# Patient Record
Sex: FEMALE | Race: WHITE | Employment: OTHER
[De-identification: names, ages, dates, MRNs, and addresses within clinical notes are randomized per-mention and may not be internally consistent; named-entity substitution may affect disease eponyms.]

---

## 2017-01-12 ENCOUNTER — TELEPHONE (OUTPATIENT)
Dept: ONCOLOGY | Facility: CLINIC | Age: 62
End: 2017-01-12

## 2017-01-18 ENCOUNTER — TELEPHONE (OUTPATIENT)
Dept: PRIMARY CARE CLINIC | Facility: CLINIC | Age: 62
End: 2017-01-18

## 2017-01-20 ENCOUNTER — TELEPHONE (OUTPATIENT)
Dept: PRIMARY CARE CLINIC | Facility: CLINIC | Age: 62
End: 2017-01-20

## 2017-01-20 DIAGNOSIS — Z79.01 LONG TERM CURRENT USE OF ANTICOAGULANT THERAPY: ICD-10-CM

## 2017-01-20 DIAGNOSIS — I82.409 ACUTE THROMBOEMBOLISM OF DEEP VEINS OF LOWER EXTREMITY, UNSPECIFIED LATERALITY (HCC): Primary | ICD-10-CM

## 2017-01-20 RX ORDER — WARFARIN SODIUM 5 MG/1
TABLET ORAL
Qty: 30 TABLET | Refills: 0 | Status: SHIPPED | OUTPATIENT
Start: 2017-01-20 | End: 2017-03-20 | Stop reason: SDUPTHER

## 2017-03-02 ENCOUNTER — OFFICE VISIT (OUTPATIENT)
Dept: ONCOLOGY | Facility: CLINIC | Age: 62
End: 2017-03-02

## 2017-03-02 VITALS
WEIGHT: 267.8 LBS | TEMPERATURE: 99 F | SYSTOLIC BLOOD PRESSURE: 133 MMHG | HEART RATE: 98 BPM | RESPIRATION RATE: 20 BRPM | HEIGHT: 60 IN | BODY MASS INDEX: 52.58 KG/M2 | DIASTOLIC BLOOD PRESSURE: 67 MMHG

## 2017-03-02 DIAGNOSIS — I82.4Y3 DEEP VEIN THROMBOSIS (DVT) OF PROXIMAL VEIN OF BOTH LOWER EXTREMITIES, UNSPECIFIED CHRONICITY (HCC): ICD-10-CM

## 2017-03-02 DIAGNOSIS — I82.409 ACUTE THROMBOEMBOLISM OF DEEP VEINS OF LOWER EXTREMITY, UNSPECIFIED LATERALITY (HCC): ICD-10-CM

## 2017-03-02 DIAGNOSIS — I89.0 LYMPHEDEMA OF BOTH LOWER EXTREMITIES: ICD-10-CM

## 2017-03-02 DIAGNOSIS — Z79.01 LONG TERM CURRENT USE OF ANTICOAGULANT THERAPY: Primary | ICD-10-CM

## 2017-03-02 PROCEDURE — 99203 OFFICE O/P NEW LOW 30 MIN: CPT | Performed by: INTERNAL MEDICINE

## 2017-03-02 RX ORDER — M-VIT,TX,IRON,MINS/CALC/FOLIC 27MG-0.4MG
1 TABLET ORAL DAILY
COMMUNITY

## 2017-03-02 ASSESSMENT — ENCOUNTER SYMPTOMS
COLOR CHANGE: 0
COUGH: 0
BACK PAIN: 0
CONSTIPATION: 0
EYE ITCHING: 0
EYE REDNESS: 0
BLOOD IN STOOL: 0
WHEEZING: 0
NAUSEA: 0
VOMITING: 0
DIARRHEA: 0
SHORTNESS OF BREATH: 0
ABDOMINAL PAIN: 0
CHEST TIGHTNESS: 0

## 2017-03-15 ENCOUNTER — TELEPHONE (OUTPATIENT)
Dept: WOUND CARE | Age: 62
End: 2017-03-15

## 2017-03-15 RX ORDER — TRAMADOL HYDROCHLORIDE 50 MG/1
TABLET ORAL
Qty: 30 TABLET | Refills: 1 | Status: SHIPPED | OUTPATIENT
Start: 2017-03-15 | End: 2018-01-11 | Stop reason: ALTCHOICE

## 2017-03-20 DIAGNOSIS — Z79.01 LONG TERM CURRENT USE OF ANTICOAGULANT THERAPY: ICD-10-CM

## 2017-03-20 DIAGNOSIS — I82.409 ACUTE THROMBOEMBOLISM OF DEEP VEINS OF LOWER EXTREMITY, UNSPECIFIED LATERALITY (HCC): ICD-10-CM

## 2017-03-20 RX ORDER — WARFARIN SODIUM 5 MG/1
TABLET ORAL
Qty: 30 TABLET | Refills: 0 | Status: SHIPPED | OUTPATIENT
Start: 2017-03-20 | End: 2017-06-22 | Stop reason: ALTCHOICE

## 2017-03-23 ENCOUNTER — OFFICE VISIT (OUTPATIENT)
Dept: ONCOLOGY | Age: 62
End: 2017-03-23
Payer: COMMERCIAL

## 2017-03-23 VITALS
SYSTOLIC BLOOD PRESSURE: 130 MMHG | TEMPERATURE: 97.8 F | HEIGHT: 60 IN | DIASTOLIC BLOOD PRESSURE: 62 MMHG | HEART RATE: 111 BPM | WEIGHT: 264.8 LBS | RESPIRATION RATE: 20 BRPM | BODY MASS INDEX: 51.99 KG/M2

## 2017-03-23 DIAGNOSIS — I82.409 ACUTE THROMBOEMBOLISM OF DEEP VEINS OF LOWER EXTREMITY, UNSPECIFIED LATERALITY (HCC): Primary | ICD-10-CM

## 2017-03-23 DIAGNOSIS — E66.9 OBESITY, UNSPECIFIED OBESITY SEVERITY, UNSPECIFIED OBESITY TYPE: ICD-10-CM

## 2017-03-23 DIAGNOSIS — Z79.01 LONG TERM CURRENT USE OF ANTICOAGULANT THERAPY: ICD-10-CM

## 2017-03-23 PROCEDURE — 99214 OFFICE O/P EST MOD 30 MIN: CPT | Performed by: INTERNAL MEDICINE

## 2017-03-23 ASSESSMENT — ENCOUNTER SYMPTOMS
VOMITING: 0
EYE REDNESS: 0
DIARRHEA: 0
BACK PAIN: 0
COLOR CHANGE: 0
ABDOMINAL PAIN: 0
SINUS PRESSURE: 1
NAUSEA: 0
CONSTIPATION: 0
EYE ITCHING: 0
WHEEZING: 0
CHEST TIGHTNESS: 0
COUGH: 0
SHORTNESS OF BREATH: 0
BLOOD IN STOOL: 0

## 2017-06-20 ENCOUNTER — HOSPITAL ENCOUNTER (OUTPATIENT)
Age: 62
Discharge: HOME | End: 2017-06-20
Payer: COMMERCIAL

## 2017-06-20 DIAGNOSIS — I82.409 ACUTE THROMBOEMBOLISM OF DEEP VEINS OF LOWER EXTREMITY, UNSPECIFIED LATERALITY (HCC): ICD-10-CM

## 2017-06-20 DIAGNOSIS — Z79.01 LONG TERM CURRENT USE OF ANTICOAGULANT THERAPY: ICD-10-CM

## 2017-06-20 LAB
ABSOLUTE EOS #: 0.2 K/UL (ref 0–0.4)
ABSOLUTE LYMPH #: 2.2 K/UL (ref 1–4.8)
ABSOLUTE MONO #: 0.6 K/UL (ref 0–1)
ALBUMIN SERPL-MCNC: 3.8 G/DL (ref 3.5–5.2)
ALBUMIN/GLOBULIN RATIO: 1.2 (ref 1–2.5)
ALP BLD-CCNC: 84 U/L (ref 35–104)
ALT SERPL-CCNC: 15 U/L (ref 5–33)
ANION GAP SERPL CALCULATED.3IONS-SCNC: 15 MMOL/L (ref 9–17)
AST SERPL-CCNC: 15 U/L
BASOPHILS # BLD: 0 %
BASOPHILS ABSOLUTE: 0 K/UL (ref 0–0.2)
BILIRUB SERPL-MCNC: 0.26 MG/DL (ref 0.3–1.2)
BUN BLDV-MCNC: 25 MG/DL (ref 8–23)
BUN/CREAT BLD: 36 (ref 9–20)
CALCIUM SERPL-MCNC: 8.8 MG/DL (ref 8.6–10.4)
CHLORIDE BLD-SCNC: 104 MMOL/L (ref 98–107)
CO2: 25 MMOL/L (ref 20–31)
CREAT SERPL-MCNC: 0.7 MG/DL (ref 0.5–0.9)
DIFFERENTIAL TYPE: NORMAL
EOSINOPHILS RELATIVE PERCENT: 2 %
GFR AFRICAN AMERICAN: >60 ML/MIN
GFR NON-AFRICAN AMERICAN: >60 ML/MIN
GFR SERPL CREATININE-BSD FRML MDRD: ABNORMAL ML/MIN/{1.73_M2}
GFR SERPL CREATININE-BSD FRML MDRD: ABNORMAL ML/MIN/{1.73_M2}
GLUCOSE BLD-MCNC: 105 MG/DL (ref 70–99)
HCT VFR BLD CALC: 44.8 % (ref 36–46)
HEMOGLOBIN: 15.3 G/DL (ref 12–16)
LYMPHOCYTES # BLD: 29 %
MCH RBC QN AUTO: 32.9 PG (ref 26–34)
MCHC RBC AUTO-ENTMCNC: 34.2 G/DL (ref 31–37)
MCV RBC AUTO: 96.1 FL (ref 80–100)
MONOCYTES # BLD: 9 %
PDW BLD-RTO: 12.9 % (ref 12.1–15.2)
PLATELET # BLD: 286 K/UL (ref 140–450)
PLATELET ESTIMATE: NORMAL
PMV BLD AUTO: NORMAL FL (ref 6–12)
POTASSIUM SERPL-SCNC: 3.9 MMOL/L (ref 3.7–5.3)
RBC # BLD: 4.66 M/UL (ref 4–5.2)
RBC # BLD: NORMAL 10*6/UL
SEG NEUTROPHILS: 60 %
SEGMENTED NEUTROPHILS ABSOLUTE COUNT: 4.4 K/UL (ref 1.8–7.7)
SODIUM BLD-SCNC: 144 MMOL/L (ref 135–144)
TOTAL PROTEIN: 7.1 G/DL (ref 6.4–8.3)
WBC # BLD: 7.4 K/UL (ref 3.5–11)
WBC # BLD: NORMAL 10*3/UL

## 2017-06-20 PROCEDURE — 80053 COMPREHEN METABOLIC PANEL: CPT

## 2017-06-20 PROCEDURE — 85025 COMPLETE CBC W/AUTO DIFF WBC: CPT

## 2017-06-20 PROCEDURE — 36415 COLL VENOUS BLD VENIPUNCTURE: CPT

## 2017-06-22 ENCOUNTER — OFFICE VISIT (OUTPATIENT)
Dept: ONCOLOGY | Age: 62
End: 2017-06-22
Payer: COMMERCIAL

## 2017-06-22 VITALS
HEIGHT: 60 IN | RESPIRATION RATE: 20 BRPM | WEIGHT: 256 LBS | SYSTOLIC BLOOD PRESSURE: 148 MMHG | DIASTOLIC BLOOD PRESSURE: 81 MMHG | HEART RATE: 81 BPM | TEMPERATURE: 97.2 F | BODY MASS INDEX: 50.26 KG/M2

## 2017-06-22 DIAGNOSIS — I82.409 ACUTE THROMBOEMBOLISM OF DEEP VEINS OF LOWER EXTREMITY, UNSPECIFIED LATERALITY (HCC): Primary | ICD-10-CM

## 2017-06-22 DIAGNOSIS — Z79.01 LONG TERM CURRENT USE OF ANTICOAGULANT THERAPY: ICD-10-CM

## 2017-06-22 PROCEDURE — 99213 OFFICE O/P EST LOW 20 MIN: CPT | Performed by: INTERNAL MEDICINE

## 2017-06-22 ASSESSMENT — ENCOUNTER SYMPTOMS
WHEEZING: 0
CONSTIPATION: 0
COLOR CHANGE: 0
BLOOD IN STOOL: 0
CHEST TIGHTNESS: 0
COUGH: 0
DIARRHEA: 0
ABDOMINAL PAIN: 0
EYE ITCHING: 0
NAUSEA: 0
SHORTNESS OF BREATH: 0
VOMITING: 0
EYE REDNESS: 0
BACK PAIN: 0

## 2017-06-30 ENCOUNTER — TELEPHONE (OUTPATIENT)
Dept: PODIATRY | Age: 62
End: 2017-06-30

## 2017-07-06 ENCOUNTER — TELEPHONE (OUTPATIENT)
Dept: PODIATRY | Age: 62
End: 2017-07-06

## 2018-01-03 ENCOUNTER — HOSPITAL ENCOUNTER (OUTPATIENT)
Age: 63
Discharge: HOME | End: 2018-01-03
Payer: COMMERCIAL

## 2018-01-03 DIAGNOSIS — I82.409 ACUTE THROMBOEMBOLISM OF DEEP VEINS OF LOWER EXTREMITY, UNSPECIFIED LATERALITY (HCC): ICD-10-CM

## 2018-01-03 LAB
ABSOLUTE EOS #: 0.2 K/UL (ref 0–0.4)
ABSOLUTE IMMATURE GRANULOCYTE: ABNORMAL K/UL (ref 0–0.3)
ABSOLUTE LYMPH #: 2 K/UL (ref 1–4.8)
ABSOLUTE MONO #: 0.7 K/UL (ref 0–1)
ALBUMIN SERPL-MCNC: 4 G/DL (ref 3.5–5.2)
ALBUMIN/GLOBULIN RATIO: 1.2 (ref 1–2.5)
ALP BLD-CCNC: 95 U/L (ref 35–104)
ALT SERPL-CCNC: 19 U/L (ref 5–33)
ANION GAP SERPL CALCULATED.3IONS-SCNC: 12 MMOL/L (ref 9–17)
AST SERPL-CCNC: 16 U/L
BASOPHILS # BLD: 0 % (ref 0–2)
BASOPHILS ABSOLUTE: 0 K/UL (ref 0–0.2)
BILIRUB SERPL-MCNC: 0.3 MG/DL (ref 0.3–1.2)
BUN BLDV-MCNC: 25 MG/DL (ref 8–23)
BUN/CREAT BLD: 40 (ref 9–20)
CALCIUM SERPL-MCNC: 9.3 MG/DL (ref 8.6–10.4)
CHLORIDE BLD-SCNC: 103 MMOL/L (ref 98–107)
CO2: 29 MMOL/L (ref 20–31)
CREAT SERPL-MCNC: 0.62 MG/DL (ref 0.5–0.9)
DIFFERENTIAL TYPE: ABNORMAL
EOSINOPHILS RELATIVE PERCENT: 3 % (ref 0–8)
GFR AFRICAN AMERICAN: >60 ML/MIN
GFR NON-AFRICAN AMERICAN: >60 ML/MIN
GFR SERPL CREATININE-BSD FRML MDRD: ABNORMAL ML/MIN/{1.73_M2}
GFR SERPL CREATININE-BSD FRML MDRD: ABNORMAL ML/MIN/{1.73_M2}
GLUCOSE BLD-MCNC: 108 MG/DL (ref 70–99)
HCT VFR BLD CALC: 46.8 % (ref 36–46)
HEMOGLOBIN: 15.5 G/DL (ref 12–16)
IMMATURE GRANULOCYTES: ABNORMAL %
LYMPHOCYTES # BLD: 26 % (ref 24–44)
MCH RBC QN AUTO: 32.3 PG (ref 26–34)
MCHC RBC AUTO-ENTMCNC: 33.2 G/DL (ref 31–37)
MCV RBC AUTO: 97.5 FL (ref 80–100)
MONOCYTES # BLD: 9 % (ref 0–12)
PDW BLD-RTO: 13.1 % (ref 12.1–15.2)
PLATELET # BLD: 303 K/UL (ref 140–450)
PLATELET ESTIMATE: ABNORMAL
PMV BLD AUTO: 7.3 FL (ref 6–12)
POTASSIUM SERPL-SCNC: 4 MMOL/L (ref 3.7–5.3)
RBC # BLD: 4.8 M/UL (ref 4–5.2)
RBC # BLD: ABNORMAL 10*6/UL
SEG NEUTROPHILS: 62 % (ref 36–66)
SEGMENTED NEUTROPHILS ABSOLUTE COUNT: 4.8 K/UL (ref 1.8–7.7)
SODIUM BLD-SCNC: 144 MMOL/L (ref 135–144)
TOTAL PROTEIN: 7.4 G/DL (ref 6.4–8.3)
WBC # BLD: 7.7 K/UL (ref 3.5–11)
WBC # BLD: ABNORMAL 10*3/UL

## 2018-01-03 PROCEDURE — 85025 COMPLETE CBC W/AUTO DIFF WBC: CPT

## 2018-01-03 PROCEDURE — 36415 COLL VENOUS BLD VENIPUNCTURE: CPT

## 2018-01-03 PROCEDURE — 80053 COMPREHEN METABOLIC PANEL: CPT

## 2018-01-11 ENCOUNTER — OFFICE VISIT (OUTPATIENT)
Dept: ONCOLOGY | Age: 63
End: 2018-01-11
Payer: COMMERCIAL

## 2018-01-11 VITALS
RESPIRATION RATE: 18 BRPM | HEART RATE: 108 BPM | DIASTOLIC BLOOD PRESSURE: 73 MMHG | WEIGHT: 269 LBS | BODY MASS INDEX: 52.81 KG/M2 | SYSTOLIC BLOOD PRESSURE: 141 MMHG | TEMPERATURE: 96.6 F | HEIGHT: 60 IN

## 2018-01-11 DIAGNOSIS — I82.409 ACUTE THROMBOEMBOLISM OF DEEP VEINS OF LOWER EXTREMITY, UNSPECIFIED LATERALITY (HCC): Primary | ICD-10-CM

## 2018-01-11 DIAGNOSIS — Z79.01 LONG TERM CURRENT USE OF ANTICOAGULANT THERAPY: ICD-10-CM

## 2018-01-11 PROCEDURE — 3017F COLORECTAL CA SCREEN DOC REV: CPT | Performed by: INTERNAL MEDICINE

## 2018-01-11 PROCEDURE — G8427 DOCREV CUR MEDS BY ELIG CLIN: HCPCS | Performed by: INTERNAL MEDICINE

## 2018-01-11 PROCEDURE — 3014F SCREEN MAMMO DOC REV: CPT | Performed by: INTERNAL MEDICINE

## 2018-01-11 PROCEDURE — G8417 CALC BMI ABV UP PARAM F/U: HCPCS | Performed by: INTERNAL MEDICINE

## 2018-01-11 PROCEDURE — G8484 FLU IMMUNIZE NO ADMIN: HCPCS | Performed by: INTERNAL MEDICINE

## 2018-01-11 PROCEDURE — 99213 OFFICE O/P EST LOW 20 MIN: CPT | Performed by: INTERNAL MEDICINE

## 2018-01-11 PROCEDURE — 4004F PT TOBACCO SCREEN RCVD TLK: CPT | Performed by: INTERNAL MEDICINE

## 2018-01-11 ASSESSMENT — ENCOUNTER SYMPTOMS
NAUSEA: 0
SHORTNESS OF BREATH: 0
COLOR CHANGE: 0
EYE REDNESS: 0
DIARRHEA: 0
CHEST TIGHTNESS: 0
CONSTIPATION: 0
COUGH: 0
EYE ITCHING: 0
BACK PAIN: 0
WHEEZING: 0
VOMITING: 0
BLOOD IN STOOL: 0
ABDOMINAL PAIN: 0

## 2018-01-11 NOTE — PROGRESS NOTES
respiratory distress. She has no wheezes. Right breast exhibits no mass. Left breast exhibits no mass. Abdominal: Soft. She exhibits no mass. There is no hepatosplenomegaly. There is no tenderness. Musculoskeletal: Normal range of motion. She exhibits no edema or tenderness. Lymphadenopathy:     She has no cervical adenopathy. She has no axillary adenopathy. Neurological: She is alert and oriented to person, place, and time. No cranial nerve deficit. Skin: Skin is warm and dry. No cyanosis. Nails show no clubbing. Psychiatric: She has a normal mood and affect. Her behavior is normal. Thought content normal.   Nursing note and vitals reviewed.     Results for orders placed or performed during the hospital encounter of 01/03/18   CBC Auto Differential   Result Value Ref Range    WBC 7.7 3.5 - 11.0 k/uL    RBC 4.80 4.0 - 5.2 m/uL    Hemoglobin 15.5 12.0 - 16.0 g/dL    Hematocrit 46.8 (H) 36 - 46 %    MCV 97.5 80 - 100 fL    MCH 32.3 26 - 34 pg    MCHC 33.2 31 - 37 g/dL    RDW 13.1 12.1 - 15.2 %    Platelets 166 571 - 009 k/uL    MPV 7.3 6.0 - 12.0 fL    Differential Type NOT REPORTED     Seg Neutrophils 62 36 - 66 %    Lymphocytes 26 24 - 44 %    Monocytes 9 0 - 12 %    Eosinophils % 3 0 - 8 %    Basophils 0 0 - 2 %    Immature Granulocytes NOT REPORTED 0 %    Segs Absolute 4.80 1.8 - 7.7 k/uL    Absolute Lymph # 2.00 1.0 - 4.8 k/uL    Absolute Mono # 0.70 0.0 - 1.0 k/uL    Absolute Eos # 0.20 0.0 - 0.4 k/uL    Basophils # 0.00 0.0 - 0.2 k/uL    Absolute Immature Granulocyte NOT REPORTED 0.00 - 0.30 k/uL    WBC Morphology NOT REPORTED     RBC Morphology NOT REPORTED     Platelet Estimate NOT REPORTED    Comprehensive Metabolic Panel   Result Value Ref Range    Glucose 108 (H) 70 - 99 mg/dL    BUN 25 (H) 8 - 23 mg/dL    CREATININE 0.62 0.50 - 0.90 mg/dL    Bun/Cre Ratio 40 (H) 9 - 20    Calcium 9.3 8.6 - 10.4 mg/dL    Sodium 144 135 - 144 mmol/L    Potassium 4.0 3.7 - 5.3 mmol/L    Chloride 103 98 - 107

## 2018-01-16 DIAGNOSIS — Z79.01 LONG TERM CURRENT USE OF ANTICOAGULANT THERAPY: ICD-10-CM

## 2018-01-19 ENCOUNTER — TELEPHONE (OUTPATIENT)
Dept: ONCOLOGY | Age: 63
End: 2018-01-19

## 2018-01-19 DIAGNOSIS — Z79.01 LONG TERM CURRENT USE OF ANTICOAGULANT THERAPY: ICD-10-CM

## 2018-03-23 ENCOUNTER — HOSPITAL ENCOUNTER (OUTPATIENT)
Dept: PREADMISSION TESTING | Age: 63
Discharge: HOME | End: 2018-03-27
Payer: COMMERCIAL

## 2018-03-23 VITALS
DIASTOLIC BLOOD PRESSURE: 81 MMHG | WEIGHT: 265.4 LBS | HEART RATE: 85 BPM | RESPIRATION RATE: 18 BRPM | TEMPERATURE: 97.6 F | SYSTOLIC BLOOD PRESSURE: 155 MMHG | BODY MASS INDEX: 52.1 KG/M2 | HEIGHT: 60 IN | OXYGEN SATURATION: 95 %

## 2018-03-23 LAB
ABSOLUTE EOS #: 0.12 K/UL (ref 0–0.44)
ABSOLUTE IMMATURE GRANULOCYTE: <0.03 K/UL (ref 0–0.3)
ABSOLUTE LYMPH #: 1.8 K/UL (ref 1.1–3.7)
ABSOLUTE MONO #: 0.66 K/UL (ref 0.1–1.2)
ANION GAP SERPL CALCULATED.3IONS-SCNC: 11 MMOL/L (ref 9–17)
BASOPHILS # BLD: 1 % (ref 0–2)
BASOPHILS ABSOLUTE: 0.04 K/UL (ref 0–0.2)
BUN BLDV-MCNC: 26 MG/DL (ref 8–23)
BUN/CREAT BLD: 44 (ref 9–20)
CALCIUM SERPL-MCNC: 8.9 MG/DL (ref 8.6–10.4)
CHLORIDE BLD-SCNC: 103 MMOL/L (ref 98–107)
CO2: 30 MMOL/L (ref 20–31)
CREAT SERPL-MCNC: 0.59 MG/DL (ref 0.5–0.9)
DIFFERENTIAL TYPE: ABNORMAL
EKG ATRIAL RATE: 75 BPM
EKG P AXIS: 51 DEGREES
EKG P-R INTERVAL: 196 MS
EKG Q-T INTERVAL: 452 MS
EKG QRS DURATION: 136 MS
EKG QTC CALCULATION (BAZETT): 504 MS
EKG R AXIS: -17 DEGREES
EKG T AXIS: 10 DEGREES
EKG VENTRICULAR RATE: 75 BPM
EOSINOPHILS RELATIVE PERCENT: 2 % (ref 1–4)
GFR AFRICAN AMERICAN: >60 ML/MIN
GFR NON-AFRICAN AMERICAN: >60 ML/MIN
GFR SERPL CREATININE-BSD FRML MDRD: ABNORMAL ML/MIN/{1.73_M2}
GFR SERPL CREATININE-BSD FRML MDRD: ABNORMAL ML/MIN/{1.73_M2}
GLUCOSE BLD-MCNC: 98 MG/DL (ref 70–99)
HCT VFR BLD CALC: 45.3 % (ref 36.3–47.1)
HEMOGLOBIN: 15.3 G/DL (ref 11.9–15.1)
IMMATURE GRANULOCYTES: 0 %
LYMPHOCYTES # BLD: 25 % (ref 24–43)
MCH RBC QN AUTO: 32.9 PG (ref 25.2–33.5)
MCHC RBC AUTO-ENTMCNC: 33.8 G/DL (ref 28.4–34.8)
MCV RBC AUTO: 97.4 FL (ref 82.6–102.9)
MONOCYTES # BLD: 9 % (ref 3–12)
NRBC AUTOMATED: 0 PER 100 WBC
PDW BLD-RTO: 12.5 % (ref 11.8–14.4)
PLATELET # BLD: 298 K/UL (ref 138–453)
PLATELET ESTIMATE: ABNORMAL
PMV BLD AUTO: 9.1 FL (ref 8.1–13.5)
POTASSIUM SERPL-SCNC: 3.5 MMOL/L (ref 3.7–5.3)
RBC # BLD: 4.65 M/UL (ref 3.95–5.11)
RBC # BLD: ABNORMAL 10*6/UL
SEG NEUTROPHILS: 64 % (ref 36–65)
SEGMENTED NEUTROPHILS ABSOLUTE COUNT: 4.65 K/UL (ref 1.5–8.1)
SODIUM BLD-SCNC: 144 MMOL/L (ref 135–144)
WBC # BLD: 7.3 K/UL (ref 3.5–11.3)
WBC # BLD: ABNORMAL 10*3/UL

## 2018-03-23 PROCEDURE — 93005 ELECTROCARDIOGRAM TRACING: CPT

## 2018-03-23 PROCEDURE — 87641 MR-STAPH DNA AMP PROBE: CPT

## 2018-03-23 PROCEDURE — 36415 COLL VENOUS BLD VENIPUNCTURE: CPT

## 2018-03-23 PROCEDURE — 85025 COMPLETE CBC W/AUTO DIFF WBC: CPT

## 2018-03-23 PROCEDURE — 80048 BASIC METABOLIC PNL TOTAL CA: CPT

## 2018-03-23 RX ORDER — ACETAMINOPHEN 325 MG/1
650 TABLET ORAL ONCE
Status: CANCELLED | OUTPATIENT
Start: 2018-03-23 | End: 2018-03-23

## 2018-03-23 RX ORDER — TRANEXAMIC ACID 650 1/1
1300 TABLET ORAL ONCE
Status: CANCELLED | OUTPATIENT
Start: 2018-03-23

## 2018-03-23 RX ORDER — HYDROCORTISONE ACETATE 0.5 %
CREAM (GRAM) TOPICAL DAILY
COMMUNITY
End: 2018-10-30 | Stop reason: ALTCHOICE

## 2018-03-23 ASSESSMENT — PAIN DESCRIPTION - FREQUENCY: FREQUENCY: CONTINUOUS

## 2018-03-23 ASSESSMENT — PAIN SCALES - GENERAL: PAINLEVEL_OUTOF10: 9

## 2018-03-23 ASSESSMENT — PAIN DESCRIPTION - ORIENTATION: ORIENTATION: LEFT

## 2018-03-23 ASSESSMENT — PAIN DESCRIPTION - PAIN TYPE: TYPE: ACUTE PAIN

## 2018-03-23 ASSESSMENT — PAIN DESCRIPTION - LOCATION: LOCATION: KNEE

## 2018-03-23 NOTE — PROGRESS NOTES
Patient instructed on the pre-operative, intra-operative, and post-operative process. Patient's surgery arrival time to the hospital and surgery start time confirmed for the day of surgery. Patient instructed on NPO status. Medication instructions reviewed with patient. Pre operative instruction sheet reviewed and given to patient in PAT.
Patient instructed to stop Xarelto per PCP instructions.
Yes  Patient Language: English  Medical History Reviewed: Yes  NPO Status Reinforced: Yes  Ride and Caregiver Arranged: Yes  Ride Caregiver Provider:  & daughter  Patient Knows to Bring Current Medications: Yes    Pre-AdmissionTesting Checklist  Patient has been to this health system before?: Yes, W/in last 6 months  Does patient refuse blood?: No  Pre-existing DNR Comfort Care/DNR Arrest/DNI Order: No  Healthcare Directive: No, patient does not have an advance directive for healthcare treatment   needed: No  Patient can read and write?: Yes  History given by: Patient  Providing self care at home?: Yes  Assistive Devices with patient: Jennifer Paz, Wheelchair  Discharge transport (for same day patients): Family    Patient instructed on the pre-operative, intra-operative, and post-operative process? Yes  Medication instructions reviewed with patient? Yes  Pre operative instruction sheet reviewed and given to patient in PAT?   Yes

## 2018-03-24 LAB
MRSA, DNA, NASAL: NORMAL
SPECIMEN DESCRIPTION: NORMAL

## 2018-04-04 ENCOUNTER — HOSPITAL ENCOUNTER (OUTPATIENT)
Dept: PHYSICAL THERAPY | Age: 63
Setting detail: THERAPIES SERIES
Discharge: HOME | End: 2018-04-04
Payer: COMMERCIAL

## 2018-04-04 PROCEDURE — G8979 MOBILITY GOAL STATUS: HCPCS

## 2018-04-04 PROCEDURE — 97162 PT EVAL MOD COMPLEX 30 MIN: CPT

## 2018-04-04 PROCEDURE — G8978 MOBILITY CURRENT STATUS: HCPCS

## 2018-04-04 PROCEDURE — 97530 THERAPEUTIC ACTIVITIES: CPT

## 2018-04-06 ENCOUNTER — HOSPITAL ENCOUNTER (OUTPATIENT)
Age: 63
Discharge: HOME | End: 2018-04-06
Payer: COMMERCIAL

## 2018-04-06 ENCOUNTER — HOSPITAL ENCOUNTER (OUTPATIENT)
Dept: PHYSICAL THERAPY | Age: 63
Setting detail: THERAPIES SERIES
Discharge: HOME | End: 2018-04-06
Payer: COMMERCIAL

## 2018-04-06 DIAGNOSIS — Z79.01 LONG TERM CURRENT USE OF ANTICOAGULANT THERAPY: ICD-10-CM

## 2018-04-06 DIAGNOSIS — I82.409 ACUTE THROMBOEMBOLISM OF DEEP VEINS OF LOWER EXTREMITY, UNSPECIFIED LATERALITY (HCC): ICD-10-CM

## 2018-04-06 LAB
ABSOLUTE EOS #: 0.09 K/UL (ref 0–0.44)
ABSOLUTE IMMATURE GRANULOCYTE: <0.03 K/UL (ref 0–0.3)
ABSOLUTE LYMPH #: 1.66 K/UL (ref 1.1–3.7)
ABSOLUTE MONO #: 0.56 K/UL (ref 0.1–1.2)
ALBUMIN SERPL-MCNC: 3.9 G/DL (ref 3.5–5.2)
ALBUMIN/GLOBULIN RATIO: 1.2 (ref 1–2.5)
ALP BLD-CCNC: 104 U/L (ref 35–104)
ALT SERPL-CCNC: 15 U/L (ref 5–33)
ANION GAP SERPL CALCULATED.3IONS-SCNC: 12 MMOL/L (ref 9–17)
AST SERPL-CCNC: 15 U/L
BASOPHILS # BLD: 1 % (ref 0–2)
BASOPHILS ABSOLUTE: 0.05 K/UL (ref 0–0.2)
BILIRUB SERPL-MCNC: 0.28 MG/DL (ref 0.3–1.2)
BUN BLDV-MCNC: 27 MG/DL (ref 8–23)
BUN/CREAT BLD: 49 (ref 9–20)
CALCIUM SERPL-MCNC: 9.3 MG/DL (ref 8.6–10.4)
CHLORIDE BLD-SCNC: 105 MMOL/L (ref 98–107)
CO2: 27 MMOL/L (ref 20–31)
CREAT SERPL-MCNC: 0.55 MG/DL (ref 0.5–0.9)
DIFFERENTIAL TYPE: ABNORMAL
EOSINOPHILS RELATIVE PERCENT: 1 % (ref 1–4)
GFR AFRICAN AMERICAN: >60 ML/MIN
GFR NON-AFRICAN AMERICAN: >60 ML/MIN
GFR SERPL CREATININE-BSD FRML MDRD: ABNORMAL ML/MIN/{1.73_M2}
GFR SERPL CREATININE-BSD FRML MDRD: ABNORMAL ML/MIN/{1.73_M2}
GLUCOSE BLD-MCNC: 93 MG/DL (ref 70–99)
HCT VFR BLD CALC: 48.1 % (ref 36.3–47.1)
HEMOGLOBIN: 16.1 G/DL (ref 11.9–15.1)
IMMATURE GRANULOCYTES: 0 %
LYMPHOCYTES # BLD: 24 % (ref 24–43)
MCH RBC QN AUTO: 32.7 PG (ref 25.2–33.5)
MCHC RBC AUTO-ENTMCNC: 33.5 G/DL (ref 28.4–34.8)
MCV RBC AUTO: 97.6 FL (ref 82.6–102.9)
MONOCYTES # BLD: 8 % (ref 3–12)
NRBC AUTOMATED: 0 PER 100 WBC
PDW BLD-RTO: 12.1 % (ref 11.8–14.4)
PLATELET # BLD: 287 K/UL (ref 138–453)
PLATELET ESTIMATE: ABNORMAL
PMV BLD AUTO: 9 FL (ref 8.1–13.5)
POTASSIUM SERPL-SCNC: 3.7 MMOL/L (ref 3.7–5.3)
RBC # BLD: 4.93 M/UL (ref 3.95–5.11)
RBC # BLD: ABNORMAL 10*6/UL
SEG NEUTROPHILS: 66 % (ref 36–65)
SEGMENTED NEUTROPHILS ABSOLUTE COUNT: 4.66 K/UL (ref 1.5–8.1)
SODIUM BLD-SCNC: 144 MMOL/L (ref 135–144)
TOTAL PROTEIN: 7.2 G/DL (ref 6.4–8.3)
WBC # BLD: 7 K/UL (ref 3.5–11.3)
WBC # BLD: ABNORMAL 10*3/UL

## 2018-04-06 PROCEDURE — 97110 THERAPEUTIC EXERCISES: CPT

## 2018-04-06 PROCEDURE — 36415 COLL VENOUS BLD VENIPUNCTURE: CPT

## 2018-04-06 PROCEDURE — 80053 COMPREHEN METABOLIC PANEL: CPT

## 2018-04-06 PROCEDURE — 85025 COMPLETE CBC W/AUTO DIFF WBC: CPT

## 2018-04-09 ENCOUNTER — HOSPITAL ENCOUNTER (OUTPATIENT)
Dept: PHYSICAL THERAPY | Age: 63
Setting detail: THERAPIES SERIES
Discharge: HOME | End: 2018-04-09
Payer: COMMERCIAL

## 2018-04-09 NOTE — PROGRESS NOTES
[facility]  Inpatient/Observation/Outpatient Rehabilitation    Date: 2018  Patient Name: Diamond Woods       [] Inpatient Acute/Observation       [x]  Outpatient  : 1955   [x] Pt cancelled due to:    [x] Other: Patient called to cancel due to a death in family over the weekend Anders Kehr lost her mother)  Maria Fernanda Garrett Date: 2018

## 2018-04-12 ENCOUNTER — HOSPITAL ENCOUNTER (OUTPATIENT)
Dept: PHYSICAL THERAPY | Age: 63
Setting detail: THERAPIES SERIES
Discharge: HOME | End: 2018-04-12
Payer: COMMERCIAL

## 2018-04-25 NOTE — PROGRESS NOTES
[facility]  Inpatient/Observation/Outpatient Rehabilitation    Date: 2018  Patient Name: Stephen Noonan       [x] Inpatient Acute/Observation       []  Outpatient  : 1955     Phoned pt regarding PT and whether pt would like to resume at this time; left message for pt to phone facility.        Ambar Riojas , PT, DPT, CMPT Date: 2018

## 2018-04-26 ENCOUNTER — OFFICE VISIT (OUTPATIENT)
Dept: ONCOLOGY | Age: 63
End: 2018-04-26
Payer: COMMERCIAL

## 2018-04-26 VITALS
DIASTOLIC BLOOD PRESSURE: 68 MMHG | BODY MASS INDEX: 51.13 KG/M2 | TEMPERATURE: 96.7 F | HEIGHT: 60 IN | RESPIRATION RATE: 17 BRPM | SYSTOLIC BLOOD PRESSURE: 127 MMHG | WEIGHT: 260.4 LBS | HEART RATE: 86 BPM

## 2018-04-26 DIAGNOSIS — D75.1 ERYTHROCYTOSIS: ICD-10-CM

## 2018-04-26 DIAGNOSIS — Z79.01 LONG TERM CURRENT USE OF ANTICOAGULANT THERAPY: Primary | ICD-10-CM

## 2018-04-26 DIAGNOSIS — Z86.718 HISTORY OF RECURRENT DEEP VEIN THROMBOSIS (DVT): ICD-10-CM

## 2018-04-26 PROCEDURE — 4004F PT TOBACCO SCREEN RCVD TLK: CPT | Performed by: INTERNAL MEDICINE

## 2018-04-26 PROCEDURE — G8427 DOCREV CUR MEDS BY ELIG CLIN: HCPCS | Performed by: INTERNAL MEDICINE

## 2018-04-26 PROCEDURE — 99214 OFFICE O/P EST MOD 30 MIN: CPT | Performed by: INTERNAL MEDICINE

## 2018-04-26 PROCEDURE — 3017F COLORECTAL CA SCREEN DOC REV: CPT | Performed by: INTERNAL MEDICINE

## 2018-04-26 PROCEDURE — G8417 CALC BMI ABV UP PARAM F/U: HCPCS | Performed by: INTERNAL MEDICINE

## 2018-04-26 RX ORDER — POTASSIUM CHLORIDE 1500 MG/1
20 TABLET, EXTENDED RELEASE ORAL
Refills: 0 | COMMUNITY
Start: 2018-04-13 | End: 2018-10-30 | Stop reason: ALTCHOICE

## 2018-04-26 ASSESSMENT — ENCOUNTER SYMPTOMS
WHEEZING: 0
DIARRHEA: 0
ABDOMINAL PAIN: 0
COLOR CHANGE: 0
CHEST TIGHTNESS: 0
NAUSEA: 0
COUGH: 0
CONSTIPATION: 0
EYE ITCHING: 0
VOMITING: 0
EYE REDNESS: 0
BACK PAIN: 0
SHORTNESS OF BREATH: 0
BLOOD IN STOOL: 0

## 2018-05-01 DIAGNOSIS — D75.1 ERYTHROCYTOSIS: ICD-10-CM

## 2018-05-02 ENCOUNTER — HOSPITAL ENCOUNTER (OUTPATIENT)
Dept: PULMONOLOGY | Age: 63
Discharge: HOME | End: 2018-05-02
Payer: COMMERCIAL

## 2018-05-02 ENCOUNTER — HOSPITAL ENCOUNTER (OUTPATIENT)
Age: 63
Discharge: HOME | End: 2018-05-02
Payer: COMMERCIAL

## 2018-05-02 LAB
ALLEN TEST: ABNORMAL
CARBOXYHEMOGLOBIN: ABNORMAL % (ref 0–5)
FIO2: 21
HCO3 ARTERIAL: 22.1 MMOL/L (ref 22–26)
METHEMOGLOBIN: ABNORMAL % (ref 0–1.9)
MODE: ABNORMAL
NEGATIVE BASE EXCESS, ART: 1.2 MMOL/L (ref 0–2)
NOTIFICATION TIME: ABNORMAL
NOTIFICATION: ABNORMAL
O2 DEVICE/FLOW/%: ABNORMAL
O2 SAT, ARTERIAL: 95.2 % (ref 95–98)
OXYHEMOGLOBIN: ABNORMAL % (ref 95–98)
PATIENT TEMP: 37
PCO2 ARTERIAL: 33.4 MMHG (ref 35–45)
PCO2, ART, TEMP ADJ: ABNORMAL (ref 35–45)
PEEP/CPAP: ABNORMAL
PH ARTERIAL: 7.44 (ref 7.35–7.45)
PH, ART, TEMP ADJ: ABNORMAL (ref 7.35–7.45)
PO2 ARTERIAL: 72 MMHG (ref 80–100)
PO2, ART, TEMP ADJ: ABNORMAL MMHG (ref 80–100)
POSITIVE BASE EXCESS, ART: ABNORMAL MMOL/L (ref 0–2)
PSV: ABNORMAL
PT. POSITION: ABNORMAL
RESPIRATORY RATE: 16
SAMPLE SITE: ABNORMAL
SET RATE: ABNORMAL
TEXT FOR RESPIRATORY: ABNORMAL
TOTAL HB: ABNORMAL G/DL (ref 12–16)
TOTAL RATE: ABNORMAL
VT: ABNORMAL

## 2018-05-02 PROCEDURE — 36415 COLL VENOUS BLD VENIPUNCTURE: CPT

## 2018-05-02 PROCEDURE — 82805 BLOOD GASES W/O2 SATURATION: CPT

## 2018-05-02 PROCEDURE — 36600 WITHDRAWAL OF ARTERIAL BLOOD: CPT

## 2018-05-02 PROCEDURE — 81270 JAK2 GENE: CPT

## 2018-05-02 PROCEDURE — 82668 ASSAY OF ERYTHROPOIETIN: CPT

## 2018-05-04 LAB — ERYTHROPOIETIN: 10 MU/ML (ref 4–27)

## 2018-05-06 LAB — V617F MUTATION, QNT: 0 %

## 2018-06-18 NOTE — DISCHARGE SUMMARY
Phone: Lorena          Fax: [phone]                            Outpatient Physical Therapy                                                                    Discharge Summary    Patient: Sintia Mojica  : 1955  [facility] #: [id number]   Referring physician: No admitting provider for patient encounter. Referring Practitioner: Florinda Deras MD      Diagnosis: Primary OA of left knee, M17.12      Date Treatment Initiated: 18  Date of Last Treatment: 18      PT Visit Information  Onset Date: 18  PT Insurance Information: Pramod  Total # of Visits Approved: 3  Total # of Visits to Date: 2  Plan of Care/Certification Expiration Date: 18  No Show: 0  Canceled Appointment: 0      Frequency/Duration   2 times per week   4 weeks      Treatment Received  []HP/CP      []Electrical Stim   [x]Therapeutic Exercise      []Gait Training  []Aquatics   []Ultrasound         [x]Patient Education/HEP   []Manual Therapy  []Traction    []Neuro-kyle        []Soft Tissue Mobs            []Home TENS  []Iontophoresis    []Orthotic casting/fitting      []Dry Needling    Assessment  Assessment: Pt completed 2 PT visits for left knee pain. No other visits were scheduled and we will now discharge PT. Goals  Short term goals  Time Frame for Short term goals: 2 weeks  Short term goal 1: Pt to be instructed in home program. -MET  Short term goal 2: Pt to tolerate 20-30 mins ther ex for left LE strengthening. Short term goal 3: Pt to achieve 92 degrees of left knee flexion to assist with ADL's. Long term goals  Time Frame for Long term goals : 4 weeks  Long term goal 1: Pt to report independence and compliance with home program.   Long term goal 2: Pt to perform sit to stand transfers with only one UE on arm rest indicating improved functional strength of LE's. Long term goal 3: Pt to achieve 100 degrees of left knee flexion to assist with functional tasks. Long term goal 4: Pt to ascend/descend one flight of stairs in order to assist with access to bathroom at home.        Reason for Discharge  [] Goals Achieved                       [x] Poor Follow Through/Attendance                  [] Optimal Function Achieved     [x] Patient Discharged Self    [] Hospitalization                         [] Physician discharge      Thank you for this referral      Diann Cordova, PT, DPT                    Date: 5/18/2018

## 2018-07-17 DIAGNOSIS — Z79.01 LONG TERM CURRENT USE OF ANTICOAGULANT THERAPY: ICD-10-CM

## 2018-07-17 NOTE — TELEPHONE ENCOUNTER
Patient calls to request Xarelto refill. She asks that it be sent to [facility].  She has 3-4 tablets remaining.

## 2018-07-19 ENCOUNTER — HOSPITAL ENCOUNTER (OUTPATIENT)
Dept: PULMONOLOGY | Age: 63
Discharge: HOME | End: 2018-07-19
Payer: COMMERCIAL

## 2018-07-19 ENCOUNTER — HOSPITAL ENCOUNTER (OUTPATIENT)
Age: 63
Discharge: HOME | End: 2018-07-19
Payer: COMMERCIAL

## 2018-07-19 DIAGNOSIS — Z86.718 HISTORY OF RECURRENT DEEP VEIN THROMBOSIS (DVT): ICD-10-CM

## 2018-07-19 DIAGNOSIS — D75.1 ERYTHROCYTOSIS: ICD-10-CM

## 2018-07-19 LAB
ABSOLUTE EOS #: 0.12 K/UL (ref 0–0.44)
ABSOLUTE IMMATURE GRANULOCYTE: <0.03 K/UL (ref 0–0.3)
ABSOLUTE LYMPH #: 1.94 K/UL (ref 1.1–3.7)
ABSOLUTE MONO #: 0.55 K/UL (ref 0.1–1.2)
ALBUMIN SERPL-MCNC: 3.6 G/DL (ref 3.5–5.2)
ALBUMIN/GLOBULIN RATIO: 1.2 (ref 1–2.5)
ALLEN TEST: ABNORMAL
ALP BLD-CCNC: 84 U/L (ref 35–104)
ALT SERPL-CCNC: 13 U/L (ref 5–33)
ANION GAP SERPL CALCULATED.3IONS-SCNC: 10 MMOL/L (ref 9–17)
AST SERPL-CCNC: 14 U/L
BASOPHILS # BLD: 1 % (ref 0–2)
BASOPHILS ABSOLUTE: 0.03 K/UL (ref 0–0.2)
BILIRUB SERPL-MCNC: 0.32 MG/DL (ref 0.3–1.2)
BUN BLDV-MCNC: 28 MG/DL (ref 8–23)
BUN/CREAT BLD: 53 (ref 9–20)
CALCIUM SERPL-MCNC: 8.7 MG/DL (ref 8.6–10.4)
CARBOXYHEMOGLOBIN: ABNORMAL % (ref 0–5)
CHLORIDE BLD-SCNC: 105 MMOL/L (ref 98–107)
CO2: 27 MMOL/L (ref 20–31)
CREAT SERPL-MCNC: 0.53 MG/DL (ref 0.5–0.9)
DIFFERENTIAL TYPE: NORMAL
EOSINOPHILS RELATIVE PERCENT: 2 % (ref 1–4)
FIO2: 21
GFR AFRICAN AMERICAN: >60 ML/MIN
GFR NON-AFRICAN AMERICAN: >60 ML/MIN
GFR SERPL CREATININE-BSD FRML MDRD: ABNORMAL ML/MIN/{1.73_M2}
GFR SERPL CREATININE-BSD FRML MDRD: ABNORMAL ML/MIN/{1.73_M2}
GLUCOSE BLD-MCNC: 85 MG/DL (ref 70–99)
HCO3 ARTERIAL: 25 MMOL/L (ref 22–26)
HCT VFR BLD CALC: 43.2 % (ref 36.3–47.1)
HEMOGLOBIN: 14.7 G/DL (ref 11.9–15.1)
IMMATURE GRANULOCYTES: 0 %
LYMPHOCYTES # BLD: 30 % (ref 24–43)
MCH RBC QN AUTO: 32.6 PG (ref 25.2–33.5)
MCHC RBC AUTO-ENTMCNC: 34 G/DL (ref 28.4–34.8)
MCV RBC AUTO: 95.8 FL (ref 82.6–102.9)
METHEMOGLOBIN: ABNORMAL % (ref 0–1.9)
MODE: ABNORMAL
MONOCYTES # BLD: 9 % (ref 3–12)
NEGATIVE BASE EXCESS, ART: ABNORMAL MMOL/L (ref 0–2)
NOTIFICATION TIME: ABNORMAL
NOTIFICATION: ABNORMAL
NRBC AUTOMATED: 0 PER 100 WBC
O2 DEVICE/FLOW/%: ABNORMAL
O2 SAT, ARTERIAL: 95.5 % (ref 95–98)
OXYHEMOGLOBIN: ABNORMAL % (ref 95–98)
PATIENT TEMP: 37
PCO2 ARTERIAL: 35.5 MMHG (ref 35–45)
PCO2, ART, TEMP ADJ: ABNORMAL (ref 35–45)
PDW BLD-RTO: 12.4 % (ref 11.8–14.4)
PEEP/CPAP: ABNORMAL
PH ARTERIAL: 7.47 (ref 7.35–7.45)
PH, ART, TEMP ADJ: ABNORMAL (ref 7.35–7.45)
PLATELET # BLD: 295 K/UL (ref 138–453)
PLATELET ESTIMATE: NORMAL
PMV BLD AUTO: 9.5 FL (ref 8.1–13.5)
PO2 ARTERIAL: 72.5 MMHG (ref 80–100)
PO2, ART, TEMP ADJ: ABNORMAL MMHG (ref 80–100)
POSITIVE BASE EXCESS, ART: 1.7 MMOL/L (ref 0–2)
POTASSIUM SERPL-SCNC: 3.1 MMOL/L (ref 3.7–5.3)
PSV: ABNORMAL
PT. POSITION: ABNORMAL
RBC # BLD: 4.51 M/UL (ref 3.95–5.11)
RBC # BLD: NORMAL 10*6/UL
RESPIRATORY RATE: 16
SAMPLE SITE: ABNORMAL
SEG NEUTROPHILS: 58 % (ref 36–65)
SEGMENTED NEUTROPHILS ABSOLUTE COUNT: 3.76 K/UL (ref 1.5–8.1)
SET RATE: ABNORMAL
SODIUM BLD-SCNC: 142 MMOL/L (ref 135–144)
TEXT FOR RESPIRATORY: ABNORMAL
TOTAL HB: ABNORMAL G/DL (ref 12–16)
TOTAL PROTEIN: 6.5 G/DL (ref 6.4–8.3)
TOTAL RATE: ABNORMAL
VT: ABNORMAL
WBC # BLD: 6.4 K/UL (ref 3.5–11.3)
WBC # BLD: NORMAL 10*3/UL

## 2018-07-19 PROCEDURE — 82805 BLOOD GASES W/O2 SATURATION: CPT

## 2018-07-19 PROCEDURE — 36415 COLL VENOUS BLD VENIPUNCTURE: CPT

## 2018-07-19 PROCEDURE — 85025 COMPLETE CBC W/AUTO DIFF WBC: CPT

## 2018-07-19 PROCEDURE — 36600 WITHDRAWAL OF ARTERIAL BLOOD: CPT

## 2018-07-19 PROCEDURE — 80053 COMPREHEN METABOLIC PANEL: CPT

## 2018-08-17 ENCOUNTER — HOSPITAL ENCOUNTER (OUTPATIENT)
Dept: NON INVASIVE DIAGNOSTICS | Age: 63
Discharge: HOME | End: 2018-08-17
Payer: COMMERCIAL

## 2018-08-17 LAB
LV EF: 55 %
LVEF MODALITY: NORMAL

## 2018-08-17 PROCEDURE — 93306 TTE W/DOPPLER COMPLETE: CPT

## 2018-08-17 PROCEDURE — 3430000000 HC RX DIAGNOSTIC RADIOPHARMACEUTICAL: Performed by: INTERNAL MEDICINE

## 2018-08-17 PROCEDURE — A9500 TC99M SESTAMIBI: HCPCS | Performed by: INTERNAL MEDICINE

## 2018-08-17 PROCEDURE — 93017 CV STRESS TEST TRACING ONLY: CPT

## 2018-08-17 PROCEDURE — 6360000002 HC RX W HCPCS: Performed by: FAMILY MEDICINE

## 2018-08-17 PROCEDURE — 78452 HT MUSCLE IMAGE SPECT MULT: CPT

## 2018-08-17 RX ADMIN — REGADENOSON 0.4 MG: 0.08 INJECTION, SOLUTION INTRAVENOUS at 10:45

## 2018-08-17 RX ADMIN — Medication 30 MILLICURIE: at 09:46

## 2018-08-20 ENCOUNTER — HOSPITAL ENCOUNTER (OUTPATIENT)
Dept: NON INVASIVE DIAGNOSTICS | Age: 63
Discharge: HOME | End: 2018-08-20
Payer: COMMERCIAL

## 2018-08-20 PROCEDURE — 3430000000 HC RX DIAGNOSTIC RADIOPHARMACEUTICAL: Performed by: INTERNAL MEDICINE

## 2018-08-20 PROCEDURE — A9500 TC99M SESTAMIBI: HCPCS | Performed by: INTERNAL MEDICINE

## 2018-08-20 RX ADMIN — Medication 30 MILLICURIE: at 12:32

## 2018-10-16 RX ORDER — OXYBUTYNIN CHLORIDE 5 MG/1
5 TABLET ORAL 4 TIMES DAILY
COMMUNITY
End: 2018-10-30 | Stop reason: ALTCHOICE

## 2018-10-16 RX ORDER — GABAPENTIN 300 MG/1
300 CAPSULE ORAL 3 TIMES DAILY
COMMUNITY
End: 2018-10-30 | Stop reason: ALTCHOICE

## 2018-10-30 ENCOUNTER — OFFICE VISIT (OUTPATIENT)
Dept: UROLOGY | Age: 63
End: 2018-10-30
Payer: COMMERCIAL

## 2018-10-30 VITALS
DIASTOLIC BLOOD PRESSURE: 78 MMHG | TEMPERATURE: 98.2 F | BODY MASS INDEX: 48.21 KG/M2 | WEIGHT: 262 LBS | SYSTOLIC BLOOD PRESSURE: 120 MMHG | HEIGHT: 62 IN

## 2018-10-30 DIAGNOSIS — R35.1 NOCTURIA: ICD-10-CM

## 2018-10-30 DIAGNOSIS — K59.00 CONSTIPATION, UNSPECIFIED CONSTIPATION TYPE: ICD-10-CM

## 2018-10-30 DIAGNOSIS — R31.0 GROSS HEMATURIA: Primary | ICD-10-CM

## 2018-10-30 DIAGNOSIS — N39.46 MIXED INCONTINENCE: ICD-10-CM

## 2018-10-30 DIAGNOSIS — R35.0 FREQUENCY OF URINATION: ICD-10-CM

## 2018-10-30 PROCEDURE — 3017F COLORECTAL CA SCREEN DOC REV: CPT | Performed by: NURSE PRACTITIONER

## 2018-10-30 PROCEDURE — G8417 CALC BMI ABV UP PARAM F/U: HCPCS | Performed by: NURSE PRACTITIONER

## 2018-10-30 PROCEDURE — 99244 OFF/OP CNSLTJ NEW/EST MOD 40: CPT | Performed by: NURSE PRACTITIONER

## 2018-10-30 PROCEDURE — G8484 FLU IMMUNIZE NO ADMIN: HCPCS | Performed by: NURSE PRACTITIONER

## 2018-10-30 PROCEDURE — 51798 US URINE CAPACITY MEASURE: CPT | Performed by: NURSE PRACTITIONER

## 2018-10-30 PROCEDURE — G8427 DOCREV CUR MEDS BY ELIG CLIN: HCPCS | Performed by: NURSE PRACTITIONER

## 2018-10-30 PROCEDURE — 4004F PT TOBACCO SCREEN RCVD TLK: CPT | Performed by: NURSE PRACTITIONER

## 2018-10-30 RX ORDER — POLYETHYLENE GLYCOL 3350 17 G/17G
17 POWDER, FOR SOLUTION ORAL DAILY
Qty: 510 G | Refills: 11 | Status: SHIPPED | OUTPATIENT
Start: 2018-10-30 | End: 2018-11-29

## 2018-10-30 RX ORDER — OXYBUTYNIN CHLORIDE 10 MG/1
10 TABLET, EXTENDED RELEASE ORAL DAILY
Qty: 30 TABLET | Refills: 3 | Status: SHIPPED | OUTPATIENT
Start: 2018-10-30 | End: 2019-03-13 | Stop reason: SDUPTHER

## 2018-10-30 ASSESSMENT — ENCOUNTER SYMPTOMS
ABDOMINAL PAIN: 0
COLOR CHANGE: 0
SHORTNESS OF BREATH: 0
WHEEZING: 0
VOMITING: 0
BACK PAIN: 0
NAUSEA: 0
EYE PAIN: 0
COUGH: 0
CONSTIPATION: 0
EYE REDNESS: 0

## 2019-01-08 ENCOUNTER — TELEPHONE (OUTPATIENT)
Dept: UROLOGY | Age: 64
End: 2019-01-08

## 2019-02-21 ENCOUNTER — OFFICE VISIT (OUTPATIENT)
Dept: OBGYN | Age: 64
End: 2019-02-21
Payer: MEDICARE

## 2019-02-21 ENCOUNTER — HOSPITAL ENCOUNTER (OUTPATIENT)
Age: 64
Setting detail: SPECIMEN
Discharge: HOME | End: 2019-02-21
Payer: MEDICARE

## 2019-02-21 VITALS
DIASTOLIC BLOOD PRESSURE: 82 MMHG | HEIGHT: 62 IN | WEIGHT: 273 LBS | BODY MASS INDEX: 50.24 KG/M2 | SYSTOLIC BLOOD PRESSURE: 130 MMHG

## 2019-02-21 DIAGNOSIS — N81.4 CYSTOCELE WITH PROLAPSE: ICD-10-CM

## 2019-02-21 DIAGNOSIS — Z12.4 SCREENING FOR MALIGNANT NEOPLASM OF CERVIX: ICD-10-CM

## 2019-02-21 DIAGNOSIS — Z12.4 SCREENING FOR MALIGNANT NEOPLASM OF CERVIX: Primary | ICD-10-CM

## 2019-02-21 PROCEDURE — 99203 OFFICE O/P NEW LOW 30 MIN: CPT | Performed by: ADVANCED PRACTICE MIDWIFE

## 2019-02-21 PROCEDURE — 4004F PT TOBACCO SCREEN RCVD TLK: CPT | Performed by: ADVANCED PRACTICE MIDWIFE

## 2019-02-21 PROCEDURE — 87624 HPV HI-RISK TYP POOLED RSLT: CPT

## 2019-02-21 PROCEDURE — 3017F COLORECTAL CA SCREEN DOC REV: CPT | Performed by: ADVANCED PRACTICE MIDWIFE

## 2019-02-21 PROCEDURE — 1090F PRES/ABSN URINE INCON ASSESS: CPT | Performed by: ADVANCED PRACTICE MIDWIFE

## 2019-02-21 PROCEDURE — G0145 SCR C/V CYTO,THINLAYER,RESCR: HCPCS

## 2019-02-21 PROCEDURE — 1101F PT FALLS ASSESS-DOCD LE1/YR: CPT | Performed by: ADVANCED PRACTICE MIDWIFE

## 2019-02-21 PROCEDURE — G8417 CALC BMI ABV UP PARAM F/U: HCPCS | Performed by: ADVANCED PRACTICE MIDWIFE

## 2019-02-21 PROCEDURE — G8427 DOCREV CUR MEDS BY ELIG CLIN: HCPCS | Performed by: ADVANCED PRACTICE MIDWIFE

## 2019-02-21 PROCEDURE — 4040F PNEUMOC VAC/ADMIN/RCVD: CPT | Performed by: ADVANCED PRACTICE MIDWIFE

## 2019-02-21 PROCEDURE — 57160 INSERT PESSARY/OTHER DEVICE: CPT | Performed by: ADVANCED PRACTICE MIDWIFE

## 2019-02-21 PROCEDURE — G8484 FLU IMMUNIZE NO ADMIN: HCPCS | Performed by: ADVANCED PRACTICE MIDWIFE

## 2019-02-21 PROCEDURE — G8400 PT W/DXA NO RESULTS DOC: HCPCS | Performed by: ADVANCED PRACTICE MIDWIFE

## 2019-02-21 PROCEDURE — A4561 PESSARY RUBBER, ANY TYPE: HCPCS | Performed by: ADVANCED PRACTICE MIDWIFE

## 2019-02-21 PROCEDURE — 1123F ACP DISCUSS/DSCN MKR DOCD: CPT | Performed by: ADVANCED PRACTICE MIDWIFE

## 2019-02-21 ASSESSMENT — PATIENT HEALTH QUESTIONNAIRE - PHQ9
SUM OF ALL RESPONSES TO PHQ QUESTIONS 1-9: 2
SUM OF ALL RESPONSES TO PHQ QUESTIONS 1-9: 2
2. FEELING DOWN, DEPRESSED OR HOPELESS: 1
SUM OF ALL RESPONSES TO PHQ9 QUESTIONS 1 & 2: 2
1. LITTLE INTEREST OR PLEASURE IN DOING THINGS: 1

## 2019-02-22 LAB — COMMENT: NORMAL

## 2019-02-23 LAB
HPV SAMPLE: ABNORMAL
HPV, GENOTYPE 16: DETECTED
HPV, GENOTYPE 18: NOT DETECTED
HPV, HIGH RISK OTHER: NOT DETECTED
HPV, INTERPRETATION: ABNORMAL
SPECIMEN DESCRIPTION: ABNORMAL

## 2019-02-25 ENCOUNTER — TELEPHONE (OUTPATIENT)
Dept: OBGYN | Age: 64
End: 2019-02-25

## 2019-02-25 DIAGNOSIS — R32 URINARY INCONTINENCE, UNSPECIFIED TYPE: Primary | ICD-10-CM

## 2019-02-25 RX ORDER — OXYBUTYNIN CHLORIDE 10 MG/1
10 TABLET, EXTENDED RELEASE ORAL DAILY
Qty: 30 TABLET | Refills: 0 | Status: SHIPPED | OUTPATIENT
Start: 2019-02-25 | End: 2019-03-25 | Stop reason: SDUPTHER

## 2019-03-07 LAB — CYTOLOGY REPORT: NORMAL

## 2019-03-13 ENCOUNTER — OFFICE VISIT (OUTPATIENT)
Dept: OBGYN | Age: 64
End: 2019-03-13
Payer: MEDICARE

## 2019-03-13 ENCOUNTER — TELEPHONE (OUTPATIENT)
Dept: OBGYN | Age: 64
End: 2019-03-13

## 2019-03-13 VITALS
WEIGHT: 275 LBS | HEIGHT: 61 IN | SYSTOLIC BLOOD PRESSURE: 138 MMHG | DIASTOLIC BLOOD PRESSURE: 64 MMHG | BODY MASS INDEX: 51.92 KG/M2

## 2019-03-13 DIAGNOSIS — N81.4 CYSTOCELE WITH UTERINE PROLAPSE: Primary | ICD-10-CM

## 2019-03-13 DIAGNOSIS — R87.613 HGSIL (HIGH GRADE SQUAMOUS INTRAEPITHELIAL LESION) ON PAP SMEAR OF CERVIX: ICD-10-CM

## 2019-03-13 PROCEDURE — G8427 DOCREV CUR MEDS BY ELIG CLIN: HCPCS | Performed by: ADVANCED PRACTICE MIDWIFE

## 2019-03-13 PROCEDURE — 1090F PRES/ABSN URINE INCON ASSESS: CPT | Performed by: ADVANCED PRACTICE MIDWIFE

## 2019-03-13 PROCEDURE — 4004F PT TOBACCO SCREEN RCVD TLK: CPT | Performed by: ADVANCED PRACTICE MIDWIFE

## 2019-03-13 PROCEDURE — 1123F ACP DISCUSS/DSCN MKR DOCD: CPT | Performed by: ADVANCED PRACTICE MIDWIFE

## 2019-03-13 PROCEDURE — 99212 OFFICE O/P EST SF 10 MIN: CPT | Performed by: ADVANCED PRACTICE MIDWIFE

## 2019-03-13 PROCEDURE — G8484 FLU IMMUNIZE NO ADMIN: HCPCS | Performed by: ADVANCED PRACTICE MIDWIFE

## 2019-03-13 PROCEDURE — 1101F PT FALLS ASSESS-DOCD LE1/YR: CPT | Performed by: ADVANCED PRACTICE MIDWIFE

## 2019-03-13 PROCEDURE — 76856 US EXAM PELVIC COMPLETE: CPT | Performed by: OBSTETRICS & GYNECOLOGY

## 2019-03-13 PROCEDURE — G8417 CALC BMI ABV UP PARAM F/U: HCPCS | Performed by: ADVANCED PRACTICE MIDWIFE

## 2019-03-13 PROCEDURE — 3017F COLORECTAL CA SCREEN DOC REV: CPT | Performed by: ADVANCED PRACTICE MIDWIFE

## 2019-03-13 PROCEDURE — 4040F PNEUMOC VAC/ADMIN/RCVD: CPT | Performed by: ADVANCED PRACTICE MIDWIFE

## 2019-03-13 PROCEDURE — G8400 PT W/DXA NO RESULTS DOC: HCPCS | Performed by: ADVANCED PRACTICE MIDWIFE

## 2019-03-19 ENCOUNTER — HOSPITAL ENCOUNTER (OUTPATIENT)
Age: 64
Setting detail: SPECIMEN
Discharge: HOME | End: 2019-03-19
Payer: MEDICARE

## 2019-03-19 ENCOUNTER — OFFICE VISIT (OUTPATIENT)
Dept: OBGYN | Age: 64
End: 2019-03-19
Payer: MEDICARE

## 2019-03-19 VITALS
WEIGHT: 264 LBS | DIASTOLIC BLOOD PRESSURE: 74 MMHG | HEIGHT: 61 IN | BODY MASS INDEX: 49.84 KG/M2 | SYSTOLIC BLOOD PRESSURE: 128 MMHG

## 2019-03-19 DIAGNOSIS — R87.613 HGSIL ON CYTOLOGIC SMEAR OF CERVIX: Primary | ICD-10-CM

## 2019-03-19 PROCEDURE — 88305 TISSUE EXAM BY PATHOLOGIST: CPT

## 2019-03-19 PROCEDURE — 57454 BX/CURETT OF CERVIX W/SCOPE: CPT | Performed by: OBSTETRICS & GYNECOLOGY

## 2019-03-21 LAB — SURGICAL PATHOLOGY REPORT: NORMAL

## 2019-03-25 DIAGNOSIS — R32 URINARY INCONTINENCE, UNSPECIFIED TYPE: ICD-10-CM

## 2019-03-26 DIAGNOSIS — R87.613 HGSIL (HIGH GRADE SQUAMOUS INTRAEPITHELIAL LESION) ON PAP SMEAR OF CERVIX: ICD-10-CM

## 2019-03-26 DIAGNOSIS — Z98.890 HISTORY OF COLPOSCOPY WITH CERVICAL BIOPSY: Primary | ICD-10-CM

## 2019-03-26 RX ORDER — OXYBUTYNIN CHLORIDE 10 MG/1
TABLET, EXTENDED RELEASE ORAL
Qty: 30 TABLET | Refills: 0 | Status: ON HOLD | OUTPATIENT
Start: 2019-03-26 | End: 2020-02-14 | Stop reason: HOSPADM

## 2019-04-24 DIAGNOSIS — R32 URINARY INCONTINENCE, UNSPECIFIED TYPE: ICD-10-CM

## 2019-04-24 RX ORDER — OXYBUTYNIN CHLORIDE 10 MG/1
TABLET, EXTENDED RELEASE ORAL
Qty: 30 TABLET | Refills: 0 | OUTPATIENT
Start: 2019-04-24

## 2019-04-24 NOTE — TELEPHONE ENCOUNTER
This patient is no longer coming to our office she is being seen at [facility] and needs to have rx refilled there

## 2019-04-25 DIAGNOSIS — R32 URINARY INCONTINENCE, UNSPECIFIED TYPE: Primary | ICD-10-CM

## 2019-04-25 DIAGNOSIS — N39.498 OTHER URINARY INCONTINENCE: Primary | ICD-10-CM

## 2019-04-25 RX ORDER — OXYBUTYNIN CHLORIDE 10 MG/1
10 TABLET, EXTENDED RELEASE ORAL DAILY
Qty: 30 TABLET | Refills: 0 | Status: SHIPPED | OUTPATIENT
Start: 2019-04-25 | End: 2022-03-29 | Stop reason: DRUGHIGH

## 2019-04-25 RX ORDER — OXYBUTYNIN CHLORIDE 10 MG/1
10 TABLET, EXTENDED RELEASE ORAL DAILY
Qty: 7 TABLET | Refills: 0 | OUTPATIENT
Start: 2019-04-25

## 2019-08-06 ENCOUNTER — TELEPHONE (OUTPATIENT)
Dept: OBGYN | Age: 64
End: 2019-08-06

## 2019-10-09 ENCOUNTER — TELEPHONE (OUTPATIENT)
Dept: OBGYN | Age: 64
End: 2019-10-09

## 2019-10-29 ENCOUNTER — OFFICE VISIT (OUTPATIENT)
Dept: OBGYN | Age: 64
End: 2019-10-29
Payer: MEDICARE

## 2019-10-29 VITALS
SYSTOLIC BLOOD PRESSURE: 132 MMHG | DIASTOLIC BLOOD PRESSURE: 86 MMHG | WEIGHT: 281 LBS | HEIGHT: 61 IN | BODY MASS INDEX: 53.05 KG/M2

## 2019-10-29 DIAGNOSIS — N81.9 VAGINAL VAULT PROLAPSE: ICD-10-CM

## 2019-10-29 DIAGNOSIS — N81.11 CYSTOCELE, MIDLINE: Primary | ICD-10-CM

## 2019-10-29 PROCEDURE — 99213 OFFICE O/P EST LOW 20 MIN: CPT | Performed by: OBSTETRICS & GYNECOLOGY

## 2019-10-29 RX ORDER — NYSTATIN 100000 [USP'U]/G
POWDER TOPICAL
Refills: 11 | Status: ON HOLD | COMMUNITY
Start: 2019-08-22 | End: 2020-02-14 | Stop reason: HOSPADM

## 2019-10-30 ENCOUNTER — TELEPHONE (OUTPATIENT)
Dept: OBGYN | Age: 64
End: 2019-10-30

## 2019-10-30 DIAGNOSIS — N81.4 CYSTOCELE WITH PROLAPSE: Primary | ICD-10-CM

## 2020-02-10 ENCOUNTER — APPOINTMENT (OUTPATIENT)
Dept: GENERAL RADIOLOGY | Age: 65
DRG: 603 | End: 2020-02-10
Payer: MEDICARE

## 2020-02-10 ENCOUNTER — HOSPITAL ENCOUNTER (INPATIENT)
Age: 65
LOS: 4 days | Discharge: HOME HEALTH | DRG: 603 | End: 2020-02-14
Attending: EMERGENCY MEDICINE | Admitting: INTERNAL MEDICINE
Payer: MEDICARE

## 2020-02-10 PROBLEM — L03.119 CELLULITIS OF LOWER LEG: Status: ACTIVE | Noted: 2020-02-10

## 2020-02-10 LAB
ABSOLUTE EOS #: 0.06 K/UL (ref 0–0.44)
ABSOLUTE IMMATURE GRANULOCYTE: 0.03 K/UL (ref 0–0.3)
ABSOLUTE LYMPH #: 1.17 K/UL (ref 1.1–3.7)
ABSOLUTE MONO #: 0.69 K/UL (ref 0.1–1.2)
ALBUMIN SERPL-MCNC: 3.1 G/DL (ref 3.5–5.2)
ALBUMIN/GLOBULIN RATIO: 0.7 (ref 1–2.5)
ALP BLD-CCNC: 90 U/L (ref 35–104)
ALT SERPL-CCNC: 14 U/L (ref 5–33)
ANION GAP SERPL CALCULATED.3IONS-SCNC: 12 MMOL/L (ref 9–17)
AST SERPL-CCNC: 12 U/L
BASOPHILS # BLD: 0 % (ref 0–2)
BASOPHILS ABSOLUTE: 0.04 K/UL (ref 0–0.2)
BILIRUB SERPL-MCNC: 0.18 MG/DL (ref 0.3–1.2)
BUN BLDV-MCNC: 18 MG/DL (ref 8–23)
BUN/CREAT BLD: 15 (ref 9–20)
CALCIUM SERPL-MCNC: 8.5 MG/DL (ref 8.6–10.4)
CHLORIDE BLD-SCNC: 103 MMOL/L (ref 98–107)
CO2: 21 MMOL/L (ref 20–31)
CREAT SERPL-MCNC: 1.19 MG/DL (ref 0.5–0.9)
DIFFERENTIAL TYPE: ABNORMAL
EOSINOPHILS RELATIVE PERCENT: 1 % (ref 1–4)
GFR AFRICAN AMERICAN: 55 ML/MIN
GFR NON-AFRICAN AMERICAN: 45 ML/MIN
GFR SERPL CREATININE-BSD FRML MDRD: ABNORMAL ML/MIN/{1.73_M2}
GFR SERPL CREATININE-BSD FRML MDRD: ABNORMAL ML/MIN/{1.73_M2}
GLUCOSE BLD-MCNC: 128 MG/DL (ref 70–99)
HCT VFR BLD CALC: 35.4 % (ref 36.3–47.1)
HEMOGLOBIN: 11.4 G/DL (ref 11.9–15.1)
IMMATURE GRANULOCYTES: 0 %
INR BLD: 1 (ref 0.9–1.2)
LACTIC ACID, SEPSIS WHOLE BLOOD: NORMAL MMOL/L (ref 0.5–1.9)
LACTIC ACID, SEPSIS WHOLE BLOOD: NORMAL MMOL/L (ref 0.5–1.9)
LACTIC ACID, SEPSIS: 1.1 MMOL/L (ref 0.5–1.9)
LACTIC ACID, SEPSIS: 1.1 MMOL/L (ref 0.5–1.9)
LIPASE: 30 U/L (ref 13–60)
LYMPHOCYTES # BLD: 12 % (ref 24–43)
MCH RBC QN AUTO: 31.3 PG (ref 25.2–33.5)
MCHC RBC AUTO-ENTMCNC: 32.2 G/DL (ref 28.4–34.8)
MCV RBC AUTO: 97.3 FL (ref 82.6–102.9)
MONOCYTES # BLD: 7 % (ref 3–12)
NRBC AUTOMATED: 0 PER 100 WBC
PDW BLD-RTO: 13.8 % (ref 11.8–14.4)
PLATELET # BLD: 424 K/UL (ref 138–453)
PLATELET ESTIMATE: ABNORMAL
PMV BLD AUTO: 8.2 FL (ref 8.1–13.5)
POTASSIUM SERPL-SCNC: 4.6 MMOL/L (ref 3.7–5.3)
PROTHROMBIN TIME: 10.5 SEC (ref 9.7–12.2)
RBC # BLD: 3.64 M/UL (ref 3.95–5.11)
RBC # BLD: ABNORMAL 10*6/UL
SEG NEUTROPHILS: 80 % (ref 36–65)
SEGMENTED NEUTROPHILS ABSOLUTE COUNT: 7.41 K/UL (ref 1.5–8.1)
SODIUM BLD-SCNC: 136 MMOL/L (ref 135–144)
TOTAL PROTEIN: 7.7 G/DL (ref 6.4–8.3)
WBC # BLD: 9.4 K/UL (ref 3.5–11.3)
WBC # BLD: ABNORMAL 10*3/UL

## 2020-02-10 PROCEDURE — 6360000002 HC RX W HCPCS: Performed by: EMERGENCY MEDICINE

## 2020-02-10 PROCEDURE — 87186 SC STD MICRODIL/AGAR DIL: CPT

## 2020-02-10 PROCEDURE — 2580000003 HC RX 258: Performed by: INTERNAL MEDICINE

## 2020-02-10 PROCEDURE — 85610 PROTHROMBIN TIME: CPT

## 2020-02-10 PROCEDURE — 36415 COLL VENOUS BLD VENIPUNCTURE: CPT

## 2020-02-10 PROCEDURE — 2580000003 HC RX 258: Performed by: EMERGENCY MEDICINE

## 2020-02-10 PROCEDURE — 96365 THER/PROPH/DIAG IV INF INIT: CPT

## 2020-02-10 PROCEDURE — 87070 CULTURE OTHR SPECIMN AEROBIC: CPT

## 2020-02-10 PROCEDURE — 87077 CULTURE AEROBIC IDENTIFY: CPT

## 2020-02-10 PROCEDURE — 87040 BLOOD CULTURE FOR BACTERIA: CPT

## 2020-02-10 PROCEDURE — 83690 ASSAY OF LIPASE: CPT

## 2020-02-10 PROCEDURE — 87075 CULTR BACTERIA EXCEPT BLOOD: CPT

## 2020-02-10 PROCEDURE — 80053 COMPREHEN METABOLIC PANEL: CPT

## 2020-02-10 PROCEDURE — 87086 URINE CULTURE/COLONY COUNT: CPT

## 2020-02-10 PROCEDURE — 71045 X-RAY EXAM CHEST 1 VIEW: CPT

## 2020-02-10 PROCEDURE — 83605 ASSAY OF LACTIC ACID: CPT

## 2020-02-10 PROCEDURE — 87205 SMEAR GRAM STAIN: CPT

## 2020-02-10 PROCEDURE — 1200000000 HC SEMI PRIVATE

## 2020-02-10 PROCEDURE — 99284 EMERGENCY DEPT VISIT MOD MDM: CPT

## 2020-02-10 PROCEDURE — 85025 COMPLETE CBC W/AUTO DIFF WBC: CPT

## 2020-02-10 RX ORDER — SODIUM CHLORIDE 0.9 % (FLUSH) 0.9 %
10 SYRINGE (ML) INJECTION EVERY 12 HOURS SCHEDULED
Status: DISCONTINUED | OUTPATIENT
Start: 2020-02-10 | End: 2020-02-14 | Stop reason: HOSPADM

## 2020-02-10 RX ORDER — ONDANSETRON 2 MG/ML
4 INJECTION INTRAMUSCULAR; INTRAVENOUS EVERY 6 HOURS PRN
Status: DISCONTINUED | OUTPATIENT
Start: 2020-02-10 | End: 2020-02-14 | Stop reason: HOSPADM

## 2020-02-10 RX ORDER — SODIUM CHLORIDE 9 MG/ML
INJECTION, SOLUTION INTRAVENOUS CONTINUOUS
Status: DISCONTINUED | OUTPATIENT
Start: 2020-02-10 | End: 2020-02-11

## 2020-02-10 RX ORDER — M-VIT,TX,IRON,MINS/CALC/FOLIC 27MG-0.4MG
1 TABLET ORAL DAILY
Status: DISCONTINUED | OUTPATIENT
Start: 2020-02-11 | End: 2020-02-14 | Stop reason: HOSPADM

## 2020-02-10 RX ORDER — SODIUM CHLORIDE 0.9 % (FLUSH) 0.9 %
10 SYRINGE (ML) INJECTION EVERY 12 HOURS SCHEDULED
Status: DISCONTINUED | OUTPATIENT
Start: 2020-02-10 | End: 2020-02-11 | Stop reason: SDUPTHER

## 2020-02-10 RX ORDER — ACETAMINOPHEN 325 MG/1
650 TABLET ORAL EVERY 4 HOURS PRN
Status: DISCONTINUED | OUTPATIENT
Start: 2020-02-10 | End: 2020-02-11

## 2020-02-10 RX ORDER — SODIUM CHLORIDE 0.9 % (FLUSH) 0.9 %
10 SYRINGE (ML) INJECTION PRN
Status: DISCONTINUED | OUTPATIENT
Start: 2020-02-10 | End: 2020-02-14 | Stop reason: HOSPADM

## 2020-02-10 RX ORDER — OXYBUTYNIN CHLORIDE 10 MG/1
1 TABLET, EXTENDED RELEASE ORAL DAILY
Status: DISCONTINUED | OUTPATIENT
Start: 2020-02-11 | End: 2020-02-10 | Stop reason: SDUPTHER

## 2020-02-10 RX ORDER — NYSTATIN 100000 U/G
CREAM TOPICAL 2 TIMES DAILY
Status: DISCONTINUED | OUTPATIENT
Start: 2020-02-10 | End: 2020-02-11 | Stop reason: ALTCHOICE

## 2020-02-10 RX ORDER — OXYBUTYNIN CHLORIDE 5 MG/1
10 TABLET, EXTENDED RELEASE ORAL DAILY
Status: DISCONTINUED | OUTPATIENT
Start: 2020-02-11 | End: 2020-02-14 | Stop reason: HOSPADM

## 2020-02-10 RX ORDER — SODIUM CHLORIDE 0.9 % (FLUSH) 0.9 %
10 SYRINGE (ML) INJECTION PRN
Status: DISCONTINUED | OUTPATIENT
Start: 2020-02-10 | End: 2020-02-11 | Stop reason: SDUPTHER

## 2020-02-10 RX ADMIN — VANCOMYCIN HYDROCHLORIDE 1000 MG: 1 INJECTION, POWDER, LYOPHILIZED, FOR SOLUTION INTRAVENOUS at 23:03

## 2020-02-10 RX ADMIN — SODIUM CHLORIDE: 9 INJECTION, SOLUTION INTRAVENOUS at 23:16

## 2020-02-10 ASSESSMENT — PAIN SCALES - GENERAL
PAINLEVEL_OUTOF10: 6
PAINLEVEL_OUTOF10: 8

## 2020-02-10 ASSESSMENT — PAIN DESCRIPTION - DESCRIPTORS: DESCRIPTORS: SHARP

## 2020-02-10 ASSESSMENT — PAIN DESCRIPTION - PAIN TYPE: TYPE: ACUTE PAIN

## 2020-02-10 ASSESSMENT — PAIN DESCRIPTION - LOCATION
LOCATION: LEG
LOCATION: LEG

## 2020-02-10 ASSESSMENT — PAIN DESCRIPTION - FREQUENCY: FREQUENCY: INTERMITTENT

## 2020-02-10 ASSESSMENT — PAIN DESCRIPTION - ORIENTATION
ORIENTATION: LEFT
ORIENTATION: LEFT

## 2020-02-10 ASSESSMENT — PAIN - FUNCTIONAL ASSESSMENT: PAIN_FUNCTIONAL_ASSESSMENT: PREVENTS OR INTERFERES SOME ACTIVE ACTIVITIES AND ADLS

## 2020-02-11 PROBLEM — I87.009 POST-PHLEBITIC SYNDROME: Status: ACTIVE | Noted: 2020-02-11

## 2020-02-11 PROBLEM — I89.0 ELEPHANTIASIS NOSTRA VERRUCOSA: Status: ACTIVE | Noted: 2020-02-11

## 2020-02-11 LAB
-: NORMAL
ABSOLUTE EOS #: 0.08 K/UL (ref 0–0.44)
ABSOLUTE IMMATURE GRANULOCYTE: 0.03 K/UL (ref 0–0.3)
ABSOLUTE LYMPH #: 1.03 K/UL (ref 1.1–3.7)
ABSOLUTE MONO #: 0.67 K/UL (ref 0.1–1.2)
AMORPHOUS: NORMAL
ANION GAP SERPL CALCULATED.3IONS-SCNC: 11 MMOL/L (ref 9–17)
BACTERIA: NORMAL
BASOPHILS # BLD: 0 % (ref 0–2)
BASOPHILS ABSOLUTE: 0.03 K/UL (ref 0–0.2)
BUN BLDV-MCNC: 14 MG/DL (ref 8–23)
BUN/CREAT BLD: 14 (ref 9–20)
C-REACTIVE PROTEIN: 52 MG/L (ref 0–5)
CALCIUM SERPL-MCNC: 7.9 MG/DL (ref 8.6–10.4)
CASTS UA: NORMAL /LPF
CHLORIDE BLD-SCNC: 106 MMOL/L (ref 98–107)
CO2: 21 MMOL/L (ref 20–31)
CREAT SERPL-MCNC: 1.03 MG/DL (ref 0.5–0.9)
CRYSTALS, UA: NORMAL /HPF
DIFFERENTIAL TYPE: ABNORMAL
EOSINOPHILS RELATIVE PERCENT: 1 % (ref 1–4)
EPITHELIAL CELLS UA: NORMAL /HPF (ref 0–25)
GFR AFRICAN AMERICAN: >60 ML/MIN
GFR NON-AFRICAN AMERICAN: 54 ML/MIN
GFR SERPL CREATININE-BSD FRML MDRD: ABNORMAL ML/MIN/{1.73_M2}
GFR SERPL CREATININE-BSD FRML MDRD: ABNORMAL ML/MIN/{1.73_M2}
GLUCOSE BLD-MCNC: 108 MG/DL (ref 70–99)
HCT VFR BLD CALC: 31.5 % (ref 36.3–47.1)
HEMOGLOBIN: 10.1 G/DL (ref 11.9–15.1)
IMMATURE GRANULOCYTES: 0 %
LYMPHOCYTES # BLD: 13 % (ref 24–43)
MCH RBC QN AUTO: 31 PG (ref 25.2–33.5)
MCHC RBC AUTO-ENTMCNC: 32.1 G/DL (ref 28.4–34.8)
MCV RBC AUTO: 96.6 FL (ref 82.6–102.9)
MONOCYTES # BLD: 9 % (ref 3–12)
MUCUS: NORMAL
NRBC AUTOMATED: 0 PER 100 WBC
OTHER OBSERVATIONS UA: NORMAL
PDW BLD-RTO: 13.8 % (ref 11.8–14.4)
PLATELET # BLD: 366 K/UL (ref 138–453)
PLATELET ESTIMATE: ABNORMAL
PMV BLD AUTO: 8.2 FL (ref 8.1–13.5)
POTASSIUM SERPL-SCNC: 4.5 MMOL/L (ref 3.7–5.3)
PROCALCITONIN: 0.07 NG/ML
RBC # BLD: 3.26 M/UL (ref 3.95–5.11)
RBC # BLD: ABNORMAL 10*6/UL
RBC UA: NORMAL /HPF (ref 0–2)
RENAL EPITHELIAL, UA: NORMAL /HPF
SEDIMENTATION RATE, ERYTHROCYTE: 91 MM (ref 0–20)
SEG NEUTROPHILS: 77 % (ref 36–65)
SEGMENTED NEUTROPHILS ABSOLUTE COUNT: 5.82 K/UL (ref 1.5–8.1)
SODIUM BLD-SCNC: 138 MMOL/L (ref 135–144)
TRICHOMONAS: NORMAL
WBC # BLD: 7.7 K/UL (ref 3.5–11.3)
WBC # BLD: ABNORMAL 10*3/UL
WBC UA: NORMAL /HPF (ref 0–5)
YEAST: NORMAL

## 2020-02-11 PROCEDURE — 6360000002 HC RX W HCPCS: Performed by: NURSE PRACTITIONER

## 2020-02-11 PROCEDURE — 85651 RBC SED RATE NONAUTOMATED: CPT

## 2020-02-11 PROCEDURE — 2580000003 HC RX 258: Performed by: INTERNAL MEDICINE

## 2020-02-11 PROCEDURE — 51702 INSERT TEMP BLADDER CATH: CPT

## 2020-02-11 PROCEDURE — 80048 BASIC METABOLIC PNL TOTAL CA: CPT

## 2020-02-11 PROCEDURE — 84145 PROCALCITONIN (PCT): CPT

## 2020-02-11 PROCEDURE — 86140 C-REACTIVE PROTEIN: CPT

## 2020-02-11 PROCEDURE — 96366 THER/PROPH/DIAG IV INF ADDON: CPT

## 2020-02-11 PROCEDURE — 81001 URINALYSIS AUTO W/SCOPE: CPT

## 2020-02-11 PROCEDURE — 96367 TX/PROPH/DG ADDL SEQ IV INF: CPT

## 2020-02-11 PROCEDURE — 96376 TX/PRO/DX INJ SAME DRUG ADON: CPT

## 2020-02-11 PROCEDURE — 36415 COLL VENOUS BLD VENIPUNCTURE: CPT

## 2020-02-11 PROCEDURE — 97166 OT EVAL MOD COMPLEX 45 MIN: CPT

## 2020-02-11 PROCEDURE — 97162 PT EVAL MOD COMPLEX 30 MIN: CPT

## 2020-02-11 PROCEDURE — 6370000000 HC RX 637 (ALT 250 FOR IP): Performed by: NURSE PRACTITIONER

## 2020-02-11 PROCEDURE — 1200000000 HC SEMI PRIVATE

## 2020-02-11 PROCEDURE — 6370000000 HC RX 637 (ALT 250 FOR IP): Performed by: INTERNAL MEDICINE

## 2020-02-11 PROCEDURE — 85025 COMPLETE CBC W/AUTO DIFF WBC: CPT

## 2020-02-11 PROCEDURE — 99221 1ST HOSP IP/OBS SF/LOW 40: CPT | Performed by: PODIATRIST

## 2020-02-11 PROCEDURE — 6360000002 HC RX W HCPCS: Performed by: INTERNAL MEDICINE

## 2020-02-11 PROCEDURE — 96375 TX/PRO/DX INJ NEW DRUG ADDON: CPT

## 2020-02-11 RX ORDER — ACETAMINOPHEN 325 MG/1
650 TABLET ORAL EVERY 4 HOURS PRN
Status: DISCONTINUED | OUTPATIENT
Start: 2020-02-11 | End: 2020-02-14 | Stop reason: HOSPADM

## 2020-02-11 RX ORDER — KETOROLAC TROMETHAMINE 15 MG/ML
15 INJECTION, SOLUTION INTRAMUSCULAR; INTRAVENOUS EVERY 6 HOURS PRN
Status: DISCONTINUED | OUTPATIENT
Start: 2020-02-11 | End: 2020-02-14 | Stop reason: HOSPADM

## 2020-02-11 RX ORDER — CALCIUM CARBONATE 200(500)MG
500 TABLET,CHEWABLE ORAL 3 TIMES DAILY PRN
Status: DISCONTINUED | OUTPATIENT
Start: 2020-02-11 | End: 2020-02-14 | Stop reason: HOSPADM

## 2020-02-11 RX ORDER — FUROSEMIDE 10 MG/ML
40 INJECTION INTRAMUSCULAR; INTRAVENOUS 2 TIMES DAILY
Status: DISCONTINUED | OUTPATIENT
Start: 2020-02-11 | End: 2020-02-14 | Stop reason: HOSPADM

## 2020-02-11 RX ADMIN — KETOROLAC TROMETHAMINE 15 MG: 15 INJECTION, SOLUTION INTRAMUSCULAR; INTRAVENOUS at 14:43

## 2020-02-11 RX ADMIN — MULTIPLE VITAMINS W/ MINERALS TAB 1 TABLET: TAB at 08:41

## 2020-02-11 RX ADMIN — FUROSEMIDE 40 MG: 10 INJECTION, SOLUTION INTRAMUSCULAR; INTRAVENOUS at 17:20

## 2020-02-11 RX ADMIN — OXYBUTYNIN CHLORIDE 10 MG: 5 TABLET, EXTENDED RELEASE ORAL at 08:41

## 2020-02-11 RX ADMIN — NYSTATIN: 100000 CREAM TOPICAL at 00:52

## 2020-02-11 RX ADMIN — ACETAMINOPHEN 650 MG: 325 TABLET, FILM COATED ORAL at 08:41

## 2020-02-11 RX ADMIN — PIPERACILLIN AND TAZOBACTAM 3.38 G: 3; .375 INJECTION, POWDER, FOR SOLUTION INTRAVENOUS at 16:16

## 2020-02-11 RX ADMIN — PIPERACILLIN AND TAZOBACTAM 3.38 G: 3; .375 INJECTION, POWDER, FOR SOLUTION INTRAVENOUS at 23:20

## 2020-02-11 RX ADMIN — SODIUM CHLORIDE, PRESERVATIVE FREE 10 ML: 5 INJECTION INTRAVENOUS at 21:10

## 2020-02-11 RX ADMIN — ACETAMINOPHEN 650 MG: 325 TABLET, FILM COATED ORAL at 02:03

## 2020-02-11 RX ADMIN — VANCOMYCIN HYDROCHLORIDE 1750 MG: 750 INJECTION, POWDER, LYOPHILIZED, FOR SOLUTION INTRAVENOUS at 11:48

## 2020-02-11 RX ADMIN — RIVAROXABAN 20 MG: 20 TABLET, FILM COATED ORAL at 08:41

## 2020-02-11 RX ADMIN — FUROSEMIDE 40 MG: 10 INJECTION, SOLUTION INTRAMUSCULAR; INTRAVENOUS at 08:42

## 2020-02-11 RX ADMIN — ANTACID TABLETS 500 MG: 500 TABLET, CHEWABLE ORAL at 14:43

## 2020-02-11 RX ADMIN — PIPERACILLIN AND TAZOBACTAM 3.38 G: 3; .375 INJECTION, POWDER, FOR SOLUTION INTRAVENOUS at 07:26

## 2020-02-11 RX ADMIN — PIPERACILLIN AND TAZOBACTAM 3.38 G: 3; .375 INJECTION, POWDER, FOR SOLUTION INTRAVENOUS at 01:12

## 2020-02-11 RX ADMIN — KETOROLAC TROMETHAMINE 15 MG: 15 INJECTION, SOLUTION INTRAMUSCULAR; INTRAVENOUS at 21:09

## 2020-02-11 ASSESSMENT — PAIN DESCRIPTION - DESCRIPTORS
DESCRIPTORS: THROBBING
DESCRIPTORS: THROBBING
DESCRIPTORS: ACHING
DESCRIPTORS: SHARP

## 2020-02-11 ASSESSMENT — PAIN DESCRIPTION - FREQUENCY
FREQUENCY: INTERMITTENT

## 2020-02-11 ASSESSMENT — PAIN SCALES - GENERAL
PAINLEVEL_OUTOF10: 10
PAINLEVEL_OUTOF10: 5
PAINLEVEL_OUTOF10: 10
PAINLEVEL_OUTOF10: 10
PAINLEVEL_OUTOF10: 0
PAINLEVEL_OUTOF10: 5
PAINLEVEL_OUTOF10: 6
PAINLEVEL_OUTOF10: 0
PAINLEVEL_OUTOF10: 10

## 2020-02-11 ASSESSMENT — ENCOUNTER SYMPTOMS
EYES NEGATIVE: 1
GASTROINTESTINAL NEGATIVE: 1
DIARRHEA: 0
VOMITING: 0
SHORTNESS OF BREATH: 0
CONSTIPATION: 0
CHOKING: 0
WHEEZING: 0
COLOR CHANGE: 1
COUGH: 0
NAUSEA: 0
EYE PAIN: 0
APNEA: 0
ABDOMINAL PAIN: 0

## 2020-02-11 ASSESSMENT — PAIN DESCRIPTION - ORIENTATION
ORIENTATION: LEFT
ORIENTATION: RIGHT;LEFT;LOWER

## 2020-02-11 ASSESSMENT — PAIN DESCRIPTION - LOCATION
LOCATION: LEG

## 2020-02-11 ASSESSMENT — PAIN DESCRIPTION - PAIN TYPE: TYPE: CHRONIC PAIN

## 2020-02-11 NOTE — PROGRESS NOTES
Restrictions  Restrictions/Precautions  Restrictions/Precautions: General Precautions, Fall Risk    Subjective   General  Chart Reviewed: Yes  Patient assessed for rehabilitation services?: Yes  Family / Caregiver Present: No  Referring Practitioner: Dr Julien Em  Diagnosis: Cellulitis of lower leg  Subjective  Subjective: Patient reports 10/10 pain in LLE  General Comment  Comments: Patient laying in bed upon OT arrival, declines OOB activities due to LLE pain       Social/Functional History  Social/Functional History  Lives With: Spouse, Daughter  Type of Home: House  Home Layout: Two level, 1/2 bath on main level, Performs ADL's on one level  Home Access: Stairs to enter with rails  Entrance Stairs - Number of Steps: 5  Entrance Stairs - Rails: Left  Bathroom Shower/Tub: Walk-in shower, Shower chair with back  Bathroom Toilet: Standard  Bathroom Equipment: Shower chair  Bathroom Accessibility: Accessible  Home Equipment: Standard walker, Rolling walker  Receives Help From: Family  ADL Assistance: Independent  Homemaking Responsibilities: No  Ambulation Assistance: Independent  Transfer Assistance: Independent  Active : Yes  Mode of Transportation: Car  Additional Comments: Patient reports sponge bathing in 1/2 bath downstairs due to being unable to go up steps to 2nd floor with full bathroom and walk-in shower       Objective   Vision: Impaired  Vision Exceptions: Wears glasses at all times  Hearing: Within functional limits    Orientation  Overall Orientation Status: Within Functional Limits        ADL  Feeding: Independent  Additional Comments: Patient declines ADLs this date  Tone RUE  RUE Tone: Normotonic  Tone LUE  LUE Tone: Normotonic  Coordination  Movements Are Fluid And Coordinated: Yes        Transfers  Transfer Comments: Patient declines transfers this date     Cognition  Overall Cognitive Status: WFL  Perception  Overall Perceptual Status: WFL     Sensation  Overall Sensation Status: Impaired(Patient reports carpal tunnel in LUE)  LUE AROM (degrees)  LUE AROM : WFL  RUE AROM (degrees)  RUE AROM : WFL  LUE Strength  Gross LUE Strength: WFL  RUE Strength  Gross RUE Strength: WFL                   Plan   Plan  Times per week: 7  Times per day: Daily  Current Treatment Recommendations: Strengthening, Endurance Training, Self-Care / ADL, Balance Training, Functional Mobility Training, Safety Education & Training  Plan Comment: ther ex, ther act, self care                                                    AM-PAC Score        AM-[facility] Inpatient Daily Activity Raw Score: 17 (02/11/20 1220)  AM-PAC Inpatient ADL T-Scale Score : 37.26 (02/11/20 1220)  ADL Inpatient CMS 0-100% Score: 50.11 (02/11/20 1220)  ADL Inpatient CMS G-Code Modifier : CK (02/11/20 1220)      Goals  Short term goals  Time Frame for Short term goals: 10 days  Short term goal 1: Patient will perform 15 min of ther ex/ther act w/o SOB or fatigue in order to increase strength and endurance required for ADLs  Short term goal 2: Patient will perform 5 min of standing sinkside ADLs w/o LOB or intolerable pain in order to increase participation in ADLs  Short term goal 3: Patient will perform ADLs with SUP using AE PRN in order to increase safety and independence with ADLs  Short term goal 4: Patient will perform functional mobility during ADLs with SUP using LRAD in order to increase safety and independence with ADLs  Patient Goals   Patient goals : to decrease pain in LLE       Therapy Time   Individual Concurrent Group Co-treatment   Time In 1146         Time Out 1204         Minutes 18         Timed Code Treatment Minutes: Urbano Bergman 14 , Mohan Mcghee

## 2020-02-11 NOTE — PROGRESS NOTES
Physical Therapy    Facility/Department: [facility] MED SURG  Initial Assessment    NAME: Rogerio Coats  : 1955  MRN: [id number]    Date of Service: 2020    Discharge Recommendations:  Continue to assess pending progress, Home with Home health PT, IP Rehab        Assessment   Assessment: Patient is 75year old female with dx of lower leg cellulitis who presents with decreased B LE strength, decreased functional mobility and endurance, decreased functional activity tolerance, and decreased safety and balance during transfers and ambulation requiring increased level of assistance to prevent falls. Patient to  benefit from physical therapy to address all concerns and safely return to [facility]. Treatment Diagnosis: Difficulty walking  Prognosis: Good  Decision Making: Medium Complexity  REQUIRES PT FOLLOW UP: Yes  Activity Tolerance  Activity Tolerance: Patient Tolerated treatment well;Patient limited by pain       Patient Diagnosis(es): The primary encounter diagnosis was Cellulitis of left lower extremity. A diagnosis of Failure of outpatient treatment was also pertinent to this visit. has a past medical history of Carcinoma ([facility] [address] Cellulitis, DVT (deep venous thrombosis) ([facility] [address] and Wears glasses. has a past surgical history that includes Tubal ligation (); Carpal tunnel release (); [place] tooth extraction; Tubal ligation; and candy and bso (cervix removed).     Restrictions  Restrictions/Precautions  Restrictions/Precautions: General Precautions, Fall Risk  Vision/Hearing  Vision: Impaired  Vision Exceptions: Wears glasses at all times  Hearing: Within functional limits     Subjective  General  Chart Reviewed: Yes  Patient assessed for rehabilitation services?: Yes  Response To Previous Treatment: Not applicable  Family / Caregiver Present: No  Referring Practitioner: Dr. Susan Smiley MD  Referral Date : 20  Diagnosis: Cellulitis of lower leg, L03.119  Follows Commands: Within Functional

## 2020-02-11 NOTE — PROGRESS NOTES
Betadine painted to BLE creases with  [facility] at this time. He is ok with using in house powder afterwards, since we do not carry nystatin powder.

## 2020-02-11 NOTE — ED NOTES
Report called to 107 Saroj Norwood RN, patient transported by [facility].         Maricarmen English RN  02/10/20 4851

## 2020-02-11 NOTE — PROGRESS NOTES
Pharmacy Note  Vancomycin Consult    Sue Alvarado is a 75 y.o. female started on Vancomycin for cellulitis; consult received from Dr. Maggie Dinero  to manage therapy. Also receiving the following antibiotics: zosyn. Patient Active Problem List   Diagnosis    Acute thromboembolism of deep veins of lower extremity (HCC)    Long term current use of anticoagulant therapy    Bilateral cellulitis of lower leg    Acute shoulder pain due to trauma    Lymphedema of both lower extremities    Obesity    Tobacco abuse    Infection with methicillin-resistant Staphylococcus aureus (MRSA)    Bacterial infection due to E. coli    History of recurrent deep vein thrombosis (DVT)    Erythrocytosis    Gross hematuria    Frequency of urination    Nocturia    Mixed incontinence    Constipation    Cellulitis of lower leg       Allergies:  Estrogens     Temp max: 98.9 F  (37.2 C)    Recent Labs     02/10/20  2111   BUN 18       Recent Labs     02/10/20  2111   CREATININE 1.19*       Recent Labs     02/10/20  2111   WBC 9.4       No intake or output data in the 24 hours ending 02/10/20 2353    Culture Date      Source                       Results       Ht Readings from Last 1 Encounters:   02/10/20 5' 1\" (1.549 m)        Wt Readings from Last 1 Encounters:   02/10/20 282 lb 8 oz (128.1 kg)         Body mass index is 53.38 kg/m². Estimated Creatinine Clearance: 59 mL/min (A) (based on SCr of 1.19 mg/dL (H)). Goal Trough Level: 10-20 mcg/mL    Assessment/Plan:  Will initiate vancomycin 1750 mg IV every 24 hours. Timing of trough level will be determined based on culture results, renal function, and clinical response. Thank you for the consult. Will continue to follow.

## 2020-02-11 NOTE — PROGRESS NOTES
Dr. Ordonez Se in department at this time. Writer made him aware of consult. He will come back later to round on patient.

## 2020-02-11 NOTE — PROGRESS NOTES
Patient is resting in her bed quietly. Vitals are completed at this moment. Needs assessed.  Orders continued to be followed,

## 2020-02-11 NOTE — CONSULTS
Podiatry Consult H&P  2/11/2020   Junious Go       SUBJECTIVE: This is a 75 y.o. female seen bedside for B/L Leg cellulitis -- L>R ; requested by Dr. Jean Madrid. Cc: pain / redness , progressive and unresponsive to outpatient p.o. ATB  HPI: p.o; ATB         Leg elevation , without compression stockings ; can't wear          Leg swelling since 2006 DVT   MRR: IV Vancomycin  / IV Zosyn             SubQ Lovenox             OOB BRPs             Nystatin powder , automatically substituted with nystatin cream , via [facility] algorithm     Review of Systems   Constitutional: Positive for activity change, chills and fever. Negative for appetite change, diaphoresis, fatigue and unexpected weight change. HENT: Negative. Negative for nosebleeds. Eyes: Negative. Respiratory: Negative for apnea, cough, choking, shortness of breath and wheezing. Cardiovascular: Positive for leg swelling. +DVT, -claudication    Gastrointestinal: Negative. Negative for constipation, diarrhea, nausea and vomiting. Endocrine: Negative. -DM    Genitourinary:        +tubal Lig. Musculoskeletal: Positive for gait problem. Skin: Positive for color change, rash and wound. Negative for pallor. +lymphedema    Allergic/Immunologic: Negative for environmental allergies, food allergies and immunocompromised state. +meds , -metals , -tape, -latex,    Neurological: Negative for weakness and numbness. Hematological: Negative for adenopathy. Does not bruise/bleed easily. -hyper coagulopathy    Psychiatric/Behavioral: Negative.             Past Medical History:   Diagnosis Date    Carcinoma ([facility] [address]     Squamous Cell    Cellulitis     DVT (deep venous thrombosis) ([facility] [address] 2006    LLE    Wears glasses         Past Surgical History:   Procedure Laterality Date    CARPAL TUNNEL RELEASE  1990s    ANNABELLA AND BSO      05/2019    TUBAL LIGATION  1993    TUBAL LIGATION      WISDOM TOOTH EXTRACTION Family History   Adopted: Yes        Social History     Tobacco Use    Smoking status: Current Every Day Smoker     Packs/day: 1.00     Years: 42.00     Pack years: 42.00     Types: Cigarettes    Smokeless tobacco: Never Used   Substance Use Topics    Alcohol use: No     Alcohol/week: 0.0 standard drinks        Prior to Admission medications    Medication Sig Start Date End Date Taking? Authorizing Provider   nystatin (MYCOSTATIN) [id number] UNIT/GM powder APPLY TOPICALLY TO AFFECTED AREA(S) TWICE DAILY UNTIL HEALED *REPEAT AS NEEDED IF SYMPTOMS RETURN* 8/22/19  Yes Historical Provider, MD   oxybutynin (DITROPAN XL) 10 MG extended release tablet Take 1 tablet by mouth daily 4/25/19  Yes Ng Pipe, APRN - CNM   rivaroxaban (XARELTO) 20 MG TABS tablet Take 1 tablet by mouth daily (with breakfast) 7/17/18  Yes Margarita Moura MD   oxybutynin (DITROPAN-XL) 10 MG extended release tablet TAKE 1 TABLET BY MOUTH EVERY DAY 3/26/19   Ng Pipe, APRN - CNM   Multiple Vitamins-Minerals (THERAPEUTIC MULTIVITAMIN-MINERALS) tablet Take 1 tablet by mouth daily    Historical Provider, MD        Estrogens         OBJECTIVE:        Vitals:    02/11/20 1443   BP: 139/66   Pulse: 77   Resp: 16   Temp: 97.7 °F (36.5 °C)   SpO2: 95%              EXAM:        Pt is AAOx3    Vascular Exam:  DP and PT pulses are +2 on the right . DP and PT pulses are +2 on the left. CFT is <3 seconds bilateral lower extremity. Edema is Present, significant +2/7 pitting superimposed over indurated lymphedema -- significant anatomic cavity horizontal separation with associated skin \"clefts\"    Neuro Exam:  Light touch sensation is Present bilaterally Protective sensation is present using Arthur-Tim monofilament bilaterally    Dermatologic Exam:    Webspaces are dry, clean bilaterally. Open lesion are present, in the form of erosive skin clefts.   Hyperkeratotic lesion are present, actually lymphedema islands of \"nostra\"   Erythema Lab Results   Component Value Date    WBC 7.7 02/11/2020    HCT 31.5 (L) 02/11/2020    HGB 10.1 (L) 02/11/2020     02/11/2020     02/11/2020    K 4.5 02/11/2020     02/11/2020    CO2 21 02/11/2020    BUN 14 02/11/2020    CREATININE 1.03 (H) 02/11/2020    GLUCOSE 108 (H) 02/11/2020         Radiographs:    ASSESEMENT:  [unfilled]   Principal Problem:    Cellulitis of lower leg : IDSA moderate classification , L>R                  Plan: IV ATB x 2                           Hydration                            Leg elevation   Active Problems:    Lymphedema of both lower extremities ; secondary stage 2                  Plan: leg elevation    Post-phlebitic syndrome    Elephantiasis nostra verrucosa ; secondary to chronic lymphedema -- macerated skin friction areas                  Plan: D/C Nystatin cream                          Betadine solution astringent paint , F/U Cornstarch powder        PLAN:  Evaluation and Management  See orders           Thank you   Fatou Hayes [facility]   2/11/2020   3:36 PM

## 2020-02-11 NOTE — H&P
Hospital Medicine  History and Physical    Patient:  Cha Peterson  MRN: [id number]    Chief Complaint:  Leg pain, fever    History Obtained From:  patient  PCP: DONG Hodges CNP    History of Present Illness: The patient is a 75 y.o. female who has been on PO bactrim as an outpatient for several days for cellulitis of LLE. On 2/10 developed fever and chills and presented to the ER. Admitted for severe cellulitis, failure to respond to outpatient therapy, for IV antibiotics. Past Medical History:        Diagnosis Date    Carcinoma ([facility] [address]     Squamous Cell    Cellulitis     DVT (deep venous thrombosis) ([facility] [address] 2006    LLE    Wears glasses        Past Surgical History:        Procedure Laterality Date    CARPAL TUNNEL RELEASE  1990s    ANNABELLA AND BSO      05/2019    TUBAL LIGATION  1993    TUBAL LIGATION      WISDOM TOOTH EXTRACTION         Medications Prior to Admission:    Prior to Admission medications    Medication Sig Start Date End Date Taking? Authorizing Provider   nystatin (MYCOSTATIN) 741773 UNIT/GM powder APPLY TOPICALLY TO AFFECTED AREA(S) TWICE DAILY UNTIL HEALED *REPEAT AS NEEDED IF SYMPTOMS RETURN* 8/22/19  Yes Historical Provider, MD   oxybutynin (DITROPAN XL) 10 MG extended release tablet Take 1 tablet by mouth daily 4/25/19  Yes DONG Griffith CNM   rivaroxaban (XARELTO) 20 MG TABS tablet Take 1 tablet by mouth daily (with breakfast) 7/17/18  Yes Fidelina Arenas MD   oxybutynin (DITROPAN-XL) 10 MG extended release tablet TAKE 1 TABLET BY MOUTH EVERY DAY 3/26/19   DONG Griffith - CNLINUS   Multiple Vitamins-Minerals (THERAPEUTIC MULTIVITAMIN-MINERALS) tablet Take 1 tablet by mouth daily    Historical Provider, MD       Allergies:  Estrogens    Social History:   TOBACCO:   reports that she has been smoking cigarettes. She has a 42.00 pack-year smoking history. She has never used smokeless tobacco.  ETOH:   reports no history of alcohol use.   OCCUPATION: none    Family History:       Adopted: Yes       Review of Systems:  Constitutional: negative  for fevers, and positive for chills. Respiratory: positive for shortness of breath, negative for cough, and negative for wheezing  Cardiovascular: negative for chest pain, and negative for palpitations  Gastrointestinal: negative for abdominal pain, negative for nausea,negative for vomiting, negative for diarrhea, and negative for constipation  Genitourinary:positive for urinary incontinence, negative for decreased stream, dysuria, frequency, hematuria, hesitancy and nocturia  Integument/breast: positive for dryness, pruritus, rash, skin color change and skin lesion(s), negative for changed mole  Musculoskeletal:negative  Neurological: negative  All other systems were reviewed with the patient and are negative except as stated  Physical Exam:    Vitals:   Vitals:    02/11/20 0150   BP:    Pulse: 93   Resp:    Temp:    SpO2:      Weight: 282 lb 12.8 oz (128.3 kg)   Height: 5' 1\" (154.9 cm)   GEN:   A & O x3, no apparent distress. Is morbidly obese  EYES: No gross abnormalities. NECK: normal, supple, no lymphadenopathy,  no carotid bruits  PULM: clear to auscultation bilaterally- no wheezes, rales or rhonchi, normal air movement, no respiratory distress  COR: regular rate & rhythm, no murmurs and no gallops  ABD:  soft, non-tender, non-distended, normal bowel sounds, no masses or organomegaly  EXT:   Severe stasis changes with excoriation bilaterally. Left LE is red, hot to touch. 3+ edema bilaterally  NEURO: negative  SKIN:  See above      -----------------------------------------------------------------  Diagnostic Data: Reviewed    Assessment:      Hospital Problems:  Active Problems:    Cellulitis of lower leg  Resolved Problems:    * No resolved hospital problems.  *      All Problems:  Patient Active Problem List   Diagnosis    Acute thromboembolism of deep veins of lower extremity ([address]    Long term current use of anticoagulant therapy    Bilateral cellulitis of lower leg    Acute shoulder pain due to trauma    Lymphedema of both lower extremities    Obesity    Tobacco abuse    Infection with methicillin-resistant Staphylococcus aureus (MRSA)    Bacterial infection due to E. coli    History of recurrent deep vein thrombosis (DVT)    Erythrocytosis    Gross hematuria    Frequency of urination    Nocturia    Mixed incontinence    Constipation    Cellulitis of lower leg           Plan:       · This patient requires inpatient admission because of severe cellulitis, failure as out patient   · Factors affecting the medical complexity of this patient include morbid obesity, lymphedema, history of DVT, MAYNOR  · Estimated length of stay is 3 days  · Cultures, IV antibiotics, IV lasix, wound care  ·   High risk medications: Adarsh Joya MD  CORE MEASURES  · DVT prophylaxis: already on chronic anticoagulation  · Decubitus ulcer present on admission: No  · CODE STATUS: FULL CODE  · Nutrition Status: morbid obesity  · Physical therapy: Yes   · Old Charts reviewed: Yes  · EKG Reviewed:  Yes  · Advance Directive Addressed: Yes    Moraima García M.D.

## 2020-02-11 NOTE — ED PROVIDER NOTES
[facility] AT THE [facility] MED SURG  eMERGENCY dEPARTMENT eNCOUnter      Pt Name: Felice Sy  MRN: [id number]  [place] 1955  Date of evaluation: 2/10/2020  Provider: Ghassan Ortiz MD    CHIEF COMPLAINT     Chief Complaint   Patient presents with    Wound Check     left lower leg cellulitis. patient was seen by pcp last week. she was started on atb.  had follow up appt today. her pcp advised her to go to er to be admitted to hospital.        [address] [place] [address]    Felice Sy is a 75 y.o. female who presents to the emergency department for evaluation of left lower extremity redness. Patient was diagnosed with cellulitis and started on Bactrim last week. Symptoms are not improved so was sent by PCP to be admitted today. She states she has had discharge from the wound. She states she has had this problem in the past.  She denies any associated fevers. She describes the pain as burning. She rates the pain as mild. Patient is on Xarelto due to previous DVT.         PAST MEDICAL HISTORY     Past Medical History:   Diagnosis Date    Carcinoma ([facility] [address]     Squamous Cell    Cellulitis     DVT (deep venous thrombosis) ([facility] [address] 2006    LLE    Wears glasses        SURGICAL HISTORY       Past Surgical History:   Procedure Laterality Date    CARPAL TUNNEL RELEASE  1990s    ANNABELLA AND BSO      05/2019    TUBAL LIGATION  1993    TUBAL LIGATION      WISDOM TOOTH EXTRACTION         CURRENT MEDICATIONS       Current Discharge Medication List      CONTINUE these medications which have NOT CHANGED    Details   nystatin (MYCOSTATIN) [id number] UNIT/GM powder APPLY TOPICALLY TO AFFECTED AREA(S) TWICE DAILY UNTIL HEALED *REPEAT AS NEEDED IF SYMPTOMS RETURN*  Refills: 11      !! oxybutynin (DITROPAN XL) 10 MG extended release tablet Take 1 tablet by mouth daily  Qty: 30 tablet, Refills: 0    Associated Diagnoses: Other urinary incontinence      rivaroxaban (XARELTO) 20 MG TABS tablet Take 1 tablet by mouth daily (with breakfast)  Qty: 30 tablet, Refills: 5    Associated Diagnoses: Long term current use of anticoagulant therapy      !! oxybutynin (DITROPAN-XL) 10 MG extended release tablet TAKE 1 TABLET BY MOUTH EVERY DAY  Qty: 30 tablet, Refills: 0    Associated Diagnoses: Urinary incontinence, unspecified type      Multiple Vitamins-Minerals (THERAPEUTIC MULTIVITAMIN-MINERALS) tablet Take 1 tablet by mouth daily       ! ! - Potential duplicate medications found. Please discuss with provider.           ALLERGIES     Estrogens    FAMILY HISTORY       Family History   Adopted: Yes        SOCIAL HISTORY       Social History     Socioeconomic History    Marital status:      Spouse name: None    Number of children: None    Years of education: None    Highest education level: None   Occupational History    None   Social Needs    Financial resource strain: None    Food insecurity:     Worry: None     Inability: None    Transportation needs:     Medical: None     Non-medical: None   Tobacco Use    Smoking status: Current Every Day Smoker     Packs/day: 1.00     Years: 42.00     Pack years: 42.00     Types: Cigarettes    Smokeless tobacco: Never Used   Substance and Sexual Activity    Alcohol use: No     Alcohol/week: 0.0 standard drinks    Drug use: No    Sexual activity: Not Currently   Lifestyle    Physical activity:     Days per week: None     Minutes per session: None    Stress: None   Relationships    Social connections:     Talks on phone: None     Gets together: None     Attends [employer]ism service: None     Active member of club or organization: None     Attends meetings of clubs or organizations: None     Relationship status: None    Intimate partner violence:     Fear of current or ex partner: None     Emotionally abused: None     Physically abused: None     Forced sexual activity: None   Other Topics Concern    None   Social History Narrative    None       REVIEW OF SYSTEMS       Review of Systems Mental Status: She is alert and oriented to person, place, and time. DIAGNOSTIC RESULTS     RADIOLOGY: (none if blank)   Interpretation per theRadiologist below, if available at the time of this note:    XR CHEST PORTABLE   Final Result   Cardiomegaly with increased perihilar vasculature and interstitial markings   worrisome for interstitial pulmonary edema/congestion. LABS:  Labs Reviewed   CBC WITH AUTO DIFFERENTIAL - Abnormal; Notable for the following components:       Result Value    RBC 3.64 (*)     Hemoglobin 11.4 (*)     Hematocrit 35.4 (*)     Seg Neutrophils 80 (*)     Lymphocytes 12 (*)     All other components within normal limits   COMPREHENSIVE METABOLIC PANEL W/ REFLEX TO MG FOR LOW K - Abnormal; Notable for the following components:    Glucose 128 (*)     CREATININE 1.19 (*)     Calcium 8.5 (*)     Total Bilirubin 0.18 (*)     Alb 3.1 (*)     Albumin/Globulin Ratio 0.7 (*)     GFR Non- 45 (*)     GFR  55 (*)     All other components within normal limits   URINALYSIS - Abnormal; Notable for the following components:    Urine Hgb 1+ (*)     Protein, UA 1+ (*)     All other components within normal limits   URINE CULTURE   CULTURE BLOOD #1   CULTURE BLOOD #2   CULTURE, ANAEROBIC AND AEROBIC   WOUND CULTURE   LACTATE, SEPSIS   LACTATE, SEPSIS   PROTIME-INR   LIPASE   MICROSCOPIC URINALYSIS   CBC WITH AUTO DIFFERENTIAL       All other labs were within normal range or not returned as of this dictation. EMERGENCY DEPARTMENT COURSE and Medical Decision Making:     Laboratory studies are unremarkable. No evidence of sepsis. Patient does have cellulitis likely from fungus and back to real infection. Patient was started on nystatin cream.  As patient has failed outpatient treatment, patient was started on Zosyn and vancomycin. Patient would benefit from admission. Patient was discussed with Dr. Jose Franklin who agreed with this plan.     ED Medications administered this visit:    Medications   sodium chloride flush 0.9 % injection 10 mL (10 mLs Intravenous Not Given 2/10/20 2358)   sodium chloride flush 0.9 % injection 10 mL (has no administration in time range)   nystatin (MYCOSTATIN) cream ( Topical Given 2/11/20 0052)   therapeutic multivitamin-minerals 1 tablet (has no administration in time range)   oxybutynin (DITROPAN-XL) extended release tablet 10 mg (has no administration in time range)   rivaroxaban (XARELTO) tablet 20 mg (has no administration in time range)   0.9 % sodium chloride infusion ( Intravenous New Bag 2/10/20 2316)   sodium chloride flush 0.9 % injection 10 mL (10 mLs Intravenous Not Given 2/10/20 2358)   sodium chloride flush 0.9 % injection 10 mL (has no administration in time range)   magnesium hydroxide (MILK OF MAGNESIA) 400 MG/5ML suspension 30 mL (has no administration in time range)   ondansetron (ZOFRAN) injection 4 mg (has no administration in time range)   piperacillin-tazobactam (ZOSYN) 3.375 g in dextrose 5 % 50 mL IVPB extended infusion (mini-bag) (0 g Intravenous Stopped 2/11/20 0529)   acetaminophen (TYLENOL) tablet 650 mg (650 mg Oral Given 2/11/20 0203)   vancomycin (VANCOCIN) intermittent dosing (placeholder) (has no administration in time range)   vancomycin (VANCOCIN) 1,750 mg in dextrose 5 % 500 mL IVPB (has no administration in time range)   vancomycin 1000 mg IVPB in 250 mL D5W addavial (0 mg Intravenous Stopped 2/11/20 0050)       CLINICAL IMPRESSION      1. Cellulitis of left lower extremity    2.  Failure of outpatient treatment          DISPOSITION/PLAN   DISPOSITION Admitted 02/10/2020 10:10:05 PM      PATIENT REFERRED TO:  Judd Hill, APRN - CNP  [address]   [phone]            DISCHARGE MEDICATIONS:  Current Discharge Medication List               Solo Mosher MD (electronically signed)  AttendingEmergency Department Provider            Solo Mosher MD  02/11/20 3296

## 2020-02-11 NOTE — PLAN OF CARE
Problem: Pain:  Goal: Pain level will decrease  Description  Pain level will decrease  Outcome: Ongoing  Note:   Patient is able to communicate when pain intervention is required. Patient is also able to rate the pain on a scale of 0-10. Pain is assessed with hourly rounding while patient is awake and pain medication administered as needed and reassessed. Goal: Control of acute pain  Description  Control of acute pain  Outcome: Ongoing  Goal: Control of chronic pain  Description  Control of chronic pain  Outcome: Ongoing     Problem: Falls - Risk of:  Goal: Will remain free from falls  Description  Will remain free from falls  Outcome: Ongoing  Note:   Patient is a high fall risk. Patient is alert and oriented and calls out appropriately. Bed alarm on, bed wheels locked and kept in lowest position. Nonskid wear on, fall sign posted and fall sticker on. Bedside table and call light within reach. Needs assessed with hourly rounding and environment scanned for any safety issue. Goal: Absence of physical injury  Description  Absence of physical injury  Outcome: Ongoing     Problem: Risk for Impaired Skin Integrity  Goal: Tissue integrity - skin and mucous membranes  Description  Structural intactness and normal physiological function of skin and  mucous membranes. Outcome: Ongoing  Note:   Rome score completed for the admission. Skin assessed through head to toe. Patient do have multiple sites of skin issues with redness and excoriation that is seeping through. Medication applied as ordered to the creases of legs. Abdominal fold and bilateral groin cleaned and powder applied. Patient is able to turn self.  Will continue to assess

## 2020-02-12 LAB
ABSOLUTE EOS #: 0.11 K/UL (ref 0–0.44)
ABSOLUTE IMMATURE GRANULOCYTE: <0.03 K/UL (ref 0–0.3)
ABSOLUTE LYMPH #: 1.27 K/UL (ref 1.1–3.7)
ABSOLUTE MONO #: 0.67 K/UL (ref 0.1–1.2)
ANION GAP SERPL CALCULATED.3IONS-SCNC: 13 MMOL/L (ref 9–17)
BASOPHILS # BLD: 0 % (ref 0–2)
BASOPHILS ABSOLUTE: 0.03 K/UL (ref 0–0.2)
BILIRUBIN URINE: NEGATIVE
BUN BLDV-MCNC: 17 MG/DL (ref 8–23)
BUN/CREAT BLD: 14 (ref 9–20)
CALCIUM SERPL-MCNC: 7.6 MG/DL (ref 8.6–10.4)
CHLORIDE BLD-SCNC: 99 MMOL/L (ref 98–107)
CO2: 22 MMOL/L (ref 20–31)
COLOR: YELLOW
COMMENT UA: ABNORMAL
CREAT SERPL-MCNC: 1.22 MG/DL (ref 0.5–0.9)
CULTURE: ABNORMAL
CULTURE: NORMAL
DIFFERENTIAL TYPE: ABNORMAL
DIRECT EXAM: ABNORMAL
DIRECT EXAM: ABNORMAL
EOSINOPHILS RELATIVE PERCENT: 2 % (ref 1–4)
GFR AFRICAN AMERICAN: 53 ML/MIN
GFR NON-AFRICAN AMERICAN: 44 ML/MIN
GFR SERPL CREATININE-BSD FRML MDRD: ABNORMAL ML/MIN/{1.73_M2}
GFR SERPL CREATININE-BSD FRML MDRD: ABNORMAL ML/MIN/{1.73_M2}
GLUCOSE BLD-MCNC: 97 MG/DL (ref 70–99)
GLUCOSE URINE: NEGATIVE
HCT VFR BLD CALC: 31.7 % (ref 36.3–47.1)
HEMOGLOBIN: 10 G/DL (ref 11.9–15.1)
IMMATURE GRANULOCYTES: 0 %
KETONES, URINE: NEGATIVE
LEUKOCYTE ESTERASE, URINE: ABNORMAL
LYMPHOCYTES # BLD: 19 % (ref 24–43)
Lab: ABNORMAL
Lab: NORMAL
MCH RBC QN AUTO: 31 PG (ref 25.2–33.5)
MCHC RBC AUTO-ENTMCNC: 31.5 G/DL (ref 28.4–34.8)
MCV RBC AUTO: 98.1 FL (ref 82.6–102.9)
MONOCYTES # BLD: 10 % (ref 3–12)
NITRITE, URINE: NEGATIVE
NRBC AUTOMATED: 0 PER 100 WBC
PDW BLD-RTO: 13.5 % (ref 11.8–14.4)
PH UA: 7.5 (ref 5–9)
PLATELET # BLD: 354 K/UL (ref 138–453)
PLATELET ESTIMATE: ABNORMAL
PMV BLD AUTO: 8.4 FL (ref 8.1–13.5)
POTASSIUM SERPL-SCNC: 4.3 MMOL/L (ref 3.7–5.3)
PROTEIN UA: ABNORMAL
RBC # BLD: 3.23 M/UL (ref 3.95–5.11)
RBC # BLD: ABNORMAL 10*6/UL
SEG NEUTROPHILS: 69 % (ref 36–65)
SEGMENTED NEUTROPHILS ABSOLUTE COUNT: 4.59 K/UL (ref 1.5–8.1)
SODIUM BLD-SCNC: 134 MMOL/L (ref 135–144)
SPECIFIC GRAVITY UA: 1.01 (ref 1.01–1.02)
SPECIMEN DESCRIPTION: ABNORMAL
SPECIMEN DESCRIPTION: NORMAL
TURBIDITY: CLEAR
URINE HGB: ABNORMAL
UROBILINOGEN, URINE: NORMAL
WBC # BLD: 6.7 K/UL (ref 3.5–11.3)
WBC # BLD: ABNORMAL 10*3/UL

## 2020-02-12 PROCEDURE — 2580000003 HC RX 258: Performed by: INTERNAL MEDICINE

## 2020-02-12 PROCEDURE — 85025 COMPLETE CBC W/AUTO DIFF WBC: CPT

## 2020-02-12 PROCEDURE — 96376 TX/PRO/DX INJ SAME DRUG ADON: CPT

## 2020-02-12 PROCEDURE — 97110 THERAPEUTIC EXERCISES: CPT

## 2020-02-12 PROCEDURE — 97116 GAIT TRAINING THERAPY: CPT

## 2020-02-12 PROCEDURE — 80048 BASIC METABOLIC PNL TOTAL CA: CPT

## 2020-02-12 PROCEDURE — 6360000002 HC RX W HCPCS: Performed by: INTERNAL MEDICINE

## 2020-02-12 PROCEDURE — 6370000000 HC RX 637 (ALT 250 FOR IP): Performed by: INTERNAL MEDICINE

## 2020-02-12 PROCEDURE — 1200000000 HC SEMI PRIVATE

## 2020-02-12 PROCEDURE — 36415 COLL VENOUS BLD VENIPUNCTURE: CPT

## 2020-02-12 PROCEDURE — 6360000002 HC RX W HCPCS: Performed by: NURSE PRACTITIONER

## 2020-02-12 RX ADMIN — PIPERACILLIN AND TAZOBACTAM 3.38 G: 3; .375 INJECTION, POWDER, FOR SOLUTION INTRAVENOUS at 15:54

## 2020-02-12 RX ADMIN — SODIUM CHLORIDE, PRESERVATIVE FREE 10 ML: 5 INJECTION INTRAVENOUS at 07:57

## 2020-02-12 RX ADMIN — KETOROLAC TROMETHAMINE 15 MG: 15 INJECTION, SOLUTION INTRAMUSCULAR; INTRAVENOUS at 20:02

## 2020-02-12 RX ADMIN — MULTIPLE VITAMINS W/ MINERALS TAB 1 TABLET: TAB at 09:20

## 2020-02-12 RX ADMIN — KETOROLAC TROMETHAMINE 15 MG: 15 INJECTION, SOLUTION INTRAMUSCULAR; INTRAVENOUS at 10:07

## 2020-02-12 RX ADMIN — FUROSEMIDE 40 MG: 10 INJECTION, SOLUTION INTRAMUSCULAR; INTRAVENOUS at 17:05

## 2020-02-12 RX ADMIN — KETOROLAC TROMETHAMINE 15 MG: 15 INJECTION, SOLUTION INTRAMUSCULAR; INTRAVENOUS at 03:10

## 2020-02-12 RX ADMIN — PIPERACILLIN AND TAZOBACTAM 3.38 G: 3; .375 INJECTION, POWDER, FOR SOLUTION INTRAVENOUS at 23:15

## 2020-02-12 RX ADMIN — SODIUM CHLORIDE, PRESERVATIVE FREE 10 ML: 5 INJECTION INTRAVENOUS at 20:02

## 2020-02-12 RX ADMIN — OXYBUTYNIN CHLORIDE 10 MG: 5 TABLET, EXTENDED RELEASE ORAL at 09:20

## 2020-02-12 RX ADMIN — RIVAROXABAN 20 MG: 20 TABLET, FILM COATED ORAL at 07:56

## 2020-02-12 RX ADMIN — VANCOMYCIN HYDROCHLORIDE 1750 MG: 750 INJECTION, POWDER, LYOPHILIZED, FOR SOLUTION INTRAVENOUS at 11:53

## 2020-02-12 RX ADMIN — FUROSEMIDE 40 MG: 10 INJECTION, SOLUTION INTRAMUSCULAR; INTRAVENOUS at 09:20

## 2020-02-12 RX ADMIN — PIPERACILLIN AND TAZOBACTAM 3.38 G: 3; .375 INJECTION, POWDER, FOR SOLUTION INTRAVENOUS at 08:02

## 2020-02-12 ASSESSMENT — PAIN DESCRIPTION - LOCATION
LOCATION: LEG

## 2020-02-12 ASSESSMENT — PAIN SCALES - GENERAL
PAINLEVEL_OUTOF10: 5
PAINLEVEL_OUTOF10: 10
PAINLEVEL_OUTOF10: 2
PAINLEVEL_OUTOF10: 3
PAINLEVEL_OUTOF10: 6
PAINLEVEL_OUTOF10: 10
PAINLEVEL_OUTOF10: 2
PAINLEVEL_OUTOF10: 2
PAINLEVEL_OUTOF10: 6

## 2020-02-12 ASSESSMENT — PAIN DESCRIPTION - FREQUENCY
FREQUENCY: CONTINUOUS
FREQUENCY: INTERMITTENT

## 2020-02-12 ASSESSMENT — PAIN DESCRIPTION - ORIENTATION
ORIENTATION: LEFT

## 2020-02-12 ASSESSMENT — PAIN DESCRIPTION - PAIN TYPE
TYPE: ACUTE PAIN

## 2020-02-12 ASSESSMENT — PAIN DESCRIPTION - DESCRIPTORS
DESCRIPTORS: THROBBING
DESCRIPTORS: THROBBING

## 2020-02-12 NOTE — PROGRESS NOTES
After 3 unsuccessful attempts by other RNs, Anna Campos was also unsuccessful x2 attempts to place new IV. Supervisor called at this time to attempt. Vanco on hold until new IV in place.

## 2020-02-12 NOTE — PROGRESS NOTES
Progress Note    SUBJECTIVE:  Patient seen for cellulitis LLE. No new C/O . Feels better. ROS:   Constitutional: negative  for fevers, and negative for chills. Respiratory: negative for shortness of breath, negative for cough, and negative for wheezing  Cardiovascular: negative for chest pain, and negative for palpitations  Gastrointestinal: negative for abdominal pain, negative for nausea,negative for vomiting, negative for diarrhea, and negative for constipation     All other systems were reviewed with the patient and are negative unless otherwise stated in HPI    OBJECTIVE:    EXAM:  Vitals:    02/12/20 0001   BP:    Pulse: 83   Resp:    Temp:    SpO2:       Weight: 278 lb 12.8 oz (126.5 kg)    Height: 5' 1\" (154.9 cm)     GEN:   A & O x3, no apparent distress  EYES: No gross abnormalities. , PERRL and EOMI  NECK: normal, supple, no lymphadenopathy,   PULM: clear to auscultation bilaterally- no wheezes, rales or rhonchi, normal air movement, no respiratory distress  COR: regular rate & rhythm, no murmurs and no gallops  ABD:  soft, non-tender, non-distended, normal bowel sounds, no masses or organomegaly  EXT:   Bilateral severe stasis changes. Left still red , warm, but improving.  Edema improving  NEURO: negative  SKIN:  See above        CBC with Differential:    Lab Results   Component Value Date    WBC 6.7 02/12/2020    RBC 3.23 02/12/2020    HGB 10.0 02/12/2020    HCT 31.7 02/12/2020     02/12/2020    MCV 98.1 02/12/2020    MCH 31.0 02/12/2020    MCHC 31.5 02/12/2020    RDW 13.5 02/12/2020    LYMPHOPCT 19 02/12/2020    MONOPCT 10 02/12/2020    BASOPCT 0 02/12/2020    MONOSABS 0.67 02/12/2020    LYMPHSABS 1.27 02/12/2020    EOSABS 0.11 02/12/2020    BASOSABS 0.03 02/12/2020    DIFFTYPE NOT REPORTED 02/12/2020     CMP:    Lab Results   Component Value Date     02/11/2020    K 4.5 02/11/2020     02/11/2020    CO2 21 02/11/2020    BUN 14 02/11/2020    CREATININE 1.03 02/11/2020    GFRAA >60

## 2020-02-12 NOTE — PROGRESS NOTES
shoulder ROM noted.                      Plan   Plan  Times per week: 7  Times per day: Daily  Current Treatment Recommendations: Strengthening, Endurance Training, Self-Care / ADL, Balance Training, Functional Mobility Training, Safety Education & Training  Plan Comment: ther ex, ther act, self care  G-Code     OutComes Score                                                  AM-PAC Score             Goals  Short term goals  Time Frame for Short term goals: 10 days  Short term goal 1: Patient will perform 15 min of ther ex/ther act w/o SOB or fatigue in order to increase strength and endurance required for ADLs  Short term goal 2: Patient will perform 5 min of standing sinkside ADLs w/o LOB or intolerable pain in order to increase participation in ADLs  Short term goal 3: Patient will perform ADLs with SUP using AE PRN in order to increase safety and independence with ADLs  Short term goal 4: Patient will perform functional mobility during ADLs with SUP using LRAD in order to increase safety and independence with ADLs  Patient Goals   Patient goals : to decrease pain in LLE       Therapy Time   Individual Concurrent Group Co-treatment   Time In 1535         Time Out 1546         Minutes 11                 MICHAEL Lawler

## 2020-02-12 NOTE — PROGRESS NOTES
Vital signs and initial assessment done at this time. Pt is A&O x4. Lung sounds clear/diminished and bowel sounds active in all four quadrants. Pt reports that last BM was yesterday AM, 2/10/2020. BLE are swollen, red, and weeping with multiple red creases. Pedal pulses +2 bilaterally. Pt reports intermittent numbness to left leg. Heart sounds regular. Brooks catheter in place and draining yellow urine with sediment. Telemetry on and monitoring patient. IV Zosyn running. Pt asked writer to move her legs. Writer asked pt if she could move them without assistance and pt was able. Pt repositioned at this time and is now resting comfortably in bed with call light and belongings. Will continue to monitor.

## 2020-02-12 NOTE — PROGRESS NOTES
guard assistance  Quality of Gait: Slow shanna with FF posture  Distance: 20 fct x1 in room     Balance  Standing - Static: Fair  Standing - Dynamic: Fair  Exercises  Straight Leg Raise: x15  Quad Sets: x15  Heelslides: x15  Hip Abduction: x15  Knee Long Arc Quad: x15  Knee Short Arc Quad: x15  Ankle Pumps: 15x2  Comments: Seated marching x15. LE ther ex completed reclined and seated. G-Code     OutComes Score                                                     AM-PAC Score             Goals  Short term goals  Time Frame for Short term goals: 10 days  Short term goal 1: Patient to complete sit<->supine transfers with CGA and no LOB to improve mobility. Short term goal 2: Patient to complete sit/stand and stand pivot trasfers with SUP and no LOB with std. walker to improve mobility. Short term goal 3: Patient to ambulate 50ft with LRAD and no LOB or fatigue to improve functional endurance. Short term goal 4: Patient to ascend/descend 5 steps with HR and SBA with no LOB to safely enter her home. Plan    Plan  Times per week: 7  Times per day: Twice a day  Current Treatment Recommendations: Strengthening, Gait Training, Patient/Caregiver Education & Training, ROM, Stair training, Balance Training, Neuromuscular Re-education, Functional Mobility Training, Endurance Training, Home Exercise Program, Transfer Training, Safety Education & Training  Safety Devices  Type of devices:  All fall risk precautions in place, Call light within reach, Left in bed, Nurse notified, Patient at risk for falls(nurse in room)     Therapy Time   Individual Concurrent Group Co-treatment   Time In 1242         Time Out 1316         Minutes 34         Timed Code Treatment Minutes: [address] [facility], [id number]

## 2020-02-12 NOTE — PROGRESS NOTES
Physical Therapy  Facility/Department: [facility] AT THE [facility] MED SURG  Daily Treatment Note  NAME: Ramon Gordon  : 1955  MRN: [id number]    Date of Service: 2020    Discharge Recommendations:  Continue to assess pending progress, Home with Home health PT, IP Rehab        Assessment   Assessment: Pt was abmulate 4-5 feet with walker +1 CGA. Pt limited by knee pain. Bed exercise performed with reps of 10. Min/CGA assisit needed with transfers. Treatment Diagnosis: Difficulty walking  Prognosis: Good  Decision Making: Medium Complexity  PT Education: General Safety;Gait Training;Transfer Training  REQUIRES PT FOLLOW UP: Yes  Activity Tolerance  Activity Tolerance: Patient Tolerated treatment well;Patient limited by pain     Patient Diagnosis(es): The primary encounter diagnosis was Cellulitis of left lower extremity. A diagnosis of Failure of outpatient treatment was also pertinent to this visit. has a past medical history of Carcinoma ([facility] [address] Cellulitis, DVT (deep venous thrombosis) ([facility] [address] and Wears glasses. has a past surgical history that includes Tubal ligation (); Carpal tunnel release (); [place] tooth extraction; Tubal ligation; and candy and bso (cervix removed).     Restrictions  Restrictions/Precautions  Restrictions/Precautions: General Precautions, Fall Risk  Subjective   General  Chart Reviewed: Yes  Response To Previous Treatment: Not applicable  Family / Caregiver Present: No  Referring Practitioner: Dr. Taylor Ruelas MD  Subjective  Subjective: States pain in left knee got a little worse following gait--/10  Pain Screening  Patient Currently in Pain: Yes  Vital Signs  Patient Currently in Pain: Yes       Orientation  Orientation  Overall Orientation Status: Within Functional Limits  Cognition      Objective   Bed mobility  Supine to Sit: Minimal assistance  Transfers  Sit to Stand: Contact guard assistance  Stand to sit: Contact guard assistance  Ambulation  Ambulation?: Yes  Ambulation 1  Surface: level tile  Device: Standard Walker  Assistance: Contact guard assistance  Distance: 4-5 feet with walker         Exercises  Quad Sets: 10x  Heelslides: 10x  Gluteal Sets: 10x  Hip Extension/Leg Presses: 10x  Knee Short Arc Quad: 10x  Ankle Pumps: 10x  Upper Extremity: flexion and bench 10x ea                        G-Code     OutComes Score                                                     AM-PAC Score             Goals  Short term goals  Time Frame for Short term goals: 10 days  Short term goal 1: Patient to complete sit<->supine transfers with CGA and no LOB to improve mobility. Short term goal 2: Patient to complete sit/stand and stand pivot trasfers with SUP and no LOB with std. walker to improve mobility. Short term goal 3: Patient to ambulate 50ft with LRAD and no LOB or fatigue to improve functional endurance. Short term goal 4: Patient to ascend/descend 5 steps with HR and SBA with no LOB to safely enter her home. Plan    Plan  Times per week: 7  Times per day: Twice a day  Current Treatment Recommendations: Strengthening, Gait Training, Patient/Caregiver Education & Training, ROM, Stair training, Balance Training, Neuromuscular Re-education, Functional Mobility Training, Endurance Training, Home Exercise Program, Transfer Training, Safety Education & Training  Safety Devices  Type of devices:  All fall risk precautions in place, Call light within reach, Gait belt, Patient at risk for falls, Left in chair, Nurse notified     Therapy Time   Individual Concurrent Group Co-treatment   Time In 0955         Time Out 1019         Minutes 24         Timed Code Treatment Minutes: 9[address] [facility]

## 2020-02-13 LAB
ABSOLUTE EOS #: 0.18 K/UL (ref 0–0.44)
ABSOLUTE IMMATURE GRANULOCYTE: <0.03 K/UL (ref 0–0.3)
ABSOLUTE LYMPH #: 1.37 K/UL (ref 1.1–3.7)
ABSOLUTE MONO #: 0.65 K/UL (ref 0.1–1.2)
ANION GAP SERPL CALCULATED.3IONS-SCNC: 11 MMOL/L (ref 9–17)
BASOPHILS # BLD: 1 % (ref 0–2)
BASOPHILS ABSOLUTE: 0.04 K/UL (ref 0–0.2)
BUN BLDV-MCNC: 22 MG/DL (ref 8–23)
BUN/CREAT BLD: 18 (ref 9–20)
CALCIUM SERPL-MCNC: 7.9 MG/DL (ref 8.6–10.4)
CHLORIDE BLD-SCNC: 101 MMOL/L (ref 98–107)
CO2: 25 MMOL/L (ref 20–31)
CREAT SERPL-MCNC: 1.21 MG/DL (ref 0.5–0.9)
DIFFERENTIAL TYPE: ABNORMAL
EOSINOPHILS RELATIVE PERCENT: 3 % (ref 1–4)
GFR AFRICAN AMERICAN: 54 ML/MIN
GFR NON-AFRICAN AMERICAN: 45 ML/MIN
GFR SERPL CREATININE-BSD FRML MDRD: ABNORMAL ML/MIN/{1.73_M2}
GFR SERPL CREATININE-BSD FRML MDRD: ABNORMAL ML/MIN/{1.73_M2}
GLUCOSE BLD-MCNC: 110 MG/DL (ref 70–99)
HCT VFR BLD CALC: 30.3 % (ref 36.3–47.1)
HEMOGLOBIN: 9.8 G/DL (ref 11.9–15.1)
IMMATURE GRANULOCYTES: 0 %
LYMPHOCYTES # BLD: 22 % (ref 24–43)
MCH RBC QN AUTO: 30.7 PG (ref 25.2–33.5)
MCHC RBC AUTO-ENTMCNC: 32.3 G/DL (ref 28.4–34.8)
MCV RBC AUTO: 95 FL (ref 82.6–102.9)
MONOCYTES # BLD: 11 % (ref 3–12)
NRBC AUTOMATED: 0 PER 100 WBC
PDW BLD-RTO: 13.2 % (ref 11.8–14.4)
PLATELET # BLD: 349 K/UL (ref 138–453)
PLATELET ESTIMATE: ABNORMAL
PMV BLD AUTO: 8.1 FL (ref 8.1–13.5)
POTASSIUM SERPL-SCNC: 4.2 MMOL/L (ref 3.7–5.3)
RBC # BLD: 3.19 M/UL (ref 3.95–5.11)
RBC # BLD: ABNORMAL 10*6/UL
SEG NEUTROPHILS: 63 % (ref 36–65)
SEGMENTED NEUTROPHILS ABSOLUTE COUNT: 3.88 K/UL (ref 1.5–8.1)
SODIUM BLD-SCNC: 137 MMOL/L (ref 135–144)
VANCOMYCIN TROUGH DATE LAST DOSE: NORMAL
VANCOMYCIN TROUGH DOSE AMOUNT: NORMAL
VANCOMYCIN TROUGH TIME LAST DOSE: NORMAL
VANCOMYCIN TROUGH: 17 UG/ML (ref 10–20)
WBC # BLD: 6.1 K/UL (ref 3.5–11.3)
WBC # BLD: ABNORMAL 10*3/UL

## 2020-02-13 PROCEDURE — 97530 THERAPEUTIC ACTIVITIES: CPT

## 2020-02-13 PROCEDURE — 80202 ASSAY OF VANCOMYCIN: CPT

## 2020-02-13 PROCEDURE — 97110 THERAPEUTIC EXERCISES: CPT

## 2020-02-13 PROCEDURE — 36415 COLL VENOUS BLD VENIPUNCTURE: CPT

## 2020-02-13 PROCEDURE — 6370000000 HC RX 637 (ALT 250 FOR IP): Performed by: INTERNAL MEDICINE

## 2020-02-13 PROCEDURE — 6360000002 HC RX W HCPCS: Performed by: INTERNAL MEDICINE

## 2020-02-13 PROCEDURE — 2580000003 HC RX 258: Performed by: INTERNAL MEDICINE

## 2020-02-13 PROCEDURE — 99231 SBSQ HOSP IP/OBS SF/LOW 25: CPT | Performed by: PODIATRIST

## 2020-02-13 PROCEDURE — 96376 TX/PRO/DX INJ SAME DRUG ADON: CPT

## 2020-02-13 PROCEDURE — 85025 COMPLETE CBC W/AUTO DIFF WBC: CPT

## 2020-02-13 PROCEDURE — 6360000002 HC RX W HCPCS: Performed by: NURSE PRACTITIONER

## 2020-02-13 PROCEDURE — 1200000000 HC SEMI PRIVATE

## 2020-02-13 PROCEDURE — 97535 SELF CARE MNGMENT TRAINING: CPT

## 2020-02-13 PROCEDURE — 80048 BASIC METABOLIC PNL TOTAL CA: CPT

## 2020-02-13 PROCEDURE — 6370000000 HC RX 637 (ALT 250 FOR IP): Performed by: NURSE PRACTITIONER

## 2020-02-13 PROCEDURE — 97116 GAIT TRAINING THERAPY: CPT

## 2020-02-13 PROCEDURE — 96366 THER/PROPH/DIAG IV INF ADDON: CPT

## 2020-02-13 RX ORDER — POLYETHYLENE GLYCOL 3350 17 G/17G
17 POWDER, FOR SOLUTION ORAL DAILY
Status: DISCONTINUED | OUTPATIENT
Start: 2020-02-13 | End: 2020-02-14 | Stop reason: HOSPADM

## 2020-02-13 RX ORDER — TRAMADOL HYDROCHLORIDE 50 MG/1
50 TABLET ORAL EVERY 6 HOURS PRN
Status: DISCONTINUED | OUTPATIENT
Start: 2020-02-13 | End: 2020-02-14 | Stop reason: HOSPADM

## 2020-02-13 RX ADMIN — POLYETHYLENE GLYCOL (3350) 17 G: 17 POWDER, FOR SOLUTION ORAL at 09:54

## 2020-02-13 RX ADMIN — OXYBUTYNIN CHLORIDE 10 MG: 5 TABLET, EXTENDED RELEASE ORAL at 09:53

## 2020-02-13 RX ADMIN — Medication 10 ML: at 10:01

## 2020-02-13 RX ADMIN — PIPERACILLIN AND TAZOBACTAM 3.38 G: 3; .375 INJECTION, POWDER, FOR SOLUTION INTRAVENOUS at 07:00

## 2020-02-13 RX ADMIN — MULTIPLE VITAMINS W/ MINERALS TAB 1 TABLET: TAB at 09:53

## 2020-02-13 RX ADMIN — KETOROLAC TROMETHAMINE 15 MG: 15 INJECTION, SOLUTION INTRAMUSCULAR; INTRAVENOUS at 08:30

## 2020-02-13 RX ADMIN — RIVAROXABAN 20 MG: 20 TABLET, FILM COATED ORAL at 08:30

## 2020-02-13 RX ADMIN — TRAMADOL HYDROCHLORIDE 50 MG: 50 TABLET, FILM COATED ORAL at 12:53

## 2020-02-13 RX ADMIN — PIPERACILLIN AND TAZOBACTAM 3.38 G: 3; .375 INJECTION, POWDER, FOR SOLUTION INTRAVENOUS at 16:03

## 2020-02-13 RX ADMIN — TRAMADOL HYDROCHLORIDE 50 MG: 50 TABLET, FILM COATED ORAL at 18:56

## 2020-02-13 RX ADMIN — FUROSEMIDE 40 MG: 10 INJECTION, SOLUTION INTRAMUSCULAR; INTRAVENOUS at 09:54

## 2020-02-13 RX ADMIN — FUROSEMIDE 40 MG: 10 INJECTION, SOLUTION INTRAMUSCULAR; INTRAVENOUS at 17:32

## 2020-02-13 RX ADMIN — KETOROLAC TROMETHAMINE 15 MG: 15 INJECTION, SOLUTION INTRAMUSCULAR; INTRAVENOUS at 02:02

## 2020-02-13 RX ADMIN — SODIUM CHLORIDE, PRESERVATIVE FREE 10 ML: 5 INJECTION INTRAVENOUS at 08:31

## 2020-02-13 RX ADMIN — VANCOMYCIN HYDROCHLORIDE 1750 MG: 750 INJECTION, POWDER, LYOPHILIZED, FOR SOLUTION INTRAVENOUS at 12:06

## 2020-02-13 RX ADMIN — ACETAMINOPHEN 650 MG: 325 TABLET, FILM COATED ORAL at 02:57

## 2020-02-13 ASSESSMENT — PAIN DESCRIPTION - FREQUENCY
FREQUENCY: CONTINUOUS

## 2020-02-13 ASSESSMENT — PAIN SCALES - GENERAL
PAINLEVEL_OUTOF10: 2
PAINLEVEL_OUTOF10: 10
PAINLEVEL_OUTOF10: 0
PAINLEVEL_OUTOF10: 8
PAINLEVEL_OUTOF10: 8
PAINLEVEL_OUTOF10: 0
PAINLEVEL_OUTOF10: 8
PAINLEVEL_OUTOF10: 10
PAINLEVEL_OUTOF10: 9
PAINLEVEL_OUTOF10: 10
PAINLEVEL_OUTOF10: 2

## 2020-02-13 ASSESSMENT — PAIN DESCRIPTION - DESCRIPTORS
DESCRIPTORS: SHARP;THROBBING
DESCRIPTORS: SHARP;THROBBING
DESCRIPTORS: SHARP

## 2020-02-13 ASSESSMENT — PAIN DESCRIPTION - ORIENTATION
ORIENTATION: LEFT

## 2020-02-13 ASSESSMENT — PAIN DESCRIPTION - PAIN TYPE
TYPE: ACUTE PAIN

## 2020-02-13 ASSESSMENT — PAIN DESCRIPTION - LOCATION
LOCATION: LEG

## 2020-02-13 ASSESSMENT — PAIN - FUNCTIONAL ASSESSMENT
PAIN_FUNCTIONAL_ASSESSMENT: PREVENTS OR INTERFERES SOME ACTIVE ACTIVITIES AND ADLS

## 2020-02-13 NOTE — PROGRESS NOTES
Viola Aparicio NP at bedside. Patient complaining of pain still in LLE. Requesting something stronger for pain.

## 2020-02-13 NOTE — PLAN OF CARE
Problem: Pain:  Goal: Pain level will decrease  Description  Pain level will decrease  Outcome: Ongoing  Note:   Pt is able to verbalize when in pain. Pt given Toradol as needed. Will continue to monitor. Problem: Falls - Risk of:  Goal: Will remain free from falls  Description  Will remain free from falls  Outcome: Ongoing  Note:   Bed in low position. Wheels locked. Bed alarm on. 2/4 side rails are up. Fall band on. Call light within reach. Problem: Body Temperature - Imbalanced:  Goal: Ability to maintain a body temperature in the normal range will improve  Description  Ability to maintain a body temperature in the normal range will improve  Outcome: Ongoing  Note:   Temperature WNL, pt is getting IV Zosyn and Vanco, will continue to monitor. Problem: Skin Integrity - Impaired:  Goal: Will show no infection signs and symptoms  Description  Will show no infection signs and symptoms  Outcome: Ongoing  Note:   BLE are red, swollen, and dry. Pt is getting Betadine painted on BLE with powder applied twice a day, will continue to monitor.

## 2020-02-13 NOTE — PLAN OF CARE
Problem: Pain:  Goal: Pain level will decrease  Description  Pain level will decrease  2/13/2020 1044 by Sadiq Perdomo RN  Outcome: Ongoing  Note:   Patient with c/o pain to LLE, patient pain is being managed with reposition, elevation of lower extremities and prn pain medication. Patient is having periods of rest and sleep after taking pain meds. Problem: Falls - Risk of:  Goal: Will remain free from falls  Description  Will remain free from falls  2/13/2020 1044 by Sadiq Perdomo RN  Outcome: Ongoing  Note:   Patient has remained fall free, using fall prevention techniques, walker and assist up, slip socks, bed alarm. Problem: Risk for Impaired Skin Integrity  Goal: Tissue integrity - skin and mucous membranes  Description  Structural intactness and normal physiological function of skin and  mucous membranes. Outcome: Ongoing  Note:   Patient has compromise skin, continuing to assess and treat areas of breakdown as ordered.       Problem: Skin Integrity - Impaired:  Goal: Will show no infection signs and symptoms  Description  Will show no infection signs and symptoms  2/13/2020 1044 by Sadiq Perdomo RN  Outcome: Ongoing  Note:   Continuing to monitor skin and perform wound care as ordered

## 2020-02-13 NOTE — PROGRESS NOTES
Physical Therapy  Facility/Department: [facility] AT THE [facility] MED SURG  Daily Treatment Note  NAME: Chasity Hodges  : 1955  MRN: [id number]    Date of Service: 2020    Discharge Recommendations:  Continue to assess pending progress, Home with Home health PT, IP Rehab        Assessment   Treatment Diagnosis: Difficulty walking  Prognosis: Good  Patient Education: Patient educated on importance of daily ambulation and exercises. Pt with good understanding. REQUIRES PT FOLLOW UP: Yes  Activity Tolerance  Activity Tolerance: Patient limited by fatigue;Patient Tolerated treatment well     Patient Diagnosis(es): The primary encounter diagnosis was Cellulitis of lower leg. Diagnoses of Cellulitis of left lower extremity, Failure of outpatient treatment, Lymphedema of both lower extremities, and Post-phlebitic syndrome were also pertinent to this visit. has a past medical history of Carcinoma ([facility] [address] Cellulitis, DVT (deep venous thrombosis) ([facility] [address] and Wears glasses. has a past surgical history that includes Tubal ligation (); Carpal tunnel release (); [place] tooth extraction; Tubal ligation; and candy and bso (cervix removed). Restrictions  Restrictions/Precautions  Restrictions/Precautions: General Precautions, Fall Risk  Required Braces or Orthoses?: No  Subjective   General  Chart Reviewed: Yes  Response To Previous Treatment: Patient with no complaints from previous session. Family / Caregiver Present: No  Referring Practitioner: Dr. Lyndsay Vera MD  Subjective  Subjective: Patient reports that LLE is sore.            Orientation  Orientation  Overall Orientation Status: Within Functional Limits  Cognition      Objective      Transfers  Sit to Stand: Contact guard assistance;Stand by assistance  Stand to sit: Contact guard assistance;Stand by assistance  Ambulation  Ambulation?: Yes  Ambulation 1  Surface: level tile  Device: Rolling Walker  Assistance: Contact guard assistance;Stand by assistance  Quality of

## 2020-02-13 NOTE — PROGRESS NOTES
Patient visited @ bedside   Cc: B/L , L>>R          Sore          Is the redness returning ? HPI: elevation           Xarelto p.o.            Local specialized skin and ulcer care           IV ATB Vancomycin / Zosyn  ROS: neg constitutional             Tender  Past Medical History:   Diagnosis Date    Carcinoma ([facility] [address]     Squamous Cell    Cellulitis     [facility]    LLE    Wears glasses      Past Surgical History:   Procedure Laterality Date    CARPAL TUNNEL RELEASE  1990s    ANNABELLA AND BSO      05/2019    TUBAL LIGATION  1993    TUBAL LIGATION      WISDOM TOOTH EXTRACTION       Allergies   Allergen Reactions    Estrogens Other (See Comments)     Hx of DVT       Current Facility-Administered Medications:     polyethylene glycol (GLYCOLAX) packet 17 g, 17 g, Oral, Daily, DONG Zelaya - CNP    traMADol (ULTRAM) tablet 50 mg, 50 mg, Oral, Q6H PRN, Amanda Garcia APRN - CNP    furosemide (LASIX) injection 40 mg, 40 mg, Intravenous, BID, Samantha Mancera MD, 40 mg at 02/12/20 1705    calcium carbonate (TUMS) chewable tablet 500 mg, 500 mg, Oral, TID PRN, Amanda Garcia APRN - CNP, 500 mg at 02/11/20 1443    ketorolac (TORADOL) injection 15 mg, 15 mg, Intravenous, Q6H PRN, Amanda Garcia APRN - CNP, 15 mg at 02/13/20 0830    acetaminophen (TYLENOL) tablet 650 mg, 650 mg, Oral, Q4H PRN, Amanda Garcia APRN - CNP, 650 mg at 02/13/20 0257    therapeutic multivitamin-minerals 1 tablet, 1 tablet, Oral, Daily, Samantha Mancera MD, 1 tablet at 02/12/20 0920    oxybutynin (DITROPAN-XL) extended release tablet 10 mg, 10 mg, Oral, Daily, Samantha Mancera MD, 10 mg at 02/12/20 0920    rivaroxaban (XARELTO) tablet 20 mg, 20 mg, Oral, Daily with breakfast, Samantha Mancera MD, 20 mg at 02/13/20 0830    sodium chloride flush 0.9 % injection 10 mL, 10 mL, Intravenous, 2 times per day, Samantha Mancera MD, 10 mL at 02/13/20 0831    sodium chloride flush 0.9 % injection 10

## 2020-02-13 NOTE — PROGRESS NOTES
Vitals and assessment as charted. Brooks catheter empted at this time. Betadine painted to BLE, allowed to dry for 5 minutes, then powder applied. Legs are dry, red, and swollen. Will continue to monitor.

## 2020-02-13 NOTE — PROGRESS NOTES
standing sinkside grooming for 8 minutes c SBA. Pt. noted to be leaning slighlt onto sink while completing grooming tasks. Pt. had no LOB or reports of pain. Functional Mobility  Functional - Mobility Device: Rolling Walker  Activity: To/from bathroom  Functional Mobility Comments: Pt. bri'alexandra WILSON- safety to/from bathroom c walker but required 1 vc to back up completly before sitting in recliner. Pt. educated to back up completely, feel the back of her leg touch the chair before sitting down. Bed mobility  Supine to Sit: Minimal assistance(Pt. required one hand to pull sulf up from supine position. )  Scooting: Modified independent  Comment: Pt. educated to push through hands before standing c G understanding and bri. (Simultaneous filing. User may not have seen previous data.)  Transfers  Sit to stand: Stand by assistance         Type of ROM/Therapeutic Exercise  Comment: Theraband exercises for BUE in horizontal and vertical planes c light green theraband. Pt. was only able complete 10 reps with L hand on top d/t limited R shoulder flexion. Concerns c R shoulder c pt. presenting c limited PROM/AROM in frontal plane and crepitus in R shoulder.  Pt. reported PMH of dislocation and educated to contact Dr. Sarita Landaverde AROM (degrees)  RUE General AROM: limited          Plan   Plan  Times per week: 7  Times per day: Daily  Current Treatment Recommendations: Strengthening, Endurance Training, Self-Care / ADL, Balance Training, Functional Mobility Training, Safety Education & Training  Plan Comment: ther ex, ther act, self care    Goals  Short term goals  Time Frame for Short term goals: 10 days  Short term goal 1: Patient will perform 15 min of ther ex/ther act w/o SOB or fatigue in order to increase strength and endurance required for ADLs  Short term goal 2: Patient will perform 5 min of standing sinkside ADLs w/o LOB or intolerable pain in order to increase participation in ADLs  Short term goal 3: Patient will perform

## 2020-02-13 NOTE — PROGRESS NOTES
Sets: x15  Hip Abduction: x15  Knee Short Arc Quad: x15  Ankle Pumps: x15  Comments: Above listed exercises completed in supine position. Frequent rest periods d/t fatigue. Goals  Short term goals  Time Frame for Short term goals: 10 days  Short term goal 1: Patient to complete sit<->supine transfers with CGA and no LOB to improve mobility. Short term goal 2: Patient to complete sit/stand and stand pivot trasfers with SUP and no LOB with std. walker to improve mobility. Short term goal 3: Patient to ambulate 50ft with LRAD and no LOB or fatigue to improve functional endurance. Short term goal 4: Patient to ascend/descend 5 steps with HR and SBA with no LOB to safely enter her home. Plan    Plan  Times per week: 7  Times per day: Twice a day  Current Treatment Recommendations: Strengthening, Gait Training, Patient/Caregiver Education & Training, ROM, Stair training, Balance Training, Neuromuscular Re-education, Functional Mobility Training, Endurance Training, Home Exercise Program, Transfer Training, Safety Education & Training  Safety Devices  Type of devices:  All fall risk precautions in place, Call light within reach, Left in bed, Nurse notified, Patient at risk for falls     Therapy Time   Individual Concurrent Group Co-treatment   Time In 0941         Time Out 1004         Minutes [address] [place], [place]

## 2020-02-14 VITALS
OXYGEN SATURATION: 94 % | WEIGHT: 266.5 LBS | HEIGHT: 61 IN | SYSTOLIC BLOOD PRESSURE: 120 MMHG | DIASTOLIC BLOOD PRESSURE: 70 MMHG | BODY MASS INDEX: 50.31 KG/M2 | HEART RATE: 71 BPM | RESPIRATION RATE: 16 BRPM | TEMPERATURE: 98.4 F

## 2020-02-14 LAB
ABSOLUTE EOS #: 0.18 K/UL (ref 0–0.44)
ABSOLUTE IMMATURE GRANULOCYTE: 0.03 K/UL (ref 0–0.3)
ABSOLUTE LYMPH #: 1.41 K/UL (ref 1.1–3.7)
ABSOLUTE MONO #: 0.61 K/UL (ref 0.1–1.2)
ANION GAP SERPL CALCULATED.3IONS-SCNC: 10 MMOL/L (ref 9–17)
BASOPHILS # BLD: 1 % (ref 0–2)
BASOPHILS ABSOLUTE: 0.04 K/UL (ref 0–0.2)
BUN BLDV-MCNC: 22 MG/DL (ref 8–23)
BUN/CREAT BLD: 18 (ref 9–20)
CALCIUM SERPL-MCNC: 8 MG/DL (ref 8.6–10.4)
CHLORIDE BLD-SCNC: 97 MMOL/L (ref 98–107)
CO2: 27 MMOL/L (ref 20–31)
CREAT SERPL-MCNC: 1.24 MG/DL (ref 0.5–0.9)
DIFFERENTIAL TYPE: ABNORMAL
EOSINOPHILS RELATIVE PERCENT: 3 % (ref 1–4)
GFR AFRICAN AMERICAN: 52 ML/MIN
GFR NON-AFRICAN AMERICAN: 43 ML/MIN
GFR SERPL CREATININE-BSD FRML MDRD: ABNORMAL ML/MIN/{1.73_M2}
GFR SERPL CREATININE-BSD FRML MDRD: ABNORMAL ML/MIN/{1.73_M2}
GLUCOSE BLD-MCNC: 102 MG/DL (ref 70–99)
HCT VFR BLD CALC: 31.9 % (ref 36.3–47.1)
HEMOGLOBIN: 10.3 G/DL (ref 11.9–15.1)
IMMATURE GRANULOCYTES: 1 %
LYMPHOCYTES # BLD: 22 % (ref 24–43)
MCH RBC QN AUTO: 30.7 PG (ref 25.2–33.5)
MCHC RBC AUTO-ENTMCNC: 32.3 G/DL (ref 28.4–34.8)
MCV RBC AUTO: 95.2 FL (ref 82.6–102.9)
MONOCYTES # BLD: 9 % (ref 3–12)
NRBC AUTOMATED: 0 PER 100 WBC
PDW BLD-RTO: 13.1 % (ref 11.8–14.4)
PLATELET # BLD: 365 K/UL (ref 138–453)
PLATELET ESTIMATE: ABNORMAL
PMV BLD AUTO: 8.3 FL (ref 8.1–13.5)
POTASSIUM SERPL-SCNC: 4.2 MMOL/L (ref 3.7–5.3)
RBC # BLD: 3.35 M/UL (ref 3.95–5.11)
RBC # BLD: ABNORMAL 10*6/UL
SEG NEUTROPHILS: 64 % (ref 36–65)
SEGMENTED NEUTROPHILS ABSOLUTE COUNT: 4.19 K/UL (ref 1.5–8.1)
SODIUM BLD-SCNC: 134 MMOL/L (ref 135–144)
WBC # BLD: 6.5 K/UL (ref 3.5–11.3)
WBC # BLD: ABNORMAL 10*3/UL

## 2020-02-14 PROCEDURE — 6370000000 HC RX 637 (ALT 250 FOR IP): Performed by: INTERNAL MEDICINE

## 2020-02-14 PROCEDURE — 96376 TX/PRO/DX INJ SAME DRUG ADON: CPT

## 2020-02-14 PROCEDURE — 96375 TX/PRO/DX INJ NEW DRUG ADDON: CPT

## 2020-02-14 PROCEDURE — 36415 COLL VENOUS BLD VENIPUNCTURE: CPT

## 2020-02-14 PROCEDURE — 6370000000 HC RX 637 (ALT 250 FOR IP): Performed by: NURSE PRACTITIONER

## 2020-02-14 PROCEDURE — 2580000003 HC RX 258: Performed by: INTERNAL MEDICINE

## 2020-02-14 PROCEDURE — 97535 SELF CARE MNGMENT TRAINING: CPT

## 2020-02-14 PROCEDURE — 97530 THERAPEUTIC ACTIVITIES: CPT

## 2020-02-14 PROCEDURE — 97110 THERAPEUTIC EXERCISES: CPT

## 2020-02-14 PROCEDURE — 6360000002 HC RX W HCPCS: Performed by: INTERNAL MEDICINE

## 2020-02-14 PROCEDURE — 80048 BASIC METABOLIC PNL TOTAL CA: CPT

## 2020-02-14 PROCEDURE — 85025 COMPLETE CBC W/AUTO DIFF WBC: CPT

## 2020-02-14 PROCEDURE — 97116 GAIT TRAINING THERAPY: CPT

## 2020-02-14 PROCEDURE — 96366 THER/PROPH/DIAG IV INF ADDON: CPT

## 2020-02-14 RX ORDER — CLINDAMYCIN HYDROCHLORIDE 300 MG/1
300 CAPSULE ORAL 4 TIMES DAILY
Qty: 40 CAPSULE | Refills: 0 | Status: SHIPPED | OUTPATIENT
Start: 2020-02-14 | End: 2020-02-24

## 2020-02-14 RX ORDER — TRAMADOL HYDROCHLORIDE 50 MG/1
50 TABLET ORAL EVERY 6 HOURS PRN
Qty: 20 TABLET | Refills: 0 | Status: SHIPPED | OUTPATIENT
Start: 2020-02-14 | End: 2020-02-19

## 2020-02-14 RX ADMIN — TRAMADOL HYDROCHLORIDE 50 MG: 50 TABLET, FILM COATED ORAL at 07:38

## 2020-02-14 RX ADMIN — MULTIPLE VITAMINS W/ MINERALS TAB 1 TABLET: TAB at 09:28

## 2020-02-14 RX ADMIN — ACETAMINOPHEN 650 MG: 325 TABLET, FILM COATED ORAL at 05:46

## 2020-02-14 RX ADMIN — FUROSEMIDE 40 MG: 10 INJECTION, SOLUTION INTRAMUSCULAR; INTRAVENOUS at 09:27

## 2020-02-14 RX ADMIN — RIVAROXABAN 20 MG: 20 TABLET, FILM COATED ORAL at 07:38

## 2020-02-14 RX ADMIN — SODIUM CHLORIDE, PRESERVATIVE FREE 10 ML: 5 INJECTION INTRAVENOUS at 00:04

## 2020-02-14 RX ADMIN — PIPERACILLIN AND TAZOBACTAM 3.38 G: 3; .375 INJECTION, POWDER, FOR SOLUTION INTRAVENOUS at 07:28

## 2020-02-14 RX ADMIN — PIPERACILLIN AND TAZOBACTAM 3.38 G: 3; .375 INJECTION, POWDER, FOR SOLUTION INTRAVENOUS at 00:18

## 2020-02-14 RX ADMIN — OXYBUTYNIN CHLORIDE 10 MG: 5 TABLET, EXTENDED RELEASE ORAL at 09:28

## 2020-02-14 RX ADMIN — VANCOMYCIN HYDROCHLORIDE 1750 MG: 750 INJECTION, POWDER, LYOPHILIZED, FOR SOLUTION INTRAVENOUS at 10:24

## 2020-02-14 RX ADMIN — ONDANSETRON 4 MG: 2 INJECTION INTRAMUSCULAR; INTRAVENOUS at 09:40

## 2020-02-14 RX ADMIN — TRAMADOL HYDROCHLORIDE 50 MG: 50 TABLET, FILM COATED ORAL at 01:06

## 2020-02-14 ASSESSMENT — PAIN DESCRIPTION - FREQUENCY
FREQUENCY: CONTINUOUS
FREQUENCY: CONTINUOUS

## 2020-02-14 ASSESSMENT — PAIN DESCRIPTION - DESCRIPTORS: DESCRIPTORS: ACHING

## 2020-02-14 ASSESSMENT — PAIN DESCRIPTION - ORIENTATION
ORIENTATION: LEFT
ORIENTATION: LEFT

## 2020-02-14 ASSESSMENT — PAIN SCALES - GENERAL
PAINLEVEL_OUTOF10: 4
PAINLEVEL_OUTOF10: 10
PAINLEVEL_OUTOF10: 4
PAINLEVEL_OUTOF10: 8
PAINLEVEL_OUTOF10: 10
PAINLEVEL_OUTOF10: 6

## 2020-02-14 ASSESSMENT — PAIN DESCRIPTION - PAIN TYPE
TYPE: ACUTE PAIN
TYPE: ACUTE PAIN

## 2020-02-14 ASSESSMENT — PAIN DESCRIPTION - LOCATION
LOCATION: LEG
LOCATION: LEG

## 2020-02-14 NOTE — DISCHARGE SUMMARY
Summary  Preparation of Medication Reconciliation  Preparation of Discharge Prescriptions    Signed:  DONG Ponce, NP-C  2/14/2020, 10:00 AM

## 2020-02-14 NOTE — PROGRESS NOTES
Occupational Therapy  Facility/Department: [facility] AT THE [facility] MED SURG  Daily Treatment Note  NAME: Ana Laura Hayes  : 1955  MRN: [id number]    Date of Service: 2020    Discharge Recommendations:  Continue to assess pending progress, Patient would benefit from continued therapy after discharge, Home with Home health OT, Home with assist PRN       Assessment      Patient Education: Pt educated on d/c folder. Discussed DME and AE options, as well as fall prevention and safety within the home. Pt verbalized understanding. Activity Tolerance  Activity Tolerance: Patient Tolerated treatment well  Activity Tolerance: increased encouragement needed for participation. Safety Devices  Type of devices: Left in chair;Call light within reach         Patient Diagnosis(es): The primary encounter diagnosis was Cellulitis of lower leg. Diagnoses of Cellulitis of left lower extremity, Failure of outpatient treatment, Lymphedema of both lower extremities, and Post-phlebitic syndrome were also pertinent to this visit. has a past medical history of Carcinoma ([facility] [address] Cellulitis, DVT (deep venous thrombosis) ([facility] [address] and Wears glasses. has a past surgical history that includes Tubal ligation (); Carpal tunnel release (); [place] tooth extraction; Tubal ligation; and candy and bso (cervix removed). Restrictions  Restrictions/Precautions  Restrictions/Precautions: General Precautions, Fall Risk  Required Braces or Orthoses?: No  Subjective   General  Chart Reviewed: Yes  Patient assessed for rehabilitation services?: Yes  Family / Caregiver Present: No  Referring Practitioner: Dr Jose Franklin  Diagnosis: Cellulitis of lower leg  Subjective  Subjective: Pt reports 0/10 pain on therapist arrival as she reports \"I got my leg situated. \" When transitioning and sitting at EOB, pt reports 8/10 pain in L lower leg. General Comment  Comments: Pt in bed on therapist arrival, initially declined OT tx.  Nursing then present to remove cateheter and pt agreeable to getting up and out of bed. Orientation     Objective    ADL  LE Dressing: Contact guard assistance(Pt donned underwear while seated on toilet)  Toileting: Contact guard assistance  Additional Comments: Pt completed toileting routine and donning underwear with CGA this date. Functional Mobility  Functional - Mobility Device: Rolling Walker  Activity: To/from bathroom  Assist Level: Stand by assistance  Functional Mobility Comments: Pt demo'd good safety overall with functional mobility. Pt required Vc to back up to toilet to notice position of body to toilet before sitting. Toilet Transfers  Toilet - Technique: Ambulating  Equipment Used: Standard toilet  Toilet Transfer: Stand by assistance  Toilet Transfers Comments: Some VC required for pt to check position of body to toilet.    Bed mobility  Supine to Sit: Supervision  Transfers  Slide Board: Stand by assistance  Sit to stand: Stand by assistance                 Plan   Plan  Times per week: 7  Times per day: Daily  Current Treatment Recommendations: Strengthening, Endurance Training, Self-Care / ADL, Balance Training, Functional Mobility Training, Safety Education & Training  Plan Comment: ther ex, ther act, self care    Goals  Short term goals  Time Frame for Short term goals: 10 days  Short term goal 1: Patient will perform 15 min of ther ex/ther act w/o SOB or fatigue in order to increase strength and endurance required for ADLs  Short term goal 2: Patient will perform 5 min of standing sinkside ADLs w/o LOB or intolerable pain in order to increase participation in ADLs  Short term goal 3: Patient will perform ADLs with SUP using AE PRN in order to increase safety and independence with ADLs  Short term goal 4: Patient will perform functional mobility during ADLs with SUP using LRAD in order to increase safety and independence with ADLs  Patient Goals   Patient goals : to decrease pain in LLE       Therapy Time

## 2020-02-14 NOTE — PROGRESS NOTES
Physical Therapy  Facility/Department: [facility] AT THE [facility] MED SURG  Daily Treatment Note  NAME: Nghia Donnelly  : 1955  MRN: [id number]    Date of Service: 2020    Discharge Recommendations:  Continue to assess pending progress, Home with Home health PT, IP Rehab        Assessment   Assessment: Increased gait distance to 43 feet. Patient Education: Reviewed hand placement for proper sit to stand transfers. Patient Diagnosis(es): The primary encounter diagnosis was Cellulitis of lower leg. Diagnoses of Cellulitis of left lower extremity, Failure of outpatient treatment, Lymphedema of both lower extremities, and Post-phlebitic syndrome were also pertinent to this visit. has a past medical history of Carcinoma ([facility] [address] Cellulitis, DVT (deep venous thrombosis) ([facility] [address] and Wears glasses. has a past surgical history that includes Tubal ligation (); Carpal tunnel release (); [place] tooth extraction; Tubal ligation; and candy and bso (cervix removed). Restrictions  Restrictions/Precautions  Restrictions/Precautions: General Precautions, Fall Risk  Required Braces or Orthoses?: No  Subjective   General  Chart Reviewed: Yes  Response To Previous Treatment: Patient with no complaints from previous session. Referring Practitioner: Dr. Delmar Silva MD  Subjective  Subjective: Patient reports that LLE is sore. Pain Screening  Patient Currently in Pain: Denies  Vital Signs  Patient Currently in Pain: Denies       Orientation     Cognition      Objective   Bed mobility  Supine to Sit: Contact guard assistance  Scooting: Contact guard assistance  Transfers  Sit to Stand: Contact guard assistance;Stand by assistance  Stand to sit: Contact guard assistance  Comment: Reminded of proper hand placements. Ambulation  Ambulation?: Yes  Ambulation 1  Surface: level tile  Device: Rolling Walker  Assistance: Contact guard assistance;Stand by assistance  Quality of Gait: Good pattern --fair shanna.   Distance: 43 feet Exercises  Hip Flexion: 15x2  Knee Long Arc Quad: 15x2  Ankle Pumps: 15x2  Comments: Ther ex in sitting to BLE. Trunk FF                        G-Code     OutComes Score                                                     AM-PAC Score             Goals  Short term goals  Time Frame for Short term goals: 10 days  Short term goal 1: Patient to complete sit<->supine transfers with CGA and no LOB to improve mobility. Short term goal 2: Patient to complete sit/stand and stand pivot trasfers with SUP and no LOB with std. walker to improve mobility. Short term goal 3: Patient to ambulate 50ft with LRAD and no LOB or fatigue to improve functional endurance. Short term goal 4: Patient to ascend/descend 5 steps with HR and SBA with no LOB to safely enter her home. Plan    Plan  Times per week: 7  Times per day: Twice a day  Current Treatment Recommendations: Strengthening, Gait Training, Patient/Caregiver Education & Training, ROM, Stair training, Balance Training, Neuromuscular Re-education, Functional Mobility Training, Endurance Training, Home Exercise Program, Transfer Training, Safety Education & Training  Safety Devices  Type of devices:  All fall risk precautions in place, Call light within reach, Nurse notified, Patient at risk for falls, Left in chair, Gait belt     Therapy Time   Individual Concurrent Group Co-treatment   Time In 0700         Time Out 0723         Minutes [address] PTA 2893

## 2020-02-14 NOTE — PROGRESS NOTES
[facility]  Inpatient/Observation/Outpatient Rehabilitation    Date: 2020  Patient Name: Brandy Peña       [x] Inpatient Acute/Observation       []  Outpatient  : 1955       [] Pt no showed for scheduled appointment    [] Pt refused/declined therapy at this time due to:           [x] Pt cancelled due to:  [] No Reason Given   [] Sick/ill   [x] Other:    Patient not seen for physical therapy at 2:58Pm d/t just completing OT and needing to contact family preparing for discharge home.       Chiara Cobb, PTA Date: 2020

## 2020-02-14 NOTE — FLOWSHEET NOTE
Reviewed discharge instructions with patient. Voices understanding.  Waiting for her ride to come and bring her clothes

## 2020-02-14 NOTE — PLAN OF CARE
Problem: Pain:  Goal: Pain level will decrease  Description  Pain level will decrease  Outcome: Ongoing  Note:   Patient reporting tramadol is effectively controlling her pain. Will continue to assess     Problem: Falls - Risk of:  Goal: Will remain free from falls  Description  Will remain free from falls  Outcome: Ongoing  Note:   Patient is alert and oriented and has demonstrated the use of using the call light for assistance before getting up. Education has been provided to defer the use of the bed alarm and/or restraint free alarm as the patient has shown competency in calling for assistance and verbalizing the risk. Problem: Musculor/Skeletal Functional Status  Goal: Highest potential functional level  Outcome: Ongoing  Note:   Patient is a one assist with a walker. Will continue to monitor. Problem: Body Temperature - Imbalanced:  Goal: Ability to maintain a body temperature in the normal range will improve  Description  Ability to maintain a body temperature in the normal range will improve  Outcome: Ongoing  Note:   Patient afebrile at time of assessment. Problem: Skin Integrity - Impaired:  Goal: Will show no infection signs and symptoms  Description  Will show no infection signs and symptoms  Outcome: Ongoing  Note:   Patients vitals are WNL, she is afebrile and the redness and swelling on her BLE's is decreasing. She is also getting zosyn and vanco IV. Will continue to monitor.

## 2020-02-14 NOTE — PROGRESS NOTES
Progress Note    SUBJECTIVE:  Patient seen for cellulitis, no complaints. ROS:   Constitutional: negative  for fevers, and negative for chills. Respiratory: negative for shortness of breath, negative for cough, and negative for wheezing  Cardiovascular: negative for chest pain, and negative for palpitations  Gastrointestinal: negative for abdominal pain, negative for nausea,negative for vomiting, negative for diarrhea, and negative for constipation     All other systems were reviewed with the patient and are negative unless otherwise stated in HPI    OBJECTIVE:    EXAM:  Vitals:    02/14/20 0204   BP: 132/60   Pulse: 77   Resp: 18   Temp: 98.4 °F (36.9 °C)   SpO2: 92%      Weight: 266 lb 8 oz (120.9 kg)    Height: 5' 1\" (154.9 cm)     GEN:   A & O x3, no apparent distress  EYES: No gross abnormalities. , PERRL and EOMI  NECK: normal, supple, no lymphadenopathy,   PULM: clear to auscultation bilaterally- no wheezes, rales or rhonchi, normal air movement, no respiratory distress  COR: regular rate & rhythm and no murmurs  ABD:  soft, non-tender, non-distended, normal bowel sounds, no masses or organomegaly  EXT:   Chronic lymphedema, looks better  NEURO: negative  SKIN:  Left shin still red                Xr Chest Portable    Result Date: 2/10/2020  EXAMINATION: ONE XRAY VIEW OF THE CHEST 2/10/2020 9:06 pm COMPARISON: None. HISTORY: ORDERING SYSTEM PROVIDED HISTORY: fever TECHNOLOGIST PROVIDED HISTORY: fever FINDINGS: The cardiomediastinal silhouette is mildly enlarged in size. Increased perihilar pulmonary vascular congestion. Mildly increased perihilar interstitial markings. .  The lungs are otherwise clear. No pleural effusion or pneumothorax is present. Cardiomegaly with increased perihilar vasculature and interstitial markings worrisome for interstitial pulmonary edema/congestion.                    Hospital Problems:  Principal Problem:    Cellulitis of lower leg  Active Problems:    Post-phlebitic syndrome    Elephantiasis nostra verrucosa    Lymphedema of both lower extremities    Cellulitis of left lower extremity  Resolved Problems:    * No resolved hospital problems. *      All Problems:  Patient Active Problem List   Diagnosis    Acute thromboembolism of deep veins of lower extremity ([address]    Long term current use of anticoagulant therapy    Bilateral cellulitis of lower leg    Acute shoulder pain due to trauma    Lymphedema of both lower extremities    Obesity    Tobacco abuse    History of recurrent deep vein thrombosis (DVT)    Erythrocytosis    Gross hematuria    Frequency of urination    Nocturia    Mixed incontinence    Constipation    Cellulitis of lower leg    Post-phlebitic syndrome    Elephantiasis nostra verrucosa    Cellulitis of left lower extremity       ASSESSMENT/PLAN:  ·  cellulitis---resolving.  Home on PO antibiotics and home health     HIGH RISK MEDS: none    Valentín Betancourt M.D.

## 2020-02-15 LAB
CULTURE: ABNORMAL
CULTURE: NORMAL
CULTURE: NORMAL
DIRECT EXAM: ABNORMAL
DIRECT EXAM: ABNORMAL
Lab: ABNORMAL
Lab: NORMAL
Lab: NORMAL
SPECIMEN DESCRIPTION: ABNORMAL
SPECIMEN DESCRIPTION: NORMAL
SPECIMEN DESCRIPTION: NORMAL

## 2020-12-28 ENCOUNTER — HOSPITAL ENCOUNTER (INPATIENT)
Age: 65
LOS: 2 days | Discharge: HOME | DRG: 690 | End: 2020-12-30
Attending: EMERGENCY MEDICINE | Admitting: INTERNAL MEDICINE
Payer: MEDICARE

## 2020-12-28 ENCOUNTER — APPOINTMENT (OUTPATIENT)
Dept: CT IMAGING | Age: 65
DRG: 690 | End: 2020-12-28
Payer: MEDICARE

## 2020-12-28 ENCOUNTER — APPOINTMENT (OUTPATIENT)
Dept: GENERAL RADIOLOGY | Age: 65
DRG: 690 | End: 2020-12-28
Payer: MEDICARE

## 2020-12-28 DIAGNOSIS — N39.0 URINARY TRACT INFECTION WITH HEMATURIA, SITE UNSPECIFIED: Primary | ICD-10-CM

## 2020-12-28 DIAGNOSIS — N20.0 KIDNEY STONE: ICD-10-CM

## 2020-12-28 DIAGNOSIS — R31.9 URINARY TRACT INFECTION WITH HEMATURIA, SITE UNSPECIFIED: Primary | ICD-10-CM

## 2020-12-28 LAB
-: ABNORMAL
ABSOLUTE EOS #: <0.03 K/UL (ref 0–0.44)
ABSOLUTE IMMATURE GRANULOCYTE: 0.07 K/UL (ref 0–0.3)
ABSOLUTE LYMPH #: 0.9 K/UL (ref 1.1–3.7)
ABSOLUTE MONO #: 0.72 K/UL (ref 0.1–1.2)
ALBUMIN SERPL-MCNC: 3.2 G/DL (ref 3.5–5.2)
ALBUMIN/GLOBULIN RATIO: 0.8 (ref 1–2.5)
ALP BLD-CCNC: 74 U/L (ref 35–104)
ALT SERPL-CCNC: 8 U/L (ref 5–33)
AMORPHOUS: ABNORMAL
ANION GAP SERPL CALCULATED.3IONS-SCNC: 12 MMOL/L (ref 9–17)
AST SERPL-CCNC: 11 U/L
BACTERIA: ABNORMAL
BASOPHILS # BLD: 0 % (ref 0–2)
BASOPHILS ABSOLUTE: 0.03 K/UL (ref 0–0.2)
BILIRUB SERPL-MCNC: 0.19 MG/DL (ref 0.3–1.2)
BILIRUBIN URINE: ABNORMAL
BUN BLDV-MCNC: 28 MG/DL (ref 8–23)
BUN/CREAT BLD: 25 (ref 9–20)
CALCIUM SERPL-MCNC: 8.6 MG/DL (ref 8.6–10.4)
CASTS UA: ABNORMAL /LPF
CHLORIDE BLD-SCNC: 110 MMOL/L (ref 98–107)
CO2: 22 MMOL/L (ref 20–31)
COLOR: ABNORMAL
COMMENT UA: ABNORMAL
CREAT SERPL-MCNC: 1.1 MG/DL (ref 0.5–0.9)
CRYSTALS, UA: ABNORMAL /HPF
DIFFERENTIAL TYPE: ABNORMAL
EOSINOPHILS RELATIVE PERCENT: 0 % (ref 1–4)
EPITHELIAL CELLS UA: ABNORMAL /HPF (ref 0–25)
GFR AFRICAN AMERICAN: >60 ML/MIN
GFR NON-AFRICAN AMERICAN: 50 ML/MIN
GFR SERPL CREATININE-BSD FRML MDRD: ABNORMAL ML/MIN/{1.73_M2}
GFR SERPL CREATININE-BSD FRML MDRD: ABNORMAL ML/MIN/{1.73_M2}
GLUCOSE BLD-MCNC: 149 MG/DL (ref 70–99)
GLUCOSE URINE: ABNORMAL
HCT VFR BLD CALC: 33.2 % (ref 36.3–47.1)
HEMOGLOBIN: 10.6 G/DL (ref 11.9–15.1)
IMMATURE GRANULOCYTES: 1 %
INR BLD: 1.4
KETONES, URINE: ABNORMAL
LACTIC ACID, WHOLE BLOOD: NORMAL MMOL/L (ref 0.7–2.1)
LACTIC ACID: 0.8 MMOL/L (ref 0.5–2.2)
LEUKOCYTE ESTERASE, URINE: ABNORMAL
LYMPHOCYTES # BLD: 7 % (ref 24–43)
MCH RBC QN AUTO: 31.5 PG (ref 25.2–33.5)
MCHC RBC AUTO-ENTMCNC: 31.9 G/DL (ref 28.4–34.8)
MCV RBC AUTO: 98.5 FL (ref 82.6–102.9)
MONOCYTES # BLD: 6 % (ref 3–12)
MUCUS: ABNORMAL
NITRITE, URINE: POSITIVE
NRBC AUTOMATED: 0 PER 100 WBC
OTHER OBSERVATIONS UA: ABNORMAL
PARTIAL THROMBOPLASTIN TIME: 37.8 SEC (ref 23.9–33.8)
PDW BLD-RTO: 13.2 % (ref 11.8–14.4)
PH UA: 8.5 (ref 5–9)
PLATELET # BLD: 234 K/UL (ref 138–453)
PLATELET ESTIMATE: ABNORMAL
PMV BLD AUTO: 8.9 FL (ref 8.1–13.5)
POTASSIUM SERPL-SCNC: 3.7 MMOL/L (ref 3.7–5.3)
PROTEIN UA: ABNORMAL
PROTHROMBIN TIME: 17.1 SEC (ref 11.5–14.2)
RBC # BLD: 3.37 M/UL (ref 3.95–5.11)
RBC # BLD: ABNORMAL 10*6/UL
RBC UA: ABNORMAL /HPF (ref 0–2)
RENAL EPITHELIAL, UA: ABNORMAL /HPF
SEG NEUTROPHILS: 86 % (ref 36–65)
SEGMENTED NEUTROPHILS ABSOLUTE COUNT: 11.26 K/UL (ref 1.5–8.1)
SODIUM BLD-SCNC: 144 MMOL/L (ref 135–144)
SPECIFIC GRAVITY UA: <1.005 (ref 1.01–1.02)
TOTAL PROTEIN: 7.1 G/DL (ref 6.4–8.3)
TRICHOMONAS: ABNORMAL
TURBIDITY: ABNORMAL
URINE HGB: ABNORMAL
UROBILINOGEN, URINE: NORMAL
WBC # BLD: 13 K/UL (ref 3.5–11.3)
WBC # BLD: ABNORMAL 10*3/UL
WBC UA: ABNORMAL /HPF (ref 0–5)
YEAST: ABNORMAL

## 2020-12-28 PROCEDURE — 2580000003 HC RX 258: Performed by: INTERNAL MEDICINE

## 2020-12-28 PROCEDURE — 96375 TX/PRO/DX INJ NEW DRUG ADDON: CPT

## 2020-12-28 PROCEDURE — 87077 CULTURE AEROBIC IDENTIFY: CPT

## 2020-12-28 PROCEDURE — 85730 THROMBOPLASTIN TIME PARTIAL: CPT

## 2020-12-28 PROCEDURE — 74176 CT ABD & PELVIS W/O CONTRAST: CPT

## 2020-12-28 PROCEDURE — 81001 URINALYSIS AUTO W/SCOPE: CPT

## 2020-12-28 PROCEDURE — 99285 EMERGENCY DEPT VISIT HI MDM: CPT

## 2020-12-28 PROCEDURE — 80053 COMPREHEN METABOLIC PANEL: CPT

## 2020-12-28 PROCEDURE — 1200000000 HC SEMI PRIVATE

## 2020-12-28 PROCEDURE — 87040 BLOOD CULTURE FOR BACTERIA: CPT

## 2020-12-28 PROCEDURE — 6360000002 HC RX W HCPCS: Performed by: INTERNAL MEDICINE

## 2020-12-28 PROCEDURE — 85025 COMPLETE CBC W/AUTO DIFF WBC: CPT

## 2020-12-28 PROCEDURE — 85610 PROTHROMBIN TIME: CPT

## 2020-12-28 PROCEDURE — 83605 ASSAY OF LACTIC ACID: CPT

## 2020-12-28 PROCEDURE — 96365 THER/PROPH/DIAG IV INF INIT: CPT

## 2020-12-28 PROCEDURE — 6360000002 HC RX W HCPCS: Performed by: EMERGENCY MEDICINE

## 2020-12-28 PROCEDURE — 2580000003 HC RX 258: Performed by: EMERGENCY MEDICINE

## 2020-12-28 PROCEDURE — 74018 RADEX ABDOMEN 1 VIEW: CPT

## 2020-12-28 PROCEDURE — 6360000002 HC RX W HCPCS: Performed by: NURSE PRACTITIONER

## 2020-12-28 PROCEDURE — 87086 URINE CULTURE/COLONY COUNT: CPT

## 2020-12-28 PROCEDURE — 36415 COLL VENOUS BLD VENIPUNCTURE: CPT

## 2020-12-28 PROCEDURE — 87186 SC STD MICRODIL/AGAR DIL: CPT

## 2020-12-28 RX ORDER — SODIUM CHLORIDE 9 MG/ML
INJECTION, SOLUTION INTRAVENOUS CONTINUOUS
Status: DISCONTINUED | OUTPATIENT
Start: 2020-12-28 | End: 2020-12-30

## 2020-12-28 RX ORDER — MORPHINE SULFATE 2 MG/ML
2 INJECTION, SOLUTION INTRAMUSCULAR; INTRAVENOUS ONCE
Status: DISCONTINUED | OUTPATIENT
Start: 2020-12-28 | End: 2020-12-28

## 2020-12-28 RX ORDER — MORPHINE SULFATE 2 MG/ML
2 INJECTION, SOLUTION INTRAMUSCULAR; INTRAVENOUS ONCE
Status: COMPLETED | OUTPATIENT
Start: 2020-12-28 | End: 2020-12-28

## 2020-12-28 RX ORDER — PROMETHAZINE HYDROCHLORIDE 25 MG/1
12.5 TABLET ORAL EVERY 6 HOURS PRN
Status: DISCONTINUED | OUTPATIENT
Start: 2020-12-28 | End: 2020-12-30 | Stop reason: HOSPADM

## 2020-12-28 RX ORDER — SODIUM CHLORIDE 0.9 % (FLUSH) 0.9 %
10 SYRINGE (ML) INJECTION EVERY 12 HOURS SCHEDULED
Status: DISCONTINUED | OUTPATIENT
Start: 2020-12-28 | End: 2020-12-30 | Stop reason: HOSPADM

## 2020-12-28 RX ORDER — SODIUM CHLORIDE 0.9 % (FLUSH) 0.9 %
10 SYRINGE (ML) INJECTION PRN
Status: DISCONTINUED | OUTPATIENT
Start: 2020-12-28 | End: 2020-12-30 | Stop reason: HOSPADM

## 2020-12-28 RX ORDER — ACETAMINOPHEN 325 MG/1
650 TABLET ORAL EVERY 6 HOURS PRN
Status: DISCONTINUED | OUTPATIENT
Start: 2020-12-28 | End: 2020-12-30 | Stop reason: HOSPADM

## 2020-12-28 RX ORDER — ONDANSETRON 2 MG/ML
4 INJECTION INTRAMUSCULAR; INTRAVENOUS EVERY 6 HOURS PRN
Status: DISCONTINUED | OUTPATIENT
Start: 2020-12-28 | End: 2020-12-30 | Stop reason: HOSPADM

## 2020-12-28 RX ORDER — MORPHINE SULFATE 2 MG/ML
2 INJECTION, SOLUTION INTRAMUSCULAR; INTRAVENOUS EVERY 4 HOURS PRN
Status: DISCONTINUED | OUTPATIENT
Start: 2020-12-28 | End: 2020-12-29

## 2020-12-28 RX ORDER — SODIUM CHLORIDE 9 MG/ML
INJECTION, SOLUTION INTRAVENOUS CONTINUOUS
Status: DISCONTINUED | OUTPATIENT
Start: 2020-12-28 | End: 2020-12-28

## 2020-12-28 RX ORDER — ACETAMINOPHEN 650 MG/1
650 SUPPOSITORY RECTAL EVERY 6 HOURS PRN
Status: DISCONTINUED | OUTPATIENT
Start: 2020-12-28 | End: 2020-12-30 | Stop reason: HOSPADM

## 2020-12-28 RX ORDER — POLYETHYLENE GLYCOL 3350 17 G/17G
17 POWDER, FOR SOLUTION ORAL DAILY PRN
Status: DISCONTINUED | OUTPATIENT
Start: 2020-12-28 | End: 2020-12-30 | Stop reason: HOSPADM

## 2020-12-28 RX ADMIN — SODIUM CHLORIDE: 9 INJECTION, SOLUTION INTRAVENOUS at 19:06

## 2020-12-28 RX ADMIN — SODIUM CHLORIDE: 9 INJECTION, SOLUTION INTRAVENOUS at 10:21

## 2020-12-28 RX ADMIN — MORPHINE SULFATE 2 MG: 2 INJECTION, SOLUTION INTRAMUSCULAR; INTRAVENOUS at 11:10

## 2020-12-28 RX ADMIN — MORPHINE SULFATE 2 MG: 2 INJECTION, SOLUTION INTRAMUSCULAR; INTRAVENOUS at 20:06

## 2020-12-28 RX ADMIN — MORPHINE SULFATE 2 MG: 2 INJECTION, SOLUTION INTRAMUSCULAR; INTRAVENOUS at 08:44

## 2020-12-28 RX ADMIN — ENOXAPARIN SODIUM 40 MG: 40 INJECTION SUBCUTANEOUS at 13:02

## 2020-12-28 RX ADMIN — WATER 1 G: 1 INJECTION INTRAMUSCULAR; INTRAVENOUS; SUBCUTANEOUS at 10:21

## 2020-12-28 RX ADMIN — MORPHINE SULFATE 2 MG: 2 INJECTION, SOLUTION INTRAMUSCULAR; INTRAVENOUS at 14:22

## 2020-12-28 ASSESSMENT — PAIN DESCRIPTION - PROGRESSION: CLINICAL_PROGRESSION: GRADUALLY WORSENING

## 2020-12-28 ASSESSMENT — PAIN - FUNCTIONAL ASSESSMENT: PAIN_FUNCTIONAL_ASSESSMENT: ACTIVITIES ARE NOT PREVENTED

## 2020-12-28 ASSESSMENT — PAIN DESCRIPTION - PAIN TYPE
TYPE: ACUTE PAIN

## 2020-12-28 ASSESSMENT — PAIN SCALES - GENERAL
PAINLEVEL_OUTOF10: 5
PAINLEVEL_OUTOF10: 6
PAINLEVEL_OUTOF10: 8
PAINLEVEL_OUTOF10: 10
PAINLEVEL_OUTOF10: 10
PAINLEVEL_OUTOF10: 5
PAINLEVEL_OUTOF10: 8

## 2020-12-28 ASSESSMENT — PAIN DESCRIPTION - ORIENTATION
ORIENTATION: MID
ORIENTATION: MID
ORIENTATION: RIGHT
ORIENTATION: RIGHT

## 2020-12-28 ASSESSMENT — PAIN DESCRIPTION - FREQUENCY
FREQUENCY: INTERMITTENT
FREQUENCY: INTERMITTENT
FREQUENCY: CONTINUOUS
FREQUENCY: INTERMITTENT
FREQUENCY: CONTINUOUS

## 2020-12-28 ASSESSMENT — PAIN DESCRIPTION - ONSET
ONSET: GRADUAL
ONSET: GRADUAL

## 2020-12-28 ASSESSMENT — PAIN DESCRIPTION - LOCATION
LOCATION: HEAD
LOCATION: FLANK
LOCATION: FLANK

## 2020-12-28 ASSESSMENT — PAIN DESCRIPTION - DESCRIPTORS: DESCRIPTORS: HEADACHE

## 2020-12-28 NOTE — PROGRESS NOTES
Patient brought up via cart for admission to [facility] room 334. Pt is alert and oriented x4. Pt states she peed in her brief on the way up from ER. Pt transferred from cart to the bed with assistance. Brief changed and patient cleaned up. Respirations are unlabored but POPE noted. Pt noted to have an audible wheeze and expiratory wheezes auscultated throughout all lobes. Wound noted to the back of patients LLE. +2 pitting edema also noted to BLE. Pt c/o generalizes body soreness as well.

## 2020-12-28 NOTE — PROGRESS NOTES
Patient c/o right flank pain increasing at this time. Eladio Sharma NP called and updated. See new orders.

## 2020-12-28 NOTE — H&P
History and Physical    Patient:  June Call  MRN: [id number]    Chief Complaint:  Right flank pain, hematuria    History Obtained From:  patient, electronic medical record    PCP: No primary care provider on file. History of Present Illness: The patient is a 77 y.o. female who presented to the ER for evaluation of right flank pain and hematuria that she states has been going on for \"several weeks\". She explained that her symptoms included right flank pain, dysuria, and hematuria including clots, however she stated that they improved but then worsened causing her to have fever/chills, n/v and increased pain and malaise on Mirela Solange. She was seen and evaluated in the ER and found to have a Complicated UTI with Right Kidney stones. She was admitted and Urology was consulted. She explained that she has a bladder prolapse as well, however an apescary did not work for her. Past Medical History:        Diagnosis Date    Carcinoma ([facility]     Squamous Cell    Cellulitis     DVT (deep venous thrombosis) ([facility] 2006    LLE    Wears glasses        Past Surgical History:        Procedure Laterality Date    CARPAL TUNNEL RELEASE  1990s    ANNABELLA AND BSO      05/2019    TUBAL LIGATION  1993    TUBAL LIGATION      WISDOM TOOTH EXTRACTION         Family History:       Adopted: Yes       Social History:   TOBACCO:   reports that she has been smoking cigarettes. She has a 21.00 pack-year smoking history. She has never used smokeless tobacco.  ETOH:   reports no history of alcohol use. ELICIT DRUG USE:    Social History     Substance and Sexual Activity   Drug Use No     OCCUPATION: Retired      Allergies:  Estrogens    Medications Prior to Admission:    Prior to Admission medications    Medication Sig Start Date End Date Taking?  Authorizing Provider   oxybutynin (DITROPAN XL) 10 MG extended release tablet Take 1 tablet by mouth daily 4/25/19  Yes DONG Prieto CNM   rivaroxaban (XARELTO) 20 MG TABS tablet Take 1 tablet by mouth daily (with breakfast) 7/17/18  Yes Saige Bhardwaj MD   Multiple Vitamins-Minerals (THERAPEUTIC MULTIVITAMIN-MINERALS) tablet Take 1 tablet by mouth daily   Yes Historical Provider, MD       Review of Systems:  Constitutional:positive  for fevers, and negative for chills. Eyes: negative for visual disturbance   ENT: negative for sore throat, negative nasal congestion, and negative for earache  Respiratory: negative for shortness of breath, negative for cough, and negative for wheezing  Cardiovascular: negative for chest pain, negative for palpitations, and negative for syncope  Gastrointestinal: positive for abdominal pain, positive for nausea,positive for vomiting, negative for diarrhea, negative for constipation, and negative for hematochezia or melena  Genitourinary: positive for dysuria, positive for urinary urgency, positive for urinary frequency, and positive for hematuria  Skin: negative for skin rash, and negative for skin lesions  Neurological: negative for unilateral weakness, numbness or tingling. Physical Exam:    Vitals:   Temp: 98.6 °F (37 °C)  BP: (!) 162/82  Resp: 16  Pulse: 89  SpO2: 96 %  24HR INTAKE/OUTPUT:      Intake/Output Summary (Last 24 hours) at 12/28/2020 1452  Last data filed at 12/28/2020 1422  Gross per 24 hour   Intake 475 ml   Output --   Net 475 ml       Exam:  GEN:  alert and oriented to person, place and time, well-developed and well-nourished, in no acute distress  EYES: No gross abnormalities. , PERRL and EOMI  NECK: normal, supple, no lymphadenopathy,  no carotid bruits  PULM: clear to auscultation bilaterally- no wheezes, rales or rhonchi, normal air movement, no respiratory distress  COR: regular rate & rhythm, no murmurs, no gallops, S1 normal and S2 normal  ABD:  soft, tender RLQ, non-distended, normal bowel sounds, no masses or organomegaly and CV tenderness on the right  EXT:   no cyanosis, clubbing or edema present    NEURO: follows commands, [place], no deficits  SKIN:  no rashes or significant lesions  -----------------------------------------------------------------  Diagnostic Data:   Lab Results   Component Value Date    WBC 13.0 (H) 12/28/2020    HGB 10.6 (L) 12/28/2020     12/28/2020       Lab Results   Component Value Date    BUN 28 (H) 12/28/2020    CREATININE 1.10 (H) 12/28/2020     12/28/2020    K 3.7 12/28/2020    CALCIUM 8.6 12/28/2020     (H) 12/28/2020    CO2 22 12/28/2020    LABGLOM 50 (L) 12/28/2020       Lab Results   Component Value Date    WBCUA 10 TO 20 12/28/2020    RBCUA GREATER THAN 100 12/28/2020    EPITHUA 0 TO 2 12/28/2020    LEUKOCYTESUR LARGE (A) 12/28/2020    SPECGRAV <1.005 (L) 12/28/2020    GLUCOSEU TRACE (A) 12/28/2020    KETUA TRACE (A) 12/28/2020    PROTEINU 4+ (A) 12/28/2020    HGBUR 3+ (A) 12/28/2020    CASTUA NOT REPORTED 12/28/2020    CRYSTUA NOT REPORTED 12/28/2020    BACTERIA 4+ (A) 12/28/2020    YEAST NOT REPORTED 12/28/2020       No results found for: MYOGLOBIN, TROPONINT, CKTOTAL, CKMB, PROBNP    Ct Abdomen Pelvis Wo Contrast Additional Contrast? None    Result Date: 12/28/2020  EXAMINATION: CT OF THE ABDOMEN AND PELVIS WITHOUT CONTRAST 12/28/2020 9:00 am TECHNIQUE: CT of the abdomen and pelvis was performed without the administration of intravenous contrast. Multiplanar reformatted images are provided for review. Dose modulation, iterative reconstruction, and/or weight based adjustment of the mA/kV was utilized to reduce the radiation dose to as low as reasonably achievable. COMPARISON: None. HISTORY: ORDERING SYSTEM PROVIDED HISTORY: right flank pain, hematuria TECHNOLOGIST PROVIDED HISTORY: right flank pain, hematuria FINDINGS: Lower Chest: The visualized heart and lungs show no acute abnormalities. Organs: The liver, spleen, pancreas, adrenal glands and gallbladder show no significant abnormalities. Few tiny hyperdensities in the gallbladder may reflect layering gallstones. GI/Bowel:  There resolved hospital problems.  *      Patient Active Problem List    Diagnosis Date Noted    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     Priority: Medium     Class: Chronic    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     Priority: Medium     Class: Chronic    Complicated UTI (urinary tract infection) 12/28/2020    Calculus of right kidney 12/28/2020    Cellulitis of left lower extremity     Cellulitis of lower leg 02/10/2020    Gross hematuria 10/30/2018    Frequency of urination 10/30/2018    Nocturia 10/30/2018    Mixed incontinence 10/30/2018    Constipation 10/30/2018    History of recurrent deep vein thrombosis (DVT) 04/26/2018    Erythrocytosis 04/26/2018    Obesity 11/15/2016    Tobacco abuse 11/15/2016    Bilateral cellulitis of lower leg 11/08/2016    Acute shoulder pain due to trauma 11/08/2016    Lymphedema of both lower extremities 11/08/2016    Acute thromboembolism of deep veins of lower extremity ([facility] 09/05/2015    Long term current use of anticoagulant therapy 09/05/2015       Plan:     · This patient requires inpatient admission because of Complicated UTI  · Factors affecting the medical complexity of this patient include Right Renal Calculus  · Estimated length of stay is 3 days  · Complicated UTI with Right Renal Calculi  · Appreciate Urology  · IV NS at 125 ml/hr  · Rocephin IV  · BC x 2  · Lactic Acid -- normal  · Pain Control  · Daily BMP and CBC  · High risk medication monitoring: MS    CORE MEASURES  DVT prophylaxis: Lovenox  Decubitus ulcer present on admission: No  CODE STATUS: FULL CODE  Nutrition Status: good   Physical therapy: NA   Old Charts reviewed: Yes  EKG Reviewed: No  Advance Directive Addressed: Yes    DONG Ramirez, NP-C  12/28/2020, 2:52 PM

## 2020-12-28 NOTE — ED PROVIDER NOTES
[facility]      Pt Name: Malina Gotti  MRN: [id number]  [place] 1955  Date of evaluation: 12/28/2020  Provider: Ravi Pham MD    [address] [place] [address]       Chief Complaint   Patient presents with    Flank Pain     right side, ongoing for about 2 weeks,    Hematuria     ongoing for 2 weeks, passing large blood clots         HISTORY OF PRESENT ILLNESS   (Location/Symptom, Timing/Onset, Context/Setting, Quality, Duration, Modifying Factors, Severity)  Note limiting factors. Malina Gotti is a 77 y.o. female who presents to the emergency department      64-year-old female presented to emergency department for evaluation of right-sided flank and groin pain and intermittent hematuria. Patient states she does have a history of chronic back pain which has seemed to worsen over the past several days. Over the night last night the patient had intense right-sided flank pain that would intermittently radiate towards her right groin. She does have a history of intermittent hematuria over the past several weeks. Today she did note dark red urine. She did have chills and sweats on December 24 in the morning of December 25 but these have resolved. She not documented any fevers. She denies any fevers chills or sweats at this time. He has not had any nausea. No vomiting. Denies any abdominal pain. She does have a known history of a bladder prolapse. She tried a pessary in the past without success. She has not been able to schedule an appointment yet with urology. Patient does state she has been taking quite a bit of ibuprofen. States she takes up to 4 over-the-counter tablets every 6-8 hours for the past few days due to her back pain. He does currently take Xarelto for history of DVT in 2006. Currently denying any chest pain shortness of breath or cough. No nausea vomiting or diarrhea. No dysuria or urinary frequency. Patient does smoke cigarettes.   She does have gross hematuria. No known sick contacts. Nursing Notes were reviewed. REVIEW OF SYSTEMS    (2-9 systems for level 4, 10 or more for level 5)     Review of Systems   All other systems reviewed and are negative. Except as noted above the remainder of the review of systems was reviewed and negative.        PAST MEDICAL HISTORY     Past Medical History:   Diagnosis Date    [facility]     Squamous Cell    Cellulitis     DVT (deep venous thrombosis) ([facility] 2006    LLE    Wears glasses          SURGICAL HISTORY       Past Surgical History:   Procedure Laterality Date    CARPAL TUNNEL RELEASE  1990s    ANNABELLA AND BSO      05/2019    TUBAL LIGATION  1993    TUBAL LIGATION      WISDOM TOOTH EXTRACTION           CURRENT MEDICATIONS       Current Discharge Medication List      CONTINUE these medications which have NOT CHANGED    Details   oxybutynin (DITROPAN XL) 10 MG extended release tablet Take 1 tablet by mouth daily  Qty: 30 tablet, Refills: 0    Associated Diagnoses: Other urinary incontinence      rivaroxaban (XARELTO) 20 MG TABS tablet Take 1 tablet by mouth daily (with breakfast)  Qty: 30 tablet, Refills: 5    Associated Diagnoses: Long term current use of anticoagulant therapy      Multiple Vitamins-Minerals (THERAPEUTIC MULTIVITAMIN-MINERALS) tablet Take 1 tablet by mouth daily             ALLERGIES     Estrogens    FAMILY HISTORY       Family History   Adopted: Yes          SOCIAL HISTORY       Social History     Socioeconomic History    Marital status:      Spouse name: None    Number of children: None    Years of education: None    Highest education level: None   Occupational History    None   Social Needs    Financial resource strain: None    Food insecurity     Worry: None     Inability: None    Transportation needs     Medical: None     Non-medical: None   Tobacco Use    Smoking status: Current Every Day Smoker     Packs/day: 0.50     Years: 42.00     Pack years: tenderness. No guarding. Musculoskeletal:      Comments: Bilateral lower extremity lymphedema which is chronic. Skin:     General: Skin is warm and dry. Comments: Lateral groin intertriginous rash. Chronic lower extremity skin changes secondary to lymphedema. Neurological:      General: No focal deficit present. Mental Status: She is alert and oriented to person, place, and time. Comments: No gross sensorimotor deficits         DIAGNOSTIC RESULTS     EKG: All EKG's are interpreted by the Emergency Department Physician who either signs or Co-signs this chart in the absence of a cardiologist.        RADIOLOGY:   Non-plain film images such as CT, Ultrasound and MRI are read by the radiologist. Plain radiographic images are visualized and preliminarily interpreted by the emergency physician with the below findings:        Interpretation per the Radiologist below, if available at the time of this note:    XR ABDOMEN (KUB) (SINGLE AP VIEW)   Final Result   Known right renal pelvic stone. No additional stone definitely seen   radiographically. CT ABDOMEN PELVIS WO CONTRAST Additional Contrast? None   Final Result   1. A 13 mm calculus in the right renal pelvis and a 6 mm inferior pole right   renal calculus. No hydronephrosis. 2. Sigmoid diverticulosis. 3. A few tiny hyperdensities in the gallbladder suggesting cholelithiasis.                ED BEDSIDE ULTRASOUND:   Performed by ED Physician - none    LABS:  Labs Reviewed   CBC WITH AUTO DIFFERENTIAL - Abnormal; Notable for the following components:       Result Value    WBC 13.0 (*)     RBC 3.37 (*)     Hemoglobin 10.6 (*)     Hematocrit 33.2 (*)     Seg Neutrophils 86 (*)     Lymphocytes 7 (*)     Eosinophils % 0 (*)     Immature Granulocytes 1 (*)     Segs Absolute 11.26 (*)     Absolute Lymph # 0.90 (*)     All other components within normal limits   COMPREHENSIVE METABOLIC PANEL W/ REFLEX TO MG FOR LOW K - Abnormal; Notable for the following components:    Glucose 149 (*)     BUN 28 (*)     CREATININE 1.10 (*)     Bun/Cre Ratio 25 (*)     Chloride 110 (*)     Total Bilirubin 0.19 (*)     Alb 3.2 (*)     Albumin/Globulin Ratio 0.8 (*)     GFR Non- 50 (*)     All other components within normal limits   PROTIME-INR - Abnormal; Notable for the following components:    Protime 17.1 (*)     All other components within normal limits   APTT - Abnormal; Notable for the following components:    PTT 37.8 (*)     All other components within normal limits   URINE RT REFLEX TO CULTURE - Abnormal; Notable for the following components:    Color, UA RED (*)     Turbidity UA TURBID (*)     Glucose, Ur TRACE (*)     Bilirubin Urine MODERATE (*)     Ketones, Urine TRACE (*)     Specific Gravity, UA <1.005 (*)     Urine Hgb 3+ (*)     Protein, UA 4+ (*)     Nitrite, Urine POSITIVE (*)     Leukocyte Esterase, Urine LARGE (*)     All other components within normal limits   MICROSCOPIC URINALYSIS - Abnormal; Notable for the following components:    Bacteria, UA 4+ (*)     All other components within normal limits   CBC WITH AUTO DIFFERENTIAL - Abnormal; Notable for the following components:    RBC 2.79 (*)     Hemoglobin 8.8 (*)     Hematocrit 27.7 (*)     Seg Neutrophils 73 (*)     Lymphocytes 17 (*)     Eosinophils % 0 (*)     Immature Granulocytes 1 (*)     All other components within normal limits   BASIC METABOLIC PANEL W/ REFLEX TO MG FOR LOW K - Abnormal; Notable for the following components:    Bun/Cre Ratio 27 (*)     Calcium 7.8 (*)     Potassium 3.5 (*)     Chloride 111 (*)     All other components within normal limits   CULTURE, URINE   CULTURE, BLOOD 1   CULTURE, BLOOD 2   LACTIC ACID, PLASMA   MAGNESIUM       All other labs were within normal range or not returned as of this dictation.     EMERGENCY DEPARTMENT COURSE and DIFFERENTIAL DIAGNOSIS/MDM:   Vitals:    Vitals:    12/28/20 1954 12/29/20 0047 12/29/20 0517 12/29/20 0725   BP: (!) 147/63 138/67  121/79   Pulse: 88 86  88   Resp: 16 16  20   Temp: 98.7 °F (37.1 °C) 98.4 °F (36.9 °C)  98.7 °F (37.1 °C)   TempSrc: Temporal Temporal  Temporal   SpO2: 96% 93%  94%   Weight:   282 lb 4.8 oz (128.1 kg)    Height:               MDM  Number of Diagnoses or Management Options  Diagnosis management comments: Results reviewed and discussed with patient. Gross hematuria. WBC's 13. Hgb 10.6. Afebrile. Urine significant for 10-20 WBC, large amount RBC, Large LE and nitrite positive. Renal stones 13 mm and 6 mm noted. No ureteral stones. Rocephin ordered. Discussed with Dr Ailin Noriega and urology. Patient admitted for continued management. REASSESSMENT          CRITICAL CARE TIME   Total Critical Care time was  minutes, excluding separately reportable procedures. There was a high probability of clinically significant/life threatening deterioration in the patient's condition which required my urgent intervention. CONSULTS:  IP CONSULT TO UROLOGY    PROCEDURES:  Unless otherwise noted below, none     Procedures        FINAL IMPRESSION      1. Urinary tract infection with hematuria, site unspecified    2. Kidney stone          DISPOSITION/PLAN   DISPOSITION Admitted 12/28/2020 10:51:02 AM      PATIENT REFERRED TO:  No follow-up provider specified. DISCHARGE MEDICATIONS:  Current Discharge Medication List        Controlled Substances Monitoring:     No flowsheet data found.     (Please note that portions of this note were completed with a voice recognition program.  Efforts were made to edit the dictations but occasionally words are mis-transcribed.)    Gillian Werner MD (electronically signed)  Attending Emergency Physician            Gillian Werner MD  12/29/20 [id number]

## 2020-12-28 NOTE — CONSULTS
UROLOGY CONSULT    Patient:  Elmira Anna  MRN: [id number]    Chief Complaint:   The patient is a 77 y.o. female who presents with right flank pain    HISTORY OF PRESENT ILLNESS:   Patient presented to the emergency room with 2 weeks of right-sided flank pain and 2 weeks to 1 month of gross hematuria. Patient has been seen by our office in the past for urinary urgency and frequency. Patient also had hematuria at that time. Patient was offered CT urogram and cystoscopy over 2 years ago and she never followed up with our office. She states that since then she has intermittently passed clots in her urine. Within the past month she has been doing this more frequently. Patient does take Ditropan XL from her primary care. Patient is a current smoker. She states that her mother had bladder cancer. She is obese. She denies any dysuria nausea vomiting fever or chills. She states that she has been very fatigued recently. She was found to have a UTI on urinalysis and her white blood cell count was 13. Her creatinine was 1.10. She was admitted. She did have a CT stone protocol in the emergency room which was independently reviewed showing a 1.6 cm renal calculi. She has a noted right atrophic kidney. She does have bladder wall thickening as well.     Past Medical History:    Past Medical History:   Diagnosis Date    Carcinoma ([facility] [address]     Squamous Cell    Cellulitis     DVT (deep venous thrombosis) ([facility] [address] 2006    LLE    Wears glasses        Past Surgical History:    Past Surgical History:   Procedure Laterality Date    CARPAL TUNNEL RELEASE  1990s    ANNABELLA AND BSO      05/2019    TUBAL LIGATION  1993    TUBAL LIGATION      WISDOM TOOTH EXTRACTION          Medications:    Scheduled Meds:   sodium chloride flush  10 mL Intravenous 2 times per day    enoxaparin  40 mg Subcutaneous Daily    [START ON 12/29/2020] cefTRIAXone (ROCEPHIN) IV  1 g Intravenous Q24H     Continuous Infusions:   sodium chloride 125 mL/hr at 12/28/20 1223     PRN Meds:.sodium chloride flush, promethazine **OR** ondansetron, acetaminophen **OR** acetaminophen, polyethylene glycol, morphine    Allergies:    Estrogens    Social History:    Social History     Socioeconomic History    Marital status:      Spouse name: Not on file    Number of children: Not on file    Years of education: Not on file    Highest education level: Not on file   Occupational History    Not on file   Social Needs    Financial resource strain: Not on file    Food insecurity     Worry: Not on file     Inability: Not on file    Transportation needs     Medical: Not on file     Non-medical: Not on file   Tobacco Use    Smoking status: Current Every Day Smoker     Packs/day: 0.50     Years: 42.00     Pack years: 21.00     Types: Cigarettes    Smokeless tobacco: Never Used   Substance and Sexual Activity    Alcohol use: No     Alcohol/week: 0.0 standard drinks    Drug use: No    Sexual activity: Not Currently   Lifestyle    Physical activity     Days per week: Not on file     Minutes per session: Not on file    Stress: Not on file   Relationships    Social connections     Talks on phone: Not on file     Gets together: Not on file     Attends Evangelical service: Not on file     Active member of club or organization: Not on file     Attends meetings of clubs or organizations: Not on file     Relationship status: Not on file    Intimate partner violence     Fear of current or ex partner: Not on file     Emotionally abused: Not on file     Physically abused: Not on file     Forced sexual activity: Not on file   Other Topics Concern    Not on file   Social History Narrative    Not on file       Family History:    Family History   Adopted: Yes       REVIEW OF SYSTEMS:  Constitutional: Negative for fever, chills and unexpected weight change. Respiratory: Negative for shortness of breath and wheezing. Cardiovascular: Negative for chest pain and palpitations. 13.0*   HGB 10.6*   HCT 33.2*   MCV 98.5        Recent Labs     12/28/20  0830      K 3.7   *   CO2 22   BUN 28*   CREATININE 1.10*     No results found for: PSA    Urinalysis:   Recent Labs     12/28/20  0820   COLORU RED*   PHUR 8.5   WBCUA 10 TO 20   RBCUA GREATER THAN 100   MUCUS NOT REPORTED   TRICHOMONAS NOT REPORTED   YEAST NOT REPORTED   BACTERIA 4+*   SPECGRAV <1.005*   LEUKOCYTESUR LARGE*   UROBILINOGEN Normal   BILIRUBINUR MODERATE*        Additional Lab/culture results:  none    Imaging Results:  none    ASSESSMENT AND PLAN:  Impression:    Patient Active Problem List   Diagnosis    Acute thromboembolism of deep veins of lower extremity (HCC)    Long term current use of anticoagulant therapy    Bilateral cellulitis of lower leg    Acute shoulder pain due to trauma    Lymphedema of both lower extremities    Obesity    Tobacco abuse    History of recurrent deep vein thrombosis (DVT)    Erythrocytosis    Gross hematuria    Frequency of urination    Nocturia    Mixed incontinence    Constipation    Cellulitis of lower leg    Post-phlebitic syndrome    Elephantiasis nostra verrucosa    Cellulitis of left lower extremity    Complicated UTI (urinary tract infection)    Calculus of right kidney       Plan:   Follow-up urine culture    Continue Rocephin    Continue IV fluids    We will obtain a KUB to see if her large right-sided renal calculi is visualized on plain film. If stone is seen on plain film patient may be a candidate for right ESWL, if it is not seen on plain film we would have to proceed with a right HLL. She will need to clear her infection prior to proceeding with any procedure. Stone was visible on KUB.  We discussed risks and benefits of ESWL procedure and stent placement (including bleeding, infection, injury/perforation of  tract, renal hematoma, and possibility of the need for multiple procedures such as stent placement, subsequent HLL or repeat ESWL), details of procedure, and what to expect post-operatively. Patient does understand that this procedure will fragment the stone and these fragments will need to pass. Fragment passage will be associated with gross hematuria and flank pain. Patient voiced understanding and is amenable to schedule on right ESWL. We also discussed with her the need for a gross hematuria work-up. Again we had offered a work-up for this in the past but patient never followed up. We will be able to do cystoscopy at time of ESWL. Discussed with her the risk and benefits of a cystoscopy including bleeding and infection. Prior to scheduling any of these procedures she will need to get clearance as well as find out when she can hold her Xarelto as she does have a history of blood clots. We will more than likely need to schedule any elective procedures at [facility] as her insurance is not taken by [facility]. ------------------------------------------------  Thank you for the consultation.   I have discussed the care of this patient including pertinent history and exam findings, lab and imaging results, assessment, orders and plan as documented above with Holden Aguilar MD.    Electronically signed by Ezio Liao PA-C on 12/28/2020 at 2:14 PM

## 2020-12-28 NOTE — ED NOTES
Dr Camille Hazel called at office.  He is with a patient and will  Call us back     Rosa Elena Wayne  12/28/20 1000

## 2020-12-29 LAB
ABSOLUTE EOS #: 0.03 K/UL (ref 0–0.44)
ABSOLUTE IMMATURE GRANULOCYTE: 0.04 K/UL (ref 0–0.3)
ABSOLUTE LYMPH #: 1.39 K/UL (ref 1.1–3.7)
ABSOLUTE MONO #: 0.76 K/UL (ref 0.1–1.2)
ANION GAP SERPL CALCULATED.3IONS-SCNC: 11 MMOL/L (ref 9–17)
BASOPHILS # BLD: 0 % (ref 0–2)
BASOPHILS ABSOLUTE: 0.03 K/UL (ref 0–0.2)
BUN BLDV-MCNC: 23 MG/DL (ref 8–23)
BUN/CREAT BLD: 27 (ref 9–20)
CALCIUM SERPL-MCNC: 7.8 MG/DL (ref 8.6–10.4)
CHLORIDE BLD-SCNC: 111 MMOL/L (ref 98–107)
CO2: 20 MMOL/L (ref 20–31)
CREAT SERPL-MCNC: 0.85 MG/DL (ref 0.5–0.9)
DIFFERENTIAL TYPE: ABNORMAL
EOSINOPHILS RELATIVE PERCENT: 0 % (ref 1–4)
GFR AFRICAN AMERICAN: >60 ML/MIN
GFR NON-AFRICAN AMERICAN: >60 ML/MIN
GFR SERPL CREATININE-BSD FRML MDRD: ABNORMAL ML/MIN/{1.73_M2}
GFR SERPL CREATININE-BSD FRML MDRD: ABNORMAL ML/MIN/{1.73_M2}
GLUCOSE BLD-MCNC: 99 MG/DL (ref 70–99)
HCT VFR BLD CALC: 27.7 % (ref 36.3–47.1)
HEMOGLOBIN: 8.8 G/DL (ref 11.9–15.1)
IMMATURE GRANULOCYTES: 1 %
LYMPHOCYTES # BLD: 17 % (ref 24–43)
MAGNESIUM: 2 MG/DL (ref 1.6–2.6)
MCH RBC QN AUTO: 31.5 PG (ref 25.2–33.5)
MCHC RBC AUTO-ENTMCNC: 31.8 G/DL (ref 28.4–34.8)
MCV RBC AUTO: 99.3 FL (ref 82.6–102.9)
MONOCYTES # BLD: 9 % (ref 3–12)
NRBC AUTOMATED: 0 PER 100 WBC
PDW BLD-RTO: 13.1 % (ref 11.8–14.4)
PLATELET # BLD: 207 K/UL (ref 138–453)
PLATELET ESTIMATE: ABNORMAL
PMV BLD AUTO: 9.2 FL (ref 8.1–13.5)
POTASSIUM SERPL-SCNC: 3.5 MMOL/L (ref 3.7–5.3)
RBC # BLD: 2.79 M/UL (ref 3.95–5.11)
RBC # BLD: ABNORMAL 10*6/UL
SEG NEUTROPHILS: 73 % (ref 36–65)
SEGMENTED NEUTROPHILS ABSOLUTE COUNT: 6.07 K/UL (ref 1.5–8.1)
SODIUM BLD-SCNC: 142 MMOL/L (ref 135–144)
WBC # BLD: 8.3 K/UL (ref 3.5–11.3)
WBC # BLD: ABNORMAL 10*3/UL

## 2020-12-29 PROCEDURE — 2500000003 HC RX 250 WO HCPCS: Performed by: NURSE PRACTITIONER

## 2020-12-29 PROCEDURE — 1200000000 HC SEMI PRIVATE

## 2020-12-29 PROCEDURE — 6360000002 HC RX W HCPCS: Performed by: INTERNAL MEDICINE

## 2020-12-29 PROCEDURE — 83735 ASSAY OF MAGNESIUM: CPT

## 2020-12-29 PROCEDURE — 97162 PT EVAL MOD COMPLEX 30 MIN: CPT

## 2020-12-29 PROCEDURE — 6370000000 HC RX 637 (ALT 250 FOR IP): Performed by: NURSE PRACTITIONER

## 2020-12-29 PROCEDURE — 36415 COLL VENOUS BLD VENIPUNCTURE: CPT

## 2020-12-29 PROCEDURE — 97166 OT EVAL MOD COMPLEX 45 MIN: CPT

## 2020-12-29 PROCEDURE — 51798 US URINE CAPACITY MEASURE: CPT

## 2020-12-29 PROCEDURE — 6360000002 HC RX W HCPCS: Performed by: NURSE PRACTITIONER

## 2020-12-29 PROCEDURE — 6370000000 HC RX 637 (ALT 250 FOR IP): Performed by: INTERNAL MEDICINE

## 2020-12-29 PROCEDURE — 85025 COMPLETE CBC W/AUTO DIFF WBC: CPT

## 2020-12-29 PROCEDURE — 2580000003 HC RX 258: Performed by: INTERNAL MEDICINE

## 2020-12-29 PROCEDURE — 80048 BASIC METABOLIC PNL TOTAL CA: CPT

## 2020-12-29 RX ORDER — FUROSEMIDE 10 MG/ML
40 INJECTION INTRAMUSCULAR; INTRAVENOUS DAILY
Status: DISCONTINUED | OUTPATIENT
Start: 2020-12-29 | End: 2020-12-30 | Stop reason: HOSPADM

## 2020-12-29 RX ORDER — PHENAZOPYRIDINE HYDROCHLORIDE 100 MG/1
200 TABLET, FILM COATED ORAL 3 TIMES DAILY PRN
Status: DISCONTINUED | OUTPATIENT
Start: 2020-12-29 | End: 2020-12-30 | Stop reason: HOSPADM

## 2020-12-29 RX ADMIN — MICONAZOLE NITRATE: 20 POWDER TOPICAL at 20:22

## 2020-12-29 RX ADMIN — ACETAMINOPHEN 650 MG: 325 TABLET, FILM COATED ORAL at 23:39

## 2020-12-29 RX ADMIN — ACETAMINOPHEN 650 MG: 325 TABLET, FILM COATED ORAL at 07:32

## 2020-12-29 RX ADMIN — ACETAMINOPHEN 650 MG: 325 TABLET, FILM COATED ORAL at 17:41

## 2020-12-29 RX ADMIN — SODIUM CHLORIDE: 9 INJECTION, SOLUTION INTRAVENOUS at 12:30

## 2020-12-29 RX ADMIN — MORPHINE SULFATE 2 MG: 2 INJECTION, SOLUTION INTRAMUSCULAR; INTRAVENOUS at 03:39

## 2020-12-29 RX ADMIN — ENOXAPARIN SODIUM 40 MG: 40 INJECTION SUBCUTANEOUS at 08:27

## 2020-12-29 RX ADMIN — MORPHINE SULFATE 2 MG: 2 INJECTION, SOLUTION INTRAMUSCULAR; INTRAVENOUS at 08:28

## 2020-12-29 RX ADMIN — WATER 1 G: 1 INJECTION INTRAMUSCULAR; INTRAVENOUS; SUBCUTANEOUS at 08:27

## 2020-12-29 RX ADMIN — PHENAZOPYRIDINE HYDROCHLORIDE 200 MG: 100 TABLET ORAL at 17:41

## 2020-12-29 RX ADMIN — PHENAZOPYRIDINE HYDROCHLORIDE 200 MG: 100 TABLET ORAL at 23:39

## 2020-12-29 RX ADMIN — SODIUM CHLORIDE: 9 INJECTION, SOLUTION INTRAVENOUS at 21:39

## 2020-12-29 RX ADMIN — FUROSEMIDE 40 MG: 10 INJECTION, SOLUTION INTRAMUSCULAR; INTRAVENOUS at 08:27

## 2020-12-29 RX ADMIN — SODIUM CHLORIDE: 9 INJECTION, SOLUTION INTRAVENOUS at 03:36

## 2020-12-29 ASSESSMENT — PAIN SCALES - GENERAL
PAINLEVEL_OUTOF10: 10
PAINLEVEL_OUTOF10: 6
PAINLEVEL_OUTOF10: 8
PAINLEVEL_OUTOF10: 8
PAINLEVEL_OUTOF10: 10
PAINLEVEL_OUTOF10: 8

## 2020-12-29 ASSESSMENT — PAIN DESCRIPTION - DESCRIPTORS: DESCRIPTORS: SHARP

## 2020-12-29 ASSESSMENT — PAIN DESCRIPTION - FREQUENCY: FREQUENCY: INTERMITTENT

## 2020-12-29 NOTE — PROGRESS NOTES
[facility]  OCCUPATIONAL THERAPY  No Visit Note    [] ICU    [x] Acute   Patient: Ingrid Espinosa  Room: 63/6140-05      Ingrid Espinosa not seen on 12/29/2020 at 11:39 AM due to pt refusal/decline for OT evaluation at this time. Pt requesting this writer return at later time. Will re-attempt at later time as able.           Signature: FRANCINE Solis, OTR/L

## 2020-12-29 NOTE — PROGRESS NOTES
Occupational Therapy   Occupational Therapy Initial Assessment  Date: 2020   Patient Name: Amber Paredes  MRN: [id number]     : 1955    Date of Service: 2020    Discharge Recommendations:  Continue to assess pending progress       Assessment   Performance deficits / Impairments: Decreased functional mobility ; Decreased endurance;Decreased ADL status; Decreased coordination;Decreased strength;Decreased safe awareness  Assessment: Pt is a 77year old female admitted for a complicated UTI/kidney stones. Pt reports pain in lower back during evaluation, and reports relief with standing. min/modAx2 for sit to stand transfer out of bedside chair. Pt presents with decreased ADL independence and overall strength and endurance. Pt would benefit from skilled occupational therapy services to address deficits to improve independence and safety wtih ADL tasks. Treatment Diagnosis: generalized weakness  Prognosis: Good  Decision Making: Medium Complexity  OT Education: OT Role;Plan of Care;Transfer Training  REQUIRES OT FOLLOW UP: Yes  Safety Devices  Safety Devices in place: Yes  Type of devices: Left in chair;Call light within reach           Patient Diagnosis(es): The primary encounter diagnosis was Urinary tract infection with hematuria, site unspecified. A diagnosis of Kidney stone was also pertinent to this visit. has a past medical history of Carcinoma ([facility] [address] Cellulitis, DVT (deep venous thrombosis) ([facility] [address] and Wears glasses. has a past surgical history that includes Tubal ligation (); Carpal tunnel release (); Sarver tooth extraction; Tubal ligation; and candy and bso (cervix removed).     Treatment Diagnosis: generalized weakness      Restrictions  Restrictions/Precautions  Restrictions/Precautions: General Precautions, Fall Risk    Subjective   General  Chart Reviewed: Yes  Patient assessed for rehabilitation services?: Yes  Family / Caregiver Present: No  Referring Practitioner: Robyn Strength Comment: Grossly 4-/5  RUE Strength  Gross RUE Strength: WFL  RUE Strength Comment: Grossly 3+/5        Plan   Plan  Times per week: 7x/week  Times per day: Daily  Current Treatment Recommendations: Strengthening, Patient/Caregiver Education & Training, Balance Training, Functional Mobility Training, Endurance Training, Safety Education & Training, Self-Care / ADL      AM-PAC Score  AM-PAC Inpatient Daily Activity Raw Score: 17 (12/29/20 1404)  AM-PAC Inpatient ADL T-Scale Score : 37.26 (12/29/20 1404)  ADL Inpatient CMS 0-100% Score: 50.11 (12/29/20 1404)  ADL Inpatient CMS G-Code Modifier : CK (12/29/20 1404)    Goals  Short term goals  Time Frame for Short term goals: 10 visits  Short term goal 1: Pt will engage in greater than 15 minutes BUE therex/theract to improve UB strength for improved independence with ADL and functional transfers. Short term goal 2: Pt will complete TB ADL Yuridia with use of AE PRN to improve safety and independence with ADL tasks. Short term goal 3: Pt will complete toileting routine Yuridia to ensure safe return home. Short term goal 4: Pt will tolerate standing for greater than 5 mintues without rest break or LOB to improve safety and endurance for ADL and functional mobility.        Therapy Time   Individual Concurrent Group Co-treatment   Time In 1308         Time Out 1320         Minutes [address] [place], [place]

## 2020-12-29 NOTE — PROGRESS NOTES
Meghan Davis M.D. Internal Medicine Progress Note 12/29/20    SUBJECTIVE:    Patient seen for f/u of Complicated UTI (urinary tract infection). She denies fever or chills. Flank pain improved slightly. Denies n/v/d. She c/o swelling in hands and feet. States she had to have lasix along with IVF's during a previous admission. ROS:   Constitutional: negative  for fevers, and negative for chills. Respiratory: negative for shortness of breath, negative for cough, and negative for wheezing  Cardiovascular: negative for chest pain, and negative for palpitations  Gastrointestinal: negative for abdominal pain, negative for nausea,negative for vomiting, negative for diarrhea, and negative for constipation     All other systems were reviewed with the patient and are negative unless otherwise stated in HPI    OBJECTIVE:  Vitals:   Temp: 98.7 °F (37.1 °C)  BP: 121/79  Resp: 20  Pulse: 88  SpO2: 94 %    24HR INTAKE/OUTPUT:      Intake/Output Summary (Last 24 hours) at 12/29/2020 0750  Last data filed at 12/28/2020 2002  Gross per 24 hour   Intake 1358 ml   Output --   Net 1358 ml     -----------------------------------------------------------------  Exam:  GEN:    Awake, alert and oriented x3. EYES:  EOMI, pupils equal   NECK: Supple. No lymphadenopathy. No carotid bruit  CVS:    regular rate and rhythm, no audible murmur  PULM:  CTA, no wheezes, rales or rhonchi, no acute respiratory distress  ABD:    Bowels sounds normal.  Abdomen is soft. No distention. no tenderness to palpation. EXT:  1+ edema bilaterally  . No calf tenderness. NEURO: Moves all extremities. Motor and sensory are grossly intact  SKIN:  No rashes.   No skin lesions.    -----------------------------------------------------------------  Diagnostic Data:    · All available data reviewed  Lab Results   Component Value Date    WBC 8.3 12/29/2020    HGB 8.8 (L) 12/29/2020    MCV 99.3 12/29/2020     12/29/2020      Lab Results Component Value Date    GLUCOSE 99 12/29/2020    BUN 23 12/29/2020    CREATININE 0.85 12/29/2020     12/29/2020    K 3.5 (L) 12/29/2020    CALCIUM 7.8 (L) 12/29/2020     (H) 12/29/2020    CO2 20 12/29/2020         ASSESSMENT / PLAN:  · Complicated UTI with right renal calculi  ? IV rocephin  ? IVF's   ? Monitor urine and blood cultures  ? Urology consult  ? Monitor CBC with Diff and BMP daily  · History of DVT   ?  On chronic Xarelto  · DVT prophylaxis: SCD's   · High risk medications: none   · Disposition:  Discharge plan is home    Stephy Wilkinson M.D.  12/29/2020  7:50 AM

## 2020-12-29 NOTE — PROGRESS NOTES
Nutrition Assessment     Type and Reason for Visit: Initial    Nutrition Recommendations/Plan: continue current diet, f/u diet education needs. Nutrition Assessment:  Overweight/obesity r/t excess energy intakes aeb BMI 53.34. Pt would benefit from weight loss regimen, f/u for diet education needs. Pt admitted with UTI with R renal calculi. Pt has been asleep at multiple attempts to visit with her. Corrected calcium 8.44. Had received low sodium diet education at 2/20 hospitalization. Malnutrition Assessment:  Malnutrition Status: Insufficient data      Nutrition Related Findings: appears well nourished      Current Nutrition Therapies:    DIET GENERAL;     Anthropometric Measures:  · Height: 5' 1\" (154.9 cm)  · Current Body Wt: 282 lb 4.8 oz (128.1 kg)   · BMI: 53.4    Nutrition Diagnosis:   · Overweight/Obese related to excessive energy intake as evidenced by BMI      Nutrition Interventions:   Food and/or Nutrient Delivery:  Continue Current Diet  Nutrition Education/Counseling:  Education needed   Coordination of Nutrition Care:  Continue to monitor while inpatient    Goals:  PO > 75% of meals      Recent Labs     12/28/20  0830 12/29/20  0620    142   K 3.7 3.5*   * 111*   CO2 22 20   BUN 28* 23   CREATININE 1.10* 0.85   GLUCOSE 149* 99   ALT 8  --    ALKPHOS 74  --    GFR                            Lab Results   Component Value Date    LABALBU 3.2 12/28/2020      Nutrition Monitoring and Evaluation:   Behavioral-Environmental Outcomes:  None Identified   Food/Nutrient Intake Outcomes:  Food and Nutrient Intake  Physical Signs/Symptoms Outcomes:  Biochemical Data, Weight     Discharge Planning:    Continue current diet     Electronically signed by Valeria Ortiz RD, VIDAL on 12/29/20 at 9:43 AM EST    Contact: [phone]

## 2020-12-29 NOTE — FLOWSHEET NOTE
Vitals checked and assessment completed. C/o a headache this morning. Tylenol po given.  Continue to monitor

## 2020-12-29 NOTE — PROGRESS NOTES
Discussed discharge plans with the patient. Patient is a 77year old female here with Complicated UTI. She is alert and oriented. Patient is  and lives at home with her  & daughter. She uses a walker for ambulation. The patient and the family all share in the cooking and the cleaning. She is independent with her ADL's. Patient manages her own medications and drives. She has no outside services at this time. Her PCP is DONG Childers CNP. She has medical insurance that helps with medication costs. The discharge plan is home with no services at this time. She does not have advance directives. LSW to monitor and assist with any needs or concerns as they arise.     KATHRYN Fermin

## 2020-12-29 NOTE — PROGRESS NOTES
UROLOGY PROGRESS NOTE    Patient:  Beverly Her  MRN: [id number]      Subjective:   Dysuria is improving. Denies gross hematuria. Denies flank or abdominal pain. Objective:  Afebrile. Renal labs stable: BUN 23, creatinine 0.85, GFR greater than 60. WBC 8.3. Blood cultures x2 show no growth at 18 hours. Urine culture does show greater than 100,000 gram-negative rods. KUB was independently reviewed and does show a right renal stone consistent with prior CT findings. Staff reports incontinence and patient does not even attempt to use the bedside commode. PVR 190ml    REVIEW OF SYSTEMS:  Constitutional: Negative for fever, chills and unexpected weight change. Respiratory: Negative for shortness of breath and wheezing. Cardiovascular: Negative for chest pain and palpitations. Gastrointestinal: Negative for nausea or vomiting. Endocrine: Negative for polydipsia and polyuria. Genitourinary: Positive for incontinence and dysuria. Musculoskeletal: Negative for myalgias and joint swelling. Skin: Negative for rash and wound. Neurological: Negative for dizziness and headaches. Hematological: Negative for adenopathy. Does not bruise/bleed easily. PHYSICAL EXAM:  Constitutional: Patient resting comfortably, in no acute distress. Neuro: Alert and oriented to person place and time. Cranial nerves grossly intact. Psych: Mood and affect normal.  Skin: Warm, dry, non-diaphoretic  HEENT: normocephalic, atraumatic  Lymphatics: No palpable lymphadenopathy  Lungs: Respiratory effort normal, unlabored  Cardiovascular:  Normal peripheral pulses  Abdomen: Soft, non-tender, non-distended with no organomegaly or palpable masses. Erythematous rash consistent with yeast  : No CVA tenderness bilat. Bladder non-tender and not distended. Extremities: Calves are non-tender, equal in circumference bilat without swelling, warmth, or erythema.      Labs:  Recent Labs     12/28/20  0830 12/29/20  0620   WBC 13.0*

## 2020-12-29 NOTE — FLOWSHEET NOTE
Sleeping. Woke up to change brief since Lasix given earlier. States she bas urinated several times. Does not call out to be changed. Encouraged to try to use a BSC instead of lying in soaked briefs. Voices understanding.

## 2020-12-30 ENCOUNTER — TELEPHONE (OUTPATIENT)
Dept: UROLOGY | Age: 65
End: 2020-12-30

## 2020-12-30 VITALS
TEMPERATURE: 98.4 F | WEIGHT: 283.5 LBS | RESPIRATION RATE: 18 BRPM | HEIGHT: 61 IN | HEART RATE: 76 BPM | OXYGEN SATURATION: 96 % | BODY MASS INDEX: 53.52 KG/M2 | DIASTOLIC BLOOD PRESSURE: 83 MMHG | SYSTOLIC BLOOD PRESSURE: 161 MMHG

## 2020-12-30 LAB
ABSOLUTE EOS #: 0.08 K/UL (ref 0–0.44)
ABSOLUTE IMMATURE GRANULOCYTE: 0.04 K/UL (ref 0–0.3)
ABSOLUTE LYMPH #: 1.48 K/UL (ref 1.1–3.7)
ABSOLUTE MONO #: 0.79 K/UL (ref 0.1–1.2)
ANION GAP SERPL CALCULATED.3IONS-SCNC: 9 MMOL/L (ref 9–17)
BASOPHILS # BLD: 0 % (ref 0–2)
BASOPHILS ABSOLUTE: 0.03 K/UL (ref 0–0.2)
BUN BLDV-MCNC: 22 MG/DL (ref 8–23)
BUN/CREAT BLD: 27 (ref 9–20)
CALCIUM SERPL-MCNC: 7.8 MG/DL (ref 8.6–10.4)
CHLORIDE BLD-SCNC: 107 MMOL/L (ref 98–107)
CO2: 22 MMOL/L (ref 20–31)
CREAT SERPL-MCNC: 0.81 MG/DL (ref 0.5–0.9)
CULTURE: ABNORMAL
CULTURE: ABNORMAL
DIFFERENTIAL TYPE: ABNORMAL
EOSINOPHILS RELATIVE PERCENT: 1 % (ref 1–4)
GFR AFRICAN AMERICAN: >60 ML/MIN
GFR NON-AFRICAN AMERICAN: >60 ML/MIN
GFR SERPL CREATININE-BSD FRML MDRD: ABNORMAL ML/MIN/{1.73_M2}
GFR SERPL CREATININE-BSD FRML MDRD: ABNORMAL ML/MIN/{1.73_M2}
GLUCOSE BLD-MCNC: 107 MG/DL (ref 70–99)
HCT VFR BLD CALC: 27.9 % (ref 36.3–47.1)
HEMOGLOBIN: 9.2 G/DL (ref 11.9–15.1)
IMMATURE GRANULOCYTES: 1 %
LYMPHOCYTES # BLD: 19 % (ref 24–43)
Lab: ABNORMAL
MAGNESIUM: 2 MG/DL (ref 1.6–2.6)
MCH RBC QN AUTO: 32.1 PG (ref 25.2–33.5)
MCHC RBC AUTO-ENTMCNC: 33 G/DL (ref 28.4–34.8)
MCV RBC AUTO: 97.2 FL (ref 82.6–102.9)
MONOCYTES # BLD: 10 % (ref 3–12)
NRBC AUTOMATED: 0 PER 100 WBC
PDW BLD-RTO: 12.7 % (ref 11.8–14.4)
PLATELET # BLD: 249 K/UL (ref 138–453)
PLATELET ESTIMATE: ABNORMAL
PMV BLD AUTO: 9.6 FL (ref 8.1–13.5)
POTASSIUM SERPL-SCNC: 3 MMOL/L (ref 3.7–5.3)
RBC # BLD: 2.87 M/UL (ref 3.95–5.11)
RBC # BLD: ABNORMAL 10*6/UL
SEG NEUTROPHILS: 69 % (ref 36–65)
SEGMENTED NEUTROPHILS ABSOLUTE COUNT: 5.31 K/UL (ref 1.5–8.1)
SODIUM BLD-SCNC: 138 MMOL/L (ref 135–144)
SPECIMEN DESCRIPTION: ABNORMAL
WBC # BLD: 7.7 K/UL (ref 3.5–11.3)
WBC # BLD: ABNORMAL 10*3/UL

## 2020-12-30 PROCEDURE — 97116 GAIT TRAINING THERAPY: CPT

## 2020-12-30 PROCEDURE — 6370000000 HC RX 637 (ALT 250 FOR IP): Performed by: NURSE PRACTITIONER

## 2020-12-30 PROCEDURE — 94761 N-INVAS EAR/PLS OXIMETRY MLT: CPT

## 2020-12-30 PROCEDURE — 85025 COMPLETE CBC W/AUTO DIFF WBC: CPT

## 2020-12-30 PROCEDURE — 2500000003 HC RX 250 WO HCPCS: Performed by: NURSE PRACTITIONER

## 2020-12-30 PROCEDURE — 97110 THERAPEUTIC EXERCISES: CPT

## 2020-12-30 PROCEDURE — 36415 COLL VENOUS BLD VENIPUNCTURE: CPT

## 2020-12-30 PROCEDURE — 83735 ASSAY OF MAGNESIUM: CPT

## 2020-12-30 PROCEDURE — 80048 BASIC METABOLIC PNL TOTAL CA: CPT

## 2020-12-30 PROCEDURE — 6370000000 HC RX 637 (ALT 250 FOR IP): Performed by: INTERNAL MEDICINE

## 2020-12-30 PROCEDURE — 6360000002 HC RX W HCPCS: Performed by: INTERNAL MEDICINE

## 2020-12-30 PROCEDURE — 2580000003 HC RX 258: Performed by: INTERNAL MEDICINE

## 2020-12-30 RX ORDER — CIPROFLOXACIN 500 MG/1
500 TABLET, FILM COATED ORAL 2 TIMES DAILY
Qty: 20 TABLET | Refills: 0 | Status: SHIPPED | OUTPATIENT
Start: 2020-12-30 | End: 2021-01-09

## 2020-12-30 RX ORDER — PHENAZOPYRIDINE HYDROCHLORIDE 200 MG/1
200 TABLET, FILM COATED ORAL 3 TIMES DAILY PRN
Qty: 9 TABLET | Refills: 0 | Status: SHIPPED | OUTPATIENT
Start: 2020-12-30 | End: 2021-01-02

## 2020-12-30 RX ORDER — IBUPROFEN 400 MG/1
400 TABLET ORAL EVERY 6 HOURS PRN
Status: DISCONTINUED | OUTPATIENT
Start: 2020-12-30 | End: 2020-12-30 | Stop reason: HOSPADM

## 2020-12-30 RX ORDER — NYSTATIN 100000 U/G
CREAM TOPICAL
Qty: 30 G | Refills: 0 | Status: ON HOLD | OUTPATIENT
Start: 2020-12-30 | End: 2022-01-04

## 2020-12-30 RX ORDER — POTASSIUM CHLORIDE 20 MEQ/1
40 TABLET, EXTENDED RELEASE ORAL ONCE
Status: COMPLETED | OUTPATIENT
Start: 2020-12-30 | End: 2020-12-30

## 2020-12-30 RX ADMIN — SODIUM CHLORIDE: 9 INJECTION, SOLUTION INTRAVENOUS at 04:50

## 2020-12-30 RX ADMIN — FUROSEMIDE 40 MG: 10 INJECTION, SOLUTION INTRAMUSCULAR; INTRAVENOUS at 08:28

## 2020-12-30 RX ADMIN — PHENAZOPYRIDINE HYDROCHLORIDE 200 MG: 100 TABLET ORAL at 07:05

## 2020-12-30 RX ADMIN — POTASSIUM CHLORIDE 40 MEQ: 1500 TABLET, EXTENDED RELEASE ORAL at 08:27

## 2020-12-30 RX ADMIN — WATER 1 G: 1 INJECTION INTRAMUSCULAR; INTRAVENOUS; SUBCUTANEOUS at 08:29

## 2020-12-30 RX ADMIN — ENOXAPARIN SODIUM 40 MG: 40 INJECTION SUBCUTANEOUS at 08:28

## 2020-12-30 RX ADMIN — MICONAZOLE NITRATE: 20 POWDER TOPICAL at 08:33

## 2020-12-30 RX ADMIN — Medication 10 ML: at 08:28

## 2020-12-30 RX ADMIN — ACETAMINOPHEN 650 MG: 325 TABLET, FILM COATED ORAL at 07:05

## 2020-12-30 RX ADMIN — IBUPROFEN 400 MG: 400 TABLET, FILM COATED ORAL at 10:17

## 2020-12-30 NOTE — TELEPHONE ENCOUNTER
Ashely Martinez was recently d/c'd from the hospital.  She wanted to know if she could get a script for the amount of pain she is in. She states she cannot walk at times d/t the pain. She has been taking ibuprofen and is not helping her. Health Maintenance   Topic Date Due    Hepatitis C screen  1955    DTaP/Tdap/Td vaccine (1 - Tdap) 12/18/1972    Lipid screen  12/18/1993    Breast cancer screen  12/18/2003    Shingles Vaccine (1 of 2) 12/18/2003    Colon cancer screen colonoscopy  12/18/2003    DEXA (modify frequency per FRAX score)  12/18/2008    Pneumococcal 65+ years Vaccine (1 of 1 - PPSV23) 12/18/2018    Annual Wellness Visit (AWV)  09/22/2019    Flu vaccine (1) 09/01/2020    Potassium monitoring  12/30/2021    Creatinine monitoring  12/30/2021    Hepatitis A vaccine  Aged Out    Hepatitis B vaccine  Aged Out    Hib vaccine  Aged Out    Meningococcal (ACWY) vaccine  Aged Out             (applicable per patient's age: Cancer Screenings, Depression Screening, Fall Risk Screening, Immunizations)    Hemoglobin A1C (%)   Date Value   11/11/2016 5.5     AST (U/L)   Date Value   12/28/2020 11     ALT (U/L)   Date Value   12/28/2020 8     BUN (mg/dL)   Date Value   12/30/2020 22      (goal A1C is < 7)   (goal LDL is <100) need 30-50% reduction from baseline     BP Readings from Last 3 Encounters:   12/30/20 (!) 161/83   02/14/20 120/70   10/29/19 132/86    (goal /80)      All Future Testing planned in [facility]:  Lab Frequency Next Occurrence       Next Visit Date:  No future appointments.          Patient Active Problem List:     Acute thromboembolism of deep veins of lower extremity ([address]     Long term current use of anticoagulant therapy     Bilateral cellulitis of lower leg     Acute shoulder pain due to trauma     Lymphedema of both lower extremities     Obesity     Tobacco abuse     History of recurrent deep vein thrombosis (DVT)     Erythrocytosis     Gross hematuria     Frequency of

## 2020-12-30 NOTE — TELEPHONE ENCOUNTER
Jenna please call this patient and schedule her for a follow-up appointment in [place] as in our [place] office she may have to pay more out of pocket    (disregard the pain medication message, as our [place] office has called her

## 2020-12-30 NOTE — PROGRESS NOTES
Physical Therapy  Facility/Department: [facility] AT THE [facility] MED SURG  Daily Treatment Note  NAME: Osmar Canchola  : 1955  MRN: [id number]    Date of Service: 2020    Discharge Recommendations:  Continue to assess pending progress, Home with Home health PT, Home with assist PRN        Assessment   Treatment Diagnosis: Difficulty walking  Prognosis: Good  Decision Making: Low Complexity  PT Education: General Safety;PT Role;Gait Training;Transfer Training  Patient Education: Educated pt on above and importance of daily ex and mobility  REQUIRES PT FOLLOW UP: Yes  Activity Tolerance  Activity Tolerance: Patient Tolerated treatment well;Patient limited by pain     Patient Diagnosis(es): The primary encounter diagnosis was Urinary tract infection with hematuria, site unspecified. A diagnosis of Kidney stone was also pertinent to this visit. has a past medical history of Carcinoma ([address] Cellulitis, DVT (deep venous thrombosis) ([address] and Wears glasses. has a past surgical history that includes Tubal ligation (); Carpal tunnel release (); [place] tooth extraction; Tubal ligation; and candy and bso (cervix removed). Restrictions  Restrictions/Precautions  Restrictions/Precautions: General Precautions, Fall Risk  Subjective   General  Chart Reviewed: Yes  Response To Previous Treatment: Patient with no complaints from previous session. Family / Caregiver Present: No  Referring Practitioner: LAURA Bernard  Subjective  Subjective: C/o pain in LB          Orientation  Orientation  Overall Orientation Status: Within Functional Limits  Cognition      Objective   Bed mobility  Rolling to Left: Stand by assistance  Rolling to Right: Stand by assistance  Supine to Sit: Minimal assistance  Sit to Supine:  Moderate assistance;2 Person assistance  Scooting: Minimal assistance  Transfers  Sit to Stand: Minimal Assistance  Stand to sit: Minimal Assistance *Pt refused to sit up in chair after walking d/t the chair hurts her back. Ambulation  Ambulation?: Yes  Ambulation 1  Surface: level tile  Device: Rolling Walker  Assistance: Contact guard assistance;Minimal assistance  Quality of Gait: flexed posture  Gait Deviations: Slow Cheryl;Decreased step height;Decreased step length;Shuffles  Distance: 12 feet x 2     Balance  Posture: Poor  Sitting - Static: Good  Sitting - Dynamic: Fair  Standing - Static: Fair  Standing - Dynamic: Fair;-  Exercises  Quad Sets: x15  Heelslides: x10  Gluteal Sets: x15  Hip Abduction: x10  Ankle Pumps: x15  Comments: Above ex completed in supine       G-Code     OutComes Score   AM-PAC Score     Goals  Short term goals  Time Frame for Short term goals: 10 days  Short term goal 1: Patient to complete sit/stand and stand pivot transfers with CG/MinAx1 and no vc's for safe technique to improve mobility. Short term goal 2: Patient to tolerate 20-30 min of ther ex/act to improve functional strength and endurance. Short term goal 3: Patient to ascend/descend 4 steps with B HR and CGA with no LOB to safely enter her home. Short term goal 4: Patient to ambulate 50ft with FWW and CGA with no LOB or fatigue to improve mobility. Plan    Plan  Times per week: 7  Times per day: Twice a day(daily on weekends)  Current Treatment Recommendations: Strengthening, Neuromuscular Re-education, Home Exercise Program, ROM, Safety Education & Training, Balance Training, Endurance Training, Patient/Caregiver Education & Training, Functional Mobility Training, Transfer Training, Gait Training, Stair training  Safety Devices  Type of devices:  All fall risk precautions in place, Call light within reach, Patient at risk for falls, Nurse notified(no alarm upon entry)     Therapy Time   Individual Concurrent Group Co-treatment   Time In 0930         Time Out 0959         Minutes Ranjith So PTA

## 2020-12-30 NOTE — TELEPHONE ENCOUNTER
Please call her back and let her know that it does not appear as if she received any strong pain medication or narcotic pain medicines while she was admitted to the hospital floor other than what she received in the emergency room. If she is in significant intractable/uncontrolled pain we recommend that she goes to the emergency room.     Please also let her know that the [place] office will be reaching out to her to schedule her a follow-up appointment as she was correct when she stated that our [place] office does not take her insurance

## 2020-12-30 NOTE — PLAN OF CARE
Problem: Pain:  Goal: Pain level will decrease  Outcome: Completed  Goal: Control of acute pain  Outcome: Completed  Goal: Control of chronic pain  Outcome: Completed     Problem: Falls - Risk of:  Goal: Will remain free from falls  Outcome: Completed  Goal: Absence of physical injury  Outcome: Completed     Problem: Skin Integrity:  Goal: Will show no infection signs and symptoms  Outcome: Completed  Goal: Absence of new skin breakdown  Outcome: Completed

## 2020-12-30 NOTE — FLOWSHEET NOTE
Up to bathroom per PT after incontinent a large amount of urine in bed. Refused to sit up in chair at bedside. States her back hurts. Returned to bed.

## 2020-12-30 NOTE — DISCHARGE SUMMARY
Triny Cross M.D. 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     Priority: Medium     Class: Chronic    Complicated UTI (urinary tract infection) 12/28/2020    Calculus of right kidney 12/28/2020    Cellulitis of left lower extremity     Cellulitis of lower leg 02/10/2020    Gross hematuria 10/30/2018    Frequency of urination 10/30/2018    Nocturia 10/30/2018    Mixed incontinence 10/30/2018    Constipation 10/30/2018    History of recurrent deep vein thrombosis (DVT) 04/26/2018    Erythrocytosis 04/26/2018    Obesity 11/15/2016    Tobacco abuse 11/15/2016    Bilateral cellulitis of lower leg 11/08/2016    Acute shoulder pain due to trauma 11/08/2016    Lymphedema of both lower extremities 11/08/2016    Acute thromboembolism of deep veins of lower extremity ([address] 09/05/2015    Long term current use of anticoagulant therapy 09/05/2015       Additional Diagnoses:       Diagnosis Date    Carcinoma ([address]     Squamous Cell    Cellulitis     DVT (deep venous thrombosis) ([facility] 2006    LLE    Wears glasses         Hospital Course: The patient was admitted for complicated UTI with right renal stones. She was treated with iv rocephin and improved over the course of her hospitalization. Urine culture grew klebsiella which was sensitive to cipro. Blood cultures were negative. She was seen by urology who will f/u as outpatient to discuss definitive stone tx. Discharge Exam:  GEN:    Awake, alert and oriented x3. EYES:  EOMI, pupils equal   NECK: Supple. No lymphadenopathy. No carotid bruit  CVS:    regular rate and rhythm, no audible murmur  PULM:  CTA, no wheezes, rales or rhonchi, no acute respiratory distress  ABD:    Bowels sounds normal.  Abdomen is soft. No distention. no tenderness to palpation. EXT:   no edema bilaterally . No calf tenderness. NEURO: Moves all extremities. Motor and sensory are grossly intact  SKIN:  No rashes. No skin lesions. Consultants:    · Dr. Anuel Clinton, urology    Procedures:    · none    Complications:   · none    Significant Diagnostic Studies:   · Discharge Labs:  Lab Results   Component Value Date    WBC 7.7 12/30/2020    HGB 9.2 (L) 12/30/2020    MCV 97.2 12/30/2020     12/30/2020      Lab Results   Component Value Date    GLUCOSE 107 (H) 12/30/2020    BUN 22 12/30/2020    CREATININE 0.81 12/30/2020     12/30/2020    K 3.0 (L) 12/30/2020    CALCIUM 7.8 (L) 12/30/2020     12/30/2020    CO2 22 12/30/2020       Discharge Condition:   · stable    Disposition:   · Discharge to Home    Discharge Medications:   Sushil Jasso   Home Medication Instructions [id number]    Printed on:12/30/20 1017   Medication Information                      ciprofloxacin (CIPRO) 500 MG tablet  Take 1 tablet by mouth 2 times daily for 10 days             Multiple Vitamins-Minerals (THERAPEUTIC MULTIVITAMIN-MINERALS) tablet  Take 1 tablet by mouth daily             nystatin (MYCOSTATIN) 988459 UNIT/GM cream  Apply topically 2 times daily.              oxybutynin (DITROPAN XL) 10 MG extended release tablet  Take 1 tablet by mouth daily             phenazopyridine (PYRIDIUM) 200 MG tablet  Take 1 tablet by mouth 3 times daily as needed for Pain             rivaroxaban (XARELTO) 20 MG TABS tablet  Take 1 tablet by mouth daily (with breakfast)                 Patient Instructions:   · Activity: activity as tolerated  · Diet: encourage fluids  · Wound Care: none needed  · Follow up with DONG Bruce CNP in 1-2 weeks · Follow-up with Dr. Jeremias Morris, Urology in 1-2 weeks as directed    Ranjith Licona on Discharge (if applicable)  ACE/ARB in CHF: N/A  ASA in MI: N/A  Statin in MI: N/A  Statin in CVA: N/A  Antiplatelet in CVA: N/A    Total time spent on discharge services: 40 minutes  Including the following activities:  · Evaluation and Management of patient  · Discussion with patient and/or surrogate about current care plan  · Coordination with Case Management and/or   · Coordination of care with Consultants (if applicable)   · Coordination of care with Receiving Facility Physician (if applicable)  · Completion of DME forms (if applicable)  · Preparation of Discharge Summary  · Preparation of Medication Reconciliation  · Preparation of Discharge Prescriptions      Signed:  Collin Garcia M.D.  12/30/2020  10:17 AM

## 2020-12-30 NOTE — DISCHARGE INSTR - DIET

## 2020-12-30 NOTE — FLOWSHEET NOTE
Awake, resting in bed. Vitals checked and assessment completed. C/O chronic back pain and cramping in abdomen/ nathalia area. Tylenol po and Pyridium given. Encouraged out of bed today and to participate with PT/OT or she may possible have to go to rehab. Voices understanding. Call light within reach.  Continued to monitor

## 2020-12-30 NOTE — FLOWSHEET NOTE
Changed wet brief. Up to chair at bedside. Reviewed discharge instructions with patients. Voices understanding. IV removed. States her son cant come until this afternoon.

## 2020-12-30 NOTE — PROGRESS NOTES
Component Value Date    GLUCOSE 107 (H) 12/30/2020    BUN 22 12/30/2020    CREATININE 0.81 12/30/2020     12/30/2020    K 3.0 (L) 12/30/2020    CALCIUM 7.8 (L) 12/30/2020     12/30/2020    CO2 22 12/30/2020       PROBLEM LIST:  Principal Problem:    Complicated UTI (urinary tract infection)  Active Problems:    Calculus of right kidney  Resolved Problems:    * No resolved hospital problems. *      ASSESSMENT / PLAN:  · Complicated Klebsiella UTI with right renal calculi  ? Blood cultures negative  ? Urine culture = klebsiella susceptible to cipro  ? Urology consult appreciated - f/u as outpatient  · History of DVT   ?  On chronic Xarelto  · High risk medications: none   · Disposition: Tripp De La Torre is home      Annie Sun M.D.  12/30/2020  10:13 AM

## 2020-12-30 NOTE — PROGRESS NOTES
Occupational Therapy  Facility/Department: [facility] AT THE [facility] MED SURG  Daily Treatment Note  NAME: Baron Miller  : 1955  MRN: [id number]    Date of Service: 2020    Discharge Recommendations:  Continue to assess pending progress       Assessment      OT Education: OT Role  Patient Education: Pt educated on discharge folder with G understanding. Activity Tolerance  Activity Tolerance: Patient limited by pain  Safety Devices  Safety Devices in place: Yes  Type of devices: Call light within reach; Left in bed         Patient Diagnosis(es): The primary encounter diagnosis was Urinary tract infection with hematuria, site unspecified. A diagnosis of Kidney stone was also pertinent to this visit. has a past medical history of Carcinoma ([facility] Cellulitis, DVT (deep venous thrombosis) ([facility] [facility] and Wears glasses. has a past surgical history that includes Tubal ligation (); Carpal tunnel release (); [place] tooth extraction; Tubal ligation; and candy and bso (cervix removed). Restrictions  Restrictions/Precautions  Restrictions/Precautions: General Precautions, Fall Risk  Subjective   General  Chart Reviewed: Yes  Patient assessed for rehabilitation services?: Yes  Family / Caregiver Present: No  Referring Practitioner: LAURA Lopez  Diagnosis: complicated UTI  Subjective  Subjective: Pt reports 10/10 pain at time of arrival in lower back due to kidney stone. General Comment  Comments: Pt in bed with HOB raised upon therapist arrival finishing session with PT. Pt agreeable to ther ex tasks but declines to complete self cares stating, \"I'm suppose to go home this afternoon. \"      Orientation     Objective                                                                Type of ROM/Therapeutic Exercise  Type of ROM/Therapeutic Exercise: Free weights  Comment: (B)UE ther ex, 1# dumbells x15 reps and x5 variations for increased UE stg and endurance necessary in ADL tasks.  Pt requires frequent short RBs secondary to increased fatigue and reports of back pain. Pt declines to complete additional set of ther ex at this time dt to increased pain stating, \"It's just throbbing in there. \"  Exercises  Other: Pt declined to get OOB with ther ex this date due to just finishing session with PT. Plan   Plan  Times per week: 7x/week  Times per day: Daily  Current Treatment Recommendations: Strengthening, Patient/Caregiver Education & Training, Balance Training, Functional Mobility Training, Endurance Training, Safety Education & Training, Self-Care / ADL  G-Code     OutComes Score                                                  AM-PAC Score             Goals  Short term goals  Time Frame for Short term goals: 10 visits  Short term goal 1: Pt will engage in greater than 15 minutes BUE therex/theract to improve UB strength for improved independence with ADL and functional transfers. Short term goal 2: Pt will complete TB ADL Yuridia with use of AE PRN to improve safety and independence with ADL tasks. Short term goal 3: Pt will complete toileting routine Yuridia to ensure safe return home. Short term goal 4: Pt will tolerate standing for greater than 5 mintues without rest break or LOB to improve safety and endurance for ADL and functional mobility.        Therapy Time   Individual Concurrent Group Co-treatment   Time In 1000         Time Out 1017         Minutes [facility]

## 2021-01-02 LAB
CULTURE: NORMAL
CULTURE: NORMAL
Lab: NORMAL
Lab: NORMAL
SPECIMEN DESCRIPTION: NORMAL
SPECIMEN DESCRIPTION: NORMAL

## 2021-01-12 ENCOUNTER — HOSPITAL ENCOUNTER (OUTPATIENT)
Age: 66
Setting detail: SPECIMEN
Discharge: HOME | End: 2021-01-12
Payer: MEDICARE

## 2021-01-12 DIAGNOSIS — R30.0 DYSURIA: ICD-10-CM

## 2021-01-12 LAB
-: ABNORMAL
AMORPHOUS: ABNORMAL
BACTERIA: ABNORMAL
BILIRUBIN URINE: NEGATIVE
CASTS UA: ABNORMAL /LPF
COLOR: YELLOW
COMMENT UA: ABNORMAL
CRYSTALS, UA: ABNORMAL /HPF
CRYSTALS, UA: ABNORMAL /HPF
EPITHELIAL CELLS UA: ABNORMAL /HPF (ref 0–25)
GLUCOSE URINE: NEGATIVE
KETONES, URINE: NEGATIVE
LEUKOCYTE ESTERASE, URINE: ABNORMAL
MUCUS: ABNORMAL
NITRITE, URINE: NEGATIVE
OTHER OBSERVATIONS UA: ABNORMAL
PH UA: 6 (ref 5–9)
PROTEIN UA: ABNORMAL
RBC UA: ABNORMAL /HPF (ref 0–2)
RENAL EPITHELIAL, UA: ABNORMAL /HPF
SPECIFIC GRAVITY UA: >1.03 (ref 1.01–1.02)
TRICHOMONAS: ABNORMAL
TURBIDITY: ABNORMAL
URINE HGB: ABNORMAL
UROBILINOGEN, URINE: NORMAL
WBC UA: ABNORMAL /HPF (ref 0–5)
YEAST: ABNORMAL

## 2021-01-12 PROCEDURE — 87086 URINE CULTURE/COLONY COUNT: CPT

## 2021-01-12 PROCEDURE — 81001 URINALYSIS AUTO W/SCOPE: CPT

## 2021-01-13 LAB
CULTURE: NO GROWTH
Lab: NORMAL
SPECIMEN DESCRIPTION: NORMAL

## 2021-01-14 ENCOUNTER — TELEPHONE (OUTPATIENT)
Dept: UROLOGY | Age: 66
End: 2021-01-14

## 2021-01-14 NOTE — TELEPHONE ENCOUNTER
----- Message from DONG Wheat - CNP sent at 1/14/2021  8:59 AM EST -----  Call pt - urine cx reviewed and negative for UTI     [significant microhematuria - add to schedule to discuss blood in the urine also]

## 2021-01-14 NOTE — RESULT ENCOUNTER NOTE
Call pt - urine cx reviewed and negative for UTI     [significant microhematuria - add to schedule to discuss blood in the urine also]

## 2021-01-22 ENCOUNTER — HOSPITAL ENCOUNTER (INPATIENT)
Age: 66
LOS: 3 days | Discharge: HOME | DRG: 603 | End: 2021-01-25
Attending: EMERGENCY MEDICINE | Admitting: FAMILY MEDICINE
Payer: MEDICARE

## 2021-01-22 DIAGNOSIS — I89.0 LYMPHEDEMA: ICD-10-CM

## 2021-01-22 DIAGNOSIS — L03.90 CELLULITIS, UNSPECIFIED CELLULITIS SITE: Primary | ICD-10-CM

## 2021-01-22 LAB
ABSOLUTE EOS #: 0.04 K/UL (ref 0–0.44)
ABSOLUTE IMMATURE GRANULOCYTE: 0.03 K/UL (ref 0–0.3)
ABSOLUTE LYMPH #: 1.14 K/UL (ref 1.1–3.7)
ABSOLUTE MONO #: 0.6 K/UL (ref 0.1–1.2)
ANION GAP SERPL CALCULATED.3IONS-SCNC: 11 MMOL/L (ref 9–17)
BASOPHILS # BLD: 0 % (ref 0–2)
BASOPHILS ABSOLUTE: <0.03 K/UL (ref 0–0.2)
BNP INTERPRETATION: NORMAL
BUN BLDV-MCNC: 36 MG/DL (ref 8–23)
BUN/CREAT BLD: 31 (ref 9–20)
CALCIUM SERPL-MCNC: 9.1 MG/DL (ref 8.6–10.4)
CHLORIDE BLD-SCNC: 104 MMOL/L (ref 98–107)
CO2: 22 MMOL/L (ref 20–31)
CREAT SERPL-MCNC: 1.18 MG/DL (ref 0.5–0.9)
DIFFERENTIAL TYPE: ABNORMAL
EKG ATRIAL RATE: 92 BPM
EKG P AXIS: 76 DEGREES
EKG P-R INTERVAL: 166 MS
EKG Q-T INTERVAL: 384 MS
EKG QRS DURATION: 142 MS
EKG QTC CALCULATION (BAZETT): 474 MS
EKG R AXIS: -17 DEGREES
EKG T AXIS: 19 DEGREES
EKG VENTRICULAR RATE: 92 BPM
EOSINOPHILS RELATIVE PERCENT: 1 % (ref 1–4)
GFR AFRICAN AMERICAN: 55 ML/MIN
GFR NON-AFRICAN AMERICAN: 46 ML/MIN
GFR SERPL CREATININE-BSD FRML MDRD: ABNORMAL ML/MIN/{1.73_M2}
GFR SERPL CREATININE-BSD FRML MDRD: ABNORMAL ML/MIN/{1.73_M2}
GLUCOSE BLD-MCNC: 117 MG/DL (ref 70–99)
HCT VFR BLD CALC: 32.8 % (ref 36.3–47.1)
HEMOGLOBIN: 10.4 G/DL (ref 11.9–15.1)
IMMATURE GRANULOCYTES: 0 %
LYMPHOCYTES # BLD: 14 % (ref 24–43)
MCH RBC QN AUTO: 31 PG (ref 25.2–33.5)
MCHC RBC AUTO-ENTMCNC: 31.7 G/DL (ref 28.4–34.8)
MCV RBC AUTO: 97.6 FL (ref 82.6–102.9)
MONOCYTES # BLD: 8 % (ref 3–12)
NRBC AUTOMATED: 0 PER 100 WBC
PDW BLD-RTO: 14.1 % (ref 11.8–14.4)
PLATELET # BLD: 328 K/UL (ref 138–453)
PLATELET ESTIMATE: ABNORMAL
PMV BLD AUTO: 9.2 FL (ref 8.1–13.5)
POTASSIUM SERPL-SCNC: 4 MMOL/L (ref 3.7–5.3)
PRO-BNP: 130 PG/ML
RBC # BLD: 3.36 M/UL (ref 3.95–5.11)
RBC # BLD: ABNORMAL 10*6/UL
SEG NEUTROPHILS: 77 % (ref 36–65)
SEGMENTED NEUTROPHILS ABSOLUTE COUNT: 6.09 K/UL (ref 1.5–8.1)
SODIUM BLD-SCNC: 137 MMOL/L (ref 135–144)
TROPONIN INTERP: ABNORMAL
TROPONIN INTERP: ABNORMAL
TROPONIN T: ABNORMAL NG/ML
TROPONIN T: ABNORMAL NG/ML
TROPONIN, HIGH SENSITIVITY: 21 NG/L (ref 0–14)
TROPONIN, HIGH SENSITIVITY: 25 NG/L (ref 0–14)
WBC # BLD: 7.9 K/UL (ref 3.5–11.3)
WBC # BLD: ABNORMAL 10*3/UL

## 2021-01-22 PROCEDURE — 93005 ELECTROCARDIOGRAM TRACING: CPT | Performed by: EMERGENCY MEDICINE

## 2021-01-22 PROCEDURE — 93010 ELECTROCARDIOGRAM REPORT: CPT | Performed by: FAMILY MEDICINE

## 2021-01-22 PROCEDURE — 94761 N-INVAS EAR/PLS OXIMETRY MLT: CPT

## 2021-01-22 PROCEDURE — 99283 EMERGENCY DEPT VISIT LOW MDM: CPT

## 2021-01-22 PROCEDURE — 87070 CULTURE OTHR SPECIMN AEROBIC: CPT

## 2021-01-22 PROCEDURE — 96375 TX/PRO/DX INJ NEW DRUG ADDON: CPT

## 2021-01-22 PROCEDURE — 2500000003 HC RX 250 WO HCPCS: Performed by: FAMILY MEDICINE

## 2021-01-22 PROCEDURE — 87040 BLOOD CULTURE FOR BACTERIA: CPT

## 2021-01-22 PROCEDURE — 84484 ASSAY OF TROPONIN QUANT: CPT

## 2021-01-22 PROCEDURE — 85025 COMPLETE CBC W/AUTO DIFF WBC: CPT

## 2021-01-22 PROCEDURE — 36415 COLL VENOUS BLD VENIPUNCTURE: CPT

## 2021-01-22 PROCEDURE — 83880 ASSAY OF NATRIURETIC PEPTIDE: CPT

## 2021-01-22 PROCEDURE — 2580000003 HC RX 258: Performed by: NURSE PRACTITIONER

## 2021-01-22 PROCEDURE — 6370000000 HC RX 637 (ALT 250 FOR IP): Performed by: NURSE PRACTITIONER

## 2021-01-22 PROCEDURE — 2580000003 HC RX 258: Performed by: EMERGENCY MEDICINE

## 2021-01-22 PROCEDURE — 96374 THER/PROPH/DIAG INJ IV PUSH: CPT

## 2021-01-22 PROCEDURE — 1200000000 HC SEMI PRIVATE

## 2021-01-22 PROCEDURE — 87205 SMEAR GRAM STAIN: CPT

## 2021-01-22 PROCEDURE — 6360000002 HC RX W HCPCS: Performed by: EMERGENCY MEDICINE

## 2021-01-22 PROCEDURE — 80048 BASIC METABOLIC PNL TOTAL CA: CPT

## 2021-01-22 RX ORDER — SODIUM CHLORIDE 0.9 % (FLUSH) 0.9 %
10 SYRINGE (ML) INJECTION PRN
Status: DISCONTINUED | OUTPATIENT
Start: 2021-01-22 | End: 2021-01-25 | Stop reason: HOSPADM

## 2021-01-22 RX ORDER — SODIUM CHLORIDE 9 MG/ML
INJECTION, SOLUTION INTRAVENOUS CONTINUOUS
Status: DISCONTINUED | OUTPATIENT
Start: 2021-01-22 | End: 2021-01-23

## 2021-01-22 RX ORDER — ACETAMINOPHEN 325 MG/1
650 TABLET ORAL EVERY 6 HOURS PRN
Status: DISCONTINUED | OUTPATIENT
Start: 2021-01-22 | End: 2021-01-25 | Stop reason: HOSPADM

## 2021-01-22 RX ORDER — ZINC 25 MG
2 TABLET ORAL DAILY
COMMUNITY
End: 2022-02-25

## 2021-01-22 RX ORDER — ASCORBIC ACID 500 MG
250 TABLET ORAL DAILY
Status: DISCONTINUED | OUTPATIENT
Start: 2021-01-22 | End: 2021-01-25 | Stop reason: HOSPADM

## 2021-01-22 RX ORDER — VITAMIN B COMPLEX
1000 TABLET ORAL DAILY
Status: DISCONTINUED | OUTPATIENT
Start: 2021-01-22 | End: 2021-01-25 | Stop reason: HOSPADM

## 2021-01-22 RX ORDER — CLINDAMYCIN PHOSPHATE 600 MG/50ML
600 INJECTION INTRAVENOUS EVERY 8 HOURS
Status: DISCONTINUED | OUTPATIENT
Start: 2021-01-22 | End: 2021-01-25 | Stop reason: HOSPADM

## 2021-01-22 RX ORDER — KETOROLAC TROMETHAMINE 15 MG/ML
30 INJECTION, SOLUTION INTRAMUSCULAR; INTRAVENOUS ONCE
Status: COMPLETED | OUTPATIENT
Start: 2021-01-22 | End: 2021-01-22

## 2021-01-22 RX ORDER — PROMETHAZINE HYDROCHLORIDE 25 MG/1
12.5 TABLET ORAL EVERY 6 HOURS PRN
Status: DISCONTINUED | OUTPATIENT
Start: 2021-01-22 | End: 2021-01-25 | Stop reason: HOSPADM

## 2021-01-22 RX ORDER — POLYETHYLENE GLYCOL 3350 17 G/17G
17 POWDER, FOR SOLUTION ORAL DAILY PRN
Status: DISCONTINUED | OUTPATIENT
Start: 2021-01-22 | End: 2021-01-25 | Stop reason: HOSPADM

## 2021-01-22 RX ORDER — PHENAZOPYRIDINE HYDROCHLORIDE 95 MG/1
95 TABLET ORAL 3 TIMES DAILY PRN
Status: ON HOLD | COMMUNITY
End: 2022-01-04

## 2021-01-22 RX ORDER — VITAMIN E 268 MG
400 CAPSULE ORAL DAILY
Status: DISCONTINUED | OUTPATIENT
Start: 2021-01-22 | End: 2021-01-25 | Stop reason: HOSPADM

## 2021-01-22 RX ORDER — MULTIVIT WITH MINERALS/LUTEIN
250 TABLET ORAL DAILY
COMMUNITY

## 2021-01-22 RX ORDER — SODIUM CHLORIDE 0.9 % (FLUSH) 0.9 %
10 SYRINGE (ML) INJECTION EVERY 12 HOURS SCHEDULED
Status: DISCONTINUED | OUTPATIENT
Start: 2021-01-22 | End: 2021-01-25 | Stop reason: HOSPADM

## 2021-01-22 RX ORDER — VITAMIN E 268 MG
400 CAPSULE ORAL DAILY
COMMUNITY

## 2021-01-22 RX ORDER — ACETAMINOPHEN 650 MG/1
650 SUPPOSITORY RECTAL EVERY 6 HOURS PRN
Status: DISCONTINUED | OUTPATIENT
Start: 2021-01-22 | End: 2021-01-25 | Stop reason: HOSPADM

## 2021-01-22 RX ORDER — OXYBUTYNIN CHLORIDE 5 MG/1
10 TABLET, EXTENDED RELEASE ORAL DAILY
Status: DISCONTINUED | OUTPATIENT
Start: 2021-01-22 | End: 2021-01-25 | Stop reason: HOSPADM

## 2021-01-22 RX ORDER — ONDANSETRON 2 MG/ML
4 INJECTION INTRAMUSCULAR; INTRAVENOUS EVERY 6 HOURS PRN
Status: DISCONTINUED | OUTPATIENT
Start: 2021-01-22 | End: 2021-01-25 | Stop reason: HOSPADM

## 2021-01-22 RX ADMIN — RIVAROXABAN 20 MG: 20 TABLET, FILM COATED ORAL at 17:04

## 2021-01-22 RX ADMIN — SODIUM CHLORIDE: 9 INJECTION, SOLUTION INTRAVENOUS at 17:04

## 2021-01-22 RX ADMIN — WATER 1000 MG: 1 INJECTION INTRAMUSCULAR; INTRAVENOUS; SUBCUTANEOUS at 13:16

## 2021-01-22 RX ADMIN — CLINDAMYCIN PHOSPHATE 600 MG: 600 INJECTION, SOLUTION INTRAVENOUS at 17:44

## 2021-01-22 RX ADMIN — KETOROLAC TROMETHAMINE 30 MG: 15 INJECTION, SOLUTION INTRAMUSCULAR; INTRAVENOUS at 13:29

## 2021-01-22 ASSESSMENT — PAIN SCALES - GENERAL: PAINLEVEL_OUTOF10: 0

## 2021-01-22 NOTE — H&P
[address] [facility]  History and Physical        Patient:  Sandra Shaikh  MRN: [id number]    Chief Complaint:    Chief Complaint   Patient presents with    Leg Swelling    Leg Problem     leg cellulitis, discharge, foul odor and copious odor. History Obtained From:  patient, electronic medical record  PCP: DONG Potter CNP    History of Present Illness: The patient is a 77 y.o. female  With h/o lymphedema of legs,  who presents with INCREASING REDNESS,SWELLING OF BOTH LEGS AND DRAIANGE FROM BOTH LEGS OVER LAST 1 WK. SHE HAS H/O CELLULITIS AND DVT. Past Medical History:        Diagnosis Date    Carcinoma ([facility]     Squamous Cell    Cellulitis     DVT (deep venous thrombosis) ([facility] 2006    LLE    Kidney stone     Wears glasses        Past Surgical History:        Procedure Laterality Date    CARPAL TUNNEL RELEASE  1990s    ANNABELLA AND BSO      05/2019    TUBAL LIGATION  1993    TUBAL LIGATION      WISDOM TOOTH EXTRACTION         Medications Prior to Admission:    Prior to Admission medications    Medication Sig Start Date End Date Taking? Authorizing Provider   Misc Natural Products (JOINT SUPPORT COMPLEX) CAPS Take 2 capsules by mouth daily   Yes Historical Provider, MD   Ascorbic Acid (VITAMIN C) 250 MG tablet Take 250 mg by mouth daily   Yes Historical Provider, MD   vitamin E 400 UNIT capsule Take 400 Units by mouth daily   Yes Historical Provider, MD   vitamin D (CHOLECALCIFEROL) 25 MCG (1000 UT) TABS tablet Take 1,000 Units by mouth daily   Yes Historical Provider, MD   phenazopyridine (AZO-TABS) 95 MG tablet Take 95 mg by mouth 3 times daily as needed for Pain   Yes Historical Provider, MD   nystatin (MYCOSTATIN) 735199 UNIT/GM cream Apply topically 2 times daily.  12/30/20  Yes Devi Haskins MD   oxybutynin (DITROPAN XL) 10 MG extended release tablet Take 1 tablet by mouth daily 4/25/19  Yes Janeal Prader, APRN - MOISES   rivaroxaban (XARELTO) 20 MG TABS CELLULITIS    -----------------------------------------------------------------  Diagnostic Data:   · All diagnostic data was reviewed  Lab Results   Component Value Date    WBC 7.9 01/22/2021    HGB 10.4 (L) 01/22/2021    MCV 97.6 01/22/2021     01/22/2021      Lab Results   Component Value Date    GLUCOSE 117 (H) 01/22/2021    BUN 36 (H) 01/22/2021    CREATININE 1.18 (H) 01/22/2021     01/22/2021    K 4.0 01/22/2021    CALCIUM 9.1 01/22/2021     01/22/2021    CO2 22 01/22/2021     Lab Results   Component Value Date    WBCUA 5 TO 10 01/12/2021    RBCUA GREATER THAN 100 01/12/2021    EPITHUA 2 TO 5 01/12/2021    LEUKOCYTESUR TRACE (A) 01/12/2021    SPECGRAV >1.030 (H) 01/12/2021    GLUCOSEU NEGATIVE 01/12/2021    KETUA NEGATIVE 01/12/2021    PROTEINU 1+ (A) 01/12/2021    HGBUR 3+ (A) 01/12/2021    CASTUA NOT REPORTED 01/12/2021    CRYSTUA CALCIUM OXALATE (A) 01/12/2021    CRYSTUA 20 TO 50 (A) 01/12/2021    BACTERIA NOT REPORTED 01/12/2021    YEAST NOT REPORTED 01/12/2021       Assessment:     Active Problems:    Post-phlebitic syndrome    Bilateral cellulitis of lower leg    Lymphedema of both lower extremities    Obesity    Tobacco abuse  Resolved Problems:    * No resolved hospital problems. *      Plan:     · This patient requires inpatient admission because of [address] IV ANTIBIOTICS  · Factors affecting the medical complexity of this patient include LYMPHEDEMA,TOBACCOABUSE,H/O DVT  · Estimated length of stay is 2 days  · Discussed patient's symptoms and data results including labs and imaging studies with the ER MD at time of admission  · High risk drug monitoring: XERALTO  ·     CORE MEASURES  · DVT prophylaxis: already on chronic anticoagulation  · Decubitus ulcer present on admission: No  · CODE STATUS: FULL CODE  · Nutrition Status: good   · Physical therapy: Yes   · Old Charts reviewed: Yes  · EKG Reviewed:  Yes  · Advance Directive Addressed: Yes    Dhaval Bell Skye Carrera M.D.  1/22/2021  5:22 PM

## 2021-01-22 NOTE — PROGRESS NOTES
Patient arrived to floor via Er cart. Transferred to bed with slider. Multiple areas of impaired skin integrity to BLE. VS and assessment obtained.

## 2021-01-22 NOTE — ED PROVIDER NOTES
[facility] AT THE [facility] MED SURG  EMERGENCY DEPARTMENT ENCOUNTER      Pt Name: Ingrid Espinosa  MRN: [id number]  [place] 1955  Date of evaluation: 1/22/2021  Provider: Sugar Johnson MD    CHIEF COMPLAINT       Chief Complaint   Patient presents with    Leg Swelling    Leg Problem     leg cellulitis, discharge, foul odor and copious odor. HISTORY OF PRESENT ILLNESS   (Location/Symptom, Timing/Onset, Context/Setting, Quality, Duration, Modifying Factors, Severity)  Note limiting factors. Ingrid Espinosa is a 77 y.o. female who presents to the emergency department      57-year-old female history of bilateral lower extremity lymphedema and recurrent cellulitis presented emergency department for evaluation of worsening swelling pain and redness of her bilateral lower extremities. She states she was supposed to go to wound care today for evaluation but due to her worsening symptoms she presented to the emergency department instead. She does report she has a history of DVTs but she is currently on Xarelto. Also reports she has a known kidney stone and she is following with urology but she has not had any flank pain dysuria urinary frequency. Not any fevers chills or sweats. She is concerned only about the condition of the skin on her legs the worsening swelling redness and drainage. Nursing Notes were reviewed. REVIEW OF SYSTEMS    (2-9 systems for level 4, 10 or more for level 5)     Review of Systems   All other systems reviewed and are negative. Except as noted above the remainder of the review of systems was reviewed and negative.        PAST MEDICAL HISTORY     Past Medical History:   Diagnosis Date    Carcinoma ([facility] [address]     Squamous Cell    Cellulitis     DVT (deep venous thrombosis) ([facility] [address] 2006    LLE    Kidney stone     Wears glasses          SURGICAL HISTORY       Past Surgical History:   Procedure Laterality Date    CARPAL TUNNEL RELEASE  1990s    ANNABELLA AND BSO      05/2019    TUBAL Currently   Lifestyle    Physical activity     Days per week: None     Minutes per session: None    Stress: None   Relationships    Social connections     Talks on phone: None     Gets together: None     Attends Sabianism service: None     Active member of club or organization: None     Attends meetings of clubs or organizations: None     Relationship status: None    Intimate partner violence     Fear of current or ex partner: None     Emotionally abused: None     Physically abused: None     Forced sexual activity: None   Other Topics Concern    None   Social History Narrative    None       SCREENINGS        [place] Coma Scale  Eye Opening: Spontaneous  Best Verbal Response: Oriented  Best Motor Response: Obeys commands  [place] Coma Scale Score: 15               PHYSICAL EXAM    (up to 7 for level 4, 8 or more for level 5)     ED Triage Vitals [01/22/21 1056]   BP Temp Temp Source Pulse Resp SpO2 Height Weight   126/64 97.7 °F (36.5 °C) Oral 93 20 99 % 5' 1\" (1.549 m) 295 lb (133.8 kg)       Physical Exam  Vitals signs and nursing note reviewed. Constitutional:       Comments: Afebrile. Nontoxic-appearing. Patient appears in no acute distress   HENT:      Head: Normocephalic and atraumatic. Neck:      Musculoskeletal: Normal range of motion and neck supple. Cardiovascular:      Rate and Rhythm: Normal rate and regular rhythm. Pulmonary:      Effort: Pulmonary effort is normal. No respiratory distress. Breath sounds: Normal breath sounds. Abdominal:      General: There is no distension. Palpations: Abdomen is soft. Tenderness: There is no abdominal tenderness. Musculoskeletal:      Comments: Tensive bilateral lower extremity lymphedema with chronic skin changes. Patient does have extensive cellulitis of her bilateral feet and legs left does appear to be worse than the right. Multiple areas of skin breakdown secondary to edema and chronic pressure and irritation.   Multiple areas of what appears to be mostly serous drainage. Left leg wounds did have some what appear to be purulent drainage. Neurological:      General: No focal deficit present. Mental Status: She is oriented to person, place, and time.          DIAGNOSTIC RESULTS     EKG: All EKG's are interpreted by the Emergency Department Physician who either signs or Co-signs this chart in the absence of a cardiologist.        RADIOLOGY:   Non-plain film images such as CT, Ultrasound and MRI are read by the radiologist. Plain radiographic images are visualized and preliminarily interpreted by the emergency physician with the below findings:        Interpretation per the Radiologist below, if available at the time of this note:    No orders to display         ED BEDSIDE ULTRASOUND:   Performed by ED Physician - none    LABS:  Labs Reviewed   CULTURE, WOUND - Abnormal; Notable for the following components:       Result Value    Direct Exam RARE GRAM POSITIVE COCCI (*)     Culture NORMAL SKIN NAGI MODERATE GROWTH (*)     All other components within normal limits   CBC WITH AUTO DIFFERENTIAL - Abnormal; Notable for the following components:    RBC 3.36 (*)     Hemoglobin 10.4 (*)     Hematocrit 32.8 (*)     Seg Neutrophils 77 (*)     Lymphocytes 14 (*)     All other components within normal limits   BASIC METABOLIC PANEL W/ REFLEX TO MG FOR LOW K - Abnormal; Notable for the following components:    Glucose 117 (*)     BUN 36 (*)     CREATININE 1.18 (*)     Bun/Cre Ratio 31 (*)     GFR Non- 46 (*)     GFR  55 (*)     All other components within normal limits   TROPONIN - Abnormal; Notable for the following components:    Troponin, High Sensitivity 25 (*)     All other components within normal limits   TROPONIN - Abnormal; Notable for the following components:    Troponin, High Sensitivity 21 (*)     All other components within normal limits   CBC WITH AUTO DIFFERENTIAL - Abnormal; Notable for the following components:    RBC 2.80 (*)     Hemoglobin 8.7 (*)     Hematocrit 28.1 (*)     Lymphocytes 23 (*)     All other components within normal limits   BASIC METABOLIC PANEL W/ REFLEX TO MG FOR LOW K - Abnormal; Notable for the following components:    Glucose 100 (*)     BUN 32 (*)     CREATININE 0.93 (*)     Bun/Cre Ratio 34 (*)     Calcium 8.1 (*)     Chloride 110 (*)     Anion Gap 8 (*)     All other components within normal limits   IRON AND TIBC - Abnormal; Notable for the following components:    Iron 36 (*)     TIBC 213 (*)     Iron Saturation 17 (*)     All other components within normal limits   CBC WITH AUTO DIFFERENTIAL - Abnormal; Notable for the following components:    RBC 2.86 (*)     Hemoglobin 8.9 (*)     Hematocrit 28.4 (*)     Lymphocytes 22 (*)     All other components within normal limits   COMPREHENSIVE METABOLIC PANEL - Abnormal; Notable for the following components:    Glucose 100 (*)     BUN 34 (*)     CREATININE 1.00 (*)     Bun/Cre Ratio 34 (*)     Calcium 8.1 (*)     Sodium 134 (*)     Anion Gap 8 (*)     Total Bilirubin <0.10 (*)     Total Protein 6.2 (*)     Albumin 2.6 (*)     Albumin/Globulin Ratio 0.7 (*)     GFR Non- 55 (*)     All other components within normal limits   CBC WITH AUTO DIFFERENTIAL - Abnormal; Notable for the following components:    RBC 2.84 (*)     Hemoglobin 9.0 (*)     Hematocrit 28.6 (*)     All other components within normal limits   COMPREHENSIVE METABOLIC PANEL - Abnormal; Notable for the following components:    Glucose 102 (*)     BUN 31 (*)     CREATININE 1.05 (*)     Bun/Cre Ratio 30 (*)     Calcium 7.9 (*)     Anion Gap 5 (*)     Total Bilirubin <0.10 (*)     Total Protein 6.0 (*)     Albumin 2.6 (*)     Albumin/Globulin Ratio 0.8 (*)     GFR Non- 52 (*)     All other components within normal limits   CULTURE, BLOOD 1   CULTURE, BLOOD 1   BRAIN NATRIURETIC PEPTIDE   FERRITIN   BLOOD OCCULT STOOL DIAGNOSTIC       All other labs were within normal range or not returned as of this dictation. EMERGENCY DEPARTMENT COURSE and DIFFERENTIAL DIAGNOSIS/MDM:   Vitals:    Vitals:    01/25/21 0017 01/25/21 0615 01/25/21 0850 01/25/21 1045   BP: 123/69 (!) 134/97  133/60   Pulse: 76 73  75   Resp: 18 28 22   Temp: 98.1 °F (36.7 °C) 98.6 °F (37 °C)  98.6 °F (37 °C)   TempSrc: Temporal Oral  Temporal   SpO2: 96% 96%  99%   Weight:       Height:   5' 1\" (1.549 m)            MDM  Number of Diagnoses or Management Options  Cellulitis, unspecified cellulitis site  Lymphedema  Diagnosis management comments: Patient is requesting pain medication for pain in her lower extremities. Toradol was ordered. I did start her on Rocephin IV. Laboratory results were reviewed. She did have extensive wounds cellulitis and need for wound care due to lymphedema and chronic skin breakdown of her lower extremities. Patient was discussed via telephone with Dr. Rosalio Cartagena who accepted the patient for continued management. REASSESSMENT          CRITICAL CARE TIME   Total Critical Care time was  minutes, excluding separately reportable procedures. There was a high probability of clinically significant/life threatening deterioration in the patient's condition which required my urgent intervention. CONSULTS:  IP CONSULT TO SPIRITUAL SERVICES  IP CONSULT TO CASE MANAGEMENT  IP CONSULT TO PODIATRY  IP CONSULT TO PHYSICAL MEDICINE REHAB    PROCEDURES:  Unless otherwise noted below, none     Procedures        FINAL IMPRESSION      1. Cellulitis, unspecified cellulitis site    2.  Lymphedema          DISPOSITION/PLAN   DISPOSITION Admitted 01/22/2021 03:14:18 PM      PATIENT REFERRED TO:  DONG Delarosa CNP  [address]  [phone]    Go on 2/2/2021  at 2:30 PM for hospital follow up     [place] DONG Corrales CNP  [address]  [phone]    Go on 2/1/2021  at 1:45 PM for previous follow up- (rescheduled appointment from 1/25/21)    [address]  [address] [phone]          DISCHARGE MEDICATIONS:  Discharge Medication List as of 1/25/2021  5:23 PM      START taking these medications    Details   clindamycin (CLEOCIN) 300 MG capsule Take 1 capsule by mouth 3 times daily for 10 days, Disp-30 capsule, R-0Normal           Controlled Substances Monitoring:     No flowsheet data found.     (Please note that portions of this note were completed with a voice recognition program.  Efforts were made to edit the dictations but occasionally words are mis-transcribed.)    Susan Alva MD (electronically signed)  Attending Emergency Physician            Susan Alva MD  01/22/21 2010       Susan Alva MD  01/29/21 [id number]

## 2021-01-23 PROBLEM — D64.9 NORMOCYTIC ANEMIA: Status: ACTIVE | Noted: 2021-01-23

## 2021-01-23 LAB
ABSOLUTE EOS #: 0.26 K/UL (ref 0–0.44)
ABSOLUTE IMMATURE GRANULOCYTE: <0.03 K/UL (ref 0–0.3)
ABSOLUTE LYMPH #: 1.45 K/UL (ref 1.1–3.7)
ABSOLUTE MONO #: 0.61 K/UL (ref 0.1–1.2)
ANION GAP SERPL CALCULATED.3IONS-SCNC: 8 MMOL/L (ref 9–17)
BASOPHILS # BLD: 1 % (ref 0–2)
BASOPHILS ABSOLUTE: 0.03 K/UL (ref 0–0.2)
BUN BLDV-MCNC: 32 MG/DL (ref 8–23)
BUN/CREAT BLD: 34 (ref 9–20)
CALCIUM SERPL-MCNC: 8.1 MG/DL (ref 8.6–10.4)
CHLORIDE BLD-SCNC: 110 MMOL/L (ref 98–107)
CO2: 21 MMOL/L (ref 20–31)
CREAT SERPL-MCNC: 0.93 MG/DL (ref 0.5–0.9)
DIFFERENTIAL TYPE: ABNORMAL
EOSINOPHILS RELATIVE PERCENT: 4 % (ref 1–4)
FERRITIN: 42 UG/L (ref 13–150)
GFR AFRICAN AMERICAN: >60 ML/MIN
GFR NON-AFRICAN AMERICAN: >60 ML/MIN
GFR SERPL CREATININE-BSD FRML MDRD: ABNORMAL ML/MIN/{1.73_M2}
GFR SERPL CREATININE-BSD FRML MDRD: ABNORMAL ML/MIN/{1.73_M2}
GLUCOSE BLD-MCNC: 100 MG/DL (ref 70–99)
HCT VFR BLD CALC: 28.1 % (ref 36.3–47.1)
HEMOGLOBIN: 8.7 G/DL (ref 11.9–15.1)
IMMATURE GRANULOCYTES: 0 %
IRON SATURATION: 17 % (ref 20–55)
IRON: 36 UG/DL (ref 37–145)
LYMPHOCYTES # BLD: 23 % (ref 24–43)
MCH RBC QN AUTO: 31.1 PG (ref 25.2–33.5)
MCHC RBC AUTO-ENTMCNC: 31 G/DL (ref 28.4–34.8)
MCV RBC AUTO: 100.4 FL (ref 82.6–102.9)
MONOCYTES # BLD: 10 % (ref 3–12)
NRBC AUTOMATED: 0 PER 100 WBC
PDW BLD-RTO: 14.4 % (ref 11.8–14.4)
PLATELET # BLD: 261 K/UL (ref 138–453)
PLATELET ESTIMATE: ABNORMAL
PMV BLD AUTO: 8.9 FL (ref 8.1–13.5)
POTASSIUM SERPL-SCNC: 3.7 MMOL/L (ref 3.7–5.3)
RBC # BLD: 2.8 M/UL (ref 3.95–5.11)
RBC # BLD: ABNORMAL 10*6/UL
SEG NEUTROPHILS: 62 % (ref 36–65)
SEGMENTED NEUTROPHILS ABSOLUTE COUNT: 4.06 K/UL (ref 1.5–8.1)
SODIUM BLD-SCNC: 139 MMOL/L (ref 135–144)
TOTAL IRON BINDING CAPACITY: 213 UG/DL (ref 250–450)
UNSATURATED IRON BINDING CAPACITY: 177 UG/DL (ref 112–347)
WBC # BLD: 6.4 K/UL (ref 3.5–11.3)
WBC # BLD: ABNORMAL 10*3/UL

## 2021-01-23 PROCEDURE — 36415 COLL VENOUS BLD VENIPUNCTURE: CPT

## 2021-01-23 PROCEDURE — 1200000000 HC SEMI PRIVATE

## 2021-01-23 PROCEDURE — 6370000000 HC RX 637 (ALT 250 FOR IP): Performed by: NURSE PRACTITIONER

## 2021-01-23 PROCEDURE — 2580000003 HC RX 258: Performed by: NURSE PRACTITIONER

## 2021-01-23 PROCEDURE — 82728 ASSAY OF FERRITIN: CPT

## 2021-01-23 PROCEDURE — 83540 ASSAY OF IRON: CPT

## 2021-01-23 PROCEDURE — 94761 N-INVAS EAR/PLS OXIMETRY MLT: CPT

## 2021-01-23 PROCEDURE — 80048 BASIC METABOLIC PNL TOTAL CA: CPT

## 2021-01-23 PROCEDURE — 85025 COMPLETE CBC W/AUTO DIFF WBC: CPT

## 2021-01-23 PROCEDURE — 2500000003 HC RX 250 WO HCPCS: Performed by: FAMILY MEDICINE

## 2021-01-23 PROCEDURE — 83550 IRON BINDING TEST: CPT

## 2021-01-23 PROCEDURE — 6360000002 HC RX W HCPCS: Performed by: NURSE PRACTITIONER

## 2021-01-23 RX ADMIN — RIVAROXABAN 20 MG: 20 TABLET, FILM COATED ORAL at 17:06

## 2021-01-23 RX ADMIN — OXYBUTYNIN CHLORIDE 10 MG: 5 TABLET, EXTENDED RELEASE ORAL at 09:33

## 2021-01-23 RX ADMIN — WATER 1000 MG: 1 INJECTION INTRAMUSCULAR; INTRAVENOUS; SUBCUTANEOUS at 12:52

## 2021-01-23 RX ADMIN — OXYCODONE HYDROCHLORIDE AND ACETAMINOPHEN 250 MG: 500 TABLET ORAL at 09:34

## 2021-01-23 RX ADMIN — ACETAMINOPHEN 650 MG: 325 TABLET, FILM COATED ORAL at 02:50

## 2021-01-23 RX ADMIN — CLINDAMYCIN PHOSPHATE 600 MG: 600 INJECTION, SOLUTION INTRAVENOUS at 17:06

## 2021-01-23 RX ADMIN — CLINDAMYCIN PHOSPHATE 600 MG: 600 INJECTION, SOLUTION INTRAVENOUS at 02:37

## 2021-01-23 RX ADMIN — SODIUM CHLORIDE: 9 INJECTION, SOLUTION INTRAVENOUS at 07:34

## 2021-01-23 RX ADMIN — Medication 1000 UNITS: at 09:33

## 2021-01-23 RX ADMIN — SODIUM CHLORIDE, PRESERVATIVE FREE 10 ML: 5 INJECTION INTRAVENOUS at 12:52

## 2021-01-23 RX ADMIN — CLINDAMYCIN PHOSPHATE 600 MG: 600 INJECTION, SOLUTION INTRAVENOUS at 09:35

## 2021-01-23 RX ADMIN — SODIUM CHLORIDE, PRESERVATIVE FREE 10 ML: 5 INJECTION INTRAVENOUS at 09:36

## 2021-01-23 RX ADMIN — Medication 400 UNITS: at 09:33

## 2021-01-23 RX ADMIN — SODIUM CHLORIDE, PRESERVATIVE FREE 10 ML: 5 INJECTION INTRAVENOUS at 20:32

## 2021-01-23 ASSESSMENT — PAIN SCALES - GENERAL
PAINLEVEL_OUTOF10: 10
PAINLEVEL_OUTOF10: 0
PAINLEVEL_OUTOF10: 10
PAINLEVEL_OUTOF10: 0

## 2021-01-23 ASSESSMENT — PAIN DESCRIPTION - PAIN TYPE: TYPE: OTHER (COMMENT)

## 2021-01-23 NOTE — PROGRESS NOTES
Reapplied zinc paste to patients bilaterally to lower extremities and changed bed pad-Assessment and vital signs complete. Repositioned patient for comfort.

## 2021-01-23 NOTE — PROGRESS NOTES
Morning assessment completed. Pt denies needs at this time. Call light in reach. Will continue to monitor.

## 2021-01-23 NOTE — PROGRESS NOTES
Progress Note  Boaz Reagan MD    OBJECTIVE:    Patient seen for f/u of Bilateral cellulitis of lower leg. She has less popain  Hb 8.7     ROS:   Constitutional: negative  for fevers, and negative for chills. Respiratory: negative for shortness of breath, negative for cough, and negative for wheezing  Cardiovascular: negative for chest pain, and negative for palpitations  Gastrointestinal: negative for abdominal pain, negative for nausea,negative for vomiting, negative for diarrhea, and negative for constipation  Extremities- has less pain   All other systems were reviewed with the patient and are negative unless otherwise stated in HPI    OBJECTIVE:  Vitals:   Temp: 97.9 °F (36.6 °C)  BP: 120/65  Resp: 18  Pulse: 82  SpO2: 99 %    24HR INTAKE/OUTPUT:      Intake/Output Summary (Last 24 hours) at 1/23/2021 0853  Last data filed at 1/23/2021 0452  Gross per 24 hour   Intake 1156 ml   Output 200 ml   Net 956 ml     -----------------------------------------------------------------  Exam:  GEN:    Awake, alert and oriented x3. EYES:  EOMI, pupils equal   NECK: Supple. No lymphadenopathy. No carotid bruit  CVS:    regular rate and rhythm, no audible murmur  PULM:  CTA, no wheezes, rales or rhonchi, no acute respiratory distress  ABD:    Bowels sounds normal.  Abdomen is soft. No distention. no tenderness to palpation. EXT:   has non pitting edema, edema bilaterally . No calf tenderness. NEURO: Moves all extremities. Motor and sensory are grossly intact  SKIN:  No rashes.   Cellulitis improving    -----------------------------------------------------------------  Diagnostic Data:    · All available data reviewed  Lab Results   Component Value Date    WBC 6.4 01/23/2021    HGB 8.7 (L) 01/23/2021    .4 01/23/2021     01/23/2021      Lab Results   Component Value Date    GLUCOSE 100 (H) 01/23/2021    BUN 32 (H) 01/23/2021    CREATININE 0.93 (H) 01/23/2021     01/23/2021    K 3.7 01/23/2021    CALCIUM 8.1 (L) 01/23/2021     (H) 01/23/2021    CO2 21 01/23/2021       PROBLEM LIST:  Principal Problem:    Bilateral cellulitis of lower leg  Active Problems:    Post-phlebitic syndrome    Lymphedema of both lower extremities    Obesity    Tobacco abuse    Normocytic anemia  Resolved Problems:    * No resolved hospital problems. *      ASSESSMENT / PLAN:  Bilateral cellulitis of lower leg  · Continue current therapy  · Local skin care  · Normocytic anemia- check iron and tibc,stool for occult blood  · Nutrition status:  Well developed, well nourished with no malnutrition  · DVT prophylaxis: Xarelto   · High risk medications: xeralto   · Disposition:  Discharge plan is home    Jenniffer Nelson M.D.  1/23/2021  8:53 AM

## 2021-01-24 PROBLEM — D50.9 IRON DEFICIENCY ANEMIA: Status: ACTIVE | Noted: 2021-01-24

## 2021-01-24 LAB
ABSOLUTE EOS #: 0.17 K/UL (ref 0–0.44)
ABSOLUTE IMMATURE GRANULOCYTE: <0.03 K/UL (ref 0–0.3)
ABSOLUTE LYMPH #: 1.26 K/UL (ref 1.1–3.7)
ABSOLUTE MONO #: 0.61 K/UL (ref 0.1–1.2)
ALBUMIN SERPL-MCNC: 2.6 G/DL (ref 3.5–5.2)
ALBUMIN/GLOBULIN RATIO: 0.7 (ref 1–2.5)
ALP BLD-CCNC: 60 U/L (ref 35–104)
ALT SERPL-CCNC: 18 U/L (ref 5–33)
ANION GAP SERPL CALCULATED.3IONS-SCNC: 8 MMOL/L (ref 9–17)
AST SERPL-CCNC: 21 U/L
BASOPHILS # BLD: 0 % (ref 0–2)
BASOPHILS ABSOLUTE: <0.03 K/UL (ref 0–0.2)
BILIRUB SERPL-MCNC: <0.1 MG/DL (ref 0.3–1.2)
BUN BLDV-MCNC: 34 MG/DL (ref 8–23)
BUN/CREAT BLD: 34 (ref 9–20)
CALCIUM SERPL-MCNC: 8.1 MG/DL (ref 8.6–10.4)
CHLORIDE BLD-SCNC: 106 MMOL/L (ref 98–107)
CO2: 20 MMOL/L (ref 20–31)
CREAT SERPL-MCNC: 1 MG/DL (ref 0.5–0.9)
CULTURE: ABNORMAL
DIFFERENTIAL TYPE: ABNORMAL
DIRECT EXAM: ABNORMAL
DIRECT EXAM: ABNORMAL
EOSINOPHILS RELATIVE PERCENT: 3 % (ref 1–4)
GFR AFRICAN AMERICAN: >60 ML/MIN
GFR NON-AFRICAN AMERICAN: 55 ML/MIN
GFR SERPL CREATININE-BSD FRML MDRD: ABNORMAL ML/MIN/{1.73_M2}
GFR SERPL CREATININE-BSD FRML MDRD: ABNORMAL ML/MIN/{1.73_M2}
GLUCOSE BLD-MCNC: 100 MG/DL (ref 70–99)
HCT VFR BLD CALC: 28.4 % (ref 36.3–47.1)
HEMOGLOBIN: 8.9 G/DL (ref 11.9–15.1)
IMMATURE GRANULOCYTES: 0 %
LYMPHOCYTES # BLD: 22 % (ref 24–43)
Lab: ABNORMAL
MCH RBC QN AUTO: 31.1 PG (ref 25.2–33.5)
MCHC RBC AUTO-ENTMCNC: 31.3 G/DL (ref 28.4–34.8)
MCV RBC AUTO: 99.3 FL (ref 82.6–102.9)
MONOCYTES # BLD: 11 % (ref 3–12)
NRBC AUTOMATED: 0 PER 100 WBC
PDW BLD-RTO: 14.4 % (ref 11.8–14.4)
PLATELET # BLD: 248 K/UL (ref 138–453)
PLATELET ESTIMATE: ABNORMAL
PMV BLD AUTO: 8.9 FL (ref 8.1–13.5)
POTASSIUM SERPL-SCNC: 3.8 MMOL/L (ref 3.7–5.3)
RBC # BLD: 2.86 M/UL (ref 3.95–5.11)
RBC # BLD: ABNORMAL 10*6/UL
SEG NEUTROPHILS: 64 % (ref 36–65)
SEGMENTED NEUTROPHILS ABSOLUTE COUNT: 3.63 K/UL (ref 1.5–8.1)
SODIUM BLD-SCNC: 134 MMOL/L (ref 135–144)
SPECIMEN DESCRIPTION: ABNORMAL
TOTAL PROTEIN: 6.2 G/DL (ref 6.4–8.3)
WBC # BLD: 5.7 K/UL (ref 3.5–11.3)
WBC # BLD: ABNORMAL 10*3/UL

## 2021-01-24 PROCEDURE — 36415 COLL VENOUS BLD VENIPUNCTURE: CPT

## 2021-01-24 PROCEDURE — 6360000002 HC RX W HCPCS: Performed by: FAMILY MEDICINE

## 2021-01-24 PROCEDURE — 85025 COMPLETE CBC W/AUTO DIFF WBC: CPT

## 2021-01-24 PROCEDURE — 6360000002 HC RX W HCPCS: Performed by: NURSE PRACTITIONER

## 2021-01-24 PROCEDURE — 2580000003 HC RX 258: Performed by: NURSE PRACTITIONER

## 2021-01-24 PROCEDURE — 2500000003 HC RX 250 WO HCPCS: Performed by: FAMILY MEDICINE

## 2021-01-24 PROCEDURE — 6370000000 HC RX 637 (ALT 250 FOR IP): Performed by: NURSE PRACTITIONER

## 2021-01-24 PROCEDURE — 94761 N-INVAS EAR/PLS OXIMETRY MLT: CPT

## 2021-01-24 PROCEDURE — 1200000000 HC SEMI PRIVATE

## 2021-01-24 PROCEDURE — 80053 COMPREHEN METABOLIC PANEL: CPT

## 2021-01-24 RX ORDER — FUROSEMIDE 10 MG/ML
40 INJECTION INTRAMUSCULAR; INTRAVENOUS ONCE
Status: COMPLETED | OUTPATIENT
Start: 2021-01-24 | End: 2021-01-24

## 2021-01-24 RX ADMIN — SODIUM CHLORIDE, PRESERVATIVE FREE 10 ML: 5 INJECTION INTRAVENOUS at 10:03

## 2021-01-24 RX ADMIN — WATER 1000 MG: 1 INJECTION INTRAMUSCULAR; INTRAVENOUS; SUBCUTANEOUS at 13:40

## 2021-01-24 RX ADMIN — RIVAROXABAN 20 MG: 20 TABLET, FILM COATED ORAL at 17:43

## 2021-01-24 RX ADMIN — FUROSEMIDE 40 MG: 10 INJECTION, SOLUTION INTRAMUSCULAR; INTRAVENOUS at 09:58

## 2021-01-24 RX ADMIN — ACETAMINOPHEN 650 MG: 325 TABLET, FILM COATED ORAL at 06:59

## 2021-01-24 RX ADMIN — OXYCODONE HYDROCHLORIDE AND ACETAMINOPHEN 250 MG: 500 TABLET ORAL at 08:08

## 2021-01-24 RX ADMIN — CLINDAMYCIN PHOSPHATE 600 MG: 600 INJECTION, SOLUTION INTRAVENOUS at 02:00

## 2021-01-24 RX ADMIN — ACETAMINOPHEN 650 MG: 325 TABLET, FILM COATED ORAL at 19:40

## 2021-01-24 RX ADMIN — CLINDAMYCIN PHOSPHATE 600 MG: 600 INJECTION, SOLUTION INTRAVENOUS at 17:43

## 2021-01-24 RX ADMIN — Medication 400 UNITS: at 08:08

## 2021-01-24 RX ADMIN — Medication 1000 UNITS: at 08:08

## 2021-01-24 RX ADMIN — OXYBUTYNIN CHLORIDE 10 MG: 5 TABLET, EXTENDED RELEASE ORAL at 08:08

## 2021-01-24 RX ADMIN — SODIUM CHLORIDE, PRESERVATIVE FREE 10 ML: 5 INJECTION INTRAVENOUS at 20:50

## 2021-01-24 RX ADMIN — CLINDAMYCIN PHOSPHATE 600 MG: 600 INJECTION, SOLUTION INTRAVENOUS at 09:58

## 2021-01-24 ASSESSMENT — PAIN SCALES - GENERAL
PAINLEVEL_OUTOF10: 1
PAINLEVEL_OUTOF10: 3
PAINLEVEL_OUTOF10: 8

## 2021-01-24 NOTE — PROGRESS NOTES
Patient alert and oriented x 4. Vital signs and head to toe assessment performed. Zinc paste over dry skin and wound areas on bilateral lower extremities. Swelling and seeping on bilateral lower extremities. Patient comfortable at this time. Vital signs stable at this time. Bed in lowest position with bed alarm on and bed wheels locked. Call light and patient belongings in reach. Instructed to use call light for assistance.

## 2021-01-24 NOTE — PROGRESS NOTES
Dr Kelley Manzo called back today and stated that he will see patient tomorrow and to continue zinc on her legs and antibiotics. No new orders given at this time.

## 2021-01-24 NOTE — PROGRESS NOTES
Progress Note  Boaz Reagan MD    OBJECTIVE:    Patient seen for f/u of Bilateral cellulitis of lower leg. Has gained 2 lbs and has peripheral edema     ROS:   Constitutional: negative  for fevers, and negative for chills. Respiratory: negative for shortness of breath, negative for cough, and negative for wheezing  Cardiovascular: negative for chest pain, and negative for palpitations, has edema  Gastrointestinal: negative for abdominal pain, negative for nausea,negative for vomiting, negative for diarrhea, and negative for constipation  Extremities- has less pain   All other systems were reviewed with the patient and are negative unless otherwise stated in HPI    OBJECTIVE:  Vitals:   Temp: 98.6 °F (37 °C)  BP: (!) 142/63  Resp: 16  Pulse: 84  SpO2: 98 %    24HR INTAKE/OUTPUT:      Intake/Output Summary (Last 24 hours) at 1/24/2021 1008  Last data filed at 1/24/2021 1000  Gross per 24 hour   Intake 600 ml   Output 200 ml   Net 400 ml     -----------------------------------------------------------------  Exam:  GEN:    Awake, alert and oriented x3. EYES:  EOMI, pupils equal   NECK: Supple. No lymphadenopathy. No carotid bruit  CVS:    regular rate and rhythm, no audible murmur  PULM:  CTA, no wheezes, rales or rhonchi, no acute respiratory distress  ABD:    Bowels sounds normal.  Abdomen is soft. No distention. no tenderness to palpation. EXT:   has non pitting edema, edema bilaterally . No calf tenderness. NEURO: Moves all extremities. Motor and sensory are grossly intact  SKIN:  No rashes.   Cellulitis improving    -----------------------------------------------------------------  Diagnostic Data:    · All available data reviewed  Lab Results   Component Value Date    WBC 5.7 01/24/2021    HGB 8.9 (L) 01/24/2021    MCV 99.3 01/24/2021     01/24/2021      Lab Results   Component Value Date    GLUCOSE 100 (H) 01/24/2021    BUN 34 (H) 01/24/2021    CREATININE 1.00 (H) 01/24/2021     (L) 01/24/2021    K 3.8 01/24/2021    CALCIUM 8.1 (L) 01/24/2021     01/24/2021    CO2 20 01/24/2021       PROBLEM LIST:  Principal Problem:    Bilateral cellulitis of lower leg  Active Problems:    Post-phlebitic syndrome    Lymphedema of both lower extremities    Obesity    Tobacco abuse    Iron deficiency anemia  Resolved Problems:    * No resolved hospital problems. *      ASSESSMENT / PLAN:  Bilateral cellulitis of lower leg  · Continue iv antibiotics  · Wound care  · Local skin care  · Anemia- check stool for occult blood  · Edema- lasix 40 mg iv once,iv to hep lock  · Nutrition status:  Well developed, well nourished with no malnutrition  · DVT prophylaxis: Xarelto   · High risk medications: xeralto   · Disposition:  Discharge plan is home    Joni Mccoy M.D.  1/24/2021  10:08 AM

## 2021-01-24 NOTE — PROGRESS NOTES
Dr Lala Fernando spoke to writer this morning about pts bilateral lower leg wounds. Dr Grant Toney to see pt tomorrow. Writer to bedside to see wounds today. BLE skin very scaly, weeping, wounds noted to posterior calves bilaterally. Skin currently has zinc paste in place. Recommended wound care at this time, wash legs with mild soap and water daily. Apply zinc paste, cover open areas with silver alginate dressing, wrap in kerlix, cover with ace wrap. Change daily and PRN saturation. Posterior upper thigh wound, cover with padded tegaderm.    Liana Montelongo BSN, RN, [employer]

## 2021-01-25 VITALS
RESPIRATION RATE: 22 BRPM | OXYGEN SATURATION: 99 % | DIASTOLIC BLOOD PRESSURE: 60 MMHG | WEIGHT: 277.6 LBS | SYSTOLIC BLOOD PRESSURE: 133 MMHG | TEMPERATURE: 98.6 F | HEIGHT: 61 IN | BODY MASS INDEX: 52.41 KG/M2 | HEART RATE: 75 BPM

## 2021-01-25 LAB
ABSOLUTE EOS #: 0.18 K/UL (ref 0–0.44)
ABSOLUTE IMMATURE GRANULOCYTE: <0.03 K/UL (ref 0–0.3)
ABSOLUTE LYMPH #: 1.5 K/UL (ref 1.1–3.7)
ABSOLUTE MONO #: 0.67 K/UL (ref 0.1–1.2)
ALBUMIN SERPL-MCNC: 2.6 G/DL (ref 3.5–5.2)
ALBUMIN/GLOBULIN RATIO: 0.8 (ref 1–2.5)
ALP BLD-CCNC: 54 U/L (ref 35–104)
ALT SERPL-CCNC: 16 U/L (ref 5–33)
ANION GAP SERPL CALCULATED.3IONS-SCNC: 5 MMOL/L (ref 9–17)
AST SERPL-CCNC: 16 U/L
BASOPHILS # BLD: 1 % (ref 0–2)
BASOPHILS ABSOLUTE: 0.03 K/UL (ref 0–0.2)
BILIRUB SERPL-MCNC: <0.1 MG/DL (ref 0.3–1.2)
BUN BLDV-MCNC: 31 MG/DL (ref 8–23)
BUN/CREAT BLD: 30 (ref 9–20)
CALCIUM SERPL-MCNC: 7.9 MG/DL (ref 8.6–10.4)
CHLORIDE BLD-SCNC: 107 MMOL/L (ref 98–107)
CO2: 25 MMOL/L (ref 20–31)
CREAT SERPL-MCNC: 1.05 MG/DL (ref 0.5–0.9)
DATE, STOOL #1: NORMAL
DATE, STOOL #2: NORMAL
DATE, STOOL #3: NORMAL
DIFFERENTIAL TYPE: ABNORMAL
EOSINOPHILS RELATIVE PERCENT: 3 % (ref 1–4)
GFR AFRICAN AMERICAN: >60 ML/MIN
GFR NON-AFRICAN AMERICAN: 52 ML/MIN
GFR SERPL CREATININE-BSD FRML MDRD: ABNORMAL ML/MIN/{1.73_M2}
GFR SERPL CREATININE-BSD FRML MDRD: ABNORMAL ML/MIN/{1.73_M2}
GLUCOSE BLD-MCNC: 102 MG/DL (ref 70–99)
HCT VFR BLD CALC: 28.6 % (ref 36.3–47.1)
HEMOCCULT SP1 STL QL: NEGATIVE
HEMOCCULT SP2 STL QL: NORMAL
HEMOCCULT SP3 STL QL: NORMAL
HEMOGLOBIN: 9 G/DL (ref 11.9–15.1)
IMMATURE GRANULOCYTES: 0 %
LYMPHOCYTES # BLD: 26 % (ref 24–43)
MCH RBC QN AUTO: 31.7 PG (ref 25.2–33.5)
MCHC RBC AUTO-ENTMCNC: 31.5 G/DL (ref 28.4–34.8)
MCV RBC AUTO: 100.7 FL (ref 82.6–102.9)
MONOCYTES # BLD: 12 % (ref 3–12)
NRBC AUTOMATED: 0 PER 100 WBC
PDW BLD-RTO: 14.4 % (ref 11.8–14.4)
PLATELET # BLD: 240 K/UL (ref 138–453)
PLATELET ESTIMATE: ABNORMAL
PMV BLD AUTO: 8.9 FL (ref 8.1–13.5)
POTASSIUM SERPL-SCNC: 4.2 MMOL/L (ref 3.7–5.3)
RBC # BLD: 2.84 M/UL (ref 3.95–5.11)
RBC # BLD: ABNORMAL 10*6/UL
SEG NEUTROPHILS: 58 % (ref 36–65)
SEGMENTED NEUTROPHILS ABSOLUTE COUNT: 3.39 K/UL (ref 1.5–8.1)
SODIUM BLD-SCNC: 137 MMOL/L (ref 135–144)
TIME, STOOL #1: 1425
TIME, STOOL #2: NORMAL
TIME, STOOL #3: NORMAL
TOTAL PROTEIN: 6 G/DL (ref 6.4–8.3)
WBC # BLD: 5.8 K/UL (ref 3.5–11.3)
WBC # BLD: ABNORMAL 10*3/UL

## 2021-01-25 PROCEDURE — 82272 OCCULT BLD FECES 1-3 TESTS: CPT

## 2021-01-25 PROCEDURE — 94761 N-INVAS EAR/PLS OXIMETRY MLT: CPT

## 2021-01-25 PROCEDURE — 97162 PT EVAL MOD COMPLEX 30 MIN: CPT

## 2021-01-25 PROCEDURE — 80053 COMPREHEN METABOLIC PANEL: CPT

## 2021-01-25 PROCEDURE — 6360000002 HC RX W HCPCS: Performed by: NURSE PRACTITIONER

## 2021-01-25 PROCEDURE — 85025 COMPLETE CBC W/AUTO DIFF WBC: CPT

## 2021-01-25 PROCEDURE — 36415 COLL VENOUS BLD VENIPUNCTURE: CPT

## 2021-01-25 PROCEDURE — 6370000000 HC RX 637 (ALT 250 FOR IP): Performed by: NURSE PRACTITIONER

## 2021-01-25 PROCEDURE — 99221 1ST HOSP IP/OBS SF/LOW 40: CPT | Performed by: PODIATRIST

## 2021-01-25 PROCEDURE — 2580000003 HC RX 258: Performed by: NURSE PRACTITIONER

## 2021-01-25 PROCEDURE — 2500000003 HC RX 250 WO HCPCS: Performed by: FAMILY MEDICINE

## 2021-01-25 RX ORDER — CLINDAMYCIN HYDROCHLORIDE 300 MG/1
300 CAPSULE ORAL 3 TIMES DAILY
Qty: 30 CAPSULE | Refills: 0 | Status: SHIPPED | OUTPATIENT
Start: 2021-01-25 | End: 2021-02-04

## 2021-01-25 RX ADMIN — Medication 400 UNITS: at 07:40

## 2021-01-25 RX ADMIN — Medication 1000 UNITS: at 07:40

## 2021-01-25 RX ADMIN — RIVAROXABAN 20 MG: 20 TABLET, FILM COATED ORAL at 16:56

## 2021-01-25 RX ADMIN — OXYBUTYNIN CHLORIDE 10 MG: 5 TABLET, EXTENDED RELEASE ORAL at 07:40

## 2021-01-25 RX ADMIN — CLINDAMYCIN PHOSPHATE 600 MG: 600 INJECTION, SOLUTION INTRAVENOUS at 02:37

## 2021-01-25 RX ADMIN — OXYCODONE HYDROCHLORIDE AND ACETAMINOPHEN 250 MG: 500 TABLET ORAL at 07:41

## 2021-01-25 RX ADMIN — CLINDAMYCIN PHOSPHATE 600 MG: 600 INJECTION, SOLUTION INTRAVENOUS at 09:49

## 2021-01-25 RX ADMIN — SODIUM CHLORIDE, PRESERVATIVE FREE 10 ML: 5 INJECTION INTRAVENOUS at 10:00

## 2021-01-25 NOTE — PROGRESS NOTES
Outcomes:  Knowledge or Skill   Food/Nutrient Intake Outcomes:  Food and Nutrient Intake  Physical Signs/Symptoms Outcomes:  Biochemical Data, Weight, Fluid Status or Edema     Discharge Planning:    Continue current diet     Electronically signed by Valeria Ortiz RD, VIDAL on 1/25/21 at 8:57 AM EST    Contact: [phone]

## 2021-01-25 NOTE — VIRTUAL HEALTH
Podiatry Consult H&P  1/25/2021   Cincinnatilia Bundy       SUBJECTIVE: This is a 77 y.o. female seen bedside for B/L Legs ; DDx cellulitis   Cc: progressive and unresponsive red , eventual redness  / fever  HPI: [facility] wound clinic outpatient care     MRR: Lovenox ; current            IV ATB x 2 ; current             Current dressing Zinc paste -Kerlix- Ace             IV Lasix / bedrest             Hospitalization 12/28/2020 & 2/10/2020 @ [facility]             **Hx [facility] lymphedema clinic 2016 ; last visit 12/22/2016     Review of Systems +smoker / +CVH & I , with DVT Hx. ; -Mag filter , -PE                                    +obesity  / +Anemia                                    +admission malaise / +admission fever / chills                                    +long standing lymphedema                                    -claudication , -angina         Past Medical History:   Diagnosis Date    Carcinoma ([facility] [address]     Squamous Cell    Cellulitis     DVT (deep venous thrombosis) ([facility] [address] 2006    LLE    Kidney stone     Wears glasses         Past Surgical History:   Procedure Laterality Date    CARPAL TUNNEL RELEASE  1990s    ANNABELLA AND BSO      05/2019    TUBAL LIGATION  1993    TUBAL LIGATION      WISDOM TOOTH EXTRACTION           Family History   Adopted: Yes        Social History     Tobacco Use    Smoking status: Current Every Day Smoker     Packs/day: 0.50     Years: 42.00     Pack years: 21.00     Types: Cigarettes    Smokeless tobacco: Never Used   Substance Use Topics    Alcohol use: No     Alcohol/week: 0.0 standard drinks        Prior to Admission medications    Medication Sig Start Date End Date Taking?  Authorizing Provider   Misc Natural Products (JOINT SUPPORT COMPLEX) CAPS Take 2 capsules by mouth daily   Yes Historical Provider, MD   Ascorbic Acid (VITAMIN C) 250 MG tablet Take 250 mg by mouth daily   Yes Historical Provider, MD   vitamin E 400 UNIT capsule Take 400 Units by mouth daily   Yes Historical Provider, MD   vitamin D (CHOLECALCIFEROL) 25 MCG (1000 UT) TABS tablet Take 1,000 Units by mouth daily   Yes Historical Provider, MD   phenazopyridine (AZO-TABS) 95 MG tablet Take 95 mg by mouth 3 times daily as needed for Pain   Yes Historical Provider, MD   nystatin (MYCOSTATIN) 770331 UNIT/GM cream Apply topically 2 times daily. 12/30/20  Yes Devi Haskins MD   oxybutynin (DITROPAN XL) 10 MG extended release tablet Take 1 tablet by mouth daily 4/25/19  Yes Janeal Prader, APRN - CNM   rivaroxaban (XARELTO) 20 MG TABS tablet Take 1 tablet by mouth daily (with breakfast) 7/17/18  Yes Ever Wise MD   Multiple Vitamins-Minerals (THERAPEUTIC MULTIVITAMIN-MINERALS) tablet Take 1 tablet by mouth daily   Yes Historical Provider, MD        Estrogens         OBJECTIVE:        Vitals:    01/25/21 0615   BP: (!) 134/97   Pulse: 73   Resp: 28   Temp: 98.6 °F (37 °C)   SpO2: 96%              EXAM:        Pt is AAOx3    Vascular Exam:  DP and PT pulses are +1 on the right . DP and PT pulses are +1 on the left. CFT is <5 seconds bilateral lower extremity. Edema is Present, significant ; +2/7 , R=L ; WITH end stage induration     Neuro Exam:  Light touch sensation is Present bilaterally Protective sensation is present using [place] monofilament bilaterally    Dermatologic Exam:  Nails are thickened, elongated with subungual debris bilaterally. Webspaces are dry, clean bilaterally. Open lesion are present, as irregular macerated cleft(s) . Hyperkeratotic lesion are present, consistent with verrucous nostra. Erythema present, low grade this AM  ; apparently significant decrease from admission , > 24 hours ago     MSK: Muscle strength 5/5 Bilateral  ROM is full without pain or crepitation bilateral.  Pain on palpation Bilateral calves // shins , not  foot/feet.     Current Facility-Administered Medications   Medication Dose Route Frequency Provider Last Rate Last Admin    ascorbic acid (VITAMIN C) tablet 250 mg  250 mg Oral Daily Milon Peat, APRN - CNP   250 mg at 01/25/21 0741    oxybutynin (DITROPAN-XL) extended release tablet 10 mg  10 mg Oral Daily Milon Peat, APRN - CNP   10 mg at 01/25/21 0740    rivaroxaban (XARELTO) tablet 20 mg  20 mg Oral Dinner Milon Peat, APRN - CNP   20 mg at 01/24/21 1743    vitamin D (CHOLECALCIFEROL) tablet 1,000 Units  1,000 Units Oral Daily Milon Peat, APRN - CNP   1,000 Units at 01/25/21 0740    vitamin E capsule 400 Units  400 Units Oral Daily Milon Peat, APRN - CNP   400 Units at 01/25/21 0740    sodium chloride flush 0.9 % injection 10 mL  10 mL Intravenous 2 times per day Milon Peat, APRN - CNP   10 mL at 01/24/21 2050    sodium chloride flush 0.9 % injection 10 mL  10 mL Intravenous PRN Milon Peat, APRN - CNP   10 mL at 01/23/21 1252    promethazine (PHENERGAN) tablet 12.5 mg  12.5 mg Oral Q6H PRN Milon Peat, APRN - CNP        Or    ondansetron (ZOFRAN) injection 4 mg  4 mg Intravenous Q6H PRN Milon Peat, APRN - CNP        polyethylene glycol (GLYCOLAX) packet 17 g  17 g Oral Daily PRN Milon Peat, APRN - CNP        acetaminophen (TYLENOL) tablet 650 mg  650 mg Oral Q6H PRN Milon Peat, APRN - CNP   650 mg at 01/24/21 1940    Or    acetaminophen (TYLENOL) suppository 650 mg  650 mg Rectal Q6H PRN Milon Peat, APRN - CNP        cefTRIAXone (ROCEPHIN) 1,000 mg in sterile water 10 mL IV syringe  1,000 mg Intravenous Q24H Milon Peat, APRN - CNP   1,000 mg at 01/24/21 1340    clindamycin (CLEOCIN) 600 mg in dextrose 5 % 50 mL IVPB  600 mg Intravenous Shiva Baig MD   Stopped at 01/25/21 0323        Lab Results   Component Value Date    WBC 5.8 01/25/2021    HCT 28.6 (L) 01/25/2021    HGB 9.0 (L) 01/25/2021     01/25/2021     01/25/2021    K 4.2 01/25/2021     01/25/2021    CO2 25 01/25/2021    BUN 31 (H) 01/25/2021    CREATININE 1.05 (H) 01/25/2021

## 2021-01-25 NOTE — PROGRESS NOTES
Met with Patient this a.m. to discuss discharge plans. Patient is a 77year old , white female, admitted with a diagnosis of Bilateral Cellulitis of lower leg. Patient is alert and oriented, pleasant and cooperative with this assessment. States that she will go home upon discharge with her  and her daughter. Patient resides in [facility] with her  and daughter. This is a second marriage for her. States that daughter works but also assists in her care needs when able. Her family consists of 5 adult children and 3 grand children. Patient uses a walker and a shower chair at home for assistance. States that she lives in a two floor home and only has a half bath on the main floor. States that she washes up but it has been \"quite a while\" since she has been able to climb the steps and take a shower. Patient is a smoker and smokes \"about a half a pack\" daily. She drives herself and provides for her own transportation needs or relies on family. PCP is Jessy Kaufman CNP. Patient states that she has no difficulty with affording her medications at this time. Discharge plan is to return home with her family when she is deemed medically stable. She declines offer to arrange for home health care services. She is a 'Full Code' status and voices no interest in pursuing medical directives at this point. States that she has had this diagnosis a couple of times in the distant past.  LSW to monitor for further needs and assist as needs/concerns develop.     [address] Micheal [address]  1/25/2021

## 2021-01-25 NOTE — CARE COORDINATION
Readmission Assessment  Number of Days since last admission?: 8-30 days  Previous Disposition: Home with Family  Who is being Interviewed: Patient  What was the patient's/caregiver's perception as to why they think they needed to return back to the hospital?: (feels has a low immune system and go the leg wounds after the kidney stone last month)  Did you visit your Primary Care Physician after you left the hospital, before you returned this time?: No(Did not have a ride to the appointment that was made before discharge)  Why weren't you able to visit your PCP?: Had no transportation  Did you see a specialist, such as Cardiac, Pulmonary, Orthopedic Physician, etc. after you left the hospital?: No(had appt with Dr Keaton Sloan for today in the [address] [place] [address])  Who advised the patient to return to the hospital?: Other (Comment)(Came to ER per self because legs were sore and seeping.)  Does the patient report anything that got in the way of taking their medications?: No  In our efforts to provide the best possible care to you and others like you, can you think of anything that we could have done to help you after you left the hospital the first time, so that you might not have needed to return so soon?: Other (Comment)(No.  Feels this admission for cellulitis is unrelated to the kidney stones from last month)

## 2021-01-25 NOTE — PROGRESS NOTES
Patient was taken out with family by Patrizia Deutsch RN. Personal belongings were taken with patient and locked cupboard checked. Patient denied questions and all paperwork was sent with patient. #Chest pain  - Now resolved. Patient HDS. ddx: AF vs COPD, per cards, low risk of ACS  - Low c/f ACS given multiple previous ED hospitalization for similar pain and negative workup. Remains HDS  - CE (elevated troponin also in setting of ESRD): delta trop likely renal and volume shifts  c/w diuresis via dialysis  Per cardiology, No further testing at this time  - Recent nuclear stress 03/2020 w/o coronary insufficiency.  - Telemetry monitoring  - TTE 10/31 revealed pulmonary hypertension, elevated LV filling pressures

## 2021-01-25 NOTE — PROGRESS NOTES
Referral to [facility] made this p.m. as Patient now wants assistance with wound care/dressing changes.     [address]heal [address]  1/25/2021

## 2021-01-25 NOTE — PROGRESS NOTES
Physical Therapy    Facility/Department: [facility] MED SURG  Initial Assessment    NAME: Kinza Yoder  : 1955  MRN: [id number]    Date of Service: 2021    Discharge Recommendations:  Home with Home health PT        Assessment   Body structures, Functions, Activity limitations: (BLE lymphedema)  Assessment: Pt is a 77year old female that was referred to PT for lymphedema consult. PT spoke to patient about plan of care once D/C today. PT recommneded home care nursing and PT for lymphedema management  Treatment Diagnosis: BLE lymphedema  Prognosis: Fair  Decision Making: Medium Complexity  Patient Education: pt educated on home health lymphedema  REQUIRES PT FOLLOW UP: No(1 time consult for lymphedema consult)  Activity Tolerance  Activity Tolerance: Patient Tolerated treatment well       Patient Diagnosis(es): The primary encounter diagnosis was Cellulitis, unspecified cellulitis site. A diagnosis of Lymphedema was also pertinent to this visit. has a past medical history of Carcinoma ([address] Cellulitis, DVT (deep venous thrombosis) ([facility] [address] Kidney stone, and Wears glasses. has a past surgical history that includes Tubal ligation (); Carpal tunnel release (); [place] tooth extraction; Tubal ligation; and candy and bso (cervix removed).     Restrictions  Restrictions/Precautions  Restrictions/Precautions: General Precautions, Fall Risk  Vision/Hearing        Subjective  General  Chart Reviewed: Yes  Response To Previous Treatment: Not applicable  Family / Caregiver Present: No  Referring Practitioner: Dr. Trudy Flowers  Referral Date : 21  Diagnosis: BLE lymphedema/cellulitis  Subjective  Subjective: pt reports chronic leg swelling  Pain Screening  Patient Currently in Pain: Denies          Orientation  Orientation  Overall Orientation Status: Within Normal Limits  Social/Functional History  Social/Functional History  Lives With: Daughter  Type of Home: House  Home Equipment: Rolling walker  ADL Assistance: Independent  Homemaking Assistance: Independent  Ambulation Assistance: Independent  Transfer Assistance: Independent    Objective     Observation/Palpation  Observation: BLE lymphedema    AROM RLE (degrees)  RLE AROM: WFL  AROM LLE (degrees)  LLE AROM : WFL  Strength RLE  Strength RLE: WFL  Strength LLE  Strength LLE: WFL    Plan   Plan  Times per week: 1 visit for lymphedema consult  Safety Devices  Type of devices: Call light within reach, Left in bed    AM-PAC Score     AM-PAC Inpatient Mobility without Stair Climbing Raw Score : 15 (01/25/21 1650)  AM-PAC Inpatient without Stair Climbing T-Scale Score : 43.03 (01/25/21 1650)  Mobility Inpatient CMS 0-100% Score: 47.43 (01/25/21 1650)  Mobility Inpatient without Stair CMS G-Code Modifier : CK (01/25/21 1650)       Goals  Short term goals  Time Frame for Short term goals: 20 days  Short term goal 1: Pt educated on her POC and plan for lymphedema  Short term goal 2: PT will set up at home lymphedema care with   Patient Goals   Patient goals : \"to get treatment for BLE lymphedema\"       Therapy Time   Individual Concurrent Group Co-treatment   Time In 1555         Time Out 1610         Minutes 138 Antonina Ramey, PT

## 2021-01-25 NOTE — PROGRESS NOTES
Discharge instructions given to pt. No questions, pt verbalized understanding all instructions. Pt aware of follow up appointment as listed on AVS. Await family to bring clothing for pt to wear home and transport pt home.

## 2021-01-25 NOTE — PROGRESS NOTES
XL BM, formed, brown with small amt of red noted within. Occult stool sample sent to lab for testing, as ordered.  Await results O-T Advancement Flap Text: The defect edges were debeveled with a #15 scalpel blade.  Given the location of the defect, shape of the defect and the proximity to free margins an O-T advancement flap was deemed most appropriate.  Using a sterile surgical marker, an appropriate advancement flap was drawn incorporating the defect and placing the expected incisions within the relaxed skin tension lines where possible.    The area thus outlined was incised deep to adipose tissue with a #15 scalpel blade.  The skin margins were undermined to an appropriate distance in all directions utilizing iris scissors.

## 2021-01-25 NOTE — PROGRESS NOTES
Student at bedside. No complaints at this time. Assessment complete. Will continue to assess and monitor.

## 2021-01-25 NOTE — DISCHARGE SUMMARY
Discharge Summary    Fransisca Rogel  :  1955  MRN:  [id number]    Admit date:  2021      Discharge date: 2021     Admitting Physician:  Shannon Andino MD    Discharge Diagnoses:    Principal Problem:    Bilateral cellulitis of lower leg  Active Problems:    Post-phlebitic syndrome    Elephantiasis nostra verrucosa    Lymphedema of both lower extremities    Obesity    Tobacco abuse    Iron deficiency anemia  Resolved Problems:    * No resolved hospital problems. *      Hospital Course:   Fransisca Rogel is a 77 y.o. female admitted with bilateral cellulitis of lower legs. She presented to the ER for evaluation of edema redness and discomfort to both lower legs. Upon evaluation she was found to have cellulitis and drainage that approximately had been going on for over a week. She does have a history of lymphedema in both lower extremities. She was admitted and placed on IV Zosyn. Dr. Aranza Flores was also consulted for management of wounds. His recommendation is zinc applied to the skin wrapped with Kerlix and secured with Ace bandages. Her legs have been improving. She will be discharged home today and placed on clindamycin for 10 days of treatment. She will have [facility] home care come to the home to provide PT/OT and wound care. Caty Garrison is going to arrange home pumping through [facility] as well. Consultants:  Dr. Leroy Hawthorne, wound care/podiatry    Procedures: none    Complications: none    Discharge Condition: good    Exam:  GEN:  alert and oriented to person, place and time, well-developed and well-nourished, in no acute distress  EYES: No gross abnormalities. , PERRL and EOMI  NECK: normal, supple, no lymphadenopathy,  no carotid bruits  PULM: clear to auscultation bilaterally- no wheezes, rales or rhonchi, normal air movement, no respiratory distress  COR: regular rate & rhythm, no murmurs, no gallops, S1 normal and S2 normal  ABD:  soft, non-tender, non-distended, normal bowel sounds, no masses or organomegaly  EXT:   Nonpitting chronic edema  NEURO: follows commands, [place], no deficits  SKIN:  Erythema improved    Significant Diagnostic Studies:   Lab Results   Component Value Date    WBC 5.8 01/25/2021    HGB 9.0 (L) 01/25/2021     01/25/2021       Lab Results   Component Value Date    BUN 31 (H) 01/25/2021    CREATININE 1.05 (H) 01/25/2021     01/25/2021    K 4.2 01/25/2021    CALCIUM 7.9 (L) 01/25/2021     01/25/2021    CO2 25 01/25/2021    LABGLOM 52 (L) 01/25/2021       Lab Results   Component Value Date    WBCUA 5 TO 10 01/12/2021    RBCUA GREATER THAN 100 01/12/2021    EPITHUA 2 TO 5 01/12/2021    LEUKOCYTESUR TRACE (A) 01/12/2021    SPECGRAV >1.030 (H) 01/12/2021    GLUCOSEU NEGATIVE 01/12/2021    KETUA NEGATIVE 01/12/2021    PROTEINU 1+ (A) 01/12/2021    HGBUR 3+ (A) 01/12/2021    CASTUA NOT REPORTED 01/12/2021    CRYSTUA CALCIUM OXALATE (A) 01/12/2021    CRYSTUA 20 TO 50 (A) 01/12/2021    BACTERIA NOT REPORTED 01/12/2021    YEAST NOT REPORTED 01/12/2021       No results found.     Assessment and Plan:  Patient Active Problem List    Diagnosis Date Noted    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     Priority: Medium     Class: Chronic    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     Priority: Medium     Class: Chronic    Iron deficiency anemia 01/24/2021    Normocytic anemia 01/23/2021    Cellulitis 96/97/0385    Complicated UTI (urinary tract infection) 12/28/2020    Calculus of right kidney 12/28/2020    Cellulitis of left lower extremity     Cellulitis of lower leg 02/10/2020    Gross hematuria 10/30/2018    Frequency of urination 10/30/2018    Nocturia 10/30/2018    Mixed incontinence 10/30/2018    Constipation 10/30/2018    History of recurrent deep vein thrombosis (DVT) 04/26/2018    Erythrocytosis 04/26/2018    Obesity 11/15/2016    Tobacco abuse 11/15/2016    Bilateral cellulitis of lower leg 11/08/2016    Acute shoulder pain due to trauma 11/08/2016    Lymphedema of both lower extremities 11/08/2016    Acute thromboembolism of deep veins of lower extremity ([facility] [address] 09/05/2015    Long term current use of anticoagulant therapy 09/05/2015        Discharge Medications:       Polly Hartman   Home Medication Instructions [id number]    Printed on:01/25/21 1211   Medication Information                      Ascorbic Acid (VITAMIN C) 250 MG tablet  Take 250 mg by mouth daily             clindamycin (CLEOCIN) 300 MG capsule  Take 1 capsule by mouth 3 times daily for 10 days             Misc Natural Products (JOINT SUPPORT COMPLEX) CAPS  Take 2 capsules by mouth daily             Multiple Vitamins-Minerals (THERAPEUTIC MULTIVITAMIN-MINERALS) tablet  Take 1 tablet by mouth daily             nystatin (MYCOSTATIN) 146531 UNIT/GM cream  Apply topically 2 times daily. oxybutynin (DITROPAN XL) 10 MG extended release tablet  Take 1 tablet by mouth daily             phenazopyridine (AZO-TABS) 95 MG tablet  Take 95 mg by mouth 3 times daily as needed for Pain             rivaroxaban (XARELTO) 20 MG TABS tablet  Take 1 tablet by mouth daily (with breakfast)             vitamin D (CHOLECALCIFEROL) 25 MCG (1000 UT) TABS tablet  Take 1,000 Units by mouth daily             vitamin E 400 UNIT capsule  Take 400 Units by mouth daily                 Patient Instructions:    Activity: activity as tolerated  Diet: cardiac diet  Wound Care: Zinc, Kerlix, Ace  Other: Home pumping through [facility] rehab    Disposition:   Discharge to Home with home health    Follow up:  Patient will be followed by DONG Gutierres CNP in 1-2 weeks    CORE MEASURES on Discharge (if applicable)  ACE/ARB in CHF: NA  Statin in MI: NA  ASA in MI: NA  Statin in CVA: NA  Antiplatelet in CVA: NA    Total time spent on discharge services: 40 minutes    Including the following activities:  Evaluation and Management of patient  Discussion with patient and/or surrogate about current care

## 2021-01-25 NOTE — PROGRESS NOTES
Progress Note  Boaz Reagan MD    OBJECTIVE:    Patient seen for f/u of Bilateral cellulitis of lower leg.edema better  ROS:   Constitutional: negative  for fevers, and negative for chills. Respiratory: negative for shortness of breath, negative for cough, and negative for wheezing  Cardiovascular: negative for chest pain, and negative for palpitations, has edema  Gastrointestinal: negative for abdominal pain, negative for nausea,negative for vomiting, negative for diarrhea, and negative for constipation     All other systems were reviewed with the patient and are negative unless otherwise stated in HPI    OBJECTIVE:  Vitals:   Temp: 98.6 °F (37 °C)  BP: (!) 134/97  Resp: 28  Pulse: 73  SpO2: 96 %    24HR INTAKE/OUTPUT:      Intake/Output Summary (Last 24 hours) at 1/25/2021 0752  Last data filed at 1/24/2021 1932  Gross per 24 hour   Intake 700 ml   Output --   Net 700 ml     -----------------------------------------------------------------  Exam:  GEN:    Awake, alert and oriented x3. EYES:  EOMI, pupils equal   NECK: Supple. No lymphadenopathy. No carotid bruit  CVS:    regular rate and rhythm, no audible murmur  PULM:  CTA, no wheezes, rales or rhonchi, no acute respiratory distress  ABD:    Bowels sounds normal.  Abdomen is soft. No distention. no tenderness to palpation. EXT:   has non pitting edema, edema bilaterally . No calf tenderness. NEURO: Moves all extremities. Motor and sensory are grossly intact  SKIN:  No rashes.   Cellulitis improving    -----------------------------------------------------------------  Diagnostic Data:    · All available data reviewed  Lab Results   Component Value Date    WBC 5.8 01/25/2021    HGB 9.0 (L) 01/25/2021    .7 01/25/2021     01/25/2021      Lab Results   Component Value Date    GLUCOSE 102 (H) 01/25/2021    BUN 31 (H) 01/25/2021    CREATININE 1.05 (H) 01/25/2021     01/25/2021    K 4.2 01/25/2021    CALCIUM 7.9 (L) 01/25/2021    CL

## 2021-03-04 DIAGNOSIS — I89.0 LYMPHEDEMA OF BOTH LOWER EXTREMITIES: Primary | ICD-10-CM

## 2021-03-04 DIAGNOSIS — R60.0 EDEMA OF LOWER EXTREMITY: ICD-10-CM

## 2021-03-04 NOTE — PROGRESS NOTES
Hx IP consult & referral for outpatient services  Continuity of care   5-6 weeks outpatient pneumatic pumping + MLD  Physical therapy notes reviewed     Rx: home pneumatic compression pumps     [employer] [facility]  3/4/2021  10:19 AM

## 2022-01-02 ENCOUNTER — APPOINTMENT (OUTPATIENT)
Dept: GENERAL RADIOLOGY | Age: 67
End: 2022-01-02
Payer: MEDICARE

## 2022-01-02 ENCOUNTER — APPOINTMENT (OUTPATIENT)
Dept: CT IMAGING | Age: 67
End: 2022-01-02
Payer: MEDICARE

## 2022-01-02 ENCOUNTER — HOSPITAL ENCOUNTER (EMERGENCY)
Age: 67
Discharge: ANOTHER HOSPITAL | End: 2022-01-04
Attending: EMERGENCY MEDICINE
Payer: MEDICARE

## 2022-01-02 DIAGNOSIS — N39.0 URINARY TRACT INFECTION WITHOUT HEMATURIA, SITE UNSPECIFIED: Primary | ICD-10-CM

## 2022-01-02 DIAGNOSIS — K68.12 PSOAS ABSCESS (HCC): ICD-10-CM

## 2022-01-02 DIAGNOSIS — J81.0 ACUTE PULMONARY EDEMA (HCC): ICD-10-CM

## 2022-01-02 DIAGNOSIS — N20.0 KIDNEY STONE: ICD-10-CM

## 2022-01-02 DIAGNOSIS — N81.10 FEMALE BLADDER PROLAPSE: ICD-10-CM

## 2022-01-02 PROBLEM — N28.89 RENAL MASS: Status: ACTIVE | Noted: 2022-01-02

## 2022-01-02 LAB
-: ABNORMAL
ABSOLUTE EOS #: <0.03 K/UL (ref 0–0.44)
ABSOLUTE IMMATURE GRANULOCYTE: 0.06 K/UL (ref 0–0.3)
ABSOLUTE LYMPH #: 0.76 K/UL (ref 1.1–3.7)
ABSOLUTE MONO #: 0.84 K/UL (ref 0.1–1.2)
ALBUMIN SERPL-MCNC: 2.8 G/DL (ref 3.5–5.2)
ALBUMIN/GLOBULIN RATIO: 0.6 (ref 1–2.5)
ALP BLD-CCNC: 61 U/L (ref 35–104)
ALT SERPL-CCNC: 8 U/L (ref 5–33)
AMORPHOUS: ABNORMAL
ANION GAP SERPL CALCULATED.3IONS-SCNC: 12 MMOL/L (ref 9–17)
AST SERPL-CCNC: 7 U/L
BACTERIA: ABNORMAL
BASOPHILS # BLD: 0 % (ref 0–2)
BASOPHILS ABSOLUTE: <0.03 K/UL (ref 0–0.2)
BILIRUB SERPL-MCNC: 0.37 MG/DL (ref 0.3–1.2)
BILIRUBIN URINE: NEGATIVE
BNP INTERPRETATION: ABNORMAL
BUN BLDV-MCNC: 18 MG/DL (ref 8–23)
BUN/CREAT BLD: 19 (ref 9–20)
CALCIUM SERPL-MCNC: 8.4 MG/DL (ref 8.6–10.4)
CASTS UA: ABNORMAL /LPF
CHLORIDE BLD-SCNC: 105 MMOL/L (ref 98–107)
CO2: 24 MMOL/L (ref 20–31)
COLOR: YELLOW
COMMENT UA: ABNORMAL
CREAT SERPL-MCNC: 0.97 MG/DL (ref 0.5–0.9)
CRYSTALS, UA: ABNORMAL /HPF
DIFFERENTIAL TYPE: ABNORMAL
EOSINOPHILS RELATIVE PERCENT: 0 % (ref 1–4)
EPITHELIAL CELLS UA: ABNORMAL /HPF (ref 0–25)
GFR AFRICAN AMERICAN: >60 ML/MIN
GFR NON-AFRICAN AMERICAN: 57 ML/MIN
GFR SERPL CREATININE-BSD FRML MDRD: ABNORMAL ML/MIN/{1.73_M2}
GFR SERPL CREATININE-BSD FRML MDRD: ABNORMAL ML/MIN/{1.73_M2}
GLUCOSE BLD-MCNC: 134 MG/DL (ref 70–99)
GLUCOSE URINE: NEGATIVE
HCT VFR BLD CALC: 32.5 % (ref 36.3–47.1)
HEMOGLOBIN: 10.2 G/DL (ref 11.9–15.1)
IMMATURE GRANULOCYTES: 1 %
KETONES, URINE: NEGATIVE
LACTIC ACID: 0.9 MMOL/L (ref 0.5–2.2)
LEUKOCYTE ESTERASE, URINE: ABNORMAL
LIPASE: 18 U/L (ref 13–60)
LYMPHOCYTES # BLD: 7 % (ref 24–43)
MCH RBC QN AUTO: 30.9 PG (ref 25.2–33.5)
MCHC RBC AUTO-ENTMCNC: 31.4 G/DL (ref 28.4–34.8)
MCV RBC AUTO: 98.5 FL (ref 82.6–102.9)
MONOCYTES # BLD: 7 % (ref 3–12)
MUCUS: ABNORMAL
NITRITE, URINE: POSITIVE
NRBC AUTOMATED: 0 PER 100 WBC
OTHER OBSERVATIONS UA: ABNORMAL
PDW BLD-RTO: 13.7 % (ref 11.8–14.4)
PH UA: 6 (ref 5–9)
PLATELET # BLD: 367 K/UL (ref 138–453)
PLATELET ESTIMATE: ABNORMAL
PMV BLD AUTO: 8 FL (ref 8.1–13.5)
POTASSIUM SERPL-SCNC: 3.8 MMOL/L (ref 3.7–5.3)
PRO-BNP: 1070 PG/ML
PROTEIN UA: ABNORMAL
RBC # BLD: 3.3 M/UL (ref 3.95–5.11)
RBC # BLD: ABNORMAL 10*6/UL
RBC UA: ABNORMAL /HPF (ref 0–2)
RENAL EPITHELIAL, UA: ABNORMAL /HPF
SARS-COV-2, RAPID: NOT DETECTED
SEG NEUTROPHILS: 85 % (ref 36–65)
SEGMENTED NEUTROPHILS ABSOLUTE COUNT: 10.07 K/UL (ref 1.5–8.1)
SODIUM BLD-SCNC: 141 MMOL/L (ref 135–144)
SPECIFIC GRAVITY UA: 1.02 (ref 1.01–1.02)
SPECIMEN DESCRIPTION: NORMAL
TOTAL PROTEIN: 7.4 G/DL (ref 6.4–8.3)
TRICHOMONAS: ABNORMAL
TURBIDITY: ABNORMAL
URINE HGB: ABNORMAL
UROBILINOGEN, URINE: NORMAL
WBC # BLD: 11.8 K/UL (ref 3.5–11.3)
WBC # BLD: ABNORMAL 10*3/UL
WBC UA: ABNORMAL /HPF (ref 0–5)
YEAST: ABNORMAL

## 2022-01-02 PROCEDURE — 6370000000 HC RX 637 (ALT 250 FOR IP): Performed by: EMERGENCY MEDICINE

## 2022-01-02 PROCEDURE — 85025 COMPLETE CBC W/AUTO DIFF WBC: CPT

## 2022-01-02 PROCEDURE — 99285 EMERGENCY DEPT VISIT HI MDM: CPT

## 2022-01-02 PROCEDURE — 6360000002 HC RX W HCPCS: Performed by: EMERGENCY MEDICINE

## 2022-01-02 PROCEDURE — 81001 URINALYSIS AUTO W/SCOPE: CPT

## 2022-01-02 PROCEDURE — 87077 CULTURE AEROBIC IDENTIFY: CPT

## 2022-01-02 PROCEDURE — 2580000003 HC RX 258: Performed by: EMERGENCY MEDICINE

## 2022-01-02 PROCEDURE — 80053 COMPREHEN METABOLIC PANEL: CPT

## 2022-01-02 PROCEDURE — 96375 TX/PRO/DX INJ NEW DRUG ADDON: CPT

## 2022-01-02 PROCEDURE — 87186 SC STD MICRODIL/AGAR DIL: CPT

## 2022-01-02 PROCEDURE — 74176 CT ABD & PELVIS W/O CONTRAST: CPT

## 2022-01-02 PROCEDURE — 83605 ASSAY OF LACTIC ACID: CPT

## 2022-01-02 PROCEDURE — C9803 HOPD COVID-19 SPEC COLLECT: HCPCS

## 2022-01-02 PROCEDURE — 96365 THER/PROPH/DIAG IV INF INIT: CPT

## 2022-01-02 PROCEDURE — 87205 SMEAR GRAM STAIN: CPT

## 2022-01-02 PROCEDURE — 96367 TX/PROPH/DG ADDL SEQ IV INF: CPT

## 2022-01-02 PROCEDURE — 96376 TX/PRO/DX INJ SAME DRUG ADON: CPT

## 2022-01-02 PROCEDURE — 71045 X-RAY EXAM CHEST 1 VIEW: CPT

## 2022-01-02 PROCEDURE — 87635 SARS-COV-2 COVID-19 AMP PRB: CPT

## 2022-01-02 PROCEDURE — 96366 THER/PROPH/DIAG IV INF ADDON: CPT

## 2022-01-02 PROCEDURE — 83690 ASSAY OF LIPASE: CPT

## 2022-01-02 PROCEDURE — 87040 BLOOD CULTURE FOR BACTERIA: CPT

## 2022-01-02 PROCEDURE — 83880 ASSAY OF NATRIURETIC PEPTIDE: CPT

## 2022-01-02 PROCEDURE — 87150 DNA/RNA AMPLIFIED PROBE: CPT

## 2022-01-02 PROCEDURE — 96372 THER/PROPH/DIAG INJ SC/IM: CPT

## 2022-01-02 PROCEDURE — 36415 COLL VENOUS BLD VENIPUNCTURE: CPT

## 2022-01-02 RX ORDER — SODIUM CHLORIDE 9 MG/ML
25 INJECTION, SOLUTION INTRAVENOUS PRN
Status: CANCELLED | OUTPATIENT
Start: 2022-01-02

## 2022-01-02 RX ORDER — ONDANSETRON 2 MG/ML
4 INJECTION INTRAMUSCULAR; INTRAVENOUS ONCE
Status: COMPLETED | OUTPATIENT
Start: 2022-01-02 | End: 2022-01-02

## 2022-01-02 RX ORDER — POTASSIUM CHLORIDE 7.45 MG/ML
10 INJECTION INTRAVENOUS PRN
Status: CANCELLED | OUTPATIENT
Start: 2022-01-02

## 2022-01-02 RX ORDER — FENTANYL CITRATE 50 UG/ML
50 INJECTION, SOLUTION INTRAMUSCULAR; INTRAVENOUS
Status: DISCONTINUED | OUTPATIENT
Start: 2022-01-02 | End: 2022-01-04 | Stop reason: HOSPADM

## 2022-01-02 RX ORDER — FUROSEMIDE 10 MG/ML
20 INJECTION INTRAMUSCULAR; INTRAVENOUS ONCE
Status: DISCONTINUED | OUTPATIENT
Start: 2022-01-02 | End: 2022-01-02

## 2022-01-02 RX ORDER — POTASSIUM CHLORIDE 20 MEQ/1
40 TABLET, EXTENDED RELEASE ORAL PRN
Status: CANCELLED | OUTPATIENT
Start: 2022-01-02

## 2022-01-02 RX ORDER — FENTANYL CITRATE 50 UG/ML
25 INJECTION, SOLUTION INTRAMUSCULAR; INTRAVENOUS ONCE
Status: COMPLETED | OUTPATIENT
Start: 2022-01-02 | End: 2022-01-02

## 2022-01-02 RX ORDER — SODIUM CHLORIDE 0.9 % (FLUSH) 0.9 %
10 SYRINGE (ML) INJECTION PRN
Status: CANCELLED | OUTPATIENT
Start: 2022-01-02

## 2022-01-02 RX ORDER — SODIUM CHLORIDE 0.9 % (FLUSH) 0.9 %
5-40 SYRINGE (ML) INJECTION EVERY 12 HOURS SCHEDULED
Status: CANCELLED | OUTPATIENT
Start: 2022-01-02

## 2022-01-02 RX ORDER — 0.9 % SODIUM CHLORIDE 0.9 %
500 INTRAVENOUS SOLUTION INTRAVENOUS ONCE
Status: COMPLETED | OUTPATIENT
Start: 2022-01-02 | End: 2022-01-02

## 2022-01-02 RX ADMIN — SODIUM CHLORIDE 500 ML: 9 INJECTION, SOLUTION INTRAVENOUS at 13:13

## 2022-01-02 RX ADMIN — PIPERACILLIN SODIUM,TAZOBACTAM SODIUM 3375 MG: 3; .375 INJECTION, POWDER, FOR SOLUTION INTRAVENOUS at 17:17

## 2022-01-02 RX ADMIN — ONDANSETRON 4 MG: 2 INJECTION INTRAMUSCULAR; INTRAVENOUS at 13:14

## 2022-01-02 RX ADMIN — RIVAROXABAN 20 MG: 20 TABLET, FILM COATED ORAL at 21:31

## 2022-01-02 RX ADMIN — FENTANYL CITRATE 25 MCG: 50 INJECTION INTRAMUSCULAR; INTRAVENOUS at 13:15

## 2022-01-02 RX ADMIN — FENTANYL CITRATE 50 MCG: 50 INJECTION INTRAMUSCULAR; INTRAVENOUS at 21:30

## 2022-01-02 ASSESSMENT — PAIN DESCRIPTION - LOCATION: LOCATION: BACK

## 2022-01-02 ASSESSMENT — PAIN SCALES - GENERAL
PAINLEVEL_OUTOF10: 10
PAINLEVEL_OUTOF10: 10
PAINLEVEL_OUTOF10: 6

## 2022-01-02 ASSESSMENT — PAIN DESCRIPTION - ORIENTATION: ORIENTATION: LOWER

## 2022-01-02 NOTE — ED PROVIDER NOTES
[facility] ED  EMERGENCY DEPARTMENT ENCOUNTER      Pt Name: Juan Mercado  MRN: [id number]  [place] 1955  Date of evaluation: 1/2/2022  Provider: Caitlin Morrison MD    CHIEF COMPLAINT       Chief Complaint   Patient presents with    Back Pain     Pt C/O lower back pain that started yesterday. pt thinks she may have a kidney stone or an infection. HISTORY OF PRESENT ILLNESS   (Location/Symptom, Timing/Onset, Context/Setting, Quality, Duration, Modifying Factors, Severity)  Note limiting factors. Juan Mercado is a 74 y.o. female who presents to the emergency department     68-year-old patient presents emergency department with concerns of low back discomfort of gradual onset approximately 2 days in duration. Patient has had similar symptomatology approximately 1 year prior during which time she had kidney infection. Patient also is concerned that she may indeed have a kidney stone. She denies any history for trauma. She also relates that she does have a documented bladder prolapse which is of chronicity. There is been no history for vomiting no history for loose stools. No numbness or tingling in the perineal region no bowel or bladder incontinence. No radiation discomfort into her legs. Nursing Notes were reviewed. REVIEW OF SYSTEMS    (2-9 systems for level 4, 10 or more for level 5)     Review of Systems   All other systems reviewed and are negative. Except as noted above the remainder of the review of systems was reviewed and negative.        PAST MEDICAL HISTORY     Past Medical History:   Diagnosis Date    Carcinoma ([address]     Squamous Cell    Cellulitis     DVT (deep venous thrombosis) ([facility] [address] 2006    LLE    Kidney stone     Wears glasses          SURGICAL HISTORY       Past Surgical History:   Procedure Laterality Date    CARPAL TUNNEL RELEASE  1990s    ANNABELLA AND BSO      05/2019    TUBAL LIGATION  1993    TUBAL LIGATION      WISDOM TOOTH EXTRACTION           CURRENT MEDICATIONS       Previous Medications    ASCORBIC ACID (VITAMIN C) 250 MG TABLET    Take 250 mg by mouth daily    MISC NATURAL PRODUCTS (JOINT SUPPORT COMPLEX) CAPS    Take 2 capsules by mouth daily    MULTIPLE VITAMINS-MINERALS (THERAPEUTIC MULTIVITAMIN-MINERALS) TABLET    Take 1 tablet by mouth daily    NYSTATIN (MYCOSTATIN) 518075 UNIT/GM CREAM    Apply topically 2 times daily. OXYBUTYNIN (DITROPAN XL) 10 MG EXTENDED RELEASE TABLET    Take 1 tablet by mouth daily    PHENAZOPYRIDINE (AZO-TABS) 95 MG TABLET    Take 95 mg by mouth 3 times daily as needed for Pain    RIVAROXABAN (XARELTO) 20 MG TABS TABLET    Take 1 tablet by mouth daily (with breakfast)    VITAMIN D (CHOLECALCIFEROL) 25 MCG (1000 UT) TABS TABLET    Take 1,000 Units by mouth daily    VITAMIN E 400 UNIT CAPSULE    Take 400 Units by mouth daily       ALLERGIES     Estrogens    FAMILY HISTORY       Family History   Adopted: Yes          SOCIAL HISTORY       Social History     Socioeconomic History    Marital status:      Spouse name: None    Number of children: None    Years of education: None    Highest education level: None   Occupational History    None   Tobacco Use    Smoking status: Current Every Day Smoker     Packs/day: 0.50     Years: 42.00     Pack years: 21.00     Types: Cigarettes    Smokeless tobacco: Never Used   Vaping Use    Vaping Use: Never used   Substance and Sexual Activity    Alcohol use: No     Alcohol/week: 0.0 standard drinks    Drug use: No    Sexual activity: Not Currently   Other Topics Concern    None   Social History Narrative    None     Social Determinants of Health     Financial Resource Strain:     Difficulty of Paying Living Expenses: Not on file   Food Insecurity:     Worried About Running Out of Food in the Last Year: Not on file    Nabeel of Food in the Last Year: Not on file   Transportation Needs:     Lack of Transportation (Medical):  Not on file    Lack of Transportation (Non-Medical): Not on file   Physical Activity:     Days of Exercise per Week: Not on file    Minutes of Exercise per Session: Not on file   Stress:     Feeling of Stress : Not on file   Social Connections:     Frequency of Communication with Friends and Family: Not on file    Frequency of Social Gatherings with Friends and Family: Not on file    Attends Orthodoxy Services: Not on file    Active Member of [address] [place] [address] or Organizations: Not on file    Attends Club or Organization Meetings: Not on file    Marital Status: Not on file   Intimate Partner Violence:     Fear of Current or Ex-Partner: Not on file    Emotionally Abused: Not on file    Physically Abused: Not on file    Sexually Abused: Not on file   Housing Stability:     Unable to Pay for Housing in the Last Year: Not on file    Number of [place] in the Last Year: Not on file    Unstable Housing in the Last Year: Not on file       SCREENINGS        [place] Coma Scale  Eye Opening: Spontaneous  Best Verbal Response: Oriented  Best Motor Response: Obeys commands  [place] Coma Scale Score: 15               PHYSICAL EXAM    (up to 7 for level 4, 8 or more for level 5)     ED Triage Vitals [01/02/22 1203]   BP Temp Temp Source Pulse Resp SpO2 Height Weight   (!) 140/66 98.7 °F (37.1 °C) Oral 95 16 95 % 5' 1\" (1.549 m) 250 lb (113.4 kg)       Physical Exam  Vitals reviewed. HENT:      Head: Normocephalic. Nose: Nose normal.      Mouth/Throat:      Mouth: Mucous membranes are moist.   Eyes:      Extraocular Movements: Extraocular movements intact. Cardiovascular:      Rate and Rhythm: Normal rate and regular rhythm. Pulses: Normal pulses. Heart sounds: Normal heart sounds. Pulmonary:      Effort: Pulmonary effort is normal.      Breath sounds: Normal breath sounds. Abdominal:      General: Abdomen is flat. Palpations: Abdomen is soft. There is no mass. Tenderness: There is no abdominal tenderness. There is right CVA tenderness. There is no left CVA tenderness. Genitourinary:     Comments: Significant bladder prolapse of chronicity  Musculoskeletal:         General: Normal range of motion. Cervical back: Normal range of motion. No rigidity. Right lower leg: No edema. Left lower leg: No edema. Comments: Minimal tenderness paralumbar musculoskeletal region   Skin:     General: Skin is warm. Capillary Refill: Capillary refill takes less than 2 seconds. Findings: No lesion or rash. Neurological:      General: No focal deficit present. Mental Status: She is alert and oriented to person, place, and time. Mental status is at baseline. Motor: No weakness. DIAGNOSTIC RESULTS     EKG: All EKG's are interpreted by the Emergency Department Physician who either signs or Co-signs this chart in the absence of a cardiologist.      RADIOLOGY:   Non-plain film images such as CT, Ultrasound and MRI are read by the radiologist. Plain radiographic images are visualized and preliminarily interpreted by the emergency physician with the below findings:      Interpretation per the Radiologist below, if available at the time of this note:    XR CHEST PORTABLE   Final Result   Pulmonary vascular congestion         CT ABDOMEN PELVIS WO CONTRAST Additional Contrast? None   Preliminary Result   Limited noncontrast examination. New abnormal mass appears to be incontiguous with the inferomedial aspect of   the right kidney and contiguous with the right psoas muscle measuring   approximately 7.9 x 7.4 x 8.8 cm. The density of this lesion/mass measures   slightly higher than the simple fluid. Differential consideration include   perinephric/retroperitoneal abscess or complex perinephric urinoma in a   setting of large hyperdense stone seen about the right ureteropelvic   junction. Consider CT abdomen and pelvis with contrast for better   characterization.   Additional subcentimeter hypodense renal stones, which   appear to be nonobstructing. Layering subcentimeter hyperdense stones seen within the urinary bladder. Inferiorly prolapsed urinary bladder. Gallbladder sludge/layering gallstones without imaging features of acute   cholecystitis seen. Colonic diverticulosis without evidence of acute diverticulitis.                ED BEDSIDE ULTRASOUND:   Performed by ED Physician - none    LABS:  Labs Reviewed   CULTURE, BLOOD 1 - Abnormal; Notable for the following components:       Result Value    Culture POSITIVE BLOOD CULTURE, RN NOTIFIED: (*)     All other components within normal limits   CULTURE, BLOOD 1 - Abnormal; Notable for the following components:    Culture POSITIVE BLOOD CULTURE, RN NOTIFIED: (*)     All other components within normal limits   CBC WITH AUTO DIFFERENTIAL - Abnormal; Notable for the following components:    WBC 11.8 (*)     RBC 3.30 (*)     Hemoglobin 10.2 (*)     Hematocrit 32.5 (*)     MPV 8.0 (*)     Seg Neutrophils 85 (*)     Lymphocytes 7 (*)     Eosinophils % 0 (*)     Immature Granulocytes 1 (*)     Segs Absolute 10.07 (*)     Absolute Lymph # 0.76 (*)     All other components within normal limits   COMPREHENSIVE METABOLIC PANEL W/ REFLEX TO MG FOR LOW K - Abnormal; Notable for the following components:    Glucose 134 (*)     CREATININE 0.97 (*)     Calcium 8.4 (*)     Albumin 2.8 (*)     Albumin/Globulin Ratio 0.6 (*)     GFR Non- 57 (*)     All other components within normal limits   URINALYSIS - Abnormal; Notable for the following components:    Turbidity UA SLIGHTLY CLOUDY (*)     Urine Hgb 2+ (*)     Protein, UA TRACE (*)     Nitrite, Urine POSITIVE (*)     Leukocyte Esterase, Urine SMALL (*)     All other components within normal limits   MICROSCOPIC URINALYSIS - Abnormal; Notable for the following components:    Bacteria, UA 3+ (*)     All other components within normal limits   BRAIN NATRIURETIC PEPTIDE - Abnormal; Notable for the following components:    Pro-BNP 1,070 (*)     All other components within normal limits   CBC WITH AUTO DIFFERENTIAL - Abnormal; Notable for the following components:    WBC 11.9 (*)     RBC 3.11 (*)     Hemoglobin 9.6 (*)     Hematocrit 30.8 (*)     Seg Neutrophils 83 (*)     Lymphocytes 9 (*)     Eosinophils % 0 (*)     Segs Absolute 9.84 (*)     Absolute Lymph # 1.09 (*)     All other components within normal limits   COMPREHENSIVE METABOLIC PANEL W/ REFLEX TO MG FOR LOW K - Abnormal; Notable for the following components:    Glucose 128 (*)     Calcium 8.2 (*)     Albumin 2.5 (*)     Albumin/Globulin Ratio 0.6 (*)     All other components within normal limits   TROPONIN - Abnormal; Notable for the following components:    Troponin, High Sensitivity 28 (*)     All other components within normal limits   TROPONIN - Abnormal; Notable for the following components:    Troponin, High Sensitivity 29 (*)     All other components within normal limits   BRAIN NATRIURETIC PEPTIDE - Abnormal; Notable for the following components:    Pro-BNP 2,247 (*)     All other components within normal limits   COVID-19, RAPID   COVID-19, RAPID   LIPASE   LACTIC ACID       All other labs were within normal range or not returned as of this dictation. EMERGENCY DEPARTMENT COURSE and DIFFERENTIAL DIAGNOSIS/MDM:   Vitals:    Vitals:    01/03/22 1400 01/03/22 1430 01/03/22 1500 01/03/22 1530   BP: (!) 176/67 (!) 171/66 (!) 170/65 (!) 177/78   Pulse:       Resp:       Temp:       TempSrc:       SpO2: 98% 98% 99% 98%   Weight:       Height:             MDM  Number of Diagnoses or Management Options  Acute pulmonary edema [facility])  Female bladder prolapse  Renal mass  Urinary tract infection without hematuria, site unspecified  Diagnosis management comments: 58-year-old patient presents emergency department with severe lower back discomfort as documented in the HPI.     Diagnostic considerations kidney abnormality, nephrolithiasis, pyelonephritis,    Laboratory studies CT scan has been reviewed demonstrated renal mass with extension into the psoas muscle-abscess versus uroma    Consultation undertaken with Dr. Rogerio Akers  general surgeon on-call at . [facility] 142 transfer to facility which has IR and urology\nephrology services readily available    Consultation undertaken with practitioner Zoila Guidry) on-call for Dr. Mckinley and accepts the patient on behalf of Dr. Mary Cheatham also mentions that she will inform urology of the patient    Cultures have been drawn IV antibiotics initiated    Currently we are awaiting available beds at the [address] [place] [address]     Patient remains hemodynamically stable nontoxic-appearing during emergency department course oxygenating well with pulse oximeter of 95% on room air-during my attendance  ED Course as of 01/03/22 1613   Sun Jan 02, 2022   1526 CT ABDOMEN PELVIS WO CONTRAST Additional Contrast? None  CT abdomen pelvis right renal mass continuous with the right psoas muscle-suspect abscess versus urinoma [RS]   1614 CBC Auto Differential(!):    WBC 11.8(!)   RBC 3.30(!)   Hemoglobin Quant 10. 2(!)   Hematocrit 32.5(!)   MCV 98.5   MCH 30.9   MCHC 31.4   RDW 13.7   Platelet Count 120   MPV 8.0(!)   NRBC Automated 0.0   Differential Type NOT REPORTED   Seg Neutrophils 85(!)   Lymphocytes 7(!)   Monocytes 7   Eosinophils % 0(!)   Basophils 0   Immature Granulocytes 1(!)   Segs Absolute 10.07(!)   Absolute Lymph # 0.76(!)   Absolute Mono # 0.84   Absolute Eos # <0.03   Basophils Absolute <0.03   Absolute Immature Granulocyte 0.06   WBC Morphology NOT REPORTED   RBC Morphology NOT REPORTED   Platelet Estimate NOT REPORTED [RS]   1614 Comprehensive Metabolic Panel w/ Reflex to MG(!):    Glucose 134(!)   BUN 18   Creatinine 0.97(!)   Bun/Cre Ratio 19   CALCIUM, SERUM, 396053 8.4(!)   Sodium 141   Potassium 3.8   Chloride 105   CO2 24   Anion Gap 12   Alk Phos 61   ALT 8   AST 7 Bilirubin 0.37   Total Protein 7.4   Albumin 2.8(!)   Albumin/Globulin Ratio 0.6(!)   GFR Non- 57(!)   GFR  >60   GFR Comment        GFR Staging      [RS]   1614 Microscopic Urinalysis(!):    -        WBC, UA 20 TO 50   RBC, UA 5 TO 10   Casts UA NOT REPORTED   Crystals, UA NOT REPORTED   Epithelial Cells, UA 2 TO 5   Renal Epithelial, UA NOT REPORTED   Bacteria, UA 3+(!)   Mucus, UA NOT REPORTED   Trichomonas, UA NOT REPORTED   Amorphous, UA NOT REPORTED   Other Observations UA NOT REPORTED   Yeast, Urine NOT REPORTED [RS]   1614 Urinalysis, reflex to microscopic(!):    Color, UA Yellow   Turbidity UA SLIGHTLY CLOUDY(!)   Glucose, UA NEGATIVE   Bilirubin, Urine NEGATIVE   Ketones, Urine NEGATIVE   Specific Gravity, UA 1.020   Urine Hgb 2+(!)   pH, UA 6.0   Protein, UA TRACE(!)   Urobilinogen, Urine Normal   Nitrite, Urine POSITIVE(!)   Leukocyte Esterase, Urine SMALL(!)   Urinalysis Comments NOT REPORTED [RS]      ED Course User Index  [RS] Jeremy Ewing MD         CRITICAL CARE TIME   Total Critical Care time was minutes, excluding separately reportable procedures. There was a high probability of clinically significant/life threatening deterioration in the patient's condition which required my urgent intervention. CONSULTS:  None    PROCEDURES:  Unless otherwise noted below, none     Procedures    FINAL IMPRESSION      1. Urinary tract infection without hematuria, site unspecified    2. Renal mass    3. Female bladder prolapse    4. Acute pulmonary edema [facility])          DISPOSITION/PLAN   DISPOSITION Decision To Transfer 01/02/2022 04:17:21 PM      PATIENT REFERRED TO:  No follow-up provider specified. DISCHARGE MEDICATIONS:  New Prescriptions    No medications on file     Controlled Substances Monitoring:     No flowsheet data found.     (Please note that portions of this note were completed with a voice recognition program.  Efforts were made to edit the dictations but occasionally words are mis-transcribed.)    Judith Dumont MD (electronically signed)  Attending Emergency Physician            Judith Dumont MD  01/03/22 [facility]  01/03/22 9155

## 2022-01-02 NOTE — ED NOTES
93 Antonina Gill for hospitalist at [facility]. V's per Dr. Vu Meter request.     Tere [facility]  01/02/22 8067

## 2022-01-02 NOTE — ED NOTES
Bed: 01B  Expected date: 1/2/22  Expected time:   Means of arrival:   Comments:  Low back pain     Bear JONES Reser  01/02/22 1153

## 2022-01-02 NOTE — ED NOTES
Pt straight  cathed at this time. Lisbeth care complete. Pt has a large bladder prolapse. MD notified.       Yvonne Mendez RN  01/02/22 1718

## 2022-01-03 LAB
ABSOLUTE EOS #: <0.03 K/UL (ref 0–0.44)
ABSOLUTE EOS #: <0.03 K/UL (ref 0–0.44)
ABSOLUTE IMMATURE GRANULOCYTE: 0.04 K/UL (ref 0–0.3)
ABSOLUTE IMMATURE GRANULOCYTE: 0.05 K/UL (ref 0–0.3)
ABSOLUTE LYMPH #: 1.03 K/UL (ref 1.1–3.7)
ABSOLUTE LYMPH #: 1.09 K/UL (ref 1.1–3.7)
ABSOLUTE MONO #: 0.88 K/UL (ref 0.1–1.2)
ABSOLUTE MONO #: 0.91 K/UL (ref 0.1–1.2)
ALBUMIN SERPL-MCNC: 2.5 G/DL (ref 3.5–5.2)
ALBUMIN/GLOBULIN RATIO: 0.6 (ref 1–2.5)
ALP BLD-CCNC: 52 U/L (ref 35–104)
ALT SERPL-CCNC: 8 U/L (ref 5–33)
ANION GAP SERPL CALCULATED.3IONS-SCNC: 10 MMOL/L (ref 9–17)
AST SERPL-CCNC: 11 U/L
BASOPHILS # BLD: 0 % (ref 0–2)
BASOPHILS # BLD: 0 % (ref 0–2)
BASOPHILS ABSOLUTE: 0.03 K/UL (ref 0–0.2)
BASOPHILS ABSOLUTE: 0.03 K/UL (ref 0–0.2)
BILIRUB SERPL-MCNC: 0.38 MG/DL (ref 0.3–1.2)
BNP INTERPRETATION: ABNORMAL
BUN BLDV-MCNC: 15 MG/DL (ref 8–23)
BUN/CREAT BLD: 17 (ref 9–20)
CALCIUM SERPL-MCNC: 8.2 MG/DL (ref 8.6–10.4)
CHLORIDE BLD-SCNC: 102 MMOL/L (ref 98–107)
CO2: 24 MMOL/L (ref 20–31)
CREAT SERPL-MCNC: 0.87 MG/DL (ref 0.5–0.9)
DIFFERENTIAL TYPE: ABNORMAL
DIFFERENTIAL TYPE: ABNORMAL
EKG ATRIAL RATE: 83 BPM
EKG P AXIS: 43 DEGREES
EKG P-R INTERVAL: 158 MS
EKG Q-T INTERVAL: 420 MS
EKG QRS DURATION: 128 MS
EKG QTC CALCULATION (BAZETT): 493 MS
EKG R AXIS: -20 DEGREES
EKG T AXIS: 4 DEGREES
EKG VENTRICULAR RATE: 83 BPM
EOSINOPHILS RELATIVE PERCENT: 0 % (ref 1–4)
EOSINOPHILS RELATIVE PERCENT: 0 % (ref 1–4)
GFR AFRICAN AMERICAN: >60 ML/MIN
GFR NON-AFRICAN AMERICAN: >60 ML/MIN
GFR SERPL CREATININE-BSD FRML MDRD: ABNORMAL ML/MIN/{1.73_M2}
GFR SERPL CREATININE-BSD FRML MDRD: ABNORMAL ML/MIN/{1.73_M2}
GLUCOSE BLD-MCNC: 128 MG/DL (ref 70–99)
HCT VFR BLD CALC: 30.8 % (ref 36.3–47.1)
HCT VFR BLD CALC: 31.6 % (ref 36.3–47.1)
HEMOGLOBIN: 9.6 G/DL (ref 11.9–15.1)
HEMOGLOBIN: 9.9 G/DL (ref 11.9–15.1)
IMMATURE GRANULOCYTES: 0 %
IMMATURE GRANULOCYTES: 0 %
LACTIC ACID, WHOLE BLOOD: NORMAL MMOL/L (ref 0.7–2.1)
LACTIC ACID: 0.8 MMOL/L (ref 0.5–2.2)
LYMPHOCYTES # BLD: 9 % (ref 24–43)
LYMPHOCYTES # BLD: 9 % (ref 24–43)
MCH RBC QN AUTO: 30.7 PG (ref 25.2–33.5)
MCH RBC QN AUTO: 30.9 PG (ref 25.2–33.5)
MCHC RBC AUTO-ENTMCNC: 31.2 G/DL (ref 28.4–34.8)
MCHC RBC AUTO-ENTMCNC: 31.3 G/DL (ref 28.4–34.8)
MCV RBC AUTO: 98.1 FL (ref 82.6–102.9)
MCV RBC AUTO: 99 FL (ref 82.6–102.9)
MONOCYTES # BLD: 8 % (ref 3–12)
MONOCYTES # BLD: 8 % (ref 3–12)
NRBC AUTOMATED: 0 PER 100 WBC
NRBC AUTOMATED: 0 PER 100 WBC
PDW BLD-RTO: 13.4 % (ref 11.8–14.4)
PDW BLD-RTO: 13.7 % (ref 11.8–14.4)
PLATELET # BLD: 368 K/UL (ref 138–453)
PLATELET # BLD: 369 K/UL (ref 138–453)
PLATELET ESTIMATE: ABNORMAL
PLATELET ESTIMATE: ABNORMAL
PMV BLD AUTO: 8.4 FL (ref 8.1–13.5)
PMV BLD AUTO: 8.6 FL (ref 8.1–13.5)
POTASSIUM SERPL-SCNC: 3.9 MMOL/L (ref 3.7–5.3)
PRO-BNP: 2247 PG/ML
RBC # BLD: 3.11 M/UL (ref 3.95–5.11)
RBC # BLD: 3.22 M/UL (ref 3.95–5.11)
RBC # BLD: ABNORMAL 10*6/UL
RBC # BLD: ABNORMAL 10*6/UL
SARS-COV-2, RAPID: NOT DETECTED
SEG NEUTROPHILS: 83 % (ref 36–65)
SEG NEUTROPHILS: 83 % (ref 36–65)
SEGMENTED NEUTROPHILS ABSOLUTE COUNT: 9.83 K/UL (ref 1.5–8.1)
SEGMENTED NEUTROPHILS ABSOLUTE COUNT: 9.84 K/UL (ref 1.5–8.1)
SODIUM BLD-SCNC: 136 MMOL/L (ref 135–144)
SPECIMEN DESCRIPTION: NORMAL
TOTAL PROTEIN: 6.8 G/DL (ref 6.4–8.3)
TROPONIN INTERP: ABNORMAL
TROPONIN INTERP: ABNORMAL
TROPONIN T: ABNORMAL NG/ML
TROPONIN T: ABNORMAL NG/ML
TROPONIN, HIGH SENSITIVITY: 28 NG/L (ref 0–14)
TROPONIN, HIGH SENSITIVITY: 29 NG/L (ref 0–14)
WBC # BLD: 11.8 K/UL (ref 3.5–11.3)
WBC # BLD: 11.9 K/UL (ref 3.5–11.3)
WBC # BLD: ABNORMAL 10*3/UL
WBC # BLD: ABNORMAL 10*3/UL

## 2022-01-03 PROCEDURE — 6370000000 HC RX 637 (ALT 250 FOR IP): Performed by: EMERGENCY MEDICINE

## 2022-01-03 PROCEDURE — C9803 HOPD COVID-19 SPEC COLLECT: HCPCS

## 2022-01-03 PROCEDURE — 83605 ASSAY OF LACTIC ACID: CPT

## 2022-01-03 PROCEDURE — 87635 SARS-COV-2 COVID-19 AMP PRB: CPT

## 2022-01-03 PROCEDURE — 6360000002 HC RX W HCPCS: Performed by: EMERGENCY MEDICINE

## 2022-01-03 PROCEDURE — 93010 ELECTROCARDIOGRAM REPORT: CPT | Performed by: INTERNAL MEDICINE

## 2022-01-03 PROCEDURE — 36415 COLL VENOUS BLD VENIPUNCTURE: CPT

## 2022-01-03 PROCEDURE — 84484 ASSAY OF TROPONIN QUANT: CPT

## 2022-01-03 PROCEDURE — 83880 ASSAY OF NATRIURETIC PEPTIDE: CPT

## 2022-01-03 PROCEDURE — 85025 COMPLETE CBC W/AUTO DIFF WBC: CPT

## 2022-01-03 PROCEDURE — 2580000003 HC RX 258: Performed by: EMERGENCY MEDICINE

## 2022-01-03 PROCEDURE — 93005 ELECTROCARDIOGRAM TRACING: CPT | Performed by: EMERGENCY MEDICINE

## 2022-01-03 PROCEDURE — 80053 COMPREHEN METABOLIC PANEL: CPT

## 2022-01-03 PROCEDURE — 2500000003 HC RX 250 WO HCPCS: Performed by: EMERGENCY MEDICINE

## 2022-01-03 RX ORDER — FENTANYL CITRATE 50 UG/ML
25 INJECTION, SOLUTION INTRAMUSCULAR; INTRAVENOUS
Status: DISCONTINUED | OUTPATIENT
Start: 2022-01-03 | End: 2022-01-04 | Stop reason: HOSPADM

## 2022-01-03 RX ORDER — ACETAMINOPHEN 500 MG
1000 TABLET ORAL ONCE
Status: COMPLETED | OUTPATIENT
Start: 2022-01-03 | End: 2022-01-03

## 2022-01-03 RX ORDER — FUROSEMIDE 10 MG/ML
20 INJECTION INTRAMUSCULAR; INTRAVENOUS 2 TIMES DAILY
Status: COMPLETED | OUTPATIENT
Start: 2022-01-03 | End: 2022-01-03

## 2022-01-03 RX ORDER — METOPROLOL TARTRATE 5 MG/5ML
5 INJECTION INTRAVENOUS ONCE
Status: COMPLETED | OUTPATIENT
Start: 2022-01-03 | End: 2022-01-03

## 2022-01-03 RX ADMIN — FUROSEMIDE 20 MG: 10 INJECTION, SOLUTION INTRAVENOUS at 08:13

## 2022-01-03 RX ADMIN — ACETAMINOPHEN 1000 MG: 500 TABLET ORAL at 21:34

## 2022-01-03 RX ADMIN — PIPERACILLIN SODIUM,TAZOBACTAM SODIUM 3375 MG: 3; .375 INJECTION, POWDER, FOR SOLUTION INTRAVENOUS at 00:19

## 2022-01-03 RX ADMIN — PIPERACILLIN SODIUM,TAZOBACTAM SODIUM 3375 MG: 3; .375 INJECTION, POWDER, FOR SOLUTION INTRAVENOUS at 18:47

## 2022-01-03 RX ADMIN — FUROSEMIDE 20 MG: 10 INJECTION, SOLUTION INTRAVENOUS at 18:39

## 2022-01-03 RX ADMIN — RIVAROXABAN 20 MG: 20 TABLET, FILM COATED ORAL at 18:51

## 2022-01-03 RX ADMIN — FENTANYL CITRATE 50 MCG: 50 INJECTION INTRAMUSCULAR; INTRAVENOUS at 12:53

## 2022-01-03 RX ADMIN — METOPROLOL TARTRATE 5 MG: 5 INJECTION INTRAVENOUS at 11:30

## 2022-01-03 RX ADMIN — PIPERACILLIN SODIUM,TAZOBACTAM SODIUM 3375 MG: 3; .375 INJECTION, POWDER, FOR SOLUTION INTRAVENOUS at 07:17

## 2022-01-03 RX ADMIN — FENTANYL CITRATE 50 MCG: 50 INJECTION INTRAMUSCULAR; INTRAVENOUS at 07:11

## 2022-01-03 RX ADMIN — FENTANYL CITRATE 50 MCG: 50 INJECTION INTRAMUSCULAR; INTRAVENOUS at 18:41

## 2022-01-03 ASSESSMENT — PAIN SCALES - GENERAL
PAINLEVEL_OUTOF10: 6
PAINLEVEL_OUTOF10: 8
PAINLEVEL_OUTOF10: 10

## 2022-01-03 ASSESSMENT — PAIN DESCRIPTION - PROGRESSION: CLINICAL_PROGRESSION: RESOLVED

## 2022-01-03 NOTE — ED NOTES
Pt requesting pain medication, MD made aware no further orders.      Telma Willoughby RN  01/02/22 5351

## 2022-01-03 NOTE — ED PROVIDER NOTES
This is an admitted patient that is being boarded in the Emergency Department due to critical shortage of inpatient resources. It is unclear at this point when the patient would be getting a bed, and therefore Emergency Department rounds were performed on the patient. Pt looks nontoxic  However room airsat noted to be 90% and she is noted to be rhoncourous  Denies any hx of preexisting lung disease; is a smoker        Assessment/Plan:    1. L sided ?urinoma vs abscess--Abx started; will change Zosyn to q6hrs  Pain control, anticipate likely procedure however no bed tonight so will give her home dose of Xarelto tonight. 2. Cough/SOB--obtain covid swab, CXR        Edgardo Barba, DO  01/02/22 2111    A/P:  1. L renal mass: urinoma vs abscess  2. Pulmonary edema, ? CHF  3.  UTI on [address], DO  01/03/22 3777

## 2022-01-03 NOTE — ED NOTES
[facility] called to try [facility] (could be a week for a bed), [place], [facility], [facility], and [facility] IF they have interventional radiology.       Edgar Jackson  01/03/22 7825

## 2022-01-03 NOTE — ED NOTES
[facility] called to try either [facility] or [facility] OJ Nelson  01/03/22 1002

## 2022-01-04 ENCOUNTER — APPOINTMENT (OUTPATIENT)
Dept: GENERAL RADIOLOGY | Age: 67
End: 2022-01-04
Payer: MEDICARE

## 2022-01-04 ENCOUNTER — APPOINTMENT (OUTPATIENT)
Dept: CT IMAGING | Age: 67
End: 2022-01-04
Payer: MEDICARE

## 2022-01-04 ENCOUNTER — APPOINTMENT (OUTPATIENT)
Dept: GENERAL RADIOLOGY | Age: 67
DRG: 871 | End: 2022-01-04
Attending: STUDENT IN AN ORGANIZED HEALTH CARE EDUCATION/TRAINING PROGRAM
Payer: MEDICARE

## 2022-01-04 ENCOUNTER — ANESTHESIA (OUTPATIENT)
Dept: OPERATING ROOM | Age: 67
End: 2022-01-04
Payer: MEDICARE

## 2022-01-04 ENCOUNTER — ANESTHESIA EVENT (OUTPATIENT)
Dept: OPERATING ROOM | Age: 67
End: 2022-01-04
Payer: MEDICARE

## 2022-01-04 ENCOUNTER — HOSPITAL ENCOUNTER (INPATIENT)
Age: 67
LOS: 9 days | Discharge: NURSING, REHAB OR LONG-TERM CARE | DRG: 871 | End: 2022-01-13
Attending: STUDENT IN AN ORGANIZED HEALTH CARE EDUCATION/TRAINING PROGRAM | Admitting: INTERNAL MEDICINE
Payer: MEDICARE

## 2022-01-04 VITALS
WEIGHT: 250 LBS | BODY MASS INDEX: 47.2 KG/M2 | HEIGHT: 61 IN | OXYGEN SATURATION: 94 % | DIASTOLIC BLOOD PRESSURE: 69 MMHG | HEART RATE: 78 BPM | TEMPERATURE: 98.5 F | SYSTOLIC BLOOD PRESSURE: 166 MMHG | RESPIRATION RATE: 18 BRPM

## 2022-01-04 VITALS — DIASTOLIC BLOOD PRESSURE: 76 MMHG | OXYGEN SATURATION: 100 % | SYSTOLIC BLOOD PRESSURE: 149 MMHG

## 2022-01-04 DIAGNOSIS — K68.12 PSOAS MUSCLE ABSCESS (HCC): ICD-10-CM

## 2022-01-04 DIAGNOSIS — N15.1 PERINEPHRIC ABSCESS: Primary | ICD-10-CM

## 2022-01-04 PROBLEM — B96.1 BACTEREMIA DUE TO KLEBSIELLA PNEUMONIAE: Status: ACTIVE | Noted: 2022-01-04

## 2022-01-04 PROBLEM — R78.81 BACTEREMIA DUE TO KLEBSIELLA PNEUMONIAE: Status: ACTIVE | Noted: 2022-01-04

## 2022-01-04 LAB
ABSOLUTE EOS #: <0.03 K/UL (ref 0–0.44)
ABSOLUTE IMMATURE GRANULOCYTE: 0.05 K/UL (ref 0–0.3)
ABSOLUTE LYMPH #: 0.8 K/UL (ref 1.1–3.7)
ABSOLUTE MONO #: 0.76 K/UL (ref 0.1–1.2)
ANION GAP SERPL CALCULATED.3IONS-SCNC: 13 MMOL/L (ref 9–17)
BASOPHILS # BLD: 0 % (ref 0–2)
BASOPHILS ABSOLUTE: <0.03 K/UL (ref 0–0.2)
BUN BLDV-MCNC: 17 MG/DL (ref 8–23)
BUN/CREAT BLD: ABNORMAL (ref 9–20)
CALCIUM SERPL-MCNC: 8.4 MG/DL (ref 8.6–10.4)
CHLORIDE BLD-SCNC: 102 MMOL/L (ref 98–107)
CO2: 24 MMOL/L (ref 20–31)
CREAT SERPL-MCNC: 0.77 MG/DL (ref 0.5–0.9)
CULTURE: ABNORMAL
DIFFERENTIAL TYPE: ABNORMAL
EOSINOPHILS RELATIVE PERCENT: 0 % (ref 1–4)
GFR AFRICAN AMERICAN: >60 ML/MIN
GFR NON-AFRICAN AMERICAN: >60 ML/MIN
GFR SERPL CREATININE-BSD FRML MDRD: ABNORMAL ML/MIN/{1.73_M2}
GFR SERPL CREATININE-BSD FRML MDRD: ABNORMAL ML/MIN/{1.73_M2}
GLUCOSE BLD-MCNC: 106 MG/DL (ref 70–99)
HCT VFR BLD CALC: 33.6 % (ref 36.3–47.1)
HEMOGLOBIN: 10.8 G/DL (ref 11.9–15.1)
IMMATURE GRANULOCYTES: 0 %
INR BLD: 1.1
LYMPHOCYTES # BLD: 7 % (ref 24–43)
Lab: ABNORMAL
Lab: ABNORMAL
MCH RBC QN AUTO: 31.4 PG (ref 25.2–33.5)
MCHC RBC AUTO-ENTMCNC: 32.1 G/DL (ref 28.4–34.8)
MCV RBC AUTO: 97.7 FL (ref 82.6–102.9)
MONOCYTES # BLD: 6 % (ref 3–12)
NRBC AUTOMATED: 0 PER 100 WBC
PDW BLD-RTO: 13.3 % (ref 11.8–14.4)
PLATELET # BLD: 346 K/UL (ref 138–453)
PLATELET ESTIMATE: ABNORMAL
PMV BLD AUTO: 8.3 FL (ref 8.1–13.5)
POTASSIUM SERPL-SCNC: 3.9 MMOL/L (ref 3.7–5.3)
PROTHROMBIN TIME: 11.3 SEC (ref 9.1–12.3)
RBC # BLD: 3.44 M/UL (ref 3.95–5.11)
RBC # BLD: ABNORMAL 10*6/UL
SEG NEUTROPHILS: 87 % (ref 36–65)
SEGMENTED NEUTROPHILS ABSOLUTE COUNT: 10.49 K/UL (ref 1.5–8.1)
SODIUM BLD-SCNC: 139 MMOL/L (ref 135–144)
SPECIMEN DESCRIPTION: ABNORMAL
SPECIMEN DESCRIPTION: ABNORMAL
WBC # BLD: 12.1 K/UL (ref 3.5–11.3)
WBC # BLD: ABNORMAL 10*3/UL

## 2022-01-04 PROCEDURE — 2580000003 HC RX 258: Performed by: NURSE ANESTHETIST, CERTIFIED REGISTERED

## 2022-01-04 PROCEDURE — 6360000002 HC RX W HCPCS: Performed by: NURSE ANESTHETIST, CERTIFIED REGISTERED

## 2022-01-04 PROCEDURE — 7100000011 HC PHASE II RECOVERY - ADDTL 15 MIN: Performed by: UROLOGY

## 2022-01-04 PROCEDURE — 3209999900 FLUORO FOR SURGICAL PROCEDURES

## 2022-01-04 PROCEDURE — 36415 COLL VENOUS BLD VENIPUNCTURE: CPT

## 2022-01-04 PROCEDURE — 6360000002 HC RX W HCPCS: Performed by: EMERGENCY MEDICINE

## 2022-01-04 PROCEDURE — 1200000000 HC SEMI PRIVATE

## 2022-01-04 PROCEDURE — 6360000002 HC RX W HCPCS: Performed by: INTERNAL MEDICINE

## 2022-01-04 PROCEDURE — C1758 CATHETER, URETERAL: HCPCS | Performed by: UROLOGY

## 2022-01-04 PROCEDURE — 80048 BASIC METABOLIC PNL TOTAL CA: CPT

## 2022-01-04 PROCEDURE — 6370000000 HC RX 637 (ALT 250 FOR IP): Performed by: UROLOGY

## 2022-01-04 PROCEDURE — 2709999900 HC NON-CHARGEABLE SUPPLY: Performed by: UROLOGY

## 2022-01-04 PROCEDURE — 85610 PROTHROMBIN TIME: CPT

## 2022-01-04 PROCEDURE — 7100000010 HC PHASE II RECOVERY - FIRST 15 MIN: Performed by: UROLOGY

## 2022-01-04 PROCEDURE — 2500000003 HC RX 250 WO HCPCS: Performed by: NURSE ANESTHETIST, CERTIFIED REGISTERED

## 2022-01-04 PROCEDURE — 2580000003 HC RX 258: Performed by: EMERGENCY MEDICINE

## 2022-01-04 PROCEDURE — 2580000003 HC RX 258: Performed by: STUDENT IN AN ORGANIZED HEALTH CARE EDUCATION/TRAINING PROGRAM

## 2022-01-04 PROCEDURE — 99222 1ST HOSP IP/OBS MODERATE 55: CPT | Performed by: STUDENT IN AN ORGANIZED HEALTH CARE EDUCATION/TRAINING PROGRAM

## 2022-01-04 PROCEDURE — 3600000003 HC SURGERY LEVEL 3 BASE: Performed by: UROLOGY

## 2022-01-04 PROCEDURE — 85025 COMPLETE CBC W/AUTO DIFF WBC: CPT

## 2022-01-04 PROCEDURE — C1769 GUIDE WIRE: HCPCS | Performed by: UROLOGY

## 2022-01-04 PROCEDURE — 6360000002 HC RX W HCPCS: Performed by: STUDENT IN AN ORGANIZED HEALTH CARE EDUCATION/TRAINING PROGRAM

## 2022-01-04 PROCEDURE — 3600000013 HC SURGERY LEVEL 3 ADDTL 15MIN: Performed by: UROLOGY

## 2022-01-04 PROCEDURE — 74018 RADEX ABDOMEN 1 VIEW: CPT

## 2022-01-04 PROCEDURE — C2617 STENT, NON-COR, TEM W/O DEL: HCPCS | Performed by: UROLOGY

## 2022-01-04 PROCEDURE — 74177 CT ABD & PELVIS W/CONTRAST: CPT

## 2022-01-04 PROCEDURE — 3700000001 HC ADD 15 MINUTES (ANESTHESIA): Performed by: UROLOGY

## 2022-01-04 PROCEDURE — 6360000004 HC RX CONTRAST MEDICATION: Performed by: EMERGENCY MEDICINE

## 2022-01-04 PROCEDURE — 3700000000 HC ANESTHESIA ATTENDED CARE: Performed by: UROLOGY

## 2022-01-04 DEVICE — URETERAL STENT WITH SIDE HOLES 6FX24CM
Type: IMPLANTABLE DEVICE | Site: URETER | Status: FUNCTIONAL
Brand: TRIA™ FIRM

## 2022-01-04 RX ORDER — LIDOCAINE HYDROCHLORIDE 20 MG/ML
JELLY TOPICAL PRN
Status: DISCONTINUED | OUTPATIENT
Start: 2022-01-04 | End: 2022-01-04 | Stop reason: ALTCHOICE

## 2022-01-04 RX ORDER — MAGNESIUM SULFATE 1 G/100ML
1000 INJECTION INTRAVENOUS PRN
Status: DISCONTINUED | OUTPATIENT
Start: 2022-01-04 | End: 2022-01-13 | Stop reason: HOSPADM

## 2022-01-04 RX ORDER — LIDOCAINE HYDROCHLORIDE 20 MG/ML
INJECTION, SOLUTION EPIDURAL; INFILTRATION; INTRACAUDAL; PERINEURAL PRN
Status: DISCONTINUED | OUTPATIENT
Start: 2022-01-04 | End: 2022-01-04 | Stop reason: SDUPTHER

## 2022-01-04 RX ORDER — SODIUM CHLORIDE, SODIUM LACTATE, POTASSIUM CHLORIDE, CALCIUM CHLORIDE 600; 310; 30; 20 MG/100ML; MG/100ML; MG/100ML; MG/100ML
INJECTION, SOLUTION INTRAVENOUS CONTINUOUS PRN
Status: DISCONTINUED | OUTPATIENT
Start: 2022-01-04 | End: 2022-01-04 | Stop reason: SDUPTHER

## 2022-01-04 RX ORDER — MORPHINE SULFATE 2 MG/ML
1 INJECTION, SOLUTION INTRAMUSCULAR; INTRAVENOUS EVERY 4 HOURS PRN
Status: DISCONTINUED | OUTPATIENT
Start: 2022-01-04 | End: 2022-01-13 | Stop reason: HOSPADM

## 2022-01-04 RX ORDER — FENTANYL CITRATE 50 UG/ML
INJECTION, SOLUTION INTRAMUSCULAR; INTRAVENOUS PRN
Status: DISCONTINUED | OUTPATIENT
Start: 2022-01-04 | End: 2022-01-04 | Stop reason: SDUPTHER

## 2022-01-04 RX ORDER — ACETAMINOPHEN 325 MG/1
650 TABLET ORAL EVERY 6 HOURS PRN
Status: DISCONTINUED | OUTPATIENT
Start: 2022-01-04 | End: 2022-01-13 | Stop reason: HOSPADM

## 2022-01-04 RX ORDER — MORPHINE SULFATE 2 MG/ML
2 INJECTION, SOLUTION INTRAMUSCULAR; INTRAVENOUS EVERY 4 HOURS PRN
Status: DISCONTINUED | OUTPATIENT
Start: 2022-01-04 | End: 2022-01-13 | Stop reason: HOSPADM

## 2022-01-04 RX ORDER — SODIUM CHLORIDE 9 MG/ML
INJECTION, SOLUTION INTRAVENOUS CONTINUOUS
Status: DISCONTINUED | OUTPATIENT
Start: 2022-01-04 | End: 2022-01-06

## 2022-01-04 RX ORDER — ONDANSETRON 2 MG/ML
4 INJECTION INTRAMUSCULAR; INTRAVENOUS EVERY 6 HOURS PRN
Status: DISCONTINUED | OUTPATIENT
Start: 2022-01-04 | End: 2022-01-13 | Stop reason: HOSPADM

## 2022-01-04 RX ORDER — GENTAMICIN SULFATE 100 MG/100ML
100 INJECTION, SOLUTION INTRAVENOUS EVERY 12 HOURS
Status: COMPLETED | OUTPATIENT
Start: 2022-01-04 | End: 2022-01-05

## 2022-01-04 RX ORDER — ONDANSETRON 4 MG/1
4 TABLET, ORALLY DISINTEGRATING ORAL EVERY 8 HOURS PRN
Status: DISCONTINUED | OUTPATIENT
Start: 2022-01-04 | End: 2022-01-13 | Stop reason: HOSPADM

## 2022-01-04 RX ORDER — ONDANSETRON 2 MG/ML
INJECTION INTRAMUSCULAR; INTRAVENOUS PRN
Status: DISCONTINUED | OUTPATIENT
Start: 2022-01-04 | End: 2022-01-04 | Stop reason: SDUPTHER

## 2022-01-04 RX ORDER — PROPOFOL 10 MG/ML
INJECTION, EMULSION INTRAVENOUS PRN
Status: DISCONTINUED | OUTPATIENT
Start: 2022-01-04 | End: 2022-01-04 | Stop reason: SDUPTHER

## 2022-01-04 RX ORDER — ACETAMINOPHEN 650 MG/1
650 SUPPOSITORY RECTAL EVERY 6 HOURS PRN
Status: DISCONTINUED | OUTPATIENT
Start: 2022-01-04 | End: 2022-01-13 | Stop reason: HOSPADM

## 2022-01-04 RX ORDER — POLYETHYLENE GLYCOL 3350 17 G/17G
17 POWDER, FOR SOLUTION ORAL DAILY PRN
Status: DISCONTINUED | OUTPATIENT
Start: 2022-01-04 | End: 2022-01-13 | Stop reason: HOSPADM

## 2022-01-04 RX ORDER — PANTOPRAZOLE SODIUM 40 MG/1
40 TABLET, DELAYED RELEASE ORAL
Status: DISCONTINUED | OUTPATIENT
Start: 2022-01-05 | End: 2022-01-13 | Stop reason: HOSPADM

## 2022-01-04 RX ADMIN — SODIUM CHLORIDE: 9 INJECTION, SOLUTION INTRAVENOUS at 18:58

## 2022-01-04 RX ADMIN — MEROPENEM 1000 MG: 1 INJECTION INTRAVENOUS at 09:28

## 2022-01-04 RX ADMIN — IOPAMIDOL 75 ML: 755 INJECTION, SOLUTION INTRAVENOUS at 06:58

## 2022-01-04 RX ADMIN — FENTANYL CITRATE 25 MCG: 50 INJECTION INTRAMUSCULAR; INTRAVENOUS at 12:11

## 2022-01-04 RX ADMIN — PIPERACILLIN AND TAZOBACTAM 3375 MG: 3; .375 INJECTION, POWDER, FOR SOLUTION INTRAVENOUS at 19:01

## 2022-01-04 RX ADMIN — SODIUM CHLORIDE, POTASSIUM CHLORIDE, SODIUM LACTATE AND CALCIUM CHLORIDE: 600; 310; 30; 20 INJECTION, SOLUTION INTRAVENOUS at 11:43

## 2022-01-04 RX ADMIN — MORPHINE SULFATE 2 MG: 2 INJECTION, SOLUTION INTRAMUSCULAR; INTRAVENOUS at 18:53

## 2022-01-04 RX ADMIN — LIDOCAINE HYDROCHLORIDE 100 MG: 20 INJECTION, SOLUTION EPIDURAL; INFILTRATION; INTRACAUDAL; PERINEURAL at 11:58

## 2022-01-04 RX ADMIN — GENTAMICIN SULFATE 100 MG: 100 INJECTION, SOLUTION INTRAVENOUS at 23:20

## 2022-01-04 RX ADMIN — FENTANYL CITRATE 50 MCG: 50 INJECTION INTRAMUSCULAR; INTRAVENOUS at 07:24

## 2022-01-04 RX ADMIN — FENTANYL CITRATE 25 MCG: 50 INJECTION INTRAMUSCULAR; INTRAVENOUS at 10:40

## 2022-01-04 RX ADMIN — PIPERACILLIN SODIUM,TAZOBACTAM SODIUM 3375 MG: 3; .375 INJECTION, POWDER, FOR SOLUTION INTRAVENOUS at 04:50

## 2022-01-04 RX ADMIN — PROPOFOL 100 MG: 10 INJECTION, EMULSION INTRAVENOUS at 11:58

## 2022-01-04 RX ADMIN — FENTANYL CITRATE 25 MCG: 50 INJECTION INTRAMUSCULAR; INTRAVENOUS at 11:54

## 2022-01-04 RX ADMIN — ENOXAPARIN SODIUM 30 MG: 100 INJECTION SUBCUTANEOUS at 07:24

## 2022-01-04 RX ADMIN — ONDANSETRON 4 MG: 2 INJECTION INTRAMUSCULAR; INTRAVENOUS at 11:56

## 2022-01-04 RX ADMIN — MORPHINE SULFATE 2 MG: 2 INJECTION, SOLUTION INTRAMUSCULAR; INTRAVENOUS at 23:19

## 2022-01-04 RX ADMIN — FENTANYL CITRATE 25 MCG: 50 INJECTION INTRAMUSCULAR; INTRAVENOUS at 12:05

## 2022-01-04 ASSESSMENT — PAIN SCALES - GENERAL
PAINLEVEL_OUTOF10: 8
PAINLEVEL_OUTOF10: 8
PAINLEVEL_OUTOF10: 0
PAINLEVEL_OUTOF10: 3
PAINLEVEL_OUTOF10: 10
PAINLEVEL_OUTOF10: 10
PAINLEVEL_OUTOF10: 0
PAINLEVEL_OUTOF10: 10
PAINLEVEL_OUTOF10: 0
PAINLEVEL_OUTOF10: 10
PAINLEVEL_OUTOF10: 9

## 2022-01-04 ASSESSMENT — PAIN DESCRIPTION - LOCATION
LOCATION: FLANK
LOCATION: BACK
LOCATION: BACK

## 2022-01-04 ASSESSMENT — PAIN DESCRIPTION - PROGRESSION
CLINICAL_PROGRESSION: NOT CHANGED

## 2022-01-04 ASSESSMENT — PAIN - FUNCTIONAL ASSESSMENT: PAIN_FUNCTIONAL_ASSESSMENT: PREVENTS OR INTERFERES SOME ACTIVE ACTIVITIES AND ADLS

## 2022-01-04 ASSESSMENT — PAIN DESCRIPTION - ONSET: ONSET: ON-GOING

## 2022-01-04 ASSESSMENT — LIFESTYLE VARIABLES: SMOKING_STATUS: 1

## 2022-01-04 ASSESSMENT — PAIN DESCRIPTION - FREQUENCY
FREQUENCY: CONTINUOUS
FREQUENCY: CONTINUOUS

## 2022-01-04 ASSESSMENT — PAIN DESCRIPTION - DESCRIPTORS
DESCRIPTORS: DISCOMFORT;SHARP;SHOOTING
DESCRIPTORS: TIGHTNESS;SQUEEZING

## 2022-01-04 ASSESSMENT — PAIN DESCRIPTION - PAIN TYPE
TYPE: ACUTE PAIN
TYPE: ACUTE PAIN

## 2022-01-04 ASSESSMENT — PAIN DESCRIPTION - ORIENTATION
ORIENTATION: MID
ORIENTATION: RIGHT

## 2022-01-04 NOTE — ANESTHESIA PRE PROCEDURE
Department of Anesthesiology  Preprocedure Note       Name:  Richelle Mae   Age:  74 y.o.  :  1955                                          MRN:  [id number]         Date:  2022      Surgeon: Franco Ferrer):  Vashti Frances MD    Procedure: Procedure(s):  CYSTOSCOPY URETERAL STENT INSERTION    Medications prior to admission:   Prior to Admission medications    Medication Sig Start Date End Date Taking? Authorizing Provider   oxybutynin (DITROPAN XL) 10 MG extended release tablet Take 1 tablet by mouth daily 19  Yes DONG [facility] - CNLINUS   rivaroxaban (XARELTO) 20 MG TABS tablet Take 1 tablet by mouth daily (with breakfast) 18  Yes Lisbet Elam MD   Misc Natural Products (JOINT SUPPORT COMPLEX) CAPS Take 2 capsules by mouth daily    Historical Provider, MD   Ascorbic Acid (VITAMIN C) 250 MG tablet Take 250 mg by mouth daily    Historical Provider, MD   vitamin E 400 UNIT capsule Take 400 Units by mouth daily    Historical Provider, MD   vitamin D (CHOLECALCIFEROL) 25 MCG (1000 UT) TABS tablet Take 1,000 Units by mouth daily    Historical Provider, MD   phenazopyridine (AZO-TABS) 95 MG tablet Take 95 mg by mouth 3 times daily as needed for Pain    Historical Provider, MD   nystatin (MYCOSTATIN) 368349 UNIT/GM cream Apply topically 2 times daily.  20   Saniya Diego MD   Multiple Vitamins-Minerals (THERAPEUTIC MULTIVITAMIN-MINERALS) tablet Take 1 tablet by mouth daily    Historical Provider, MD       Current medications:    Current Facility-Administered Medications   Medication Dose Route Frequency Provider Last Rate Last Admin    [MAR Hold] meropenem (MERREM) 1,000 mg in sodium chloride 0.9 % 100 mL IVPB (mini-bag)  1,000 mg IntraVENous Q8H Sandor Herr DO   Stopped at 22 0955    [MAR Hold] enoxaparin (LOVENOX) injection 30 mg  30 mg SubCUTAneous BID Jake Castaneda DO   30 mg at 22 0724    [MAR Hold] fentaNYL (SUBLIMAZE) injection 25 mcg  25 mcg IntraVENous Q2H PRN Danielle Reyes MD        [facility] Hold] rivaroxaban (XARELTO) tablet 20 mg  20 mg Oral Daily Danielle Reyes MD   20 mg at 01/03/22 1851    [MAR Hold] fentaNYL (SUBLIMAZE) injection 50 mcg  50 mcg IntraVENous Q2H PRN Opal Folk, DO   50 mcg at 01/04/22 0724    [MAR Hold] piperacillin-tazobactam (ZOSYN) 3,375 mg in dextrose 5 % 50 mL IVPB (mini-bag)  3,375 mg IntraVENous Q8H Opal Folk, DO   Stopped at 01/04/22 8892       Allergies: Allergies   Allergen Reactions    Estrogens Other (See Comments)     Hx of DVT       Problem List:    Patient Active Problem List   Diagnosis Code    Acute thromboembolism of deep veins of lower extremity ([facility] [address] I82.409    Long term current use of anticoagulant therapy Z79.01    Bilateral cellulitis of lower leg L03.116, L03.115    Acute shoulder pain due to trauma M25.519, G89.11    Lymphedema of both lower extremities I89.0    Obesity E66.9    Tobacco abuse Z72.0    History of recurrent deep vein thrombosis (DVT) Z86.718    Erythrocytosis D75.1    Gross hematuria R31.0    Frequency of urination R35.0    Nocturia R35.1    Mixed incontinence N39.46    Constipation K59.00    Cellulitis of lower leg L03.119    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 995    Complicated UTI (urinary tract infection) N39.0    Calculus of right kidney N20.0    Cellulitis L03.90    Normocytic anemia D64.9    Iron deficiency anemia D50.9    Renal mass N28.89       Past Medical History:        Diagnosis Date    Carcinoma ([facility] [address]     Squamous Cell    Cellulitis     DVT (deep venous thrombosis) ([facility] [address] 2006    LLE    Kidney stone     Wears glasses        Past Surgical History:        Procedure Laterality Date    CARPAL TUNNEL RELEASE  1990s    ANNABELLA AND BSO      05/2019    TUBAL LIGATION  1993    TUBAL LIGATION      WISDOM TOOTH EXTRACTION         Social History:    Social History Tobacco Use    Smoking status: Current Every Day Smoker     Packs/day: 0.50     Years: 42.00     Pack years: 21.00     Types: Cigarettes    Smokeless tobacco: Never Used   Substance Use Topics    Alcohol use: No     Alcohol/week: 0.0 standard drinks                                Ready to quit: Not Answered  Counseling given: Not Answered      Vital Signs (Current):   Vitals:    01/04/22 0602 01/04/22 0829 01/04/22 0859 01/04/22 1014   BP: (!) 143/65 (!) 162/69 (!) 169/71 (!) 186/79   Pulse:    77   Resp:    24   Temp:    36.5 °C (97.7 °F)   TempSrc:    Temporal   SpO2: 98% 98% 98% 98%   Weight:       Height:                                                  BP Readings from Last 3 Encounters:   01/04/22 (!) 186/79   01/25/21 133/60   12/30/20 (!) 161/83       NPO Status: Time of last liquid consumption: 1700                        Time of last solid consumption: 1700                        Date of last liquid consumption: 01/01/22                        Date of last solid food consumption: 01/01/22    BMI:   Wt Readings from Last 3 Encounters:   01/02/22 250 lb (113.4 kg)   01/25/21 277 lb 9.6 oz (125.9 kg)   12/30/20 283 lb 8 oz (128.6 kg)     Body mass index is 47.24 kg/m². CBC:   Lab Results   Component Value Date    WBC 11.8 01/03/2022    RBC 3.22 01/03/2022    HGB 9.9 01/03/2022    HCT 31.6 01/03/2022    MCV 98.1 01/03/2022    RDW 13.4 01/03/2022     01/03/2022       CMP:   Lab Results   Component Value Date     01/03/2022    K 3.9 01/03/2022     01/03/2022    CO2 24 01/03/2022    BUN 15 01/03/2022    CREATININE 0.87 01/03/2022    GFRAA >60 01/03/2022    LABGLOM >60 01/03/2022    GLUCOSE 128 01/03/2022    PROT 6.8 01/03/2022    CALCIUM 8.2 01/03/2022    BILITOT 0.38 01/03/2022    ALKPHOS 52 01/03/2022    AST 11 01/03/2022    ALT 8 01/03/2022       POC Tests: No results for input(s): POCGLU, POCNA, POCK, POCCL, POCBUN, POCHEMO, POCHCT in the last 72 hours.     Coags:   Lab Results Component Value Date    PROTIME 17.1 12/28/2020    INR 1.4 12/28/2020    APTT 37.8 12/28/2020       HCG (If Applicable): No results found for: PREGTESTUR, PREGSERUM, HCG, HCGQUANT     ABGs:   Lab Results   Component Value Date    PHART 7.466 07/19/2018    PO2ART 72.5 07/19/2018    RVG9ALE 35.5 07/19/2018    GLC4WOC 25.0 07/19/2018    W3HAXQCA 95.5 07/19/2018        Type & Screen (If Applicable):  No results found for: LABABO, LABRH    Drug/Infectious Status (If Applicable):  No results found for: HIV, HEPCAB    COVID-19 Screening (If Applicable):   Lab Results   Component Value Date    COVID19 Not Detected 01/03/2022           Anesthesia Evaluation  Patient summary reviewed and Nursing notes reviewed no history of anesthetic complications:   Airway: Mallampati: III  TM distance: >3 FB   Neck ROM: full  Mouth opening: > = 3 FB Dental:          Pulmonary:normal exam  breath sounds clear to auscultation  (+) current smoker                           Cardiovascular:  Exercise tolerance: poor (<4 METS),   (+) hypertension:,       ECG reviewed  Rhythm: regular  Rate: normal  Echocardiogram reviewed         Beta Blocker:  Dose within 24 Hrs      ROS comment: EKG: Sinus rhythm with Premature atrial complexes  Right bundle branch block  Abnormal ECG  When compared with ECG of 22-JAN-2021 12:37,  Premature atrial complexes are now Present  Confirmed by Vikas Teague MD, Madison Johnson ([id number]) on 1/3/2022 10:48:18 AM     Neuro/Psych:   (+) headaches: migraine headaches,             GI/Hepatic/Renal:   (+) renal disease: kidney stones, morbid obesity          Endo/Other:    (+) blood dyscrasia (Last xaralto dose was yesterday.): anemia and anticoagulation therapy:., .                  ROS comment: Cellulitis BLE Abdominal:   (+) obese,           Vascular:   + DVT (LLE in 2006, currently resolved.), . Other Findings:             Anesthesia Plan      general     ASA 3       Induction: intravenous.     MIPS: Postoperative opioids intended and Prophylactic antiemetics administered. Anesthetic plan and risks discussed with patient. Plan discussed with CRNA.                   Carline Fabry, APRN - CRNA   1/4/2022

## 2022-01-04 NOTE — ED PROVIDER NOTES
Care of Ashely Paredes was assumed from previous attending and is being seen for Back Pain (Pt C/O lower back pain that started yesterday. pt thinks she may have a kidney stone or an infection. )  . The patient's initial evaluation and plan have been discussed with the prior provider who initially evaluated the patient. Handoff taken on the following patient from prior Attending Physician:    Brianda Mathew    I was available and discussed any additional care issues that arose and coordinated the management plans with the resident(s) caring for the patient during my duty period. Any areas of disagreement with residents documentation of care or procedures are noted on the chart. I was personally present for the key portions of any/all procedures during my duty period. I have documented in the chart those procedures where I was not present during the key portions.       EMERGENCY DEPARTMENT COURSE / MEDICAL DECISION MAKING:       MEDICATIONS GIVEN:  Orders Placed This Encounter   Medications    0.9 % sodium chloride bolus    fentaNYL (SUBLIMAZE) injection 25 mcg    ondansetron (ZOFRAN) injection 4 mg    piperacillin-tazobactam (ZOSYN) 3,375 mg in dextrose 5 % 50 mL IVPB (mini-bag)     Order Specific Question:   Antimicrobial Indications     Answer:   Urinary Tract Infection    rivaroxaban (XARELTO) tablet 20 mg    DISCONTD: fentaNYL (SUBLIMAZE) injection 50 mcg    DISCONTD: piperacillin-tazobactam (ZOSYN) 3,375 mg in dextrose 5 % 50 mL IVPB (mini-bag)     Order Specific Question:   Antimicrobial Indications     Answer:   Intra-Abdominal Infection    DISCONTD: piperacillin-tazobactam (ZOSYN) 3,375 mg in dextrose 5 % 50 mL IVPB (mini-bag)     Order Specific Question:   Antimicrobial Indications     Answer:   Intra-Abdominal Infection    DISCONTD: furosemide (LASIX) injection 20 mg    furosemide (LASIX) injection 20 mg    metoprolol (LOPRESSOR) injection 5 mg    DISCONTD: fentaNYL (SUBLIMAZE) injection 25 mcg    DISCONTD: rivaroxaban (XARELTO) tablet 20 mg    acetaminophen (TYLENOL) tablet 1,000 mg    iopamidol (ISOVUE-370) 76 % injection 75 mL    DISCONTD: meropenem (MERREM) 1,000 mg in sodium chloride 0.9 % 100 mL IVPB (mini-bag)     Order Specific Question:   Antimicrobial Indications     Answer:   Urinary Tract Infection    DISCONTD: enoxaparin (LOVENOX) injection 30 mg    DISCONTD: lidocaine (XYLOCAINE) 2 % uro-jet       LABS / RADIOLOGY:     Labs Reviewed   CULTURE, BLOOD 1 - Abnormal; Notable for the following components:       Result Value    Culture POSITIVE BLOOD CULTURE, RN NOTIFIED: (*)     Culture   (*)     Value: Detected: Klebsiella pneumoniae Detected: Methodology- Polymerase Chain Reaction (PCR)    All other components within normal limits   CULTURE, BLOOD 1 - Abnormal; Notable for the following components:    Culture POSITIVE BLOOD CULTURE, RN NOTIFIED: (*)     Culture LACTOSE FERMENTING GRAM NEGATIVE RODS (*)     All other components within normal limits   CBC WITH AUTO DIFFERENTIAL - Abnormal; Notable for the following components:    WBC 11.8 (*)     RBC 3.30 (*)     Hemoglobin 10.2 (*)     Hematocrit 32.5 (*)     MPV 8.0 (*)     Seg Neutrophils 85 (*)     Lymphocytes 7 (*)     Eosinophils % 0 (*)     Immature Granulocytes 1 (*)     Segs Absolute 10.07 (*)     Absolute Lymph # 0.76 (*)     All other components within normal limits   COMPREHENSIVE METABOLIC PANEL W/ REFLEX TO MG FOR LOW K - Abnormal; Notable for the following components:    Glucose 134 (*)     CREATININE 0.97 (*)     Calcium 8.4 (*)     Albumin 2.8 (*)     Albumin/Globulin Ratio 0.6 (*)     GFR Non- 57 (*)     All other components within normal limits   URINALYSIS - Abnormal; Notable for the following components:    Turbidity UA SLIGHTLY CLOUDY (*)     Urine Hgb 2+ (*)     Protein, UA TRACE (*)     Nitrite, Urine POSITIVE (*)     Leukocyte Esterase, Urine SMALL (*)     All other components within normal limits MICROSCOPIC URINALYSIS - Abnormal; Notable for the following components:    Bacteria, UA 3+ (*)     All other components within normal limits   BRAIN NATRIURETIC PEPTIDE - Abnormal; Notable for the following components:    Pro-BNP 1,070 (*)     All other components within normal limits   CBC WITH AUTO DIFFERENTIAL - Abnormal; Notable for the following components:    WBC 11.9 (*)     RBC 3.11 (*)     Hemoglobin 9.6 (*)     Hematocrit 30.8 (*)     Seg Neutrophils 83 (*)     Lymphocytes 9 (*)     Eosinophils % 0 (*)     Segs Absolute 9.84 (*)     Absolute Lymph # 1.09 (*)     All other components within normal limits   COMPREHENSIVE METABOLIC PANEL W/ REFLEX TO MG FOR LOW K - Abnormal; Notable for the following components:    Glucose 128 (*)     Calcium 8.2 (*)     Albumin 2.5 (*)     Albumin/Globulin Ratio 0.6 (*)     All other components within normal limits   TROPONIN - Abnormal; Notable for the following components:    Troponin, High Sensitivity 28 (*)     All other components within normal limits   TROPONIN - Abnormal; Notable for the following components:    Troponin, High Sensitivity 29 (*)     All other components within normal limits   BRAIN NATRIURETIC PEPTIDE - Abnormal; Notable for the following components:    Pro-BNP 2,247 (*)     All other components within normal limits   CBC WITH AUTO DIFFERENTIAL - Abnormal; Notable for the following components:    WBC 11.8 (*)     RBC 3.22 (*)     Hemoglobin 9.9 (*)     Hematocrit 31.6 (*)     Seg Neutrophils 83 (*)     Lymphocytes 9 (*)     Eosinophils % 0 (*)     Segs Absolute 9.83 (*)     Absolute Lymph # 1.03 (*)     All other components within normal limits   COVID-19, RAPID   COVID-19, RAPID   CULTURE, URINE   LIPASE   LACTIC ACID   LACTIC ACID, PLASMA       CT ABDOMEN PELVIS WO CONTRAST Additional Contrast? None    Result Date: 1/2/2022  EXAMINATION: CT OF THE ABDOMEN AND PELVIS WITHOUT CONTRAST 1/2/2022 1:34 pm TECHNIQUE: CT of the abdomen and pelvis was performed without the administration of intravenous contrast. Multiplanar reformatted images are provided for review. Dose modulation, iterative reconstruction, and/or weight based adjustment of the mA/kV was utilized to reduce the radiation dose to as low as reasonably achievable. COMPARISON: 12/28/2020 HISTORY: ORDERING SYSTEM PROVIDED HISTORY: Low back pain suspect nephrolithiasis TECHNOLOGIST PROVIDED HISTORY: Low back pain suspect nephrolithiasis Decision Support Exception - unselect if not a suspected or confirmed emergency medical condition->Emergency Medical Condition (MA) FINDINGS: Lower Chest: Scarring/atelectatic changes are seen at the lung bases. Organs: Evaluation of visceral organs is limited without the benefit of intravenous contrast. The liver appears homogeneous without contour deforming mass seen. Layering hyperdensity is seen within the lumen of the gallbladder, which may reflect gallbladder sludge versus tiny gravel of gallstones. No significant pericholecystic inflammatory changes seen. The pancreas appears atrophic without evidence of pancreatic ductal dilatation seen. The spleen is not enlarged. The adrenal glands appear within normal limits. Compared to the prior CT abdomen and pelvis dated 12/28/2020, there is a new abnormal density/mass of the inferomedial aspect of the right kidney. This area is poorly delineated and difficult to measure; however, it measures approximately 7.9 x 7.4 x 8.8 cm. The density of lesion/mass measures slightly higher than simple fluid and contiguous with the right psoas muscle. There is a 1.3 cm hyperdense stone seen about the expected location of the ureteropelvic junction. There are additional smaller subcentimeter stones of the bilateral kidneys. There is mild-to-moderate right and mild left perinephric stranding. GI/Bowel: Evaluation of the GI tract is limited without the benefit of enteric contrast.  Extensive colonic diverticulosis is seen. The appendix appears nondilated. There is a small hiatal hernia. No evidence of bowel obstruction is seen. Pelvis: The urinary bladder appears to be inferiorly prolapse. There are subcentimeter foci of increased density seen within the dependent portion of the urinary bladder, suspicious for tiny urinary bladder stones. The uterus appears absent. Peritoneum/Retroperitoneum: No evidence of intra-abdominal free air is seen. Moderate vascular calcification changes are seen. Bones/Soft Tissues: Multilevel degenerative disc disease affects the thoracolumbar spine. Limited noncontrast examination. New abnormal mass appears to be incontiguous with the inferomedial aspect of the right kidney and contiguous with the right psoas muscle measuring approximately 7.9 x 7.4 x 8.8 cm. The density of this lesion/mass measures slightly higher than the simple fluid. Differential consideration include perinephric/retroperitoneal abscess or complex perinephric urinoma in a setting of large hyperdense stone seen about the right ureteropelvic junction. Consider CT abdomen and pelvis with contrast for better characterization. Additional subcentimeter hypodense renal stones, which appear to be nonobstructing. Layering subcentimeter hyperdense stones seen within the urinary bladder. Inferiorly prolapsed urinary bladder. Gallbladder sludge/layering gallstones without imaging features of acute cholecystitis seen. Colonic diverticulosis without evidence of acute diverticulitis. CT ABDOMEN PELVIS W IV CONTRAST Additional Contrast? None    Result Date: 1/4/2022  EXAMINATION: CT OF THE ABDOMEN AND PELVIS WITH CONTRAST 1/4/2022 7:11 am TECHNIQUE: CT of the abdomen and pelvis was performed with the administration of intravenous contrast. Multiplanar reformatted images are provided for review.  Dose modulation, iterative reconstruction, and/or weight based adjustment of the mA/kV was utilized to reduce the radiation dose to as low as reasonably achievable. COMPARISON: 01/02/2022 HISTORY: ORDERING SYSTEM PROVIDED HISTORY: fever, flank pain; Abscess vs urinoma? TECHNOLOGIST PROVIDED HISTORY: IV contrast only fever, flank pain; Abscess vs urinoma? Decision Support Exception - unselect if not a suspected or confirmed emergency medical condition->Emergency Medical Condition (MA) FINDINGS: LOWER CHEST: Trace right pleural effusion. Dependent opacities in the lung bases are consistent with subsegmental atelectasis. KIDNEYS AND URINARY TRACT: Obstructing 1.6 cm stone in the right UPJ results in moderately severe hydronephrosis. The recently described abnormality about the inferior medial right kidney involving the adjacent psoas muscle has rim enhancement consistent with an abscess measuring 7.2 x 6.0 x 7.7 cm. Additional punctate stones are present in the lower pole of the right kidney. Punctate left renal stones are present as well. Appropriate contrast excretion on the left side is demonstrated. No contrast is present within the right renal collecting system. A Brooks catheter is present within the bladder. Findings of pelvic floor relaxation are demonstrated with cystocele. Previously noted small stones in the inferior bladder not well delineated on this exam.  Multiple calcifications seen posterior to the bladder are again noted, likely representing phleboliths. ORGANS: Lack of intravenous contrast limits evaluation of the solid organs and bowel. The liver, gallbladder, pancreas, spleen, and adrenals reveal no acute abnormality. Cholelithiasis. GI/BOWEL: No bowel dilatation or wall thickening. Diverticulosis. PELVIS: No acute findings. PERITONEUM/RETROPERITONEUM: No lymphadenopathy is noted. No free air or free fluid. The aorta is normal in caliber. BONES/SOFT TISSUES: No acute osseous abnormality is identified. 1.  Obstructing 1.6 cm stone in the right UPJ results in moderately severe hydronephrosis.   The recently described abnormality along the inferomedial right kidney involving the psoas muscle is consistent with an abscess measuring up to 7.2 x 6.0 x 7.7 cm. 2.  Bilateral nephrolithiasis. Previously noted small bladder stones are not delineated on this exam. 3.  Brooks catheter within the bladder. Pelvic floor relaxation. 4.  Diverticulosis and cholelithiasis. RECOMMENDATIONS: Unavailable     XR CHEST PORTABLE    Result Date: 1/2/2022  EXAMINATION: ONE XRAY VIEW OF THE CHEST 1/2/2022 6:41 pm COMPARISON: 02/10/2020 HISTORY: ORDERING SYSTEM PROVIDED HISTORY: cough, SOB TECHNOLOGIST PROVIDED HISTORY: cough, SOB FINDINGS: Cardiac size is enlarged. No acute infiltrates are seen . The pulmonary vascularity is hazy and indistinct. No pneumothorax. No pleural effusions identified . Gweneth Latch Pulmonary vascular congestion       RECENT VITALS:     Temp: 98.5 °F (36.9 °C),  Pulse: 78, Resp: 18, BP: (!) 166/69, SpO2: 94 %    This patient is a 74 y.o. Female with lower back pain, CT showing stone at UPJ on the R side, PAtient also with UTI and fever, and CT showing psoas abscess. Patient on zosyn,and meropenam ordered. Awaiting transfer. Blood cultures obtained initially, now showing +Klebseialla pneumonia. Patient still having pain, REviewed imaging, and due to septic stone, and abscess, decision to transfer to ER due to surgical need. Will need urology and gen surgery  Is on xarelto,  Also having CHF exacerabation, got lasix    8:32 AM EST  Notified that patient does have a bed available. Transport set up will speak with urology    9:38 AM EST  Spoke with Dr. Michaelle Jolley who requested stent be placed by Dr. Deborah Luke if possible prior to transport  Spoke with [facility] for Dr. Deborah Luke, who will see if patient can be add on for stent placement. 9:53 AM EST  Patient to go to OR for stent placement. Dr. Michaelle Jolley notified. Transport waiting patient to be done with OR and then will take up to V's    OUTSTANDING TASKS / RECOMMENDATIONS:    1. Transfer    1.  Urinary tract infection without hematuria, site unspecified    2. Female bladder prolapse    3. Acute pulmonary edema (HCC)    4. Kidney stone    5.  Psoas abscess ([facility]                Patricio Salcido DO, DO  Attending Emergency Physician  [address] [place] [address],   01/04/22 1800

## 2022-01-04 NOTE — H&P
[facility]  Office: [address], DO, Rene Victor, DO, Audra Boast, DO, Katie Carlsonjanneth Blood, DO, Juanita Chavez MD, Belen Cabrera MD, James Lassiter MD, Saji Reina MD, Farideh Ortega MD, Fatou Contreras MD, Lucy Ya MD, Chance Vázquez, DO, Esther Bustamante, DO, Kristal Ocampo MD,  Noam Rodriguez, DO, Philemon Simmonds, MD, Creed Ahumada, MD, Marlow Cowden, MD, Heydi Aguilera MD, Esha Hernandez MD, Vance Hunter MD, Geronimo Green MD, Beth Panda, [facility], [facility], [facility], Jolynn Patino, CNP, Jose Ramon Hill, CNS, Akila Nava, CNP, Billy Nelson, CNP, Angeles Bernabe, CNP, Maurizio Khan, CNP, Corrina Wood, CNP, Liliana Bailey PA-C, Linda Guzman, [facility], Henrry Chau, [facility], Guillermo Hagen, CNP, Baldwin Essex, CNP, Sj Sanabria, CNP, Aimee Ramirez, CNP, Lizbet Fink, CNP, [facility]    HISTORY AND PHYSICAL EXAMINATION            Date:   1/4/2022  Patient name:  Mane Chew  Date of admission:  1/4/2022  2:33 PM  MRN:   [id number]  Account:  [de-identified]  YOB: 1955  PCP:    DONG Lassiter CNP  Room:   392/4847-40  Code Status:    Prior    Chief Complaint:     Back pain right sided    History Obtained From:     patient, electronic medical record    History of Present Illness:     Mane Chew is a 74 y.o. Non- / non  female who presents with No chief complaint on file. and is admitted to the hospital for the management of Renal mass. 74year old female with past medical history of bilateral lympedema, morbid obesity, history of DVT and on chronic anticoagulation at home on xarelto presents with back pain to outlying facility. Patient underwent CT abdomen pelvis showing 1.6 cm stone with 7.2x 6.0 x7.0 cm psoas abscess. Tim also has 2 blood cultures postivie for bacteria. One showing klebsiella PNA.        Past Medical History:     Past Medical History: Diagnosis Date    [facility]     Squamous Cell    Cellulitis     DVT (deep venous thrombosis) ([facility] 2006    LLE    Kidney stone     Wears glasses         Past Surgical History:     Past Surgical History:   Procedure Laterality Date    CARPAL TUNNEL RELEASE  1990s    ANNABELLA AND BSO      05/2019    TUBAL LIGATION  1993    TUBAL LIGATION      WISDOM TOOTH EXTRACTION          Medications Prior to Admission:     Prior to Admission medications    Medication Sig Start Date End Date Taking? Authorizing Provider   Multiple Vitamins-Minerals (THERAPEUTIC MULTIVITAMIN-MINERALS) tablet Take 1 tablet by mouth daily   Yes Historical Provider, MD [employer] (JOINT SUPPORT COMPLEX) CAPS Take 2 capsules by mouth daily    Historical Provider, MD   Ascorbic Acid (VITAMIN C) 250 MG tablet Take 250 mg by mouth daily    Historical Provider, MD   vitamin E 400 UNIT capsule Take 400 Units by mouth daily    Historical Provider, MD   vitamin D (CHOLECALCIFEROL) 25 MCG (1000 UT) TABS tablet Take 1,000 Units by mouth daily    Historical Provider, MD   oxybutynin (DITROPAN XL) 10 MG extended release tablet Take 1 tablet by mouth daily  Patient taking differently: Take 15 mg by mouth daily  4/25/19   DONG Ramsey CNM   rivaroxaban (XARELTO) 20 MG TABS tablet Take 1 tablet by mouth daily (with breakfast) 7/17/18   Adryan Nath MD        Allergies:     Estrogens    Social History:     Tobacco:    reports that she has been smoking cigarettes. She has a 21.00 pack-year smoking history. She has never used smokeless tobacco.  Alcohol:      reports no history of alcohol use. Drug Use:  reports no history of drug use. Family History:     Family History   Adopted: Yes       Review of Systems:     Positive and Negative as described in HPI.     CONSTITUTIONAL:  negative for fevers, chills, sweats, fatigue, weight loss  HEENT:  negative for vision, hearing changes, runny nose, throat pain  RESPIRATORY:  negative for shortness of breath, cough, congestion, wheezing  CARDIOVASCULAR:  negative for chest pain, palpitations  GASTROINTESTINAL:  negative for nausea, vomiting, diarrhea, constipation, change in bowel habits, abdominal pain   GENITOURINARY:  negative for difficulty of urination, burning with urination, frequency   INTEGUMENT:  negative for rash, skin lesions, easy bruising   HEMATOLOGIC/LYMPHATIC:  negative for swelling/edema   ALLERGIC/IMMUNOLOGIC:  negative for urticaria , itching  ENDOCRINE:  negative increase in drinking, increase in urination, hot or cold intolerance  MUSCULOSKELETAL:  negative joint pains, muscle aches, swelling of joints  NEUROLOGICAL:  negative for headaches, dizziness, lightheadedness, numbness, pain, tingling extremities  BEHAVIOR/PSYCH:  negative for depression, anxiety    Physical Exam:   BP (!) 154/82   Pulse 79   Temp 99.8 °F (37.7 °C)   Resp 20   Ht 5' 1\" (1.549 m)   LMP  (LMP Unknown)   SpO2 98%   BMI 47.24 kg/m²   Temp (24hrs), Av.9 °F (37.2 °C), Min:97.7 °F (36.5 °C), Max:101.2 °F (38.4 °C)    No results for input(s): POCGLU in the last 72 hours.   No intake or output data in the 24 hours ending 22 1726    General Appearance: alert, chronically ill appearing,   Mental status: oriented to person, place, and time  Head: normocephalic, atraumatic  Eye: no icterus, redness, pupils equal and reactive  Ear: normal external ear, no discharge, hearing intact  Nose: no drainage noted  Mouth: mucous membranes dry  Neck: supple,  Lungs: decreased secodnary to body habitus  Cardiovascular: normal rate, regular rhythm, systolic murmur  Abdomen: Soft, nontender, nondistended, normal bowel sounds, slight right sided flank pain  Neurologic: There are no new focal motor or sensory deficits, normal muscle tone and bulk, no abnormal sensation, normal speech, cranial nerves II through XII grossly intact  Skin: bilateral lymphedema  Extremities: bilateral lymphedema  Psych: normal affect    Investigations:      Laboratory Testing:  Recent Results (from the past 24 hour(s))   CBC auto differential    Collection Time: 01/03/22  9:52 PM   Result Value Ref Range    WBC 11.8 (H) 3.5 - 11.3 k/uL    RBC 3.22 (L) 3.95 - 5.11 m/uL    Hemoglobin 9.9 (L) 11.9 - 15.1 g/dL    Hematocrit 31.6 (L) 36.3 - 47.1 %    MCV 98.1 82.6 - 102.9 fL    MCH 30.7 25.2 - 33.5 pg    MCHC 31.3 28.4 - 34.8 g/dL    RDW 13.4 11.8 - 14.4 %    Platelets 666 629 - 059 k/uL    MPV 8.6 8.1 - 13.5 fL    NRBC Automated 0.0 0.0 per 100 WBC    Differential Type NOT REPORTED     Seg Neutrophils 83 (H) 36 - 65 %    Lymphocytes 9 (L) 24 - 43 %    Monocytes 8 3 - 12 %    Eosinophils % 0 (L) 1 - 4 %    Basophils 0 0 - 2 %    Immature Granulocytes 0 0 %    Segs Absolute 9.83 (H) 1.50 - 8.10 k/uL    Absolute Lymph # 1.03 (L) 1.10 - 3.70 k/uL    Absolute Mono # 0.88 0.10 - 1.20 k/uL    Absolute Eos # <0.03 0.00 - 0.44 k/uL    Basophils Absolute 0.03 0.00 - 0.20 k/uL    Absolute Immature Granulocyte 0.04 0.00 - 0.30 k/uL    WBC Morphology NOT REPORTED     RBC Morphology NOT REPORTED     Platelet Estimate NOT REPORTED    Lactic acid, plasma    Collection Time: 01/03/22  9:52 PM   Result Value Ref Range    Lactic Acid 0.8 0.5 - 2.2 mmol/L    Lactic Acid, Whole Blood NOT REPORTED 0.7 - 2.1 mmol/L       Imaging/Diagnostics:  CT ABDOMEN PELVIS WO CONTRAST Additional Contrast? None    Result Date: 1/4/2022  Limited noncontrast examination. New abnormal mass appears to be contiguous with the inferomedial aspect of the right kidney and contiguous with the right psoas muscle measuring approximately 7.9 x 7.4 x 8.8 cm. The density of this lesion/mass measures slightly higher than the simple fluid. Differential consideration include perinephric/retroperitoneal abscess or complex perinephric urinoma in a setting of large hyperdense stone seen about the right ureteropelvic junction.   Consider CT abdomen and pelvis with contrast for better characterization. Additional subcentimeter hypodense renal stones, which appear to be nonobstructing. Layering subcentimeter hyperdense stones seen within the urinary bladder. Inferiorly prolapsed urinary bladder. Gallbladder sludge/layering gallstones without imaging features of acute cholecystitis seen. Colonic diverticulosis without evidence of acute diverticulitis. CT ABDOMEN PELVIS W IV CONTRAST Additional Contrast? None    Result Date: 1/4/2022  1. Obstructing 1.6 cm stone in the right UPJ results in moderately severe hydronephrosis. The recently described abnormality along the inferomedial right kidney involving the psoas muscle is consistent with an abscess measuring up to 7.2 x 6.0 x 7.7 cm. 2.  Bilateral nephrolithiasis. Previously noted small bladder stones are not delineated on this exam. 3.  Brooks catheter within the bladder. Pelvic floor relaxation. 4.  Diverticulosis and cholelithiasis. RECOMMENDATIONS: Unavailable     XR CHEST PORTABLE    Result Date: 1/2/2022  Pulmonary vascular congestion       Assessment :      Hospital Problems           Last Modified POA    * (Principal) Renal mass 1/4/2022 Yes    Long term current use of anticoagulant therapy 1/4/2022 Yes    Lymphedema of both lower extremities 1/4/2022 Yes    Obesity 1/4/2022 Yes    Tobacco abuse 1/4/2022 Yes    History of recurrent deep vein thrombosis (DVT) 1/4/2022 Yes    Frequency of urination 1/4/2022 Yes    Mixed incontinence 4/7/2690 Yes    Complicated UTI (urinary tract infection) 1/4/2022 Yes          Plan:     Patient status inpatient in the Med/Surge    Uretal stone with Klebsiella PNA bacteremia and concern for psoas abscess. APprciate Urology and ID recommdnations.  IV hydration, zosyn, follow up KUB, follow up CBC and PT INR  Chronically anticoagulated with xarelto, hold for now, if no intervention, plan to start loveox full dose in interim  Wound care for bilateral lymphedema  Brooks placed, continue to monitor output  PT/OT    Consultations:   IP CONSULT TO UROLOGY  IP CONSULT TO UROLOGY    Patient is admitted as inpatient status because of co-morbidities listed above, severity of signs and symptoms as outlined, requirement for current medical therapies and most importantly because of direct risk to patient if care not provided in a hospital setting. Expected length of stay > 48 hours.     Zora Felix MD  1/4/2022  5:26 PM    Copy sent to  DONG Curran CNP

## 2022-01-04 NOTE — ED NOTES
Patient was taken out of department to surgery via stretcher.  has been notified via phone on poc.      Nia Lorenzo RN  01/04/22 1 Michelle Aguero RN  01/04/22 1013

## 2022-01-04 NOTE — CONSULTS
Urology Consultation    Patient:  Juan Mercado  MRN: [id number]  YOB: 1955    CHIEF COMPLAINT:  Right flank pain    HISTORY OF PRESENT ILLNESS:   The patient is a 74 y.o. female who presents with right flank pain. Pt presented to the ED with right flank pain. Pt had Ct scan that showed a right sided renal abscess. This also showed right hydronephrosis and an obstructing 1.6 cm right ureteral calculus. Pt was subsequently scheduled for transfer but has had to remain in ED for bed/staff issues.  locally was consulted this AM. There are plans for right sided PCNT at tertiary center. Pt is on empiric zosyn and merepenum. PT has has fever and leukocytosis. Pt has never seen Urology but did know she has a large stone. She has tried to make  appointments but has had transportation issues. Pain is 10 of 10 in the right flank currently. Patient's old records, notes and chart reviewed and summarized above. Past Medical History:    Past Medical History:   Diagnosis Date    Car[facility] ([facility]     Squamous Cell    Cellulitis     DVT (deep venous thrombosis) ([facility] 2006    LLE    Kidney stone     Wears glasses        Past Surgical History:    Past Surgical History:   Procedure Laterality Date    CARPAL TUNNEL RELEASE  1990s    ANNABELLA AND BSO      05/2019    TUBAL LIGATION  1993    TUBAL LIGATION      WISDOM TOOTH EXTRACTION       Previous  surgery: none   Medications:    Scheduled Meds:   [MAR Hold] meropenem  1,000 mg IntraVENous Q8H    [MAR Hold] enoxaparin  30 mg SubCUTAneous BID    [MAR Hold] rivaroxaban  20 mg Oral Daily    [MAR Hold] piperacillin-tazobactam  3,375 mg IntraVENous Q8H     Continuous Infusions:  PRN Meds:. [MAR Hold] fentanNYL, [MAR Hold] fentanNYL    Allergies:  Estrogens    Social History:    Social History     Socioeconomic History    Marital status:      Spouse name: Not on file    Number of children: Not on file    Years of education: Not on file    Highest education level: Not on file   Occupational History    Not on file   Tobacco Use    Smoking status: Current Every Day Smoker     Packs/day: 0.50     Years: 42.00     Pack years: 21.00     Types: Cigarettes    Smokeless tobacco: Never Used   Vaping Use    Vaping Use: Never used   Substance and Sexual Activity    Alcohol use: No     Alcohol/week: 0.0 standard drinks    Drug use: No    Sexual activity: Not Currently   Other Topics Concern    Not on file   Social History Narrative    Not on file     Social Determinants of Health     Financial Resource Strain:     Difficulty of Paying Living Expenses: Not on file   Food Insecurity:     Worried About Running Out of Food in the Last Year: Not on file    Nabeel of Food in the Last Year: Not on file   Transportation Needs:     Lack of Transportation (Medical): Not on file    Lack of Transportation (Non-Medical):  Not on file   Physical Activity:     Days of Exercise per Week: Not on file    Minutes of Exercise per Session: Not on file   Stress:     Feeling of Stress : Not on file   Social Connections:     Frequency of Communication with Friends and Family: Not on file    Frequency of Social Gatherings with Friends and Family: Not on file    Attends [employer]ology Services: Not on file    Active Member of [address] [place] [address] [employer] or Organizations: Not on file    Attends Club or Organization Meetings: Not on file    Marital Status: Not on file   Intimate Partner Violence:     Fear of Current or Ex-Partner: Not on file    Emotionally Abused: Not on file    Physically Abused: Not on file    Sexually Abused: Not on file   Housing Stability:     Unable to Pay for Housing in the Last Year: Not on file    Number of [place] in the Last Year: Not on file    Unstable Housing in the Last Year: Not on file       Family History:    Family History   Adopted: Yes     Previous Urologic Family history: none  REVIEW OF SYSTEMS:  Constitutional: negative  Eyes: negative  Respiratory: negative  Cardiovascular: negative  Gastrointestinal: negative  Genitourinary: see HPI  Musculoskeletal: negative  Skin: negative   Neurological: negative  Hematological/Lymphatic: negative  Psychological: negative    Physical Exam:      This a 74 y.o. female   Patient Vitals for the past 24 hrs:   BP Temp Temp src Pulse Resp SpO2   01/04/22 1014 (!) 186/79 97.7 °F (36.5 °C) Temporal 77 24 98 %   01/04/22 0859 (!) 169/71 -- -- -- -- 98 %   01/04/22 0829 (!) 162/69 -- -- -- -- 98 %   01/04/22 0602 (!) 143/65 -- -- -- -- 98 %   01/04/22 0600 (!) 165/69 97.9 °F (36.6 °C) Oral 78 20 98 %   01/04/22 0532 (!) 141/67 -- -- -- -- 97 %   01/04/22 0502 (!) 162/57 -- -- -- -- 96 %   01/04/22 0451 -- 97.9 °F (36.6 °C) Tympanic -- -- --   01/04/22 0428 (!) 154/71 -- -- -- -- 98 %   01/04/22 0358 (!) 172/70 -- -- -- -- 96 %   01/04/22 0328 (!) 166/70 -- -- -- -- 96 %   01/04/22 0258 (!) 141/74 -- -- -- -- 96 %   01/04/22 0228 138/65 -- -- -- -- 98 %   01/04/22 0223 (!) 151/66 -- -- -- -- 98 %   01/04/22 0158 (!) 150/71 -- -- -- -- 96 %   01/04/22 0128 (!) 139/59 -- -- -- -- 98 %   01/04/22 0058 (!) 161/72 -- -- -- -- --   01/03/22 2243 -- 100 °F (37.8 °C) Tympanic -- -- --   01/03/22 2228 (!) 147/61 -- -- -- -- 94 %   01/03/22 2158 (!) 142/63 -- -- -- -- 95 %   01/03/22 2128 (!) 148/61 -- -- -- -- 94 %   01/03/22 2058 (!) 149/64 -- -- -- -- 93 %   01/03/22 2048 (!) 151/62 -- -- -- -- 94 %   01/03/22 2045 -- 101.2 °F (38.4 °C) Tympanic -- -- --   01/03/22 1901 (!) 150/56 -- -- -- -- 94 %   01/03/22 1831 (!) 164/80 -- -- -- -- 96 %   01/03/22 1801 (!) 161/75 -- -- -- -- 96 %   01/03/22 1731 (!) 152/76 -- -- -- -- 97 %   01/03/22 1701 (!) 153/73 -- -- -- -- 97 %   01/03/22 1530 (!) 177/78 -- -- -- -- 98 %   01/03/22 1500 (!) 170/65 -- -- -- -- 99 %   01/03/22 1430 (!) 171/66 -- -- -- -- 98 %   01/03/22 1400 (!) 176/67 -- -- -- -- 98 %   01/03/22 1330 (!) 164/58 -- -- -- -- 99 %   01/03/22 1300 (!) 173/69 -- -- -- -- 95 %   01/03/22 1252 (!) 175/62 -- -- -- -- 97 %   01/03/22 1140 (!) 149/51 -- -- -- -- 97 %   01/03/22 1130 (!) 217/93 -- -- -- -- 97 %   01/03/22 1100 (!) 217/100 -- -- -- -- 98 %     Constitutional: Patient in no acute distress. Neuro: Alert and oriented to person, place and time. Psych: mood and affect normal  HEENT negative  Lungs: Respiratory effort is normal  Cardiovascular: Normal peripheral pulses  Abdomen: Soft, non-tender, non-distended with no CVA, flank pain or hepatosplenomegaly. No hernias. Kidneys normal.  Lymphatics: No palpable lymphadenopathy. Bladder non-tender and not distended.   Pelvic exam: deferred  Rectal exam not indicated    LABS:   Recent Labs     01/02/22  1243 01/03/22  0534 01/03/22  2152   WBC 11.8* 11.9* 11.8*   HGB 10.2* 9.6* 9.9*   HCT 32.5* 30.8* 31.6*   MCV 98.5 99.0 98.1    368 369     Recent Labs     01/02/22  1243 01/03/22  0534    136   K 3.8 3.9    102   CO2 24 24   BUN 18 15   CREATININE 0.97* 0.87       Additional Lab/culture results:    Urinalysis:   Recent Labs     01/02/22  1530   COLORU Yellow   PHUR 6.0   WBCUA 20 TO 48   RBCUA 5 TO 10   MUCUS NOT REPORTED   TRICHOMONAS NOT REPORTED   YEAST NOT REPORTED   BACTERIA 3+*   SPECGRAV 1.020   LEUKOCYTESUR SMALL*   UROBILINOGEN Normal   BILIRUBINUR NEGATIVE        -----------------------------------------------------------------  Imaging Results:      Assessment and Plan   Impression:    Patient Active Problem List   Diagnosis    Acute thromboembolism of deep veins of lower extremity (HCC)    Long term current use of anticoagulant therapy    Bilateral cellulitis of lower leg    Acute shoulder pain due to trauma    Lymphedema of both lower extremities    Obesity    Tobacco abuse    History of recurrent deep vein thrombosis (DVT)    Erythrocytosis    Gross hematuria    Frequency of urination    Nocturia    Mixed incontinence    Constipation    Cellulitis of lower leg    Post-phlebitic syndrome    Elephantiasis nostra verrucosa    Cellulitis of left lower extremity    Complicated UTI (urinary tract infection)    Calculus of right kidney    Cellulitis    Normocytic anemia    Iron deficiency anemia    Renal mass       Plan: Right ureteral stent emergent, then continue with transfer to tertiary center where right PCNT can be placed. Definitive stone treatment after acute phase over.     Yasmeen Polanco MD  10:31 AM 1/4/2022

## 2022-01-04 NOTE — ANESTHESIA POSTPROCEDURE EVALUATION
Department of Anesthesiology  Postprocedure Note    Patient: Héctor Cheung  MRN: [id number]  YOB: 1955  Date of evaluation: 1/4/2022  Time:  1:26 PM     Procedure Summary     Date: 01/04/22 Room / Location: [facility]    Anesthesia Start: 1150 Anesthesia Stop: 9033    Procedure: [address] (N/A ) Diagnosis: (STONE)    Surgeons: Jackson Lyles MD Responsible Provider: Adrian Bower    Anesthesia Type: general ASA Status: 3          Anesthesia Type: general    Fly Phase I:      Fly Phase II: Fly Score: 9    Last vitals: Reviewed and per EMR flowsheets.        Anesthesia Post Evaluation    Patient location during evaluation: PACU  Patient participation: complete - patient participated  Level of consciousness: awake and alert  Pain score: 0  Airway patency: patent  Nausea & Vomiting: no nausea and no vomiting  Complications: no  Cardiovascular status: blood pressure returned to baseline  Respiratory status: acceptable and room air  Hydration status: stable

## 2022-01-04 NOTE — BRIEF OP NOTE
Brief Postoperative Note      Patient: Varsha Matthews  YOB: 1955  MRN: [id number]    Date of Procedure: 1/4/2022    Pre-Op Diagnosis: STONE    Post-Op Diagnosis: Same/ grade 3 cystocele       Procedure(s):  CYSTOSCOPY URETERAL STENT INSERTION    Surgeon(s):  Selam Mora MD    Assistant:  * No surgical staff found *    Anesthesia: General    Estimated Blood Loss (mL): Minimal    Complications: None    Specimens:   * No specimens in log *    Implants:  Implant Name Type Inv.  Item Serial No.  Lot No. LRB No. Used Action   STENT URET 6 [id number] CM FIRM MONOFILAMENT TRIA  STENT URET 6 FRX24 CM FIRM MONOFILAMENT TRIA  [employer] [id number] N/A 1 Implanted         Drains:   [REMOVED] Urethral Catheter Non-latex 18 fr (Removed)       Findings: right ureteral obstruction/ large cystocele    Electronically signed by Selam Mora MD on 1/4/2022 at 12:26 PM

## 2022-01-04 NOTE — PROGRESS NOTES
This is an admitted patient that is being boarded in the Emergency Department due to critical shortage of inpatient resources. It is unclear at this point when the patient would be getting a bed, and therefore Emergency Department rounds were performed on the patient. Pt looks well, not in any distress. Remains on supplemental oxygen  I was notified by nursing that the patient spiked a temp overnight, 38.4 Celsius. Reviewed culture report which is positive for Klebsiella in both blood cultures. Unfortunately urine culture does not appear to been done. Will attempt to obtain a urine culture, which may be sterile given the patient has been on Zosyn. Given that she continues to spike temps, will add Merrem for ESBL coverage    Remain unclear when the patient will be able to be transferred and received inpatient bed at [facility] or any other facility in the region.   Therefore, we will start DVT prophylaxis

## 2022-01-04 NOTE — PLAN OF CARE
Discussed with urology. Concern for malposition of stent. NPO after midnight, plan for possible intervention tomorrow. Possible PERC tube tomorrow. And for drain of abscess with IR.

## 2022-01-04 NOTE — ED NOTES
Patient sleeping at this time, awakens easily. Writer offered to change patient into inpatient bed for comfort but patient states that is not necessary at this time.       Db Long RN  01/04/22 0420

## 2022-01-05 ENCOUNTER — APPOINTMENT (OUTPATIENT)
Dept: INTERVENTIONAL RADIOLOGY/VASCULAR | Age: 67
DRG: 871 | End: 2022-01-05
Attending: STUDENT IN AN ORGANIZED HEALTH CARE EDUCATION/TRAINING PROGRAM
Payer: MEDICARE

## 2022-01-05 LAB
ANION GAP SERPL CALCULATED.3IONS-SCNC: 10 MMOL/L (ref 9–17)
BUN BLDV-MCNC: 17 MG/DL (ref 8–23)
BUN/CREAT BLD: ABNORMAL (ref 9–20)
CALCIUM SERPL-MCNC: 8.3 MG/DL (ref 8.6–10.4)
CHLORIDE BLD-SCNC: 102 MMOL/L (ref 98–107)
CO2: 26 MMOL/L (ref 20–31)
CREAT SERPL-MCNC: 0.83 MG/DL (ref 0.5–0.9)
CULTURE: NORMAL
DIRECT EXAM: NORMAL
GFR AFRICAN AMERICAN: >60 ML/MIN
GFR NON-AFRICAN AMERICAN: >60 ML/MIN
GFR SERPL CREATININE-BSD FRML MDRD: ABNORMAL ML/MIN/{1.73_M2}
GFR SERPL CREATININE-BSD FRML MDRD: ABNORMAL ML/MIN/{1.73_M2}
GLUCOSE BLD-MCNC: 125 MG/DL (ref 70–99)
GLUCOSE BLD-MCNC: 147 MG/DL (ref 65–105)
HCT VFR BLD CALC: 30.8 % (ref 36.3–47.1)
HEMOGLOBIN: 9.7 G/DL (ref 11.9–15.1)
INR BLD: 1.1
Lab: NORMAL
MAGNESIUM: 2.2 MG/DL (ref 1.6–2.6)
MCH RBC QN AUTO: 30.8 PG (ref 25.2–33.5)
MCHC RBC AUTO-ENTMCNC: 31.5 G/DL (ref 28.4–34.8)
MCV RBC AUTO: 97.8 FL (ref 82.6–102.9)
NRBC AUTOMATED: 0 PER 100 WBC
PDW BLD-RTO: 13.4 % (ref 11.8–14.4)
PLATELET # BLD: 355 K/UL (ref 138–453)
PMV BLD AUTO: 8.7 FL (ref 8.1–13.5)
POTASSIUM SERPL-SCNC: 3.5 MMOL/L (ref 3.7–5.3)
PROTHROMBIN TIME: 11.8 SEC (ref 9.1–12.3)
RBC # BLD: 3.15 M/UL (ref 3.95–5.11)
SODIUM BLD-SCNC: 138 MMOL/L (ref 135–144)
SPECIMEN DESCRIPTION: NORMAL
WBC # BLD: 11 K/UL (ref 3.5–11.3)

## 2022-01-05 PROCEDURE — 97535 SELF CARE MNGMENT TRAINING: CPT

## 2022-01-05 PROCEDURE — 80048 BASIC METABOLIC PNL TOTAL CA: CPT

## 2022-01-05 PROCEDURE — 2709999900 HC NON-CHARGEABLE SUPPLY

## 2022-01-05 PROCEDURE — 6370000000 HC RX 637 (ALT 250 FOR IP): Performed by: NURSE PRACTITIONER

## 2022-01-05 PROCEDURE — 97530 THERAPEUTIC ACTIVITIES: CPT

## 2022-01-05 PROCEDURE — 87205 SMEAR GRAM STAIN: CPT

## 2022-01-05 PROCEDURE — 87070 CULTURE OTHR SPECIMN AEROBIC: CPT

## 2022-01-05 PROCEDURE — 82947 ASSAY GLUCOSE BLOOD QUANT: CPT

## 2022-01-05 PROCEDURE — 0T9330Z DRAINAGE OF RIGHT KIDNEY PELVIS WITH DRAINAGE DEVICE, PERCUTANEOUS APPROACH: ICD-10-PCS | Performed by: RADIOLOGY

## 2022-01-05 PROCEDURE — C1729 CATH, DRAINAGE: HCPCS

## 2022-01-05 PROCEDURE — 1200000000 HC SEMI PRIVATE

## 2022-01-05 PROCEDURE — BT1D1ZZ FLUOROSCOPY OF RIGHT KIDNEY, URETER AND BLADDER USING LOW OSMOLAR CONTRAST: ICD-10-PCS | Performed by: RADIOLOGY

## 2022-01-05 PROCEDURE — 99211 OFF/OP EST MAY X REQ PHY/QHP: CPT

## 2022-01-05 PROCEDURE — 6360000002 HC RX W HCPCS: Performed by: RADIOLOGY

## 2022-01-05 PROCEDURE — 87075 CULTR BACTERIA EXCEPT BLOOD: CPT

## 2022-01-05 PROCEDURE — 6360000002 HC RX W HCPCS: Performed by: INTERNAL MEDICINE

## 2022-01-05 PROCEDURE — 85027 COMPLETE CBC AUTOMATED: CPT

## 2022-01-05 PROCEDURE — 36415 COLL VENOUS BLD VENIPUNCTURE: CPT

## 2022-01-05 PROCEDURE — C1769 GUIDE WIRE: HCPCS

## 2022-01-05 PROCEDURE — C1894 INTRO/SHEATH, NON-LASER: HCPCS

## 2022-01-05 PROCEDURE — 6360000004 HC RX CONTRAST MEDICATION: Performed by: INTERNAL MEDICINE

## 2022-01-05 PROCEDURE — 6360000002 HC RX W HCPCS: Performed by: STUDENT IN AN ORGANIZED HEALTH CARE EDUCATION/TRAINING PROGRAM

## 2022-01-05 PROCEDURE — 2580000003 HC RX 258: Performed by: STUDENT IN AN ORGANIZED HEALTH CARE EDUCATION/TRAINING PROGRAM

## 2022-01-05 PROCEDURE — 50432 PLMT NEPHROSTOMY CATHETER: CPT

## 2022-01-05 PROCEDURE — 83735 ASSAY OF MAGNESIUM: CPT

## 2022-01-05 PROCEDURE — 99232 SBSQ HOSP IP/OBS MODERATE 35: CPT | Performed by: INTERNAL MEDICINE

## 2022-01-05 PROCEDURE — 97162 PT EVAL MOD COMPLEX 30 MIN: CPT

## 2022-01-05 PROCEDURE — 6370000000 HC RX 637 (ALT 250 FOR IP): Performed by: STUDENT IN AN ORGANIZED HEALTH CARE EDUCATION/TRAINING PROGRAM

## 2022-01-05 PROCEDURE — 85610 PROTHROMBIN TIME: CPT

## 2022-01-05 PROCEDURE — 87077 CULTURE AEROBIC IDENTIFY: CPT

## 2022-01-05 PROCEDURE — 87186 SC STD MICRODIL/AGAR DIL: CPT

## 2022-01-05 PROCEDURE — 97166 OT EVAL MOD COMPLEX 45 MIN: CPT

## 2022-01-05 RX ORDER — FENTANYL CITRATE 50 UG/ML
INJECTION, SOLUTION INTRAMUSCULAR; INTRAVENOUS
Status: COMPLETED | OUTPATIENT
Start: 2022-01-05 | End: 2022-01-05

## 2022-01-05 RX ORDER — GUAIFENESIN 600 MG/1
600 TABLET, EXTENDED RELEASE ORAL 2 TIMES DAILY
Status: DISCONTINUED | OUTPATIENT
Start: 2022-01-05 | End: 2022-01-13 | Stop reason: HOSPADM

## 2022-01-05 RX ORDER — MIDAZOLAM HYDROCHLORIDE 2 MG/2ML
INJECTION, SOLUTION INTRAMUSCULAR; INTRAVENOUS
Status: COMPLETED | OUTPATIENT
Start: 2022-01-05 | End: 2022-01-05

## 2022-01-05 RX ADMIN — FENTANYL CITRATE 50 MCG: 50 INJECTION, SOLUTION INTRAMUSCULAR; INTRAVENOUS at 10:18

## 2022-01-05 RX ADMIN — PIPERACILLIN AND TAZOBACTAM 3375 MG: 3; .375 INJECTION, POWDER, FOR SOLUTION INTRAVENOUS at 22:19

## 2022-01-05 RX ADMIN — MIDAZOLAM HYDROCHLORIDE 1 MG: 1 INJECTION, SOLUTION INTRAMUSCULAR; INTRAVENOUS at 10:09

## 2022-01-05 RX ADMIN — MORPHINE SULFATE 2 MG: 2 INJECTION, SOLUTION INTRAMUSCULAR; INTRAVENOUS at 20:37

## 2022-01-05 RX ADMIN — MORPHINE SULFATE 2 MG: 2 INJECTION, SOLUTION INTRAMUSCULAR; INTRAVENOUS at 13:38

## 2022-01-05 RX ADMIN — IOPAMIDOL 20 ML: 755 INJECTION, SOLUTION INTRAVENOUS at 10:27

## 2022-01-05 RX ADMIN — GUAIFENESIN 600 MG: 600 TABLET, EXTENDED RELEASE ORAL at 23:54

## 2022-01-05 RX ADMIN — GENTAMICIN SULFATE 100 MG: 100 INJECTION, SOLUTION INTRAVENOUS at 12:09

## 2022-01-05 RX ADMIN — MORPHINE SULFATE 2 MG: 2 INJECTION, SOLUTION INTRAMUSCULAR; INTRAVENOUS at 06:28

## 2022-01-05 RX ADMIN — PIPERACILLIN AND TAZOBACTAM 3375 MG: 3; .375 INJECTION, POWDER, FOR SOLUTION INTRAVENOUS at 03:00

## 2022-01-05 RX ADMIN — FENTANYL CITRATE 50 MCG: 50 INJECTION, SOLUTION INTRAMUSCULAR; INTRAVENOUS at 10:09

## 2022-01-05 RX ADMIN — PIPERACILLIN AND TAZOBACTAM 3375 MG: 3; .375 INJECTION, POWDER, FOR SOLUTION INTRAVENOUS at 13:34

## 2022-01-05 RX ADMIN — MIDAZOLAM HYDROCHLORIDE 1 MG: 1 INJECTION, SOLUTION INTRAMUSCULAR; INTRAVENOUS at 10:18

## 2022-01-05 ASSESSMENT — PAIN SCALES - GENERAL
PAINLEVEL_OUTOF10: 10
PAINLEVEL_OUTOF10: 8
PAINLEVEL_OUTOF10: 10
PAINLEVEL_OUTOF10: 10
PAINLEVEL_OUTOF10: 0
PAINLEVEL_OUTOF10: 0

## 2022-01-05 ASSESSMENT — PAIN DESCRIPTION - PROGRESSION
CLINICAL_PROGRESSION: NOT CHANGED

## 2022-01-05 ASSESSMENT — PAIN DESCRIPTION - ORIENTATION
ORIENTATION: RIGHT
ORIENTATION: RIGHT

## 2022-01-05 ASSESSMENT — PAIN DESCRIPTION - LOCATION
LOCATION: FLANK
LOCATION: FLANK

## 2022-01-05 ASSESSMENT — PAIN DESCRIPTION - ONSET: ONSET: ON-GOING

## 2022-01-05 ASSESSMENT — PAIN DESCRIPTION - FREQUENCY: FREQUENCY: CONTINUOUS

## 2022-01-05 ASSESSMENT — PAIN - FUNCTIONAL ASSESSMENT: PAIN_FUNCTIONAL_ASSESSMENT: PREVENTS OR INTERFERES SOME ACTIVE ACTIVITIES AND ADLS

## 2022-01-05 ASSESSMENT — PAIN DESCRIPTION - PAIN TYPE: TYPE: ACUTE PAIN

## 2022-01-05 ASSESSMENT — PAIN DESCRIPTION - DESCRIPTORS: DESCRIPTORS: SHARP

## 2022-01-05 NOTE — PROGRESS NOTES
assessed for rehabilitation services?: Yes  General Comment  Comments: Pt and nurse in agreement with PT to see in PM  Subjective  Subjective: Pt complaint  of pain and discomfort post nephrostomy tube placement  Pain Screening  Patient Currently in Pain: Yes  Pain Assessment  Pain Assessment: 0-10  Pain Level: 10  Pain Location: Flank  Pain Orientation: Right  Response to Pain Intervention: Patient Satisfied  Vital Signs  Patient Currently in Pain: Yes       Orientation  Orientation  Overall Orientation Status: Within Normal Limits  Social/Functional History  Social/Functional History  Lives With: Spouse,Daughter  Type of Home: House  Home Layout: Two level,Able to Live on Main level with bedroom/bathroom,1/2 bath on main level (lives on main level)  Home Access: Stairs to enter with rails  Entrance Stairs - Number of Steps: 4 (negotiate steps side ways)  Entrance Stairs - Rails: Right  Bathroom Shower/Tub: Walk-in shower (wash up at sink, wash hair at sink)  Bathroom Equipment: Shower chair  Bathroom Accessibility: Not accessible  Home Equipment: 4 wheeled walker,Standard walker  Receives Help From: Family  ADL Assistance: Independent  Homemaking Assistance: Needs assistance  Meal Prep:  Other (comment) (share responsibility daughter primary, spouse help)  Laundry: Other (comment) (share responsibility daughter primary, spouse help)  Vacuuming: Other (comment) (share responsibility daughter primary, spouse help)  Cleaning: Other (comment) (share responsibility daughter primary, spouse help)  Homemaking Responsibilities: Yes  Active : Yes  Mode of Transportation: SUV (assist needed with right leg)  Occupation: Retired  Type of occupation: part time work  Leisure & Hobbies: face book  Cognition      A&O x 4  Objective     Observation/Palpation  Posture: Good  Scar: right side nephrostomy tube    AROM RLE (degrees)  RLE AROM: Exceptions  RLE General AROM: bilateral LE lymphedema, pain with active movement  AROM LLE (degrees)  LLE AROM : Exceptions  AROM RUE (degrees)  RUE AROM : WFL  AROM LUE (degrees)  LUE AROM : WFL  Strength RLE  Strength RLE: Exception  R Hip Flexion: 2/5  R Hip ABduction: 2/5  R Ankle Dorsiflexion: 2/5  Strength LLE  Strength LLE: Exception  L Hip Flexion: 2/5  L Hip ABduction: 2/5  L Knee Flexion: 2/5  L Ankle Dorsiflexion: 2/5  Tone RLE  RLE Tone: Normotonic  Tone LLE  LLE Tone: Normotonic  Motor Control  Gross Motor?: Exceptions  Comments: baseline decreased frunctional abilty history of bilateral  LE lymphedema  Sensation  Overall Sensation Status: WFL  Bed mobility  Bridging: Maximum assistance;2 Person assistance  Rolling to Left: Maximum assistance  Supine to Sit: Maximum assistance  Sit to Supine: Maximum assistance  Transfers  Sit to Stand: Maximum Assistance;2 Person Assistance; Unable to assess (pt initiated movement unable to complete due to pain)  Ambulation  Ambulation?: No     Balance  Posture: Good  Sitting - Static: Good  Sitting - Dynamic: Good  Exercises  Comments: Pt instructed in sitting balance activity, completed bed mobility with max assist due to pain and weaknes  Other exercises  Other exercises?: No     Plan   Plan  Times per week: 5-6x/wk  Specific instructions for Next Treatment: enourage out of bed  Current Treatment Recommendations: Strengthening,Balance Training,Functional Mobility Training,Gait Training,Endurance Training,Neuromuscular Re-education,Safety Education & Training  Safety Devices  Type of devices:  All fall risk precautions in place,Bed alarm in place,Call light within reach,Gait belt,Patient at risk for falls,Left in bed,Nurse notified  Restraints  Initially in place: No                                                      AM-PAC Score     AM-PAC Inpatient Mobility without Stair Climbing Raw Score : 10 (01/05/22 1432)  AM-PAC Inpatient without Stair Climbing T-Scale Score : 34.07 (01/05/22 1432)  Mobility Inpatient CMS 0-100% Score: 71.66 (01/05/22 1432)  Mobility Inpatient without Stair CMS G-Code Modifier : CL (01/05/22 1432)       Goals  Short term goals  Time Frame for Short term goals: 14 days  Short term goal 1: pt to demonstrate mod A with bed mobitily  Short term goal 2: sit <> stand CGA with walker  Short term goal 3: Pt to ambulate up to 10 ft with walker as tolerated  Short term goal 4: Improve general strength and activity tolerance to toleated out of bed activity greater than 30 minutes  Patient Goals   Patient goals : to regain strength       Therapy Time   Individual Concurrent Group Co-treatment   Time In 0150         Time Out 0237         Minutes 47         Timed Code Treatment Minutes: [address] Zain Castro, PT

## 2022-01-05 NOTE — PROGRESS NOTES
[facility] Continence Nurse  Consult Note       NAME:  [address] [place] [address] RECORD NUMBER:  [id number]  AGE: 74 y.o. GENDER: female  : 1955  TODAY'S DATE:  2022    Subjective:     Reason for WOCN Evaluation and Assessment: \"bilateral lymphedema'      Juan Mercado is a 74 y.o. female referred by:   [x] Physician  [] Nursing  [] Other:     Wound Identification:  No wounds found        Patient does not utilize any compression therapy at home. Objective:      BP (!) 154/93   Pulse 75   Temp 97.8 °F (36.6 °C) (Axillary)   Resp 14   Ht 5' 1\" (1.549 m)   Wt 273 lb 2.4 oz (123.9 kg)   LMP  (LMP Unknown)   SpO2 98%   BMI 51.61 kg/m²   Rome Risk Score: Rome Scale Score: 18    LABS    CBC:   Lab Results   Component Value Date    WBC 11.0 2022    RBC 3.15 2022    HGB 9.7 2022     CMP:  Albumin:    Lab Results   Component Value Date    LABALBU 2.5 2022     PT/INR:    Lab Results   Component Value Date    PROTIME 11.8 2022    INR 1.1 2022     HgBA1c:    Lab Results   Component Value Date    LABA1C 5.5 2016     PTT: No components found for: LABPTT      Assessment:       Skin Integrity  [] Normal [x] Dry  [x]Rough []Moist []Rash  Location of problem area/s:  [] Wound: Location/Description   [] Fibrosis: Location/Description  [x] Keratosis: Location/Description: BLE and feet  [] Papillomas: Location/Description   [] Stemmers Sign: Location/Description  [] Other                  Plan:     Plan of Care:   Elevate BLE  Provide hygiene to legs and feet as per routine  Apply moisturizing cream daily.        Specialty Bed Required : Yes   [] Low Air Loss   [x] Pressure Redistribution  [] Fluid Immersion  [] Bariatric  [] Total Pressure Relief  [] Other:     Discharge Plan:  TBD    Patient/Caregiver Teaching:    [] Indicates understanding       [] Needs reinforcement  [] Unsuccessful      [x] Verbal Understanding  [] Demonstrated understanding       [] No evidence of learning  [] Refused teaching         [] N/A           Wound Ostomy Continence nurse (ET nurse) will no longer follow pt. Call prn if concerns related to skin, wound, or ostomy care    Lymphedema treatment is a long term treatment modality offered by occupational therapists in an outpatient setting. Successful treatment requires compliance from the patient as lymphatic massage and short stretch wrap treatments require weekly follow up to offer any benefit. There is no short term, acute solution to lymphedema.    Electronically signed by Lotus Shirley RN, CWON on 1/5/2022 at 2:23 PM

## 2022-01-05 NOTE — PROGRESS NOTES
Physical Therapy        Physical Therapy Cancel Note      DATE: 2022    NAME: Adelina Lomeli  MRN: [id number]   : 1955      Patient not seen this date for Physical Therapy due to:    Surgery/Procedure: to IR for right percutaneous nephrostomy tube placement, right perinephric drain placement.  Ck pm as able or       Electronically signed by Rehana Doan PT on 1536 at 10:28 AM

## 2022-01-05 NOTE — OP NOTE
[address] [place], [place] [address]                                OPERATIVE REPORT    PATIENT NAME: Amber Flynn                   :        1955  MED REC NO:   [id number]                              ROOM:  ACCOUNT NO:   [de-identified]                           ADMIT DATE: 2022  PROVIDER:     Eben Ledesma    DATE OF PROCEDURE:  2022    PREOPERATIVE DIAGNOSES:  1. Right ureteral calculus. 2.  Right renal abscess. POSTOPERATIVE DIAGNOSES:  1. Right ureteral calculus. 2.  Right renal abscess. 3.  Grade 3 cystocele. PROCEDURES PERFORMED:  Cystoscopy, right ureteral stent placement. SURGEON:  Dr. Eben Ledesma. ASSISTANT:  None. ANESTHESIA:  General.    COMPLICATIONS:  None. ESTIMATED BLOOD LOSS:  Minimal.    SPECIMENS:  None. PROSTHESIS:  A 6-Sammarinese x 26 cm double-J ureteral stent. DISPOSITION:  Stable. FINDINGS:  Right ureteral obstruction. INDICATIONS:  The patient is a 67-year-old female with known right-sided  renal abscess and right-sided obstructing stone. She is here now for  temporizing therapy in the form of ureteral stent placement. DESCRIPTION OF PROCEDURE:  The patient was taken back to the operating  room after informed consent including all risks, benefits, and  alternatives were obtained. The patient was transferred from the [facility]  onto the operating table, where she was induced under general anesthesia  and given IV meropenem prior to the procedure. She was then prepped and  draped in normal sterile fashion, and placed in dorsal lithotomy. She  had a 22-Sammarinese sheath with a 30-degree lens passed through the urethra  into the bladder. Once in the bladder, we did identify a very large  cystocele. This needed to be reduced in order to be able to visualize  the ureteral orifice.   Once this was done, we were able to cannulate the  ureteral orifice with an 8-Sammarinese catheter. We were then able to place  the Glidewire up into the kidney and confirmed via fluoroscopy. We were  then able to place a 6-Occitan x 26-cm double-J ureteral stent over the  Glidewire up into what we thought was the kidney. We did take  fluoroscopic images. We would like to take some more, we were unable  to. At this point, she was awoken from general anesthesia, transferred  to the [facility], and taken to the PACU in satisfactory condition by  Nursing and Anesthesia teams. PLAN:  The patient will be discharged from our facility via ambulance,  where she will be admitted in the tertiary care center to have her right  renal abscess drained. They can also repeat image to see if the stent  has led by the stone on her right side, if not, they can then place a  percutaneous nephrostomy tube as well.         Vianca Stephens    D: 01/04/2022 14:23:10       T: 01/04/2022 21:58:55     [email or web address]  Job#: [id number]     Doc#: [id number]    CC:

## 2022-01-05 NOTE — PROGRESS NOTES
Distinct nathalia-nephric abscess could not be identified with US. 10 fr drain placed in the kidney. Renal architecture appears degraded. There appears to be gross conection of collecting system with nathalia-nephric space. 120 ml pus aspirated. Sample sent for cx. Unsure of viability of the kidney.

## 2022-01-05 NOTE — PROGRESS NOTES
[facility]  Office: [address], DO, Geoffreyesteban Meraz, DO, Coleman Yamile, DO, Zach Linder Blood, DO, Bernadene Ahumada, MD, Alvin Flower MD, Jason Kate MD, Ramona Coleman MD, Julia Mckeon MD, Aurelio Teague MD, Juliann Aparicio MD, Zenobia Mojica, DO, Jori Neves DO, Adelina Watkins MD,  Vy Canales DO, Sriram Terry MD, Joel Irving MD, Tara Bourne MD, Freddy Love MD, Erich Gay MD, Farideh Price MD, Blaise Hernandez MD, Rony Gamez [facility], [facility], CNP, Adriel Caballero, CNP, Justo Godinez, [facility], Ludin Jenkins, CNP, Maxx Gerardo, CNP, Twylla Boeck, CNP, Karli Hays, CNP, Bette Smith, CNP, Violeta Sorto PA-C, Khoi Benjamin, DNP, Marty Cross, DNP, Rolo Gutierrez, CNP, Lenora Teague, CNP, Jamin Cohen, CNP, Anita Cameron, CNP, Evelin Rajan, [facility], Consuelo Nava, [address] [place] [address]    Progress Note    1/5/2022    2:20 PM    Name:   Brian Carney  MRN:     [id number]     Acct:      [de-identified]   Room:   [address] [place] [address] Day:  1  Admit Date:  1/4/2022  2:33 PM    PCP:   DONG Skaggs CNP  Code Status:  Full Code    Subjective:     C/C: Right flank pain    Interval History Status: improved. Patient had right nephrostomy tube placed today in IR and abscess drained. Her pain is mildly improved. She is very tired and drained today. Brief History:     Per my partner:  \"   Brian Carney is a 74 y.o. Non- / non  female who presents with No chief complaint on file. and is admitted to the hospital for the management of Renal mass.     74year old female with past medical history of bilateral lympedema, morbid obesity, history of DVT and on chronic anticoagulation at home on xarelto [facility]. Patient underwent CT abdomen pelvis showing 1.6 cm stone with 7.2x 6.0 x7.0 cm psoas abscess.  Indrat also has 2 blood cultures postivie for bacteria. One showing klebsiella PNA. \"    Review of Systems:     Constitutional:  negative for chills, fevers, sweats  Respiratory:  negative for cough, dyspnea on exertion, shortness of breath, wheezing  Cardiovascular:  negative for chest pain, chest pressure/discomfort, lower extremity edema, palpitations  Gastrointestinal:  negative for abdominal pain, constipation, diarrhea, nausea, vomiting  Neurological:  negative for dizziness, headache    Medications: Allergies: Allergies   Allergen Reactions    Estrogens Other (See Comments)     Hx of DVT       Current Meds:   Scheduled Meds:    piperacillin-tazobactam  3,375 mg IntraVENous Q8H    pantoprazole  40 mg Oral QAM AC     Continuous Infusions:    sodium chloride 50 mL/hr at 22 1858     PRN Meds: magnesium sulfate, ondansetron **OR** ondansetron, polyethylene glycol, acetaminophen **OR** acetaminophen, morphine **OR** morphine    Data:     Past Medical History:   has a past medical history of Carcinoma ([facility] Cellulitis, DVT (deep venous thrombosis) ([facility] [facility] Kidney stone, and Wears glasses. Social History:   reports that she has been smoking cigarettes. She has a 21.00 pack-year smoking history. She has never used smokeless tobacco. She reports that she does not drink alcohol and does not use drugs. Family History:   Family History   Adopted: Yes       Vitals:  BP (!) 154/93   Pulse 75   Temp 97.8 °F (36.6 °C) (Axillary)   Resp 14   Ht 5' 1\" (1.549 m)   Wt 273 lb 2.4 oz (123.9 kg)   LMP  (LMP Unknown)   SpO2 98%   BMI 51.61 kg/m²   Temp (24hrs), Av.5 °F (36.9 °C), Min:97.8 °F (36.6 °C), Max:99.8 °F (37.7 °C)    No results for input(s): POCGLU in the last 72 hours. I/O (24Hr):     Intake/Output Summary (Last 24 hours) at 2022 1420  Last data filed at 2022 1019  Gross per 24 hour   Intake --   Output 720 ml   Net -720 ml       Labs:  Hematology:  Recent Labs     22  2152 22  1754 22  0655   WBC 11.8* include perinephric/retroperitoneal abscess or complex perinephric urinoma in a setting of large hyperdense stone seen about the right ureteropelvic junction. Consider CT abdomen and pelvis with contrast for better characterization. Additional subcentimeter hypodense renal stones, which appear to be nonobstructing. Layering subcentimeter hyperdense stones seen within the urinary bladder. Inferiorly prolapsed urinary bladder. Gallbladder sludge/layering gallstones without imaging features of acute cholecystitis seen. Colonic diverticulosis without evidence of acute diverticulitis. XR ABDOMEN (KUB) (SINGLE AP VIEW)    Result Date: 1/4/2022  Stable configuration of the ureteral stent compared to fluoroscopic examination from earlier today. CT ABDOMEN PELVIS W IV CONTRAST Additional Contrast? None    Result Date: 1/4/2022  1. Obstructing 1.6 cm stone in the right UPJ results in moderately severe hydronephrosis. The recently described abnormality along the inferomedial right kidney involving the psoas muscle is consistent with an abscess measuring up to 7.2 x 6.0 x 7.7 cm. 2.  Bilateral nephrolithiasis. Previously noted small bladder stones are not delineated on this exam. 3.  Brooks catheter within the bladder. Pelvic floor relaxation. 4.  Diverticulosis and cholelithiasis. RECOMMENDATIONS: Unavailable     XR CHEST PORTABLE    Result Date: 1/2/2022  Pulmonary vascular congestion     IR GUIDED NEPHROSTOMY CATH PLACEMENT RIGHT    Result Date: 1/5/2022  Successful percutaneous nephrostomy tube placement. 120 mL of purulent material was aspirated. A defined separate retroperitoneal abscess was not seen and expect findings on CT scan were just extension from the infection in the kidney. Patient be followed up with contrast CT scan when the drainage subsides to exclude the presence of any remaining retroperitoneal abscess.        Physical Examination:        General appearance:  alert, cooperative and no distress  Mental Status:  oriented to person, place and time and normal affect  Lungs:  clear to auscultation bilaterally, normal effort  Heart:  regular rate and rhythm, no murmur  Abdomen:  soft, nontender, obese, normal bowel sounds, no masses, hepatomegaly, splenomegaly  Extremities:  + edema bilat LE 2+, no redness, tenderness in the calves  Skin:  + venous stasis skin changes to bilat LE, thickened scaly skin, dry flaking    Assessment:        Hospital Problems           Last Modified POA    * (Principal) Complicated UTI (urinary tract infection) 1/4/2022 Yes    Long term current use of anticoagulant therapy 1/4/2022 Yes    Lymphedema of both lower extremities 1/4/2022 Yes    Obesity 1/4/2022 Yes    Tobacco abuse 1/4/2022 Yes    History of recurrent deep vein thrombosis (DVT) 1/4/2022 Yes    Frequency of urination 1/4/2022 Yes    Mixed incontinence 1/4/2022 Yes    Renal mass 1/4/2022 Yes    Bacteremia due to Klebsiella pneumoniae 1/4/2022 Yes          Plan:        Right ureteral stone with Klebsiella pneumonia bacteremia-possible psoas abscess status post IR drainage and culture. Nephrostomy tube placed in IR today.   Continue IV Zosyn and gentamicin per infectious diseases, continue IV fluids, f/u cultures  Long-term anticoagulation therapy-hold Xarelto for now, start Lovenox subcu twice daily if okay with urology  Lymphedema bilateral LE- start ACE wraps, consult wound care, needs to wear her lymphedema boots at home but doesn't know how to apply them by herself  Obesity  Tobacco abuse- encouraged cessation  Okay to restart diet  GI prophylaxis  PT/OT    Nu Joseph MD  1/5/2022  2:20 PM

## 2022-01-05 NOTE — PROGRESS NOTES
Occupational Therapy   Occupational Therapy Initial Assessment  Date: 2022   Patient Name: Mane Chew  MRN: [id number]     : 1955    Date of Service: 2022     Per chart:  Mane Chew is a 74y.o.-year-old female with past medical history of bilateral lymphedema, morbid obesity, history of DVT on Xarelto presents with right flank pain from outside facility. Patient had fever and leukocytosis. CT abdomen pelvis revealed 1.6 cm right renal calculus with 7 x 6 x 7 cm psoas abscess. Patient transferred to [facility] for right PCNT. Blood culture x2+ for bacteria. 1/2 Klebsiella pneumonia. Discharge Recommendations:  Patient would benefit from continued therapy after discharge Pt is currently unsafe to return to their prior living arrangements without / assist/supervision to safely engage in all aspects of ADLs, IADLs and functional mobility tasks. OT Equipment Recommendations  Equipment Needed: Yes  Mobility Devices: ADL Assistive Devices  ADL Assistive Devices: Toileting - Drop Arm Commode, Heavy Duty Drop Arm Commode    Assessment   Performance deficits / Impairments: Decreased functional mobility ; Decreased ADL status; Decreased endurance;Decreased high-level IADLs;Decreased ROM; Decreased strength;Decreased balance  Assessment: Pt supine in bed upon arrival and required significant encouragement for participation. Pt completed supine to sit transfer with max A. Pt sat EOB for ~7 minutes for ROM/MMT and grooming task. Pt encouraged to attempt functional transfer but declined multiple times requesting to return supine in bed. Pt did complete grooming task EOB, brushed hair w/ Min A d/t fatigue. Pt is expected to require skilled OT services during their acute hospitalization stay to address the above noted deficits through skilled occupational therapy intervention for promotion of increased independence throughout ADLs, IADLs and functional mobility tasks.    Prognosis: Good  Decision Making: Medium Complexity  Patient Education: Pt educated on OT role, OT POC, activity promotion, energy conservation, importance of EOB/OOB activity, equitpment-good return  REQUIRES OT FOLLOW UP: Yes  Activity Tolerance  Activity Tolerance: Patient limited by pain; Patient limited by fatigue  Safety Devices  Safety Devices in place: Yes  Type of devices: Gait belt;Bed alarm in place; Patient at risk for falls;Call light within reach; Left in bed;Nurse notified  Restraints  Initially in place: No           Patient Diagnosis(es): There were no encounter diagnoses. has a past medical history of Carcinoma ([facility] [address] Cellulitis, DVT (deep venous thrombosis) ([facility] [address] Kidney stone, and Wears glasses. has a past surgical history that includes Tubal ligation (1993); Carpal tunnel release (1990s); [place] tooth extraction; Tubal ligation; candy and bso (cervix removed); Cystoscopy (N/A, 1/4/2022); and IR GUIDED NEPHROSTOMY CATH PLACEMENT RIGHT (1/5/2022). Restrictions  Restrictions/Precautions  Restrictions/Precautions: General Precautions,Surgical Protocols,Fall Risk  Required Braces or Orthoses?: No  Position Activity Restriction  Other position/activity restrictions: right side nephrostomy tube (right kidney); up with assistance    Subjective   General  Patient assessed for rehabilitation services?: Yes  Family / Caregiver Present: No  General Comment  Comments: RN ok'd for OT/PT eval this PM. Pt agreeable to session, pleasent/cooperative throughout.   Patient Currently in Pain: Yes  Pain Assessment  Pain Level: 10  Response to Pain Intervention: Patient Satisfied  Vital Signs  Patient Currently in Pain: Yes     Social/Functional History  Social/Functional History  Lives With: Spouse,Daughter  Type of Home: House  Home Layout: Two level,Able to Live on Main level with bedroom/bathroom,1/2 bath on main level (lives on main level)  Home Access: Stairs to enter with rails  Entrance Stairs - Number of Steps: 4 (negotiate steps side ways)  Entrance Stairs - Rails: Right  Bathroom Shower/Tub: Walk-in shower (wash up at sink, wash hair at sink)  Bathroom Equipment: Shower chair  Bathroom Accessibility: Not accessible  Home Equipment: 4 wheeled walker,Standard walker  Receives Help From: Family  ADL Assistance: [address]: Needs assistance  Meal Prep: Other (comment) (share responsibility daughter primary, spouse help)  Laundry: Other (comment) (share responsibility daughter primary, spouse help)  Vacuuming: Other (comment) (share responsibility daughter primary, spouse help)  Cleaning: Other (comment) (share responsibility daughter primary, spouse help)  Homemaking Responsibilities: Yes  Active : Yes  Mode of Transportation: SUV (assist needed with right leg)  Occupation: Retired  Type of occupation: part time work  Leisure & Hobbies: face book       Objective   Vision: Impaired  Vision Exceptions: Wears glasses for distance  Hearing: Within functional limits      Balance  Sitting Balance: Contact guard assistance (seated EOB unsupported for ~7 minutes for ROM/MMT and grooming task. Heavy reliance on UEs support on bed for balance.)  Standing Balance: Unable to assess(comment)  Standing Balance  Comment: Pt unwilling to attempt  this date d/t pain and discomfort. When encouraged pt reported \"I just wan't to lay back down\"    Functional Mobility  Functional Mobility Comments: Pt unwilling to attempt functional transfers this date d/t pain and discomfort. When encouraged pt reported \"I just wan't to lay back down\"    ADL  Feeding: Independent  Grooming: Minimal assistance;Verbal cueing; Increased time to complete (OT facilitated grooming task EOB. Pt brushed hair with Min A to complete task for back of hair d/t pt fatigue.)  UE Bathing: Minimal assistance;Verbal cueing;Setup; Increased time to complete  LE Bathing: Increased time to complete; Moderate assistance;Setup;Verbal cueing  UE Dressing: Minimal assistance;Verbal cueing; Increased time to complete  LE Dressing: Increased time to complete;Setup;Verbal cueing;Maximum assistance  Toileting: Maximum assistance; Increased time to complete;Setup     Tone RUE  RUE Tone: Normotonic  Tone LUE  LUE Tone: Normotonic  Coordination  Movements Are Fluid And Coordinated: Yes     Bed mobility  Bridging: Maximum assistance;2 Person assistance  Rolling to Left: Maximum assistance  Supine to Sit: Maximum assistance (LE and trunk progression)  Sit to Supine: Maximum assistance (LE and trunk progression)  Scooting: Maximal assistance  Comment: Use of bedrail, HOB ~30 degrees, increased time/effort required     Transfers  Transfer Comments: Pt unwilling to attempt functional transfers this date d/t pain and discomfort.  When encouraged pt reported \"I just wan't to lay back down\"     Cognition  Overall Cognitive Status: WFL        Sensation  Overall Sensation Status: WFL (Denies any numbness/tingling)        LUE AROM (degrees)  LUE AROM : WFL  Left Hand AROM (degrees)  Left Hand AROM: WFL  RUE AROM (degrees)  RUE AROM : Exceptions  R Shoulder Flexion 0-180: 0-70, distal to shoulder WFL  Right Hand AROM (degrees)  Right Hand AROM: WFL  LUE Strength  Gross LUE Strength: Exceptions to [facility]  L Shoulder Flex: 4-/5  L Shoulder Ext: 4-/5  L Elbow Flex: 4-/5  L Elbow Ext: 4-/5  L Hand General: 4-/5  RUE Strength  Gross RUE Strength: Exceptions to [facility]  R Shoulder Flex: 3-/5  R Shoulder Ext: 3-/5  R Elbow Flex: 4-/5  R Elbow Ext: 4-/5  R Hand General: 4-/5                   Plan   Plan  Times per week: 3-4x/wk  Current Treatment Recommendations: Safety Education & Training,Patient/Caregiver Education & Training,Balance Training,Functional Mobility Training,Self-Care / ADL,Endurance Training,Equipment Evaluation, Education, & procurement      AM-PAC Score        AM-PAC Inpatient Daily Activity Raw Score: 16 (01/05/22 1731)  AM-PAC Inpatient ADL T-Scale Score : 35.96 (01/05/22 1731)  ADL

## 2022-01-05 NOTE — CARE COORDINATION
Case Management Initial Discharge Plan  Laura Vernon,             Met with:patient to discuss discharge plans. Information verified: address, contacts, phone number, , insurance Yes  Insurance Provider: Saul Shrestha    Emergency Contact/Next of Kin name & number:   Jaime Kay 2. *No Contact Specified* No    Spouse    [phone]          Who are involved in patient's support system?  daughtr    PCP: Mala Resendiz, APRN - CNP  Date of last visit: 1 year      Discharge Planning    Living Arrangements:        Home has 2 stories  4 stairs to climb to get into front door, 0stairs to climb to reach second floor  Location of bedroom/bathroom in home main    Patient able to perform ADL's:Independent    Current Services (outpatient & in home) nonde  DME equipment: walker shower chair  DME provider:     Is patient receiving oral anticoagulation therapy? Yes xeralto    If indicated:   Physician managing anticoagulation treatment: Mala Resendiz  Where does patient obtain lab work for ATC treatment? Potential Assistance Needed:       Patient agreeable to home care:tbd she will see how she does  Freedom of choice provided:  no    Prior SNF/Rehab Placement and Facility: none  Agreeable to SNF/Rehab: No  [place] of choice provided: n/a     Evaluation: no    Expected Discharge date:       Patient expects to be discharged to: If home: is the family and/or caregiver wiling & able to provide support at home?  daughter  Who will be providing this support?  daughter    Follow Up Appointment: Best Day/ Time:      Transportation provider:   Transportation arrangements needed for discharge: No    Readmission Risk              Risk of Unplanned Readmission:  13             Does patient have a readmission risk score greater than 14?: No  If yes, follow-up appointment must be made within 7 days of discharge.      Goals of Care:       Educated pt on transitional options, provided freedom of choice and are agreeable with plan      Discharge Plan: Follow for needs, may need homecare,pcp established, has transportation          Electronically signed by Lillian Sy RN on 1/4/22 at 7:11 PM EST

## 2022-01-05 NOTE — CONSULTS
Akbar Johnson, Amanda Leigh, Meghan Schmitz, & Sagar   Urology Consult      Patient:  Russ Gomez  MRN: [id number]  YOB: 1955    CHIEF COMPLAINT: Right UPJ stone    HISTORY OF PRESENT ILLNESS:   The patient is a 74 y.o. female who presents with right UPJ stone with hydronephrosis, right psoas, perinephric abscess. She presents as a transfer from outside facility. Patient was previously outside facility presented with abdominal pain, fevers, chills. She subsequently underwent right stent placement. Currently afebrile vital signs stable. WBC 11.3. She denies any prior history of nephrolithiasis but states that she knows that she has had a staghorn calculi for years now. She denies any fevers, chills, nausea or vomiting. Currently has Brooks catheter in. No other prior urological history. Patient's old records, notes and chart reviewed and summarized above.     Past Medical History:    Past Medical History:   Diagnosis Date    Carcinoma ([facility] [address]     Squamous Cell    Cellulitis     DVT (deep venous thrombosis) ([facility] [address] 2006    LLE    Kidney stone     Wears glasses        Past Surgical History:    Past Surgical History:   Procedure Laterality Date    CARPAL TUNNEL RELEASE  1990s    ANNABELLA AND BSO      05/2019    TUBAL LIGATION  1993    TUBAL LIGATION      WISDOM TOOTH EXTRACTION         Medications:      Current Facility-Administered Medications:     magnesium sulfate 1000 mg in dextrose 5% 100 mL IVPB, 1,000 mg, IntraVENous, PRN, Ruby Shown, APRN - NP    ondansetron (ZOFRAN-ODT) disintegrating tablet 4 mg, 4 mg, Oral, Q8H PRN **OR** ondansetron (ZOFRAN) injection 4 mg, 4 mg, IntraVENous, Q6H PRN, Ruby Shown, APRN - NP    polyethylene glycol (GLYCOLAX) packet 17 g, 17 g, Oral, Daily PRN, Ruby Shown, APRN - NP    acetaminophen (TYLENOL) tablet 650 mg, 650 mg, Oral, Q6H PRN **OR** acetaminophen (TYLENOL) suppository 650 mg, 650 mg, Rectal, Q6H PRN, Ayana Peralta Ronaklacey Hamilton APRN - NP    piperacillin-tazobactam (ZOSYN) 3,375 mg in dextrose 5 % 50 mL IVPB extended infusion (mini-bag), 3,375 mg, IntraVENous, Q8H, Lillie Wilson MD, Last Rate: 12.5 mL/hr at 01/04/22 1901, 3,375 mg at 01/04/22 1901    0.9 % sodium chloride infusion, , IntraVENous, Continuous, Lillie Wilson MD, Last Rate: 50 mL/hr at 01/04/22 1858, New Bag at 01/04/22 1858    [START ON 1/5/2022] pantoprazole (PROTONIX) tablet 40 mg, 40 mg, Oral, QAM AC, Lillie Wilson MD    morphine (PF) injection 1 mg, 1 mg, IntraVENous, Q4H PRN **OR** morphine (PF) injection 2 mg, 2 mg, IntraVENous, Q4H PRN, Lillie Wilson MD, 2 mg at 01/04/22 1853    Allergies: Allergies   Allergen Reactions    Estrogens Other (See Comments)     Hx of DVT       Social History:   Social History     Socioeconomic History    Marital status:      Spouse name: Not on file    Number of children: Not on file    Years of education: Not on file    Highest education level: Not on file   Occupational History    Not on file   Tobacco Use    Smoking status: Current Every Day Smoker     Packs/day: 0.50     Years: 42.00     Pack years: 21.00     Types: Cigarettes    Smokeless tobacco: Never Used   Vaping Use    Vaping Use: Never used   Substance and Sexual Activity    Alcohol use: No     Alcohol/week: 0.0 standard drinks    Drug use: No    Sexual activity: Not Currently   Other Topics Concern    Not on file   Social History Narrative    Not on file     Social Determinants of Health     Financial Resource Strain:     Difficulty of Paying Living Expenses: Not on file   Food Insecurity:     Worried About Running Out of Food in the Last Year: Not on file    Nabeel of Food in the Last Year: Not on file   Transportation Needs:     Lack of Transportation (Medical): Not on file    Lack of Transportation (Non-Medical):  Not on file   Physical Activity:     Days of Exercise per Week: Not on file    Minutes of Exercise per Session: Not on file   Stress:     Feeling of Stress : Not on file   Social Connections:     Frequency of Communication with Friends and Family: Not on file    Frequency of Social Gatherings with Friends and Family: Not on file    Attends Mandaeism Services: Not on file    Active Member of Clubs or Organizations: Not on file    Attends Club or Organization Meetings: Not on file    Marital Status: Not on file   Intimate Partner Violence:     Fear of Current or Ex-Partner: Not on file    Emotionally Abused: Not on file    Physically Abused: Not on file    Sexually Abused: Not on file   Housing Stability:     Unable to Pay for Housing in the Last Year: Not on file    Number of [place] in the Last Year: Not on file    Unstable Housing in the Last Year: Not on file       Family History:    Family History   Adopted: Yes       REVIEW OF SYSTEMS:  A comprehensive 14 point review of systems was obtained. Constitutional: No fatigue  Eyes: No blurry vision  Ears, nose, mouth, throat, face: No ringing in the ears; no facial droop. Respiratory: No cough or cold. Cardiovascular: No palpitations  Gastrointestinal: No diarrhea or constipation. Genitourinary: No burning with urination  Integument/Skin: No rashes  Hematologic/Lymphatic: No easy bruising  Musculoskeletal: No muscle pains  Neurologic: No weakness in the extremities. Psychiatric: No depression or suicidal thoughts. Endocrine: No heat or cold intolerances. Allergic/Immunologic: No current seasonal allergies; no skin hives. Physical Exam:      This a 74 y.o. female   Vitals:    01/04/22 1600   BP: (!) 156/80   Pulse: 89   Resp: 18   Temp: 98.6 °F (37 °C)   SpO2: 98%     Constitutional: Patient in no acute distress. Neuro: alert and oriented to person place and time. Head: Atraumatic and normocephalic. Neck: Trachea midline. Ext: 2+ radial pulses bilaterally.   Psych: Mood and affect normal.  Skin: No rashes or bruising present. Lungs: Respiratory effort normal.  Cardiovascular:  Regular rhythm. Abdomen: Soft, non-tender, non-distended. Right side has CVA tenderness on exam.  Bladder non-tender and not distended. Brooks in with clear yellow urine  Lymphatics: no palpable lymphadenopathy  Pelvic exam: deferred. Rectal exam not indicated. Labs:  Recent Labs     01/03/22  0534 01/03/22  2152 01/04/22  1754   WBC 11.9* 11.8* 12.1*   HGB 9.6* 9.9* 10.8*   HCT 30.8* 31.6* 33.6*   MCV 99.0 98.1 97.7    369 346     Recent Labs     01/02/22  1243 01/03/22  0534 01/04/22  1754    136 139   K 3.8 3.9 3.9    102 102   CO2 24 24 24   BUN 18 15 17   CREATININE 0.97* 0.87 0.77       Recent Labs     01/02/22  1530   COLORU Yellow   PHUR 6.0   WBCUA 20 TO 50   RBCUA 5 TO 10   MUCUS NOT REPORTED   TRICHOMONAS NOT REPORTED   YEAST NOT REPORTED   BACTERIA 3+*   SPECGRAV 1.020   LEUKOCYTESUR SMALL*   UROBILINOGEN Normal   BILIRUBINUR NEGATIVE           -----------------------------------------------------------------  Imaging Results:  XR ABDOMEN (KUB) (SINGLE AP VIEW)    Result Date: 1/4/2022  EXAMINATION: ONE SUPINE XRAY VIEW(S) OF THE ABDOMEN 1/4/2022 5:28 pm COMPARISON: CT 01/02/2022 and 01/04/2022 and fluoroscopic exam 01/04/2022 HISTORY: ORDERING SYSTEM PROVIDED HISTORY: Uretal stent eval TECHNOLOGIST PROVIDED HISTORY: Uretal stent eval Reason for Exam: ureteral stent eval port supine at 545pm FINDINGS: Ureteral stent identified projecting overlying the right sacrum. This appears stable position to the fluoroscopic exam performed at 1317 hours. Right renal calculus noted. Dilated loops of bowel identified similar previous exams. Mild retained stool. Dextrocurvature lumbar spine. Multilevel degenerate changes lumbar spine. Stable configuration of the ureteral stent compared to fluoroscopic examination from earlier today.      CT ABDOMEN PELVIS W IV CONTRAST Additional Contrast? None    Result Date: 1/4/2022  EXAMINATION: CT OF THE ABDOMEN AND PELVIS WITH CONTRAST 1/4/2022 7:11 am TECHNIQUE: CT of the abdomen and pelvis was performed with the administration of intravenous contrast. Multiplanar reformatted images are provided for review. Dose modulation, iterative reconstruction, and/or weight based adjustment of the mA/kV was utilized to reduce the radiation dose to as low as reasonably achievable. COMPARISON: 01/02/2022 HISTORY: ORDERING SYSTEM PROVIDED HISTORY: fever, flank pain; Abscess vs urinoma? TECHNOLOGIST PROVIDED HISTORY: IV contrast only fever, flank pain; Abscess vs urinoma? Decision Support Exception - unselect if not a suspected or confirmed emergency medical condition->Emergency Medical Condition (MA) FINDINGS: LOWER CHEST: Trace right pleural effusion. Dependent opacities in the lung bases are consistent with subsegmental atelectasis. KIDNEYS AND URINARY TRACT: Obstructing 1.6 cm stone in the right UPJ results in moderately severe hydronephrosis. The recently described abnormality about the inferior medial right kidney involving the adjacent psoas muscle has rim enhancement consistent with an abscess measuring 7.2 x 6.0 x 7.7 cm. Additional punctate stones are present in the lower pole of the right kidney. Punctate left renal stones are present as well. Appropriate contrast excretion on the left side is demonstrated. No contrast is present within the right renal collecting system. A Brooks catheter is present within the bladder. Findings of pelvic floor relaxation are demonstrated with cystocele. Previously noted small stones in the inferior bladder not well delineated on this exam.  Multiple calcifications seen posterior to the bladder are again noted, likely representing phleboliths. ORGANS: Lack of intravenous contrast limits evaluation of the solid organs and bowel. The liver, gallbladder, pancreas, spleen, and adrenals reveal no acute abnormality. Cholelithiasis.  GI/BOWEL: No bowel dilatation or wall thickening. Diverticulosis. PELVIS: No acute findings. PERITONEUM/RETROPERITONEUM: No lymphadenopathy is noted. No free air or free fluid. The aorta is normal in caliber. BONES/SOFT TISSUES: No acute osseous abnormality is identified. 1.  Obstructing 1.6 cm stone in the right UPJ results in moderately severe hydronephrosis. The recently described abnormality along the inferomedial right kidney involving the psoas muscle is consistent with an abscess measuring up to 7.2 x 6.0 x 7.7 cm. 2.  Bilateral nephrolithiasis. Previously noted small bladder stones are not delineated on this exam. 3.  Brooks catheter within the bladder. Pelvic floor relaxation. 4.  Diverticulosis and cholelithiasis. RECOMMENDATIONS: Unavailable     FLUORO FOR SURGICAL PROCEDURES    Result Date: 1/4/2022  Radiology exam is complete. No Radiologist dictation. Please follow up with ordering provider. Assessment and Plan   Impression:   problem list:  1.6 cm right UPJ stone with hydronephrosis  Right perinephric abscess      Plan:   N.p.o. at midnight.   Please hold all anticoagulation in anticipation for right percutaneous nephrostomy tube placement, right perinephric drain placement by interventional radiology  PT, PTT, INR in the morning  Continue Zosyn pending urine and blood culture susceptibilities  Medical management per primary team  Patient will need definitive stone treatment at a later date  Encourage ambulation, incentive spirometry use          Bladimir Rhodes MD  7:26 PM 1/4/2022

## 2022-01-05 NOTE — PROGRESS NOTES
Occupational [address] [employer]  Occupational Therapy Not Seen Note    DATE: 2022    NAME: Adriel Cleaning  MRN: [id number]   : 1955      Patient not seen this date for Occupational Therapy due to:    Surgery/Procedure: Pt off floor at IR for right percutaneous nephrostomy tube placement, right perinephric drain placement    Next Scheduled Treatment: Check back in PM or      Electronically signed by Abby Daniel OT on 2022 at 11:13 AM

## 2022-01-05 NOTE — PROGRESS NOTES
[facility] -   Infectious diseases evaluation  admission date 1/4/2022    reason for consultation:   Blood culture x2 growing Klebsiella pneumonia-1/2  Perinephric abscess    Impression :   Current:  · Klebsiella bacteremia  · Blood cultures x2+  · perinephric abscess s/p right percutaneous nephrostomy tube 120 mL pus aspirated and right perinephric drain placement  · Right hydronephrosis  · 1.6 cm right ureteral stone  · S/p cystoscopy ureteral stent insertion 1/4  · Bilateral lymphedema  · Prolonged QT  Other:  ·   Discussion / summary of stay / plan of care   ·   Recommendations   · Follow blood culture and sensitivity   · Stop Zosyn 1/6. Started Rocephin 2 g IV daily  · s/p gentamicin for synergy x1 200 mg 1/4/2022  · Follow-up urine culture and sensitivity  · Follow-up perinephric abscess culture      Infection Control Recommendations   · [place] Precautions  · Contact Isolation   · Airborne isolation  · Droplet Isolation  · Brooks discontinuation  · Central line discontinuation    Antimicrobial Stewardship Recommendations   · Simplification of therapy  · Targeted therapy  · IV to oral conversion  · Per Kg dosing  · Restricted antimicrobial use  · Discontinuation of therapy    Coordination ofOutpatient Care:   · Estimated Length of IV antimicrobials:  · Patient will need Midline / picc Catheter Insertion:   · Patient will need SNF:  · Patient will need outpatient wound care:     History of Present Illness:   Initial history:  Jatinder Guardian is a 74y.o.-year-old female with past medical history of bilateral lymphedema, morbid obesity, history of DVT on Xarelto presents with right flank pain from outside facility. Patient had fever and leukocytosis. CT abdomen pelvis revealed 1.6 cm right renal calculus with 7 x 6 x 7 cm psoas abscess. Patient transferred to [facility] for right PCNT. Blood culture x2+ for bacteria. 1/2 Klebsiella pneumonia.     Interval changes  1/5/2022   Patient Vitals for the past 8 hrs:   BP Temp Temp src Pulse Resp SpO2 Weight   01/05/22 1213 (!) 154/93 97.8 °F (36.6 °C) Axillary 75 14 98 % --   01/05/22 1035 (!) 123/100 -- -- -- -- -- --   01/05/22 1030 (!) 123/100 -- -- 87 21 97 % --   01/05/22 1025 (!) 166/92 -- -- 88 18 98 % --   01/05/22 1020 (!) 167/67 -- -- 90 19 98 % --   01/05/22 1015 (!) 156/90 -- -- 82 24 98 % --   01/05/22 1010 (!) 168/95 -- -- 85 16 98 % --   01/05/22 0934 (!) 157/69 97.8 °F (36.6 °C) Axillary 85 16 96 % --   01/05/22 0600 -- -- -- -- -- -- 273 lb 2.4 oz (123.9 kg)     1/5  Patient seen and examined in her room. She had a drain placed to the perinephric abscess of her right kidney. 120 mL pus aspirated. She is afebrile hemodynamically stable has some flank pain. 1/6 patient seen and examined in her room. Drain in place. Has some back pain. Afebrile hemodynamically stable. On 2 L oxygen via nasal cannula  White count trending down    Summary of relevant labs:  Labs:  Creatinine 0.77-0.83  WBC 11-12-11-10    Micro:  Blood culture x2 1/2 growing Klebsiella  Perinephric abscess culture 1/5: Lactose fermenting gram-negative rods. Gram-positive cocci in clusters. Gram-positive cocci in chains. Imaging:  CT abdomen pelvis with contrast 1/5/2022  .  Obstructing 1.6 cm stone in the right UPJ results in moderately severe   hydronephrosis.  The recently described abnormality along the inferomedial   right kidney involving the psoas muscle is consistent with an abscess   measuring up to 7.2 x 6.0 x 7.7 cm.       2.  Bilateral nephrolithiasis.  Previously noted small bladder stones are not   delineated on this exam.       3.  Brooks catheter within the bladder.  Pelvic floor relaxation.       4.  Diverticulosis and cholelithiasis.                I have personally reviewed the past medical history, past surgical history, medications, social history, and family history, and I haveupdated the database accordingly. Allergies:   Estrogens     Review of Systems:     Review of Systems   Constitutional: Positive for activity change. HENT: Negative for congestion. Eyes: Positive for discharge. Negative for redness. Respiratory: Negative for cough, chest tightness and shortness of breath. Cardiovascular: Positive for leg swelling. Negative for chest pain and palpitations. Gastrointestinal: Negative for abdominal distention, abdominal pain and diarrhea. Genitourinary: Positive for difficulty urinating and dysuria. Musculoskeletal: Positive for back pain. Negative for myalgias. Skin: Negative. Neurological: Negative. Hematological: Negative. Psychiatric/Behavioral: Negative. All other systems reviewed and are negative. Physical Examination :       Physical Exam  Constitutional:       Appearance: Normal appearance. HENT:      Right Ear: Tympanic membrane normal.      Left Ear: Tympanic membrane normal.      Nose: Nose normal.      Mouth/Throat:      Mouth: Mucous membranes are moist.   Eyes:      Pupils: Pupils are equal, round, and reactive to light. Cardiovascular:      Rate and Rhythm: Normal rate and regular rhythm. Pulses: Normal pulses. Heart sounds: Normal heart sounds. Abdominal:      General: Abdomen is flat. Tenderness: There is abdominal tenderness. Skin:     Capillary Refill: Capillary refill takes less than 2 seconds. Neurological:      Mental Status: She is alert.      bilateral lower extremity lymphedema and venous stasis dermatitis present    Past Medical History:     Past Medical History:   Diagnosis Date    Carcinoma ([facility] [address]     Squamous Cell    Cellulitis     DVT (deep venous thrombosis) ([facility] [address] 2006    LLE    Kidney stone     Wears glasses        Past Surgical  History:     Past Surgical History:   Procedure Laterality Date    CARPAL TUNNEL RELEASE  1990s    ANNABELLA AND BSO      05/2019    TUBAL LIGATION  1993    TUBAL LIGATION      WISDOM TOOTH EXTRACTION         Medications:      piperacillin-tazobactam  3,375 mg IntraVENous Q8H    pantoprazole  40 mg Oral QAM AC       Social History:     Social History     Socioeconomic History    Marital status:      Spouse name: Not on file    Number of children: Not on file    Years of education: Not on file    Highest education level: Not on file   Occupational History    Not on file   Tobacco Use    Smoking status: Current Every Day Smoker     Packs/day: 0.50     Years: 42.00     Pack years: 21.00     Types: Cigarettes    Smokeless tobacco: Never Used   Vaping Use    Vaping Use: Never used   Substance and Sexual Activity    Alcohol use: No     Alcohol/week: 0.0 standard drinks    Drug use: No    Sexual activity: Not Currently   Other Topics Concern    Not on file   Social History Narrative    Not on file     Social Determinants of Health     Financial Resource Strain:     Difficulty of Paying Living Expenses: Not on file   Food Insecurity:     Worried About Running Out of Food in the Last Year: Not on file    Nabeel of Food in the Last Year: Not on file   Transportation Needs:     Lack of Transportation (Medical): Not on file    Lack of Transportation (Non-Medical):  Not on file   Physical Activity:     Days of Exercise per Week: Not on file    Minutes of Exercise per Session: Not on file   Stress:     Feeling of Stress : Not on file   Social Connections:     Frequency of Communication with Friends and Family: Not on file    Frequency of Social Gatherings with Friends and Family: Not on file    Attends Anabaptist Services: Not on file    Active Member of Clubs or Organizations: Not on file    Attends Club or Organization Meetings: Not on file    Marital Status: Not on file   Intimate Partner Violence:     Fear of Current or Ex-Partner: Not on file    Emotionally Abused: Not on file    Physically Abused: Not on file    Sexually Abused: Not on file   Housing Stability:     Unable to Pay for Housing in the Last Year: Not on file    Number of Places Lived in the Last Year: Not on file    Unstable Housing in the Last Year: Not on file       Family History:     Family History   Adopted: Yes      Medical Decision Making:   I have independently reviewed/ordered the following labs:    CBC with Differential:   Recent Labs     01/03/22  2152 01/03/22  2152 01/04/22  1754 01/05/22  0655   WBC 11.8*   < > 12.1* 11.0   HGB 9.9*   < > 10.8* 9.7*   HCT 31.6*   < > 33.6* 30.8*      < > 346 355   LYMPHOPCT 9*  --  7*  --    MONOPCT 8  --  6  --     < > = values in this interval not displayed. BMP:  Recent Labs     01/04/22  1754 01/05/22  0655    138   K 3.9 3.5*    102   CO2 24 26   BUN 17 17   CREATININE 0.77 0.83   MG  --  2.2     Hepatic Function Panel:   Recent Labs     01/03/22  0534   PROT 6.8   LABALBU 2.5*   BILITOT 0.38   ALKPHOS 52   ALT 8   AST 11     No results for input(s): RPR in the last 72 hours. No results for input(s): HIV in the last 72 hours. No results for input(s): BC in the last 72 hours. Lab Results   Component Value Date    CREATININE 0.83 01/05/2022    GLUCOSE 125 01/05/2022       Detailed results: Thank you for allowing us to participate in the care of this patient. Please call with questions. This note is created with the assistance of a speech recognition program.  While intending to generate adocument that actually reflects the content of the visit, the document can still have some errors including those of syntax and sound a like substitutions which may escape proof reading. It such instances, actual meaningcan be extrapolated by contextual diversion.     Durga Card MD  PGY-3, Internal Medicine Resident  [facility]  1/5/2022 1:40 PM

## 2022-01-06 ENCOUNTER — APPOINTMENT (OUTPATIENT)
Dept: GENERAL RADIOLOGY | Age: 67
DRG: 871 | End: 2022-01-06
Attending: STUDENT IN AN ORGANIZED HEALTH CARE EDUCATION/TRAINING PROGRAM
Payer: MEDICARE

## 2022-01-06 LAB
ANION GAP SERPL CALCULATED.3IONS-SCNC: 10 MMOL/L (ref 9–17)
BNP INTERPRETATION: ABNORMAL
BUN BLDV-MCNC: 20 MG/DL (ref 8–23)
BUN/CREAT BLD: ABNORMAL (ref 9–20)
CALCIUM SERPL-MCNC: 8.4 MG/DL (ref 8.6–10.4)
CHLORIDE BLD-SCNC: 104 MMOL/L (ref 98–107)
CO2: 27 MMOL/L (ref 20–31)
CREAT SERPL-MCNC: 0.82 MG/DL (ref 0.5–0.9)
ESTIMATED AVERAGE GLUCOSE: 131 MG/DL
GFR AFRICAN AMERICAN: >60 ML/MIN
GFR NON-AFRICAN AMERICAN: >60 ML/MIN
GFR SERPL CREATININE-BSD FRML MDRD: ABNORMAL ML/MIN/{1.73_M2}
GFR SERPL CREATININE-BSD FRML MDRD: ABNORMAL ML/MIN/{1.73_M2}
GLUCOSE BLD-MCNC: 129 MG/DL (ref 70–99)
HBA1C MFR BLD: 6.2 % (ref 4–6)
HCT VFR BLD CALC: 30.8 % (ref 36.3–47.1)
HEMOGLOBIN: 9.6 G/DL (ref 11.9–15.1)
MCH RBC QN AUTO: 30.8 PG (ref 25.2–33.5)
MCHC RBC AUTO-ENTMCNC: 31.2 G/DL (ref 28.4–34.8)
MCV RBC AUTO: 98.7 FL (ref 82.6–102.9)
NRBC AUTOMATED: 0 PER 100 WBC
PDW BLD-RTO: 13.5 % (ref 11.8–14.4)
PLATELET # BLD: 323 K/UL (ref 138–453)
PMV BLD AUTO: 8.8 FL (ref 8.1–13.5)
POTASSIUM SERPL-SCNC: 3.7 MMOL/L (ref 3.7–5.3)
PRO-BNP: 929 PG/ML
RBC # BLD: 3.12 M/UL (ref 3.95–5.11)
SODIUM BLD-SCNC: 141 MMOL/L (ref 135–144)
WBC # BLD: 10.1 K/UL (ref 3.5–11.3)

## 2022-01-06 PROCEDURE — 87086 URINE CULTURE/COLONY COUNT: CPT

## 2022-01-06 PROCEDURE — 6360000002 HC RX W HCPCS: Performed by: INTERNAL MEDICINE

## 2022-01-06 PROCEDURE — 6370000000 HC RX 637 (ALT 250 FOR IP): Performed by: STUDENT IN AN ORGANIZED HEALTH CARE EDUCATION/TRAINING PROGRAM

## 2022-01-06 PROCEDURE — 36415 COLL VENOUS BLD VENIPUNCTURE: CPT

## 2022-01-06 PROCEDURE — 2500000003 HC RX 250 WO HCPCS: Performed by: INTERNAL MEDICINE

## 2022-01-06 PROCEDURE — 2700000000 HC OXYGEN THERAPY PER DAY

## 2022-01-06 PROCEDURE — 6360000002 HC RX W HCPCS: Performed by: STUDENT IN AN ORGANIZED HEALTH CARE EDUCATION/TRAINING PROGRAM

## 2022-01-06 PROCEDURE — 2580000003 HC RX 258: Performed by: INTERNAL MEDICINE

## 2022-01-06 PROCEDURE — 2580000003 HC RX 258: Performed by: STUDENT IN AN ORGANIZED HEALTH CARE EDUCATION/TRAINING PROGRAM

## 2022-01-06 PROCEDURE — 6370000000 HC RX 637 (ALT 250 FOR IP): Performed by: NURSE PRACTITIONER

## 2022-01-06 PROCEDURE — 6370000000 HC RX 637 (ALT 250 FOR IP): Performed by: INTERNAL MEDICINE

## 2022-01-06 PROCEDURE — 85027 COMPLETE CBC AUTOMATED: CPT

## 2022-01-06 PROCEDURE — 83880 ASSAY OF NATRIURETIC PEPTIDE: CPT

## 2022-01-06 PROCEDURE — 71045 X-RAY EXAM CHEST 1 VIEW: CPT

## 2022-01-06 PROCEDURE — 94640 AIRWAY INHALATION TREATMENT: CPT

## 2022-01-06 PROCEDURE — 1200000000 HC SEMI PRIVATE

## 2022-01-06 PROCEDURE — 82955 ASSAY OF G6PD ENZYME: CPT

## 2022-01-06 PROCEDURE — 80048 BASIC METABOLIC PNL TOTAL CA: CPT

## 2022-01-06 PROCEDURE — 83036 HEMOGLOBIN GLYCOSYLATED A1C: CPT

## 2022-01-06 PROCEDURE — 99232 SBSQ HOSP IP/OBS MODERATE 35: CPT | Performed by: INTERNAL MEDICINE

## 2022-01-06 RX ORDER — HYDROCODONE BITARTRATE AND ACETAMINOPHEN 5; 325 MG/1; MG/1
1 TABLET ORAL EVERY 4 HOURS PRN
Status: DISCONTINUED | OUTPATIENT
Start: 2022-01-06 | End: 2022-01-10

## 2022-01-06 RX ORDER — IPRATROPIUM BROMIDE AND ALBUTEROL SULFATE 2.5; .5 MG/3ML; MG/3ML
1 SOLUTION RESPIRATORY (INHALATION) EVERY 4 HOURS PRN
Status: DISCONTINUED | OUTPATIENT
Start: 2022-01-06 | End: 2022-01-13 | Stop reason: HOSPADM

## 2022-01-06 RX ORDER — HYDROCODONE BITARTRATE AND ACETAMINOPHEN 5; 325 MG/1; MG/1
2 TABLET ORAL EVERY 4 HOURS PRN
Status: DISCONTINUED | OUTPATIENT
Start: 2022-01-06 | End: 2022-01-10

## 2022-01-06 RX ORDER — IPRATROPIUM BROMIDE AND ALBUTEROL SULFATE 2.5; .5 MG/3ML; MG/3ML
1 SOLUTION RESPIRATORY (INHALATION)
Status: DISCONTINUED | OUTPATIENT
Start: 2022-01-06 | End: 2022-01-06

## 2022-01-06 RX ADMIN — WHITE PETROLATUM: 1.75 OINTMENT TOPICAL at 11:47

## 2022-01-06 RX ADMIN — ENOXAPARIN SODIUM 40 MG: 100 INJECTION SUBCUTANEOUS at 01:36

## 2022-01-06 RX ADMIN — PIPERACILLIN AND TAZOBACTAM 3375 MG: 3; .375 INJECTION, POWDER, FOR SOLUTION INTRAVENOUS at 06:18

## 2022-01-06 RX ADMIN — ANTI-FUNGAL POWDER MICONAZOLE NITRATE TALC FREE: 1.42 POWDER TOPICAL at 20:53

## 2022-01-06 RX ADMIN — GUAIFENESIN 600 MG: 600 TABLET, EXTENDED RELEASE ORAL at 09:00

## 2022-01-06 RX ADMIN — IPRATROPIUM BROMIDE AND ALBUTEROL SULFATE 1 AMPULE: .5; 3 SOLUTION RESPIRATORY (INHALATION) at 15:11

## 2022-01-06 RX ADMIN — HYDROCODONE BITARTRATE AND ACETAMINOPHEN 2 TABLET: 5; 325 TABLET ORAL at 14:33

## 2022-01-06 RX ADMIN — ANTI-FUNGAL POWDER MICONAZOLE NITRATE TALC FREE: 1.42 POWDER TOPICAL at 11:47

## 2022-01-06 RX ADMIN — PANTOPRAZOLE SODIUM 40 MG: 40 TABLET, DELAYED RELEASE ORAL at 06:18

## 2022-01-06 RX ADMIN — ENOXAPARIN SODIUM 40 MG: 100 INJECTION SUBCUTANEOUS at 09:00

## 2022-01-06 RX ADMIN — HYDROCODONE BITARTRATE AND ACETAMINOPHEN 2 TABLET: 5; 325 TABLET ORAL at 22:49

## 2022-01-06 RX ADMIN — MORPHINE SULFATE 2 MG: 2 INJECTION, SOLUTION INTRAMUSCULAR; INTRAVENOUS at 06:38

## 2022-01-06 RX ADMIN — ENOXAPARIN SODIUM 40 MG: 100 INJECTION SUBCUTANEOUS at 20:53

## 2022-01-06 RX ADMIN — MORPHINE SULFATE 2 MG: 2 INJECTION, SOLUTION INTRAMUSCULAR; INTRAVENOUS at 01:36

## 2022-01-06 RX ADMIN — GUAIFENESIN 600 MG: 600 TABLET, EXTENDED RELEASE ORAL at 20:53

## 2022-01-06 RX ADMIN — IPRATROPIUM BROMIDE AND ALBUTEROL SULFATE 1 AMPULE: .5; 3 SOLUTION RESPIRATORY (INHALATION) at 11:37

## 2022-01-06 RX ADMIN — CEFTRIAXONE SODIUM 2000 MG: 2 INJECTION, POWDER, FOR SOLUTION INTRAMUSCULAR; INTRAVENOUS at 14:35

## 2022-01-06 RX ADMIN — WHITE PETROLATUM: 1.75 OINTMENT TOPICAL at 20:53

## 2022-01-06 ASSESSMENT — PAIN SCALES - GENERAL
PAINLEVEL_OUTOF10: 0
PAINLEVEL_OUTOF10: 8
PAINLEVEL_OUTOF10: 6
PAINLEVEL_OUTOF10: 10
PAINLEVEL_OUTOF10: 8
PAINLEVEL_OUTOF10: 8

## 2022-01-06 ASSESSMENT — ENCOUNTER SYMPTOMS
BACK PAIN: 1
EYE PAIN: 0
CHEST TIGHTNESS: 0
COUGH: 0
EYE DISCHARGE: 1
CHOKING: 0
ABDOMINAL PAIN: 0
EYE REDNESS: 0
ABDOMINAL DISTENTION: 0
DIARRHEA: 0
COLOR CHANGE: 0
SHORTNESS OF BREATH: 0

## 2022-01-06 NOTE — CONSULTS
[facility] -   Infectious diseases evaluation  admission date 1/4/2022    reason for consultation:   Sepsis    Impression :   Current:  · Right obstructive kidney stone with pyelonephritis - kleb S  · Right psoas abscess 7 x 6 x 7.7  · Klebsiella septicemia  · Large cystocele  · Prolonged QTC    Other:  ·   Discussion / summary of stay / plan of care   ·   Recommendations   · post Zosyn  · Post genta for synergy x 1 dose 200 mg  · Ceftriaxone 2 g daily x 4 weeks for the psoas and the kid infection  · Midline  · AB reconsiled  · Post IR to drain the psoas abscess    Infection Control Recommendations   · [place] Precautions  · Contact Isolation   · Airborne isolation  · Droplet Isolation      Antimicrobial Stewardship Recommendations   · Simplification of therapy  · Targeted therapy      Coordination ofOutpatient Care:   · Estimated Length of IV antimicrobials:  · Patient will need Midline / picc Catheter Insertion:   · Patient will need SNF:  · Patient will need outpatient wound care:     History of Present Illness:   Initial history:  Tio Renteria is a 74y.o.-year-old femaleP[facility] showing a kidney stone obstructing the right kidney and a psoas abscess, the patient admitted and had a stent placed, and the right kidney.   Blood culture showing Klebsiella pneumoniae X2, and the CT scan of the abdomen is showing a large right psoas abscess  Had emergent stent 1/4     Interval changes  1/6/2022   Patient Vitals for the past 8 hrs:   BP Temp Temp src Pulse Resp SpO2   01/06/22 1118 (!) 152/82 97.5 °F (36.4 °C) Oral 78 16 97 %   01/06/22 0827 133/76 98.4 °F (36.9 °C) Oral 82 18 97 %       Summary of relevant labs:  Labs:    Micro:  kleb BC S ceftriaoxne  U cx  Psoas aspirate cx GNR  Imaging:  CT AP  Obstructing 1.6 cm stone in the right UPJ with severe hydronephrosis  Abscess measuring 7 x 6 x 7.7 cm along the inferomedial right kidney involving the psoas muscle    I have personally reviewed the past medical history, past surgical history, medications, social history, and family history, and I haveupdated the database accordingly. Allergies:   Estrogens     Review of Systems:     Review of Systems   Constitutional: Negative for activity change. HENT: Negative for congestion. Eyes: Negative for pain. Respiratory: Negative for cough and choking. Cardiovascular: Negative for chest pain. Gastrointestinal: Negative for abdominal distention. Endocrine: Negative for polydipsia. Genitourinary: Positive for flank pain. Musculoskeletal: Negative for arthralgias. Skin: Negative for color change. Allergic/Immunologic: Negative for immunocompromised state. Neurological: Negative for dizziness. Hematological: Negative for adenopathy. Psychiatric/Behavioral: Negative for agitation. Physical Examination :       Physical Exam  Constitutional:       Appearance: Normal appearance. She is not ill-appearing. HENT:      Head: Normocephalic and atraumatic. Mouth/Throat:      Mouth: Mucous membranes are moist.   Eyes:      General: No scleral icterus. Conjunctiva/sclera: Conjunctivae normal.   Cardiovascular:      Rate and Rhythm: Normal rate and regular rhythm. Heart sounds: Normal heart sounds. No murmur heard. Pulmonary:      Effort: No respiratory distress. Abdominal:      General: There is no distension. Tenderness: There is no abdominal tenderness. Genitourinary:     Comments: R CVA tender  Musculoskeletal:         General: No swelling or deformity. Cervical back: Neck supple. No rigidity. Skin:     General: Skin is dry. Coloration: Skin is not jaundiced. Neurological:      General: No focal deficit present. Mental Status: She is oriented to person, place, and time. Psychiatric:         Mood and Affect: Mood normal.         Thought Content:  Thought content normal.         Past Medical History:     Past Medical History:   Diagnosis Date    Carcinoma ([facility] [address]     Squamous Cell    Cellulitis     DVT (deep venous thrombosis) ([facility] [address] 2006    LLE    Kidney stone     Wears glasses        Past Surgical  History:     Past Surgical History:   Procedure Laterality Date    CARPAL TUNNEL RELEASE  1990s    CYSTOSCOPY N/A 1/4/2022    CYSTOSCOPY URETERAL STENT INSERTION performed by Jackson Lyles MD at [facility] IR NEPHROSTOMY PERCUTANEOUS RIGHT  1/5/2022    IR NEPHROSTOMY PERCUTANEOUS RIGHT 1/5/2022 Leora Morris MD [facility]    ANNABELLA AND BSO      05/2019    TUBAL LIGATION  1993    TUBAL LIGATION      WISDOM TOOTH EXTRACTION         Medications:      ipratropium-albuterol  1 ampule Inhalation Q4H WA    mineral oil-hydrophilic petrolatum   Topical BID    miconazole   Topical BID    guaiFENesin  600 mg Oral BID    enoxaparin  40 mg SubCUTAneous BID    piperacillin-tazobactam  3,375 mg IntraVENous Q8H    pantoprazole  40 mg Oral QAM AC       Social History:     Social History     Socioeconomic History    Marital status:      Spouse name: Not on file    Number of children: Not on file    Years of education: Not on file    Highest education level: Not on file   Occupational History    Not on file   Tobacco Use    Smoking status: Current Every Day Smoker     Packs/day: 0.50     Years: 42.00     Pack years: 21.00     Types: Cigarettes    Smokeless tobacco: Never Used   Vaping Use    Vaping Use: Never used   Substance and Sexual Activity    Alcohol use: No     Alcohol/week: 0.0 standard drinks    Drug use: No    Sexual activity: Not Currently   Other Topics Concern    Not on file   Social History Narrative    Not on file     Social Determinants of Health     Financial Resource Strain:     Difficulty of Paying Living Expenses: Not on file   Food Insecurity:     Worried About Running Out of Food in the Last Year: Not on file  Ran Out of Food in the Last Year: Not on file   Transportation Needs:     Lack of Transportation (Medical): Not on file    Lack of Transportation (Non-Medical): Not on file   Physical Activity:     Days of Exercise per Week: Not on file    Minutes of Exercise per Session: Not on file   Stress:     Feeling of Stress : Not on file   Social Connections:     Frequency of Communication with Friends and Family: Not on file    Frequency of Social Gatherings with Friends and Family: Not on file    Attends Evangelical Services: Not on file    Active Member of [address] [place] [address] [employer] or Organizations: Not on file    Attends Club or Organization Meetings: Not on file    Marital Status: Not on file   Intimate Partner Violence:     Fear of Current or Ex-Partner: Not on file    Emotionally Abused: Not on file    Physically Abused: Not on file    Sexually Abused: Not on file   Housing Stability:     Unable to Pay for Housing in the Last Year: Not on file    Number of [place] in the Last Year: Not on file    Unstable Housing in the Last Year: Not on file       Family History:     Family History   Adopted: Yes      Medical Decision Making:   I have independently reviewed/ordered the following labs:    CBC with Differential:   Recent Labs     01/03/22  2152 01/03/22  2152 01/04/22  1754 01/04/22  1754 01/05/22  0655 01/06/22  0630   WBC 11.8*   < > 12.1*   < > 11.0 10.1   HGB 9.9*   < > 10.8*   < > 9.7* 9.6*   HCT 31.6*   < > 33.6*   < > 30.8* 30.8*      < > 346   < > 355 323   LYMPHOPCT 9*  --  7*  --   --   --    MONOPCT 8  --  6  --   --   --     < > = values in this interval not displayed. BMP:  Recent Labs     01/05/22  0655 01/06/22  0630    141   K 3.5* 3.7    104   CO2 26 27   BUN 17 20   CREATININE 0.83 0.82   MG 2.2  --      Hepatic Function Panel:   No results for input(s): PROT, LABALBU, BILIDIR, IBILI, BILITOT, ALKPHOS, ALT, AST in the last 72 hours.   No results for input(s): RPR in the last 72 hours. No results for input(s): HIV in the last 72 hours. No results for input(s): BC in the last 72 hours. Lab Results   Component Value Date    CREATININE 0.82 01/06/2022    GLUCOSE 129 01/06/2022       Detailed results: Thank you for allowing us to participate in the care of this patient. Please call with questions. This note is created with the assistance of a speech recognition program.  While intending to generate adocument that actually reflects the content of the visit, the document can still have some errors including those of syntax and sound a like substitutions which may escape proof reading. It such instances, actual meaningcan be extrapolated by contextual diversion. Selma Barcenas MD  Office: [phone]  Perfect serve / office [phone]        I have discussed the care of the patient, including pertinent history and exam findings,  with the resident. I have seen and examined the patient and the key elements of all parts of the encounter have been performed by me. I agree with the assessment, plan and orders as documented by the resident.     Kyung Lara, Infectious Diseases

## 2022-01-06 NOTE — PROGRESS NOTES
Urology Progress Note      Subjective: Hillary Nichols is a 74 y.o. female. His/Her current Diet is: ADULT DIET; Regular. Right percutaneous nephrostomy placed yesterday, with return of 120 cc of purulent fluid  No acute issues overnight. No fevers or chills. No nausea or vomiting. No chest pain or shortness of breath. No calf pain. Pain controlled. Ambulating. Tolerating PO Diet. Right nephrostomy bag draining pink urine      Vitals and Labs:  Vitals:    01/05/22 1030 01/05/22 1035 01/05/22 1213 01/05/22 2035   BP: (!) 123/100 (!) 123/100 (!) 154/93 (!) 143/67   Pulse: 87  75 89   Resp: 21  14 16   Temp:   97.8 °F (36.6 °C) 98.7 °F (37.1 °C)   TempSrc:   Axillary Oral   SpO2: 97%  98% 95%   Weight:       Height:         I/O last 3 completed shifts:  In: -   Out: 800 [Urine:800]    Recent Labs     01/03/22 2152 01/04/22  1754 01/05/22  0655   WBC 11.8* 12.1* 11.0   HGB 9.9* 10.8* 9.7*   HCT 31.6* 33.6* 30.8*   MCV 98.1 97.7 97.8    346 355     Recent Labs     01/04/22  1754 01/05/22  0655    138   K 3.9 3.5*    102   CO2 24 26   BUN 17 17   CREATININE 0.77 0.83       No results for input(s): COLORU, PHUR, LABCAST, WBCUA, RBCUA, MUCUS, TRICHOMONAS, YEAST, BACTERIA, CLARITYU, SPECGRAV, LEUKOCYTESUR, UROBILINOGEN, BILIRUBINUR, BLOODU in the last 72 hours. Invalid input(s): NITRATE, GLUCOSEUKETONESUAMORPHOUS    Physical Exam:  NAD  A/O x 3  RRR  No accessory muscles of inspiration  Abdomen soft, non-tender, non-distended. No CVA tenderness. Brooks in place. Clear yellow UOP. No calf pain. EPCs on. Machine turned on.     Impression:    1.6 cm right UPJ stone with hydronephrosis  Right perinephric abscess    Plan:  Continue right percutaneous nephrostomy  Continue IV antibiotics, appreciate ID recommendations - currently on zosyn  F/U aspirated fluid cultures  Void trial before discharge      Renee Kenny MD  6:53 AM 1/6/2022

## 2022-01-06 NOTE — PROGRESS NOTES
[facility]  Office: [address], DO, Gloria Kt, DO, Coleman Arenases, DO, Zach Linder Cassy, DO, Bernadene Ahumada, MD, Alvin Flower MD, Jason Kate MD, Ramona Coleman MD, Julia Mckeon MD, Aurelio Teague MD, Juliann Aparicio MD, Zenobia Mojica, DO, Jori Neves DO, Adelina Watkins MD,  Vy Canales DO, Sriram Terry MD, Joel Irving MD, Tara Bourne MD, Freddy Love MD, Erich Gay MD, Farideh Price MD, Blaise Hernandez MD, Rony Gamez [facility], [facility], [facility], Adriel Caballero, CNP, Justo Godinez, [facility], Ludin Jenkins, CNP, Maxx Gerardo, CNP, Twylla Boeck, CNP, Karli Hays, CNP, Bette Smith, CNP, Violeta Sorto PA-C, Khoi Benjamin, DNP, Marty Cross, DNP, Rolo Gutierrez, CNP, Lenora Teague, CNP, Jamin Cohen, CNP, Anita Cameron, CNP, Evelin Rajan, [facility], Consuelo Nava, [address] [place] [address]    Progress Note    1/6/2022    7:44 AM    Name:   Brian Carney  MRN:     [id number]     Acct:      [de-identified]   Room:   [address] [place] [address] Day:  2  Admit Date:  1/4/2022  2:33 PM    PCP:   DONG Skaggs CNP  Code Status:  Full Code    Subjective:     C/C: Right flank pain    Interval History Status: improved. Patient had right nephrostomy tube placed yesterday in IR and abscess drained. Her pain is mildly improved. She is feeling \"hot flashes\" today and doesn't feel well. Her cough is starting to break up, mild dyspnea. She had ACE wraps placed to her legs, which feels better. Brief History:     Per my partner:  \"   Brian Carney is a 74 y.o. Non- / non  female who presents with No chief complaint on file. and is admitted to the hospital for the management of Renal mass.     74year old female with past medical history of bilateral lympedema, morbid obesity, history of DVT and on chronic anticoagulation at home on xarelto presents with back pain to outlying facility.  Patient underwent CT abdomen pelvis showing 1.6 cm stone with 7.2x 6.0 x7.0 cm psoas abscess. Tim also has 2 blood cultures postivie for bacteria. One showing klebsiella PNA. \"    2022- IR, Right Nephrostomy tube placed, 120 ml purulent fluid drained    Review of Systems:     Constitutional:  negative for chills, fevers, +sweats  Respiratory:  negative for cough, + dyspnea on exertion, shortness of breath, wheezing  Cardiovascular:  negative for chest pain, chest pressure/discomfort, +lower extremity edema  Gastrointestinal:  negative for abdominal pain, constipation, diarrhea, nausea, vomiting  Neurological:  negative for dizziness, headache    Medications: Allergies: Allergies   Allergen Reactions    Estrogens Other (See Comments)     Hx of DVT       Current Meds:   Scheduled Meds:    guaiFENesin  600 mg Oral BID    enoxaparin  40 mg SubCUTAneous BID    piperacillin-tazobactam  3,375 mg IntraVENous Q8H    pantoprazole  40 mg Oral QAM AC     Continuous Infusions:    sodium chloride 50 mL/hr at 22 1858     PRN Meds: magnesium sulfate, ondansetron **OR** ondansetron, polyethylene glycol, acetaminophen **OR** acetaminophen, morphine **OR** morphine    Data:     Past Medical History:   has a past medical history of Carcinoma ([address] Cellulitis, DVT (deep venous thrombosis) ([facility] [address] Kidney stone, and Wears glasses. Social History:   reports that she has been smoking cigarettes. She has a 21.00 pack-year smoking history. She has never used smokeless tobacco. She reports that she does not drink alcohol and does not use drugs.      Family History:   Family History   Adopted: Yes       Vitals:  BP (!) 143/67   Pulse 89   Temp 98.7 °F (37.1 °C) (Oral)   Resp 16   Ht 5' 1\" (1.549 m)   Wt 273 lb 2.4 oz (123.9 kg)   LMP  (LMP Unknown)   SpO2 95%   BMI 51.61 kg/m²   Temp (24hrs), Av.1 °F (36.7 °C), Min:97.8 °F (36.6 °C), Max:98.7 °F (37.1 °C)    Recent Labs     22  2224   POCGLU 147*       I/O (24Hr): Intake/Output Summary (Last 24 hours) at 1/6/2022 0744  Last data filed at 1/6/2022 0640  Gross per 24 hour   Intake --   Output 1150 ml   Net -1150 ml       Labs:  Hematology:  Recent Labs     01/04/22  1754 01/05/22  0655 01/06/22  0630   WBC 12.1* 11.0 10.1   RBC 3.44* 3.15* 3.12*   HGB 10.8* 9.7* 9.6*   HCT 33.6* 30.8* 30.8*   MCV 97.7 97.8 98.7   MCH 31.4 30.8 30.8   MCHC 32.1 31.5 31.2   RDW 13.3 13.4 13.5    355 323   MPV 8.3 8.7 8.8   INR 1.1 1.1  --      Chemistry:  Recent Labs     01/03/22  1134 01/03/22 2152 01/04/22 1754 01/05/22  0655 01/06/22  0630   NA  --   --  139 138 141   K  --   --  3.9 3.5* 3.7   CL  --   --  102 102 104   CO2  --   --  24 26 27   GLUCOSE  --   --  106* 125* 129*   BUN  --   --  17 17 20   CREATININE  --   --  0.77 0.83 0.82   MG  --   --   --  2.2  --    ANIONGAP  --   --  13 10 10   LABGLOM  --   --  >60 >60 >60   GFRAA  --   --  >60 >60 >60   CALCIUM  --   --  8.4* 8.3* 8.4*   PROBNP 2,247*  --   --   --   --    TROPHS 29*  --   --   --   --    LACTACIDWB  --  NOT REPORTED  --   --   --      Recent Labs     01/05/22  2224   POCGLU 147*     ABG:  Lab Results   Component Value Date    PHART 7.466 07/19/2018    CZQ3OEI 35.5 07/19/2018    PO2ART 72.5 07/19/2018    DEU2YMF 25.0 07/19/2018    NBEA NOT REPORTED 07/19/2018    PBEA 1.7 07/19/2018    P4BLBWGJ 95.5 07/19/2018    FIO2 21 07/19/2018     Lab Results   Component Value Date/Time    SPECIAL NOT REPORTED 01/05/2022 10:30 AM    SPECIAL NOT REPORTED 01/05/2022 10:30 AM     Lab Results   Component Value Date/Time    CULTURE  01/05/2022 10:30 AM     DUE TO THE SPECIMEN TYPE, THE ORDER WAS CANCELED AND REORDERED. PLEASE REFER TO: ANAEROBIC, AEROBIC CULTURE    CULTURE PENDING 01/05/2022 10:30 AM       Radiology:  CT ABDOMEN PELVIS WO CONTRAST Additional Contrast? None    Result Date: 1/4/2022  Limited noncontrast examination.  New abnormal mass appears to be contiguous with the inferomedial aspect of the right kidney and contiguous with the right psoas muscle measuring approximately 7.9 x 7.4 x 8.8 cm. The density of this lesion/mass measures slightly higher than the simple fluid. Differential consideration include perinephric/retroperitoneal abscess or complex perinephric urinoma in a setting of large hyperdense stone seen about the right ureteropelvic junction. Consider CT abdomen and pelvis with contrast for better characterization. Additional subcentimeter hypodense renal stones, which appear to be nonobstructing. Layering subcentimeter hyperdense stones seen within the urinary bladder. Inferiorly prolapsed urinary bladder. Gallbladder sludge/layering gallstones without imaging features of acute cholecystitis seen. Colonic diverticulosis without evidence of acute diverticulitis. XR ABDOMEN (KUB) (SINGLE AP VIEW)    Result Date: 1/4/2022  Stable configuration of the ureteral stent compared to fluoroscopic examination from earlier today. CT ABDOMEN PELVIS W IV CONTRAST Additional Contrast? None    Result Date: 1/4/2022  1. Obstructing 1.6 cm stone in the right UPJ results in moderately severe hydronephrosis. The recently described abnormality along the inferomedial right kidney involving the psoas muscle is consistent with an abscess measuring up to 7.2 x 6.0 x 7.7 cm. 2.  Bilateral nephrolithiasis. Previously noted small bladder stones are not delineated on this exam. 3.  Brooks catheter within the bladder. Pelvic floor relaxation. 4.  Diverticulosis and cholelithiasis. RECOMMENDATIONS: Unavailable     XR CHEST PORTABLE    Result Date: 1/2/2022  Pulmonary vascular congestion     IR GUIDED NEPHROSTOMY CATH PLACEMENT RIGHT    Result Date: 1/5/2022  Successful percutaneous nephrostomy tube placement. 120 mL of purulent material was aspirated. A defined separate retroperitoneal abscess was not seen and expect findings on CT scan were just extension from the infection in the kidney.   Patient be followed up with contrast CT scan when the drainage subsides to exclude the presence of any remaining retroperitoneal abscess. Physical Examination:        General appearance:  alert, cooperative and no distress, ill appearing  Mental Status:  oriented to person, place and time and normal affect  Lungs:  Improved BS bilaterally, occasional wheezes throughout, normal effort  Heart:  regular rate and rhythm, + murmur  Abdomen:  soft, nontender, obese, normal bowel sounds, no masses, hepatomegaly, splenomegaly  Extremities:  + edema bilat LE 2+, no redness, tenderness in the calves  Skin:  + venous stasis skin changes to bilat LE, thickened scaly skin, dry flaking    Assessment:        Hospital Problems           Last Modified POA    * (Principal) Complicated UTI (urinary tract infection) 1/4/2022 Yes    Long term current use of anticoagulant therapy 1/4/2022 Yes    Lymphedema of both lower extremities 1/4/2022 Yes    Obesity 1/4/2022 Yes    Tobacco abuse 1/4/2022 Yes    History of recurrent deep vein thrombosis (DVT) 1/4/2022 Yes    Frequency of urination 1/4/2022 Yes    Mixed incontinence 1/4/2022 Yes    Renal mass 1/4/2022 Yes    Bacteremia due to Klebsiella pneumoniae 1/4/2022 Yes          Plan:        Right ureteral stone with Klebsiella pneumonia bacteremia- + psoas abscess status post IR drainage and culture. Nephrostomy tube placed in IR, Urology following.   Continue IV Zosyn and gentamicin per infectious diseases, stop IV fluids, f/u cultures sensitive to Cipro and Rocephin  Long-term anticoagulation therapy-hold Xarelto for now, Lovenox subcu twice daily if okay with urology  Lymphedema bilateral LE-  ACE wraps, consult wound care, needs to wear her lymphedema boots at home but doesn't know how to apply them by herself, needs lotion BID  Dyspnea/ wheezing- suspect COPD, start Duonebs 4x daily, oxygen prn, mucinex, repeat Chest xray  Murmur- hx aortic sclerosis, mild stenosis, elevated BNP, check Echo, stop IVF  Obesity- suspect MAYNOR, needs outpatient sleep study  Tobacco abuse- encouraged cessation  GI prophylaxis  PT/OT    Chiara Nugent MD  1/6/2022  7:44 AM

## 2022-01-06 NOTE — CARE COORDINATION
Spoke with pt regarding home care and home iv infusions. She ,her  and joey daughter are all willing to learn.  Referrals sent to [facility]

## 2022-01-07 LAB
CULTURE: NO GROWTH
LV EF: 62 %
LVEF MODALITY: NORMAL
Lab: NORMAL
SPECIMEN DESCRIPTION: NORMAL

## 2022-01-07 PROCEDURE — C1751 CATH, INF, PER/CENT/MIDLINE: HCPCS

## 2022-01-07 PROCEDURE — 93306 TTE W/DOPPLER COMPLETE: CPT

## 2022-01-07 PROCEDURE — 94640 AIRWAY INHALATION TREATMENT: CPT

## 2022-01-07 PROCEDURE — 99232 SBSQ HOSP IP/OBS MODERATE 35: CPT | Performed by: INTERNAL MEDICINE

## 2022-01-07 PROCEDURE — 76937 US GUIDE VASCULAR ACCESS: CPT

## 2022-01-07 PROCEDURE — 6370000000 HC RX 637 (ALT 250 FOR IP): Performed by: STUDENT IN AN ORGANIZED HEALTH CARE EDUCATION/TRAINING PROGRAM

## 2022-01-07 PROCEDURE — 6360000002 HC RX W HCPCS: Performed by: INTERNAL MEDICINE

## 2022-01-07 PROCEDURE — 2580000003 HC RX 258: Performed by: INTERNAL MEDICINE

## 2022-01-07 PROCEDURE — 05HA33Z INSERTION OF INFUSION DEVICE INTO LEFT BRACHIAL VEIN, PERCUTANEOUS APPROACH: ICD-10-PCS | Performed by: INTERNAL MEDICINE

## 2022-01-07 PROCEDURE — 6370000000 HC RX 637 (ALT 250 FOR IP): Performed by: NURSE PRACTITIONER

## 2022-01-07 PROCEDURE — 6370000000 HC RX 637 (ALT 250 FOR IP): Performed by: INTERNAL MEDICINE

## 2022-01-07 PROCEDURE — 1200000000 HC SEMI PRIVATE

## 2022-01-07 RX ADMIN — WHITE PETROLATUM: 1.75 OINTMENT TOPICAL at 08:00

## 2022-01-07 RX ADMIN — GUAIFENESIN 600 MG: 600 TABLET, EXTENDED RELEASE ORAL at 08:00

## 2022-01-07 RX ADMIN — WHITE PETROLATUM: 1.75 OINTMENT TOPICAL at 23:12

## 2022-01-07 RX ADMIN — ENOXAPARIN SODIUM 40 MG: 100 INJECTION SUBCUTANEOUS at 08:00

## 2022-01-07 RX ADMIN — PANTOPRAZOLE SODIUM 40 MG: 40 TABLET, DELAYED RELEASE ORAL at 05:26

## 2022-01-07 RX ADMIN — ANTI-FUNGAL POWDER MICONAZOLE NITRATE TALC FREE: 1.42 POWDER TOPICAL at 23:11

## 2022-01-07 RX ADMIN — ENOXAPARIN SODIUM 40 MG: 100 INJECTION SUBCUTANEOUS at 23:12

## 2022-01-07 RX ADMIN — IPRATROPIUM BROMIDE AND ALBUTEROL SULFATE 1 AMPULE: .5; 3 SOLUTION RESPIRATORY (INHALATION) at 15:48

## 2022-01-07 RX ADMIN — HYDROCODONE BITARTRATE AND ACETAMINOPHEN 2 TABLET: 5; 325 TABLET ORAL at 10:04

## 2022-01-07 RX ADMIN — HYDROCODONE BITARTRATE AND ACETAMINOPHEN 2 TABLET: 5; 325 TABLET ORAL at 23:12

## 2022-01-07 RX ADMIN — GUAIFENESIN 600 MG: 600 TABLET, EXTENDED RELEASE ORAL at 23:12

## 2022-01-07 RX ADMIN — IPRATROPIUM BROMIDE AND ALBUTEROL SULFATE 1 AMPULE: .5; 3 SOLUTION RESPIRATORY (INHALATION) at 11:29

## 2022-01-07 RX ADMIN — HYDROCODONE BITARTRATE AND ACETAMINOPHEN 2 TABLET: 5; 325 TABLET ORAL at 05:25

## 2022-01-07 RX ADMIN — HYDROCODONE BITARTRATE AND ACETAMINOPHEN 2 TABLET: 5; 325 TABLET ORAL at 17:53

## 2022-01-07 RX ADMIN — CEFTRIAXONE SODIUM 2000 MG: 2 INJECTION, POWDER, FOR SOLUTION INTRAMUSCULAR; INTRAVENOUS at 12:30

## 2022-01-07 ASSESSMENT — PAIN SCALES - GENERAL
PAINLEVEL_OUTOF10: 8
PAINLEVEL_OUTOF10: 8
PAINLEVEL_OUTOF10: 5
PAINLEVEL_OUTOF10: 7
PAINLEVEL_OUTOF10: 8

## 2022-01-07 NOTE — CARE COORDINATION
Writer spoke to Socorro from [facility] and notified her of ATB needs. Writer called Rosita at [facility] and notified her of potential DC today. Rosita requesting ATB schedule. 1500 Vandana from [employer] states that ATB are approved and that [facility] can accept this patient. Vandana from [employer] asks that patient call [facility] ([phone]) regarding Start of care. Writer called patient's RN, Arjun Sanz and passed on this message.

## 2022-01-07 NOTE — FLOWSHEET NOTE
Assessment: Patient was awake and oriented when  visited. Family was not present at the time. However, patient did confirm she had strong family support. When asked how she was feeling, patient responded. \"I hope to get better. \" Patient said she was raised Yazidism. Intervention:  provided presence, offered support and prayed with patient   Outcome: Patient expressed appreciation for the spiritual support she received. Plan: Follow up visits recommended for more prayers and support. 01/07/22 1102   Encounter Summary   Services provided to: Patient   Support System Family members   Place of [address] Visiting   (01/07/2022)   Complexity of Encounter Moderate   Length of Encounter 15 minutes   Spiritual Assessment Completed Yes   Routine   Type Initial   Assessment Calm; Approachable; Hopeful   Intervention Active listening;Nurtured hope;Prayer;[place]   Outcome Expressed gratitude   Spiritual/Mormon   Type Spiritual support

## 2022-01-07 NOTE — PROGRESS NOTES
[facility]  Office: [address], DO, Love Favre, DO, Nicholas Modorinda, DO, Jing Eye Blood, DO, Roni Plummer MD, Nanci Wills MD, Hasmukh Zamora MD, Ilsa Black MD, Fernanda Aquino MD, Chanel Franklin MD, Ailin Mack MD, Yina Leal, DO, Imani Bucio DO, Murlean Gowers, MD,  Caty Agustin DO, Robert Peraza MD, Devi Alvarado MD, Juan Ramon Newton MD, Romelia Oh MD, Abdulaziz Tolentino MD, Josiane Ramirez MD, Sushant Siegel MD, Macy Stewart [facility], [facility] Adal, CNP, Chris Landon CNP, Chelsi Mcclellan, CNS, Yogesh Quan, CNP, Reena Salcido, CNP, Mitali Berry CNP, Gina Castellanos, CNP, Tran Byers CNP, Diana Gifford PA-C, Peyton Stern, STALIN, Michael Mayo DNP, Loly Medina, CNP, Kwaku Crooks, CNP, Clay Bray CNP, Jaime Chou CNP, Nahum Dooley CNP, Carlitos Clay, [address] [place] [address]    Progress Note    1/7/2022    7:41 AM    Name:   Karla Flor  MRN:     [id number]     Acct:      [de-identified]   Room:   [address] [place] [address] Day:  3  Admit Date:  1/4/2022  2:33 PM    PCP:   DONG Bailey CNP  Code Status:  Full Code    Subjective:     C/C: Right flank pain    Interval History Status: improved. Patient has right nephrostomy tube in place. Her pain is mildly improved. Her cough is much better with very little SOB. She had ACE wraps placed to her legs, which feels better. She just had her Echo this morning. She is getting a mid-line placed for IV antibiotics. Brief History:     Per my partner:  \"   Karla Flor is a 74 y.o. Non- / non  female who presents with No chief complaint on file. and is admitted to the hospital for the management of Renal mass.     74year old female with past medical history of bilateral lympedema, morbid obesity, history of DVT and on chronic anticoagulation at home on xarelto presents with back pain to outlying facility.  Patient underwent CT abdomen pelvis showing 1.6 cm stone with 7.2x 6.0 x7.0 cm psoas abscess. Tim also has 2 blood cultures postivie for bacteria. One showing klebsiella PNA. \"    1/5/2022- IR, Right Nephrostomy tube placed, 120 ml purulent fluid drained    Review of Systems:     Constitutional:  negative for chills, fevers, +sweats  Respiratory:  negative for cough, + dyspnea on exertion, shortness of breath, wheezing  Cardiovascular:  negative for chest pain, chest pressure/discomfort, +lower extremity edema  Gastrointestinal:  negative for abdominal pain, constipation, diarrhea, nausea, vomiting  Neurological:  negative for dizziness, headache    Medications: Allergies: Allergies   Allergen Reactions    Estrogens Other (See Comments)     Hx of DVT       Current Meds:   Scheduled Meds:    mineral oil-hydrophilic petrolatum   Topical BID    miconazole   Topical BID    cefTRIAXone (ROCEPHIN) IV  2,000 mg IntraVENous Q24H    guaiFENesin  600 mg Oral BID    enoxaparin  40 mg SubCUTAneous BID    pantoprazole  40 mg Oral QAM AC     Continuous Infusions:     PRN Meds: HYDROcodone 5 mg - acetaminophen **OR** HYDROcodone 5 mg - acetaminophen, ipratropium-albuterol, magnesium sulfate, ondansetron **OR** ondansetron, polyethylene glycol, acetaminophen **OR** acetaminophen, morphine **OR** morphine    Data:     Past Medical History:   has a past medical history of Carcinoma ([address] Cellulitis, DVT (deep venous thrombosis) ([facility] [address] Kidney stone, and Wears glasses. Social History:   reports that she has been smoking cigarettes. She has a 21.00 pack-year smoking history. She has never used smokeless tobacco. She reports that she does not drink alcohol and does not use drugs.      Family History:   Family History   Adopted: Yes       Vitals:  /62   Pulse 80   Temp 98.1 °F (36.7 °C) (Oral)   Resp 15   Ht 5' 1\" (1.549 m)   Wt 273 lb 2.4 oz (123.9 kg)   LMP  (LMP Unknown)   SpO2 97%   BMI 51.61 kg/m²   Temp (24hrs), Av.1 °F (36.7 °C), Min:97.5 °F (36.4 °C), Max:98.4 °F (36.9 °C)    Recent Labs     22   POCGLU 147*       I/O (24Hr): Intake/Output Summary (Last 24 hours) at 2022 0741  Last data filed at 2022 0551  Gross per 24 hour   Intake --   Output 215 ml   Net -215 ml       Labs:  Hematology:  Recent Labs     22  0655 22  0630   WBC 12.1* 11.0 10.1   RBC 3.44* 3.15* 3.12*   HGB 10.8* 9.7* 9.6*   HCT 33.6* 30.8* 30.8*   MCV 97.7 97.8 98.7   MCH 31.4 30.8 30.8   MCHC 32.1 31.5 31.2   RDW 13.3 13.4 13.5    355 323   MPV 8.3 8.7 8.8   INR 1.1 1.1  --      Chemistry:  Recent Labs     22  0655 22  0630    138 141   K 3.9 3.5* 3.7    102 104   CO2 24 26 27   GLUCOSE 106* 125* 129*   BUN 17 17 20   CREATININE 0.77 0.83 0.82   MG  --  2.2  --    ANIONGAP 13 10 10   LABGLOM >60 >60 >60   GFRAA >60 >60 >60   CALCIUM 8.4* 8.3* 8.4*   PROBNP  --   --  929*     Recent Labs     22  0630   LABA1C  --  6.2*   POCGLU 147*  --      ABG:  Lab Results   Component Value Date    PHART 7.466 2018    FFZ7PXC 35.5 2018    PO2ART 72.5 2018    CEX1LIZ 25.0 2018    NBEA NOT REPORTED 2018    PBEA 1.7 2018    P6BUAHCX 95.5 2018    FIO2 21 2018     Lab Results   Component Value Date/Time    SPECIAL NOT REPORTED 2022 10:30 AM    SPECIAL NOT REPORTED 2022 10:30 AM     Lab Results   Component Value Date/Time    CULTURE  2022 10:30 AM     DUE TO THE SPECIMEN TYPE, THE ORDER WAS CANCELED AND REORDERED. PLEASE REFER TO: ANAEROBIC, AEROBIC CULTURE    CULTURE LACTOSE FERMENTING GRAM NEGATIVE RODS LIGHT GROWTH (A) 2022 10:30 AM       Radiology:  CT ABDOMEN PELVIS WO CONTRAST Additional Contrast? None    Result Date: 2022  Limited noncontrast examination.  New abnormal mass appears to be contiguous with the inferomedial aspect of the right kidney and contiguous with the right psoas muscle measuring approximately 7.9 x 7.4 x 8.8 cm. The density of this lesion/mass measures slightly higher than the simple fluid. Differential consideration include perinephric/retroperitoneal abscess or complex perinephric urinoma in a setting of large hyperdense stone seen about the right ureteropelvic junction. Consider CT abdomen and pelvis with contrast for better characterization. Additional subcentimeter hypodense renal stones, which appear to be nonobstructing. Layering subcentimeter hyperdense stones seen within the urinary bladder. Inferiorly prolapsed urinary bladder. Gallbladder sludge/layering gallstones without imaging features of acute cholecystitis seen. Colonic diverticulosis without evidence of acute diverticulitis. XR ABDOMEN (KUB) (SINGLE AP VIEW)    Result Date: 1/4/2022  Stable configuration of the ureteral stent compared to fluoroscopic examination from earlier today. CT ABDOMEN PELVIS W IV CONTRAST Additional Contrast? None    Result Date: 1/4/2022  1. Obstructing 1.6 cm stone in the right UPJ results in moderately severe hydronephrosis. The recently described abnormality along the inferomedial right kidney involving the psoas muscle is consistent with an abscess measuring up to 7.2 x 6.0 x 7.7 cm. 2.  Bilateral nephrolithiasis. Previously noted small bladder stones are not delineated on this exam. 3.  Brooks catheter within the bladder. Pelvic floor relaxation. 4.  Diverticulosis and cholelithiasis. RECOMMENDATIONS: Unavailable     XR CHEST PORTABLE    Result Date: 1/2/2022  Pulmonary vascular congestion     IR GUIDED NEPHROSTOMY CATH PLACEMENT RIGHT    Result Date: 1/5/2022  Successful percutaneous nephrostomy tube placement. 120 mL of purulent material was aspirated. A defined separate retroperitoneal abscess was not seen and expect findings on CT scan were just extension from the infection in the kidney.   Patient be followed up with contrast CT scan when the abuse- encouraged cessation  GI prophylaxis  PT/OT- patient refusing rehab, DC planning home with home care tomorrow morning    Needs weekly CBC, CMP while on Rocephin    Albertina Wadsworth MD  1/7/2022  7:41 AM

## 2022-01-07 NOTE — PROCEDURES
Product type: Bard 4 Fr single lumen PowerMidline  History/Labs/Allergies Reviewed  Placed By: Soni Chadwick RN  Assisted By: NICOLASA  Time out Performed using Two Identifiers  Lot # [id number]  Expiration date 2/28/2023  Catheter size 4 Afghan  Trimmed at 13 cm  Total length inserted 12 cm  External catheter length 1 cm  Location left brachial vein  Number of attempts 1  Estimated blood loss 1 ml  Placement verified by- positive blood return & flushes easily  Special equipment used- ultrasound & micro-introducer technique   Catheter secured with statlock  Dressing applied- Tegaderm CHG  Lidocaine administered intradermally conc.1% 2 mL  Midline education:   [ x ] Discussed with patient/Family or POA prior to procedure. Risks and Benefits along with reason for procedure were discussed and teaching was reinforced with an education handout on Midline insertion. [facility] FAQ Catheter Associated Blood Stream Infections and [address] [facility] [id number] REV. 7/13 Nursing and Booklet left at bedside or in chart. Patient (Family or POA) acknowledged understanding of information taught and agreed to procedure. [  ] Was not discussed with patient/family or POA due to pts medical status at time of procedure. pts family or POA not available to discuss Midline education.  [facility] FAQ Catheter Associated Blood Stream Infections and [address] [facility] REV. 7/13 Nursing and Booklet left at bedside or in chart    Beto South RN

## 2022-01-07 NOTE — PLAN OF CARE
Problem: Skin Integrity:  Goal: Will show no infection signs and symptoms  Description: Will show no infection signs and symptoms  1/7/2022 1529 by Triny Sterling RN  Outcome: Ongoing     Problem: Skin Integrity:  Goal: Absence of new skin breakdown  Description: Absence of new skin breakdown  1/7/2022 1529 by Triny Sterling RN  Outcome: Ongoing     Problem: Falls - Risk of:  Goal: Will remain free from falls  Description: Will remain free from falls  1/7/2022 1529 by Triny Sterling RN  Outcome: Ongoing     Problem: Falls - Risk of:  Goal: Absence of physical injury  Description: Absence of physical injury  1/7/2022 1529 by Triny Sterling RN  Outcome: Ongoing     Problem: Pain:  Goal: Pain level will decrease  Description: Pain level will decrease  1/7/2022 1529 by Triny Sterling RN  Outcome: Ongoing     Problem: Pain:  Goal: Control of acute pain  Description: Control of acute pain  1/7/2022 1529 by Triny Sterling RN  Outcome: Ongoing     Problem: Pain:  Goal: Control of chronic pain  Description: Control of chronic pain  1/7/2022 1529 by Triny Stelring RN  Outcome: Ongoing

## 2022-01-07 NOTE — PROGRESS NOTES
Occupational [address]  Occupational Therapy Not Seen Note    DATE: 2022    NAME: Maria Fernanda Stern  MRN: [id number]   : 1955      Patient not seen this date for Occupational Therapy due to:  Patient Declined: Pt declined to participate in treatment d/t fatigue. OT will re-attempt to see Pt at later date.          Electronically signed by ADA Goss OTR/L on 2022 at 3:32 PM

## 2022-01-07 NOTE — PROGRESS NOTES
I have discussed the care of this patient including pertinent history and exam findings, with the resident. I have seen and examined the patient and the key elements of all parts of the encounter have been performed by me. I agree with the assessment, plan and orders as documented by the resident. Janet Katz MD    Urology Progress Note      Subjective: Abdiel Allison is a 74 y.o. female. His/Her current Diet is: ADULT DIET; Regular. No acute issues overnight. No fevers or chills. No nausea or vomiting. No chest pain or shortness of breath. No calf pain. Pain controlled. Ambulating. Tolerating PO Diet. Right nephrostomy bag draining pink urine  Brooks catheter draining clear yellow urine      Vitals and Labs:  Vitals:    01/06/22 0827 01/06/22 1118 01/06/22 1558 01/06/22 2051   BP: 133/76 (!) 152/82 (!) 156/79 106/62   Pulse: 82 78 96 80   Resp: 18 16 18 15   Temp: 98.4 °F (36.9 °C) 97.5 °F (36.4 °C) 98.4 °F (36.9 °C) 98.1 °F (36.7 °C)   TempSrc: Oral Oral Oral Oral   SpO2: 97% 97% 96% 97%   Weight:       Height:         I/O last 3 completed shifts:  In: -   Out: 1150 [Urine:1150]    Recent Labs     01/04/22  1754 01/05/22  0655 01/06/22  0630   WBC 12.1* 11.0 10.1   HGB 10.8* 9.7* 9.6*   HCT 33.6* 30.8* 30.8*   MCV 97.7 97.8 98.7    355 323     Recent Labs     01/04/22  1754 01/05/22  0655 01/06/22  0630    138 141   K 3.9 3.5* 3.7    102 104   CO2 24 26 27   BUN 17 17 20   CREATININE 0.77 0.83 0.82       No results for input(s): COLORU, PHUR, LABCAST, WBCUA, RBCUA, MUCUS, TRICHOMONAS, YEAST, BACTERIA, CLARITYU, SPECGRAV, LEUKOCYTESUR, UROBILINOGEN, BILIRUBINUR, BLOODU in the last 72 hours. Invalid input(s): NITRATE, GLUCOSEUKETONESUAMORPHOUS    Physical Exam:  NAD  A/O x 3  RRR  No accessory muscles of inspiration  Abdomen soft, non-tender, non-distended. No CVA tenderness. Brooks in place. Clear yellow UOP. No calf pain. EPCs on. Machine turned on.     Impression:    1.6 cm right UPJ stone with hydronephrosis  Right perinephric abscess  Fluid Cx - GPC in clusters, GPC in chains, LFGNRs    Plan:  Continue right percutaneous nephrostomy  Continue IV antibiotics, appreciate ID recommendations - currently on ceftriaxone  F/U aspirated fluid cultures  Void trial before discharge      Gabriella Paniagua MD  6:25 AM 1/7/2022

## 2022-01-07 NOTE — DISCHARGE INSTR - COC
Call to pt.  Advise per Dr Becerra that Adriana sent to pharmacy to try.  Take once a day with food and increase to twice daily if needed.  Recommend a prep other than the miralax prep due to h/o chronic constipation if feels like can tolerate it. Pt verb understanding.   Continuity of Care Form    Patient Name: Juan Mercado   :  1955  MRN:  [id number]    Admit date:  2022  Discharge date:  21    Code Status Order: Full Code   Advance Directives:      Admitting Physician:  Beti Abernathy MD  PCP: Marge Barnes, APRN - CNP    Discharging Nurse: [facility] Unit/Room#: 4498/0734-26  Discharging Unit Phone Number: [phone]    Emergency Contact:   Extended Emergency Contact Information  Primary Emergency Contact: Jaime Kay  Address: [address] Phone: [phone]  Work Phone: [phone]  Mobile Phone: [phone]  Relation: Spouse  Hearing or visual needs: None  Other needs: None  Preferred language: English   needed? No    Past Surgical History:  Past Surgical History:   Procedure Laterality Date    CARPAL TUNNEL RELEASE  1990s    CYSTOSCOPY N/A 2022    CYSTOSCOPY URETERAL STENT INSERTION performed by Karen Silverman MD at [address] [place] [address]  2022         IR NEPHROSTOMY PERCUTANEOUS RIGHT  2022    IR NEPHROSTOMY PERCUTANEOUS RIGHT 2022 Agustina Akers MD [facility]    ANNABELLA AND BSO      2019    TUBAL LIGATION  1993    TUBAL LIGATION      WISDOM TOOTH EXTRACTION         Immunization History: There is no immunization history on file for this patient.     Active Problems:  Patient Active Problem List   Diagnosis Code    Acute thromboembolism of deep veins of lower extremity (HCC) I82.409    Long term current use of anticoagulant therapy Z79.01    Bilateral cellulitis of lower leg L03.116, L03.115    Acute shoulder pain due to trauma M25.519, G89.11    Lymphedema of both lower extremities I89.0    Obesity E66.9    Tobacco abuse Z72.0    History of recurrent deep vein thrombosis (DVT) Z86.718    Erythrocytosis D75.1    Gross hematuria R31.0    Frequency of urination R35.0    Nocturia R35.1    Mixed incontinence N39.46 Constipation K59.00    Cellulitis of lower leg L03.119    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    Cellulitis of left lower extremity L61.134    Complicated UTI (urinary tract infection) N39.0    Calculus of right kidney N20.0    Cellulitis L03.90    Normocytic anemia D64.9    Iron deficiency anemia D50.9    Renal mass N28.89    Bacteremia due to Klebsiella pneumoniae R78.81, B96.1       Isolation/Infection:   Isolation            No Isolation          Patient Infection Status       Infection Onset Added Last Indicated Last Indicated By Review Planned Expiration Resolved Resolved By    None active    Resolved    COVID-19 (Rule Out) 01/02/22 01/02/22 01/02/22 COVID-19, Rapid (Ordered)   01/02/22 Rule-Out Test Resulted            Nurse Assessment:  Last Vital Signs: BP (!) 152/76   Pulse 89   Temp 98.1 °F (36.7 °C) (Oral)   Resp 16   Ht 5' 1\" (1.549 m)   Wt 273 lb 2.4 oz (123.9 kg)   LMP  (LMP Unknown)   SpO2 91%   BMI 51.61 kg/m²     Last documented pain score (0-10 scale): Pain Level: 5  Last Weight:   Wt Readings from Last 1 Encounters:   01/05/22 273 lb 2.4 oz (123.9 kg)     Mental Status:  oriented and alert    IV Access:  - PICC - site  L Basilic, insertion date: 1/7/21 MIDLINE    Nursing Mobility/ADLs:  Walking   Assisted  Transfer  Assisted  Bathing  Assisted  Dressing  Assisted  Toileting  Assisted  Feeding  Assisted  Med Admin  Assisted  Med Delivery   whole    Wound Care Documentation and Therapy:        Elimination:  Continence: Bowel: Yes  Bladder: No  Urinary Catheter: Removal Date 1/7    Colostomy/Ileostomy/Ileal Conduit: No       Date of Last BM: catrachito    Intake/Output Summary (Last 24 hours) at 1/7/2022 1124  Last data filed at 1/7/2022 0551  Gross per 24 hour   Intake --   Output 215 ml   Net -215 ml     I/O last 3 completed shifts:  In: -   Out: 1165 [Urine:1165]    Safety Concerns:      At Risk for Falls    Impairments/Disabilities:      None    Nutrition Therapy:  Current Nutrition Therapy:   - Oral Diet:  General    Routes of Feeding: Oral  Liquids: No Restrictions  Daily Fluid Restriction: no  Last Modified Barium Swallow with Video (Video Swallowing Test): not done    Treatments at the Time of Hospital Discharge:   Respiratory Treatments: duoneb - q4h prn SOB  Oxygen Therapy:  is not on home oxygen therapy. Ventilator:    - No ventilator support    Rehab Therapies: Physical Therapy and Occupational Therapy  Weight Bearing Status/Restrictions: No weight bearing restirctions  Other Medical Equipment (for information only, NOT a DME order):  walker  Other Treatments: iv atb and midline maintenance, Skilled Nursing Assessment    Patient's personal belongings (please select all that are sent with patient):  None    RN SIGNATURE:  Electronically signed by Sherry Sadler RN on 1/7/22 at 11:28 AM EST    CASE MANAGEMENT/SOCIAL WORK SECTION    Inpatient Status Date: ***    Readmission Risk Assessment Score:  Readmission Risk              Risk of Unplanned Readmission:  13           Discharging to Facility/ Agency   [facility] Available   Skilled Nursing        Address   [id number] [place] [address]. [place] [id number]             Contact Information   [phone]               Dialysis Facility (if applicable)   Name:  Address:  Dialysis Schedule:  Phone:  Fax:    / signature: Electronically signed by Matesuz Gilbert RN on 1/12/22 at 1:31 PM EST    PHYSICIAN SECTION    Prognosis: Fair    Condition at Discharge: Stable    Rehab Potential (if transferring to Rehab): Fair    Recommended Labs or Other Treatments After Discharge: CBC, CMP weekly. Patient will need to follow-up with urology outpatient to have drains removed. Physician Certification: I certify the above information and transfer of Kell Schultz  is necessary for the continuing treatment of the diagnosis listed and that she requires SNF for less 30 days.      Update Admission H&P: No change in H&P    PHYSICIAN SIGNATURE:  Electronically signed by Renee Davis MD on 1/12/22 at 8:11 AM EST

## 2022-01-07 NOTE — PROGRESS NOTES
Physical Therapy        Physical Therapy Cancel Note      DATE: 2022    NAME: Tio Rater  MRN: [id number]   : 1955      Patient not seen this date for Physical Therapy due to:    Patient Declined: Pt polititoy declined d/t fatigue and wanting to rest. Pt states she is discharging home tomorrow.       Electronically signed by Mayela Vasquez PTA on 2022 at 1:56 PM

## 2022-01-07 NOTE — PROGRESS NOTES
[facility] -   Infectious diseases evaluation  admission date 1/4/2022    reason for consultation:   Blood culture x2 growing Klebsiella pneumonia-1/2  Perinephric abscess    Impression :   Current:  · Klebsiella bacteremia  · Right severely obstructive pyelonephritis, post stent 1/4  · 1 1 6 cm right ureteral stone  · perinephric/psoas abscess   · s/p right percutaneous nephrostomy tube 120 mL pus aspirated and right perinephric drain placement  · Bilateral lymphedema  · Prolonged QT  Other:  ·   Discussion / summary of stay / plan of care   ·   Recommendations   · Rocephin 2 g IV daily x a month until 2/2  · Midline placed, chart reconciled  · Follow with a CT scan of the abdomen and pelvis outpatient  · Pending culture from the psoas, growing gram-positive cocci      Infection Control Recommendations   · Sandy Precautions  · Contact Isolation       Antimicrobial Stewardship Recommendations   · Simplification of therapy  · Targeted therapy    Coordination ofOutpatient Care:   · Estimated Length of IV antimicrobials:  · Patient will need Midline / picc Catheter Insertion:   · Patient will need SNF:  · Patient will need outpatient wound care:     History of Present Illness:   Initial history:  Marina Weber is a 74y.o.-year-old female with past medical history of bilateral lymphedema, morbid obesity, history of DVT on Xarelto presents with right flank pain from outside facility. Patient had fever and leukocytosis. CT abdomen pelvis revealed 1.6 cm right renal calculus with 7 x 6 x 7 cm psoas abscess. Patient transferred to [facility] for right PCNT. Blood culture x2+ for bacteria. 1/2 Klebsiella pneumonia. Interval changes  1/7/2022   Patient Vitals for the past 8 hrs:   BP Temp Temp src Pulse Resp SpO2   01/07/22 0941 (!) 152/76 98.1 °F (36.7 °C) Oral 89 16 91 %     1/5  Patient seen and examined in her room.   She had a drain placed to the perinephric abscess of her right kidney. 120 mL pus aspirated. She is afebrile hemodynamically stable has some flank pain. 1/6 patient seen and examined in her room. Drain in place. Has some back pain. Afebrile hemodynamically stable. On 2 L oxygen via nasal cannula    1/7  Patient resting well in her room. No complaints. She had midline placed today  Summary of relevant labs:  Labs:  Creatinine 0.77-0.83  WBC 55-99-38-10-10    Micro:  Blood culture x2 1/2 growing Klebsiella  Perinephric abscess culture 1/5: Lactose fermenting gram-negative rods. Gram-positive cocci in clusters. Gram-positive cocci in chains. Imaging:  CT abdomen pelvis with contrast 1/5/2022  .  Obstructing 1.6 cm stone in the right UPJ results in moderately severe   hydronephrosis.  The recently described abnormality along the inferomedial   right kidney involving the psoas muscle is consistent with an abscess   measuring up to 7.2 x 6.0 x 7.7 cm.       2.  Bilateral nephrolithiasis.  Previously noted small bladder stones are not   delineated on this exam.       3.  Brooks catheter within the bladder.  Pelvic floor relaxation.       4.  Diverticulosis and cholelithiasis. I have personally reviewed the past medical history, past surgical history, medications, social history, and family history, and I haveupdated the database accordingly. Allergies:   Estrogens     Review of Systems:     Review of Systems   Constitutional: Positive for activity change. HENT: Negative for congestion. Eyes: Negative for redness. Respiratory: Negative for cough, chest tightness and shortness of breath. Cardiovascular: Positive for leg swelling. Negative for chest pain and palpitations. Gastrointestinal: Negative for abdominal distention, abdominal pain and diarrhea. Genitourinary: Positive for difficulty urinating and dysuria. Musculoskeletal: Positive for back pain. Negative for myalgias. Skin: Negative.     Neurological: Negative. Hematological: Negative. Psychiatric/Behavioral: Negative. All other systems reviewed and are negative. Physical Examination :       Physical Exam  Constitutional:       Appearance: Normal appearance. HENT:      Right Ear: Tympanic membrane normal.      Left Ear: Tympanic membrane normal.      Nose: Nose normal.      Mouth/Throat:      Mouth: Mucous membranes are moist.   Eyes:      Pupils: Pupils are equal, round, and reactive to light. Cardiovascular:      Rate and Rhythm: Normal rate and regular rhythm. Heart sounds: Normal heart sounds. Abdominal:      General: Abdomen is flat. Tenderness: There is abdominal tenderness. Skin:     Capillary Refill: Capillary refill takes less than 2 seconds. Neurological:      Mental Status: She is alert.      bilateral lower extremity lymphedema and venous stasis dermatitis present    Past Medical History:     Past Medical History:   Diagnosis Date    Carcinoma ([facility] [address]     Squamous Cell    Cellulitis     DVT (deep venous thrombosis) ([facility] [facility] 2006    LLE    Kidney stone     Wears glasses        Past Surgical  History:     Past Surgical History:   Procedure Laterality Date    CARPAL TUNNEL RELEASE  1990s    CYSTOSCOPY N/A 1/4/2022    CYSTOSCOPY URETERAL STENT INSERTION performed [address] Freddy Purvis MD [address]  1/7/2022         IR NEPHROSTOMY PERCUTANEOUS RIGHT  1/5/2022    IR NEPHROSTOMY PERCUTANEOUS RIGHT 1/5/2022 Alysha Lazo MD [facility]    ANNABELLA AND BSO      05/2019    TUBAL LIGATION  1993    TUBAL LIGATION      WISDOM TOOTH EXTRACTION         Medications:      mineral oil-hydrophilic petrolatum   Topical BID    miconazole   Topical BID    cefTRIAXone (ROCEPHIN) IV  2,000 mg IntraVENous Q24H    guaiFENesin  600 mg Oral BID    enoxaparin  40 mg SubCUTAneous BID    pantoprazole  40 mg Oral QAM AC       Social History:     Social History     Socioeconomic History    Marital status:      Spouse name: Not on file    Number of children: Not on file    Years of education: Not on file    Highest education level: Not on file   Occupational History    Not on file   Tobacco Use    Smoking status: Current Every Day Smoker     Packs/day: 0.50     Years: 42.00     Pack years: 21.00     Types: Cigarettes    Smokeless tobacco: Never Used   Vaping Use    Vaping Use: Never used   Substance and Sexual Activity    Alcohol use: No     Alcohol/week: 0.0 standard drinks    Drug use: No    Sexual activity: Not Currently   Other Topics Concern    Not on file   Social History Narrative    Not on file     Social Determinants of Health     Financial Resource Strain:     Difficulty of Paying Living Expenses: Not on file   Food Insecurity:     Worried About Running Out of Food in the Last Year: Not on file    Nabeel of Food in the Last Year: Not on file   Transportation Needs:     Lack of Transportation (Medical): Not on file    Lack of Transportation (Non-Medical):  Not on file   Physical Activity:     Days of Exercise per Week: Not on file    Minutes of Exercise per Session: Not on file   Stress:     Feeling of Stress : Not on file   Social Connections:     Frequency of Communication with Friends and Family: Not on file    Frequency of Social Gatherings with Friends and Family: Not on file    Attends Buddhism Services: Not on file    Active Member of [address] [place] [address] or Organizations: Not on file    Attends Club or Organization Meetings: Not on file    Marital Status: Not on file   Intimate Partner Violence:     Fear of Current or Ex-Partner: Not on file    Emotionally Abused: Not on file    Physically Abused: Not on file    Sexually Abused: Not on file   Housing Stability:     Unable to Pay for Housing in the Last Year: Not on file    Number of [place] in the Last Year: Not on file    Unstable Housing in the Last Year: Not on file       Family History:     Family History   Adopted: Yes      Medical Decision Making:   I have independently reviewed/ordered the following labs:    CBC with Differential:   Recent Labs     01/04/22  1754 01/04/22  1754 01/05/22  0655 01/06/22  0630   WBC 12.1*   < > 11.0 10.1   HGB 10.8*   < > 9.7* 9.6*   HCT 33.6*   < > 30.8* 30.8*      < > 355 323   LYMPHOPCT 7*  --   --   --    MONOPCT 6  --   --   --     < > = values in this interval not displayed. BMP:  Recent Labs     01/05/22  0655 01/06/22  0630    141   K 3.5* 3.7    104   CO2 26 27   BUN 17 20   CREATININE 0.83 0.82   MG 2.2  --      Hepatic Function Panel:   No results for input(s): PROT, LABALBU, BILIDIR, IBILI, BILITOT, ALKPHOS, ALT, AST in the last 72 hours. No results for input(s): RPR in the last 72 hours. No results for input(s): HIV in the last 72 hours. No results for input(s): BC in the last 72 hours. Lab Results   Component Value Date    CREATININE 0.82 01/06/2022    GLUCOSE 129 01/06/2022       Detailed results: Thank you for allowing us to participate in the care of this patient. Please call with questions. This note is created with the assistance of a speech recognition program.  While intending to generate adocument that actually reflects the content of the visit, the document can still have some errors including those of syntax and sound a like substitutions which may escape proof reading. It such instances, actual meaningcan be extrapolated by contextual diversion. Aixa Atwood MD  PGY-3, Internal Medicine Resident  [address] [place] [address]  1/7/2022 5:25 PM            I have discussed the care of the patient, including pertinent history and exam findings,  with the resident. I have seen and examined the patient and the key elements of all parts of the encounter have been performed by me. I agree with the assessment, plan and orders as documented by the resident.     Kyung Lara, Infectious Diseases

## 2022-01-07 NOTE — PLAN OF CARE
Problem: Skin Integrity:  Goal: Will show no infection signs and symptoms  Description: Will show no infection signs and symptoms  1/7/2022 0131 by Itzel Kelsey RN  Outcome: Ongoing  1/6/2022 1247 by Krysten Doan RN  Outcome: Ongoing  Goal: Absence of new skin breakdown  Description: Absence of new skin breakdown  1/7/2022 0131 by Itzel Kelsey RN  Outcome: Ongoing  1/6/2022 1247 by Krysten Doan RN  Outcome: Ongoing     Problem: Falls - Risk of:  Goal: Will remain free from falls  Description: Will remain free from falls  1/7/2022 0131 by Itzel Kelsey RN  Outcome: Ongoing  1/6/2022 1247 by Krysten Doan RN  Outcome: Ongoing  Goal: Absence of physical injury  Description: Absence of physical injury  1/7/2022 0131 by Itzel Kelsey RN  Outcome: Ongoing  1/6/2022 1247 by Krysten Doan RN  Outcome: Ongoing     Problem: Pain:  Goal: Pain level will decrease  Description: Pain level will decrease  1/7/2022 0131 by Itzel Kelsey RN  Outcome: Ongoing  1/6/2022 1247 by Krysten Doan RN  Outcome: Ongoing  Goal: Control of acute pain  Description: Control of acute pain  1/7/2022 0131 by Itzel Kelsey RN  Outcome: Ongoing  1/6/2022 1247 by Krysten Doan RN  Outcome: Ongoing  Goal: Control of chronic pain  Description: Control of chronic pain  1/7/2022 0131 by Itzel Kelsey RN  Outcome: Ongoing  1/6/2022 1247 by Krysten Doan RN  Outcome: Ongoing

## 2022-01-08 ENCOUNTER — APPOINTMENT (OUTPATIENT)
Dept: CT IMAGING | Age: 67
DRG: 871 | End: 2022-01-08
Attending: STUDENT IN AN ORGANIZED HEALTH CARE EDUCATION/TRAINING PROGRAM
Payer: MEDICARE

## 2022-01-08 PROBLEM — K68.12 PSOAS MUSCLE ABSCESS (HCC): Status: ACTIVE | Noted: 2022-01-08

## 2022-01-08 PROBLEM — A41.4 SEPTICEMIA DUE TO KLEBSIELLA PNEUMONIAE (HCC): Status: ACTIVE | Noted: 2022-01-08

## 2022-01-08 LAB
ANION GAP SERPL CALCULATED.3IONS-SCNC: 12 MMOL/L (ref 9–17)
BUN BLDV-MCNC: 18 MG/DL (ref 8–23)
BUN/CREAT BLD: ABNORMAL (ref 9–20)
CALCIUM SERPL-MCNC: 8.3 MG/DL (ref 8.6–10.4)
CHLORIDE BLD-SCNC: 104 MMOL/L (ref 98–107)
CO2: 27 MMOL/L (ref 20–31)
CREAT SERPL-MCNC: 0.84 MG/DL (ref 0.5–0.9)
CULTURE: ABNORMAL
DIRECT EXAM: ABNORMAL
GFR AFRICAN AMERICAN: >60 ML/MIN
GFR NON-AFRICAN AMERICAN: >60 ML/MIN
GFR SERPL CREATININE-BSD FRML MDRD: ABNORMAL ML/MIN/{1.73_M2}
GFR SERPL CREATININE-BSD FRML MDRD: ABNORMAL ML/MIN/{1.73_M2}
GLUCOSE BLD-MCNC: 127 MG/DL (ref 70–99)
HCT VFR BLD CALC: 33.7 % (ref 36.3–47.1)
HEMOGLOBIN: 10.3 G/DL (ref 11.9–15.1)
Lab: ABNORMAL
MCH RBC QN AUTO: 29.9 PG (ref 25.2–33.5)
MCHC RBC AUTO-ENTMCNC: 30.6 G/DL (ref 28.4–34.8)
MCV RBC AUTO: 97.7 FL (ref 82.6–102.9)
NRBC AUTOMATED: 0 PER 100 WBC
PDW BLD-RTO: 13.5 % (ref 11.8–14.4)
PLATELET # BLD: 381 K/UL (ref 138–453)
PMV BLD AUTO: 8.8 FL (ref 8.1–13.5)
POTASSIUM SERPL-SCNC: 3.2 MMOL/L (ref 3.7–5.3)
RBC # BLD: 3.45 M/UL (ref 3.95–5.11)
SODIUM BLD-SCNC: 143 MMOL/L (ref 135–144)
SPECIMEN DESCRIPTION: ABNORMAL
WBC # BLD: 9.5 K/UL (ref 3.5–11.3)

## 2022-01-08 PROCEDURE — 6370000000 HC RX 637 (ALT 250 FOR IP): Performed by: STUDENT IN AN ORGANIZED HEALTH CARE EDUCATION/TRAINING PROGRAM

## 2022-01-08 PROCEDURE — 80048 BASIC METABOLIC PNL TOTAL CA: CPT

## 2022-01-08 PROCEDURE — 94664 DEMO&/EVAL PT USE INHALER: CPT

## 2022-01-08 PROCEDURE — 99232 SBSQ HOSP IP/OBS MODERATE 35: CPT | Performed by: INTERNAL MEDICINE

## 2022-01-08 PROCEDURE — 94640 AIRWAY INHALATION TREATMENT: CPT

## 2022-01-08 PROCEDURE — 6360000002 HC RX W HCPCS: Performed by: STUDENT IN AN ORGANIZED HEALTH CARE EDUCATION/TRAINING PROGRAM

## 2022-01-08 PROCEDURE — 36415 COLL VENOUS BLD VENIPUNCTURE: CPT

## 2022-01-08 PROCEDURE — 85027 COMPLETE CBC AUTOMATED: CPT

## 2022-01-08 PROCEDURE — 2580000003 HC RX 258: Performed by: INTERNAL MEDICINE

## 2022-01-08 PROCEDURE — 99233 SBSQ HOSP IP/OBS HIGH 50: CPT | Performed by: INTERNAL MEDICINE

## 2022-01-08 PROCEDURE — 6370000000 HC RX 637 (ALT 250 FOR IP): Performed by: NURSE PRACTITIONER

## 2022-01-08 PROCEDURE — 6360000002 HC RX W HCPCS: Performed by: INTERNAL MEDICINE

## 2022-01-08 PROCEDURE — 1200000000 HC SEMI PRIVATE

## 2022-01-08 PROCEDURE — 6370000000 HC RX 637 (ALT 250 FOR IP): Performed by: INTERNAL MEDICINE

## 2022-01-08 PROCEDURE — 74176 CT ABD & PELVIS W/O CONTRAST: CPT

## 2022-01-08 RX ADMIN — PANTOPRAZOLE SODIUM 40 MG: 40 TABLET, DELAYED RELEASE ORAL at 05:57

## 2022-01-08 RX ADMIN — WHITE PETROLATUM: 1.75 OINTMENT TOPICAL at 21:39

## 2022-01-08 RX ADMIN — HYDROCODONE BITARTRATE AND ACETAMINOPHEN 2 TABLET: 5; 325 TABLET ORAL at 05:58

## 2022-01-08 RX ADMIN — WHITE PETROLATUM: 1.75 OINTMENT TOPICAL at 08:04

## 2022-01-08 RX ADMIN — IPRATROPIUM BROMIDE AND ALBUTEROL SULFATE 1 AMPULE: .5; 3 SOLUTION RESPIRATORY (INHALATION) at 12:56

## 2022-01-08 RX ADMIN — MORPHINE SULFATE 2 MG: 2 INJECTION, SOLUTION INTRAMUSCULAR; INTRAVENOUS at 19:44

## 2022-01-08 RX ADMIN — ENOXAPARIN SODIUM 40 MG: 100 INJECTION SUBCUTANEOUS at 08:04

## 2022-01-08 RX ADMIN — CEFTRIAXONE SODIUM 2000 MG: 2 INJECTION, POWDER, FOR SOLUTION INTRAMUSCULAR; INTRAVENOUS at 12:48

## 2022-01-08 RX ADMIN — HYDROCODONE BITARTRATE AND ACETAMINOPHEN 2 TABLET: 5; 325 TABLET ORAL at 23:17

## 2022-01-08 RX ADMIN — HYDROCODONE BITARTRATE AND ACETAMINOPHEN 2 TABLET: 5; 325 TABLET ORAL at 10:00

## 2022-01-08 RX ADMIN — ANTI-FUNGAL POWDER MICONAZOLE NITRATE TALC FREE: 1.42 POWDER TOPICAL at 21:39

## 2022-01-08 RX ADMIN — ENOXAPARIN SODIUM 40 MG: 100 INJECTION SUBCUTANEOUS at 21:38

## 2022-01-08 RX ADMIN — ANTI-FUNGAL POWDER MICONAZOLE NITRATE TALC FREE: 1.42 POWDER TOPICAL at 08:04

## 2022-01-08 RX ADMIN — GUAIFENESIN 600 MG: 600 TABLET, EXTENDED RELEASE ORAL at 21:38

## 2022-01-08 RX ADMIN — HYDROCODONE BITARTRATE AND ACETAMINOPHEN 2 TABLET: 5; 325 TABLET ORAL at 17:32

## 2022-01-08 RX ADMIN — GUAIFENESIN 600 MG: 600 TABLET, EXTENDED RELEASE ORAL at 08:04

## 2022-01-08 ASSESSMENT — PAIN SCALES - GENERAL
PAINLEVEL_OUTOF10: 7
PAINLEVEL_OUTOF10: 7
PAINLEVEL_OUTOF10: 8
PAINLEVEL_OUTOF10: 8
PAINLEVEL_OUTOF10: 6
PAINLEVEL_OUTOF10: 5
PAINLEVEL_OUTOF10: 10

## 2022-01-08 ASSESSMENT — ENCOUNTER SYMPTOMS
SHORTNESS OF BREATH: 0
EYE REDNESS: 0
ABDOMINAL PAIN: 0
ABDOMINAL DISTENTION: 0
COUGH: 0
CHEST TIGHTNESS: 0
DIARRHEA: 0
BACK PAIN: 1

## 2022-01-08 NOTE — PLAN OF CARE
Problem: Skin Integrity:  Goal: Will show no infection signs and symptoms  Description: Will show no infection signs and symptoms  1/8/2022 0246 by Vicente Hernandez RN  Outcome: Ongoing  1/7/2022 1529 by Joaquin Kapoor RN  Outcome: Ongoing  Goal: Absence of new skin breakdown  Description: Absence of new skin breakdown  1/8/2022 0246 by Vicente Hernandez RN  Outcome: Ongoing  1/7/2022 1529 by Joaquin Kapoor RN  Outcome: Ongoing     Problem: Falls - Risk of:  Goal: Will remain free from falls  Description: Will remain free from falls  1/8/2022 0246 by Vicente Hernandez RN  Outcome: Ongoing  1/7/2022 1529 by Joaquin Kapoor RN  Outcome: Ongoing  Goal: Absence of physical injury  Description: Absence of physical injury  1/8/2022 0246 by Vicente Hernandez RN  Outcome: Ongoing  1/7/2022 1529 by Joaquin Kapoor RN  Outcome: Ongoing     Problem: Pain:  Goal: Pain level will decrease  Description: Pain level will decrease  1/8/2022 0246 by Vicente Hernandez RN  Outcome: Ongoing  1/7/2022 1529 by Joaquin Kapoor RN  Outcome: Ongoing  Goal: Control of acute pain  Description: Control of acute pain  1/8/2022 0246 by Vicente Hernandez RN  Outcome: Ongoing  1/7/2022 1529 by Joaquin Kapoor RN  Outcome: Ongoing  Goal: Control of chronic pain  Description: Control of chronic pain  1/8/2022 0246 by Vicente Hernandez RN  Outcome: Ongoing  1/7/2022 1529 by Joaquin Kapoor RN  Outcome: Ongoing

## 2022-01-08 NOTE — PROGRESS NOTES
Urology Progress Note      Subjective: Tio Renteria is a 74 y.o. female. His/Her current Diet is: ADULT DIET; Regular. No acute issues overnight. No fevers or chills. No nausea or vomiting. No chest pain or shortness of breath. No calf pain. Pain controlled. Ambulating. Tolerating PO Diet. Right nephrostomy bag draining pink urine        Vitals and Labs:  Vitals:    01/07/22 0941 01/07/22 1955 01/07/22 2000 01/08/22 0817   BP: (!) 152/76 (!) 145/67 (!) 145/67 (!) 144/90   Pulse: 89 96 96 87   Resp: 16 18 18 18   Temp: 98.1 °F (36.7 °C) 98.7 °F (37.1 °C) 98.7 °F (37.1 °C) 97.6 °F (36.4 °C)   TempSrc: Oral  Oral    SpO2: 91% 90% 90% 90%   Weight:       Height:         I/O last 3 completed shifts:  In: -   Out: 215 [Urine:215]    Recent Labs     01/06/22  0630   WBC 10.1   HGB 9.6*   HCT 30.8*   MCV 98.7        Recent Labs     01/06/22  0630      K 3.7      CO2 27   BUN 20   CREATININE 0.82       No results for input(s): COLORU, PHUR, LABCAST, WBCUA, RBCUA, MUCUS, TRICHOMONAS, YEAST, BACTERIA, CLARITYU, SPECGRAV, LEUKOCYTESUR, UROBILINOGEN, BILIRUBINUR, BLOODU in the last 72 hours. Invalid input(s): NITRATE, GLUCOSEUKETONESUAMORPHOUS    Physical Exam:  NAD  A/O x 3  RRR  No accessory muscles of inspiration  Abdomen soft, non-tender, non-distended. No CVA tenderness. Brooks in place. Clear yellow UOP. No calf pain. EPCs on. Machine turned on.     Impression:    1.6 cm right UPJ stone with hydronephrosis  Right perinephric abscess  Fluid Cx - GPC in clusters, GPC in chains, LFGNRs    Plan:  Continue right percutaneous nephrostomy  Continue IV antibiotics, appreciate ID recommendations - currently on ceftriaxone  F/U aspirated fluid cultures  We will follow-up with patient outpatient basis for ureteroscopy, laser lithotripsy  Follow-up on repeat CT abdomen and pelvis to assess for resolution of abscess      Bladimir Rhodes MD  8:41 AM 1/8/2022 (0) independent

## 2022-01-08 NOTE — PROGRESS NOTES
[facility] -   Infectious diseases evaluation. Progress Note    admission date 1/4/2022    reason for consultation:   Blood culture x2 growing Klebsiella pneumoniae-1/2/22  Perinephric abscess    Impression :   Current:  · Klebsiella septicemia  · Right severely obstructive pyelonephritis, post stent placement 1/4/22  · 1 1 6 cm right ureteral stone  · Perinephric/psoas abscess   · s/p right percutaneous nephrostomy tube 120 mL pus aspirated and right perinephric drain placement  · Bilateral lymphedema  · Prolonged QT    Discussion / summary of stay / plan of care   ·   Recommendations   · Rocephin 2 g IV daily x a month until 2/2/22  · Midline placed, chart reconciled    · Culture from the psoas abscess: 1-5-22: Klebsiella pneumoniae      Infection Control Recommendations   · [place] Precautions  · Contact Isolation       Antimicrobial Stewardship Recommendations   · Simplification of therapy  · Targeted therapy    Coordination ofOutpatient Care:   · Estimated Length of IV antimicrobials:  · Patient will need Midline / picc Catheter Insertion:   · Patient will need SNF:  · Patient will need outpatient wound care:     History of Present Illness:       INITIAL HISTORY:    Héctor Cheung is a 74y.o.-year-old female with past medical history of bilateral lymphedema, morbid obesity, history of DVT on Xarelto presents with right flank pain from outside facility. Patient had fever and leukocytosis. CT abdomen pelvis revealed 1.6 cm right renal calculus with 7 x 6 x 7 cm psoas abscess. Patient transferred to [facility] for right PCNT. Blood culture x2+ for bacteria. 1/2 Klebsiella pneumonia. Interval changes  1/8/2022   Patient Vitals for the past 8 hrs:   BP Temp Pulse Resp SpO2   01/08/22 0817 (!) 144/90 97.6 °F (36.4 °C) 87 18 90 %     1/5  Patient seen and examined in her room. She had a drain placed to the perinephric abscess of her right kidney.   120 mL pus aspirated. She is afebrile hemodynamically stable has some flank pain. 1/6 patient seen and examined in her room. Drain in place. Has some back pain. Afebrile hemodynamically stable. On 2 L oxygen via nasal cannula    1/7  Patient resting well in her room. No complaints. She had midline placed today    CURRENT EVALUATION : 1/8/2022    Afebrile  VS stable    On room air  RR 18  02 sat 90    Repeat Ct scan 1-8-22 shows decompression of Rt kidney obstruction caused by UPJ stone. The patient needs decompression of renal and psoas abscess with a separate drain. Summary of relevant labs:1/8/2022    Labs:  Creatinine 0.77-0.83  WBC 17-45-91-10-10    Micro:  Blood culture x2 1/2 growing Klebsiella  Perinephric abscess culture 1/5: Lactose fermenting gram-negative rods. Gram-positive cocci in clusters. Gram-positive cocci in chains. Imaging:  CT abdomen pelvis with contrast 1/5/2022  .  Obstructing 1.6 cm stone in the right UPJ results in moderately severe   hydronephrosis.  The recently described abnormality along the inferomedial   right kidney involving the psoas muscle is consistent with an abscess   measuring up to 7.2 x 6.0 x 7.7 cm.       2.  Bilateral nephrolithiasis.  Previously noted small bladder stones are not   delineated on this exam.       3.  Brooks catheter within the bladder.  Pelvic floor relaxation.       4.  Diverticulosis and cholelithiasis. 1-8-22:      1-5-22: I have personally reviewed the past medical history, past surgical history, medications, social history, and family history, and I haveupdated the database accordingly. Allergies:   Estrogens     Review of Systems:     Review of Systems   Constitutional: Positive for activity change. HENT: Negative for congestion. Eyes: Negative for redness. Respiratory: Negative for cough, chest tightness and shortness of breath. Cardiovascular: Positive for leg swelling. Negative for chest pain and palpitations. Gastrointestinal: Negative for abdominal distention, abdominal pain and diarrhea. Genitourinary: Positive for difficulty urinating and dysuria. Musculoskeletal: Positive for back pain. Negative for myalgias. Skin: Negative. Neurological: Negative. Hematological: Negative. Psychiatric/Behavioral: Negative. All other systems reviewed and are negative. Physical Examination :       Physical Exam  Constitutional:       Appearance: Normal appearance. HENT:      Right Ear: Tympanic membrane normal.      Left Ear: Tympanic membrane normal.      Nose: Nose normal.      Mouth/Throat:      Mouth: Mucous membranes are moist.   Eyes:      Pupils: Pupils are equal, round, and reactive to light. Cardiovascular:      Rate and Rhythm: Normal rate and regular rhythm. Heart sounds: Normal heart sounds. Abdominal:      General: Abdomen is flat. Tenderness: There is abdominal tenderness. Skin:     Capillary Refill: Capillary refill takes less than 2 seconds. Neurological:      Mental Status: She is alert.      bilateral lower extremity lymphedema and venous stasis dermatitis present    Past Medical History:     Past Medical History:   Diagnosis Date    Carcinoma ([facility] [address]     Squamous Cell    Cellulitis     DVT (deep venous thrombosis) ([facility] [address] 2006    LLE    Kidney stone     Wears glasses        Past Surgical  History:     Past Surgical History:   Procedure Laterality Date    CARPAL TUNNEL RELEASE  1990s    CYSTOSCOPY N/A 1/4/2022    CYSTOSCOPY URETERAL STENT INSERTION performed by Sandip Lee MD at [address]  1/7/2022         IR NEPHROSTOMY PERCUTANEOUS RIGHT  1/5/2022    IR NEPHROSTOMY PERCUTANEOUS RIGHT 1/5/2022 Mary Salinas MD [facility]    ANNABELLA AND BSO      05/2019    TUBAL LIGATION  1993    TUBAL LIGATION      WISDOM TOOTH EXTRACTION         Medications:      mineral oil-hydrophilic petrolatum   Topical BID    miconazole Topical BID    cefTRIAXone (ROCEPHIN) IV  2,000 mg IntraVENous Q24H    guaiFENesin  600 mg Oral BID    enoxaparin  40 mg SubCUTAneous BID    pantoprazole  40 mg Oral QAM AC       Social History:     Social History     Socioeconomic History    Marital status:      Spouse name: Not on file    Number of children: Not on file    Years of education: Not on file    Highest education level: Not on file   Occupational History    Not on file   Tobacco Use    Smoking status: Current Every Day Smoker     Packs/day: 0.50     Years: 42.00     Pack years: 21.00     Types: Cigarettes    Smokeless tobacco: Never Used   Vaping Use    Vaping Use: Never used   Substance and Sexual Activity    Alcohol use: No     Alcohol/week: 0.0 standard drinks    Drug use: No    Sexual activity: Not Currently   Other Topics Concern    Not on file   Social History Narrative    Not on file     Social Determinants of Health     Financial Resource Strain:     Difficulty of Paying Living Expenses: Not on file   Food Insecurity:     Worried About Running Out of Food in the Last Year: Not on file    Nabeel of Food in the Last Year: Not on file   Transportation Needs:     Lack of Transportation (Medical): Not on file    Lack of Transportation (Non-Medical):  Not on file   Physical Activity:     Days of Exercise per Week: Not on file    Minutes of Exercise per Session: Not on file   Stress:     Feeling of Stress : Not on file   Social Connections:     Frequency of Communication with Friends and Family: Not on file    Frequency of Social Gatherings with Friends and Family: Not on file    Attends Alevism Services: Not on file    Active Member of Clubs or Organizations: Not on file    Attends Club or Organization Meetings: Not on file    Marital Status: Not on file   Intimate Partner Violence:     Fear of Current or Ex-Partner: Not on file    Emotionally Abused: Not on file    Physically Abused: Not on file   Salome De La Cruz Sexually Abused: Not on file   Housing Stability:     Unable to Pay for Housing in the Last Year: Not on file    Number of Places Lived in the Last Year: Not on file    Unstable Housing in the Last Year: Not on file       Family History:     Family History   Adopted: Yes      Medical Decision Making:   I have independently reviewed/ordered the following labs:    CBC with Differential:   Recent Labs     01/06/22  0630   WBC 10.1   HGB 9.6*   HCT 30.8*        BMP:  Recent Labs     01/06/22  0630      K 3.7      CO2 27   BUN 20   CREATININE 0.82     Hepatic Function Panel:   No results for input(s): PROT, LABALBU, BILIDIR, IBILI, BILITOT, ALKPHOS, ALT, AST in the last 72 hours. No results for input(s): RPR in the last 72 hours. No results for input(s): HIV in the last 72 hours. No results for input(s): BC in the last 72 hours. Lab Results   Component Value Date    CREATININE 0.82 01/06/2022    GLUCOSE 129 01/06/2022       Detailed results: Thank you for allowing us to participate in the care of this patient. Please call with questions. This note is created with the assistance of a speech recognition program.  While intending to generate adocument that actually reflects the content of the visit, the document can still have some errors including those of syntax and sound a like substitutions which may escape proof reading. It such instances, actual meaningcan be extrapolated by contextual diversion.     Katherine Lentz MD    1/8/2022 10:19 AM

## 2022-01-08 NOTE — CARE COORDINATION
RN relates pt would like to go to SNF vs home care.  Met with pt to discuss choices, choices obtained, first choice is [facility], 2nd choice is [facility], [facility] sent

## 2022-01-08 NOTE — PROGRESS NOTES
[facility] -   Infectious diseases evaluation  admission date 1/4/2022    reason for consultation:   Blood culture x2 growing Klebsiella pneumonia-1/2  Perinephric abscess    Impression :   Current:  · Klebsiella bacteremia  · Right severely obstructive pyelonephritis, post stent 1/4  · 1 1 6 cm right ureteral stone  · perinephric/psoas abscess   · s/p right percutaneous nephrostomy tube 120 mL pus aspirated and right perinephric drain placement  · Bilateral lymphedema  · Prolonged QT  Other:  ·   Discussion / summary of stay / plan of care   ·   Recommendations   · Rocephin 2 g IV daily x a month until 2/2  · Midline placed, chart reconciled  · Follow with a CT scan of the abdomen and pelvis outpatient  · Pending culture from the psoas, growing gram-positive cocci      Infection Control Recommendations   · [place] Precautions  · Contact Isolation       Antimicrobial Stewardship Recommendations   · Simplification of therapy  · Targeted therapy    Coordination ofOutpatient Care:   · Estimated Length of IV antimicrobials:  · Patient will need Midline / picc Catheter Insertion:   · Patient will need SNF:  · Patient will need outpatient wound care:     History of Present Illness:   Initial history:  Malachi Weldon is a 74y.o.-year-old female with past medical history of bilateral lymphedema, morbid obesity, history of DVT on Xarelto presents with right flank pain from outside facility. Patient had fever and leukocytosis. CT abdomen pelvis revealed 1.6 cm right renal calculus with 7 x 6 x 7 cm psoas abscess. Patient transferred to [facility] for right PCNT. Blood culture x2+ for bacteria. 1/2 Klebsiella pneumonia. Interval changes  1/8/2022   Patient Vitals for the past 8 hrs:   BP Temp Pulse Resp SpO2   01/08/22 0817 (!) 144/90 97.6 °F (36.4 °C) 87 18 90 %     1/5  Patient seen and examined in her room.   She had a drain placed to the perinephric abscess of her right kidney. 120 mL pus aspirated. She is afebrile hemodynamically stable has some flank pain. 1/6 patient seen and examined in her room. Drain in place. Has some back pain. Afebrile hemodynamically stable. On 2 L oxygen via nasal cannula    1/7  Patient resting well in her room. No complaints. She had midline placed today  Summary of relevant labs:  Labs:  Creatinine 0.77-0.83  WBC 33-96-69-10-10    Micro:  Blood culture x2 1/2 growing Klebsiella  Perinephric abscess culture 1/5: Lactose fermenting gram-negative rods. Gram-positive cocci in clusters. Gram-positive cocci in chains. Imaging:  CT abdomen pelvis with contrast 1/5/2022  .  Obstructing 1.6 cm stone in the right UPJ results in moderately severe   hydronephrosis.  The recently described abnormality along the inferomedial   right kidney involving the psoas muscle is consistent with an abscess   measuring up to 7.2 x 6.0 x 7.7 cm.       2.  Bilateral nephrolithiasis.  Previously noted small bladder stones are not   delineated on this exam.       3.  Brooks catheter within the bladder.  Pelvic floor relaxation.       4.  Diverticulosis and cholelithiasis. I have personally reviewed the past medical history, past surgical history, medications, social history, and family history, and I haveupdated the database accordingly. Allergies:   Estrogens     Review of Systems:     Review of Systems   Constitutional: Positive for activity change. HENT: Negative for congestion. Eyes: Negative for redness. Respiratory: Negative for cough, chest tightness and shortness of breath. Cardiovascular: Positive for leg swelling. Negative for chest pain and palpitations. Gastrointestinal: Negative for abdominal distention, abdominal pain and diarrhea. Genitourinary: Positive for difficulty urinating and dysuria. Musculoskeletal: Positive for back pain. Negative for myalgias. Skin: Negative. Neurological: Negative. Hematological: Negative. Psychiatric/Behavioral: Negative. All other systems reviewed and are negative. Physical Examination :       Physical Exam  Constitutional:       Appearance: Normal appearance. HENT:      Right Ear: Tympanic membrane normal.      Left Ear: Tympanic membrane normal.      Nose: Nose normal.      Mouth/Throat:      Mouth: Mucous membranes are moist.   Eyes:      Pupils: Pupils are equal, round, and reactive to light. Cardiovascular:      Rate and Rhythm: Normal rate and regular rhythm. Heart sounds: Normal heart sounds. Abdominal:      General: Abdomen is flat. Tenderness: There is abdominal tenderness. Skin:     Capillary Refill: Capillary refill takes less than 2 seconds. Neurological:      Mental Status: She is alert.      bilateral lower extremity lymphedema and venous stasis dermatitis present    Past Medical History:     Past Medical History:   Diagnosis Date    Carcinoma ([facility] [address]     Squamous Cell    Cellulitis     DVT (deep venous thrombosis) ([facility] [address] 2006    LLE    Kidney stone     Wears glasses        Past Surgical  History:     Past Surgical History:   Procedure Laterality Date    CARPAL TUNNEL RELEASE  1990s    CYSTOSCOPY N/A 1/4/2022    CYSTOSCOPY URETERAL STENT INSERTION performed by Luciana Munroe MD [address]  1/7/2022         IR NEPHROSTOMY PERCUTANEOUS RIGHT  1/5/2022    IR NEPHROSTOMY PERCUTANEOUS RIGHT 1/5/2022 Emilie Patel MD [facility]    ANNABELLA AND BSO      05/2019    TUBAL LIGATION  1993    TUBAL LIGATION      WISDOM TOOTH EXTRACTION         Medications:      mineral oil-hydrophilic petrolatum   Topical BID    miconazole   Topical BID    cefTRIAXone (ROCEPHIN) IV  2,000 mg IntraVENous Q24H    guaiFENesin  600 mg Oral BID    enoxaparin  40 mg SubCUTAneous BID    pantoprazole  40 mg Oral QAM AC       Social History:     Social History     Socioeconomic History    Marital status:      Spouse name: Not on file    Number of children: Not on file    Years of education: Not on file    Highest education level: Not on file   Occupational History    Not on file   Tobacco Use    Smoking status: Current Every Day Smoker     Packs/day: 0.50     Years: 42.00     Pack years: 21.00     Types: Cigarettes    Smokeless tobacco: Never Used   Vaping Use    Vaping Use: Never used   Substance and Sexual Activity    Alcohol use: No     Alcohol/week: 0.0 standard drinks    Drug use: No    Sexual activity: Not Currently   Other Topics Concern    Not on file   Social History Narrative    Not on file     Social Determinants of Health     Financial Resource Strain:     Difficulty of Paying Living Expenses: Not on file   Food Insecurity:     Worried About Running Out of Food in the Last Year: Not on file    Nabeel of Food in the Last Year: Not on file   Transportation Needs:     Lack of Transportation (Medical): Not on file    Lack of Transportation (Non-Medical):  Not on file   Physical Activity:     Days of Exercise per Week: Not on file    Minutes of Exercise per Session: Not on file   Stress:     Feeling of Stress : Not on file   Social Connections:     Frequency of Communication with Friends and Family: Not on file    Frequency of Social Gatherings with Friends and Family: Not on file    Attends Latter-day Services: Not on file    Active Member of [address] [place] [id number] or Organizations: Not on file    Attends Club or Organization Meetings: Not on file    Marital Status: Not on file   Intimate Partner Violence:     Fear of Current or Ex-Partner: Not on file    Emotionally Abused: Not on file    Physically Abused: Not on file    Sexually Abused: Not on file   Housing Stability:     Unable to Pay for Housing in the Last Year: Not on file    Number of [place] in the Last Year: Not on file    Unstable Housing in the Last Year: Not on file       Family History:     Family History   Adopted: Yes      Medical Decision Making:   I have independently reviewed/ordered the following labs:    CBC with Differential:   Recent Labs     01/06/22 0630   WBC 10.1   HGB 9.6*   HCT 30.8*        BMP:  Recent Labs     01/06/22 0630      K 3.7      CO2 27   BUN 20   CREATININE 0.82     Hepatic Function Panel:   No results for input(s): PROT, LABALBU, BILIDIR, IBILI, BILITOT, ALKPHOS, ALT, AST in the last 72 hours. No results for input(s): RPR in the last 72 hours. No results for input(s): HIV in the last 72 hours. No results for input(s): BC in the last 72 hours. Lab Results   Component Value Date    CREATININE 0.82 01/06/2022    GLUCOSE 129 01/06/2022       Detailed results: Thank you for allowing us to participate in the care of this patient. Please call with questions. This note is created with the assistance of a speech recognition program.  While intending to generate adocument that actually reflects the content of the visit, the document can still have some errors including those of syntax and sound a like substitutions which may escape proof reading. It such instances, actual meaningcan be extrapolated by contextual diversion.     Luisa Babin MD      1/8/2022 10:19 AM

## 2022-01-08 NOTE — PROGRESS NOTES
[facility]  Office: [address], DO, Tadeo Pandey, DO, Ross Graham, DO, Eliane Madera, DO, Radha Dominguez MD, George Aguero MD, Jake Long MD, Hanh Johnson MD, Aranza Nava MD, Isabella Brice MD, Teto Atwood MD, Haskell Leventhal, DO, Ra Penny, DO, Cheryle Bumpers, MD,  Marina Rice, DO, Ayden Macdonald MD, Nu Brewer MD, Lowell Day MD, Dalton Zheng MD, Tirso Mayorga MD, Kalvin Spurling, MD, Saint Goodwill, MD, Lady Edmonds, [facility], [facility], [facility], Talon Rodrigues, CNP, Abena Martinez, CNS, Nichole Wiggins, CNP, Lulú Ortega, CNP, Stephanie Kohler, CNP, Vanda Shaikh, CNP, George Spencer, CNP, Lora Skaggs PA-C, Alethea Espino, [facility], Celine Miramontes, [facility], Efrain Farley, CNP, Darrick Cole, CNP, Bibi Watson, CNP, Laverne Goff, CNP, Cheryl Harley, CNP, Maximiliano Kebede, [address] [place] [address]    Progress Note    1/8/2022    9:57 AM    Name:   Eldon Duarte  MRN:     [id number]     Acct:      [de-identified]   Room:   [address] [place] [address] Day:  4  Admit Date:  1/4/2022  2:33 PM    PCP:   DONG Juarez CNP  Code Status:  Full Code    Subjective:     C/C: Right flank pain    Interval History Status: improved. Patient has right nephrostomy tube in place but not draining much. Her pain is mildly improved. Her cough is much better with very little SOB. She is feeling \"sweats\", no fevers. She is in a [facility] ready for discharge. She went down for a CT abd earlier today to f/u on right psoas muscle abscess. Brief History:     Per my partner:  \"   Eldon Duarte is a 74 y.o. Non- / non  female who presents with No chief complaint on file.    and is admitted to the hospital for the management of Renal mass.     74year old female with past medical history of bilateral lympedema, morbid obesity, history of DVT and on chronic anticoagulation at home on xarelto presents with back pain to outlying facility. Patient underwent CT abdomen pelvis showing 1.6 cm stone with 7.2x 6.0 x7.0 cm psoas abscess. Tim also has 2 blood cultures postivie for bacteria. One showing klebsiella PNA. \"    1/5/2022- IR, Right Nephrostomy tube placed, 120 ml purulent fluid drained    Review of Systems:     Constitutional:  negative for chills, fevers, +sweats  Respiratory:  negative for cough,  dyspnea on exertion, shortness of breath, wheezing  Cardiovascular:  negative for chest pain, chest pressure/discomfort, +lower extremity edema  Gastrointestinal:  negative for abdominal pain, constipation, diarrhea, nausea, vomiting  Neurological:  negative for dizziness, headache    Medications: Allergies: Allergies   Allergen Reactions    Estrogens Other (See Comments)     Hx of DVT       Current Meds:   Scheduled Meds:    mineral oil-hydrophilic petrolatum   Topical BID    miconazole   Topical BID    cefTRIAXone (ROCEPHIN) IV  2,000 mg IntraVENous Q24H    guaiFENesin  600 mg Oral BID    enoxaparin  40 mg SubCUTAneous BID    pantoprazole  40 mg Oral QAM AC     Continuous Infusions:     PRN Meds: HYDROcodone 5 mg - acetaminophen **OR** HYDROcodone 5 mg - acetaminophen, ipratropium-albuterol, magnesium sulfate, ondansetron **OR** ondansetron, polyethylene glycol, acetaminophen **OR** acetaminophen, morphine **OR** morphine    Data:     Past Medical History:   has a past medical history of Carcinoma ([address] Cellulitis, DVT (deep venous thrombosis) ([facility] [address] Kidney stone, and Wears glasses. Social History:   reports that she has been smoking cigarettes. She has a 21.00 pack-year smoking history. She has never used smokeless tobacco. She reports that she does not drink alcohol and does not use drugs.      Family History:   Family History   Adopted: Yes       Vitals:  BP (!) 144/90   Pulse 87   Temp 97.6 °F (36.4 °C)   Resp 18   Ht 5' 1\" (1.549 m)   Wt 273 lb 2.4 oz (123.9 kg)   LMP  (LMP Unknown)   SpO2 90%   BMI 51.61 kg/m²   Temp (24hrs), Av.3 °F (36.8 °C), Min:97.6 °F (36.4 °C), Max:98.7 °F (37.1 °C)    Recent Labs     22  2224   POCGLU 147*       I/O (24Hr): No intake or output data in the 24 hours ending 22 0957    Labs:  Hematology:  Recent Labs     22  0630   WBC 10.1   RBC 3.12*   HGB 9.6*   HCT 30.8*   MCV 98.7   MCH 30.8   MCHC 31.2   RDW 13.5      MPV 8.8     Chemistry:  Recent Labs     22  0630      K 3.7      CO2 27   GLUCOSE 129*   BUN 20   CREATININE 0.82   ANIONGAP 10   LABGLOM >60   GFRAA >60   CALCIUM 8.4*   PROBNP 929*     Recent Labs     22  0630   LABA1C  --  6.2*   POCGLU 147*  --      ABG:  Lab Results   Component Value Date    PHART 7.466 2018    JSS4DBF 35.5 2018    PO2ART 72.5 2018    QEN5FKL 25.0 2018    NBEA NOT REPORTED 2018    PBEA 1.7 2018    Y8OYKADK 95.5 2018    FIO2 21 2018     Lab Results   Component Value Date/Time    SPECIAL NOT REPORTED 2022 09:34 PM     Lab Results   Component Value Date/Time    CULTURE NO GROWTH 2022 09:34 PM       Radiology:  CT ABDOMEN PELVIS WO CONTRAST Additional Contrast? None    Result Date: 2022  Limited noncontrast examination. New abnormal mass appears to be contiguous with the inferomedial aspect of the right kidney and contiguous with the right psoas muscle measuring approximately 7.9 x 7.4 x 8.8 cm. The density of this lesion/mass measures slightly higher than the simple fluid. Differential consideration include perinephric/retroperitoneal abscess or complex perinephric urinoma in a setting of large hyperdense stone seen about the right ureteropelvic junction. Consider CT abdomen and pelvis with contrast for better characterization. Additional subcentimeter hypodense renal stones, which appear to be nonobstructing. Layering subcentimeter hyperdense stones seen within the urinary bladder. Inferiorly prolapsed urinary bladder. Gallbladder sludge/layering gallstones without imaging features of acute cholecystitis seen. Colonic diverticulosis without evidence of acute diverticulitis. XR ABDOMEN (KUB) (SINGLE AP VIEW)    Result Date: 1/4/2022  Stable configuration of the ureteral stent compared to fluoroscopic examination from earlier today. CT ABDOMEN PELVIS W IV CONTRAST Additional Contrast? None    Result Date: 1/4/2022  1. Obstructing 1.6 cm stone in the right UPJ results in moderately severe hydronephrosis. The recently described abnormality along the inferomedial right kidney involving the psoas muscle is consistent with an abscess measuring up to 7.2 x 6.0 x 7.7 cm. 2.  Bilateral nephrolithiasis. Previously noted small bladder stones are not delineated on this exam. 3.  Brooks catheter within the bladder. Pelvic floor relaxation. 4.  Diverticulosis and cholelithiasis. RECOMMENDATIONS: Unavailable     XR CHEST PORTABLE    Result Date: 1/2/2022  Pulmonary vascular congestion     IR GUIDED NEPHROSTOMY CATH PLACEMENT RIGHT    Result Date: 1/5/2022  Successful percutaneous nephrostomy tube placement. 120 mL of purulent material was aspirated. A defined separate retroperitoneal abscess was not seen and expect findings on CT scan were just extension from the infection in the kidney. Patient be followed up with contrast CT scan when the drainage subsides to exclude the presence of any remaining retroperitoneal abscess.        Physical Examination:        General appearance:  alert, cooperative and no distress  Mental Status:  oriented to person, place and time and normal affect  Lungs:  Improved BS bilaterally, no wheezes/ rhonchi, normal effort  Heart:  regular rate and rhythm, + murmur  Abdomen:  soft, nontender, obese, normal bowel sounds, no masses, hepatomegaly, splenomegaly  Extremities:  + edema bilat LE 1+, no redness, tenderness in the calves  Skin:  + venous stasis skin changes to bilat LE, thickened scaly skin, dry flaking    Assessment:        Hospital Problems           Last Modified POA    * (Principal) Complicated UTI (urinary tract infection) 1/4/2022 Yes    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    Long term current use of anticoagulant therapy 1/4/2022 Yes    Lymphedema of both lower extremities 1/4/2022 Yes    Obesity 1/4/2022 Yes    Tobacco abuse 1/4/2022 Yes    History of recurrent deep vein thrombosis (DVT) 1/4/2022 Yes    Frequency of urination 1/4/2022 Yes    Mixed incontinence 1/4/2022 Yes    Renal mass 1/4/2022 Yes    Bacteremia due to Klebsiella pneumoniae 1/4/2022 Yes    Psoas muscle abscess ([address] 1/8/2022 Yes    Septicemia due to Klebsiella pneumoniae ([address] 1/8/2022 Yes          Plan:        Right ureteral stone with Klebsiella pneumonia bacteremia- + psoas abscess status post IR drainage and culture. Nephrostomy tube placed in IR, Urology following. Repeat CT abd reveals persistent abscess 7 x 6 cm abscess, needs a separate drain. Will consult IR again.  Continue IV Rocephin per infectious diseases x 28 days  Long-term anticoagulation therapy-hold Xarelto for now, will check with Urology if Xarelto can be restarted  Lymphedema bilateral LE-  ACE wraps, wound care, needs to wear her lymphedema boots at home but doesn't know how to apply them by herself, needs lotion BID  Dyspnea/ wheezing- suspect COPD, Duonebs 4x daily, oxygen prn, mucinex, Chest xray- negative except for atelectasis  Murmur- hx aortic sclerosis, mild stenosis, elevated BNP, Echo reviewed  Obesity- suspect MAYNOR, needs outpatient sleep study  Tobacco abuse- encouraged cessation  GI prophylaxis  PT/OT- patient refusing rehab,    Needs weekly CBC, CMP while on Rocephin    Kristen Higuera MD  1/8/2022  9:57 AM

## 2022-01-09 LAB — G-6-PD, QUANT: 13.9 U/G HB (ref 9.9–16.6)

## 2022-01-09 PROCEDURE — 6370000000 HC RX 637 (ALT 250 FOR IP): Performed by: INTERNAL MEDICINE

## 2022-01-09 PROCEDURE — 6360000002 HC RX W HCPCS: Performed by: INTERNAL MEDICINE

## 2022-01-09 PROCEDURE — 99232 SBSQ HOSP IP/OBS MODERATE 35: CPT | Performed by: INTERNAL MEDICINE

## 2022-01-09 PROCEDURE — 1200000000 HC SEMI PRIVATE

## 2022-01-09 PROCEDURE — 2580000003 HC RX 258: Performed by: INTERNAL MEDICINE

## 2022-01-09 PROCEDURE — 6370000000 HC RX 637 (ALT 250 FOR IP): Performed by: STUDENT IN AN ORGANIZED HEALTH CARE EDUCATION/TRAINING PROGRAM

## 2022-01-09 PROCEDURE — 6370000000 HC RX 637 (ALT 250 FOR IP): Performed by: NURSE PRACTITIONER

## 2022-01-09 RX ORDER — POTASSIUM CHLORIDE 750 MG/1
40 CAPSULE, EXTENDED RELEASE ORAL ONCE
Status: COMPLETED | OUTPATIENT
Start: 2022-01-09 | End: 2022-01-09

## 2022-01-09 RX ADMIN — WHITE PETROLATUM: 1.75 OINTMENT TOPICAL at 08:12

## 2022-01-09 RX ADMIN — POTASSIUM CHLORIDE 40 MEQ: 750 CAPSULE, EXTENDED RELEASE ORAL at 17:06

## 2022-01-09 RX ADMIN — CEFTRIAXONE SODIUM 2000 MG: 2 INJECTION, POWDER, FOR SOLUTION INTRAMUSCULAR; INTRAVENOUS at 12:35

## 2022-01-09 RX ADMIN — WHITE PETROLATUM: 1.75 OINTMENT TOPICAL at 21:08

## 2022-01-09 RX ADMIN — PANTOPRAZOLE SODIUM 40 MG: 40 TABLET, DELAYED RELEASE ORAL at 07:10

## 2022-01-09 RX ADMIN — GUAIFENESIN 600 MG: 600 TABLET, EXTENDED RELEASE ORAL at 21:08

## 2022-01-09 RX ADMIN — ENOXAPARIN SODIUM 40 MG: 100 INJECTION SUBCUTANEOUS at 21:08

## 2022-01-09 RX ADMIN — METOPROLOL TARTRATE 25 MG: 25 TABLET ORAL at 16:03

## 2022-01-09 RX ADMIN — HYDROCODONE BITARTRATE AND ACETAMINOPHEN 2 TABLET: 5; 325 TABLET ORAL at 21:08

## 2022-01-09 RX ADMIN — ANTI-FUNGAL POWDER MICONAZOLE NITRATE TALC FREE: 1.42 POWDER TOPICAL at 21:08

## 2022-01-09 RX ADMIN — METOPROLOL TARTRATE 25 MG: 25 TABLET ORAL at 21:08

## 2022-01-09 RX ADMIN — GUAIFENESIN 600 MG: 600 TABLET, EXTENDED RELEASE ORAL at 08:12

## 2022-01-09 RX ADMIN — ENOXAPARIN SODIUM 40 MG: 100 INJECTION SUBCUTANEOUS at 08:12

## 2022-01-09 RX ADMIN — ANTI-FUNGAL POWDER MICONAZOLE NITRATE TALC FREE: 1.42 POWDER TOPICAL at 08:12

## 2022-01-09 RX ADMIN — HYDROCODONE BITARTRATE AND ACETAMINOPHEN 2 TABLET: 5; 325 TABLET ORAL at 11:14

## 2022-01-09 RX ADMIN — HYDROCODONE BITARTRATE AND ACETAMINOPHEN 2 TABLET: 5; 325 TABLET ORAL at 16:03

## 2022-01-09 RX ADMIN — HYDROCODONE BITARTRATE AND ACETAMINOPHEN 2 TABLET: 5; 325 TABLET ORAL at 07:17

## 2022-01-09 ASSESSMENT — PAIN SCALES - GENERAL
PAINLEVEL_OUTOF10: 8
PAINLEVEL_OUTOF10: 7
PAINLEVEL_OUTOF10: 7

## 2022-01-09 ASSESSMENT — ENCOUNTER SYMPTOMS
SHORTNESS OF BREATH: 0
ABDOMINAL DISTENTION: 0
EYE REDNESS: 0
ABDOMINAL PAIN: 0
DIARRHEA: 0
COUGH: 0
CHEST TIGHTNESS: 0
BACK PAIN: 1

## 2022-01-09 NOTE — PROGRESS NOTES
[facility] -   Infectious diseases evaluation. Progress Note    admission date 1/4/2022    reason for consultation:   Blood culture x2 growing Klebsiella pneumoniae-1/2/22  Perinephric abscess    Impression :   Current:  · Klebsiella septicemia  · Right severely obstructive pyelonephritis, post stent placement 1/4/22  · 1 1 6 cm right ureteral stone  · Perinephric/psoas abscess   · s/p right percutaneous nephrostomy tube 120 mL pus aspirated and right perinephric drain placement  · Bilateral lymphedema  · Prolonged QT    Discussion / summary of stay / plan of care   ·   Recommendations   · Rocephin 2 g IV daily x one month until 2/2/22  · Midline placed, chart reconciled    · Culture from the psoas abscess: 1-5-22: Klebsiella pneumoniae      Infection Control Recommendations   · [place] Precautions  · Contact Isolation       Antimicrobial Stewardship Recommendations   · Simplification of therapy  · Targeted therapy    Coordination ofOutpatient Care:   · Estimated Length of IV antimicrobials:  · Patient will need Midline / picc Catheter Insertion:   · Patient will need SNF:  · Patient will need outpatient wound care:     History of Present Illness:       INITIAL HISTORY:    Brian Carney is [address] female with past medical history of bilateral lymphedema, morbid obesity, history of DVT on Xarelto presents with right flank pain from outside facility. Patient had fever and leukocytosis. CT abdomen pelvis revealed 1.6 cm right renal calculus with 7 x 6 x 7 cm psoas abscess. Patient transferred to [facility] for right PCNT. Blood culture x2+ for bacteria. 1/2 Klebsiella pneumonia. Interval changes  1/9/2022   Patient Vitals for the past 8 hrs:   BP Temp Pulse Resp SpO2   01/09/22 0813 (!) 162/84 98.4 °F (36.9 °C) 67 19 96 %     1/5  Patient seen and examined in her room. She had a drain placed to the perinephric abscess of her right kidney.   120 mL pus aspirated. She is afebrile hemodynamically stable has some flank pain. 1/6 patient seen and examined in her room. Drain in place. Has some back pain. Afebrile hemodynamically stable. On 2 L oxygen via nasal cannula    1/7  Patient resting well in her room. No complaints. She had midline placed today    CURRENT EVALUATION : 1/9/2022    Afebrile  VS stable, HTN    On room air  RR 18-->19  02 sat 90-->96    Repeat Ct scan 1-8-22 shows decompression of Rt kidney obstruction caused by UPJ stone. The patient needs decompression of renal and psoas abscess with a separate drain. IR to schedule additional drainage of renal/psoas abscess    Summary of relevant labs:1/9/2022    Labs:    BUN 18  Creatinine 0.77-->0.83    WBC 12-->11-->10-->9.5  Hb 10.3  Platelet 542    Micro:  Blood culture x2 1/2 growing Klebsiella  Perinephric abscess culture 1/5: Lactose fermenting gram-negative rods. Gram-positive cocci in clusters. Gram-positive cocci in chains. Imaging:  CT abdomen pelvis with contrast 1/5/2022  .  Obstructing 1.6 cm stone in the right UPJ results in moderately severe   hydronephrosis.  The recently described abnormality along the inferomedial   right kidney involving the psoas muscle is consistent with an abscess   measuring up to 7.2 x 6.0 x 7.7 cm.       2.  Bilateral nephrolithiasis.  Previously noted small bladder stones are not   delineated on this exam.       3.  Brooks catheter within the bladder.  Pelvic floor relaxation.       4.  Diverticulosis and cholelithiasis. 1-8-22:      1-5-22: I have personally reviewed the past medical history, past surgical history, medications, social history, and family history, and I haveupdated the database accordingly. Allergies:   Estrogens     Review of Systems:     Review of Systems   Constitutional: Positive for activity change. HENT: Negative for congestion. Eyes: Negative for redness.    Respiratory: Negative for cough, chest tightness and shortness of breath. Cardiovascular: Positive for leg swelling. Negative for chest pain and palpitations. Gastrointestinal: Negative for abdominal distention, abdominal pain and diarrhea. Genitourinary: Positive for difficulty urinating and dysuria. Musculoskeletal: Positive for back pain. Negative for myalgias. Skin: Negative. Neurological: Negative. Hematological: Negative. Psychiatric/Behavioral: Negative. All other systems reviewed and are negative. Physical Examination :       Physical Exam  Constitutional:       Appearance: Normal appearance. HENT:      Right Ear: Tympanic membrane normal.      Left Ear: Tympanic membrane normal.      Nose: Nose normal.      Mouth/Throat:      Mouth: Mucous membranes are moist.   Eyes:      Pupils: Pupils are equal, round, and reactive to light. Cardiovascular:      Rate and Rhythm: Normal rate and regular rhythm. Heart sounds: Normal heart sounds. Abdominal:      General: Abdomen is flat. Tenderness: There is abdominal tenderness. Skin:     Capillary Refill: Capillary refill takes less than 2 seconds. Neurological:      Mental Status: She is alert.      bilateral lower extremity lymphedema and venous stasis dermatitis present    Past Medical History:     Past Medical History:   Diagnosis Date    Carcinoma ([facility] [address]     Squamous Cell    Cellulitis     DVT (deep venous thrombosis) ([facility] [address] 2006    LLE    Kidney stone     Wears glasses        Past Surgical  History:     Past Surgical History:   Procedure Laterality Date    CARPAL TUNNEL RELEASE  1990s    CYSTOSCOPY N/A 1/4/2022    CYSTOSCOPY URETERAL STENT INSERTION performed by Evelyn Bee MD at [address]  1/7/2022         IR NEPHROSTOMY PERCUTANEOUS RIGHT  1/5/2022    IR NEPHROSTOMY PERCUTANEOUS RIGHT 1/5/2022 Alexei Barber MD [facility]    ANNABELLA AND BSO      05/2019    TUBAL LIGATION  1993    TUBAL LIGATION      WISDOM TOOTH EXTRACTION         Medications:      mineral oil-hydrophilic petrolatum   Topical BID    miconazole   Topical BID    cefTRIAXone (ROCEPHIN) IV  2,000 mg IntraVENous Q24H    guaiFENesin  600 mg Oral BID    enoxaparin  40 mg SubCUTAneous BID    pantoprazole  40 mg Oral QAM AC       Social History:     Social History     Socioeconomic History    Marital status:      Spouse name: Not on file    Number of children: Not on file    Years of education: Not on file    Highest education level: Not on file   Occupational History    Not on file   Tobacco Use    Smoking status: Current Every Day Smoker     Packs/day: 0.50     Years: 42.00     Pack years: 21.00     Types: Cigarettes    Smokeless tobacco: Never Used   Vaping Use    Vaping Use: Never used   Substance and Sexual Activity    Alcohol use: No     Alcohol/week: 0.0 standard drinks    Drug use: No    Sexual activity: Not Currently   Other Topics Concern    Not on file   Social History Narrative    Not on file     Social Determinants of Health     Financial Resource Strain:     Difficulty of Paying Living Expenses: Not on file   Food Insecurity:     Worried About Running Out of Food in the Last Year: Not on file    Nabeel of Food in the Last Year: Not on file   Transportation Needs:     Lack of Transportation (Medical): Not on file    Lack of Transportation (Non-Medical):  Not on file   Physical Activity:     Days of Exercise per Week: Not on file    Minutes of Exercise per Session: Not on file   Stress:     Feeling of Stress : Not on file   Social Connections:     Frequency of Communication with Friends and Family: Not on file    Frequency of Social Gatherings with Friends and Family: Not on file    Attends Gnosticism Services: Not on file    Active Member of Clubs or Organizations: Not on file    Attends Club or Organization Meetings: Not on file    Marital Status: Not on file   Intimate Partner Violence:     Fear of Current or Ex-Partner: Not on file    Emotionally Abused: Not on file    Physically Abused: Not on file    Sexually Abused: Not on file   Housing Stability:     Unable to Pay for Housing in the Last Year: Not on file    Number of Places Lived in the Last Year: Not on file    Unstable Housing in the Last Year: Not on file       Family History:     Family History   Adopted: Yes      Medical Decision Making:   I have independently reviewed/ordered the following labs:    CBC with Differential:   Recent Labs     01/08/22  1048   WBC 9.5   HGB 10.3*   HCT 33.7*        BMP:  Recent Labs     01/08/22  1048      K 3.2*      CO2 27   BUN 18   CREATININE 0.84     Hepatic Function Panel:   No results for input(s): PROT, LABALBU, BILIDIR, IBILI, BILITOT, ALKPHOS, ALT, AST in the last 72 hours. No results for input(s): RPR in the last 72 hours. No results for input(s): HIV in the last 72 hours. No results for input(s): BC in the last 72 hours. Lab Results   Component Value Date    CREATININE 0.84 01/08/2022    GLUCOSE 127 01/08/2022       Detailed results: Thank you for allowing us to participate in the care of this patient. Please call with questions. This note is created with the assistance of a speech recognition program.  While intending to generate adocument that actually reflects the content of the visit, the document can still have some errors including those of syntax and sound a like substitutions which may escape proof reading. It such instances, actual meaningcan be extrapolated by contextual diversion.     Carmela Chapman MD    1/9/2022 10:32 AM

## 2022-01-09 NOTE — PROGRESS NOTES
Urology Progress Note      Subjective: Bee Crooks is a 74 y.o. female. His/Her current Diet is: ADULT DIET; Regular. No acute issues overnight. No fevers or chills. No nausea or vomiting. No chest pain or shortness of breath. No calf pain. Pain controlled. Ambulating. Tolerating PO Diet. Right nephrostomy bag draining pink urine        Vitals and Labs:  Vitals:    01/07/22 2000 01/08/22 0817 01/08/22 1937 01/09/22 0813   BP: (!) 145/67 (!) 144/90 (!) 152/81 (!) 162/84   Pulse: 96 87 86 67   Resp: 18 18 18 19   Temp: 98.7 °F (37.1 °C) 97.6 °F (36.4 °C) 98.7 °F (37.1 °C) 98.4 °F (36.9 °C)   TempSrc: Oral  Oral    SpO2: 90% 90% 92% 96%   Weight:       Height:         No intake/output data recorded. Recent Labs     01/08/22  1048   WBC 9.5   HGB 10.3*   HCT 33.7*   MCV 97.7        Recent Labs     01/08/22  1048      K 3.2*      CO2 27   BUN 18   CREATININE 0.84       No results for input(s): COLORU, PHUR, LABCAST, WBCUA, RBCUA, MUCUS, TRICHOMONAS, YEAST, BACTERIA, CLARITYU, SPECGRAV, LEUKOCYTESUR, UROBILINOGEN, [place] in the last 72 hours. Invalid input(s): NITRATE, GLUCOSEUKETONESUAMORPHOUS    Physical Exam:  NAD  A/O x 3  RRR  No accessory muscles of inspiration  Abdomen soft, non-tender, non-distended. No CVA tenderness. Brooks in place. Clear yellow UOP. No calf pain. EPCs on. Machine turned on. Impression:    1.6 cm right UPJ stone with hydronephrosis  Right perinephric abscess  Fluid Cx - GPC in clusters, GPC in chains, LFGNRs    Plan:  Continue right percutaneous nephrostomy  Continue IV antibiotics, appreciate ID recommendations - currently on ceftriaxone  F/U aspirated fluid cultures  We will follow-up with patient outpatient basis for ureteroscopy, laser lithotripsy  CT abdomen and pelvis shows persistent abscess.   Appreciate interventional team recommendation for placement of drain tube adequately drain abscess      Thekimberly Luna, MD  8:24 AM 1/9/2022

## 2022-01-09 NOTE — PROGRESS NOTES
[facility]  Office: [address], DO, Nahomy Brenda, DO, Sissy Wakefield, DO, Aneesh Mukherjeemarci Madera, DO, Matthew Winston MD, Ritika Reynoso MD, Nathan Fall MD, Brien Jennings MD, Toya Dallas MD, Hollis Fuller MD, Elle Taylor MD, Sherice Fluler, DO, Adam Breaux DO, Bonifacio Lemus MD,  Miya López DO, Asmita Ford MD, Feliciano Helms MD, Sea Newell MD, Rock Garber MD, Tete Jimenez MD, Jitendra Mcclellan MD, Rory Humphries MD, [facility], [facility], [facility], CNP, Radha Mon CNP, Tripp Briggs, CNS, Cristobal Quiroga, CNP, Ashely Bartlett, CNP, James Jackson, CNP, Guillermo Rodriguez, CNP, Lindsay Galarza, CNP, Jessee Bates PA-C, Sharad Winston [facility], Jenniffer Saavedra [facility], Yfn Jean Baptiste CNP, Kira Nolan, CNP, Maikel Dinero, CNP, Suzette Herring, CNP, Amparo De La Cruz, CNP, Jovon Campos, [address] [place] [address]    Progress Note    1/9/2022    4:39 PM    Name:   Malachi Wledon  MRN:     [id number]     Acct:      [de-identified]   Room:   [address] [place] [address] Day:  5  Admit Date:  1/4/2022  2:33 PM    PCP:   DONG Smart CNP  Code Status:  Full Code    Subjective:     C/C: Right flank pain    Interval History Status: improved. Patient's right nephrostomy tube is draining more today. Her pain is mildly improved. Her cough is much better with very little SOB. She is waiting to go to IR tomorrow for drain placement of psoas muscle abscess. Brief History:     Per my partner:  \"   Malachi Weldon is a 74 y.o. Non- / non  female who presents with No chief complaint on file. and is admitted to the hospital for the management of Renal mass.     74year old female with past medical history of bilateral lympedema, morbid obesity, history of DVT and on chronic anticoagulation at home on xarelto presents with back pain to outlying facility.  Patient underwent CT abdomen pelvis showing 1.6 cm stone with 7.2x 6.0 x7.0 cm psoas abscess. Tim also has 2 blood cultures postivie for bacteria. One showing klebsiella PNA. \"    1/5/2022- IR, Right Nephrostomy tube placed, 120 ml purulent fluid drained    Review of Systems:     Constitutional:  negative for chills, fevers, +sweats  Respiratory:  negative for cough,  dyspnea on exertion, shortness of breath, wheezing  Cardiovascular:  negative for chest pain, chest pressure/discomfort, +lower extremity edema  Gastrointestinal:  negative for abdominal pain, constipation, diarrhea, nausea, vomiting  Neurological:  negative for dizziness, headache    Medications: Allergies: Allergies   Allergen Reactions    Estrogens Other (See Comments)     Hx of DVT       Current Meds:   Scheduled Meds:    metoprolol tartrate  25 mg Oral BID    potassium chloride  40 mEq Oral Once    mineral oil-hydrophilic petrolatum   Topical BID    miconazole   Topical BID    cefTRIAXone (ROCEPHIN) IV  2,000 mg IntraVENous Q24H    guaiFENesin  600 mg Oral BID    enoxaparin  40 mg SubCUTAneous BID    pantoprazole  40 mg Oral QAM AC     Continuous Infusions:     PRN Meds: HYDROcodone 5 mg - acetaminophen **OR** HYDROcodone 5 mg - acetaminophen, ipratropium-albuterol, magnesium sulfate, ondansetron **OR** ondansetron, polyethylene glycol, acetaminophen **OR** acetaminophen, morphine **OR** morphine    Data:     Past Medical History:   has a past medical history of Carcinoma ([address] Cellulitis, DVT (deep venous thrombosis) ([facility] [address] Kidney stone, and Wears glasses. Social History:   reports that she has been smoking cigarettes. She has a 21.00 pack-year smoking history. She has never used smokeless tobacco. She reports that she does not drink alcohol and does not use drugs.      Family History:   Family History   Adopted: Yes       Vitals:  BP (!) 151/87   Pulse 76   Temp 98.4 °F (36.9 °C)   Resp 18   Ht 5' 1\" (1.549 m)   Wt 273 lb 2.4 oz (123.9 kg)   LMP  (LMP Unknown)   SpO2 96%   BMI 51.61 kg/m²   Temp (24hrs), Av.6 °F (37 °C), Min:98.4 °F (36.9 °C), Max:98.7 °F (37.1 °C)    No results for input(s): POCGLU in the last 72 hours. I/O (24Hr): No intake or output data in the 24 hours ending 22 1639    Labs:  Hematology:  Recent Labs     22  1048   WBC 9.5   RBC 3.45*   HGB 10.3*   HCT 33.7*   MCV 97.7   MCH 29.9   MCHC 30.6   RDW 13.5      MPV 8.8     Chemistry:  Recent Labs     22  1048      K 3.2*      CO2 27   GLUCOSE 127*   BUN 18   CREATININE 0.84   ANIONGAP 12   LABGLOM >60   GFRAA >60   CALCIUM 8.3*     No results for input(s): PROT, LABALBU, LABA1C, Y1AFYVO, E4MUDCP, FT4, TSH, AST, ALT, LDH, GGT, ALKPHOS, LABGGT, BILITOT, BILIDIR, AMMONIA, AMYLASE, LIPASE, LACTATE, CHOL, HDL, LDLCHOLESTEROL, CHOLHDLRATIO, TRIG, VLDL, JRA11HO, PHENYTOIN, PHENYF, URICACID, POCGLU in the last 72 hours. ABG:  Lab Results   Component Value Date    PHART 7.466 2018    GNS5UVN 35.5 2018    PO2ART 72.5 2018    SGV8RNG 25.0 2018    NBEA NOT REPORTED 2018    PBEA 1.7 2018    P1HYIOUJ 95.5 2018    FIO2 21 2018     Lab Results   Component Value Date/Time    SPECIAL NOT REPORTED 2022 09:34 PM     Lab Results   Component Value Date/Time    CULTURE NO GROWTH 2022 09:34 PM       Radiology:  CT ABDOMEN PELVIS WO CONTRAST Additional Contrast? None    Result Date: 2022  Limited noncontrast examination. New abnormal mass appears to be contiguous with the inferomedial aspect of the right kidney and contiguous with the right psoas muscle measuring approximately 7.9 x 7.4 x 8.8 cm. The density of this lesion/mass measures slightly higher than the simple fluid. Differential consideration include perinephric/retroperitoneal abscess or complex perinephric urinoma in a setting of large hyperdense stone seen about the right ureteropelvic junction. Consider CT abdomen and pelvis with contrast for better characterization. Additional subcentimeter hypodense renal stones, which appear to be nonobstructing. Layering subcentimeter hyperdense stones seen within the urinary bladder. Inferiorly prolapsed urinary bladder. Gallbladder sludge/layering gallstones without imaging features of acute cholecystitis seen. Colonic diverticulosis without evidence of acute diverticulitis. XR ABDOMEN (KUB) (SINGLE AP VIEW)    Result Date: 1/4/2022  Stable configuration of the ureteral stent compared to fluoroscopic examination from earlier today. CT ABDOMEN PELVIS W IV CONTRAST Additional Contrast? None    Result Date: 1/4/2022  1. Obstructing 1.6 cm stone in the right UPJ results in moderately severe hydronephrosis. The recently described abnormality along the inferomedial right kidney involving the psoas muscle is consistent with an abscess measuring up to 7.2 x 6.0 x 7.7 cm. 2.  Bilateral nephrolithiasis. Previously noted small bladder stones are not delineated on this exam. 3.  Brooks catheter within the bladder. Pelvic floor relaxation. 4.  Diverticulosis and cholelithiasis. RECOMMENDATIONS: Unavailable     XR CHEST PORTABLE    Result Date: 1/2/2022  Pulmonary vascular congestion     IR GUIDED NEPHROSTOMY CATH PLACEMENT RIGHT    Result Date: 1/5/2022  Successful percutaneous nephrostomy tube placement. 120 mL of purulent material was aspirated. A defined separate retroperitoneal abscess was not seen and expect findings on CT scan were just extension from the infection in the kidney. Patient be followed up with contrast CT scan when the drainage subsides to exclude the presence of any remaining retroperitoneal abscess.        Physical Examination:        General appearance:  alert, cooperative and no distress  Mental Status:  oriented to person, place and time and normal affect  Lungs:  Improved BS bilaterally, no wheezes/ rhonchi, normal effort  Heart:  regular rate and rhythm, + murmur  Abdomen:  soft, nontender, obese, normal bowel sounds, no masses, hepatomegaly, splenomegaly  Extremities:  + edema bilat LE 1+, no redness, tenderness in the calves  Skin:  + venous stasis skin changes to bilat LE, thickened scaly skin, dry flaking    Assessment:        Hospital Problems           Last Modified POA    * (Principal) Complicated UTI (urinary tract infection) 1/4/2022 Yes    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    Long term current use of anticoagulant therapy 1/4/2022 Yes    Lymphedema of both lower extremities 1/4/2022 Yes    Obesity 1/4/2022 Yes    Tobacco abuse 1/4/2022 Yes    History of recurrent deep vein thrombosis (DVT) 1/4/2022 Yes    Frequency of urination 1/4/2022 Yes    Mixed incontinence 1/4/2022 Yes    Renal mass 1/4/2022 Yes    Bacteremia due to Klebsiella pneumoniae 1/4/2022 Yes    Psoas muscle abscess ([address] 1/8/2022 Yes    Septicemia due to Klebsiella pneumoniae ([address] 1/8/2022 Yes          Plan:        Right ureteral stone with Klebsiella pneumonia bacteremia- + psoas abscess status post IR drainage and culture. Nephrostomy tube placed in IR, Urology following. Repeat CT abd reveals persistent abscess 7 x 6 cm abscess, needs a separate drain. Will consult IR again for drainage on Monday.  Continue IV Rocephin per infectious diseases x 28 days  Long-term anticoagulation therapy-hold Xarelto for now, will check with Urology if Xarelto can be restarted upon discharge, on Lovenox BID for now  Lymphedema bilateral LE-  ACE wraps, wound care, needs to wear her lymphedema boots at home but doesn't know how to apply them by herself, needs lotion BID  Dyspnea/ wheezing- suspect COPD, Duonebs 4x daily, oxygen prn, mucinex, Chest xray- negative except for atelectasis, incentive spirometer  Murmur- hx aortic sclerosis, mild stenosis, elevated BNP, Echo reviewed  Obesity- suspect MAYNOR, needs outpatient sleep study  Tobacco abuse- encouraged cessation  Hypokalemia- replace KCl  GI prophylaxis  PT/OT- patient considering SNF, Vangie signed    Needs weekly CBC, CMP while on Alia Flowers MD  1/9/2022  4:39 PM

## 2022-01-10 ENCOUNTER — APPOINTMENT (OUTPATIENT)
Dept: CT IMAGING | Age: 67
DRG: 871 | End: 2022-01-10
Attending: STUDENT IN AN ORGANIZED HEALTH CARE EDUCATION/TRAINING PROGRAM
Payer: MEDICARE

## 2022-01-10 LAB
ANION GAP SERPL CALCULATED.3IONS-SCNC: 10 MMOL/L (ref 9–17)
BUN BLDV-MCNC: 16 MG/DL (ref 8–23)
BUN/CREAT BLD: ABNORMAL (ref 9–20)
CALCIUM SERPL-MCNC: 8.1 MG/DL (ref 8.6–10.4)
CHLORIDE BLD-SCNC: 103 MMOL/L (ref 98–107)
CO2: 25 MMOL/L (ref 20–31)
CREAT SERPL-MCNC: 0.77 MG/DL (ref 0.5–0.9)
GFR AFRICAN AMERICAN: >60 ML/MIN
GFR NON-AFRICAN AMERICAN: >60 ML/MIN
GFR SERPL CREATININE-BSD FRML MDRD: ABNORMAL ML/MIN/{1.73_M2}
GFR SERPL CREATININE-BSD FRML MDRD: ABNORMAL ML/MIN/{1.73_M2}
GLUCOSE BLD-MCNC: 115 MG/DL (ref 70–99)
INR BLD: 1.1
PARTIAL THROMBOPLASTIN TIME: 29.1 SEC (ref 20.5–30.5)
POTASSIUM SERPL-SCNC: 3.7 MMOL/L (ref 3.7–5.3)
PROTHROMBIN TIME: 11.5 SEC (ref 9.1–12.3)
SODIUM BLD-SCNC: 138 MMOL/L (ref 135–144)

## 2022-01-10 PROCEDURE — 97535 SELF CARE MNGMENT TRAINING: CPT

## 2022-01-10 PROCEDURE — 87077 CULTURE AEROBIC IDENTIFY: CPT

## 2022-01-10 PROCEDURE — 85610 PROTHROMBIN TIME: CPT

## 2022-01-10 PROCEDURE — 85730 THROMBOPLASTIN TIME PARTIAL: CPT

## 2022-01-10 PROCEDURE — 80048 BASIC METABOLIC PNL TOTAL CA: CPT

## 2022-01-10 PROCEDURE — 97116 GAIT TRAINING THERAPY: CPT

## 2022-01-10 PROCEDURE — 6360000002 HC RX W HCPCS: Performed by: INTERNAL MEDICINE

## 2022-01-10 PROCEDURE — 87205 SMEAR GRAM STAIN: CPT

## 2022-01-10 PROCEDURE — 2709999900 CT ABSCESS DRAINAGE

## 2022-01-10 PROCEDURE — 1200000000 HC SEMI PRIVATE

## 2022-01-10 PROCEDURE — 87070 CULTURE OTHR SPECIMN AEROBIC: CPT

## 2022-01-10 PROCEDURE — 2580000003 HC RX 258: Performed by: PHYSICIAN ASSISTANT

## 2022-01-10 PROCEDURE — 6370000000 HC RX 637 (ALT 250 FOR IP): Performed by: INTERNAL MEDICINE

## 2022-01-10 PROCEDURE — 99232 SBSQ HOSP IP/OBS MODERATE 35: CPT | Performed by: INTERNAL MEDICINE

## 2022-01-10 PROCEDURE — 2580000003 HC RX 258: Performed by: INTERNAL MEDICINE

## 2022-01-10 PROCEDURE — 6370000000 HC RX 637 (ALT 250 FOR IP): Performed by: NURSE PRACTITIONER

## 2022-01-10 PROCEDURE — 6360000002 HC RX W HCPCS: Performed by: PHYSICIAN ASSISTANT

## 2022-01-10 PROCEDURE — 87075 CULTR BACTERIA EXCEPT BLOOD: CPT

## 2022-01-10 PROCEDURE — 6360000002 HC RX W HCPCS: Performed by: RADIOLOGY

## 2022-01-10 PROCEDURE — 87186 SC STD MICRODIL/AGAR DIL: CPT

## 2022-01-10 PROCEDURE — 0K9N30Z DRAINAGE OF RIGHT HIP MUSCLE WITH DRAINAGE DEVICE, PERCUTANEOUS APPROACH: ICD-10-PCS | Performed by: RADIOLOGY

## 2022-01-10 PROCEDURE — 36415 COLL VENOUS BLD VENIPUNCTURE: CPT

## 2022-01-10 RX ORDER — CETIRIZINE HYDROCHLORIDE 10 MG/1
10 TABLET ORAL DAILY
Status: DISCONTINUED | OUTPATIENT
Start: 2022-01-10 | End: 2022-01-13 | Stop reason: HOSPADM

## 2022-01-10 RX ORDER — OXYCODONE HYDROCHLORIDE AND ACETAMINOPHEN 5; 325 MG/1; MG/1
1 TABLET ORAL EVERY 4 HOURS PRN
Status: DISCONTINUED | OUTPATIENT
Start: 2022-01-10 | End: 2022-01-13 | Stop reason: HOSPADM

## 2022-01-10 RX ORDER — HYDRALAZINE HYDROCHLORIDE 20 MG/ML
10 INJECTION INTRAMUSCULAR; INTRAVENOUS EVERY 6 HOURS PRN
Status: DISCONTINUED | OUTPATIENT
Start: 2022-01-10 | End: 2022-01-13 | Stop reason: HOSPADM

## 2022-01-10 RX ORDER — SODIUM CHLORIDE 0.9 % (FLUSH) 0.9 %
10 SYRINGE (ML) INJECTION 2 TIMES DAILY
Status: DISCONTINUED | OUTPATIENT
Start: 2022-01-10 | End: 2022-01-13 | Stop reason: HOSPADM

## 2022-01-10 RX ORDER — FENTANYL CITRATE 50 UG/ML
INJECTION, SOLUTION INTRAMUSCULAR; INTRAVENOUS
Status: COMPLETED | OUTPATIENT
Start: 2022-01-10 | End: 2022-01-10

## 2022-01-10 RX ORDER — MIDAZOLAM HYDROCHLORIDE 2 MG/2ML
INJECTION, SOLUTION INTRAMUSCULAR; INTRAVENOUS
Status: COMPLETED | OUTPATIENT
Start: 2022-01-10 | End: 2022-01-10

## 2022-01-10 RX ADMIN — OXYCODONE HYDROCHLORIDE AND ACETAMINOPHEN 1 TABLET: 5; 325 TABLET ORAL at 23:03

## 2022-01-10 RX ADMIN — HYDROCODONE BITARTRATE AND ACETAMINOPHEN 2 TABLET: 5; 325 TABLET ORAL at 11:58

## 2022-01-10 RX ADMIN — CETIRIZINE HYDROCHLORIDE 10 MG: 10 TABLET ORAL at 17:07

## 2022-01-10 RX ADMIN — GUAIFENESIN 600 MG: 600 TABLET, EXTENDED RELEASE ORAL at 08:48

## 2022-01-10 RX ADMIN — HYDRALAZINE HYDROCHLORIDE 10 MG: 20 INJECTION INTRAMUSCULAR; INTRAVENOUS at 13:48

## 2022-01-10 RX ADMIN — MIDAZOLAM HYDROCHLORIDE 0.5 MG: 1 INJECTION, SOLUTION INTRAMUSCULAR; INTRAVENOUS at 11:07

## 2022-01-10 RX ADMIN — METOPROLOL TARTRATE 25 MG: 25 TABLET ORAL at 08:48

## 2022-01-10 RX ADMIN — ANTI-FUNGAL POWDER MICONAZOLE NITRATE TALC FREE: 1.42 POWDER TOPICAL at 08:49

## 2022-01-10 RX ADMIN — WHITE PETROLATUM: 1.75 OINTMENT TOPICAL at 08:48

## 2022-01-10 RX ADMIN — CEFTRIAXONE SODIUM 2000 MG: 2 INJECTION, POWDER, FOR SOLUTION INTRAMUSCULAR; INTRAVENOUS at 13:48

## 2022-01-10 RX ADMIN — GUAIFENESIN 600 MG: 600 TABLET, EXTENDED RELEASE ORAL at 20:14

## 2022-01-10 RX ADMIN — SODIUM CHLORIDE, PRESERVATIVE FREE 10 ML: 5 INJECTION INTRAVENOUS at 20:14

## 2022-01-10 RX ADMIN — METOPROLOL TARTRATE 25 MG: 25 TABLET ORAL at 20:14

## 2022-01-10 RX ADMIN — SODIUM CHLORIDE, PRESERVATIVE FREE 10 ML: 5 INJECTION INTRAVENOUS at 13:48

## 2022-01-10 RX ADMIN — OXYCODONE HYDROCHLORIDE AND ACETAMINOPHEN 1 TABLET: 5; 325 TABLET ORAL at 18:19

## 2022-01-10 RX ADMIN — WHITE PETROLATUM: 1.75 OINTMENT TOPICAL at 20:14

## 2022-01-10 RX ADMIN — ANTI-FUNGAL POWDER MICONAZOLE NITRATE TALC FREE: 1.42 POWDER TOPICAL at 20:15

## 2022-01-10 RX ADMIN — HYDROCODONE BITARTRATE AND ACETAMINOPHEN 2 TABLET: 5; 325 TABLET ORAL at 01:27

## 2022-01-10 RX ADMIN — FENTANYL CITRATE 50 MCG: 50 INJECTION, SOLUTION INTRAMUSCULAR; INTRAVENOUS at 11:07

## 2022-01-10 RX ADMIN — HYDROCODONE BITARTRATE AND ACETAMINOPHEN 2 TABLET: 5; 325 TABLET ORAL at 05:14

## 2022-01-10 ASSESSMENT — PAIN DESCRIPTION - ORIENTATION
ORIENTATION: RIGHT

## 2022-01-10 ASSESSMENT — PAIN SCALES - GENERAL
PAINLEVEL_OUTOF10: 5
PAINLEVEL_OUTOF10: 10
PAINLEVEL_OUTOF10: 5
PAINLEVEL_OUTOF10: 8
PAINLEVEL_OUTOF10: 10
PAINLEVEL_OUTOF10: 5
PAINLEVEL_OUTOF10: 10
PAINLEVEL_OUTOF10: 10
PAINLEVEL_OUTOF10: 7

## 2022-01-10 ASSESSMENT — PAIN DESCRIPTION - PROGRESSION: CLINICAL_PROGRESSION: NOT CHANGED

## 2022-01-10 ASSESSMENT — PAIN DESCRIPTION - LOCATION
LOCATION: FLANK

## 2022-01-10 ASSESSMENT — ENCOUNTER SYMPTOMS
SHORTNESS OF BREATH: 0
ABDOMINAL DISTENTION: 0
DIARRHEA: 0
BACK PAIN: 1
ABDOMINAL PAIN: 0
EYE REDNESS: 0
COUGH: 0
CHEST TIGHTNESS: 0

## 2022-01-10 ASSESSMENT — PAIN DESCRIPTION - DESCRIPTORS
DESCRIPTORS: SHARP;ACHING
DESCRIPTORS: SORE;SHARP

## 2022-01-10 ASSESSMENT — PAIN DESCRIPTION - FREQUENCY: FREQUENCY: INTERMITTENT

## 2022-01-10 ASSESSMENT — PAIN - FUNCTIONAL ASSESSMENT: PAIN_FUNCTIONAL_ASSESSMENT: PREVENTS OR INTERFERES SOME ACTIVE ACTIVITIES AND ADLS

## 2022-01-10 ASSESSMENT — PAIN DESCRIPTION - ONSET: ONSET: ON-GOING

## 2022-01-10 ASSESSMENT — PAIN DESCRIPTION - PAIN TYPE
TYPE: ACUTE PAIN

## 2022-01-10 NOTE — PROGRESS NOTES
Occupational Therapy  Facility/Department: [facility]//MED SURG  Daily Treatment Note  NAME: David Franklin  : 1955  MRN: [id number]    Date of Service: 1/10/2022    Discharge Recommendations:  Patient would benefit from continued therapy after discharge in order to increase pt balance, strength and independence. Assessment   Performance deficits / Impairments: Decreased functional mobility ; Decreased ADL status; Decreased endurance;Decreased high-level IADLs;Decreased strength;Decreased balance;Decreased safe awareness  Prognosis: Good  OT Education: OT Role;Transfer Training;ADL Adaptive Strategies;Precautions  Patient Education: purpose of OT; proper hand and foot placement; balance maintaince; importance of activity  Barriers to Learning: pt demo F carry over  REQUIRES OT FOLLOW UP: Yes  Activity Tolerance  Activity Tolerance: Patient Tolerated treatment well;Patient limited by pain;Treatment limited secondary to agitation  Safety Devices  Safety Devices in place: Yes  Type of devices: Gait belt;Patient at risk for falls; Left in bed;Call light within reach; Bed alarm in place;Nurse notified  Restraints  Initially in place: No       Patient Diagnosis(es): The encounter diagnosis was Perinephric abscess. has a past medical history of Carcinoma ([facility] [address] Cellulitis, DVT (deep venous thrombosis) ([facility] [address] Kidney stone, and Wears glasses. has a past surgical history that includes Tubal ligation (); Carpal tunnel release (); [place] tooth extraction; Tubal ligation; candy and bso (cervix removed); Cystoscopy (N/A, 2022); IR GUIDED NEPHROSTOMY CATH PLACEMENT RIGHT (2022); Insert Midline Catheter (2022); and CT ABSCESS DRAINAGE (1/10/2022).     Restrictions  Restrictions/Precautions  Restrictions/Precautions: General Precautions,Surgical Protocols,Fall Risk  Required Braces or Orthoses?: No  Position Activity Restriction  Other position/activity restrictions: right side nephrostomy tube (right kidney); up with assistance  Subjective   General  Chart Reviewed: Yes  Patient assessed for rehabilitation services?: Yes  Family / Caregiver Present: No  General Comment  Comments: Pt and RN agreeable to therapy this day. Pt req Max encouragement from PT/OT/RN for participation. Pain Assessment  Pain Assessment: 0-10  Pain Level: 5  Pain Type: Acute pain  Pain Location: Flank  Pain Orientation: Right  Non-Pharmaceutical Pain Intervention(s): Ambulation/Increased Activity; Distraction;Repositioned  Pre Treatment Pain Screening  Comments / Details: Pt supine in bed at start of session intermitently agitated req therapeutic use of self. At session end pt retired to supine in bed with call light in reach, BLE elevated and bed alarm on. Vital Signs  Patient Currently in Pain: Yes   Orientation  Orientation  Overall Orientation Status: Within Functional Limits  Objective    ADL  UE Bathing: Minimal assistance;Setup;Verbal cueing; Increased time to complete (face washing and hair brushing)  Additional Comments: SUP for face washing seated in chair. Hair brushing faciliated seated in chair req Min A for back of head sec to pt decreased motivation. Throughout session pt limited per pain, agitation and decreased motiviation.      Balance  Sitting Balance: Stand by assistance (Pt tolerated approx 10 min static/dynamic sitting unsupported at EOB and chair utilizing BUE support)  Standing Balance: Minimal assistance (Min A x2)  Standing Balance  Time: approx 2 min  Activity: static standing at chair  Comment: utilizing RW demo forward flexed posture with fatigue req assist to correct  Functional Mobility  Functional - Mobility Device: Rolling Walker  Activity: Other  Assist Level: Minimal assistance (min x2)  Functional Mobility Comments: EOB><chair pt declined retiring to chair after transfer req to return to supine in bed  Bed mobility  Supine to Sit: Moderate assistance;2 Person assistance  Sit to Supine: Maximum assistance;2 Person assistance  Scooting:  Moderate assistance  Comment: HOB elevated, utilizing bed rails  Transfers  Sit to stand: Minimal assistance;2 Person assistance  Stand to sit: Minimal assistance;2 Person assistance  Transfer Comments: utilizing RW req cues on proper hand placement     Cognition  Overall Cognitive Status: [facility]  Times per week: 3-4x/wk  Current Treatment Recommendations: Safety Education & Training,Patient/Caregiver Education & Training,Balance Training,Functional Mobility Training,Self-Care / ADL,Endurance Training,Equipment Evaluation, Education, & procurement    AM-PAC Score   AM-PAC Inpatient Daily Activity Raw Score: 16 (01/10/22 1605)  AM-PAC Inpatient ADL T-Scale Score : 35.96 (01/10/22 1605)  ADL Inpatient CMS 0-100% Score: 53.32 (01/10/22 1605)  ADL Inpatient CMS G-Code Modifier : CK (01/10/22 1605)    Goals  Short term goals  Time Frame for Short term goals: By discharge, pt will:  Short term goal 1: Demo bed mobility with Mod A  Short term goal 2: Demo +15 minutes of dynamic sitting balance with SBA while engaging in ADLs/functional tasks  Short term goal 3: Demo UB ADLs with SBA and setup  Short term goal 4: Demo grooming/feeding with Mod IND  Short term goal 5: Demo LB ADLs/toileting with Min A, setup and use of DME PRN       Therapy Time   Individual Concurrent Group Co-treatment   Time In 1342         Time Out 1411         Minutes 29         Timed Code Treatment Minutes: 10 Minutes (co tx with PTA)       Marcelino Malloyutant, JENSEN/L

## 2022-01-10 NOTE — PROGRESS NOTES
Urology Progress Note      Subjective: Héctor Cheung is a 74 y.o. female. His/Her current Diet is: ADULT DIET; Regular; No Added Salt (3-4 gm). Since the previous note, the patient reports the following:  No acute issues overnight. No fevers or chills. No nausea or vomiting. No abdominal pain or flank pain. No calf pain. Pain Controlled. Vitals and Labs:  Vitals:    01/10/22 1058 01/10/22 1113 01/10/22 1121 01/10/22 1150   BP: (!) 167/84 (!) 160/84 (!) 172/81 (!) 190/89   Pulse: 70 63 61 65   Resp: 15 18 25 18   Temp:    97.9 °F (36.6 °C)   TempSrc:    Oral   SpO2: 96% 96% 95% 98%   Weight:       Height:         I/O last 3 completed shifts:  In: -   Out: 750 [Urine:750]    Recent Labs     01/08/22  1048   WBC 9.5   HGB 10.3*   HCT 33.7*   MCV 97.7        Recent Labs     01/08/22  1048 01/10/22  0527    138   K 3.2* 3.7    103   CO2 27 25   BUN 18 16   CREATININE 0.84 0.77       No results for input(s): COLORU, PHUR, LABCAST, WBCUA, RBCUA, MUCUS, TRICHOMONAS, YEAST, BACTERIA, CLARITYU, SPECGRAV, LEUKOCYTESUR, UROBILINOGEN, BILIRUBINUR, BLOODU in the last 72 hours. Invalid input(s): NITRATE, GLUCOSEUKETONESUAMORPHOUS    Physical Exam:  NAD  A/O x 3  RRR  No accessory muscles of inspiration  Abdomen soft, non-tender, non-distended. No CVA tenderness. Right nephrostomy draining pink urine  No lombardo  No calf pain. Bilateral lower extremities in bandaging    Impression:    1.6 cm right UPJ stone with hydronephrosis  Right perinephric abscess  Fluid Cx - GPC in clusters, GPC in chains, Klebsiella pneumoniae light growth    Plan:  Continue right percutaneous nephrostomy  Continue IV antibiotics, appreciate ID recommendations - currently on ceftriaxone  F/U aspirated fluid cultures  We will follow-up with patient outpatient basis for ureteroscopy, laser lithotripsy  CT abdomen and pelvis shows persistent abscess.   Appreciate interventional team recommendation for placement of drain tube adequately drain abscess    Jermain Luke MD  3:05 PM 1/10/2022

## 2022-01-10 NOTE — PROGRESS NOTES
Physical Therapy  Facility/Department: [facility] RENAL//MED SURG  Daily Treatment Note  NAME: Demetrio Canas  : 1955  MRN: [id number]    Date of Service: 1/10/2022    Discharge Recommendations:  Patient would benefit from continued therapy after discharge   PT Equipment Recommendations  Equipment Needed: No  Other: pt reports she owns RW    Assessment   Body structures, Functions, Activity limitations: Decreased functional mobility ; Decreased ADL status; Decreased ROM; Decreased strength;Decreased safe awareness;Decreased balance;Decreased sensation;Decreased endurance;Decreased coordination  Assessment: pt required modA 2 to maxA x2 for bed mobility, demonstrated improved mobility once seated EOB. Able to maintain EOB/EOC without physical assistance, ambulated 5ft x2 with Heriberto x2 and use of RW. Pt will require continued PT to address strength and endurance deficits, most limited this date by pain. Specific instructions for Next Treatment: enourage out of bed  Prognosis: Good  PT Education: Goals;PT Role;General Safety;Transfer Training;Disease Specific Education;Gait Training;Functional Mobility Training;Home Exercise Program  REQUIRES PT FOLLOW UP: Yes  Activity Tolerance  Activity Tolerance: Patient limited by endurance; Patient limited by pain     Patient Diagnosis(es): The encounter diagnosis was Perinephric abscess. has a past medical history of Carcinoma ([address] Cellulitis, DVT (deep venous thrombosis) ([facility] [address] Kidney stone, and Wears glasses. has a past surgical history that includes Tubal ligation (); Carpal tunnel release (); [place] tooth extraction; Tubal ligation; candy and bso (cervix removed); Cystoscopy (N/A, 2022); IR GUIDED NEPHROSTOMY CATH PLACEMENT RIGHT (2022); Insert Midline Catheter (2022); and CT ABSCESS DRAINAGE (1/10/2022).     Restrictions  Restrictions/Precautions  Restrictions/Precautions: General Precautions,Surgical Protocols,Fall Risk  Required Braces or Orthoses?: No  Position Activity Restriction  Other position/activity restrictions: right side nephrostomy tube (right kidney); up with assistance  Subjective   General  Response To Previous Treatment: Patient reporting fatigue but able to participate. Family / Caregiver Present: No  Subjective  Subjective: RN agreeable to PT. Pt with c/o pain and discomfort. Agreeable to therapy with encouragement  General Comment  Comments: Pt left in bed with call light within reach, alarm activated  Pain Screening  Patient Currently in Pain: Yes  Pain Assessment  Pain Assessment: 0-10  Pain Level: 5  Pain Type: Acute pain  Pain Location: Flank  Pain Orientation: Right  Pain Descriptors: Sore;Sharp  Pain Frequency: Intermittent  Pain Onset: On-going  Clinical Progression: Not changed  Functional Pain Assessment: Prevents or interferes some active activities and ADLs  Non-Pharmaceutical Pain Intervention(s): Ambulation/Increased Activity; Distraction;Repositioned  Vital Signs  Patient Currently in Pain: Yes       Orientation  Orientation  Overall Orientation Status: Within Normal Limits  Cognition      Objective   Bed mobility  Supine to Sit: Moderate assistance;2 Person assistance (for trunk and B LE progression)  Sit to Supine: Maximum assistance;2 Person assistance (for management of trunk and progression of B LEs to bed)  Scooting:  Moderate assistance  Comment: HOB elevted, increased time and effort to complete  Transfers  Sit to Stand: Minimal Assistance;2 Person Assistance  Stand to sit: Minimal Assistance;2 Person Assistance  Bed to Chair: Minimal assistance;2 Person Assistance  Comment: 2nd person for line and drain management as well as to assist with navigation of RW, pt c/o pain and fatigue t/o  Ambulation  Ambulation?: Yes  Ambulation 1  Surface: level tile  Device: Rolling Walker  Assistance: Minimal assistance  Quality of Gait: waddling gait secondary to body habitus, flexed posture  Gait Deviations: Slow Cheryl; Increased SAE; Decreased step length;Shuffles  Distance: 5ft x2  Comments: grossly steady  Stairs/Curb  Stairs?: No     Balance  Posture: Good  Sitting - Static: Good  Sitting - Dynamic: Good;-  Standing - Static: Fair  Standing - Dynamic: Fair  Comments: standing balance assessed with RW  Exercises  Hip Flexion: ~5x bilat  Hip Abduction: ~5x bilat  Knee Long Arc Quad: ~5x bilat  Ankle Pumps: ~5x bilat  Core Strengthening: pt seated EOB and then EOC ~16 mins with SBA/supervision  Comments: mod encouragement required for completion     AM-PAC Score  AM-PAC Inpatient Mobility Raw Score : 14 (01/10/22 1520)  AM-PAC Inpatient T-Scale Score : 38.1 (01/10/22 1520)  Mobility Inpatient CMS 0-100% Score: 61.29 (01/10/22 1520)  Mobility Inpatient CMS G-Code Modifier : CL (01/10/22 1520)    Goals  Short term goals  Time Frame for Short term goals: 14 days  Short term goal 1: pt to demonstrate mod A with bed mobitily  Short term goal 2: sit <> stand CGA with walker  Short term goal 3: Pt to ambulate up to 10 ft with walker as tolerated CGA  Short term goal 4: Improve general strength and activity tolerance to toleated out of bed activity greater than 30 minutes  Patient Goals   Patient goals : to regain strength    Plan    Plan  Times per week: 5-6x/wk  Specific instructions for Next Treatment: enourage out of bed  Current Treatment Recommendations: Strengthening,Balance Training,Functional Mobility Training,Gait Training,Endurance Training,Neuromuscular Re-education,Safety Education & Training  Safety Devices  Type of devices:  All fall risk precautions in place,Bed alarm in place,Call light within reach,Gait belt,Patient at risk for falls,Left in bed,Nurse notified  Restraints  Initially in place: No     Therapy Time   Individual Concurrent Group Co-treatment   Time In 1350         Time Out 1408         Minutes 18         Timed Code Treatment Minutes: [address] [place], [place]

## 2022-01-10 NOTE — BRIEF OP NOTE
Brief Postoperative Note    Erinn Nelson  YOB: 1955  [id number]    Pre-operative Diagnosis: Psoas Abscess      Post-operative Diagnosis: Same    Procedure: Abscess drain placement    Medication Given: fentanyl and versed    Anesthesia: 1%Lidocaine     Surgeons/Assistants:  Lynette Gilliland MD and Ron Vieira PA-C    Estimated Blood Loss: Minimal    Complications: none    Specimen: Was collected    Procedure:  Successful placement of 8 Indonesian pigtail catheter in right psoas abscess. Approximately 5 mL of purulent fluid was obtained. Drain was sutured to skin and stay fix was applied. LAURIE bulb was attached. Pt tolerated procedure well and left the department in stable condition.       Electronically signed by YUNIOR Guerra on 1/10/2022 at 11:27 AM

## 2022-01-10 NOTE — PROGRESS NOTES
[facility]  Office: [address], DO, Laurenkaela Parminder, DO, Zen Rojas, DO, Devika Madera, DO, Sylwia Enrique MD, Mark Pastor MD, Jordy Stanford MD, Benjamín Hernandez MD, Kimo Weston MD, Carolynn Snellen, MD, Satya Guerrero MD, Lloyd Coyne, DO, Ceci Briseno, DO, Marina Rangel MD,  Venita Warren, DO, Celso Funes MD, Stephy Draper MD, Sophie Melendez MD, Aranza Ashford MD, Gary Reza MD, Donn Acevedo MD, Tammie Cox MD, [facility], [facility], CNP, Akbar Calero CNP, Fredis Whatley, CNS, Becki Hall, CNP, Brandon Caputo, CNP, Sawyer Lloyd, CNP, Governor Xiong, CNP, Armand Sahu CNP, Monika Mendoza PA-C, Jamel [facility], Loco Keenan [facility], Anastasiia Turner CNP, Demond Felipe CNP, Judit Holt CNP, Hanna Hannah, CNP, Jennifer Mckoy CNP, Demond Mccartney, [address] [place] [address]    Progress Note    1/10/2022    9:04 AM    Name:   Laura Vernon  MRN:     [id number]     Acct:      [de-identified]   Room:   [address] [place] [address] Day:  6  Admit Date:  1/4/2022  2:33 PM    PCP:   DONG Monge CNP  Code Status:  Full Code    Subjective:     C/C: Right flank pain    Interval History Status: improved. Patient is returned from IR for drain placement of psoas muscle abscess. She had 5 ml pus drained. Her back is hurting and wants something stronger than Norco.  No fevers. + occasional cough, no SOB or wheezing. Brief History:     Per my partner:  \"   Laura Vernon is a 74 y.o. Non- / non  female who presents with No chief complaint on file. and is admitted to the hospital for the management of Renal mass.     74year old female with past medical history of bilateral lympedema, morbid obesity, history of DVT and on chronic anticoagulation at home on xarelto presents with back pain to outlying facility.  Patient underwent CT abdomen pelvis showing 1.6 cm stone with 7.2x 6.0 kg/m²   Temp (24hrs), Av.6 °F (36.4 °C), Min:97.2 °F (36.2 °C), Max:97.8 °F (36.6 °C)    No results for input(s): POCGLU in the last 72 hours. I/O (24Hr): Intake/Output Summary (Last 24 hours) at 1/10/2022 0904  Last data filed at 2022 1749  Gross per 24 hour   Intake --   Output 750 ml   Net -750 ml       Labs:  Hematology:  Recent Labs     22  1048 01/10/22  0527   WBC 9.5  --    RBC 3.45*  --    HGB 10.3*  --    HCT 33.7*  --    MCV 97.7  --    MCH 29.9  --    MCHC 30.6  --    RDW 13.5  --      --    MPV 8.8  --    INR  --  1.1     Chemistry:  Recent Labs     22  1048 01/10/22  05    138   K 3.2* 3.7    103   CO2 27 25   GLUCOSE 127* 115*   BUN 18 16   CREATININE 0.84 0.77   ANIONGAP 12 10   LABGLOM >60 >60   GFRAA >60 >60   CALCIUM 8.3* 8.1*     No results for input(s): PROT, LABALBU, LABA1C, T9EFGYM, I8SAACE, FT4, TSH, AST, ALT, LDH, GGT, ALKPHOS, LABGGT, BILITOT, BILIDIR, AMMONIA, AMYLASE, LIPASE, LACTATE, CHOL, HDL, LDLCHOLESTEROL, CHOLHDLRATIO, TRIG, VLDL, SFS05OX, PHENYTOIN, PHENYF, URICACID, POCGLU in the last 72 hours. ABG:  Lab Results   Component Value Date    PHART 7.466 2018    IIH2IUH 35.5 2018    PO2ART 72.5 2018    VTU4KJI 25.0 2018    NBEA NOT REPORTED 2018    PBEA 1.7 2018    F6ABOHMY 95.5 2018    FIO2 21 2018     Lab Results   Component Value Date/Time    SPECIAL NOT REPORTED 2022 09:34 PM     Lab Results   Component Value Date/Time    CULTURE NO GROWTH 2022 09:34 PM       Radiology:  CT ABDOMEN PELVIS WO CONTRAST Additional Contrast? None    Result Date: 2022  Limited noncontrast examination. New abnormal mass appears to be contiguous with the inferomedial aspect of the right kidney and contiguous with the right psoas muscle measuring approximately 7.9 x 7.4 x 8.8 cm. The density of this lesion/mass measures slightly higher than the simple fluid.   Differential consideration include perinephric/retroperitoneal abscess or complex perinephric urinoma in a setting of large hyperdense stone seen about the right ureteropelvic junction. Consider CT abdomen and pelvis with contrast for better characterization. Additional subcentimeter hypodense renal stones, which appear to be nonobstructing. Layering subcentimeter hyperdense stones seen within the urinary bladder. Inferiorly prolapsed urinary bladder. Gallbladder sludge/layering gallstones without imaging features of acute cholecystitis seen. Colonic diverticulosis without evidence of acute diverticulitis. XR ABDOMEN (KUB) (SINGLE AP VIEW)    Result Date: 1/4/2022  Stable configuration of the ureteral stent compared to fluoroscopic examination from earlier today. CT ABDOMEN PELVIS W IV CONTRAST Additional Contrast? None    Result Date: 1/4/2022  1. Obstructing 1.6 cm stone in the right UPJ results in moderately severe hydronephrosis. The recently described abnormality along the inferomedial right kidney involving the psoas muscle is consistent with an abscess measuring up to 7.2 x 6.0 x 7.7 cm. 2.  Bilateral nephrolithiasis. Previously noted small bladder stones are not delineated on this exam. 3.  Brooks catheter within the bladder. Pelvic floor relaxation. 4.  Diverticulosis and cholelithiasis. RECOMMENDATIONS: Unavailable     XR CHEST PORTABLE    Result Date: 1/2/2022  Pulmonary vascular congestion     IR GUIDED NEPHROSTOMY CATH PLACEMENT RIGHT    Result Date: 1/5/2022  Successful percutaneous nephrostomy tube placement. 120 mL of purulent material was aspirated. A defined separate retroperitoneal abscess was not seen and expect findings on CT scan were just extension from the infection in the kidney. Patient be followed up with contrast CT scan when the drainage subsides to exclude the presence of any remaining retroperitoneal abscess.        Physical Examination:        General appearance:  alert, cooperative and no distress  Mental Status:  oriented to person, place and time and normal affect  Lungs:  Improved BS bilaterally, no wheezes/ rhonchi, normal effort  Heart:  regular rate and rhythm, + murmur  Abdomen:  soft, nontender, obese, normal bowel sounds, no masses, hepatomegaly, splenomegaly  Extremities:  + edema bilat LE 1+, no redness, tenderness in the calves, ACE wraps placed  Skin:  + venous stasis skin changes to bilat LE, thickened scaly skin, dry flaking    Assessment:        Hospital Problems           Last Modified POA    * (Principal) Complicated UTI (urinary tract infection) 1/4/2022 Yes    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    Long term current use of anticoagulant therapy 1/4/2022 Yes    Lymphedema of both lower extremities 1/4/2022 Yes    Obesity 1/4/2022 Yes    Tobacco abuse 1/4/2022 Yes    History of recurrent deep vein thrombosis (DVT) 1/4/2022 Yes    Frequency of urination 1/4/2022 Yes    Mixed incontinence 1/4/2022 Yes    Renal mass 1/4/2022 Yes    Bacteremia due to Klebsiella pneumoniae 1/4/2022 Yes    Psoas muscle abscess ([address] 1/8/2022 Yes    Septicemia due to Klebsiella pneumoniae ([address] 1/8/2022 Yes          Plan:        Right ureteral stone with Klebsiella pneumonia bacteremia- + psoas abscess status post IR drainage and culture. Nephrostomy tube placed in IR, Urology following. Repeat CT abd revealed persistent abscess 7 x 6 cm abscess, needs a separate drain. IR placed a 2nd drain today. Continue IV Rocephin per infectious diseases x 28 days, has midline.   Change Norco to Percocet for now  Long-term anticoagulation therapy-hold Xarelto for now, will check with Urology if Xarelto can be restarted upon discharge, on Lovenox BID for now  Lymphedema bilateral LE-  ACE wraps, wound care, needs to wear her lymphedema boots at home but doesn't know how to apply them by herself, needs lotion BID  Dyspnea/ wheezing- suspect COPD, Duonebs 4x daily, oxygen prn, mucinex, Chest xray- negative except for atelectasis, incentive spirometer, start Zyrtec  Murmur- hx aortic sclerosis, mild stenosis, elevated BNP, Echo reviewed  Obesity- suspect MAYNOR, needs outpatient sleep study  Tobacco abuse- encouraged cessation  Hypokalemia- replace KCl  GI prophylaxis  PT/OT- patient considering SNF, Vangie signed    Needs weekly CBC, CMP while on Rocephin, discharge planning Tuesday?     Eugene Estrada MD  1/10/2022  9:04 AM

## 2022-01-10 NOTE — CARE COORDINATION
[facility] Quality Flow/Interdisciplinary Rounds Progress Note        Readmission Risk              Risk of Unplanned Readmission:  15           Discussed patient goal for the day, patient clinical progression, and barriers to discharge. The following Goal(s) of the Day/Commitment(s) have been identified:    [facility]ed chart notes for review.         11:59 [facility] to [facility]  [facility]     Met with patient reviewed snf list for more referral options agreeable to [facility], RN  January 10, 2022

## 2022-01-10 NOTE — PROGRESS NOTES
[facility] -   Infectious diseases evaluation. Progress Note    admission date 1/4/2022    reason for consultation:   Blood culture x2 growing Klebsiella pneumoniae-1/2/22  Perinephric abscess    Impression :   Current:  · Klebsiella septicemia  · Right severely obstructive pyelonephritis, post stent placement 1/4/22  · 1 1 6 cm right ureteral stone  · Perinephric/psoas abscess   · s/p right percutaneous nephrostomy tube 120 mL pus aspirated and right perinephric drain placement  · S/p psoas abscess drain 1/10  · Bilateral lymphedema  · Prolonged QT    Discussion / summary of stay / plan of care   ·   Recommendations   · Rocephin 2 g IV daily x one month until 2/2/22  · Midline placed, chart reconciled  · Follow-up culture sensitivity of psoas abscess    · Culture from the psoas abscess: 1-5-22: Klebsiella pneumoniae      Infection Control Recommendations   · [place] Precautions  · Contact Isolation       Antimicrobial Stewardship Recommendations   · Simplification of therapy  · Targeted therapy    Coordination ofOutpatient Care:   · Estimated Length of IV antimicrobials:  · Patient will need Midline / picc Catheter Insertion:   · Patient will need SNF:  · Patient will need outpatient wound care:     History of Present Illness:       INITIAL HISTORY:    David Franklin is a 74y.o.-year-old female with past medical history of bilateral lymphedema, morbid obesity, history of DVT on Xarelto presents with right flank pain from outside facility. Patient had fever and leukocytosis. CT abdomen pelvis revealed 1.6 cm right renal calculus with 7 x 6 x 7 cm psoas abscess. Patient transferred to [facility] for right PCNT. Blood culture x2+ for bacteria. 1/2 Klebsiella pneumonia.     Interval changes  1/10/2022   Patient Vitals for the past 8 hrs:   BP Temp Temp src Pulse Resp SpO2   01/10/22 1150 (!) 190/89 97.9 °F (36.6 °C) Oral 65 18 98 %   01/10/22 1121 (!) 172/81 -- -- 61 25 95 %   01/10/22 1113 (!) 160/84 -- -- 63 18 96 %   01/10/22 1058 (!) 167/84 -- -- 70 15 96 %   01/10/22 0816 (!) 167/80 97.2 °F (36.2 °C) Axillary 61 16 96 %     1/5  Patient seen and examined in her room. She had a drain placed to the perinephric abscess of her right kidney. 120 mL pus aspirated. She is afebrile hemodynamically stable has some flank pain. 1/6 patient seen and examined in her room. Drain in place. Has some back pain. Afebrile hemodynamically stable. On 2 L oxygen via nasal cannula    1/7  Patient resting well in her room. No complaints. She had midline placed today     1/10/2022    Afebrile and hemodynamically stable on 2 L oxygen via nasal cannula her pain is much better today. Patient had right psoas abscess drain placed today. Summary of relevant labs:1/10/2022    Labs:    BUN 18  Creatinine 0.77-->0.83-0.77    WBC 12-->11-->10-->9.5-9.5  Hb 10.3  Platelet 406    Micro:  Blood culture x2 1/2 growing Klebsiella  Perinephric abscess culture 1/5: Lactose fermenting gram-negative rods. Gram-positive cocci in clusters. Gram-positive cocci in chains. Imaging:  CT abdomen pelvis with contrast 1/5/2022  .  Obstructing 1.6 cm stone in the right UPJ results in moderately severe   hydronephrosis.  The recently described abnormality along the inferomedial   right kidney involving the psoas muscle is consistent with an abscess   measuring up to 7.2 x 6.0 x 7.7 cm.       2.  Bilateral nephrolithiasis.  Previously noted small bladder stones are not   delineated on this exam.       3.  Brooks catheter within the bladder.  Pelvic floor relaxation.       4.  Diverticulosis and cholelithiasis. 1-8-22:      1-5-22: I have personally reviewed the past medical history, past surgical history, medications, social history, and family history, and I haveupdated the database accordingly.       Allergies:   Estrogens     Review of Systems:     Review of Systems   Constitutional: Positive for activity change. HENT: Negative for congestion. Eyes: Negative for redness. Respiratory: Negative for cough, chest tightness and shortness of breath. Cardiovascular: Positive for leg swelling. Negative for chest pain and palpitations. Gastrointestinal: Negative for abdominal distention, abdominal pain and diarrhea. Genitourinary: Positive for difficulty urinating and dysuria. Musculoskeletal: Positive for back pain. Negative for myalgias. Skin: Negative. Neurological: Negative. Hematological: Negative. Psychiatric/Behavioral: Negative. All other systems reviewed and are negative. Physical Examination :       Physical Exam  Constitutional:       Appearance: Normal appearance. HENT:      Right Ear: External ear normal.      Left Ear: External ear normal.      Nose: Nose normal.      Mouth/Throat:      Mouth: Mucous membranes are moist.   Eyes:      Pupils: Pupils are equal, round, and reactive to light. Cardiovascular:      Rate and Rhythm: Normal rate and regular rhythm. Heart sounds: Normal heart sounds. Abdominal:      General: Abdomen is flat. Tenderness: There is no abdominal tenderness. Musculoskeletal:      Right lower leg: Edema present. Left lower leg: Edema present. Skin:     Capillary Refill: Capillary refill takes less than 2 seconds. Neurological:      General: No focal deficit present. Mental Status: She is alert.    Psychiatric:         Mood and Affect: Mood normal.     bilateral lower extremity lymphedema and venous stasis dermatitis present    Past Medical History:     Past Medical History:   Diagnosis Date    Carcinoma ([facility]     Squamous Cell    Cellulitis     DVT (deep venous thrombosis) ([facility] 2006    LLE    Kidney stone     Wears glasses        Past Surgical  History:     Past Surgical History:   Procedure Laterality Date    CARPAL TUNNEL RELEASE  1990s    CYSTOSCOPY N/A 1/4/2022    CYSTOSCOPY URETERAL STENT INSERTION performed by Karen Silverman MD at [address]  1/7/2022         IR NEPHROSTOMY PERCUTANEOUS RIGHT  1/5/2022    IR NEPHROSTOMY PERCUTANEOUS RIGHT 1/5/2022 Agustina Akers MD [facility]    ANNABELLA AND BSO      05/2019    TUBAL LIGATION  1993    TUBAL LIGATION      WISDOM TOOTH EXTRACTION         Medications:      sodium chloride flush  10 mL IntraCATHeter BID    metoprolol tartrate  25 mg Oral BID    mineral oil-hydrophilic petrolatum   Topical BID    miconazole   Topical BID    cefTRIAXone (ROCEPHIN) IV  2,000 mg IntraVENous Q24H    guaiFENesin  600 mg Oral BID    pantoprazole  40 mg Oral QAM AC       Social History:     Social History     Socioeconomic History    Marital status:      Spouse name: Not on file    Number of children: Not on file    Years of education: Not on file    Highest education level: Not on file   Occupational History    Not on file   Tobacco Use    Smoking status: Current Every Day Smoker     Packs/day: 0.50     Years: 42.00     Pack years: 21.00     Types: Cigarettes    Smokeless tobacco: Never Used   Vaping Use    Vaping Use: Never used   Substance and Sexual Activity    Alcohol use: No     Alcohol/week: 0.0 standard drinks    Drug use: No    Sexual activity: Not Currently   Other Topics Concern    Not on file   Social History Narrative    Not on file     Social Determinants of Health     Financial Resource Strain:     Difficulty of Paying Living Expenses: Not on file   Food Insecurity:     Worried About Running Out of Food in the Last Year: Not on file    Nabeel of Food in the Last Year: Not on file   Transportation Needs:     Lack of Transportation (Medical): Not on file    Lack of Transportation (Non-Medical):  Not on file   Physical Activity:     Days of Exercise per Week: Not on file    Minutes of Exercise per Session: Not on file   Stress:     Feeling of Stress : Not on file   Social Connections:  Frequency of Communication with Friends and Family: Not on file    Frequency of Social Gatherings with Friends and Family: Not on file    Attends Zoroastrian Services: Not on file    Active Member of Clubs or Organizations: Not on file    Attends Club or Organization Meetings: Not on file    Marital Status: Not on file   Intimate Partner Violence:     Fear of Current or Ex-Partner: Not on file    Emotionally Abused: Not on file    Physically Abused: Not on file    Sexually Abused: Not on file   Housing Stability:     Unable to Pay for Housing in the Last Year: Not on file    Number of [place] in the Last Year: Not on file    Unstable Housing in the Last Year: Not on file       Family History:     Family History   Adopted: Yes      Medical Decision Making:   I have independently reviewed/ordered the following labs:    CBC with Differential:   Recent Labs     01/08/22  1048   WBC 9.5   HGB 10.3*   HCT 33.7*        BMP:  Recent Labs     01/08/22  1048 01/10/22  0527    138   K 3.2* 3.7    103   CO2 27 25   BUN 18 16   CREATININE 0.84 0.77     Hepatic Function Panel:   No results for input(s): PROT, LABALBU, BILIDIR, IBILI, BILITOT, ALKPHOS, ALT, AST in the last 72 hours. No results for input(s): RPR in the last 72 hours. No results for input(s): HIV in the last 72 hours. No results for input(s): BC in the last 72 hours. Lab Results   Component Value Date    CREATININE 0.77 01/10/2022    GLUCOSE 115 01/10/2022       Detailed results: Thank you for allowing us to participate in the care of this patient. Please call with questions. Luís Brooks MD  PGY-3, Internal Medicine Resident  [facility]  1/10/2022 2:07 PM         ATTESTATION:    I have discussed the case, including pertinent history and exam findings with the residents and students. I have seen and examined the patient and the key elements of the encounter have been performed by me. I was present when the student obtained his information or examined the patient. I have reviewed the laboratory data, other diagnostic studies and discussed them with the residents. I have updated the medical record where necessary. I agree with the assessment, plan and orders as documented by the resident/ student.     Maria M Michael MD.

## 2022-01-11 PROCEDURE — 6370000000 HC RX 637 (ALT 250 FOR IP): Performed by: INTERNAL MEDICINE

## 2022-01-11 PROCEDURE — 6370000000 HC RX 637 (ALT 250 FOR IP): Performed by: STUDENT IN AN ORGANIZED HEALTH CARE EDUCATION/TRAINING PROGRAM

## 2022-01-11 PROCEDURE — 2580000003 HC RX 258: Performed by: INTERNAL MEDICINE

## 2022-01-11 PROCEDURE — 2580000003 HC RX 258: Performed by: PHYSICIAN ASSISTANT

## 2022-01-11 PROCEDURE — 99232 SBSQ HOSP IP/OBS MODERATE 35: CPT | Performed by: STUDENT IN AN ORGANIZED HEALTH CARE EDUCATION/TRAINING PROGRAM

## 2022-01-11 PROCEDURE — 6370000000 HC RX 637 (ALT 250 FOR IP): Performed by: NURSE PRACTITIONER

## 2022-01-11 PROCEDURE — 6360000002 HC RX W HCPCS: Performed by: INTERNAL MEDICINE

## 2022-01-11 PROCEDURE — 99232 SBSQ HOSP IP/OBS MODERATE 35: CPT | Performed by: INTERNAL MEDICINE

## 2022-01-11 PROCEDURE — 1200000000 HC SEMI PRIVATE

## 2022-01-11 RX ADMIN — OXYCODONE HYDROCHLORIDE AND ACETAMINOPHEN 1 TABLET: 5; 325 TABLET ORAL at 17:42

## 2022-01-11 RX ADMIN — WHITE PETROLATUM: 1.75 OINTMENT TOPICAL at 21:30

## 2022-01-11 RX ADMIN — HYDRALAZINE HYDROCHLORIDE 10 MG: 20 INJECTION INTRAMUSCULAR; INTRAVENOUS at 12:59

## 2022-01-11 RX ADMIN — OXYCODONE HYDROCHLORIDE AND ACETAMINOPHEN 1 TABLET: 5; 325 TABLET ORAL at 12:59

## 2022-01-11 RX ADMIN — SODIUM CHLORIDE, PRESERVATIVE FREE 10 ML: 5 INJECTION INTRAVENOUS at 09:23

## 2022-01-11 RX ADMIN — METOPROLOL TARTRATE 25 MG: 25 TABLET ORAL at 21:24

## 2022-01-11 RX ADMIN — ANTI-FUNGAL POWDER MICONAZOLE NITRATE TALC FREE: 1.42 POWDER TOPICAL at 21:24

## 2022-01-11 RX ADMIN — OXYCODONE HYDROCHLORIDE AND ACETAMINOPHEN 1 TABLET: 5; 325 TABLET ORAL at 21:41

## 2022-01-11 RX ADMIN — CETIRIZINE HYDROCHLORIDE 10 MG: 10 TABLET ORAL at 09:22

## 2022-01-11 RX ADMIN — GUAIFENESIN 600 MG: 600 TABLET, EXTENDED RELEASE ORAL at 09:22

## 2022-01-11 RX ADMIN — HYDRALAZINE HYDROCHLORIDE 10 MG: 20 INJECTION INTRAMUSCULAR; INTRAVENOUS at 22:45

## 2022-01-11 RX ADMIN — CEFTRIAXONE SODIUM 2000 MG: 2 INJECTION, POWDER, FOR SOLUTION INTRAMUSCULAR; INTRAVENOUS at 12:59

## 2022-01-11 RX ADMIN — ANTI-FUNGAL POWDER MICONAZOLE NITRATE TALC FREE: 1.42 POWDER TOPICAL at 09:22

## 2022-01-11 RX ADMIN — OXYCODONE HYDROCHLORIDE AND ACETAMINOPHEN 1 TABLET: 5; 325 TABLET ORAL at 03:01

## 2022-01-11 RX ADMIN — METOPROLOL TARTRATE 25 MG: 25 TABLET ORAL at 09:22

## 2022-01-11 RX ADMIN — RIVAROXABAN 20 MG: 20 TABLET, FILM COATED ORAL at 17:41

## 2022-01-11 RX ADMIN — OXYCODONE HYDROCHLORIDE AND ACETAMINOPHEN 1 TABLET: 5; 325 TABLET ORAL at 09:17

## 2022-01-11 RX ADMIN — PANTOPRAZOLE SODIUM 40 MG: 40 TABLET, DELAYED RELEASE ORAL at 05:47

## 2022-01-11 RX ADMIN — SODIUM CHLORIDE, PRESERVATIVE FREE 10 ML: 5 INJECTION INTRAVENOUS at 21:24

## 2022-01-11 RX ADMIN — GUAIFENESIN 600 MG: 600 TABLET, EXTENDED RELEASE ORAL at 21:24

## 2022-01-11 RX ADMIN — WHITE PETROLATUM: 1.75 OINTMENT TOPICAL at 09:22

## 2022-01-11 ASSESSMENT — PAIN SCALES - GENERAL
PAINLEVEL_OUTOF10: 9
PAINLEVEL_OUTOF10: 10
PAINLEVEL_OUTOF10: 7
PAINLEVEL_OUTOF10: 8
PAINLEVEL_OUTOF10: 5
PAINLEVEL_OUTOF10: 6
PAINLEVEL_OUTOF10: 7
PAINLEVEL_OUTOF10: 10
PAINLEVEL_OUTOF10: 10

## 2022-01-11 ASSESSMENT — ENCOUNTER SYMPTOMS
ABDOMINAL PAIN: 0
ABDOMINAL DISTENTION: 0
BACK PAIN: 1
EYE REDNESS: 0
COUGH: 0
SHORTNESS OF BREATH: 0
DIARRHEA: 0
CHEST TIGHTNESS: 0

## 2022-01-11 ASSESSMENT — PAIN DESCRIPTION - ORIENTATION: ORIENTATION: RIGHT

## 2022-01-11 ASSESSMENT — PAIN DESCRIPTION - LOCATION: LOCATION: FLANK

## 2022-01-11 ASSESSMENT — PAIN DESCRIPTION - DESCRIPTORS: DESCRIPTORS: ACHING

## 2022-01-11 ASSESSMENT — PAIN DESCRIPTION - PAIN TYPE: TYPE: SURGICAL PAIN;ACUTE PAIN

## 2022-01-11 NOTE — CARE COORDINATION
[facility] Flow/Interdisciplinary Rounds Progress Note    Quality Flow Rounds held on January 11, 2022 at [address] [facility] Attending:  Bedside Nurse, ,  and Nursing Unit Leadership    Barriers to Discharge:     Anticipated Discharge Date:       Anticipated Discharge Disposition:    Readmission Risk              Risk of Unplanned Readmission:  15           Discussed patient goal for the day, patient clinical progression, and barriers to discharge. The following Goal(s) of the Day/Commitment(s) have been identified:  Activity Progression  Transitional planning     Call form Malissa Vaughn at [facility] due to insurnace out of network call from Mishel Barajas at [facility] and no beds available Call out to [facility] and left message on admissions line.       Josefina Pandey RN  January 11, 2022

## 2022-01-11 NOTE — PROGRESS NOTES
Urology Progress Note      Subjective: Adriel Cleaning is a 74 y.o. female. His/Her current Diet is: ADULT DIET; Regular; No Added Salt (3-4 gm). POD1 from IR placement of right psoas abscess drainage catheter  No acute issues overnight. No fevers or chills. No nausea or vomiting. No abdominal pain, having right flank pain near right nephrostomy and right drain sites    Nephrostomy UOP 50 cc/24h pink  Urethral  cc/24h  Abscess Drain 125 cc/24h serosanguinous    Vitals and Labs:  Vitals:    01/10/22 1121 01/10/22 1150 01/10/22 1520 01/10/22 1917   BP: (!) 172/81 (!) 190/89 (!) 147/66 126/60   Pulse: 61 65 63 85   Resp: 25 18  18   Temp:  97.9 °F (36.6 °C)  98.2 °F (36.8 °C)   TempSrc:  Oral  Oral   SpO2: 95% 98%  90%   Weight:       Height:         I/O last 3 completed shifts: In: 650 [P.O.:650]  Out: 575 [Urine:450; Drains:125]    Recent Labs     01/08/22  1048   WBC 9.5   HGB 10.3*   HCT 33.7*   MCV 97.7        Recent Labs     01/08/22  1048 01/10/22  0527    138   K 3.2* 3.7    103   CO2 27 25   BUN 18 16   CREATININE 0.84 0.77       No results for input(s): COLORU, PHUR, LABCAST, WBCUA, RBCUA, MUCUS, TRICHOMONAS, YEAST, BACTERIA, CLARITYU, SPECGRAV, LEUKOCYTESUR, UROBILINOGEN, Barbette Fake in the last 72 hours. Invalid input(s): NITRATE, GLUCOSEUKETONESUAMORPHOUS    Physical Exam:  NAD  A/O x 3  RRR  No accessory muscles of inspiration  Abdomen soft, non-tender, non-distended. + minor right CVA tenderness  Right nephrostomy draining pink urine  No lombardo  No calf pain.   Bilateral lower extremities in bandaging    Impression:    1.6 cm right UPJ stone with hydronephrosis s/p right PNT placement  Right perinephric abscess s/p right psoas drain placement  Fluid Cx - GPC in clusters, GPC in chains, Klebsiella pneumoniae light growth  Bacteremia, BCx - Klebsiella pneumoniae    Plan:  Continue right percutaneous nephrostomy  Continue right psoas abscess drain  Continue IV antibiotics, appreciate ID recommendations - currently on ceftriaxone  F/U aspirated fluid cultures speciation and sensitivities  We will follow-up with patient outpatient basis for drain removal, remove of nephrostomy tube, ureteroscopy, and laser lithotripsy      Richie Fuentes MD  7:24 AM 1/11/2022

## 2022-01-11 NOTE — PROGRESS NOTES
Occupational [address]  Occupational Therapy Not Seen Note    DATE: 2022    NAME: Juan Mercado  MRN: [id number]   : 1955      Patient not seen this date for Occupational Therapy due to:    Patient Declined: adamently despite encoruagement    Next Scheduled Treatment: Attempt at later date    Electronically signed by NEO Barraza on 2022 at 3:15 PM

## 2022-01-11 NOTE — PROGRESS NOTES
[facility]  Office: [address], DO, Ellen Ayala, DO, Esthela Cosme, DO, Jm Ruvalcaba Blood, DO, Olman Sloan MD, Brenda Martinez MD, Zach Boyle MD, Becky Davila MD, Bonnie Hodgkin, MD, Dori Casanova MD, Joey Lackey MD, Levi Alfredo, DO, Aranza Doran, DO, Janusz Ramsey MD,  Liz Whalen DO, Kimo Ricks MD, Channing Benton MD, Ana Reid MD, Seth Siegel MD, Emily Garnica MD, Keshawn Cordova MD, Nettie Gomez MD, Jori Dukes [facility], [facility], CNP, Ynes Peraza, CNP, Edel Amaya, CNS, Jenni Chawla, CNP, Jayne Rivera, CNP, Adan Sun CNP, Venus Porter, CNP, Romie Hill CNP, Radonna Aschoff, PA-ANKITA, Amelia Chamberlain [facility], Alex Dobbs [facility], Jasmin Caballero CNP, Nav Welsh CNP, Ryan Murphy CNP, Toney Gay CNP, Unruly Muniz CNP, Alen Doherty, [address] [place] [address]    Progress Note    1/11/2022    2:54 PM    Name:   Hillary Nichols  MRN:     [id number]     Acct:      [de-identified]   Room:   [address] [place] [address] Day:  7  Admit Date:  1/4/2022  2:33 PM    PCP:   DONG Mcdaniel CNP  Code Status:  Full Code    Subjective:     C/C: Right flank pain    Interval History Status: improved. Patient was seen and examined at bedside this afternoon. She reports feeling \"okay\" but continues to endorse fatigue. S/P IR-guided placement of right psoas abscess drainage catheter 1/10. Awaiting placement. Brief History:     Per my partner:  \"   Hillary Nichols is a 74 y.o. Non- / non  female who presents with No chief complaint on file. and is admitted to the hospital for the management of Renal mass.     74year old female with past medical history of bilateral lympedema, morbid obesity, history of DVT and on chronic anticoagulation at home on xarelto presents with back pain to outlying facility.  Patient underwent CT abdomen pelvis showing 1.6 cm stone with 7.2x 6.0 x7.0 cm psoas abscess. Tim also has 2 blood cultures postivie for bacteria. One showing klebsiella PNA. \"    1/5/2022- IR, Right Nephrostomy tube placed, 120 ml purulent fluid drained    1/10/22- IR, second drain placed into right psoas muscle abscess    Review of Systems:     Constitutional:  Positive for fatigue   Respiratory:  negative for cough,  dyspnea on exertion, shortness of breath, wheezing  Cardiovascular:  negative for chest pain, chest pressure/discomfort, +lower extremity edema  Gastrointestinal:  negative for abdominal pain, constipation, diarrhea, nausea, vomiting  Neurological:  negative for dizziness, headache    Medications: Allergies: Allergies   Allergen Reactions    Estrogens Other (See Comments)     Hx of DVT       Current Meds:   Scheduled Meds:    rivaroxaban  20 mg Oral Daily    sodium chloride flush  10 mL IntraCATHeter BID    cetirizine  10 mg Oral Daily    metoprolol tartrate  25 mg Oral BID    mineral oil-hydrophilic petrolatum   Topical BID    miconazole   Topical BID    cefTRIAXone (ROCEPHIN) IV  2,000 mg IntraVENous Q24H    guaiFENesin  600 mg Oral BID    pantoprazole  40 mg Oral QAM AC     Continuous Infusions:     PRN Meds: hydrALAZINE, oxyCODONE-acetaminophen, ipratropium-albuterol, magnesium sulfate, ondansetron **OR** ondansetron, polyethylene glycol, acetaminophen **OR** acetaminophen, morphine **OR** morphine    Data:     Past Medical History:   has a past medical history of Carcinoma ([address] Cellulitis, DVT (deep venous thrombosis) ([facility] [address] Kidney stone, and Wears glasses. Social History:   reports that she has been smoking cigarettes. She has a 21.00 pack-year smoking history. She has never used smokeless tobacco. She reports that she does not drink alcohol and does not use drugs.      Family History:   Family History   Adopted: Yes       Vitals:  BP (!) 161/67   Pulse 78   Temp 98.4 °F (36.9 °C) (Oral)   Resp 18   Ht 5' 1\" (1.549 m)   Wt 262 lb 9.6 oz (119.1 kg)   LMP  (LMP Unknown)   SpO2 92%   BMI 49.62 kg/m²   Temp (24hrs), Av.3 °F (36.8 °C), Min:98.2 °F (36.8 °C), Max:98.4 °F (36.9 °C)    No results for input(s): POCGLU in the last 72 hours. I/O (24Hr): Intake/Output Summary (Last 24 hours) at 2022 1454  Last data filed at 2022 6063  Gross per 24 hour   Intake 650 ml   Output 870 ml   Net -220 ml       Labs:  Hematology:  Recent Labs     01/10/22  0527   INR 1.1     Chemistry:  Recent Labs     01/10/22  0527      K 3.7      CO2 25   GLUCOSE 115*   BUN 16   CREATININE 0.77   ANIONGAP 10   LABGLOM >60   GFRAA >60   CALCIUM 8.1*     No results for input(s): PROT, LABALBU, LABA1C, N7XHXIH, S6AODAM, FT4, TSH, AST, ALT, LDH, GGT, ALKPHOS, LABGGT, BILITOT, BILIDIR, AMMONIA, AMYLASE, LIPASE, LACTATE, CHOL, HDL, LDLCHOLESTEROL, CHOLHDLRATIO, TRIG, VLDL, HCD02TX, PHENYTOIN, PHENYF, URICACID, POCGLU in the last 72 hours. ABG:  Lab Results   Component Value Date    PHART 7.466 2018    NQQ6OYL 35.5 2018    PO2ART 72.5 2018    BTO6SOA 25.0 2018    NBEA NOT REPORTED 2018    PBEA 1.7 2018    M6GAOOKG 95.5 2018    FIO2 21 2018     Lab Results   Component Value Date/Time    SPECIAL NOT REPORTED 01/10/2022 11:49 AM     Lab Results   Component Value Date/Time    CULTURE CULTURE IN PROGRESS 01/10/2022 11:49 AM       Radiology:  CT ABDOMEN PELVIS WO CONTRAST Additional Contrast? None    Result Date: 2022  Limited noncontrast examination. New abnormal mass appears to be contiguous with the inferomedial aspect of the right kidney and contiguous with the right psoas muscle measuring approximately 7.9 x 7.4 x 8.8 cm. The density of this lesion/mass measures slightly higher than the simple fluid. Differential consideration include perinephric/retroperitoneal abscess or complex perinephric urinoma in a setting of large hyperdense stone seen about the right ureteropelvic junction.   Consider CT abdomen and pelvis with contrast for better characterization. Additional subcentimeter hypodense renal stones, which appear to be nonobstructing. Layering subcentimeter hyperdense stones seen within the urinary bladder. Inferiorly prolapsed urinary bladder. Gallbladder sludge/layering gallstones without imaging features of acute cholecystitis seen. Colonic diverticulosis without evidence of acute diverticulitis. XR ABDOMEN (KUB) (SINGLE AP VIEW)    Result Date: 1/4/2022  Stable configuration of the ureteral stent compared to fluoroscopic examination from earlier today. CT ABDOMEN PELVIS W IV CONTRAST Additional Contrast? None    Result Date: 1/4/2022  1. Obstructing 1.6 cm stone in the right UPJ results in moderately severe hydronephrosis. The recently described abnormality along the inferomedial right kidney involving the psoas muscle is consistent with an abscess measuring up to 7.2 x 6.0 x 7.7 cm. 2.  Bilateral nephrolithiasis. Previously noted small bladder stones are not delineated on this exam. 3.  Brooks catheter within the bladder. Pelvic floor relaxation. 4.  Diverticulosis and cholelithiasis. RECOMMENDATIONS: Unavailable     XR CHEST PORTABLE    Result Date: 1/2/2022  Pulmonary vascular congestion     IR GUIDED NEPHROSTOMY CATH PLACEMENT RIGHT    Result Date: 1/5/2022  Successful percutaneous nephrostomy tube placement. 120 mL of purulent material was aspirated. A defined separate retroperitoneal abscess was not seen and expect findings on CT scan were just extension from the infection in the kidney. Patient be followed up with contrast CT scan when the drainage subsides to exclude the presence of any remaining retroperitoneal abscess.        Physical Examination:        General appearance:  alert, cooperative and no distress  Mental Status:  oriented to person, place and time and normal affect  Lungs:  Improved BS bilaterally, no wheezes/ rhonchi, normal effort  Heart:  regular rate and rhythm, + murmur  Abdomen:  soft, nontender, obese, normal bowel sounds, no masses, hepatomegaly, splenomegaly  Extremities:  + edema bilat LE 1+, no redness, tenderness in the calves, ACE wraps placed  Skin:  + venous stasis skin changes to bilat LE, thickened scaly skin, dry flaking    Assessment:        Hospital Problems           Last Modified POA    * (Principal) Complicated UTI (urinary tract infection) 1/4/2022 Yes    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    Long term current use of anticoagulant therapy 1/4/2022 Yes    Lymphedema of both lower extremities 1/4/2022 Yes    Obesity 1/4/2022 Yes    Tobacco abuse 1/4/2022 Yes    History of recurrent deep vein thrombosis (DVT) 1/4/2022 Yes    Frequency of urination 1/4/2022 Yes    Mixed incontinence 1/4/2022 Yes    Renal mass 1/4/2022 Yes    Bacteremia due to Klebsiella pneumoniae 1/4/2022 Yes    Psoas muscle abscess ([address] 1/8/2022 Yes    Septicemia due to Klebsiella pneumoniae ([address] 1/8/2022 Yes          Plan:        Right ureteral stone with Klebsiella pneumonia bacteremia-continue ceftriaxone through 2/2 per infectious disease. S/P IR-guided placement of right psoas abscess drainage catheter. Long-term anticoagulation therapy-continue Xarelto (discussed with urology). Lymphedema bilateral LE- continue wound care   Dyspnea/ wheezing- suspect COPD, Duonebs 4x daily, oxygen prn, mucinex, Chest xray- negative except for atelectasis, incentive spirometer, start Zyrtec  Hx of aortic stenosis-stable; continue to monitor   Obesity- suspect MAYNOR, needs outpatient sleep study  Tobacco abuse- encouraged cessation  Hypokalemia- replace KCl  GI prophylaxis  Awaiting SNF placement     Needs weekly CBC, CMP while on Rocephin.      Freddy Love MD  1/11/2022  2:54 PM

## 2022-01-11 NOTE — CARE COORDINATION
[facility] Flow/Interdisciplinary Rounds Progress Note    Quality Flow Rounds held on January 11, 2022 at [address] [facility] Attending:  Bedside Nurse, ,  and Nursing Unit Leadership    Barriers to Discharge:     Anticipated Discharge Date:       Anticipated Discharge Disposition:    Readmission Risk              Risk of Unplanned Readmission:  15           Discussed patient goal for the day, patient clinical progression, and barriers to discharge.   The following Goal(s) of the Day/Commitment(s) have been identified:  Activity Progression  Transitional planning   Received call from Lorraine Mayfield at [facility] , reviewing referral.      Erika Hermosillo RN  January 11, 2022

## 2022-01-11 NOTE — CARE COORDINATION
Transitional planning     Spoke with Tamar Guzman at [facility] and they are reviewing referral for admission.

## 2022-01-11 NOTE — PROGRESS NOTES
[facility] -   Infectious diseases evaluation. Progress Note    admission date 1/4/2022    reason for consultation:   Blood culture x2 growing Klebsiella pneumoniae-1/2/22  Perinephric abscess    Impression :   Current:  · Klebsiella septicemia  · Right severely obstructive pyelonephritis, post stent placement 1/4/22  · 1 1 6 cm right ureteral stone  · s/p right percutaneous nephrostomy tube 120 mL pus aspirated and right perinephric drain placement  · Perinephric/psoas abscess   · Aspirate 120 cc pus 1/5, cs kleb  · S/p psoas abscess drain 1/10, cx pend  · Bilateral lymphedema  · Prolonged QT    Discussion / summary of stay / plan of care   ·   Recommendations   · Rocephin 2 g IV daily x one month until 2/2/22  · Midline placed, chart reconciled  · Pend placement      Infection Control Recommendations   · [place] Precautions  · Contact Isolation       Antimicrobial Stewardship Recommendations   · Simplification of therapy  · Targeted therapy    Coordination ofOutpatient Care:   · Estimated Length of IV antimicrobials:  · Patient will need Midline / picc Catheter Insertion:   · Patient will need SNF:  · Patient will need outpatient wound care:     History of Present Illness:       INITIAL HISTORY:    Bee Crooks is a 74y.o.-year-old female with past medical history of bilateral lymphedema, morbid obesity, history of DVT on Xarelto presents with right flank pain from outside facility. Patient had fever and leukocytosis. CT abdomen pelvis revealed 1.6 cm right renal calculus with 7 x 6 x 7 cm psoas abscess. Patient transferred to [facility] for right PCNT. Blood culture x2+ for bacteria. 1/2 Klebsiella pneumonia. Interval changes  1/11/2022   Patient Vitals for the past 8 hrs:   BP Temp Temp src Pulse SpO2   01/11/22 0917 (!) 201/96 98.4 °F (36.9 °C) Oral 79 91 %     1/5  Patient seen and examined in her room.   She had a drain placed to the perinephric abscess of her right kidney. 120 mL pus aspirated. She is afebrile hemodynamically stable has some flank pain. 1/6 patient seen and examined in her room. Drain in place. Has some back pain. Afebrile hemodynamically stable. On 2 L oxygen via nasal cannula    1/7  Patient resting well in her room. No complaints. She had midline placed today    1/11  Afebrile and hemodynamically stable on 2 L oxygen via nasal cannula her pain is much better today. Patient had right psoas abscess drain placed today. Culture from the psoas abscess: 1-5-22: Klebsiella pneumoniae multi S    1/12  Abdomen pain resolved, drain with some bloody fluid, nephrostomy drainage stillplaced  Appropriate breast irritated because she has to go to rehab. She has been weak since admission. In general looks much better though  No fever chills      Summary of relevant labs:1/11/2022    Labs:  Creatinine 0.77-->0.83-0.77  WBC 12-->11-->10-->9.5-9.5  Hb 10.3  Platelet 997    Micro:  Blood culture x2 1/2 growing Klebsiella  Perinephric abscess culture 1/5: - Klebsiella pneumoniae    Imaging:  CT abdomen pelvis with contrast 1/5/2022  .  Obstructing 1.6 cm stone in the right UPJ results in moderately severe   hydronephrosis.  The recently described abnormality along the inferomedial   right kidney involving the psoas muscle is consistent with an abscess   measuring up to 7.2 x 6.0 x 7.7 cm.       2.  Bilateral nephrolithiasis.  Previously noted small bladder stones are not   delineated on this exam.       3.  Brooks catheter within the bladder.  Pelvic floor relaxation.       4.  Diverticulosis and cholelithiasis. 1-8-22:      1-5-22: I have personally reviewed the past medical history, past surgical history, medications, social history, and family history, and I haveupdated the database accordingly. Allergies:   Estrogens     Review of Systems:     Review of Systems   Constitutional: Positive for activity change.    HENT: Negative for congestion. Eyes: Negative for redness. Respiratory: Negative for cough, chest tightness and shortness of breath. Cardiovascular: Negative for chest pain, palpitations and leg swelling. Gastrointestinal: Negative for abdominal distention, abdominal pain and diarrhea. Genitourinary: Negative for difficulty urinating and dysuria. Musculoskeletal: Positive for back pain. Negative for myalgias. Skin: Negative. Neurological: Negative. Hematological: Negative. Psychiatric/Behavioral: Negative. Physical Examination :       Physical Exam  Constitutional:       Appearance: Normal appearance. HENT:      Right Ear: External ear normal.      Left Ear: External ear normal.      Nose: Nose normal.      Mouth/Throat:      Mouth: Mucous membranes are moist.   Eyes:      Pupils: Pupils are equal, round, and reactive to light. Cardiovascular:      Rate and Rhythm: Normal rate and regular rhythm. Heart sounds: Normal heart sounds. Abdominal:      General: Abdomen is flat. Tenderness: There is no abdominal tenderness. Musculoskeletal:      Right lower leg: Edema present. Left lower leg: Edema present. Skin:     General: Skin is dry. Capillary Refill: Capillary refill takes less than 2 seconds. Coloration: Skin is not jaundiced. Neurological:      General: No focal deficit present. Mental Status: She is alert.    Psychiatric:         Mood and Affect: Mood normal.     bilateral lower extremity lymphedema and venous stasis dermatitis present    Past Medical History:     Past Medical History:   Diagnosis Date    Carcinoma ([facility] [address]     Squamous Cell    Cellulitis     DVT (deep venous thrombosis) ([facility] [address] [facility] 2006    LLE    Kidney stone     Wears glasses        Past Surgical  History:     Past Surgical History:   Procedure Laterality Date    CARPAL TUNNEL RELEASE  1990s    CT ABSCESS DRAIN SUBCUTANEOUS  1/10/2022    CT ABSCESS DRAIN SUBCUTANEOUS 1/10/2022 [facility] CT SCAN    CYSTOSCOPY N/A 1/4/2022    CYSTOSCOPY URETERAL STENT INSERTION performed by Neelam James MD at [address]  1/7/2022         IR NEPHROSTOMY PERCUTANEOUS RIGHT  1/5/2022    IR NEPHROSTOMY PERCUTANEOUS RIGHT 1/5/2022 Prosper Lafleur MD [facility]    ANNABELLA AND BSO      05/2019    TUBAL LIGATION  1993    TUBAL LIGATION      WISDOM TOOTH EXTRACTION         Medications:      rivaroxaban  20 mg Oral Daily    sodium chloride flush  10 mL IntraCATHeter BID    cetirizine  10 mg Oral Daily    metoprolol tartrate  25 mg Oral BID    mineral oil-hydrophilic petrolatum   Topical BID    miconazole   Topical BID    cefTRIAXone (ROCEPHIN) IV  2,000 mg IntraVENous Q24H    guaiFENesin  600 mg Oral BID    pantoprazole  40 mg Oral QAM AC       Social History:     Social History     Socioeconomic History    Marital status:      Spouse name: Not on file    Number of children: Not on file    Years of education: Not on file    Highest education level: Not on file   Occupational History    Not on file   Tobacco Use    Smoking status: Current Every Day Smoker     Packs/day: 0.50     Years: 42.00     Pack years: 21.00     Types: Cigarettes    Smokeless tobacco: Never Used   Vaping Use    Vaping Use: Never used   Substance and Sexual Activity    Alcohol use: No     Alcohol/week: 0.0 standard drinks    Drug use: No    Sexual activity: Not Currently   Other Topics Concern    Not on file   Social History Narrative    Not on file     Social Determinants of Health     Financial Resource Strain:     Difficulty of Paying Living Expenses: Not on file   Food Insecurity:     Worried About Running Out of Food in the Last Year: Not on file    Nabeel of Food in the Last Year: Not on file   Transportation Needs:     Lack of Transportation (Medical): Not on file    Lack of Transportation (Non-Medical):  Not on file   Physical Activity:     Days of Exercise per Week: Not on file    Minutes of Exercise per Session: Not on file   Stress:     Feeling of Stress : Not on file   Social Connections:     Frequency of Communication with Friends and Family: Not on file    Frequency of Social Gatherings with Friends and Family: Not on file    Attends Sabianist Services: Not on file    Active Member of [address] [place] [address] [id number] or Organizations: Not on file    Attends Club or Organization Meetings: Not on file    Marital Status: Not on file   Intimate Partner Violence:     Fear of Current or Ex-Partner: Not on file    Emotionally Abused: Not on file    Physically Abused: Not on file    Sexually Abused: Not on file   Housing Stability:     Unable to Pay for Housing in the Last Year: Not on file    Number of [place] in the Last Year: Not on file    Unstable Housing in the Last Year: Not on file       Family History:     Family History   Adopted: Yes      Medical Decision Making:   I have independently reviewed/ordered the following labs:    CBC with Differential:   Recent Labs     01/08/22  1048   WBC 9.5   HGB 10.3*   HCT 33.7*        BMP:  Recent Labs     01/08/22  1048 01/10/22  0527    138   K 3.2* 3.7    103   CO2 27 25   BUN 18 16   CREATININE 0.84 0.77     Hepatic Function Panel:   No results for input(s): PROT, LABALBU, BILIDIR, IBILI, BILITOT, ALKPHOS, ALT, AST in the last 72 hours. No results for input(s): RPR in the last 72 hours. No results for input(s): HIV in the last 72 hours. No results for input(s): BC in the last 72 hours.   Lab Results   Component Value Date    CREATININE 0.77 01/10/2022    GLUCOSE 115 01/10/2022       Detailed results:      Joanne Ibrahim MD. Infectious Diseases

## 2022-01-11 NOTE — PLAN OF CARE
Problem: Pain:  Goal: Pain level will decrease  Description: Pain level will decrease  1/11/2022 0034 by Rhonda Finn RN  Outcome: Ongoing  1/10/2022 1800 by Valerie Whalen RN  Outcome: Ongoing  Goal: Control of acute pain  Description: Control of acute pain  1/11/2022 0034 by Rhonda Finn RN  Outcome: Ongoing  1/10/2022 1800 by Valerie Whalen RN  Outcome: Ongoing  Goal: Control of chronic pain  Description: Control of chronic pain  1/11/2022 0034 by Rhonda Finn RN  Outcome: Ongoing  1/10/2022 1800 by Valerie Whalen RN  Outcome: Ongoing

## 2022-01-11 NOTE — PROGRESS NOTES
Comprehensive Nutrition Assessment    Type and Reason for Visit:  RD Nutrition Re-Screen/LOS    Nutrition Recommendations/Plan:   - Continue current diet, Regular  - Will send High kcal/High PRO ONS, BID  - Monitor/encourage PO intake    Nutrition Assessment:  75 yo F admitted for mgt of renal mass. Per pt, appetite is \"a little low,\" eating ~50% of meals. Pt unsure of UBW, has not noticed any change in wt pta. wt fluctuation noted this admission (fluid?), will monitor. Malnutrition Assessment:  Malnutrition Status:  Insufficient data      Estimated Daily Nutrient Needs:  Energy (kcal):   ; Weight Used for Energy Requirements:        Protein (g):   ; Weight Used for Protein Requirements:           Fluid (ml/day):   ; Method Used for Fluid Requirements:         Nutrition Related Findings:  Labs/meds reviewed. +2 BLE edema. No BM noted. Wounds:  None       Current Nutrition Therapies:    ADULT DIET; Regular;  No Added Salt (3-4 gm)  ADULT ORAL NUTRITION SUPPLEMENT; Breakfast, Dinner; Standard High Calorie/High Protein Oral Supplement    Anthropometric Measures:  · Height: 5' 1\" (154.9 cm)  · Current Body Weight: 262 lb 9.6 oz (119.1 kg) (1/10, bedscale)   · Admission Body Weight: 273 lb 2.4 oz (123.9 kg) (1/5, bedscale)    · Ideal Body Weight: 105 lbs; % Ideal Body Weight 250.1 %   · BMI: 49.6  · BMI Categories: Obese Class 3 (BMI 40.0 or greater)       Nutrition Diagnosis:   · No nutrition diagnosis at this time       Nutrition Interventions:   Food and/or Nutrient Delivery:  Continue Current Diet,Start Oral Nutrition Supplement  Nutrition Education/Counseling:  No recommendation at this time   Coordination of Nutrition Care:  Continue to monitor while inpatient    Nutrition Monitoring and Evaluation:   Behavioral-Environmental Outcomes:  None Identified   Food/Nutrient Intake Outcomes:  Food and Nutrient Intake,Supplement Intake  Physical Signs/Symptoms Outcomes:  Biochemical Data,Constipation,Weight Discharge Planning:     Too soon to determine     Electronically signed by Diana Gifford MS, RD, LD on 1/11/22 at 3:21 PM EST    Contact: [phone]

## 2022-01-12 LAB
SARS-COV-2, RAPID: NOT DETECTED
SPECIMEN DESCRIPTION: NORMAL

## 2022-01-12 PROCEDURE — 99232 SBSQ HOSP IP/OBS MODERATE 35: CPT | Performed by: STUDENT IN AN ORGANIZED HEALTH CARE EDUCATION/TRAINING PROGRAM

## 2022-01-12 PROCEDURE — 6360000002 HC RX W HCPCS: Performed by: INTERNAL MEDICINE

## 2022-01-12 PROCEDURE — 6370000000 HC RX 637 (ALT 250 FOR IP): Performed by: STUDENT IN AN ORGANIZED HEALTH CARE EDUCATION/TRAINING PROGRAM

## 2022-01-12 PROCEDURE — 2580000003 HC RX 258: Performed by: RADIOLOGY

## 2022-01-12 PROCEDURE — 6370000000 HC RX 637 (ALT 250 FOR IP): Performed by: NURSE PRACTITIONER

## 2022-01-12 PROCEDURE — 6370000000 HC RX 637 (ALT 250 FOR IP): Performed by: INTERNAL MEDICINE

## 2022-01-12 PROCEDURE — 1200000000 HC SEMI PRIVATE

## 2022-01-12 PROCEDURE — 87635 SARS-COV-2 COVID-19 AMP PRB: CPT

## 2022-01-12 PROCEDURE — 2580000003 HC RX 258: Performed by: INTERNAL MEDICINE

## 2022-01-12 PROCEDURE — 6360000002 HC RX W HCPCS: Performed by: STUDENT IN AN ORGANIZED HEALTH CARE EDUCATION/TRAINING PROGRAM

## 2022-01-12 PROCEDURE — 2580000003 HC RX 258: Performed by: PHYSICIAN ASSISTANT

## 2022-01-12 PROCEDURE — 99232 SBSQ HOSP IP/OBS MODERATE 35: CPT | Performed by: INTERNAL MEDICINE

## 2022-01-12 RX ORDER — SODIUM CHLORIDE 0.9 % (FLUSH) 0.9 %
10 SYRINGE (ML) INJECTION 2 TIMES DAILY
Status: DISCONTINUED | OUTPATIENT
Start: 2022-01-12 | End: 2022-01-13 | Stop reason: HOSPADM

## 2022-01-12 RX ADMIN — RIVAROXABAN 20 MG: 20 TABLET, FILM COATED ORAL at 18:04

## 2022-01-12 RX ADMIN — MORPHINE SULFATE 2 MG: 2 INJECTION, SOLUTION INTRAMUSCULAR; INTRAVENOUS at 16:05

## 2022-01-12 RX ADMIN — OXYCODONE HYDROCHLORIDE AND ACETAMINOPHEN 1 TABLET: 5; 325 TABLET ORAL at 21:11

## 2022-01-12 RX ADMIN — GUAIFENESIN 600 MG: 600 TABLET, EXTENDED RELEASE ORAL at 21:11

## 2022-01-12 RX ADMIN — METOPROLOL TARTRATE 25 MG: 25 TABLET ORAL at 09:03

## 2022-01-12 RX ADMIN — OXYCODONE HYDROCHLORIDE AND ACETAMINOPHEN 1 TABLET: 5; 325 TABLET ORAL at 06:52

## 2022-01-12 RX ADMIN — WHITE PETROLATUM: 1.75 OINTMENT TOPICAL at 13:39

## 2022-01-12 RX ADMIN — SODIUM CHLORIDE, PRESERVATIVE FREE 10 ML: 5 INJECTION INTRAVENOUS at 21:11

## 2022-01-12 RX ADMIN — HYDRALAZINE HYDROCHLORIDE 10 MG: 20 INJECTION INTRAMUSCULAR; INTRAVENOUS at 15:55

## 2022-01-12 RX ADMIN — CEFTRIAXONE SODIUM 2000 MG: 2 INJECTION, POWDER, FOR SOLUTION INTRAMUSCULAR; INTRAVENOUS at 13:49

## 2022-01-12 RX ADMIN — ANTI-FUNGAL POWDER MICONAZOLE NITRATE TALC FREE: 1.42 POWDER TOPICAL at 21:14

## 2022-01-12 RX ADMIN — CETIRIZINE HYDROCHLORIDE 10 MG: 10 TABLET ORAL at 09:03

## 2022-01-12 RX ADMIN — PANTOPRAZOLE SODIUM 40 MG: 40 TABLET, DELAYED RELEASE ORAL at 06:41

## 2022-01-12 RX ADMIN — METOPROLOL TARTRATE 25 MG: 25 TABLET ORAL at 21:11

## 2022-01-12 RX ADMIN — WHITE PETROLATUM: 1.75 OINTMENT TOPICAL at 21:14

## 2022-01-12 RX ADMIN — ANTI-FUNGAL POWDER MICONAZOLE NITRATE TALC FREE: 1.42 POWDER TOPICAL at 09:03

## 2022-01-12 RX ADMIN — OXYCODONE HYDROCHLORIDE AND ACETAMINOPHEN 1 TABLET: 5; 325 TABLET ORAL at 13:17

## 2022-01-12 RX ADMIN — GUAIFENESIN 600 MG: 600 TABLET, EXTENDED RELEASE ORAL at 09:03

## 2022-01-12 RX ADMIN — SODIUM CHLORIDE, PRESERVATIVE FREE 10 ML: 5 INJECTION INTRAVENOUS at 13:18

## 2022-01-12 RX ADMIN — SODIUM CHLORIDE, PRESERVATIVE FREE 10 ML: 5 INJECTION INTRAVENOUS at 21:17

## 2022-01-12 ASSESSMENT — PAIN SCALES - GENERAL
PAINLEVEL_OUTOF10: 8
PAINLEVEL_OUTOF10: 10
PAINLEVEL_OUTOF10: 8

## 2022-01-12 ASSESSMENT — ENCOUNTER SYMPTOMS
ABDOMINAL PAIN: 0
EYE REDNESS: 0
COUGH: 0
DIARRHEA: 0
CHEST TIGHTNESS: 0
BACK PAIN: 1
ABDOMINAL DISTENTION: 0
SHORTNESS OF BREATH: 0

## 2022-01-12 NOTE — CARE COORDINATION
[facility] Flow/Interdisciplinary Rounds Progress Note    Quality Flow Rounds held on January 12, 2022 at [address] [facility] Oli Attending:  Bedside Nurse, ,  and Nursing Unit Leadership    Barriers to Discharge: SNF Acceptance    Anticipated Discharge Date:       Anticipated Discharge Disposition: SNF    Readmission Risk              Risk of Unplanned Readmission:  15           Discussed patient goal for the day, patient clinical progression, and barriers to discharge. The following Goal(s) of the Day/Commitment(s) have been identified:  SNF Discharge - Check H&P Update, Medication Reconciliation, and Transfer Form    Call received from Rachel Mcguire with [facility], they are able to accept patient and have bed today. Report can be called to [phone]. Pt will need a Rapid CoVid test and HENS completed. Transport request faxed to [phone] [place] [address], await confirmation of transport time. PS Dr Terry Vaughan to request Rapid CoVid be ordered    1400 Spoke with Mony from [id number] [place] [address], she states transport will be available at [phone] on 01/13/2022. RN updated     [phone] with Rachel Mcguire from [facility], notified her that patient will be transported in AM.  Preston Yadav Daphne faxed to [phone]    Needs CoVid result placed in chart when completed.   Per RN, just sent         Daphne Evans RN  January 12, 2022

## 2022-01-12 NOTE — PLAN OF CARE
Problem: Pain:  Goal: Pain level will decrease  Description: Pain level will decrease  1/12/2022 0514 by Misael Abraham RN  Outcome: Ongoing  1/11/2022 1704 by Zacarias Smith RN  Outcome: Ongoing  Goal: Control of acute pain  Description: Control of acute pain  1/12/2022 0514 by Misael Abraham RN  Outcome: Ongoing  1/11/2022 1704 by Zacarias Smith RN  Outcome: Ongoing  Goal: Control of chronic pain  Description: Control of chronic pain  1/12/2022 0514 by Misael Abraham RN  Outcome: Ongoing  1/11/2022 1704 by Zacarias Smith RN  Outcome: Ongoing

## 2022-01-12 NOTE — PROGRESS NOTES
Notified [address] [facility] PA that patient had nephrostomy tube and abscess tubes flushed per orders. The nephrostomy tube flushed with 5 mls of saline. Net output of 5 mls. Abscess drain flushed with 10 mls saline with 10 mls saline return with net output of 0 mls. No new orders at this time.

## 2022-01-12 NOTE — PROGRESS NOTES
[facility] -   Infectious diseases evaluation. Progress Note    admission date 1/4/2022    reason for consultation:   Blood culture x2 growing Klebsiella pneumoniae-1/2/22  Perinephric abscess    Impression :   Current:  · Klebsiella septicemia  · Right severely obstructive pyelonephritis, post stent placement 1/4/22  · 1 1 6 cm right ureteral stone  · s/p right percutaneous nephrostomy tube 120 mL pus aspirated and right perinephric drain placement  · Perinephric/psoas abscess   · Aspirate 120 cc pus 1/5, cs kleb  · S/p psoas abscess drain 1/10, cx pend  · Bilateral lymphedema  · Prolonged QT    Discussion / summary of stay / plan of care   ·   Recommendations   · Rocephin 2 g IV daily x one month until 2/2/22  · Midline placed, chart reconciled  · Pend placement - Will go to [facility] 1/13/12      Infection Control Recommendations   · [place] Precautions  · Contact Isolation       Antimicrobial Stewardship Recommendations   · Simplification of therapy  · Targeted therapy    Coordination ofOutpatient Care:   · Estimated Length of IV antimicrobials:  · Patient will need Midline / picc Catheter Insertion:   · Patient will need SNF:  · Patient will need outpatient wound care:     History of Present Illness:       INITIAL HISTORY:    Marina Weber is a 74y.o.-year-old female with past medical history of bilateral lymphedema, morbid obesity, history of DVT on Xarelto presents with right flank pain from outside facility. Patient had fever and leukocytosis. CT abdomen pelvis revealed 1.6 cm right renal calculus with 7 x 6 x 7 cm psoas abscess. Patient transferred to [facility] for right PCNT. Blood culture x2+ for bacteria. 1/2 Klebsiella pneumonia.     Interval changes  1/12/2022   Patient Vitals for the past 8 hrs:   BP Temp Temp src Pulse Resp SpO2 Weight   01/12/22 1042 (!) 155/70 -- -- 61 -- -- --   01/12/22 0900 -- -- -- 80 -- -- --   01/12/22 0800 (!) 180/97 98.4 °F (36.9 °C) Oral 80 20 90 % --   01/12/22 0600 -- -- -- -- -- -- 255 lb 9.6 oz (115.9 kg)     1/5  Patient seen and examined in her room. She had a drain placed to the perinephric abscess of her right kidney. 120 mL pus aspirated. She is afebrile hemodynamically stable has some flank pain. 1/6 patient seen and examined in her room. Drain in place. Has some back pain. Afebrile hemodynamically stable. On 2 L oxygen via nasal cannula    1/7  Patient resting well in her room. No complaints. She had midline placed today    1/11  Afebrile and hemodynamically stable on 2 L oxygen via nasal cannula her pain is much better today. Patient had right psoas abscess drain placed today. Culture from the psoas abscess: 1-5-22: Klebsiella pneumoniae multi S    Abdomen pain resolved, drain with some bloody fluid, nephrostomy drainage stillplaced  Appropriate breast irritated because she has to go to rehab. She has been weak since admission. In general looks much better though  No fever chills    1/12/22  Pt seen and examined at bedside. Afebrile. BP jumped today to 180/97. Endorsing fatigue and In mild distress due to tenderness at drain site. Still lower back pain but better  Cultures from drain grew lactose fermenting gram negative rods. No anaerobs. Will continue Rocephin til 2/22 at [address] [place] [address] (going tomorrow 1/13/22)         Summary of relevant labs:1/12/2022    Labs:  Creatinine 0.77-->0.83-0.77  WBC 12-->11-->10-->9.5-9.5  Hb 10.3  Platelet 666    Micro:  Blood culture x2 1/2 growing Klebsiella  Perinephric abscess culture 1/5: - Klebsiella pneumoniae  Drain Cx 1/10: Light growth gram neg lactose fermenting rods. No anaerobic growth.     Imaging:  CT abdomen pelvis with contrast 1/5/2022  .  Obstructing 1.6 cm stone in the right UPJ results in moderately severe   hydronephrosis.  The recently described abnormality along the inferomedial   right kidney involving the psoas muscle is consistent with an abscess   measuring up to 7.2 x 6.0 x 7.7 cm.       2.  Bilateral nephrolithiasis.  Previously noted small bladder stones are not   delineated on this exam.       3.  Brooks catheter within the bladder.  Pelvic floor relaxation.       4.  Diverticulosis and cholelithiasis. 1-8-22:      1-5-22: I have personally reviewed the past medical history, past surgical history, medications, social history, and family history, and I haveupdated the database accordingly. Allergies:   Estrogens     Review of Systems:     Review of Systems   Constitutional: Positive for activity change and fatigue. Negative for diaphoresis. HENT: Negative for congestion. Eyes: Negative for redness. Respiratory: Negative for cough, chest tightness and shortness of breath. Cardiovascular: Negative for chest pain, palpitations and leg swelling. Gastrointestinal: Negative for abdominal distention, abdominal pain and diarrhea. Genitourinary: Negative for difficulty urinating and dysuria. Musculoskeletal: Positive for back pain. Negative for myalgias. Skin: Negative. Neurological: Negative. Hematological: Negative. Psychiatric/Behavioral: Negative. Physical Examination :       Physical Exam  Constitutional:       Appearance: Normal appearance. HENT:      Right Ear: External ear normal.      Left Ear: External ear normal.      Nose: Nose normal.      Mouth/Throat:      Mouth: Mucous membranes are moist.   Eyes:      Pupils: Pupils are equal, round, and reactive to light. Cardiovascular:      Rate and Rhythm: Normal rate and regular rhythm. Heart sounds: Normal heart sounds. Abdominal:      General: Abdomen is flat. Tenderness: There is abdominal tenderness. Comments: Tender drain site. No CVA tenderness    Musculoskeletal:      Right lower leg: Edema present. Left lower leg: Edema present. Skin:     General: Skin is dry.       Capillary Refill: Capillary refill takes less than 2 seconds. Coloration: Skin is not jaundiced. Neurological:      General: No focal deficit present. Mental Status: She is alert.    Psychiatric:         Mood and Affect: Mood normal.     bilateral lower extremity lymphedema and venous stasis dermatitis present    Past Medical History:     Past Medical History:   Diagnosis Date    Carcinoma ([facility] [address]     Squamous Cell    Cellulitis     DVT (deep venous thrombosis) ([facility] [address] 2006    LLE    Kidney stone     Wears glasses        Past Surgical  History:     Past Surgical History:   Procedure Laterality Date    CARPAL TUNNEL RELEASE  1990s    CT ABSCESS DRAIN SUBCUTANEOUS  1/10/2022    CT ABSCESS DRAIN SUBCUTANEOUS 1/10/2022 [facility] CT SCAN    CYSTOSCOPY N/A 1/4/2022    CYSTOSCOPY URETERAL STENT INSERTION performed by Iantha Sandhoff, MD at [address]  1/7/2022         IR NEPHROSTOMY PERCUTANEOUS RIGHT  1/5/2022    IR NEPHROSTOMY PERCUTANEOUS RIGHT [facility]2022 [facility]    ANNABELLA AND BSO      05/2019    TUBAL LIGATION  1993    TUBAL LIGATION      WISDOM TOOTH EXTRACTION         Medications:      rivaroxaban  20 mg Oral Daily    sodium chloride flush  10 mL IntraCATHeter BID    cetirizine  10 mg Oral Daily    metoprolol tartrate  25 mg Oral BID    mineral oil-hydrophilic petrolatum   Topical BID    miconazole   Topical BID    cefTRIAXone (ROCEPHIN) IV  2,000 mg IntraVENous Q24H    guaiFENesin  600 mg Oral BID    pantoprazole  40 mg Oral QAM AC       Social History:     Social History     Socioeconomic History    Marital status:      Spouse name: Not on file    Number of children: Not on file    Years of education: Not on file    Highest education level: Not on file   Occupational History    Not on file   Tobacco Use    Smoking status: Current Every Day Smoker     Packs/day: 0.50     Years: 42.00     Pack years: 21.00     Types: Cigarettes    Smokeless tobacco: Never Used   Vaping results for input(s): PROT, LABALBU, BILIDIR, IBILI, BILITOT, ALKPHOS, ALT, AST in the last 72 hours. No results for input(s): RPR in the last 72 hours. No results for input(s): HIV in the last 72 hours. No results for input(s): BC in the last 72 hours. Lab Results   Component Value Date    CREATININE 0.77 01/10/2022    GLUCOSE 115 01/10/2022       Detailed results:      Parul Lopez MS$ on behalf of Dr. Rlaf Velasco      I have discussed the care of the patient, including pertinent history and exam findings,  with the resident. I have seen and examined the patient and the key elements of all parts of the encounter have been performed by me. I agree with the assessment, plan and orders as documented by the resident.     Kyung Lara, Infectious Diseases

## 2022-01-12 NOTE — PROGRESS NOTES
Notified Dr. Mckinley that patient's BP was 180/97 and scheduled metoprolol was given and will recheck BP and give PRN BP medication if indicated. Also notified Dr. Mckinley that per night shift, nephrostomy and abscess drains had low output overnight and had to be flushed by night shift to obtain output. No new orders at this time.

## 2022-01-12 NOTE — PROGRESS NOTES
Dressing changed to bilateral lower legs at 1300. Patient's bilateral legs cleansed with soap and water, aquaphor applied per orders, kerlix and ACE wrap applied.

## 2022-01-12 NOTE — PROGRESS NOTES
[facility]  Office: [address], DO, Niru Sterling, DO, Geeta Pedro, DO, Jose Luis Madera, DO, Chery Kahn MD, Zach Tabor MD, Verena Marquez MD, Blue Watt MD, Exie Ahumada, MD, Bryson Christine MD, Anjana Beaver MD, Jae Vargas, DO, Sapphire Schultz, DO, Alonzo Duffy MD,  Sara Hooper DO, Vikash Arenas MD, Khadra Last MD, Karen Velasquez MD, Salena Avelar MD, Gladys Levin MD, Gisele Baird MD, Mathew Astorga MD, Yamile Jimenez [facility], [facility], [facility], Kerwin De Los Santos, [facility], Radha Gooden, CNS, Aman Duffy, CNP, Ji Sirisha, CNP, Tara Hawthorne, CNP, Joel Harris, CNP, Jovan Ramirez, CNP, Ralph Booker PA-C, Dom Greene, [facility], Lyn Greer, [facility], Jose Andersen, CNP, Christiano Thomson, CNP, Liliya Chow, CNP, Lillian Brooks, CNP, [address], [facility], Maximilian Bishop, [address] [place] [address]    Progress Note    1/12/2022    12:04 PM    Name:   Ravinder Godinez  MRN:     [id number]     Acct:      [de-identified]   Room:   [address] [place] [address] Day:  8  Admit Date:  1/4/2022  2:33 PM    PCP:   DONG Salmeron CNP  Code Status:  Full Code    Subjective:     C/C: Right flank pain    Interval History Status: improved. Patient was seen and examined at bedside this afternoon. She reports feeling well and is without complaint. Denies fever/chills, nausea/vomiting, shortness of breath or chest pain. Awaiting placement. Brief History:     Per my partner:  \"   Ravinder Godinez is a 74 y.o. Non- / non  female who presents with No chief complaint on file. and is admitted to the hospital for the management of Renal mass.     74year old female with past medical history of bilateral lympedema, morbid obesity, history of DVT and on chronic anticoagulation at home on xarelto presents with back pain to outlying facility.  Patient underwent CT abdomen pelvis showing 1.6 cm stone with 7.2x 6.0 x7.0 cm psoas abscess. Tim also has 2 blood cultures postivie for bacteria. One showing klebsiella PNA. \"    1/5/2022- IR, Right Nephrostomy tube placed, 120 ml purulent fluid drained    1/10/22- IR, second drain placed into right psoas muscle abscess    Review of Systems:     Constitutional:  Positive for fatigue   Respiratory:  negative for cough,  dyspnea on exertion, shortness of breath, wheezing  Cardiovascular:  negative for chest pain, chest pressure/discomfort, +lower extremity edema  Gastrointestinal:  negative for abdominal pain, constipation, diarrhea, nausea, vomiting  Neurological:  negative for dizziness, headache    Medications: Allergies: Allergies   Allergen Reactions    Estrogens Other (See Comments)     Hx of DVT       Current Meds:   Scheduled Meds:    sodium chloride flush  10 mL IntraCATHeter BID    rivaroxaban  20 mg Oral Daily    sodium chloride flush  10 mL IntraCATHeter BID    cetirizine  10 mg Oral Daily    metoprolol tartrate  25 mg Oral BID    mineral oil-hydrophilic petrolatum   Topical BID    miconazole   Topical BID    cefTRIAXone (ROCEPHIN) IV  2,000 mg IntraVENous Q24H    guaiFENesin  600 mg Oral BID    pantoprazole  40 mg Oral QAM AC     Continuous Infusions:     PRN Meds: hydrALAZINE, oxyCODONE-acetaminophen, ipratropium-albuterol, magnesium sulfate, ondansetron **OR** ondansetron, polyethylene glycol, acetaminophen **OR** acetaminophen, morphine **OR** morphine    Data:     Past Medical History:   has a past medical history of Carcinoma ([facility] [address] Cellulitis, DVT (deep venous thrombosis) ([facility] [address] Kidney stone, and Wears glasses. Social History:   reports that she has been smoking cigarettes. She has a 21.00 pack-year smoking history. She has never used smokeless tobacco. She reports that she does not drink alcohol and does not use drugs.      Family History:   Family History   Adopted: Yes       Vitals:  BP (!) 155/70   Pulse 61   Temp 98.4 °F (36.9 °C) (Oral)   Resp 20 Ht 5' 1\" (1.549 m)   Wt 255 lb 9.6 oz (115.9 kg)   LMP  (LMP Unknown)   SpO2 90%   BMI 48.30 kg/m²   Temp (24hrs), Av.2 °F (36.8 °C), Min:97.9 °F (36.6 °C), Max:98.4 °F (36.9 °C)    No results for input(s): POCGLU in the last 72 hours. I/O (24Hr): Intake/Output Summary (Last 24 hours) at 2022 1204  Last data filed at 2022 0800  Gross per 24 hour   Intake 120 ml   Output 1365 ml   Net -1245 ml       Labs:  Hematology:  Recent Labs     01/10/22  0527   INR 1.1     Chemistry:  Recent Labs     01/10/22  0527      K 3.7      CO2 25   GLUCOSE 115*   BUN 16   CREATININE 0.77   ANIONGAP 10   LABGLOM >60   GFRAA >60   CALCIUM 8.1*     No results for input(s): PROT, LABALBU, LABA1C, J7PFCBY, J7HNPKQ, FT4, TSH, AST, ALT, LDH, GGT, ALKPHOS, LABGGT, BILITOT, BILIDIR, AMMONIA, AMYLASE, LIPASE, LACTATE, CHOL, HDL, LDLCHOLESTEROL, CHOLHDLRATIO, TRIG, VLDL, FDD29AX, PHENYTOIN, PHENYF, URICACID, POCGLU in the last 72 hours. ABG:  Lab Results   Component Value Date    PHART 7.466 2018    QLH9EMM 35.5 2018    PO2ART 72.5 2018    TFQ6WSM 25.0 2018    NBEA NOT REPORTED 2018    PBEA 1.7 2018    J0CPGQLY 95.5 2018    FIO2 21 2018     Lab Results   Component Value Date/Time    SPECIAL NOT REPORTED 01/10/2022 11:49 AM     Lab Results   Component Value Date/Time    CULTURE LACTOSE FERMENTING GRAM NEGATIVE RODS LIGHT GROWTH (A) 01/10/2022 11:49 AM    CULTURE NO ANAEROBIC ORGANISMS ISOLATED AT 2 DAYS (A) 01/10/2022 11:49 AM       Radiology:  CT ABDOMEN PELVIS WO CONTRAST Additional Contrast? None    Result Date: 2022  Limited noncontrast examination. New abnormal mass appears to be contiguous with the inferomedial aspect of the right kidney and contiguous with the right psoas muscle measuring approximately 7.9 x 7.4 x 8.8 cm. The density of this lesion/mass measures slightly higher than the simple fluid.   Differential consideration include perinephric/retroperitoneal abscess or complex perinephric urinoma in a setting of large hyperdense stone seen about the right ureteropelvic junction. Consider CT abdomen and pelvis with contrast for better characterization. Additional subcentimeter hypodense renal stones, which appear to be nonobstructing. Layering subcentimeter hyperdense stones seen within the urinary bladder. Inferiorly prolapsed urinary bladder. Gallbladder sludge/layering gallstones without imaging features of acute cholecystitis seen. Colonic diverticulosis without evidence of acute diverticulitis. XR ABDOMEN (KUB) (SINGLE AP VIEW)    Result Date: 1/4/2022  Stable configuration of the ureteral stent compared to fluoroscopic examination from earlier today. CT ABDOMEN PELVIS W IV CONTRAST Additional Contrast? None    Result Date: 1/4/2022  1. Obstructing 1.6 cm stone in the right UPJ results in moderately severe hydronephrosis. The recently described abnormality along the inferomedial right kidney involving the psoas muscle is consistent with an abscess measuring up to 7.2 x 6.0 x 7.7 cm. 2.  Bilateral nephrolithiasis. Previously noted small bladder stones are not delineated on this exam. 3.  Brooks catheter within the bladder. Pelvic floor relaxation. 4.  Diverticulosis and cholelithiasis. RECOMMENDATIONS: Unavailable     XR CHEST PORTABLE    Result Date: 1/2/2022  Pulmonary vascular congestion     IR GUIDED NEPHROSTOMY CATH PLACEMENT RIGHT    Result Date: 1/5/2022  Successful percutaneous nephrostomy tube placement. 120 mL of purulent material was aspirated. A defined separate retroperitoneal abscess was not seen and expect findings on CT scan were just extension from the infection in the kidney. Patient be followed up with contrast CT scan when the drainage subsides to exclude the presence of any remaining retroperitoneal abscess.        Physical Examination:        General appearance:  alert, cooperative and no distress  Mental Status:  oriented to person, place and time and normal affect  Lungs:  Improved BS bilaterally, no wheezes/ rhonchi, normal effort  Heart:  regular rate and rhythm, + murmur  Abdomen:  soft, nontender, obese, normal bowel sounds, no masses, hepatomegaly, splenomegaly  Extremities:  + edema bilat LE 1+, no redness, tenderness in the calves, ACE wraps placed  Skin:  + venous stasis skin changes to bilat LE, thickened scaly skin, dry flaking    Assessment:        Hospital Problems           Last Modified POA    * (Principal) Complicated UTI (urinary tract infection) 1/4/2022 Yes    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    Long term current use of anticoagulant therapy 1/4/2022 Yes    Lymphedema of both lower extremities 1/4/2022 Yes    Obesity 1/4/2022 Yes    Tobacco abuse 1/4/2022 Yes    History of recurrent deep vein thrombosis (DVT) 1/4/2022 Yes    Frequency of urination 1/4/2022 Yes    Mixed incontinence 1/4/2022 Yes    Renal mass 1/4/2022 Yes    Bacteremia due to Klebsiella pneumoniae 1/4/2022 Yes    Psoas muscle abscess ([address] 1/8/2022 Yes    Septicemia due to Klebsiella pneumoniae ([address] 1/8/2022 Yes          Plan:        Right ureteral stone with Klebsiella pneumonia bacteremia-continue ceftriaxone through 2/2 per infectious disease. S/P IR-guided placement of right psoas abscess drainage catheter. Long-term anticoagulation therapy-continue Xarelto (discussed with urology). Lymphedema bilateral LE- continue wound care   Dyspnea/ wheezing- suspect COPD, Duonebs 4x daily, oxygen prn, mucinex, Chest xray- negative except for atelectasis, incentive spirometer, start Zyrtec  Hx of aortic stenosis-stable; continue to monitor   Obesity- suspect MAYNOR, needs outpatient sleep study  Tobacco abuse- encouraged cessation  Hypokalemia- replace KCl  GI prophylaxis  Awaiting SNF placement     Needs weekly CBC, CMP while on Rocephin.      Gail Dumont MD  1/12/2022  12:04 PM

## 2022-01-13 VITALS
WEIGHT: 255.6 LBS | OXYGEN SATURATION: 92 % | BODY MASS INDEX: 48.26 KG/M2 | RESPIRATION RATE: 18 BRPM | DIASTOLIC BLOOD PRESSURE: 77 MMHG | SYSTOLIC BLOOD PRESSURE: 178 MMHG | TEMPERATURE: 97.9 F | HEART RATE: 79 BPM | HEIGHT: 61 IN

## 2022-01-13 LAB
ABSOLUTE EOS #: 0.12 K/UL (ref 0–0.44)
ABSOLUTE IMMATURE GRANULOCYTE: 0.04 K/UL (ref 0–0.3)
ABSOLUTE LYMPH #: 1.5 K/UL (ref 1.1–3.7)
ABSOLUTE MONO #: 0.74 K/UL (ref 0.1–1.2)
BASOPHILS # BLD: 1 % (ref 0–2)
BASOPHILS ABSOLUTE: 0.04 K/UL (ref 0–0.2)
CULTURE: ABNORMAL
CULTURE: ABNORMAL
DIFFERENTIAL TYPE: ABNORMAL
DIRECT EXAM: ABNORMAL
DIRECT EXAM: ABNORMAL
EOSINOPHILS RELATIVE PERCENT: 2 % (ref 1–4)
HCT VFR BLD CALC: 32.4 % (ref 36.3–47.1)
HEMOGLOBIN: 10.2 G/DL (ref 11.9–15.1)
IMMATURE GRANULOCYTES: 1 %
LYMPHOCYTES # BLD: 19 % (ref 24–43)
Lab: ABNORMAL
MCH RBC QN AUTO: 31.3 PG (ref 25.2–33.5)
MCHC RBC AUTO-ENTMCNC: 31.5 G/DL (ref 28.4–34.8)
MCV RBC AUTO: 99.4 FL (ref 82.6–102.9)
MONOCYTES # BLD: 9 % (ref 3–12)
NRBC AUTOMATED: 0 PER 100 WBC
PDW BLD-RTO: 14.6 % (ref 11.8–14.4)
PLATELET # BLD: 512 K/UL (ref 138–453)
PLATELET ESTIMATE: ABNORMAL
PMV BLD AUTO: 9 FL (ref 8.1–13.5)
RBC # BLD: 3.26 M/UL (ref 3.95–5.11)
RBC # BLD: ABNORMAL 10*6/UL
SEG NEUTROPHILS: 68 % (ref 36–65)
SEGMENTED NEUTROPHILS ABSOLUTE COUNT: 5.5 K/UL (ref 1.5–8.1)
SPECIMEN DESCRIPTION: ABNORMAL
WBC # BLD: 7.9 K/UL (ref 3.5–11.3)
WBC # BLD: ABNORMAL 10*3/UL

## 2022-01-13 PROCEDURE — 6370000000 HC RX 637 (ALT 250 FOR IP): Performed by: INTERNAL MEDICINE

## 2022-01-13 PROCEDURE — 36415 COLL VENOUS BLD VENIPUNCTURE: CPT

## 2022-01-13 PROCEDURE — 99238 HOSP IP/OBS DSCHRG MGMT 30/<: CPT | Performed by: STUDENT IN AN ORGANIZED HEALTH CARE EDUCATION/TRAINING PROGRAM

## 2022-01-13 PROCEDURE — 6370000000 HC RX 637 (ALT 250 FOR IP): Performed by: NURSE PRACTITIONER

## 2022-01-13 PROCEDURE — 6360000002 HC RX W HCPCS: Performed by: STUDENT IN AN ORGANIZED HEALTH CARE EDUCATION/TRAINING PROGRAM

## 2022-01-13 PROCEDURE — 6370000000 HC RX 637 (ALT 250 FOR IP): Performed by: STUDENT IN AN ORGANIZED HEALTH CARE EDUCATION/TRAINING PROGRAM

## 2022-01-13 PROCEDURE — 85025 COMPLETE CBC W/AUTO DIFF WBC: CPT

## 2022-01-13 RX ORDER — OXYCODONE AND ACETAMINOPHEN 10; 325 MG/1; MG/1
1 TABLET ORAL EVERY 8 HOURS PRN
Qty: 15 TABLET | Refills: 0 | Status: SHIPPED | OUTPATIENT
Start: 2022-01-13 | End: 2022-01-18

## 2022-01-13 RX ADMIN — CETIRIZINE HYDROCHLORIDE 10 MG: 10 TABLET ORAL at 09:43

## 2022-01-13 RX ADMIN — GUAIFENESIN 600 MG: 600 TABLET, EXTENDED RELEASE ORAL at 09:43

## 2022-01-13 RX ADMIN — MORPHINE SULFATE 2 MG: 2 INJECTION, SOLUTION INTRAMUSCULAR; INTRAVENOUS at 04:03

## 2022-01-13 RX ADMIN — OXYCODONE HYDROCHLORIDE AND ACETAMINOPHEN 1 TABLET: 5; 325 TABLET ORAL at 09:42

## 2022-01-13 RX ADMIN — METOPROLOL TARTRATE 25 MG: 25 TABLET ORAL at 09:43

## 2022-01-13 RX ADMIN — OXYCODONE HYDROCHLORIDE AND ACETAMINOPHEN 1 TABLET: 5; 325 TABLET ORAL at 01:21

## 2022-01-13 RX ADMIN — PANTOPRAZOLE SODIUM 40 MG: 40 TABLET, DELAYED RELEASE ORAL at 06:37

## 2022-01-13 ASSESSMENT — PAIN SCALES - GENERAL
PAINLEVEL_OUTOF10: 10

## 2022-01-13 ASSESSMENT — ENCOUNTER SYMPTOMS
ABDOMINAL DISTENTION: 0
CHEST TIGHTNESS: 0
SHORTNESS OF BREATH: 0
DIARRHEA: 0
ABDOMINAL PAIN: 0
EYE REDNESS: 0
COUGH: 0
BACK PAIN: 1

## 2022-01-13 NOTE — DISCHARGE SUMMARY
[facility]  Office: [address], DO, Hammad Godinez, DO, Jennifer Portillo, DO, Janes Jeffrey Madera, DO, Buffy Nobles MD, Izzy Gifford MD, Romelia Mtahis MD, Gracia Irizarry MD, Alivia Vaca MD, Alec Douglas MD, Rubina Kong MD, Mallory Santos, DO, Talon Pike, DO, Iram Reid MD,  Art Castillo, DO, Romana Begin, MD, Kobi Verma MD, Tony García MD, Danielle Hubbard MD, Jennie Franz MD, Sandra Aleman MD, Sherita Callaway MD, Sarah Khan [facility], [facility], CNP, Raad Rosalba, CNP, King Gibbs, CNS, Angel Espana, CNP, Zita Chau, CNP, Kirstie Brandon, CNP, Jose Colón, CNP, Ryland Guidry, CNP, Tahmina Hall PA-C, Anastacio Chilel, [facility], Mer Viera, [facility], Mariella Duenas, CNP, Ben Burk, CNP, Ricky Olivas, CNP, Demetra Kohli, CNP, Mani Daly, CNP, Marion Trinidad,  [facility]    Discharge Summary     Patient ID: Ari Earl  :  1955   MRN: [id number]     ACCOUNT:  [de-identified]   Patient's PCP: DONG Estrella CNP  Admit Date: 2022   Discharge Date: 2022     Length of Stay: 9  Code Status:  Full Code  Admitting Physician: Lawson Oshea MD  Discharge Physician: Danielle Hubbard MD     Active Discharge Diagnoses:     Hospital Problem Lists:  Principal Problem:    Complicated UTI (urinary tract infection)  Active Problems:    Elephantiasis nostra verrucosa    Long term current use of anticoagulant therapy    Lymphedema of both lower extremities    Obesity    Tobacco abuse    History of recurrent deep vein thrombosis (DVT)    Frequency of urination    Mixed incontinence    Renal mass    Bacteremia due to Klebsiella pneumoniae    [facility] ([facility]    Septicemia due to Klebsiella pneumoniae [facility])  Resolved Problems:    * No resolved hospital problems.  *      Admission Condition:  fair     Discharged Condition: good    Hospital Stay:     Hospital Course:  Patient is a 78-year-old female with a past medical history of recurrent DVT's (on Xarelto) who presented to the emergency department on 1/2/2022 complaining of back pain. In the ED, the patient was afebrile and nontoxic appearing. CT scan of the abdomen and pelvis was done and was remarkable for an obstructing 1.6 cm stone in the right UPJ with associated moderately severe hydronephrosis as well as a right psoas abscess. The patient was started on Zosyn and admitted to internal medicine for further management. Urology was consulted and recommended nephrostomy tube placement (done on 1/5). An IR-guided pigtail catheter was placed in the right psoas abscess on 1/10. Blood cultures x 2 grew Klebsiella pneumoniae. Infectious disease was consulted and recommended starting ceftriaxone and continuing this through 2/2/2022. The patient is discharged today (1/13) in stable condition. She is instructed to follow-up with urology and infectious disease outpatient as scheduled. Significant therapeutic interventions: IV antibiotics; nephrostomy and pigtail catheter placement; urology and infectious disease evaluation     Significant Diagnostic Studies:   Labs / Micro:  BMP:    Lab Results   Component Value Date    GLUCOSE 115 01/10/2022     01/10/2022    K 3.7 01/10/2022     01/10/2022    CO2 25 01/10/2022    ANIONGAP 10 01/10/2022    BUN 16 01/10/2022    CREATININE 0.77 01/10/2022    BUNCRER NOT REPORTED 01/10/2022    CALCIUM 8.1 01/10/2022    LABGLOM >60 01/10/2022    GFRAA >60 01/10/2022    GFR      01/10/2022    GFR NOT REPORTED 01/10/2022       Radiology:  CT ABDOMEN PELVIS WO CONTRAST Additional Contrast? None    Result Date: 1/8/2022  1.  Successful placement of a right nephrostomy tube with decompression of the right kidney obstruction related to the UP junction stone which measures 1.5 cm. 2. The nephrostomy tube does not address the renal abscess which extends into the right iliopsoas muscle and measures up to 7.2 x 6.4 cm in largest axial diameter. This collection needs a separate drainage. 3. Failed attempt at placing an effective ureteric stent. The stent which was introduced from below is placed below the obstructing right renal stone. 4. Small bilateral pleural effusions and lower lobe atelectatic changes. RECOMMENDATIONS: Unavailable     CT ABSCESS DRAINAGE    Result Date: 1/10/2022  Successful CT guided placement of right psoas abscess drainage catheter. Consultations:    Consults:     Final Specialist Recommendations/Findings:   IP CONSULT TO UROLOGY  IP CONSULT TO INFECTIOUS DISEASES  IP CONSULT TO UROLOGY      The patient was seen and examined on day of discharge and this discharge summary is in conjunction with any daily progress note from day of discharge. Discharge plan:     Disposition: To a non-[facility] facility    Physician Follow Up:     Mikie Foster MD  [address] [place] [address], [address] [place] [address]    Schedule an appointment as soon as possible for a visit in 2 weeks  infec diseases    Janet Katz MD  [address] [place] [address] [address]  [phone]    Call  Call to schedule follow up appointment. Will need to have surgery for obstructing kidney stone and stent placemnet; nephrostomy tube removal within the next few weeks. Requiring Further Evaluation/Follow Up POST HOSPITALIZATION/Incidental Findings: Patient will need to have nephrostomy and IR-guided pigtail catheters removed outpatient     Diet: regular diet    Activity: As tolerated    Instructions to Patient: Follow-up with urology and infectious disease outpatient as scheduled.      Discharge Medications:      Medication List      START taking these medications    cefTRIAXone  infusion  Commonly known as: ROCEPHIN  Infuse 2,000 mg intravenously every 24 hours for 28 days Stop after 2/3/22  No line draws - cbc diff creat LFT weekly     metoprolol tartrate 25 MG tablet  Commonly known as: LOPRESSOR  Take 1 tablet by mouth 2 times daily     oxyCODONE-acetaminophen  MG per tablet  Commonly known as: Percocet  Take 1 tablet by mouth every 8 hours as needed for Pain for up to 5 days. Intended supply: 30 days        CHANGE how you take these medications    oxybutynin 10 MG extended release tablet  Commonly known as: Ditropan XL  Take 1 tablet by mouth daily  What changed: how much to take        CONTINUE taking these medications    Joint Support Complex Caps     rivaroxaban 20 MG Tabs tablet  Commonly known as: Xarelto  Take 1 tablet by mouth daily (with breakfast)     therapeutic multivitamin-minerals tablet     vitamin C 250 MG tablet     vitamin D 25 MCG (1000 UT) Tabs tablet  Commonly known as: CHOLECALCIFEROL     vitamin E 400 UNIT capsule           Where to Get Your Medications      These medications were sent to [facility] [address]  [address] [facility] [address] [facility] [address]    Phone: [phone]   metoprolol tartrate 25 MG tablet     You can get these medications from any pharmacy    Bring a paper prescription for each of these medications  cefTRIAXone  infusion  oxyCODONE-acetaminophen  MG per tablet         Discharge Procedure Orders   CT ABDOMEN PELVIS W IV CONTRAST Additional Contrast? None   Standing Status: Future Standing Exp. Date: 01/07/23     Order Specific Question Answer Comments   Additional Contrast? None        Time Spent on discharge is  20 mins in patient examination, evaluation, counseling as well as medication reconciliation, prescriptions for required medications, discharge plan and follow up. Electronically signed by   Marian Tracey MD  1/13/2022  11:05 AM      Thank you Dr. Rajinder Jaramillo, APRN - CNP for the opportunity to be involved in this patient's care.

## 2022-01-13 NOTE — PROGRESS NOTES
[facility] -   Infectious diseases evaluation. Progress Note    admission date 1/4/2022    reason for consultation:   Blood culture x2 growing Klebsiella pneumoniae-1/2/22  Perinephric abscess    Impression :   Current:  · Klebsiella septicemia  · Right severely obstructive pyelonephritis, post stent placement 1/4/22  · 1 1 6 cm right ureteral stone  · s/p right percutaneous nephrostomy tube 120 mL pus aspirated and right perinephric drain placement  · Perinephric/psoas abscess   · Aspirate 120 cc pus 1/5, cs kleb  · S/p psoas abscess drain 1/10, cx pend  · Bilateral lymphedema  · Prolonged QT    Discussion / summary of stay / plan of care   ·   Recommendations   · Rocephin 2 g IV daily x one month until 2/2/22  · Midline placed, chart reconciled  · Pend placement - Will go to [facility] 1/13/12      Infection Control Recommendations   · [place] Precautions  · Contact Isolation       Antimicrobial Stewardship Recommendations   · Simplification of therapy  · Targeted therapy    Coordination ofOutpatient Care:   · Estimated Length of IV antimicrobials:  · Patient will need Midline / picc Catheter Insertion:   · Patient will need SNF:  · Patient will need outpatient wound care:     History of Present Illness:       INITIAL HISTORY:    Abdiel Allison is a 74y.o.-year-old female with past medical history of bilateral lymphedema, morbid obesity, history of DVT on Xarelto presents with right flank pain from outside facility. Patient had fever and leukocytosis. CT abdomen pelvis revealed 1.6 cm right renal calculus with 7 x 6 x 7 cm psoas abscess. Patient [facility] for right PCNT. Blood culture x2+ for bacteria. 1/2 Klebsiella pneumonia.     Interval changes  1/13/2022   Patient Vitals for the past 8 hrs:   BP Temp Temp src Pulse SpO2 Weight   01/13/22 0800 (!) 178/77 97.9 °F (36.6 °C) Oral 79 92 % --   01/13/22 0600 -- -- -- -- -- 255 lb 9.6 oz (115.9 kg)     1/5  Patient seen and examined in her room. She had a drain placed to the perinephric abscess of her right kidney. 120 mL pus aspirated. She is afebrile hemodynamically stable has some flank pain. 1/6 patient seen and examined in her room. Drain in place. Has some back pain. Afebrile hemodynamically stable. On 2 L oxygen via nasal cannula    1/7  Patient resting well in her room. No complaints. She had midline placed today    1/11  Afebrile and hemodynamically stable on 2 L oxygen via nasal cannula her pain is much better today. Patient had right psoas abscess drain placed today. Culture from the psoas abscess: 1-5-22: Klebsiella pneumoniae multi S    Abdomen pain resolved, drain with some bloody fluid, nephrostomy drainage stillplaced  Appropriate breast irritated because she has to go to rehab. She has been weak since admission. In general looks much better though  No fever chills    1/12/22  Pt seen and examined at bedside. Afebrile. BP jumped today to 180/97. Endorsing fatigue and In mild distress due to tenderness at drain site. Still lower back pain but better  Cultures from drain grew lactose fermenting gram negative rods. No anaerobs. Will continue Rocephin til 2/22 at [address] [place] [address] (going tomorrow 1/13/22)    1/13/22   Pt seen and examined at bedside. Afebrile but high BPagain today to 188/77. Endorsing fatigue, dysphoria, and In mild distress due to tenderness at drain site and lower back. Refusing PT due to discomfort. I encouraged the importance of this. Cultures from drain 1/11 grew lactose fermenting gram negative rods. No anaerobs.    Will continue Rocephin til 2/22 at [address] [place] [address] (going today 1/13/22)        Summary of relevant labs:1/13/2022    Labs:  Creatinine 0.77-->0.83-0.77  WBC 12-->11-->10-->9.5-9.5-7.9  Hb 10.3   Platelet 661    Micro:  Blood culture x2 1/2 growing Klebsiella  Perinephric abscess culture 1/5: - Klebsiella pneumoniae  Drain Cx 1/10: Light growth gram neg lactose fermenting rods. No anaerobic growth. Imaging:  CT abdomen pelvis with contrast 1/5/2022  .  Obstructing 1.6 cm stone in the right UPJ results in moderately severe   hydronephrosis.  The recently described abnormality along the inferomedial   right kidney involving the psoas muscle is consistent with an abscess   measuring up to 7.2 x 6.0 x 7.7 cm.       2.  Bilateral nephrolithiasis.  Previously noted small bladder stones are not   delineated on this exam.       3.  Brooks catheter within the bladder.  Pelvic floor relaxation.       4.  Diverticulosis and cholelithiasis. 1-8-22:      1-5-22: I have personally reviewed the past medical history, past surgical history, medications, social history, and family history, and I haveupdated the database accordingly. Allergies:   Estrogens     Review of Systems:     Review of Systems   Constitutional: Positive for activity change and fatigue. Negative for chills and diaphoresis. HENT: Negative for congestion. Eyes: Negative for redness. Respiratory: Negative for cough, chest tightness and shortness of breath. Cardiovascular: Negative for chest pain, palpitations and leg swelling. Gastrointestinal: Negative for abdominal distention, abdominal pain and diarrhea. Genitourinary: Negative for difficulty urinating and dysuria. Musculoskeletal: Positive for back pain. Negative for myalgias. Skin: Negative. Neurological: Negative. Hematological: Negative. Psychiatric/Behavioral: Positive for dysphoric mood. Physical Examination :       Physical Exam  Constitutional:       Appearance: Normal appearance. HENT:      Right Ear: External ear normal.      Left Ear: External ear normal.      Nose: Nose normal.      Mouth/Throat:      Mouth: Mucous membranes are moist.   Eyes:      Pupils: Pupils are equal, round, and reactive to light. Cardiovascular:      Rate and Rhythm: Normal rate and regular rhythm.       Heart sounds: Normal heart sounds. Abdominal:      General: Abdomen is flat. Tenderness: There is no abdominal tenderness. Comments: Tender drain site. No CVA tenderness    Musculoskeletal:         General: Tenderness present. Right lower leg: Edema present. Left lower leg: Edema present. Comments: Lower back dull achy pain b/l and near drain site   Skin:     General: Skin is dry. Capillary Refill: Capillary refill takes less than 2 seconds. Coloration: Skin is not jaundiced. Neurological:      General: No focal deficit present. Mental Status: She is alert.    Psychiatric:         Mood and Affect: Mood normal.     bilateral lower extremity lymphedema and venous stasis dermatitis present    Past Medical History:     Past Medical History:   Diagnosis Date    Carcinoma ([facility]     Squamous Cell    Cellulitis     DVT (deep venous thrombosis) ([facility] 2006    LLE    Kidney stone     Wears glasses        Past Surgical  History:     Past Surgical History:   Procedure Laterality Date    CARPAL TUNNEL RELEASE  1990s    CT ABSCESS DRAIN SUBCUTANEOUS  1/10/2022    CT ABSCESS DRAIN SUBCUTANEOUS 1/10/2022 [facility] CT SCAN    CYSTOSCOPY N/A 1/4/2022    CYSTOSCOPY URETERAL STENT INSERTION performed by Sandip Lee MD at [address]  1/7/2022         IR NEPHROSTOMY PERCUTANEOUS RIGHT  1/5/2022    IR NEPHROSTOMY PERCUTANEOUS RIGHT 1/5/2022 [facility]    ANNABELLA AND BSO      05/2019    TUBAL LIGATION  1993    TUBAL LIGATION      WISDOM TOOTH EXTRACTION         Medications:      sodium chloride flush  10 mL IntraCATHeter BID    rivaroxaban  20 mg Oral Daily    sodium chloride flush  10 mL IntraCATHeter BID    cetirizine  10 mg Oral Daily    metoprolol tartrate  25 mg Oral BID    mineral oil-hydrophilic petrolatum   Topical BID    miconazole   Topical BID    cefTRIAXone (ROCEPHIN) IV  2,000 mg IntraVENous Q24H    guaiFENesin 600 mg Oral BID    pantoprazole  40 mg Oral QAM AC       Social History:     Social History     Socioeconomic History    Marital status:      Spouse name: Not on file    Number of children: Not on file    Years of education: Not on file    Highest education level: Not on file   Occupational History    Not on file   Tobacco Use    Smoking status: Current Every Day Smoker     Packs/day: 0.50     Years: 42.00     Pack years: 21.00     Types: Cigarettes    Smokeless tobacco: Never Used   Vaping Use    Vaping Use: Never used   Substance and Sexual Activity    Alcohol use: No     Alcohol/week: 0.0 standard drinks    Drug use: No    Sexual activity: Not Currently   Other Topics Concern    Not on file   Social History Narrative    Not on file     Social Determinants of Health     Financial Resource Strain:     Difficulty of Paying Living Expenses: Not on file   Food Insecurity:     Worried About Running Out of Food in the Last Year: Not on file    Nabeel of Food in the Last Year: Not on file   Transportation Needs:     Lack of Transportation (Medical): Not on file    Lack of Transportation (Non-Medical):  Not on file   Physical Activity:     Days of Exercise per Week: Not on file    Minutes of Exercise per Session: Not on file   Stress:     Feeling of Stress : Not on file   Social Connections:     Frequency of Communication with Friends and Family: Not on file    Frequency of Social Gatherings with Friends and Family: Not on file    Attends Yarsanism Services: Not on file    Active Member of Clubs or Organizations: Not on file    Attends Club or Organization Meetings: Not on file    Marital Status: Not on file   Intimate Partner Violence:     Fear of Current or Ex-Partner: Not on file    Emotionally Abused: Not on file    Physically Abused: Not on file    Sexually Abused: Not on file   Housing Stability:     Unable to Pay for Housing in the Last Year: Not on file    Number of Places Lived in the Last Year: Not on file    Unstable Housing in the Last Year: Not on file       Family History:     Family History   Adopted: Yes      Medical Decision Making:   I have independently reviewed/ordered the following labs:    CBC with Differential:   Recent Labs     01/13/22  0517   WBC 7.9   HGB 10.2*   HCT 32.4*   *   LYMPHOPCT 19*   MONOPCT 9     BMP:  No results for input(s): NA, K, CL, CO2, BUN, CREATININE, CA, MG in the last 72 hours. Hepatic Function Panel:   No results for input(s): PROT, LABALBU, BILIDIR, IBILI, BILITOT, ALKPHOS, ALT, AST in the last 72 hours. No results for input(s): RPR in the last 72 hours. No results for input(s): HIV in the last 72 hours. No results for input(s): BC in the last 72 hours.   Lab Results   Component Value Date    CREATININE 0.77 01/10/2022    GLUCOSE 115 01/10/2022       Detailed results:      Racquel Winchester MS4 on behalf of Dr. Bridget Sanchez

## 2022-01-13 NOTE — PROGRESS NOTES
Patient transported to [facility] per stretcher, belongings and chart/packet and script for Percocet sent with [facility] ambulance transporters, report called to facility

## 2022-01-14 ENCOUNTER — HOSPITAL ENCOUNTER (OUTPATIENT)
Age: 67
Setting detail: SPECIMEN
Discharge: HOME | End: 2022-01-14
Payer: MEDICARE

## 2022-01-14 LAB
ABSOLUTE EOS #: 0.13 K/UL (ref 0–0.44)
ABSOLUTE IMMATURE GRANULOCYTE: 0.03 K/UL (ref 0–0.3)
ABSOLUTE LYMPH #: 1.48 K/UL (ref 1.1–3.7)
ABSOLUTE MONO #: 0.66 K/UL (ref 0.1–1.2)
ALBUMIN SERPL-MCNC: 2.7 G/DL (ref 3.5–5.2)
ALBUMIN/GLOBULIN RATIO: 0.6 (ref 1–2.5)
ALP BLD-CCNC: 58 U/L (ref 35–104)
ALT SERPL-CCNC: 13 U/L (ref 5–33)
ANION GAP SERPL CALCULATED.3IONS-SCNC: 12 MMOL/L (ref 9–17)
AST SERPL-CCNC: 12 U/L
BASOPHILS # BLD: 0 % (ref 0–2)
BASOPHILS ABSOLUTE: 0.03 K/UL (ref 0–0.2)
BILIRUB SERPL-MCNC: 0.23 MG/DL (ref 0.3–1.2)
BUN BLDV-MCNC: 16 MG/DL (ref 8–23)
BUN/CREAT BLD: 18 (ref 9–20)
CALCIUM SERPL-MCNC: 8.6 MG/DL (ref 8.6–10.4)
CHLORIDE BLD-SCNC: 103 MMOL/L (ref 98–107)
CO2: 28 MMOL/L (ref 20–31)
CREAT SERPL-MCNC: 0.88 MG/DL (ref 0.5–0.9)
DIFFERENTIAL TYPE: ABNORMAL
EOSINOPHILS RELATIVE PERCENT: 2 % (ref 1–4)
GFR AFRICAN AMERICAN: >60 ML/MIN
GFR NON-AFRICAN AMERICAN: >60 ML/MIN
GFR SERPL CREATININE-BSD FRML MDRD: ABNORMAL ML/MIN/{1.73_M2}
GFR SERPL CREATININE-BSD FRML MDRD: ABNORMAL ML/MIN/{1.73_M2}
GLUCOSE BLD-MCNC: 104 MG/DL (ref 70–99)
HCT VFR BLD CALC: 34.2 % (ref 36.3–47.1)
HEMOGLOBIN: 10.5 G/DL (ref 11.9–15.1)
IMMATURE GRANULOCYTES: 0 %
LYMPHOCYTES # BLD: 21 % (ref 24–43)
MCH RBC QN AUTO: 30.7 PG (ref 25.2–33.5)
MCHC RBC AUTO-ENTMCNC: 30.7 G/DL (ref 28.4–34.8)
MCV RBC AUTO: 100 FL (ref 82.6–102.9)
MONOCYTES # BLD: 9 % (ref 3–12)
NRBC AUTOMATED: 0 PER 100 WBC
PDW BLD-RTO: 14.6 % (ref 11.8–14.4)
PLATELET # BLD: 471 K/UL (ref 138–453)
PLATELET ESTIMATE: ABNORMAL
PMV BLD AUTO: 9.5 FL (ref 8.1–13.5)
POTASSIUM SERPL-SCNC: 3.3 MMOL/L (ref 3.7–5.3)
RBC # BLD: 3.42 M/UL (ref 3.95–5.11)
RBC # BLD: ABNORMAL 10*6/UL
SEG NEUTROPHILS: 68 % (ref 36–65)
SEGMENTED NEUTROPHILS ABSOLUTE COUNT: 4.7 K/UL (ref 1.5–8.1)
SODIUM BLD-SCNC: 143 MMOL/L (ref 135–144)
TOTAL PROTEIN: 7.1 G/DL (ref 6.4–8.3)
WBC # BLD: 7 K/UL (ref 3.5–11.3)
WBC # BLD: ABNORMAL 10*3/UL

## 2022-01-14 PROCEDURE — P9603 ONE-WAY ALLOW PRORATED MILES: HCPCS

## 2022-01-14 PROCEDURE — 36415 COLL VENOUS BLD VENIPUNCTURE: CPT

## 2022-01-14 PROCEDURE — 85025 COMPLETE CBC W/AUTO DIFF WBC: CPT

## 2022-01-14 PROCEDURE — 80053 COMPREHEN METABOLIC PANEL: CPT

## 2022-01-17 ENCOUNTER — OUTSIDE SERVICES (OUTPATIENT)
Dept: PRIMARY CARE CLINIC | Age: 67
End: 2022-01-17
Payer: MEDICARE

## 2022-01-17 DIAGNOSIS — Z86.718 HISTORY OF RECURRENT DEEP VEIN THROMBOSIS (DVT): ICD-10-CM

## 2022-01-17 DIAGNOSIS — I89.0 LYMPHEDEMA OF BOTH LOWER EXTREMITIES: ICD-10-CM

## 2022-01-17 DIAGNOSIS — E66.01 MORBID OBESITY (HCC): ICD-10-CM

## 2022-01-17 DIAGNOSIS — A41.4 SEPTICEMIA DUE TO KLEBSIELLA PNEUMONIAE (HCC): Primary | ICD-10-CM

## 2022-01-17 PROCEDURE — 99306 1ST NF CARE HIGH MDM 50: CPT | Performed by: FAMILY MEDICINE

## 2022-01-17 NOTE — PROGRESS NOTES
Patient:  Russ Gomez, 1955  I saw this patient on 1/17/2022  , at [facility]. She had renal calculus and had infection with psos abscess and had drainage and nephrostomy. she is receiving iv antibiotics. She had septicemia from klebsiella. Reason for Visit:      ICD-10-CM    1. Septicemia due to Klebsiella pneumoniae ([facility] [address]  A41.4    2. Lymphedema of both lower extremities  I89.0    3. History of recurrent deep vein thrombosis (DVT)  Z86.718    4. Morbid obesity ([facility] [address]  E66.01        Changes since admission:    has some pain,leg edema  1. Fall:  none  2. Behavioral Change: none  3. Pain Control: adequate  4. Mobility: decreased  5. Pressure Sore:  none  Current Outpatient Medications   Medication Sig Dispense Refill    oxyCODONE-acetaminophen (PERCOCET)  MG per tablet Take 1 tablet by mouth every 8 hours as needed for Pain for up to 5 days. Intended supply: 30 days 15 tablet 0    metoprolol tartrate (LOPRESSOR) 25 MG tablet Take 1 tablet by mouth 2 times daily 60 tablet 3    cefTRIAXone (ROCEPHIN) infusion Infuse 2,000 mg intravenously every 24 hours for 28 days Stop after 2/3/22  No line draws - cbc diff creat LFT weekly 56 g 0    Misc Natural Products (JOINT SUPPORT COMPLEX) CAPS Take 2 capsules by mouth daily      Ascorbic Acid (VITAMIN C) 250 MG tablet Take 250 mg by mouth daily      vitamin E 400 UNIT capsule Take 400 Units by mouth daily      vitamin D (CHOLECALCIFEROL) 25 MCG (1000 UT) TABS tablet Take 1,000 Units by mouth daily      oxybutynin (DITROPAN XL) 10 MG extended release tablet Take 1 tablet by mouth daily (Patient taking differently: Take 15 mg by mouth daily ) 30 tablet 0    rivaroxaban (XARELTO) 20 MG TABS tablet Take 1 tablet by mouth daily (with breakfast) 30 tablet 5    Multiple Vitamins-Minerals (THERAPEUTIC MULTIVITAMIN-MINERALS) tablet Take 1 tablet by mouth daily       No current facility-administered medications for this visit.       Allergies: Estrogens    Past Medical History:    Past Medical History:   Diagnosis Date    Carcinoma ([facility] [address]     Squamous Cell    Cellulitis     DVT (deep venous thrombosis) ([address] 2006    LLE    Kidney stone     Morbid obesity ([facility] [address]     Wears glasses        Past Surgical History:    Past Surgical History:   Procedure Laterality Date    CARPAL TUNNEL RELEASE  1990s    CT ABSCESS DRAIN SUBCUTANEOUS  1/10/2022    CT ABSCESS DRAIN SUBCUTANEOUS 1/10/2022 [facility] CT SCAN    CYSTOSCOPY N/A 1/4/2022    CYSTOSCOPY URETERAL STENT INSERTION performed by Eleanor Kay MD at [address]  1/7/2022         IR NEPHROSTOMY PERCUTANEOUS RIGHT  1/5/2022    IR NEPHROSTOMY PERCUTANEOUS RIGHT 1/5/2022 Alan Davila MD [facility]    ANNABELLA AND BSO      05/2019    TUBAL LIGATION  1993    TUBAL LIGATION      WISDOM TOOTH EXTRACTION         Social History:   Social History     Tobacco Use    Smoking status: Current Every Day Smoker     Packs/day: 0.50     Years: 42.00     Pack years: 21.00     Types: Cigarettes    Smokeless tobacco: Never Used   Substance Use Topics    Alcohol use: No     Alcohol/week: 0.0 standard drinks       Family History:   Family History   Adopted: Yes           Review of Systems:  Constitutional: negative for fevers or chills  Eyes: negative for visual disturbance   ENT: negative for sore throat or nasal congestion,neg for  dysphagia  Respiratory: neg for shortness of breath , cough  Cardiovascular: neg for chest pain , palpitations,pnd,positive for leg edema  Gastrointestinal: neg for abd pain, nausea, vomiting, diarrhea or constipation,no ana paula,no blood in stool  Genitourinary: negative for dysuria, urgency,hematuria or frequency  Integument/breast: negative for skin rash or lesions  Neurological: negative for unilateral weakness, numbness or tingling.   Muscular Skeletal: no joint pain   Psych -no anxiety or depression    Objective:    Vitals: BP-133/71,Pulse-58,Respi-18,Temp-97.6  -----------------------------------------------------------------  Exam:  GEN:   A & O x3, no apparent distress  EYES: No gross abnormalities. ENT:ENT exam normal, no neck nodes or sinus tenderness  NECK: normal, supple, no lymphadenopathy,  no carotid bruits  PULM: clear to auscultation bilaterally- no wheezes, rales or rhonchi, normal air movement, no respiratory distress  COR: regular rate & rhythm and no gallops  ABD:  soft, non-tender, non-distended, normal bowel sounds, no masses or organomegaly  : has drainage tubes on rt renal angle area  EXT:has bilat lymphedema ,no calf tenderness, and warm to touch. NEURO: Motor and sensory grossly intact  PSYCH: mood stable  SKIN:  No skin lesions or rashes    -----------------------------------------------------------------  Diagnostic Data:   · All diagnostic data was reviewed    Assessment:        ICD-10-CM    1. Septicemia due to Klebsiella pneumoniae ([facility]  A41.4    2. Lymphedema of both lower extremities  I89.0    3. History of recurrent deep vein thrombosis (DVT)  Z86.718    4. Morbid obesity ([facility]  E66.01      Patient Active Problem List   Diagnosis Code    Acute thromboembolism of deep veins of lower extremity (HCC) I82.409    Long term current use of anticoagulant therapy Z79.01    Bilateral cellulitis of lower leg L03.116, L03.115    Acute shoulder pain due to trauma M25.519, G89.11    Lymphedema of both lower extremities I89.0    Obesity E66.9    Tobacco abuse Z72.0    History of recurrent deep vein thrombosis (DVT) Z86.718    Erythrocytosis D75.1    Gross hematuria R31.0    Frequency of urination R35.0    Nocturia R35.1    Mixed incontinence N39.46    Constipation K59.00    Cellulitis of lower leg L03.119    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 693    Complicated UTI (urinary tract infection) N39.0    Calculus of right kidney N20.0    Cellulitis L03.90    Normocytic anemia D64.9    Iron deficiency anemia D50.9    Renal mass N28.89    Bacteremia due to Klebsiella pneumoniae R78.81, B96.1    Psoas muscle abscess ([facility] K68.12    Septicemia due to Klebsiella pneumoniae ([facility]) A41.4         Plan:     Pt and ot  Continue iv antibiotics  Pain control and care of drainage tubes  Continue current medications  Prevent pressure sore  Prevent falls    Electronically signed by Ericka Zavaleta MD on 1/17/2022 at 3:25 PM

## 2022-01-19 ENCOUNTER — TELEPHONE (OUTPATIENT)
Dept: UROLOGY | Age: 67
End: 2022-01-19

## 2022-01-19 NOTE — TELEPHONE ENCOUNTER
Yes we sent her to Dr. Rodrigo Yen.   Dr. Parker Bose did surgery on 1/4/2022    F/U with Dr. Parker Bose only in the next week

## 2022-01-19 NOTE — TELEPHONE ENCOUNTER
Jamari Benjamin from [address] [place] [address] called and she had called Dr. Geovanni Ramos to schedule a follow up for patient. They told Jamari Benjamin that patient could be seen here if we will see her. Can we see her and if so, when does she need seen?

## 2022-01-21 ENCOUNTER — HOSPITAL ENCOUNTER (OUTPATIENT)
Age: 67
Setting detail: SPECIMEN
Discharge: HOME | End: 2022-01-21

## 2022-01-21 LAB
ABSOLUTE EOS #: 0.15 K/UL (ref 0–0.44)
ABSOLUTE IMMATURE GRANULOCYTE: <0.03 K/UL (ref 0–0.3)
ABSOLUTE LYMPH #: 1.4 K/UL (ref 1.1–3.7)
ABSOLUTE MONO #: 0.59 K/UL (ref 0.1–1.2)
ALBUMIN SERPL-MCNC: 2.7 G/DL (ref 3.5–5.2)
ALBUMIN/GLOBULIN RATIO: 0.8 (ref 1–2.5)
ALP BLD-CCNC: 66 U/L (ref 35–104)
ALT SERPL-CCNC: 9 U/L (ref 5–33)
ANION GAP SERPL CALCULATED.3IONS-SCNC: 9 MMOL/L (ref 9–17)
AST SERPL-CCNC: 10 U/L
BASOPHILS # BLD: 1 % (ref 0–2)
BASOPHILS ABSOLUTE: 0.03 K/UL (ref 0–0.2)
BILIRUB SERPL-MCNC: 0.21 MG/DL (ref 0.3–1.2)
BUN BLDV-MCNC: 19 MG/DL (ref 8–23)
BUN/CREAT BLD: 22 (ref 9–20)
CALCIUM SERPL-MCNC: 8.6 MG/DL (ref 8.6–10.4)
CHLORIDE BLD-SCNC: 107 MMOL/L (ref 98–107)
CO2: 29 MMOL/L (ref 20–31)
CREAT SERPL-MCNC: 0.88 MG/DL (ref 0.5–0.9)
DIFFERENTIAL TYPE: ABNORMAL
EOSINOPHILS RELATIVE PERCENT: 2 % (ref 1–4)
GFR AFRICAN AMERICAN: >60 ML/MIN
GFR NON-AFRICAN AMERICAN: >60 ML/MIN
GFR SERPL CREATININE-BSD FRML MDRD: ABNORMAL ML/MIN/{1.73_M2}
GFR SERPL CREATININE-BSD FRML MDRD: ABNORMAL ML/MIN/{1.73_M2}
GLUCOSE BLD-MCNC: 92 MG/DL (ref 70–99)
HCT VFR BLD CALC: 31.3 % (ref 36.3–47.1)
HEMOGLOBIN: 9.2 G/DL (ref 11.9–15.1)
IMMATURE GRANULOCYTES: 0 %
LYMPHOCYTES # BLD: 21 % (ref 24–43)
MCH RBC QN AUTO: 30.6 PG (ref 25.2–33.5)
MCHC RBC AUTO-ENTMCNC: 29.4 G/DL (ref 28.4–34.8)
MCV RBC AUTO: 104 FL (ref 82.6–102.9)
MONOCYTES # BLD: 9 % (ref 3–12)
NRBC AUTOMATED: 0 PER 100 WBC
PDW BLD-RTO: 14.8 % (ref 11.8–14.4)
PLATELET # BLD: 382 K/UL (ref 138–453)
PLATELET ESTIMATE: ABNORMAL
PMV BLD AUTO: 9.6 FL (ref 8.1–13.5)
POTASSIUM SERPL-SCNC: 4.5 MMOL/L (ref 3.7–5.3)
RBC # BLD: 3.01 M/UL (ref 3.95–5.11)
RBC # BLD: ABNORMAL 10*6/UL
SEG NEUTROPHILS: 67 % (ref 36–65)
SEGMENTED NEUTROPHILS ABSOLUTE COUNT: 4.36 K/UL (ref 1.5–8.1)
SODIUM BLD-SCNC: 145 MMOL/L (ref 135–144)
TOTAL PROTEIN: 6.3 G/DL (ref 6.4–8.3)
WBC # BLD: 6.5 K/UL (ref 3.5–11.3)
WBC # BLD: ABNORMAL 10*3/UL

## 2022-01-21 PROCEDURE — 36415 COLL VENOUS BLD VENIPUNCTURE: CPT

## 2022-01-21 PROCEDURE — 85025 COMPLETE CBC W/AUTO DIFF WBC: CPT

## 2022-01-21 PROCEDURE — P9603 ONE-WAY ALLOW PRORATED MILES: HCPCS

## 2022-01-21 PROCEDURE — 80053 COMPREHEN METABOLIC PANEL: CPT

## 2022-01-22 ENCOUNTER — HOSPITAL ENCOUNTER (OUTPATIENT)
Age: 67
Setting detail: SPECIMEN
Discharge: HOME | End: 2022-01-22
Payer: MEDICARE

## 2022-01-22 PROCEDURE — 87205 SMEAR GRAM STAIN: CPT

## 2022-01-22 PROCEDURE — 87070 CULTURE OTHR SPECIMN AEROBIC: CPT

## 2022-01-24 LAB
CULTURE: NO GROWTH
DIRECT EXAM: NORMAL
DIRECT EXAM: NORMAL
Lab: NORMAL
SPECIMEN DESCRIPTION: NORMAL

## 2022-01-26 ENCOUNTER — HOSPITAL ENCOUNTER (OUTPATIENT)
Dept: CT IMAGING | Age: 67
Discharge: HOME | End: 2022-01-28
Payer: MEDICARE

## 2022-01-26 DIAGNOSIS — N15.1 PERINEPHRIC ABSCESS: ICD-10-CM

## 2022-01-26 PROCEDURE — 6360000004 HC RX CONTRAST MEDICATION: Performed by: STUDENT IN AN ORGANIZED HEALTH CARE EDUCATION/TRAINING PROGRAM

## 2022-01-26 PROCEDURE — 74177 CT ABD & PELVIS W/CONTRAST: CPT

## 2022-01-26 RX ADMIN — IOPAMIDOL 75 ML: 755 INJECTION, SOLUTION INTRAVENOUS at 09:53

## 2022-01-28 ENCOUNTER — OFFICE VISIT (OUTPATIENT)
Dept: UROLOGY | Age: 67
End: 2022-01-28
Payer: MEDICARE

## 2022-01-28 ENCOUNTER — HOSPITAL ENCOUNTER (OUTPATIENT)
Age: 67
Setting detail: SPECIMEN
Discharge: HOME | End: 2022-01-28

## 2022-01-28 VITALS — DIASTOLIC BLOOD PRESSURE: 82 MMHG | SYSTOLIC BLOOD PRESSURE: 144 MMHG

## 2022-01-28 DIAGNOSIS — N20.0 RENAL CALCULUS: Primary | ICD-10-CM

## 2022-01-28 DIAGNOSIS — N15.1 RENAL ABSCESS, RIGHT: ICD-10-CM

## 2022-01-28 LAB
ABSOLUTE EOS #: 0.29 K/UL (ref 0–0.44)
ABSOLUTE IMMATURE GRANULOCYTE: <0.03 K/UL (ref 0–0.3)
ABSOLUTE LYMPH #: 1.32 K/UL (ref 1.1–3.7)
ABSOLUTE MONO #: 0.45 K/UL (ref 0.1–1.2)
ALBUMIN SERPL-MCNC: 2.9 G/DL (ref 3.5–5.2)
ALBUMIN/GLOBULIN RATIO: 0.9 (ref 1–2.5)
ALP BLD-CCNC: 78 U/L (ref 35–104)
ALT SERPL-CCNC: 8 U/L (ref 5–33)
ANION GAP SERPL CALCULATED.3IONS-SCNC: 11 MMOL/L (ref 9–17)
AST SERPL-CCNC: 9 U/L
BASOPHILS # BLD: 1 % (ref 0–2)
BASOPHILS ABSOLUTE: 0.05 K/UL (ref 0–0.2)
BILIRUB SERPL-MCNC: 0.21 MG/DL (ref 0.3–1.2)
BUN BLDV-MCNC: 17 MG/DL (ref 8–23)
BUN/CREAT BLD: 16 (ref 9–20)
CALCIUM SERPL-MCNC: 8.6 MG/DL (ref 8.6–10.4)
CHLORIDE BLD-SCNC: 101 MMOL/L (ref 98–107)
CO2: 26 MMOL/L (ref 20–31)
CREAT SERPL-MCNC: 1.04 MG/DL (ref 0.5–0.9)
DIFFERENTIAL TYPE: ABNORMAL
EOSINOPHILS RELATIVE PERCENT: 7 % (ref 1–4)
GFR AFRICAN AMERICAN: >60 ML/MIN
GFR NON-AFRICAN AMERICAN: 53 ML/MIN
GFR SERPL CREATININE-BSD FRML MDRD: ABNORMAL ML/MIN/{1.73_M2}
GFR SERPL CREATININE-BSD FRML MDRD: ABNORMAL ML/MIN/{1.73_M2}
GLUCOSE BLD-MCNC: 91 MG/DL (ref 70–99)
HCT VFR BLD CALC: 32.2 % (ref 36.3–47.1)
HEMOGLOBIN: 9.8 G/DL (ref 11.9–15.1)
IMMATURE GRANULOCYTES: 0 %
LYMPHOCYTES # BLD: 30 % (ref 24–43)
MCH RBC QN AUTO: 30.6 PG (ref 25.2–33.5)
MCHC RBC AUTO-ENTMCNC: 30.4 G/DL (ref 28.4–34.8)
MCV RBC AUTO: 100.6 FL (ref 82.6–102.9)
MONOCYTES # BLD: 10 % (ref 3–12)
NRBC AUTOMATED: 0 PER 100 WBC
PDW BLD-RTO: 15 % (ref 11.8–14.4)
PLATELET # BLD: 275 K/UL (ref 138–453)
PLATELET ESTIMATE: ABNORMAL
PMV BLD AUTO: 9.5 FL (ref 8.1–13.5)
POTASSIUM SERPL-SCNC: 4.8 MMOL/L (ref 3.7–5.3)
RBC # BLD: 3.2 M/UL (ref 3.95–5.11)
RBC # BLD: ABNORMAL 10*6/UL
SEG NEUTROPHILS: 52 % (ref 36–65)
SEGMENTED NEUTROPHILS ABSOLUTE COUNT: 2.3 K/UL (ref 1.5–8.1)
SODIUM BLD-SCNC: 138 MMOL/L (ref 135–144)
TOTAL PROTEIN: 6.3 G/DL (ref 6.4–8.3)
WBC # BLD: 4.4 K/UL (ref 3.5–11.3)
WBC # BLD: ABNORMAL 10*3/UL

## 2022-01-28 PROCEDURE — P9603 ONE-WAY ALLOW PRORATED MILES: HCPCS

## 2022-01-28 PROCEDURE — 99215 OFFICE O/P EST HI 40 MIN: CPT | Performed by: UROLOGY

## 2022-01-28 PROCEDURE — 36415 COLL VENOUS BLD VENIPUNCTURE: CPT

## 2022-01-28 PROCEDURE — 80053 COMPREHEN METABOLIC PANEL: CPT

## 2022-01-28 PROCEDURE — 85025 COMPLETE CBC W/AUTO DIFF WBC: CPT

## 2022-01-28 RX ORDER — POTASSIUM CHLORIDE 1.5 G/1.77G
20 POWDER, FOR SOLUTION ORAL DAILY
COMMUNITY
End: 2022-01-31

## 2022-01-28 RX ORDER — LORATADINE 10 MG/1
10 CAPSULE, LIQUID FILLED ORAL DAILY
COMMUNITY

## 2022-01-28 NOTE — PATIENT INSTRUCTIONS
SURVEY:    You may be receiving a survey from [employer] regarding your visit today. Please complete the survey to enable us to provide the highest quality of care to you and your family. If you cannot score us a very good on any question, please call the office to discuss how we could have made your experience a very good one. Thank you.

## 2022-01-28 NOTE — PROGRESS NOTES
HPI:        Patient is a 74 y.o. female in no acute distress. She is alert and oriented to person, place, and time. History  Pt presented to the ED with right flank pain. Pt had Ct scan that showed a right sided renal abscess. This also showed right hydronephrosis and an obstructing 1.6 cm right ureteral calculus. Pt was subsequently scheduled for transfer but has had to remain in ED for bed/staff issues.  locally was consulted this AM. There are plans for right sided PCNT at tertiary center. Pt is on empiric zosyn and merepenum. PT has has fever and leukocytosis. Pt has never seen Urology but did know she has a large stone. She has tried to make  appointments but has had transportation issues. Pain is 10 of 10 in the right flank currently.      1/4/2022 right ureteral stent placement  1/5/2022 right nephrostomy placement      Currently  Patient is here today to discuss her recent hospitalization and surgery. Patient did have a recent CT scan performed. This did show decrease in size of her perinephric abscess. Patient does continue to have a large stone in the proximal ureter. Patient is doing well. Patient has a follow-up with infectious disease in the next couple of weeks. Patient did have recent laboratory panel done. This did show good renal function. And no leukocytosis. She does have approximately 1 more week of antibiotics to receive. She has no pain today. She does state that overall she is starting to feel better. She is in the skilled nursing facility. She does not like this. This is the best place for her right now. No pain today.     Past Medical History:   Diagnosis Date    Carcinoma ([address]     Squamous Cell    Cellulitis     DVT (deep venous thrombosis) ([address] 2006    LLE    Kidney stone     Morbid obesity ([address]     Wears glasses      Past Surgical History:   Procedure Laterality Date    CARPAL TUNNEL RELEASE  1990s    CT ABSCESS DRAIN SUBCUTANEOUS  1/10/2022    [facility]    CYSTOSCOPY N/A 1/4/2022    CYSTOSCOPY URETERAL STENT INSERTION performed by Prashant Vásquez MD at [address]  1/7/2022         IR NEPHROSTOMY PERCUTANEOUS RIGHT  1/5/2022    IR NEPHROSTOMY PERCUTANEOUS RIGHT 1/5/2022 Lala Delgado MD [facility]    ANNABELLA AND BSO      05/2019    TUBAL LIGATION  1993    TUBAL LIGATION      WISDOM TOOTH EXTRACTION       Outpatient Encounter Medications as of 1/28/2022   Medication Sig Dispense Refill    loratadine (CLARITIN) 10 MG capsule Take 10 mg by mouth daily      potassium chloride (KLOR-CON) 20 MEQ packet Take 20 mEq by mouth daily      metoprolol tartrate (LOPRESSOR) 25 MG tablet Take 1 tablet by mouth 2 times daily 60 tablet 3    cefTRIAXone (ROCEPHIN) infusion Infuse 2,000 mg intravenously every 24 hours for 28 days Stop after 2/3/22  No line draws - cbc diff creat LFT weekly 56 g 0    Misc Natural Products (JOINT SUPPORT COMPLEX) CAPS Take 2 capsules by mouth daily      Ascorbic Acid (VITAMIN C) 250 MG tablet Take 250 mg by mouth daily      vitamin E 400 UNIT capsule Take 400 Units by mouth daily      vitamin D (CHOLECALCIFEROL) 25 MCG (1000 UT) TABS tablet Take 1,000 Units by mouth daily      oxybutynin (DITROPAN XL) 10 MG extended release tablet Take 1 tablet by mouth daily 30 tablet 0    rivaroxaban (XARELTO) 20 MG TABS tablet Take 1 tablet by mouth daily (with breakfast) 30 tablet 5    Multiple Vitamins-Minerals (THERAPEUTIC MULTIVITAMIN-MINERALS) tablet Take 1 tablet by mouth daily       No facility-administered encounter medications on file as of 1/28/2022.       Current Outpatient Medications on File Prior to Visit   Medication Sig Dispense Refill    loratadine (CLARITIN) 10 MG capsule Take 10 mg by mouth daily      potassium chloride (KLOR-CON) 20 MEQ packet Take 20 mEq by mouth daily      metoprolol tartrate (LOPRESSOR) 25 MG tablet Take 1 tablet by mouth 2 times daily 60 tablet 3    cefTRIAXone (ROCEPHIN) infusion Infuse 2,000 mg intravenously every 24 hours for 28 days Stop after 2/3/22  No line draws - cbc diff creat LFT weekly 56 g 0    Misc Natural Products (JOINT SUPPORT COMPLEX) CAPS Take 2 capsules by mouth daily      Ascorbic Acid (VITAMIN C) 250 MG tablet Take 250 mg by mouth daily      vitamin E 400 UNIT capsule Take 400 Units by mouth daily      vitamin D (CHOLECALCIFEROL) 25 MCG (1000 UT) TABS tablet Take 1,000 Units by mouth daily      oxybutynin (DITROPAN XL) 10 MG extended release tablet Take 1 tablet by mouth daily 30 tablet 0    rivaroxaban (XARELTO) 20 MG TABS tablet Take 1 tablet by mouth daily (with breakfast) 30 tablet 5    Multiple Vitamins-Minerals (THERAPEUTIC MULTIVITAMIN-MINERALS) tablet Take 1 tablet by mouth daily       No current facility-administered medications on file prior to visit. Estrogens  Family History   Adopted: Yes     Social History     Tobacco Use   Smoking Status Current Every Day Smoker    Packs/day: 0.50    Years: 42.00    Pack years: 21.00    Types: Cigarettes   Smokeless Tobacco Never Used       Social History     Substance and Sexual Activity   Alcohol Use No    Alcohol/week: 0.0 standard drinks       Review of Systems    BP (!) 144/82 (Site: Right Lower Arm, Position: Sitting, Cuff Size: Medium Adult)   LMP  (LMP Unknown)       PHYSICAL EXAM:  Constitutional: Patient resting comfortably, in no acute distress. Neuro: Alert and oriented to person place and time. Cranial nerves grossly intact. Psych: Mood and affect normal.  Skin: Warm, dry  HEENT: normocephalic, atraumatic  Lymphatics: No palpable lymphadenopathy  Lungs: Respiratory effort normal, unlabored  Cardiovascular:  Normal peripheral pulses  Abdomen: Soft, non-tender, non-distended with no organomegaly or palpable masses. : No CVA tenderness. Bladder non-tender and not distended.   Pelvic: deferred    Lab Results   Component Value Date    BUN 17 01/28/2022     Lab Results   Component Value Date    CREATININE 1.04 (H) 01/28/2022       ASSESSMENT:  This is a 74 y.o. female with the following diagnoses:   Diagnosis Orders   1. Renal calculus     2. Renal abscess, right           PLAN:  We will plan for right-sided holmium laser lithotripsy. We did state that if all goes well we will remove her right-sided percutaneous nephrostomy tube at this time. Patient is amenable to proceed. Patient does have multiple medical core comorbidities that do complicate the surgery. We will need clearance from primary care provider as well as cardiology to remove anticoagulation. Patient does understand this.

## 2022-01-31 ENCOUNTER — TELEPHONE (OUTPATIENT)
Dept: UROLOGY | Age: 67
End: 2022-01-31

## 2022-01-31 ENCOUNTER — OUTSIDE SERVICES (OUTPATIENT)
Dept: PRIMARY CARE CLINIC | Age: 67
End: 2022-01-31
Payer: MEDICARE

## 2022-01-31 ENCOUNTER — HOSPITAL ENCOUNTER (OUTPATIENT)
Age: 67
Setting detail: SPECIMEN
Discharge: HOME | End: 2022-01-31
Payer: MEDICARE

## 2022-01-31 DIAGNOSIS — I89.0 LYMPHEDEMA OF BOTH LOWER EXTREMITIES: ICD-10-CM

## 2022-01-31 DIAGNOSIS — E66.01 MORBID OBESITY (HCC): ICD-10-CM

## 2022-01-31 DIAGNOSIS — A41.4 SEPTICEMIA DUE TO KLEBSIELLA PNEUMONIAE (HCC): Primary | ICD-10-CM

## 2022-01-31 DIAGNOSIS — Z86.718 HISTORY OF RECURRENT DEEP VEIN THROMBOSIS (DVT): ICD-10-CM

## 2022-01-31 LAB
ABSOLUTE EOS #: 0.34 K/UL (ref 0–0.44)
ABSOLUTE IMMATURE GRANULOCYTE: <0.03 K/UL (ref 0–0.3)
ABSOLUTE LYMPH #: 1.43 K/UL (ref 1.1–3.7)
ABSOLUTE MONO #: 0.55 K/UL (ref 0.1–1.2)
ALBUMIN SERPL-MCNC: 3.1 G/DL (ref 3.5–5.2)
ALBUMIN/GLOBULIN RATIO: 0.9 (ref 1–2.5)
ALP BLD-CCNC: 78 U/L (ref 35–104)
ALT SERPL-CCNC: 8 U/L (ref 5–33)
ANION GAP SERPL CALCULATED.3IONS-SCNC: 10 MMOL/L (ref 9–17)
AST SERPL-CCNC: 10 U/L
BASOPHILS # BLD: 1 % (ref 0–2)
BASOPHILS ABSOLUTE: 0.04 K/UL (ref 0–0.2)
BILIRUB SERPL-MCNC: <0.1 MG/DL (ref 0.3–1.2)
BUN BLDV-MCNC: 26 MG/DL (ref 8–23)
BUN/CREAT BLD: 23 (ref 9–20)
CALCIUM SERPL-MCNC: 8.7 MG/DL (ref 8.6–10.4)
CHLORIDE BLD-SCNC: 103 MMOL/L (ref 98–107)
CO2: 26 MMOL/L (ref 20–31)
CREAT SERPL-MCNC: 1.15 MG/DL (ref 0.5–0.9)
DIFFERENTIAL TYPE: ABNORMAL
EOSINOPHILS RELATIVE PERCENT: 6 % (ref 1–4)
GFR AFRICAN AMERICAN: 57 ML/MIN
GFR NON-AFRICAN AMERICAN: 47 ML/MIN
GFR SERPL CREATININE-BSD FRML MDRD: ABNORMAL ML/MIN/{1.73_M2}
GFR SERPL CREATININE-BSD FRML MDRD: ABNORMAL ML/MIN/{1.73_M2}
GLUCOSE BLD-MCNC: 95 MG/DL (ref 70–99)
HCT VFR BLD CALC: 33.4 % (ref 36.3–47.1)
HEMOGLOBIN: 10.2 G/DL (ref 11.9–15.1)
IMMATURE GRANULOCYTES: 0 %
LYMPHOCYTES # BLD: 27 % (ref 24–43)
MCH RBC QN AUTO: 30.6 PG (ref 25.2–33.5)
MCHC RBC AUTO-ENTMCNC: 30.5 G/DL (ref 28.4–34.8)
MCV RBC AUTO: 100.3 FL (ref 82.6–102.9)
MONOCYTES # BLD: 10 % (ref 3–12)
NRBC AUTOMATED: 0 PER 100 WBC
PDW BLD-RTO: 15.1 % (ref 11.8–14.4)
PLATELET # BLD: 302 K/UL (ref 138–453)
PLATELET ESTIMATE: ABNORMAL
PMV BLD AUTO: 9.6 FL (ref 8.1–13.5)
POTASSIUM SERPL-SCNC: 5.4 MMOL/L (ref 3.7–5.3)
RBC # BLD: 3.33 M/UL (ref 3.95–5.11)
RBC # BLD: ABNORMAL 10*6/UL
SEG NEUTROPHILS: 56 % (ref 36–65)
SEGMENTED NEUTROPHILS ABSOLUTE COUNT: 2.98 K/UL (ref 1.5–8.1)
SODIUM BLD-SCNC: 139 MMOL/L (ref 135–144)
TOTAL PROTEIN: 6.5 G/DL (ref 6.4–8.3)
WBC # BLD: 5.4 K/UL (ref 3.5–11.3)
WBC # BLD: ABNORMAL 10*3/UL

## 2022-01-31 PROCEDURE — 36415 COLL VENOUS BLD VENIPUNCTURE: CPT

## 2022-01-31 PROCEDURE — 85025 COMPLETE CBC W/AUTO DIFF WBC: CPT

## 2022-01-31 PROCEDURE — P9603 ONE-WAY ALLOW PRORATED MILES: HCPCS

## 2022-01-31 PROCEDURE — 80053 COMPREHEN METABOLIC PANEL: CPT

## 2022-01-31 PROCEDURE — 99308 SBSQ NF CARE LOW MDM 20: CPT | Performed by: FAMILY MEDICINE

## 2022-01-31 NOTE — TELEPHONE ENCOUNTER
[facility] ? Is surgery clearance to remove nephrostomy tube, drain the tube or both. Please advise.

## 2022-01-31 NOTE — PROGRESS NOTES
Patient:  Karla Flor, 1955  I saw this patient on 1/31/2022 at [facility]. She  is receiving iv antibiotics- rocephin 2 gm daily. She is improving with therapy. Reason for Visit:      ICD-10-CM    1. Septicemia due to Klebsiella pneumoniae ([facility] [address]  A41.4    2. Lymphedema of both lower extremities  I89.0    3. History of recurrent deep vein thrombosis (DVT)  Z86.718    4. Morbid obesity ([facility] [address]  E66.01        Changes since last visit:    has some pain,leg edema  1. Fall:  none  2. Behavioral Change: none  3. Pain Control: adequate  4. Mobility: decreased  5. Pressure Sore:  none  Current Outpatient Medications   Medication Sig Dispense Refill    loratadine (CLARITIN) 10 MG capsule Take 10 mg by mouth daily      metoprolol tartrate (LOPRESSOR) 25 MG tablet Take 1 tablet by mouth 2 times daily 60 tablet 3    cefTRIAXone (ROCEPHIN) infusion Infuse 2,000 mg intravenously every 24 hours for 28 days Stop after 2/3/22  No line draws - cbc diff creat LFT weekly 56 g 0    Misc Natural Products (JOINT SUPPORT COMPLEX) CAPS Take 2 capsules by mouth daily      Ascorbic Acid (VITAMIN C) 250 MG tablet Take 250 mg by mouth daily      vitamin E 400 UNIT capsule Take 400 Units by mouth daily      vitamin D (CHOLECALCIFEROL) 25 MCG (1000 UT) TABS tablet Take 1,000 Units by mouth daily      oxybutynin (DITROPAN XL) 10 MG extended release tablet Take 1 tablet by mouth daily 30 tablet 0    rivaroxaban (XARELTO) 20 MG TABS tablet Take 1 tablet by mouth daily (with breakfast) 30 tablet 5    Multiple Vitamins-Minerals (THERAPEUTIC MULTIVITAMIN-MINERALS) tablet Take 1 tablet by mouth daily       No current facility-administered medications for this visit.       Allergies:  Estrogens    Past Medical History:    Past Medical History:   Diagnosis Date    Carcinoma ([facility] [address]     Squamous Cell    Cellulitis     DVT (deep venous thrombosis) ([facility] [address] 2006    LLE    Kidney stone     Morbid obesity ([facility] [address] [facility]     Wears glasses        Past Surgical History:    Past Surgical History:   Procedure Laterality Date    CARPAL TUNNEL RELEASE  1990s    CT ABSCESS DRAIN SUBCUTANEOUS  1/10/2022    [facility]    CYSTOSCOPY N/A 1/4/2022    CYSTOSCOPY URETERAL STENT INSERTION performed by Eleanor Kay MD at [address]  1/7/2022         IR NEPHROSTOMY PERCUTANEOUS RIGHT  1/5/2022    IR NEPHROSTOMY PERCUTANEOUS RIGHT 1/5/2022 Alan Davila MD [facility]    ANNABELLA AND BSO      05/2019    TUBAL LIGATION  1993    TUBAL LIGATION      WISDOM TOOTH EXTRACTION         Social History:   Social History     Tobacco Use    Smoking status: Current Every Day Smoker     Packs/day: 0.50     Years: 42.00     Pack years: 21.00     Types: Cigarettes    Smokeless tobacco: Never Used   Substance Use Topics    Alcohol use: No     Alcohol/week: 0.0 standard drinks       Family History:   Family History   Adopted: Yes           Review of Systems:  Constitutional: negative for fevers or chills  Eyes: negative for visual disturbance   ENT: negative for sore throat or nasal congestion,neg for  dysphagia  Respiratory: neg for shortness of breath , cough  Cardiovascular: neg for chest pain , palpitations,pnd,positive for leg edema  Gastrointestinal: neg for abd pain, nausea, vomiting, diarrhea or constipation,no ana paula,no blood in stool  Genitourinary: negative for dysuria, urgency,hematuria or frequency  Integument/breast: negative for skin rash or lesions  Neurological: negative for unilateral weakness, numbness or tingling. Muscular Skeletal: no joint pain   Psych -no anxiety or depression    Objective:    Vitals:   BP-126/64,Pulse-72,Respi-18,Temp-97.7  -----------------------------------------------------------------  Exam:  GEN:   A & O x3, no apparent distress  EYES: No gross abnormalities.   ENT:ENT exam normal, no neck nodes or sinus tenderness  NECK: normal, supple, no lymphadenopathy,  no carotid bruits  PULM: clear to auscultation bilaterally- no wheezes, rales or rhonchi, normal air movement, no respiratory distress  COR: regular rate & rhythm and no gallops  ABD:  soft, non-tender, non-distended, normal bowel sounds, no masses or organomegaly  : has drainage tubes on rt renal angle area  EXT:has bilat lymphedema ,no calf tenderness, and warm to touch. NEURO: Motor and sensory grossly intact  PSYCH: mood stable  SKIN:  No skin lesions or rashes    -----------------------------------------------------------------  Diagnostic Data:   · All diagnostic data was reviewed    Assessment:        ICD-10-CM    1. [facility]  A41.4    2. Lymphedema of both lower extremities  I89.0    3. History of recurrent deep vein thrombosis (DVT)  Z86.718    4. Morbid obesity ([facility] [address]  E66.01      Patient Active Problem List   Diagnosis Code    Acute thromboembolism of deep veins of lower extremity (HCC) I82.409    Long term current use of anticoagulant therapy Z79.01    Bilateral cellulitis of lower leg L03.116, L03.115    Acute shoulder pain due to trauma M25.519, G89.11    Lymphedema of both lower extremities I89.0    Obesity E66.9    Tobacco abuse Z72.0    History of recurrent deep vein thrombosis (DVT) Z86.718    Erythrocytosis D75.1    Gross hematuria R31.0    Frequency of urination R35.0    Nocturia R35.1    Mixed incontinence N39.46    Constipation K59.00    Cellulitis of lower leg L03.119    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 601    Complicated UTI (urinary tract infection) N39.0    Renal calculus N20.0    Cellulitis L03.90    Normocytic anemia D64.9    Iron deficiency anemia D50.9    Renal abscess, right N15.1    Bacteremia due to Klebsiella pneumoniae R78.81, B96.1    Psoas muscle abscess (HCC) K68.12    Septicemia due to Klebsiella pneumoniae ([facility] A41.4         Plan:     Pt and ot  Continue iv antibiotics until 2/3/2022,repeat cultures after that  Pain control and care of drainage tubes  Continue current medications  Prevent pressure sore  Prevent falls    Electronically signed by Lacy Rivera MD on 2/1/2022 at 8:56 AM

## 2022-02-01 ENCOUNTER — OUTSIDE SERVICES (OUTPATIENT)
Dept: PRIMARY CARE CLINIC | Age: 67
End: 2022-02-01
Payer: MEDICARE

## 2022-02-01 DIAGNOSIS — I89.0 LYMPHEDEMA OF BOTH LOWER EXTREMITIES: ICD-10-CM

## 2022-02-01 DIAGNOSIS — Z86.718 HISTORY OF RECURRENT DEEP VEIN THROMBOSIS (DVT): ICD-10-CM

## 2022-02-01 DIAGNOSIS — A41.4 SEPTICEMIA DUE TO KLEBSIELLA PNEUMONIAE (HCC): Primary | ICD-10-CM

## 2022-02-01 DIAGNOSIS — E66.01 MORBID OBESITY (HCC): ICD-10-CM

## 2022-02-01 NOTE — PROGRESS NOTES
Patient:  Ashely Paredes, 1955  I saw this patient on 02/02/2022 at [facility]. She  is receiving iv antibiotics- rocephin 2 gm daily. She is improving with therapy. Her last day of antibiotics tomorrow and going home on 2/4/22      Reason for Visit:      ICD-10-CM    1. Septicemia due to Klebsiella pneumoniae ([facility]  A41.4    2. Lymphedema of both lower extremities  I89.0    3. History of recurrent deep vein thrombosis (DVT)  Z86.718    4. Morbid obesity ([facility]  E66.01        Changes since last visit:    improving with therapy  1. Fall:  none  2. Behavioral Change: none  3. Pain Control: adequate  4. Mobility: decreased  5. Pressure Sore:  none  Current Outpatient Medications   Medication Sig Dispense Refill    loratadine (CLARITIN) 10 MG capsule Take 10 mg by mouth daily      metoprolol tartrate (LOPRESSOR) 25 MG tablet Take 1 tablet by mouth 2 times daily 60 tablet 3    cefTRIAXone (ROCEPHIN) infusion Infuse 2,000 mg intravenously every 24 hours for 28 days Stop after 2/3/22  No line draws - cbc diff creat LFT weekly 56 g 0    Misc Natural Products (JOINT SUPPORT COMPLEX) CAPS Take 2 capsules by mouth daily      Ascorbic Acid (VITAMIN C) 250 MG tablet Take 250 mg by mouth daily      vitamin E 400 UNIT capsule Take 400 Units by mouth daily      vitamin D (CHOLECALCIFEROL) 25 MCG (1000 UT) TABS tablet Take 1,000 Units by mouth daily      oxybutynin (DITROPAN XL) 10 MG extended release tablet Take 1 tablet by mouth daily 30 tablet 0    rivaroxaban (XARELTO) 20 MG TABS tablet Take 1 tablet by mouth daily (with breakfast) 30 tablet 5    Multiple Vitamins-Minerals (THERAPEUTIC MULTIVITAMIN-MINERALS) tablet Take 1 tablet by mouth daily       No current facility-administered medications for this visit.       Allergies:  Estrogens    Past Medical History:    Past Medical History:   Diagnosis Date    Carcinoma ([facility]     Squamous Cell    Cellulitis     DVT (deep venous thrombosis) ([facility] 2006 LLE    Kidney stone     Morbid obesity ([address]     Wears glasses        Past Surgical History:    Past Surgical History:   Procedure Laterality Date    CARPAL TUNNEL RELEASE  1990s    CT ABSCESS DRAIN SUBCUTANEOUS  1/10/2022    CT ABSCESS DRAIN SUBCUTANEOUS 1/10/2022 [facility] CT SCAN    CYSTOSCOPY N/A 1/4/2022    CYSTOSCOPY URETERAL STENT INSERTION performed by Luciana Munroe MD at [address]  1/7/2022         IR NEPHROSTOMY PERCUTANEOUS RIGHT  1/5/2022    IR NEPHROSTOMY PERCUTANEOUS RIGHT 1/5/2022 Emilie Patel MD [facility] SPECIAL PROCEDURES    ANNABELLA AND BSO      05/2019    TUBAL LIGATION  1993    TUBAL LIGATION      WISDOM TOOTH EXTRACTION         Social History:   Social History     Tobacco Use    Smoking status: Current Every Day Smoker     Packs/day: 0.50     Years: 42.00     Pack years: 21.00     Types: Cigarettes    Smokeless tobacco: Never Used   Substance Use Topics    Alcohol use: No     Alcohol/week: 0.0 standard drinks       Family History:   Family History   Adopted: Yes           Review of Systems:  Constitutional: negative for fevers or chills  Eyes: negative for visual disturbance   ENT: negative for sore throat or nasal congestion,neg for  dysphagia  Respiratory: neg for shortness of breath , cough  Cardiovascular: neg for chest pain , palpitations,pnd,positive for leg edema  Gastrointestinal: neg for abd pain, nausea, vomiting, diarrhea or constipation,no ana paula,no blood in stool  Genitourinary: negative for dysuria, urgency,hematuria or frequency  Integument/breast: negative for skin rash or lesions  Neurological: negative for unilateral weakness, numbness or tingling. Muscular Skeletal: no joint pain   Psych -no anxiety or depression    Objective:    Vitals:   BP-128/64,Pulse-69,Respi-20,Temp-98.0  -----------------------------------------------------------------  Exam:  GEN:   A & O x3, no apparent distress  EYES: No gross abnormalities.   ENT:ENT exam normal, no neck nodes or sinus tenderness  NECK: normal, supple, no lymphadenopathy,  no carotid bruits  PULM: clear to auscultation bilaterally- no wheezes, rales or rhonchi, normal air movement, no respiratory distress  COR: regular rate & rhythm and no gallops  ABD:  soft, non-tender, non-distended, normal bowel sounds, no masses or organomegaly  : has drainage tubes on rt renal angle area  EXT:has bilat lymphedema ,no calf tenderness, and warm to touch. NEURO: Motor and sensory grossly intact  PSYCH: mood stable  SKIN:  No skin lesions or rashes    -----------------------------------------------------------------  Diagnostic Data:   · All diagnostic data was reviewed    Assessment:        ICD-10-CM    1. Septicemia due to Klebsiella pneumoniae ([facility]  A41.4    2. Lymphedema of both lower extremities  I89.0    3. History of recurrent deep vein thrombosis (DVT)  Z86.718    4. Morbid obesity ([facility]  E66.01      Patient Active Problem List   Diagnosis Code    Acute thromboembolism of deep veins of lower extremity (HCC) I82.409    Long term current use of anticoagulant therapy Z79.01    Bilateral cellulitis of lower leg L03.116, L03.115    Acute shoulder pain due to trauma M25.519, G89.11    Lymphedema of both lower extremities I89.0    Obesity E66.9    Tobacco abuse Z72.0    History of recurrent deep vein thrombosis (DVT) Z86.718    Erythrocytosis D75.1    Gross hematuria R31.0    Frequency of urination R35.0    Nocturia R35.1    Mixed incontinence N39.46    Constipation K59.00    Cellulitis of lower leg L03.119    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 566    Complicated UTI (urinary tract infection) N39.0    Renal calculus N20.0    Cellulitis L03.90    Normocytic anemia D64.9    Iron deficiency anemia D50.9    Renal abscess, right N15.1    Bacteremia due to Klebsiella pneumoniae R78.81, B96.1    Psoas muscle abscess ([facility] K68.12    Septicemia due to Klebsiella pneumoniae ([facility] A41.4         Plan:     Pt and ot  Continue iv antibiotics until 2/3/2022,repeat urine culture on 2/4/2022  F/u with urology and pcp as out pt in 1 wk  Pain control and care of drainage tubes  Continue current medications on discharge  Electronically signed by Oniel Zelaya MD on 2/2/2022 at 10:55 AM

## 2022-02-02 PROCEDURE — 99308 SBSQ NF CARE LOW MDM 20: CPT | Performed by: FAMILY MEDICINE

## 2022-02-07 ENCOUNTER — HOSPITAL ENCOUNTER (OUTPATIENT)
Age: 67
Setting detail: SPECIMEN
Discharge: HOME | End: 2022-02-07

## 2022-02-08 NOTE — PROGRESS NOTES
Patient instructed on the pre-operative, intra-operative, and post-operative process. Patient instructed on NPO status. Medication instructions and pre operative instruction sheet reviewed with the patient. Instructed pt to stop taking all OTC vitamins 7 days prior to surgery and to take metoprolol with a small sip of water.  Waiting on blood thinner instructions from MD.

## 2022-02-16 ENCOUNTER — TELEPHONE (OUTPATIENT)
Dept: UROLOGY | Age: 67
End: 2022-02-16

## 2022-02-16 NOTE — TELEPHONE ENCOUNTER
Patient scheduled for surgery on 2/22/22 - right HLL/ureteroscopy, right ureteral stent exchange, right PCNT removal.      Fax received from her PCP - Melisa Sykes CNP, stating that patient is not cleared for surgery due to hypertension and cardiac issues. Recommending that patient be seen by Dr. Bessy Diego, cardiologist.      Talked with patient today and she is going to call cardiologist and let our office know when she will be seen by cardiology. Surgery will be scheduled after that appointment. Patient is also complaining of back pain. She is taking ketorolac which she says does not help. She is asking if there is something else she can take.

## 2022-02-16 NOTE — TELEPHONE ENCOUNTER
Patient called back and stated she is being seen by cardiology on 2/22/22 @ 10:15. Surgery will be rescheduled for 3/1/22 as long as patient is cleared by cardiology. Patient also informed about taking acetaminophen every 4 hours.   Patient voiced understanding

## 2022-02-23 ENCOUNTER — TELEPHONE (OUTPATIENT)
Dept: UROLOGY | Age: 67
End: 2022-02-23

## 2022-02-23 NOTE — TELEPHONE ENCOUNTER
Mulu Cueva was ntfd to take her last dose of xarelto Friday before surgery, she did voice understanding

## 2022-02-25 ENCOUNTER — TELEPHONE (OUTPATIENT)
Dept: UROLOGY | Age: 67
End: 2022-02-25

## 2022-02-25 RX ORDER — IPRATROPIUM BROMIDE AND ALBUTEROL SULFATE 2.5; .5 MG/3ML; MG/3ML
1 SOLUTION RESPIRATORY (INHALATION) EVERY 4 HOURS
COMMUNITY

## 2022-02-25 RX ORDER — KETOROLAC TROMETHAMINE 10 MG/1
10 TABLET, FILM COATED ORAL EVERY 6 HOURS PRN
Status: ON HOLD | COMMUNITY
Start: 2022-02-04 | End: 2022-05-02 | Stop reason: HOSPADM

## 2022-02-25 RX ORDER — OXYCODONE AND ACETAMINOPHEN 10; 325 MG/1; MG/1
1 TABLET ORAL EVERY 6 HOURS PRN
COMMUNITY
End: 2022-03-01 | Stop reason: HOSPADM

## 2022-02-25 RX ORDER — POLYETHYLENE GLYCOL 3350 17 G/17G
17 POWDER, FOR SOLUTION ORAL DAILY PRN
COMMUNITY

## 2022-02-25 RX ORDER — POTASSIUM CHLORIDE 1500 MG/1
20 TABLET, FILM COATED, EXTENDED RELEASE ORAL EVERY EVENING
Status: ON HOLD | COMMUNITY
End: 2022-05-02 | Stop reason: HOSPADM

## 2022-02-25 NOTE — TELEPHONE ENCOUNTER
Patient scheduled for surgery on 3/1/22 - HLL/stent exchange and right PCNT removal.  Patient called stating the PCNT is out. She cleaned area and covered with bandage.

## 2022-02-25 NOTE — TELEPHONE ENCOUNTER
Noted. Keep surgery as planned. Take this antibiotic until gone. If you develop nausea, vomiting, or fevers call the office or go to the ER.

## 2022-02-28 ENCOUNTER — ANESTHESIA EVENT (OUTPATIENT)
Dept: OPERATING ROOM | Age: 67
End: 2022-02-28
Payer: MEDICARE

## 2022-02-28 DIAGNOSIS — R94.31 ABNORMAL EKG: Primary | ICD-10-CM

## 2022-02-28 PROBLEM — N20.1 URETERAL CALCULUS: Status: ACTIVE | Noted: 2022-02-28

## 2022-02-28 NOTE — H&P
(TORADOL) 10 MG tablet Take 10 mg by mouth every 6 hours as needed for Pain  2/4/22   Historical Provider, MD   potassium chloride (KLOR-CON M) 20 MEQ TBCR extended release tablet Take 20 mEq by mouth every evening    Historical Provider, MD   oxyCODONE-acetaminophen (PERCOCET)  MG per tablet Take 1 tablet by mouth every 6 hours as needed for Pain.     Historical Provider, MD   Misc Natural Products (OSTEO BI-FLEX ADV DOUBLE ST) TABS Take by mouth every morning    Historical Provider, MD   polyethylene glycol (GLYCOLAX) 17 g packet Take 17 g by mouth daily as needed for Constipation    Historical Provider, MD   ipratropium-albuterol (DUONEB) 0.5-2.5 (3) MG/3ML SOLN nebulizer solution Inhale 1 vial into the lungs every 4 hours    Historical Provider, MD   loratadine (CLARITIN) 10 MG capsule Take 10 mg by mouth daily    Historical Provider, MD   metoprolol tartrate (LOPRESSOR) 25 MG tablet Take 1 tablet by mouth 2 times daily 1/12/22   Aranza Ashford MD   Ascorbic Acid (VITAMIN C) 250 MG tablet Take 250 mg by mouth daily    Historical Provider, MD   vitamin E 400 UNIT capsule Take 400 Units by mouth daily    Historical Provider, MD   vitamin D (CHOLECALCIFEROL) 25 MCG (1000 UT) TABS tablet Take 1,000 Units by mouth daily    Historical Provider, MD   oxybutynin (DITROPAN XL) 10 MG extended release tablet Take 1 tablet by mouth daily 4/25/19   Shyanne Livers, APRN - CNM   rivaroxaban (Norton Counts) 20 MG TABS tablet Take 1 tablet by mouth daily (with breakfast) 7/17/18   Zana Paul MD   Multiple Vitamins-Minerals (THERAPEUTIC MULTIVITAMIN-MINERALS) tablet Take 1 tablet by mouth daily    Historical Provider, MD       Allergies:  Estrogens    Social History:    Social History     Socioeconomic History    Marital status:      Spouse name: Not on file    Number of children: Not on file    Years of education: Not on file    Highest education level: Not on file   Occupational History    Not on file   Tobacco Use    Smoking status: Current Every Day Smoker     Packs/day: 0.50     Years: 42.00     Pack years: 21.00     Types: Cigarettes    Smokeless tobacco: Never Used   Vaping Use    Vaping Use: Never used   Substance and Sexual Activity    Alcohol use: No     Alcohol/week: 0.0 standard drinks    Drug use: No    Sexual activity: Not Currently   Other Topics Concern    Not on file   Social History Narrative    Not on file     Social Determinants of Health     Financial Resource Strain:     Difficulty of Paying Living Expenses: Not on file   Food Insecurity:     Worried About Running Out of Food in the Last Year: Not on file    Nabeel of Food in the Last Year: Not on file   Transportation Needs:     Lack of Transportation (Medical): Not on file    Lack of Transportation (Non-Medical): Not on file   Physical Activity:     Days of Exercise per Week: Not on file    Minutes of Exercise per Session: Not on file   Stress:     Feeling of Stress : Not on file   Social Connections:     Frequency of Communication with Friends and Family: Not on file    Frequency of Social Gatherings with Friends and Family: Not on file    Attends Caodaism Services: Not on file    Active Member of [address] [place] [address] or Organizations: Not on file    Attends Club or Organization Meetings: Not on file    Marital Status: Not on file   Intimate Partner Violence:     Fear of Current or Ex-Partner: Not on file    Emotionally Abused: Not on file    Physically Abused: Not on file    Sexually Abused: Not on file   Housing Stability:     Unable to Pay for Housing in the Last Year: Not on file    Number of [place] in the Last Year: Not on file    Unstable Housing in the Last Year: Not on file       Family History:    Family History   Adopted: Yes       REVIEW OF SYSTEMS:  All systems reviewed and negative except for that already noted in the HPI. Physical Exam:      This a 74 y.o. female   No data found.   Constitutional: Patient in no abscess, right    Bacteremia due to Klebsiella pneumoniae    Psoas muscle abscess (HCC)    Septicemia due to Klebsiella pneumoniae [facility])    Ureteral calculus       Plan: Right Homar Kinsey MD  10:34 AM 2/28/2022

## 2022-03-01 ENCOUNTER — ANESTHESIA (OUTPATIENT)
Dept: OPERATING ROOM | Age: 67
End: 2022-03-01
Payer: MEDICARE

## 2022-03-01 ENCOUNTER — HOSPITAL ENCOUNTER (OUTPATIENT)
Age: 67
Setting detail: OUTPATIENT SURGERY
Discharge: HOME | End: 2022-03-01
Attending: UROLOGY | Admitting: UROLOGY
Payer: MEDICARE

## 2022-03-01 ENCOUNTER — APPOINTMENT (OUTPATIENT)
Dept: GENERAL RADIOLOGY | Age: 67
End: 2022-03-01
Attending: UROLOGY
Payer: MEDICARE

## 2022-03-01 VITALS
HEART RATE: 64 BPM | TEMPERATURE: 98.2 F | RESPIRATION RATE: 18 BRPM | BODY MASS INDEX: 46.26 KG/M2 | DIASTOLIC BLOOD PRESSURE: 78 MMHG | SYSTOLIC BLOOD PRESSURE: 171 MMHG | HEIGHT: 61 IN | WEIGHT: 245 LBS | OXYGEN SATURATION: 96 %

## 2022-03-01 VITALS — OXYGEN SATURATION: 100 % | SYSTOLIC BLOOD PRESSURE: 170 MMHG | DIASTOLIC BLOOD PRESSURE: 78 MMHG

## 2022-03-01 DIAGNOSIS — N20.1 URETERAL CALCULUS: Primary | ICD-10-CM

## 2022-03-01 PROCEDURE — 6370000000 HC RX 637 (ALT 250 FOR IP): Performed by: NURSE ANESTHETIST, CERTIFIED REGISTERED

## 2022-03-01 PROCEDURE — 3600000004 HC SURGERY LEVEL 4 BASE: Performed by: UROLOGY

## 2022-03-01 PROCEDURE — 7100000001 HC PACU RECOVERY - ADDTL 15 MIN: Performed by: UROLOGY

## 2022-03-01 PROCEDURE — 2720000010 HC SURG SUPPLY STERILE: Performed by: UROLOGY

## 2022-03-01 PROCEDURE — C1769 GUIDE WIRE: HCPCS | Performed by: UROLOGY

## 2022-03-01 PROCEDURE — C1758 CATHETER, URETERAL: HCPCS | Performed by: UROLOGY

## 2022-03-01 PROCEDURE — C1894 INTRO/SHEATH, NON-LASER: HCPCS | Performed by: UROLOGY

## 2022-03-01 PROCEDURE — 3700000000 HC ANESTHESIA ATTENDED CARE: Performed by: UROLOGY

## 2022-03-01 PROCEDURE — 6360000002 HC RX W HCPCS: Performed by: UROLOGY

## 2022-03-01 PROCEDURE — 7100000011 HC PHASE II RECOVERY - ADDTL 15 MIN: Performed by: UROLOGY

## 2022-03-01 PROCEDURE — 3700000001 HC ADD 15 MINUTES (ANESTHESIA): Performed by: UROLOGY

## 2022-03-01 PROCEDURE — 7100000000 HC PACU RECOVERY - FIRST 15 MIN: Performed by: UROLOGY

## 2022-03-01 PROCEDURE — 7100000010 HC PHASE II RECOVERY - FIRST 15 MIN: Performed by: UROLOGY

## 2022-03-01 PROCEDURE — 6370000000 HC RX 637 (ALT 250 FOR IP): Performed by: UROLOGY

## 2022-03-01 PROCEDURE — 3209999900 FLUORO FOR SURGICAL PROCEDURES

## 2022-03-01 PROCEDURE — 2580000003 HC RX 258: Performed by: NURSE ANESTHETIST, CERTIFIED REGISTERED

## 2022-03-01 PROCEDURE — 2709999900 HC NON-CHARGEABLE SUPPLY: Performed by: UROLOGY

## 2022-03-01 PROCEDURE — 3600000014 HC SURGERY LEVEL 4 ADDTL 15MIN: Performed by: UROLOGY

## 2022-03-01 PROCEDURE — 2500000003 HC RX 250 WO HCPCS: Performed by: NURSE ANESTHETIST, CERTIFIED REGISTERED

## 2022-03-01 PROCEDURE — C2617 STENT, NON-COR, TEM W/O DEL: HCPCS | Performed by: UROLOGY

## 2022-03-01 PROCEDURE — 6360000002 HC RX W HCPCS: Performed by: NURSE ANESTHETIST, CERTIFIED REGISTERED

## 2022-03-01 DEVICE — URETERAL STENT WITH SIDE HOLES 6FX26CM
Type: IMPLANTABLE DEVICE | Site: URETER | Status: FUNCTIONAL
Brand: TRIA™ FIRM

## 2022-03-01 RX ORDER — FENTANYL CITRATE 50 UG/ML
50 INJECTION, SOLUTION INTRAMUSCULAR; INTRAVENOUS EVERY 5 MIN PRN
Status: DISCONTINUED | OUTPATIENT
Start: 2022-03-01 | End: 2022-03-01 | Stop reason: HOSPADM

## 2022-03-01 RX ORDER — HYDROCODONE BITARTRATE AND ACETAMINOPHEN 5; 325 MG/1; MG/1
1 TABLET ORAL
Status: COMPLETED | OUTPATIENT
Start: 2022-03-01 | End: 2022-03-01

## 2022-03-01 RX ORDER — FENTANYL CITRATE 50 UG/ML
25 INJECTION, SOLUTION INTRAMUSCULAR; INTRAVENOUS EVERY 5 MIN PRN
Status: DISCONTINUED | OUTPATIENT
Start: 2022-03-01 | End: 2022-03-01 | Stop reason: HOSPADM

## 2022-03-01 RX ORDER — LIDOCAINE HYDROCHLORIDE 20 MG/ML
INJECTION, SOLUTION EPIDURAL; INFILTRATION; INTRACAUDAL; PERINEURAL PRN
Status: DISCONTINUED | OUTPATIENT
Start: 2022-03-01 | End: 2022-03-01 | Stop reason: SDUPTHER

## 2022-03-01 RX ORDER — CIPROFLOXACIN 2 MG/ML
400 INJECTION, SOLUTION INTRAVENOUS
Status: COMPLETED | OUTPATIENT
Start: 2022-03-01 | End: 2022-03-01

## 2022-03-01 RX ORDER — SODIUM CHLORIDE, SODIUM LACTATE, POTASSIUM CHLORIDE, CALCIUM CHLORIDE 600; 310; 30; 20 MG/100ML; MG/100ML; MG/100ML; MG/100ML
INJECTION, SOLUTION INTRAVENOUS CONTINUOUS
Status: DISCONTINUED | OUTPATIENT
Start: 2022-03-01 | End: 2022-03-01 | Stop reason: HOSPADM

## 2022-03-01 RX ORDER — ACETAMINOPHEN 325 MG/1
650 TABLET ORAL ONCE
Status: COMPLETED | OUTPATIENT
Start: 2022-03-01 | End: 2022-03-01

## 2022-03-01 RX ORDER — FENTANYL CITRATE 50 UG/ML
INJECTION, SOLUTION INTRAMUSCULAR; INTRAVENOUS PRN
Status: DISCONTINUED | OUTPATIENT
Start: 2022-03-01 | End: 2022-03-01 | Stop reason: SDUPTHER

## 2022-03-01 RX ORDER — PROPOFOL 10 MG/ML
INJECTION, EMULSION INTRAVENOUS PRN
Status: DISCONTINUED | OUTPATIENT
Start: 2022-03-01 | End: 2022-03-01 | Stop reason: SDUPTHER

## 2022-03-01 RX ORDER — PROCHLORPERAZINE EDISYLATE 5 MG/ML
5 INJECTION INTRAMUSCULAR; INTRAVENOUS
Status: DISCONTINUED | OUTPATIENT
Start: 2022-03-01 | End: 2022-03-01 | Stop reason: HOSPADM

## 2022-03-01 RX ORDER — DIMENHYDRINATE 50 MG/1
50 TABLET ORAL ONCE
Status: COMPLETED | OUTPATIENT
Start: 2022-03-01 | End: 2022-03-01

## 2022-03-01 RX ORDER — LIDOCAINE HYDROCHLORIDE 20 MG/ML
JELLY TOPICAL PRN
Status: DISCONTINUED | OUTPATIENT
Start: 2022-03-01 | End: 2022-03-01 | Stop reason: ALTCHOICE

## 2022-03-01 RX ORDER — ONDANSETRON 2 MG/ML
INJECTION INTRAMUSCULAR; INTRAVENOUS PRN
Status: DISCONTINUED | OUTPATIENT
Start: 2022-03-01 | End: 2022-03-01 | Stop reason: SDUPTHER

## 2022-03-01 RX ORDER — OXYCODONE HYDROCHLORIDE AND ACETAMINOPHEN 5; 325 MG/1; MG/1
1 TABLET ORAL EVERY 6 HOURS PRN
Qty: 12 TABLET | Refills: 0 | Status: SHIPPED | OUTPATIENT
Start: 2022-03-01 | End: 2022-03-04

## 2022-03-01 RX ORDER — ONDANSETRON 2 MG/ML
4 INJECTION INTRAMUSCULAR; INTRAVENOUS
Status: DISCONTINUED | OUTPATIENT
Start: 2022-03-01 | End: 2022-03-01 | Stop reason: HOSPADM

## 2022-03-01 RX ADMIN — ACETAMINOPHEN 650 MG: 325 TABLET ORAL at 09:47

## 2022-03-01 RX ADMIN — HYDROCODONE BITARTRATE AND ACETAMINOPHEN 1 TABLET: 5; 325 TABLET ORAL at 13:56

## 2022-03-01 RX ADMIN — PROPOFOL 140 MG: 10 INJECTION, EMULSION INTRAVENOUS at 11:47

## 2022-03-01 RX ADMIN — ONDANSETRON 4 MG: 2 INJECTION INTRAMUSCULAR; INTRAVENOUS at 12:47

## 2022-03-01 RX ADMIN — SODIUM CHLORIDE, POTASSIUM CHLORIDE, SODIUM LACTATE AND CALCIUM CHLORIDE: 600; 310; 30; 20 INJECTION, SOLUTION INTRAVENOUS at 09:47

## 2022-03-01 RX ADMIN — DIMENHYDRINATE 50 MG: 50 TABLET ORAL at 09:48

## 2022-03-01 RX ADMIN — CIPROFLOXACIN 400 MG: 2 INJECTION, SOLUTION INTRAVENOUS at 11:38

## 2022-03-01 RX ADMIN — LIDOCAINE HYDROCHLORIDE 100 MG: 20 INJECTION, SOLUTION EPIDURAL; INFILTRATION; INTRACAUDAL; PERINEURAL at 11:47

## 2022-03-01 RX ADMIN — FENTANYL CITRATE 50 MCG: 50 INJECTION INTRAMUSCULAR; INTRAVENOUS at 11:46

## 2022-03-01 RX ADMIN — FENTANYL CITRATE 50 MCG: 50 INJECTION INTRAMUSCULAR; INTRAVENOUS at 11:45

## 2022-03-01 ASSESSMENT — PAIN DESCRIPTION - PAIN TYPE
TYPE: ACUTE PAIN
TYPE: SURGICAL PAIN

## 2022-03-01 ASSESSMENT — PAIN DESCRIPTION - ORIENTATION
ORIENTATION: LOWER
ORIENTATION: RIGHT;INNER

## 2022-03-01 ASSESSMENT — PAIN SCALES - GENERAL
PAINLEVEL_OUTOF10: 8
PAINLEVEL_OUTOF10: 10
PAINLEVEL_OUTOF10: 10
PAINLEVEL_OUTOF10: 8

## 2022-03-01 ASSESSMENT — PAIN DESCRIPTION - LOCATION
LOCATION: ABDOMEN
LOCATION: BACK

## 2022-03-01 ASSESSMENT — LIFESTYLE VARIABLES: SMOKING_STATUS: 1

## 2022-03-01 NOTE — ANESTHESIA PRE PROCEDURE
Department of Anesthesiology  Preprocedure Note       Name:  Kell Schultz   Age:  74 y.o.  :  1955                                          MRN:  [id number]         Date:  3/1/2022      Surgeon: Joselito Hernandez):  Octavia Rosa MD    Procedure: Procedure(s):  CYSTOSCOPY URETEROSCOPY LASER-[facility] HLL  CYSTOSCOPY URETERAL STENT INSERTION-EXCHANGE, RIGHT PCNT REMOVAL    Medications prior to admission:   Prior to Admission medications    Medication Sig Start Date End Date Taking? Authorizing Provider   ketorolac (TORADOL) 10 MG tablet Take 10 mg by mouth every 6 hours as needed for Pain  22   Historical Provider, MD   potassium chloride (KLOR-CON M) 20 MEQ TBCR extended release tablet Take 20 mEq by mouth every evening    Historical Provider, MD   oxyCODONE-acetaminophen (PERCOCET)  MG per tablet Take 1 tablet by mouth every 6 hours as needed for Pain.     Historical Provider, MD   Misc Natural Products (OSTEO BI-FLEX ADV DOUBLE ST) TABS Take by mouth every morning    Historical Provider, MD   polyethylene glycol (GLYCOLAX) 17 g packet Take 17 g by mouth daily as needed for Constipation    Historical Provider, MD   ipratropium-albuterol (DUONEB) 0.5-2.5 (3) MG/3ML SOLN nebulizer solution Inhale 1 vial into the lungs every 4 hours    Historical Provider, MD   loratadine (CLARITIN) 10 MG capsule Take 10 mg by mouth daily    Historical Provider, MD   metoprolol tartrate (LOPRESSOR) 25 MG tablet Take 1 tablet by mouth 2 times daily 22   Tamara Lane MD   Ascorbic Acid (VITAMIN C) 250 MG tablet Take 250 mg by mouth daily    Historical Provider, MD   vitamin E 400 UNIT capsule Take 400 Units by mouth daily    Historical Provider, MD   vitamin D (CHOLECALCIFEROL) 25 MCG (1000 UT) TABS tablet Take 1,000 Units by mouth daily    Historical Provider, MD   oxybutynin (DITROPAN XL) 10 MG extended release tablet Take 1 tablet by mouth daily 19   DONG Enriquez - MOISES   rivaroxaban (Genell Peter) 20 MG TABS tablet Take 1 tablet by mouth daily (with breakfast) 7/17/18   Camron Juarez MD   Multiple Vitamins-Minerals (THERAPEUTIC MULTIVITAMIN-MINERALS) tablet Take 1 tablet by mouth daily    Historical Provider, MD       Current medications:    No current facility-administered medications for this visit. No current outpatient medications on file. Facility-Administered Medications Ordered in Other Visits   Medication Dose Route Frequency Provider Last Rate Last Admin    lactated ringers infusion   IntraVENous Continuous Latha Randolph MD        ciprofloxacin (CIPRO) IVPB 400 mg  400 mg IntraVENous On Call to Yane Ashton MD        lactated ringers infusion   IntraVENous Continuous DONG Porras - CRNA 100 mL/hr at 03/01/22 0947 New Bag at 03/01/22 0947    lactated ringers infusion   IntraVENous Continuous Latha Randolph MD           Allergies: Allergies   Allergen Reactions    Estrogens Other (See Comments)     Hx of DVT       Problem List:    Patient Active Problem List   Diagnosis Code    Acute thromboembolism of deep veins of lower extremity ([facility] I82.409    Long term current use of anticoagulant therapy Z79.01    Bilateral cellulitis of lower leg L03.116, L03.115    Acute shoulder pain due to trauma M25.519, G89.11    Lymphedema of both lower extremities I89.0    Obesity E66.9    Tobacco abuse Z72.0    History of recurrent deep vein thrombosis (DVT) Z86.718    Erythrocytosis D75.1    Gross hematuria R31.0    Frequency of urination R35.0    Nocturia R35.1    Mixed incontinence N39.46    Constipation K59.00    Cellulitis of lower leg L03.119    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 051    Complicated UTI (urinary tract infection) N39.0    Renal calculus N20.0    Cellulitis L03.90    Normocytic anemia D64.9    Iron deficiency anemia D50.9    Renal abscess, right N15.1    Bacteremia due to Klebsiella pneumoniae R78.81, B96.1    Psoas muscle abscess ([facility]) K68.12    Septicemia due to Klebsiella pneumoniae ([facility]) A41.4    Ureteral calculus N20.1       Past Medical History:        Diagnosis Date    Carcinoma ([facility]     Squamous Cell    Cellulitis     DVT (deep venous thrombosis) ([facility] 2006    LLE    Kidney stone     Morbid obesity ([facility]     Wears glasses        Past Surgical History:        Procedure Laterality Date    CARPAL TUNNEL RELEASE  1990s    CT ABSCESS DRAIN SUBCUTANEOUS  1/10/2022    CT ABSCESS DRAIN SUBCUTANEOUS 1/10/2022 [facility] CT SCAN    CYSTOSCOPY N/A 1/4/2022    CYSTOSCOPY URETERAL STENT INSERTION performed by Karen Silverman MD at [facility]  1/7/2022         IR NEPHROSTOMY PERCUTANEOUS RIGHT  1/5/2022    IR NEPHROSTOMY PERCUTANEOUS RIGHT 1/5/2022 Agustina Akers MD [facility] SPECIAL PROCEDURES    ANNABELLA AND BSO      05/2019    TUBAL LIGATION  1993    TUBAL LIGATION      WISDOM TOOTH EXTRACTION         Social History:    Social History     Tobacco Use    Smoking status: Current Every Day Smoker     Packs/day: 0.50     Years: 42.00     Pack years: 21.00     Types: Cigarettes    Smokeless tobacco: Never Used   Substance Use Topics    Alcohol use: No     Alcohol/week: 0.0 standard drinks                                Ready to quit: Not Answered  Counseling given: Not Answered      Vital Signs (Current): There were no vitals filed for this visit.                                            BP Readings from Last 3 Encounters:   03/01/22 (!) 172/72   01/28/22 (!) 144/82   01/13/22 (!) 178/77       NPO Status:                                                                                 BMI:   Wt Readings from Last 3 Encounters:   03/01/22 245 lb (111.1 kg)   01/13/22 255 lb 9.6 oz (115.9 kg)   01/02/22 250 lb (113.4 kg)     There is no height or weight on file to calculate BMI.    CBC:   Lab Results   Component Value Date    WBC 5.4 01/31/2022 RBC 3.33 01/31/2022    HGB 10.2 01/31/2022    HCT 33.4 01/31/2022    .3 01/31/2022    RDW 15.1 01/31/2022     01/31/2022       CMP:   Lab Results   Component Value Date     01/31/2022    K 5.4 01/31/2022     01/31/2022    CO2 26 01/31/2022    BUN 26 01/31/2022    CREATININE 1.15 01/31/2022    GFRAA 57 01/31/2022    LABGLOM 47 01/31/2022    GLUCOSE 95 01/31/2022    PROT 6.5 01/31/2022    CALCIUM 8.7 01/31/2022    BILITOT <0.10 01/31/2022    ALKPHOS 78 01/31/2022    AST 10 01/31/2022    ALT 8 01/31/2022       POC Tests: No results for input(s): POCGLU, POCNA, POCK, POCCL, POCBUN, POCHEMO, POCHCT in the last 72 hours. Coags:   Lab Results   Component Value Date    PROTIME 11.5 01/10/2022    INR 1.1 01/10/2022    APTT 29.1 01/10/2022       HCG (If Applicable): No results found for: PREGTESTUR, PREGSERUM, HCG, HCGQUANT     ABGs:   Lab Results   Component Value Date    PHART 7.466 07/19/2018    PO2ART 72.5 07/19/2018    GYH8OMF 35.5 07/19/2018    MUQ8TYN 25.0 07/19/2018    K5JUMMEW 95.5 07/19/2018        Type & Screen (If Applicable):  No results found for: LABABO, LABRH    Drug/Infectious Status (If Applicable):  No results found for: HIV, HEPCAB    COVID-19 Screening (If Applicable):   Lab Results   Component Value Date    COVID19 Not Detected 01/12/2022           Anesthesia Evaluation  Patient summary reviewed and Nursing notes reviewed no history of anesthetic complications:   Airway: Mallampati: III  TM distance: >3 FB   Neck ROM: full  Mouth opening: > = 3 FB Dental:          Pulmonary:normal exam  breath sounds clear to auscultation  (+) current smoker (2-3 cigs today)          Patient smoked on day of surgery.                  Cardiovascular:  Exercise tolerance: poor (<4 METS),   (+) hypertension:,       ECG reviewed  Rhythm: regular  Rate: normal  Echocardiogram reviewed         Beta Blocker:  Dose within 24 Hrs      ROS comment: EKG: Sinus rhythm with Premature atrial complexes  Right bundle branch block  Abnormal ECG  When compared with ECG of 22-JAN-2021 12:37,  Premature atrial complexes are now Present  Confirmed by Lachelle Solomon MD, Mynor Shah ([id number]) on 1/3/2022 10:48:18 AM    1/7/22  Left ventricle is normal in size. Global left ventricular systolic function  is normal.  Calculated ejection fraction 62% by Bryant's method. Mild concentric left ventricular hypertrophy. Left atrium is normal in size. Right atrium is normal in size. Normal right ventricular function. Mildly dilated right ventricular cavity. Aortic valve is trileaflet. Aortic valve sclerosis with mild stenosis. Peak instantaneous gradient 23 mmHg and mean gradient 12 mmHg. No aortic insufficiency. Mild mitral annular calcification is seen. No mitral regurgitation. Normal tricuspid valve structure and function. No tricuspid regurgitation. No pericardial effusion seen. Neuro/Psych:   (+) headaches: migraine headaches,    CVA: no currently. GI/Hepatic/Renal:   (+) renal disease: kidney stones, morbid obesity          Endo/Other:    (+) blood dyscrasia (Last xaralto dose was yesterday.): anemia and anticoagulation therapy:., .                  ROS comment: Cellulitis BLE Abdominal:   (+) obese,           Vascular:   + DVT (LLE in 2006, currently resolved. on xaralto ), .       Other Findings:               Anesthesia Plan      general     ASA 3       Induction: intravenous. MIPS: Postoperative opioids intended and Prophylactic antiemetics administered. Anesthetic plan and risks discussed with patient. Plan discussed with CRNA.                   DONG Bernal - CRNA   3/1/2022

## 2022-03-01 NOTE — ANESTHESIA POSTPROCEDURE EVALUATION
Department of Anesthesiology  Postprocedure Note    Patient: Brian Carney  MRN: [id number]  YOB: 1955  Date of evaluation: 3/1/2022  Time:  3:20 PM     Procedure Summary     Date: 03/01/22 Room / Location: [address] [place] [address]    Anesthesia Start: 1140 Anesthesia Stop: 1301    Procedures:       CYSTOSCOPY URETEROSCOPY LASER-[facility] HLL (Right )      CYSTOSCOPY URETERAL STENT Mino Frees (Right ) Diagnosis: (RIGHT URETERAL CALCULUS)    Surgeons: Evelyn Bee MD Responsible Provider: DONG Bowman CRNA    Anesthesia Type: general ASA Status: 3          Anesthesia Type: general    Fly Phase I: Fly Score: 10    Fly Phase II: Fly Score: 10    Last vitals: Reviewed and per EMR flowsheets. Anesthesia Post Evaluation    Patient location during evaluation: PACU  Patient participation: complete - patient participated  Level of consciousness: awake and alert  Pain scale: improving after receiving oral narcotic. Airway patency: patent  Nausea & Vomiting: no nausea and no vomiting  Complications: no  Cardiovascular status: blood pressure returned to baseline  Respiratory status: acceptable and room air  Hydration status: stable  There was medical reason for not using a multimodal analgesia pain management approach.  (unable to give NSAIDs due to renal function)

## 2022-03-01 NOTE — PROGRESS NOTES
Discharge instructions given to pt and family. Pt incontinent when getting dressed. Discharge Criteria    Inpatients must meet Criteria 1 through 7. All other patients are either YES or N/A. If a NO is chosen then Anesthesia or Surgeon must be notified. 1.  Minimum 30 minutes after last dose of sedative medication, minimum 120 minutes after last dose of reversal agent. Yes      2. Systolic BP stable within 20 mmHg for 30 minutes & systolic BP between 90 & 886 or within 10 mmHg of baseline. Yes      3. Pulse between 60 and 100 or within 10 bpm of baseline. Yes      4. Spontaneous respiratory rate >/= 10 per minute. Yes      5. SaO2 >/= 95 or  >/= baseline. Yes      6. Able to cough and swallow or return to baseline function. Yes      7. Alert and oriented or return to baseline mental status. Yes      8. Demonstrates controlled, coordinated movements, ambulates with steady gait, or return to baseline activity function. Yes      9. Minimal or no pain or nausea, or at a level tolerable and acceptable to patient. Yes      10. Takes and retains oral fluids as allowed. Yes      11. Procedural / perioperative site stable. Minimal or no bleeding. Yes          12. If GI endoscopy procedure, minimal or no abdominal distention or passing flatus. N/A      13. Written discharge instructions and emergency telephone number provided. Yes      14. Accompanied by a responsible adult.     Yes

## 2022-03-02 NOTE — OP NOTE
[address]                                OPERATIVE REPORT    PATIENT NAME: Germania Islas                   :        1955  MED REC NO:   [id number]                              ROOM:  ACCOUNT NO:   [de-identified]                           ADMIT DATE: 2022  PROVIDER:     Natanael Aaron    DATE OF PROCEDURE:  2022    SURGEON:  Dr. Natanael Aaron. ASSISTANT:  None. PREOPERATIVE DIAGNOSES:  1. Right ureteral calculus. 2.  Right renal calculus. POSTOPERATIVE DIAGNOSES:  1. Right ureteral calculus. 2.  Right renal calculus. 3.  Obstructed ureter. PROCEDURES PERFORMED:  Cystoscopy, right ureteroscopy, right Holmium  laser lithotripsy, and right ureteral stent placement. ANESTHESIA:  General.    COMPLICATIONS:  None. ESTIMATED BLOOD LOSS:  Minimal.    SPECIMENS:  None. PROSTHESIS:  A 6-Slovak x 26-cm double-J ureteral stent. DISPOSITION:  Stable. FINDINGS:  1. Impacted right ureteral calculus. 2.  Obstructed right ureter. 3.  Right renal calculus. INDICATIONS:  This patient is a 59-year-old female who had a previously  placed ureteral stent. This was unsuccessful, so she has had a  percutaneous nephrostomy tube done. This did recently fall out  inadvertently, here now for definitive therapy. DESCRIPTION OF PROCEDURE:  The patient was taken back to the operating  room after informed consent including all risks, benefits, and  alternatives were obtained. The patient was transferred from the [facility]  onto the operating room table, where she was induced under general  anesthesia, and given IV Cipro for preoperative antibiotic prophylaxis. To begin the case, she was prepped and draped in the normal sterile  fashion and placed in the dorsal lithotomy. She had a 22-Slovak sheath  with a 30-degree lens passed through the urethra into the bladder.   Once  in the bladder, home per PACU criterion. We will  follow up with her for repeat procedure. We will repeat her right-sided  Holmium laser lithotripsy in approximately four to six weeks.   This  gives time for the ureter to heal.        Janeth Terrell    D: 03/01/2022 20:12:33       T: 03/02/2022 2:44:53     [email or web address]  Job#: [id number]     Doc#: [id number]    CC:

## 2022-03-03 ENCOUNTER — TELEPHONE (OUTPATIENT)
Dept: UROLOGY | Age: 67
End: 2022-03-03

## 2022-03-03 NOTE — TELEPHONE ENCOUNTER
Called patient and informed her that Dr Chase Lim is recommending repeat right HLL. Patient stated she was not aware that further treatment needed. Patient then stated she thought she was to follow up in office in 2 weeks. Patient scheduled for repeat Right HLL on 3/29/22    See patient before 3/29/22?

## 2022-03-03 NOTE — TELEPHONE ENCOUNTER
Keep surgery on 3/29    We will see her in two weeks to explain further    She had an impacted stone, so we need to do more work

## 2022-03-03 NOTE — TELEPHONE ENCOUNTER
Called patient and informed her of response. She said right now she will just keep surgery as planned. If she has further questions she will call and schedule an appointment to discuss prior to surgery.

## 2022-03-17 NOTE — PROGRESS NOTES
Patient instructed on the pre-operative, intra-operative, and post-operative process. Patient instructed on NPO status. Medication instructions and pre operative instruction sheet reviewed with the patient. Instructed pt to stop taking all OTC vitamins 7 days prior to surgery and to take metoprolol with a small sip of water. Pt is following same blood thinner instructions from MD as previous surgery 2 weeks prior.

## 2022-03-28 ENCOUNTER — TELEPHONE (OUTPATIENT)
Dept: UROLOGY | Age: 67
End: 2022-03-28

## 2022-03-28 ENCOUNTER — ANESTHESIA EVENT (OUTPATIENT)
Dept: OPERATING ROOM | Age: 67
End: 2022-03-28
Payer: MEDICARE

## 2022-03-29 ENCOUNTER — ANESTHESIA (OUTPATIENT)
Dept: OPERATING ROOM | Age: 67
End: 2022-03-29
Payer: MEDICARE

## 2022-03-29 ENCOUNTER — TELEPHONE (OUTPATIENT)
Dept: UROLOGY | Age: 67
End: 2022-03-29

## 2022-03-29 RX ORDER — OXYBUTYNIN CHLORIDE 15 MG/1
15 TABLET, EXTENDED RELEASE ORAL DAILY
Qty: 30 TABLET | Refills: 3 | Status: SHIPPED | OUTPATIENT
Start: 2022-03-29

## 2022-03-29 NOTE — TELEPHONE ENCOUNTER
Patient was given Oxybutynin 10mg at [facility]. She was on 15mg before. She needs a refill sent to [facility].

## 2022-04-04 NOTE — H&P
History and Physical    Patient:  Matt Nice  MRN: [id number]    CHIEF COMPLAINT:  Right flank pain    HISTORY OF PRESENT ILLNESS:   The patient is a 74 y.o. female who presents with right flank pain. right flank pain. Patient is here today to discuss her recent hospitalization and surgery.  Patient did have a recent CT scan performed.  This did show decrease in size of her perinephric abscess.  Patient does continue to have a large stone in the proximal ureter.  Patient is doing well.  Patient has a follow-up with infectious disease in the next couple of weeks.  Patient did have recent laboratory panel done.  This did show good renal function.  And no leukocytosis.  She does have approximately 1 more week of antibiotics to receive.  She has no pain today.  She does state that overall she is starting to feel better. Brant Bee is in the skilled nursing facility.  She does not like this.  This is the best place for her right now.  No pain today.     Past Medical History:    Past Medical History:   Diagnosis Date    Carcinoma ([address]     Squamous Cell    Cellulitis     DVT (deep venous thrombosis) ([address] 2006    LLE    Kidney stone     Morbid obesity ([address]     Wears glasses        Past Surgical History:    Past Surgical History:   Procedure Laterality Date    CARPAL TUNNEL RELEASE  1990s    CT ABSCESS DRAIN SUBCUTANEOUS  1/10/2022    CT ABSCESS DRAIN SUBCUTANEOUS 1/10/2022 [facility] CT SCAN    CYSTOSCOPY N/A 1/4/2022    CYSTOSCOPY URETERAL STENT INSERTION performed by Connie Marquez MD at [address] Right     HLL with stent    CYSTOSCOPY Right 3/1/2022    CYSTOSCOPY URETEROSCOPY LASER-WTIH HLL performed by Connie Marquez MD at [facility] Right 3/1/2022    CYSTOSCOPY URETERAL STENT [address] performed by Connie Marquez MD at [facility]  1/7/2022         IR NEPHROSTOMY PERCUTANEOUS RIGHT  1/5/2022    IR NEPHROSTOMY PERCUTANEOUS RIGHT 1/5/2022 Torres [place]unga Maritza Arredondo MD [facility]    ANNABELLA AND BSO      05/2019    TUBAL LIGATION  1993    TUBAL LIGATION      WISDOM TOOTH EXTRACTION         Medications Prior to Admission:    Prior to Admission medications    Medication Sig Start Date End Date Taking?  Authorizing Provider   oxybutynin (DITROPAN XL) 15 MG extended release tablet Take 1 tablet by mouth daily 3/29/22   DONG Matos CNP   ketorolac (TORADOL) 10 MG tablet Take 10 mg by mouth every 6 hours as needed for Pain  2/4/22   Historical Provider, MD   potassium chloride (KLOR-CON M) 20 MEQ TBCR extended release tablet Take 20 mEq by mouth every evening    Historical Provider, MD   Misc Natural Products (OSTEO BI-FLEX ADV DOUBLE ST) TABS Take by mouth every morning    Historical Provider, MD   polyethylene glycol (GLYCOLAX) 17 g packet Take 17 g by mouth daily as needed for Constipation    Historical Provider, MD   ipratropium-albuterol (DUONEB) 0.5-2.5 (3) MG/3ML SOLN nebulizer solution Inhale 1 vial into the lungs every 4 hours    Historical Provider, MD   loratadine (CLARITIN) 10 MG capsule Take 10 mg by mouth daily    Historical Provider, MD   metoprolol tartrate (LOPRESSOR) 25 MG tablet Take 1 tablet by mouth 2 times daily 1/12/22   Livia Tyson MD   Ascorbic Acid (VITAMIN C) 250 MG tablet Take 250 mg by mouth daily    Historical Provider, MD   vitamin E 400 UNIT capsule Take 400 Units by mouth daily    Historical Provider, MD   vitamin D (CHOLECALCIFEROL) 25 MCG (1000 UT) TABS tablet Take 1,000 Units by mouth daily    Historical Provider, MD   rivaroxaban (XARELTO) 20 MG TABS tablet Take 1 tablet by mouth daily (with breakfast) 7/17/18   Kaity Rosenbegr MD   Multiple Vitamins-Minerals (THERAPEUTIC MULTIVITAMIN-MINERALS) tablet Take 1 tablet by mouth daily    Historical Provider, MD       Allergies:  Estrogens    Social History:    Social History     Socioeconomic History    Marital status:      Spouse name: Not on file    Number of children: Not on file    Years of education: Not on file    Highest education level: Not on file   Occupational History    Not on file   Tobacco Use    Smoking status: Current Every Day Smoker     Packs/day: 0.50     Years: 42.00     Pack years: 21.00     Types: Cigarettes    Smokeless tobacco: Never Used   Vaping Use    Vaping Use: Never used   Substance and Sexual Activity    Alcohol use: No     Alcohol/week: 0.0 standard drinks    Drug use: No    Sexual activity: Not Currently   Other Topics Concern    Not on file   Social History Narrative    Not on file     Social Determinants of Health     Financial Resource Strain:     Difficulty of Paying Living Expenses: Not on file   Food Insecurity:     Worried About Running Out of Food in the Last Year: Not on file    Nabeel of Food in the Last Year: Not on file   Transportation Needs:     Lack of Transportation (Medical): Not on file    Lack of Transportation (Non-Medical):  Not on file   Physical Activity:     Days of Exercise per Week: Not on file    Minutes of Exercise per Session: Not on file   Stress:     Feeling of Stress : Not on file   Social Connections:     Frequency of Communication with Friends and Family: Not on file    Frequency of Social Gatherings with Friends and Family: Not on file    Attends [employer] Services: Not on file    Active Member of [address] [place] [address] or Organizations: Not on file    Attends Club or Organization Meetings: Not on file    Marital Status: Not on file   Intimate Partner Violence:     Fear of Current or Ex-Partner: Not on file    Emotionally Abused: Not on file    Physically Abused: Not on file    Sexually Abused: Not on file   Housing Stability:     Unable to Pay for Housing in the Last Year: Not on file    Number of [place] in the Last Year: Not on file    Unstable Housing in the Last Year: Not on file       Family History:    Family History   Adopted: Yes       REVIEW OF SYSTEMS:  All systems reviewed and negative except for that already noted in the HPI. Physical Exam:      This a 74 y.o. female   No data found. Constitutional: Patient in no acute distress. Neuro: Alert and oriented to person, place and time. Psych: mood and affect normal  HEENT negative  Lungs: Respiratory effort is normal  Cardiovascular: Normal peripheral pulses  Abdomen: Soft, non-tender, non-distended with no CVA, flank pain or hepatosplenomegaly. No hernias. Kidneys normal.  Lymphatics: No palpable lymphadenopathy. Bladder non-tender and not distended. Pelvic exam:  External genitalia normal  Urethral and urethral meatus normal  Vagina normal with no evidence of pelvic prolapse  Uterus normal  Adnexa normal  Anus and perineum normal  Rectal exam not indicated    LABS:   No results for input(s): WBC, HGB, HCT, MCV, PLT in the last 72 hours. No results for input(s): NA, K, CL, CO2, PHOS, BUN, CREATININE, CA in the last 72 hours. Additional Lab/culture results:    Urinalysis: No results for input(s): COLORU, PHUR, LABCAST, WBCUA, RBCUA, MUCUS, TRICHOMONAS, YEAST, BACTERIA, CLARITYU, SPECGRAV, LEUKOCYTESUR, UROBILINOGEN, BILIRUBINUR, BLOODU in the last 72 hours.     Invalid input(s): NITRATE, GLUCOSEUKETONESUAMORPHOUS     -----------------------------------------------------------------  Imaging Results:      Assessment and Plan   Impression:    Patient Active Problem List   Diagnosis    Acute thromboembolism of deep veins of lower extremity [facility]    Long term current use of anticoagulant therapy    Bilateral cellulitis of lower leg    Acute shoulder pain due to trauma    Lymphedema of both lower extremities    Obesity    Tobacco abuse    History of recurrent deep vein thrombosis (DVT)    Erythrocytosis    Gross hematuria    Frequency of urination    Nocturia    Mixed incontinence    Constipation    Cellulitis of lower leg    Post-phlebitic syndrome    Elephantiasis nostra verrucosa    Cellulitis of left lower extremity    Complicated UTI (urinary tract infection)    Renal calculus    Cellulitis    Normocytic anemia    Iron deficiency anemia    Renal abscess, right    Bacteremia due to Klebsiella pneumoniae    Psoas muscle abscess (HCC)    Septicemia due to Klebsiella pneumoniae (HCC)    Ureteral calculus       Plan: Staged right HLL    Benedicto Garcia MD  1:00 PM 4/4/2022

## 2022-04-05 ENCOUNTER — APPOINTMENT (OUTPATIENT)
Dept: GENERAL RADIOLOGY | Age: 67
End: 2022-04-05
Attending: UROLOGY
Payer: MEDICARE

## 2022-04-05 ENCOUNTER — HOSPITAL ENCOUNTER (OUTPATIENT)
Age: 67
Setting detail: OUTPATIENT SURGERY
Discharge: HOME | End: 2022-04-05
Attending: UROLOGY | Admitting: UROLOGY
Payer: MEDICARE

## 2022-04-05 VITALS
OXYGEN SATURATION: 94 % | HEART RATE: 89 BPM | SYSTOLIC BLOOD PRESSURE: 133 MMHG | BODY MASS INDEX: 46.26 KG/M2 | RESPIRATION RATE: 16 BRPM | WEIGHT: 245 LBS | HEIGHT: 61 IN | TEMPERATURE: 97.1 F | DIASTOLIC BLOOD PRESSURE: 50 MMHG

## 2022-04-05 VITALS — DIASTOLIC BLOOD PRESSURE: 59 MMHG | SYSTOLIC BLOOD PRESSURE: 138 MMHG | OXYGEN SATURATION: 100 %

## 2022-04-05 PROCEDURE — C1769 GUIDE WIRE: HCPCS | Performed by: UROLOGY

## 2022-04-05 PROCEDURE — 6360000002 HC RX W HCPCS: Performed by: UROLOGY

## 2022-04-05 PROCEDURE — C2617 STENT, NON-COR, TEM W/O DEL: HCPCS | Performed by: UROLOGY

## 2022-04-05 PROCEDURE — 3700000000 HC ANESTHESIA ATTENDED CARE: Performed by: UROLOGY

## 2022-04-05 PROCEDURE — 7100000010 HC PHASE II RECOVERY - FIRST 15 MIN: Performed by: UROLOGY

## 2022-04-05 PROCEDURE — C1894 INTRO/SHEATH, NON-LASER: HCPCS | Performed by: UROLOGY

## 2022-04-05 PROCEDURE — 6370000000 HC RX 637 (ALT 250 FOR IP): Performed by: NURSE ANESTHETIST, CERTIFIED REGISTERED

## 2022-04-05 PROCEDURE — 6360000002 HC RX W HCPCS: Performed by: NURSE ANESTHETIST, CERTIFIED REGISTERED

## 2022-04-05 PROCEDURE — 3600000014 HC SURGERY LEVEL 4 ADDTL 15MIN: Performed by: UROLOGY

## 2022-04-05 PROCEDURE — 2580000003 HC RX 258: Performed by: UROLOGY

## 2022-04-05 PROCEDURE — 7100000000 HC PACU RECOVERY - FIRST 15 MIN: Performed by: UROLOGY

## 2022-04-05 PROCEDURE — 3209999900 FLUORO FOR SURGICAL PROCEDURES

## 2022-04-05 PROCEDURE — 3600000004 HC SURGERY LEVEL 4 BASE: Performed by: UROLOGY

## 2022-04-05 PROCEDURE — 2500000003 HC RX 250 WO HCPCS: Performed by: NURSE ANESTHETIST, CERTIFIED REGISTERED

## 2022-04-05 PROCEDURE — 6370000000 HC RX 637 (ALT 250 FOR IP): Performed by: UROLOGY

## 2022-04-05 PROCEDURE — 2709999900 HC NON-CHARGEABLE SUPPLY: Performed by: UROLOGY

## 2022-04-05 PROCEDURE — 7100000001 HC PACU RECOVERY - ADDTL 15 MIN: Performed by: UROLOGY

## 2022-04-05 PROCEDURE — 2720000010 HC SURG SUPPLY STERILE: Performed by: UROLOGY

## 2022-04-05 PROCEDURE — 3700000001 HC ADD 15 MINUTES (ANESTHESIA): Performed by: UROLOGY

## 2022-04-05 PROCEDURE — 7100000011 HC PHASE II RECOVERY - ADDTL 15 MIN: Performed by: UROLOGY

## 2022-04-05 DEVICE — URETERAL STENT WITH SIDE HOLES 6FX24CM
Type: IMPLANTABLE DEVICE | Site: URETER | Status: FUNCTIONAL
Brand: TRIA™ FIRM

## 2022-04-05 RX ORDER — SODIUM CHLORIDE, SODIUM LACTATE, POTASSIUM CHLORIDE, CALCIUM CHLORIDE 600; 310; 30; 20 MG/100ML; MG/100ML; MG/100ML; MG/100ML
INJECTION, SOLUTION INTRAVENOUS CONTINUOUS
Status: DISCONTINUED | OUTPATIENT
Start: 2022-04-05 | End: 2022-04-05 | Stop reason: HOSPADM

## 2022-04-05 RX ORDER — SODIUM CHLORIDE 0.9 % (FLUSH) 0.9 %
5-40 SYRINGE (ML) INJECTION EVERY 12 HOURS SCHEDULED
Status: DISCONTINUED | OUTPATIENT
Start: 2022-04-05 | End: 2022-04-05 | Stop reason: HOSPADM

## 2022-04-05 RX ORDER — FENTANYL CITRATE 50 UG/ML
INJECTION, SOLUTION INTRAMUSCULAR; INTRAVENOUS PRN
Status: DISCONTINUED | OUTPATIENT
Start: 2022-04-05 | End: 2022-04-05 | Stop reason: SDUPTHER

## 2022-04-05 RX ORDER — PROPOFOL 10 MG/ML
INJECTION, EMULSION INTRAVENOUS PRN
Status: DISCONTINUED | OUTPATIENT
Start: 2022-04-05 | End: 2022-04-05 | Stop reason: SDUPTHER

## 2022-04-05 RX ORDER — SODIUM CHLORIDE 0.9 % (FLUSH) 0.9 %
5-40 SYRINGE (ML) INJECTION PRN
Status: DISCONTINUED | OUTPATIENT
Start: 2022-04-05 | End: 2022-04-05 | Stop reason: HOSPADM

## 2022-04-05 RX ORDER — KETOROLAC TROMETHAMINE 30 MG/ML
INJECTION, SOLUTION INTRAMUSCULAR; INTRAVENOUS PRN
Status: DISCONTINUED | OUTPATIENT
Start: 2022-04-05 | End: 2022-04-05 | Stop reason: SDUPTHER

## 2022-04-05 RX ORDER — OXYCODONE HYDROCHLORIDE 5 MG/1
10 TABLET ORAL PRN
Status: DISCONTINUED | OUTPATIENT
Start: 2022-04-05 | End: 2022-04-05 | Stop reason: HOSPADM

## 2022-04-05 RX ORDER — ACETAMINOPHEN 325 MG/1
650 TABLET ORAL ONCE
Status: COMPLETED | OUTPATIENT
Start: 2022-04-05 | End: 2022-04-05

## 2022-04-05 RX ORDER — SODIUM CHLORIDE 9 MG/ML
INJECTION, SOLUTION INTRAVENOUS PRN
Status: DISCONTINUED | OUTPATIENT
Start: 2022-04-05 | End: 2022-04-05 | Stop reason: HOSPADM

## 2022-04-05 RX ORDER — ONDANSETRON 2 MG/ML
INJECTION INTRAMUSCULAR; INTRAVENOUS PRN
Status: DISCONTINUED | OUTPATIENT
Start: 2022-04-05 | End: 2022-04-05 | Stop reason: SDUPTHER

## 2022-04-05 RX ORDER — CIPROFLOXACIN 500 MG/1
500 TABLET, FILM COATED ORAL 2 TIMES DAILY
Qty: 14 TABLET | Refills: 0 | Status: SHIPPED | OUTPATIENT
Start: 2022-04-05 | End: 2022-04-12

## 2022-04-05 RX ORDER — DEXAMETHASONE SODIUM PHOSPHATE 4 MG/ML
INJECTION, SOLUTION INTRA-ARTICULAR; INTRALESIONAL; INTRAMUSCULAR; INTRAVENOUS; SOFT TISSUE PRN
Status: DISCONTINUED | OUTPATIENT
Start: 2022-04-05 | End: 2022-04-05 | Stop reason: SDUPTHER

## 2022-04-05 RX ORDER — CIPROFLOXACIN 2 MG/ML
400 INJECTION, SOLUTION INTRAVENOUS
Status: COMPLETED | OUTPATIENT
Start: 2022-04-05 | End: 2022-04-05

## 2022-04-05 RX ORDER — LIDOCAINE HYDROCHLORIDE 20 MG/ML
JELLY TOPICAL PRN
Status: DISCONTINUED | OUTPATIENT
Start: 2022-04-05 | End: 2022-04-05 | Stop reason: ALTCHOICE

## 2022-04-05 RX ORDER — OXYCODONE HYDROCHLORIDE 5 MG/1
5 TABLET ORAL PRN
Status: DISCONTINUED | OUTPATIENT
Start: 2022-04-05 | End: 2022-04-05 | Stop reason: HOSPADM

## 2022-04-05 RX ORDER — FENTANYL CITRATE 50 UG/ML
25 INJECTION, SOLUTION INTRAMUSCULAR; INTRAVENOUS EVERY 5 MIN PRN
Status: DISCONTINUED | OUTPATIENT
Start: 2022-04-05 | End: 2022-04-05 | Stop reason: HOSPADM

## 2022-04-05 RX ORDER — DIMENHYDRINATE 50 MG/1
50 TABLET ORAL ONCE
Status: COMPLETED | OUTPATIENT
Start: 2022-04-05 | End: 2022-04-05

## 2022-04-05 RX ORDER — LIDOCAINE HYDROCHLORIDE 20 MG/ML
INJECTION, SOLUTION EPIDURAL; INFILTRATION; INTRACAUDAL; PERINEURAL PRN
Status: DISCONTINUED | OUTPATIENT
Start: 2022-04-05 | End: 2022-04-05 | Stop reason: SDUPTHER

## 2022-04-05 RX ADMIN — FENTANYL CITRATE 25 MCG: 50 INJECTION INTRAMUSCULAR; INTRAVENOUS at 11:17

## 2022-04-05 RX ADMIN — KETOROLAC TROMETHAMINE 15 MG: 30 INJECTION, SOLUTION INTRAMUSCULAR; INTRAVENOUS at 11:14

## 2022-04-05 RX ADMIN — CIPROFLOXACIN 400 MG: 2 INJECTION, SOLUTION INTRAVENOUS at 10:26

## 2022-04-05 RX ADMIN — ONDANSETRON 4 MG: 2 INJECTION INTRAMUSCULAR; INTRAVENOUS at 10:44

## 2022-04-05 RX ADMIN — ACETAMINOPHEN 650 MG: 325 TABLET ORAL at 08:54

## 2022-04-05 RX ADMIN — FENTANYL CITRATE 25 MCG: 50 INJECTION INTRAMUSCULAR; INTRAVENOUS at 11:05

## 2022-04-05 RX ADMIN — FENTANYL CITRATE 25 MCG: 50 INJECTION INTRAMUSCULAR; INTRAVENOUS at 10:44

## 2022-04-05 RX ADMIN — DEXAMETHASONE SODIUM PHOSPHATE 4 MG: 4 INJECTION, SOLUTION INTRAMUSCULAR; INTRAVENOUS at 10:36

## 2022-04-05 RX ADMIN — FENTANYL CITRATE 25 MCG: 50 INJECTION INTRAMUSCULAR; INTRAVENOUS at 10:47

## 2022-04-05 RX ADMIN — LIDOCAINE HYDROCHLORIDE 100 MG: 20 INJECTION, SOLUTION EPIDURAL; INFILTRATION; INTRACAUDAL; PERINEURAL at 10:36

## 2022-04-05 RX ADMIN — PROPOFOL 200 MG: 10 INJECTION, EMULSION INTRAVENOUS at 10:36

## 2022-04-05 RX ADMIN — DIMENHYDRINATE 50 MG: 50 TABLET ORAL at 08:54

## 2022-04-05 RX ADMIN — SODIUM CHLORIDE, POTASSIUM CHLORIDE, SODIUM LACTATE AND CALCIUM CHLORIDE: 600; 310; 30; 20 INJECTION, SOLUTION INTRAVENOUS at 08:53

## 2022-04-05 ASSESSMENT — PAIN SCALES - GENERAL
PAINLEVEL_OUTOF10: 6
PAINLEVEL_OUTOF10: 0
PAINLEVEL_OUTOF10: 6
PAINLEVEL_OUTOF10: 0

## 2022-04-05 ASSESSMENT — PAIN DESCRIPTION - LOCATION: LOCATION: BACK

## 2022-04-05 ASSESSMENT — LIFESTYLE VARIABLES: SMOKING_STATUS: 1

## 2022-04-05 ASSESSMENT — PAIN DESCRIPTION - ORIENTATION: ORIENTATION: RIGHT

## 2022-04-05 ASSESSMENT — PAIN DESCRIPTION - PAIN TYPE: TYPE: ACUTE PAIN

## 2022-04-05 NOTE — ANESTHESIA PRE PROCEDURE
Department of Anesthesiology  Preprocedure Note       Name:  Martín Riddle   Age:  74 y.o.  :  1955                                          MRN:  [id number]         Date:  2022      Surgeon: Dee Funez):  Kendra Guerra MD    Procedure: Procedure(s):  CYSTOSCOPY URETEROSCOPY LASER-HLL  CYSTOSCOPY URETERAL STENT INSERTION-EXCHANGE    Medications prior to admission:   Prior to Admission medications    Medication Sig Start Date End Date Taking?  Authorizing Provider   oxybutynin (DITROPAN XL) 15 MG extended release tablet Take 1 tablet by mouth daily 3/29/22   DONG Vega - CNP   ketorolac (TORADOL) 10 MG tablet Take 10 mg by mouth every 6 hours as needed for Pain  22   Historical Provider, MD   potassium chloride (KLOR-CON M) 20 MEQ TBCR extended release tablet Take 20 mEq by mouth every evening  Patient not taking: Reported on 2022    Historical Provider, MD   Misc Natural Products (OSTEO BI-FLEX ADV DOUBLE ST) TABS Take by mouth every morning    Historical Provider, MD   polyethylene glycol (GLYCOLAX) 17 g packet Take 17 g by mouth daily as needed for Constipation  Patient not taking: Reported on 2022    Historical Provider, MD   ipratropium-albuterol (DUONEB) 0.5-2.5 (3) MG/3ML SOLN nebulizer solution Inhale 1 vial into the lungs every 4 hours    Historical Provider, MD   loratadine (CLARITIN) 10 MG capsule Take 10 mg by mouth daily  Patient not taking: Reported on 2022    Historical Provider, MD   metoprolol tartrate (LOPRESSOR) 25 MG tablet Take 1 tablet by mouth 2 times daily  Patient not taking: Reported on 2022   Keaton Viramontes MD   Ascorbic Acid (VITAMIN C) 250 MG tablet Take 250 mg by mouth daily    Historical Provider, MD   vitamin E 400 UNIT capsule Take 400 Units by mouth daily    Historical Provider, MD   vitamin D (CHOLECALCIFEROL) 25 MCG (1000 UT) TABS tablet Take 1,000 Units by mouth daily    Historical Provider, MD   rivaroxaban (XARELTO) 20 MG TABS tablet Take 1 tablet by mouth daily (with breakfast) 7/17/18   Jacquelynne Sacks, MD   Multiple Vitamins-Minerals (THERAPEUTIC MULTIVITAMIN-MINERALS) tablet Take 1 tablet by mouth daily    Historical Provider, MD       Current medications:    Current Facility-Administered Medications   Medication Dose Route Frequency Provider Last Rate Last Admin    lactated ringers infusion   IntraVENous Continuous Celsa Langley APRN - CRNA        lactated ringers infusion   IntraVENous Continuous James Corral  mL/hr at 04/05/22 0853 New Bag at 04/05/22 0853    ciprofloxacin (CIPRO) IVPB 400 mg  400 mg IntraVENous On Call to Yane Ashton MD           Allergies: Allergies   Allergen Reactions    Estrogens Other (See Comments)     Hx of DVT       Problem List:    Patient Active Problem List   Diagnosis Code    Acute thromboembolism of deep veins of lower extremity ([facility] I82.409    Long term current use of anticoagulant therapy Z79.01    Bilateral cellulitis of lower leg L03.116, L03.115    Acute shoulder pain due to trauma M25.519, G89.11    Lymphedema of both lower extremities I89.0    Obesity E66.9    Tobacco abuse Z72.0    History of recurrent deep vein thrombosis (DVT) Z86.718    Erythrocytosis D75.1    Gross hematuria R31.0    Frequency of urination R35.0    Nocturia R35.1    Mixed incontinence N39.46    Constipation K59.00    Cellulitis of lower leg L03.119    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 983    Complicated UTI (urinary tract infection) N39.0    Renal calculus N20.0    Cellulitis L03.90    Normocytic anemia D64.9    Iron deficiency anemia D50.9    Renal abscess, right N15.1    Bacteremia due to Klebsiella pneumoniae R78.81, B96.1    Psoas muscle abscess (HCC) K68.12    Septicemia due to Klebsiella pneumoniae (HCC) A41.4    Ureteral calculus N20.1       Past Medical History:        Diagnosis Date    Carcinoma ([facility]     Squamous Cell    Cellulitis     DVT (deep venous thrombosis) ([facility] 2006    LLE    Kidney stone     Morbid obesity ([facility]     Wears glasses        Past Surgical History:        Procedure Laterality Date    CARPAL TUNNEL RELEASE  1990s    CT ABSCESS DRAIN SUBCUTANEOUS  1/10/2022    CT ABSCESS DRAIN SUBCUTANEOUS 1/10/2022 [facility] CT SCAN    CYSTOSCOPY N/A 1/4/2022    CYSTOSCOPY URETERAL STENT INSERTION performed by Aram Dominguez MD at [address] with stent    CYSTOSCOPY Right 3/1/2022    CYSTOSCOPY URETEROSCOPY LASER-WTIH HLL performed by Aram Dominguez MD at [address] [facility] [address] Right 3/1/2022    CYSTOSCOPY URETERAL STENT [address] performed by Aram Dominguez MD at [address] [facility] [address]  1/7/2022         IR NEPHROSTOMY PERCUTANEOUS RIGHT  1/5/2022    IR NEPHROSTOMY PERCUTANEOUS RIGHT 1/5/2022 Tala López MD [facility]    ANNABELLA AND BSO      05/2019    TUBAL LIGATION  1993    TUBAL LIGATION      WISDOM TOOTH EXTRACTION         Social History:    Social History     Tobacco Use    Smoking status: Current Every Day Smoker     Packs/day: 0.50     Years: 42.00     Pack years: 21.00     Types: Cigarettes    Smokeless tobacco: Never Used   Substance Use Topics    Alcohol use: No     Alcohol/week: 0.0 standard drinks                                Ready to quit: Not Answered  Counseling given: Not Answered      Vital Signs (Current):   Vitals:    03/17/22 0817 04/05/22 0849   BP:  117/65   Pulse:  108   Resp:  20   Temp:  36.7 °C (98 °F)   TempSrc:  Temporal   SpO2:  96%   Weight: 245 lb (111.1 kg)    Height: 5' 1\" (1.549 m)                                               BP Readings from Last 3 Encounters:   04/05/22 117/65   03/01/22 (!) 170/78   03/01/22 (!) 171/78       NPO Status: Time of last liquid consumption: 2030                        Time of last solid consumption: 2315                        Date of last liquid consumption: 04/04/22                        Date of last solid food consumption: 04/04/22    BMI:   Wt Readings from Last 3 Encounters:   03/17/22 245 lb (111.1 kg)   03/01/22 245 lb (111.1 kg)   01/13/22 255 lb 9.6 oz (115.9 kg)     Body mass index is 46.29 kg/m². CBC:   Lab Results   Component Value Date    WBC 5.4 01/31/2022    RBC 3.33 01/31/2022    HGB 10.2 01/31/2022    HCT 33.4 01/31/2022    .3 01/31/2022    RDW 15.1 01/31/2022     01/31/2022       CMP:   Lab Results   Component Value Date     01/31/2022    K 5.4 01/31/2022     01/31/2022    CO2 26 01/31/2022    BUN 26 01/31/2022    CREATININE 1.15 01/31/2022    GFRAA 57 01/31/2022    LABGLOM 47 01/31/2022    GLUCOSE 95 01/31/2022    PROT 6.5 01/31/2022    CALCIUM 8.7 01/31/2022    BILITOT <0.10 01/31/2022    ALKPHOS 78 01/31/2022    AST 10 01/31/2022    ALT 8 01/31/2022       POC Tests: No results for input(s): POCGLU, POCNA, POCK, POCCL, POCBUN, POCHEMO, POCHCT in the last 72 hours. Coags:   Lab Results   Component Value Date    PROTIME 11.5 01/10/2022    INR 1.1 01/10/2022    APTT 29.1 01/10/2022       HCG (If Applicable): No results found for: PREGTESTUR, PREGSERUM, HCG, HCGQUANT     ABGs:   Lab Results   Component Value Date    PHART 7.466 07/19/2018    PO2ART 72.5 07/19/2018    RTV8PRA 35.5 07/19/2018    FKI5ROJ 25.0 07/19/2018    V9VWRNAL 95.5 07/19/2018        Type & Screen (If Applicable):  No results found for: LABABO, LABRH    Drug/Infectious Status (If Applicable):  No results found for: HIV, HEPCAB    COVID-19 Screening (If Applicable):   Lab Results   Component Value Date    COVID19 Not Detected 01/12/2022           Anesthesia Evaluation  Patient summary reviewed and Nursing notes reviewed  Airway: Mallampati: III        Dental: normal exam   (+) poor dentition      Pulmonary:normal exam    (+) current smoker          Patient smoked on day of surgery.                  Cardiovascular:  Exercise tolerance: poor (<4 METS),                     Neuro/Psych:   Negative Neuro/Psych ROS              GI/Hepatic/Renal: Neg GI/Hepatic/Renal ROS            Endo/Other: Negative Endo/Other ROS                    Abdominal:             Vascular:   + DVT, . Other Findings:             Anesthesia Plan      general     ASA 3       Induction: intravenous. Anesthetic plan and risks discussed with patient.                       DONG Orozco - CRNA   4/5/2022

## 2022-04-05 NOTE — PROGRESS NOTES
Pt verbalized readiness to go home. Discharge instructions given to pt and . Verbalized understanding and all questions answered at this time. Discharge Criteria    Inpatients must meet Criteria 1 through 7. All other patients are either YES or N/A. If a NO is chosen then Anesthesia or Surgeon must be notified. 1.  Minimum 30 minutes after last dose of sedative medication, minimum 120 minutes after last dose of reversal agent. Yes      2. Systolic BP stable within 20 mmHg for 30 minutes & systolic BP between 90 & 056 or within 10 mmHg of baseline. Yes      3. Pulse between 60 and 100 or within 10 bpm of baseline. Yes      4. Spontaneous respiratory rate >/= 10 per minute. Yes      5. SaO2 >/= 95 or  >/= baseline. Yes      6. Able to cough and swallow or return to baseline function. Yes      7. Alert and oriented or return to baseline mental status. Yes      8. Demonstrates controlled, coordinated movements, ambulates with steady gait, or return to baseline activity function. Yes      9. Minimal or no pain or nausea, or at a level tolerable and acceptable to patient. Yes      10. Takes and retains oral fluids as allowed. Yes      11. Procedural / perioperative site stable. Minimal or no bleeding. Yes          12. If GI endoscopy procedure, minimal or no abdominal distention or passing flatus. N/A      13. Written discharge instructions and emergency telephone number provided. Yes      14. Accompanied by a responsible adult.     Yes

## 2022-04-05 NOTE — ANESTHESIA POSTPROCEDURE EVALUATION
Department of Anesthesiology  Postprocedure Note    Patient: Hair Martinez  MRN: [id number]  YOB: 1955  Date of evaluation: 4/5/2022  Time:  4:05 PM     Procedure Summary     Date: 04/05/22 Room / Location: [address] [place] [address]    Anesthesia Start: 1032 Anesthesia Stop: 1127    Procedures:       CYSTOSCOPY URETEROSCOPY LASER-HLL (Right )      CYSTOSCOPY URETERAL STENT INSERTION-EXCHANGE (Right ) Diagnosis: ([address]    Surgeons: Cornelius Hoffman MD Responsible Provider: DONG Berumen CRNA    Anesthesia Type: general ASA Status: 3          Anesthesia Type: general    Fly Phase I: Fly Score: 10    Fly Phase II: Fly Score: 10    Last vitals: Reviewed and per EMR flowsheets.        Anesthesia Post Evaluation    Patient location during evaluation: PACU  Patient participation: complete - patient participated  Level of consciousness: awake and alert  Airway patency: patent  Nausea & Vomiting: no nausea and no vomiting  Complications: no  Cardiovascular status: blood pressure returned to baseline and hemodynamically stable  Respiratory status: acceptable and room air  Hydration status: euvolemic

## 2022-04-05 NOTE — BRIEF OP NOTE
Brief Postoperative Note      Patient: Len Caldreon  YOB: 1955  MRN: [id number]    Date of Procedure: 4/5/2022    Pre-Op Diagnosis: RIGHT URTERAL CALCULUS    Post-Op Diagnosis: Same       Procedure(s):  CYSTOSCOPY URETEROSCOPY LASER-HLL  CYSTOSCOPY URETERAL STENT Saw See    Surgeon(s):  Jose J Felipe MD    Assistant:  * No surgical staff found *    Anesthesia: General    Estimated Blood Loss (mL): Minimal    Complications: None    Specimens:   * No specimens in log *    Implants:  Implant Name Type Inv. Item Serial No.  Lot No. LRB No. Used Action   STENT URET 6 FRX24 CM FIRM [employer] TRIA - [id number]  STENT URET 6 FRX24 CM FIRM MONOF[employer] TRIA  [employer]- [id number] Right 1 Implanted         Drains:   Closed/Suction Drain Right; Inferior  Bulb 8 [employer] (Active)       Nephrostomy 1 Right 10 fr (Active)       External Urinary Catheter (Active)       Findings: right ureteral/ renal calculus    Electronically signed by Jose J Felipe MD on 4/5/2022 at 11:27 AM

## 2022-04-06 ENCOUNTER — TELEPHONE (OUTPATIENT)
Dept: UROLOGY | Age: 67
End: 2022-04-06

## 2022-04-06 DIAGNOSIS — N20.0 RENAL CALCULUS: Primary | ICD-10-CM

## 2022-04-06 DIAGNOSIS — Z96.0 URETERAL STENT RETAINED: ICD-10-CM

## 2022-04-06 RX ORDER — OXYCODONE HYDROCHLORIDE AND ACETAMINOPHEN 5; 325 MG/1; MG/1
1 TABLET ORAL EVERY 6 HOURS PRN
Qty: 12 TABLET | Refills: 0 | Status: SHIPPED | OUTPATIENT
Start: 2022-04-06 | End: 2022-04-09

## 2022-04-06 RX ORDER — HYDROCODONE BITARTRATE AND ACETAMINOPHEN 5; 325 MG/1; MG/1
1 TABLET ORAL EVERY 6 HOURS PRN
Qty: 12 TABLET | Refills: 0 | Status: CANCELLED | OUTPATIENT
Start: 2022-04-06 | End: 2022-04-09

## 2022-04-06 NOTE — TELEPHONE ENCOUNTER
Patient called back due to continued pain. I advised that if she is in that much pain she should go to the ER. Patient is hesitant to go to the ER yet at this point she said she previously was prescribed oxycodone and this worked for her. She would like to try a stronger pain medication first if we could call something in.

## 2022-04-06 NOTE — TELEPHONE ENCOUNTER
Percocet sent. If this does not relieve your pain you need to go to the ER immediately. [Case reviewed with Dr. Tash Gannon. His token to send controlled substances is not working. Per his direction we sent in Percocet. If this does not relieve her pain she will need to go to the ER immediately. ]

## 2022-04-06 NOTE — OP NOTE
[address]                                OPERATIVE REPORT    PATIENT NAME: Don Horne                   :        1955  MED REC NO:   [id number]                              ROOM:  ACCOUNT NO:   [de-identified]                           ADMIT DATE: 2022  PROVIDER:     Dotty Vargas    DATE OF PROCEDURE:  2022    SURGEON:  Dr. Dotty Vargas. ASSISTANT:  None. PREOPERATIVE DIAGNOSES:  1. Right renal calculus. 2.  Right ureteral calculus. POSTOPERATIVE DIAGNOSES:  1. Right renal calculus. 2.  Right ureteral calculus. PROCEDURES PERFORMED:  Cystoscopy, right ureteroscopy, right holmium  laser lithotripsy, right ureteral stent exchange, staged. ANESTHESIA:  General.    COMPLICATIONS:  None. ESTIMATED BLOOD LOSS:  Minimal.    SPECIMENS:  None. PROSTHESIS:  A 6-Arabic x 24-cm double-J ureteral stent. DISPOSITION:  Stable. FINDINGS:  1. Proximal right ureteral stone. 2.  Copious right renal calculus. INDICATIONS:  The patient is a 25-year-old female who had _____  right-sided system, had a ureteral stent in place, here now for  definitive therapy in a staged fashion. DESCRIPTION OF PROCEDURE:  The patient was taken back to the operating  room. After informed consent including all risks, benefits, and  alternatives were obtained, the patient was transferred from the [facility]  onto the operating table, where she was induced under general  anesthesia. She was given IV Cipro for preoperative antibiotic  prophylaxis. To begin the case, she was prepped and draped in normal  sterile fashion. She was placed in dorsal lithotomy. She had a  22-Arabic sheath with a 30-degree lens passed through the urethra into  the bladder. Once in the bladder, we identified the right ureteral  stent. This was grasped and removed through the meatus. A 0.035-inch  wire was passed up. We then used a 36-cm ureteral navigator, placed  over the Glidewire and in the proximal ureter. We were then able to  place a flexible ureteroscope up. We identified the stone in the  proximal right ureter. We were able to take a 270 micron laser fiber  and ablate the stone. We then moved into the kidney, where in the lower  pole we identified copious amount of calculi. Most of these were  crushed up. We did further ablate these stones with the 270 micron  laser fiber until no persistent fragments over 2 mm remained. At this  point in time, we removed the scope. We did replace the Glidewire and  remove the scope and the navigator. We then replaced the cystoscope  over the Glidewire and placed a 6-Azerbaijani x 24-cm double-J ureteral stent  over the Glidewire up into the kidney. Glidewire was removed. Proximal  curl was confirmed via fluoroscopy. Distal curl was confirmed via  visualization. At this point in time, the patient was awoken from  general anesthesia, transferred to the [facility], and taken to the PACU in  satisfactory condition by the Nursing and Anesthesia teams. PLAN:  The patient will be discharged home per PACU criteria. She will  follow up with us later this week for stent removal via string.         Jayashree Dinh    D: 04/05/2022 14:32:27       T: 04/05/2022 16:25:17     [email or web address]  Job#: [id number]     Doc#: [id number]    CC:

## 2022-04-06 NOTE — TELEPHONE ENCOUNTER
Patient called and is in tears due to right flank pain. She had Right HLL/stent exchange done yesterday and is scheduled for possible stent removal on the 11th. She has been taking Ibuprofen and this has not touched her pain. She has been trying to drink her water but is having trouble due to the amount of pain she is currently in. She said she never has had this much pain with her previous stent. She questioning if we could call in something for pain or go to ER.

## 2022-04-12 ENCOUNTER — OFFICE VISIT (OUTPATIENT)
Dept: UROLOGY | Age: 67
End: 2022-04-12
Payer: MEDICARE

## 2022-04-12 VITALS
SYSTOLIC BLOOD PRESSURE: 155 MMHG | RESPIRATION RATE: 20 BRPM | TEMPERATURE: 97.8 F | HEART RATE: 107 BPM | DIASTOLIC BLOOD PRESSURE: 59 MMHG

## 2022-04-12 DIAGNOSIS — K59.03 DRUG-INDUCED CONSTIPATION: ICD-10-CM

## 2022-04-12 DIAGNOSIS — N20.1 URETERAL CALCULUS: ICD-10-CM

## 2022-04-12 DIAGNOSIS — N20.0 RENAL CALCULUS: Primary | ICD-10-CM

## 2022-04-12 DIAGNOSIS — Z96.0 URETERAL STENT RETAINED: ICD-10-CM

## 2022-04-12 PROCEDURE — 99213 OFFICE O/P EST LOW 20 MIN: CPT | Performed by: PHYSICIAN ASSISTANT

## 2022-04-12 ASSESSMENT — ENCOUNTER SYMPTOMS
APNEA: 0
COUGH: 0
BACK PAIN: 0
SHORTNESS OF BREATH: 0
ABDOMINAL PAIN: 1
COLOR CHANGE: 0
CONSTIPATION: 1
VOMITING: 0
NAUSEA: 0
WHEEZING: 0
EYE REDNESS: 0

## 2022-04-12 NOTE — PROGRESS NOTES
HPI:    Patient is a 74 y.o. female in no acute distress. She is alert and oriented to person, place, and time. History  Pt presented to the ED with right flank pain. Pt had Ct scan that showed a right sided renal abscess. This also showed right hydronephrosis and an obstructing 1.6 cm right ureteral calculus. Pt was subsequently scheduled for transfer but has had to remain in ED for bed/staff issues.  locally was consulted this AM. There are plans for right sided PCNT at tertiary center. Pt is on empiric zosyn and merepenum. PT has has fever and leukocytosis. Pt has never seen Urology but did know she has a large stone. She has tried to make  appointments but has had transportation issues. Pain is 10 of 10 in the right flank currently.      1/4/2022 right ureteral stent placement    1/5/2022 right nephrostomy placement    3/2/2022 - Right HLL stent placement    4/5/2022 - Right HLL stent exchange (second part of staged procedure)    Today:  Patient is here today 7 days status post second part of staged right H LL. Patient still has a stent in. She has not had a bowel movement for over a week. Patient was having postoperative pain and was taking narcotics.       Past Medical History:   Diagnosis Date    Carcinoma ([address]     Squamous Cell    Cellulitis     DVT (deep venous thrombosis) ([address] 2006    LLE    Kidney stone     Morbid obesity ([address]     Wears glasses      Past Surgical History:   Procedure Laterality Date    CARPAL TUNNEL RELEASE  1990s    CT ABSCESS DRAIN SUBCUTANEOUS  1/10/2022    CT ABSCESS DRAIN SUBCUTANEOUS 1/10/2022 [facility] CT SCAN    CYSTOSCOPY N/A 1/4/2022    CYSTOSCOPY URETERAL STENT INSERTION performed by Matt Briggs MD at [address] with stent    CYSTOSCOPY Right 3/1/2022    CYSTOSCOPY URETEROSCOPY LASER-WTIH HLL performed by Matt Briggs MD at [place] [address] Right 3/1/2022    CYSTOSCOPY URETERAL STENT Carlos Goel performed by Benedicto Garcia MD at [address] Right 04/05/2022    Dr Ashley Prasad with stent insertion    CYSTOSCOPY Right 4/5/2022    CYSTOSCOPY URETEROSCOPY LASER-HLL performed by Benedicto Garcia MD at [address] Right 4/5/2022    CYSTOSCOPY URETERAL STENT INSERTION-EXCHANGE performed by Benedicto Garcia MD at [address]  1/7/2022         IR NEPHROSTOMY PERCUTANEOUS RIGHT  1/5/2022    IR NEPHROSTOMY PERCUTANEOUS RIGHT 1/5/2022 Hien Mccracken MD [facility] SPECIAL PROCEDURES    ANNABELLA AND BSO      05/2019    TUBAL LIGATION  1993    TUBAL LIGATION      WISDOM TOOTH EXTRACTION       Outpatient Encounter Medications as of 4/12/2022   Medication Sig Dispense Refill    ciprofloxacin (CIPRO) 500 MG tablet Take 1 tablet by mouth 2 times daily for 7 days 14 tablet 0    oxybutynin (DITROPAN XL) 15 MG extended release tablet Take 1 tablet by mouth daily 30 tablet 3    Misc Natural Products (OSTEO BI-FLEX ADV DOUBLE ST) TABS Take by mouth every morning      Ascorbic Acid (VITAMIN C) 250 MG tablet Take 250 mg by mouth daily      vitamin E 400 UNIT capsule Take 400 Units by mouth daily      vitamin D (CHOLECALCIFEROL) 25 MCG (1000 UT) TABS tablet Take 1,000 Units by mouth daily      rivaroxaban (XARELTO) 20 MG TABS tablet Take 1 tablet by mouth daily (with breakfast) 30 tablet 5    Multiple Vitamins-Minerals (THERAPEUTIC MULTIVITAMIN-MINERALS) tablet Take 1 tablet by mouth daily      ketorolac (TORADOL) 10 MG tablet Take 10 mg by mouth every 6 hours as needed for Pain  (Patient not taking: Reported on 4/12/2022)      potassium chloride (KLOR-CON M) 20 MEQ TBCR extended release tablet Take 20 mEq by mouth every evening (Patient not taking: Reported on 4/5/2022)      polyethylene glycol (GLYCOLAX) 17 g packet Take 17 g by mouth daily as needed for Constipation (Patient not taking: Reported on 4/5/2022)      ipratropium-albuterol (DUONEB) 0.5-2.5 (3) MG/3ML SOLN nebulizer solution Inhale 1 vial into the lungs every 4 hours (Patient not taking: Reported on 4/12/2022)      loratadine (CLARITIN) 10 MG capsule Take 10 mg by mouth daily (Patient not taking: Reported on 4/5/2022)      metoprolol tartrate (LOPRESSOR) 25 MG tablet Take 1 tablet by mouth 2 times daily (Patient not taking: Reported on 4/5/2022) 60 tablet 3     No facility-administered encounter medications on file as of 4/12/2022.       Current Outpatient Medications on File Prior to Visit   Medication Sig Dispense Refill    ciprofloxacin (CIPRO) 500 MG tablet Take 1 tablet by mouth 2 times daily for 7 days 14 tablet 0    oxybutynin (DITROPAN XL) 15 MG extended release tablet Take 1 tablet by mouth daily 30 tablet 3    Misc Natural Products (OSTEO BI-FLEX ADV DOUBLE ST) TABS Take by mouth every morning      Ascorbic Acid (VITAMIN C) 250 MG tablet Take 250 mg by mouth daily      vitamin E 400 UNIT capsule Take 400 Units by mouth daily      vitamin D (CHOLECALCIFEROL) 25 MCG (1000 UT) TABS tablet Take 1,000 Units by mouth daily      rivaroxaban (XARELTO) 20 MG TABS tablet Take 1 tablet by mouth daily (with breakfast) 30 tablet 5    Multiple Vitamins-Minerals (THERAPEUTIC MULTIVITAMIN-MINERALS) tablet Take 1 tablet by mouth daily      ketorolac (TORADOL) 10 MG tablet Take 10 mg by mouth every 6 hours as needed for Pain  (Patient not taking: Reported on 4/12/2022)      potassium chloride (KLOR-CON M) 20 MEQ TBCR extended release tablet Take 20 mEq by mouth every evening (Patient not taking: Reported on 4/5/2022)      polyethylene glycol (GLYCOLAX) 17 g packet Take 17 g by mouth daily as needed for Constipation (Patient not taking: Reported on 4/5/2022)      ipratropium-albuterol (DUONEB) 0.5-2.5 (3) MG/3ML SOLN nebulizer solution Inhale 1 vial into the lungs every 4 hours (Patient not taking: Reported on 4/12/2022)      loratadine (CLARITIN) 10 MG capsule Take 10 mg by mouth daily (Patient not taking: Reported on 4/5/2022)      metoprolol tartrate (LOPRESSOR) 25 MG tablet Take 1 tablet by mouth 2 times daily (Patient not taking: Reported on 4/5/2022) 60 tablet 3     No current facility-administered medications on file prior to visit. Estrogens  Family History   Adopted: Yes     Social History     Tobacco Use   Smoking Status Current Every Day Smoker    Packs/day: 0.50    Years: 42.00    Pack years: 21.00    Types: Cigarettes   Smokeless Tobacco Never Used       Social History     Substance and Sexual Activity   Alcohol Use No    Alcohol/week: 0.0 standard drinks       Review of Systems   Constitutional: Negative for appetite change, chills and fever. Eyes: Negative for redness and visual disturbance. Respiratory: Negative for apnea, cough, shortness of breath and wheezing. Cardiovascular: Negative for chest pain and leg swelling. Gastrointestinal: Positive for abdominal pain and constipation. Negative for nausea and vomiting. Genitourinary: Negative for difficulty urinating, dyspareunia, dysuria, enuresis, flank pain, frequency, hematuria, pelvic pain, urgency, vaginal bleeding and vaginal discharge. Musculoskeletal: Negative for back pain, joint swelling and myalgias. Skin: Negative for color change, rash and wound. Neurological: Negative for dizziness, tremors and numbness. Hematological: Negative for adenopathy. Does not bruise/bleed easily. Psychiatric/Behavioral: Negative for sleep disturbance. BP (!) 155/59 (Site: Right Upper Arm, Position: Sitting, Cuff Size: Medium Adult)   Pulse 107   Temp 97.8 °F (36.6 °C) (Temporal)   Resp 20   LMP  (LMP Unknown)       PHYSICAL EXAM:  Constitutional: Patient resting comfortably, in no acute distress. Neuro: Alert and oriented to person place and time. Psych: Mood and affect normal.  HEENT: normocephalic, atraumatic  Lungs: Respiratory effort normal, unlabored  Abdomen: Soft, RLQ/LLQ tender, non-distended  : No CVA tenderness.    Pelvic: Deferred    Lab Results   Component Value Date    BUN 26 (H) 01/31/2022     Lab Results   Component Value Date    CREATININE 1.15 (H) 01/31/2022       ASSESSMENT:   Diagnosis Orders   1. Renal calculus  XR ABDOMEN (KUB) (SINGLE AP VIEW)   2. Ureteral stent retained     3. Ureteral calculus  XR ABDOMEN (KUB) (SINGLE AP VIEW)   4. Drug-induced constipation       PLAN:  You may experience waves of pain and/or nausea for the next 24-72 hrs. You may also experience burning with urination, frequency, urgency, bladder spasms, and blood in the urine. All of this should continue to improve over the next several days. The blood in the urine can last up to two weeks. 1) take ibuprofen (motrin) 600 mg (3 of the 200mg tabs) every 6 hours WITH FOOD for the next 72 hours. 2) drink at least 80 oz fluid (water, juice, Gatorade - NOT tea, coffee, soda pop) daily      Recommend starting MiraLAX twice a day 1 capful for constipation    Colace 100 mg 3 times a day    If she continues unable to have a bowel movement develops severe abdominal pain she needs to the emergency room.     Otherwise follow-up in the office in 6 weeks with KUB

## 2022-04-20 ENCOUNTER — APPOINTMENT (OUTPATIENT)
Dept: GENERAL RADIOLOGY | Age: 67
End: 2022-04-20
Payer: MEDICARE

## 2022-04-20 ENCOUNTER — APPOINTMENT (OUTPATIENT)
Dept: CT IMAGING | Age: 67
End: 2022-04-20
Payer: MEDICARE

## 2022-04-20 ENCOUNTER — HOSPITAL ENCOUNTER (EMERGENCY)
Age: 67
Discharge: ANOTHER HOSPITAL | End: 2022-04-21
Attending: STUDENT IN AN ORGANIZED HEALTH CARE EDUCATION/TRAINING PROGRAM
Payer: MEDICARE

## 2022-04-20 VITALS
DIASTOLIC BLOOD PRESSURE: 67 MMHG | SYSTOLIC BLOOD PRESSURE: 120 MMHG | OXYGEN SATURATION: 92 % | WEIGHT: 245 LBS | HEIGHT: 61 IN | HEART RATE: 111 BPM | RESPIRATION RATE: 31 BRPM | BODY MASS INDEX: 46.26 KG/M2 | TEMPERATURE: 99.5 F

## 2022-04-20 DIAGNOSIS — R09.02 HYPOXIA: ICD-10-CM

## 2022-04-20 DIAGNOSIS — E87.70 HYPERVOLEMIA, UNSPECIFIED HYPERVOLEMIA TYPE: ICD-10-CM

## 2022-04-20 DIAGNOSIS — E87.6 HYPOKALEMIA: ICD-10-CM

## 2022-04-20 DIAGNOSIS — A41.9 SEPSIS, DUE TO UNSPECIFIED ORGANISM, UNSPECIFIED WHETHER ACUTE ORGAN DYSFUNCTION PRESENT (HCC): ICD-10-CM

## 2022-04-20 DIAGNOSIS — K65.1 INTRA-ABDOMINAL ABSCESS (HCC): ICD-10-CM

## 2022-04-20 DIAGNOSIS — J90 PLEURAL EFFUSION ON RIGHT: Primary | ICD-10-CM

## 2022-04-20 DIAGNOSIS — R06.03 RESPIRATORY DISTRESS: ICD-10-CM

## 2022-04-20 DIAGNOSIS — K63.1 PERFORATED BOWEL (HCC): ICD-10-CM

## 2022-04-20 LAB
ABSOLUTE EOS #: 0 K/UL (ref 0–0.44)
ABSOLUTE IMMATURE GRANULOCYTE: 0 K/UL (ref 0–0.3)
ABSOLUTE LYMPH #: 0.88 K/UL (ref 1.1–3.7)
ABSOLUTE MONO #: 0.75 K/UL (ref 0.1–1.2)
ALBUMIN SERPL-MCNC: 2.5 G/DL (ref 3.5–5.2)
ALBUMIN/GLOBULIN RATIO: 0.5 (ref 1–2.5)
ALP BLD-CCNC: 117 U/L (ref 35–104)
ALT SERPL-CCNC: 18 U/L (ref 5–33)
ANION GAP SERPL CALCULATED.3IONS-SCNC: 13 MMOL/L (ref 9–17)
AST SERPL-CCNC: 17 U/L
BASOPHILS # BLD: 0 % (ref 0–2)
BASOPHILS ABSOLUTE: 0 K/UL (ref 0–0.2)
BILIRUB SERPL-MCNC: 0.36 MG/DL (ref 0.3–1.2)
BUN BLDV-MCNC: 15 MG/DL (ref 8–23)
BUN/CREAT BLD: 16 (ref 9–20)
CALCIUM SERPL-MCNC: 8.5 MG/DL (ref 8.6–10.4)
CHLORIDE BLD-SCNC: 103 MMOL/L (ref 98–107)
CO2: 24 MMOL/L (ref 20–31)
CREAT SERPL-MCNC: 0.95 MG/DL (ref 0.5–0.9)
EOSINOPHILS RELATIVE PERCENT: 0 % (ref 1–4)
FLU A ANTIGEN: NEGATIVE
FLU B ANTIGEN: NEGATIVE
GFR AFRICAN AMERICAN: >60 ML/MIN
GFR NON-AFRICAN AMERICAN: 59 ML/MIN
GFR SERPL CREATININE-BSD FRML MDRD: ABNORMAL ML/MIN/{1.73_M2}
GFR SERPL CREATININE-BSD FRML MDRD: ABNORMAL ML/MIN/{1.73_M2}
GLUCOSE BLD-MCNC: 178 MG/DL (ref 70–99)
HCT VFR BLD CALC: 30.5 % (ref 36.3–47.1)
HEMOGLOBIN: 9.1 G/DL (ref 11.9–15.1)
IMMATURE GRANULOCYTES: 0 %
LACTIC ACID: 4.4 MMOL/L (ref 0.5–2.2)
LIPASE: 9 U/L (ref 13–60)
LYMPHOCYTES # BLD: 7 % (ref 24–43)
MAGNESIUM: 1.9 MG/DL (ref 1.6–2.6)
MCH RBC QN AUTO: 28.1 PG (ref 25.2–33.5)
MCHC RBC AUTO-ENTMCNC: 29.8 G/DL (ref 28.4–34.8)
MCV RBC AUTO: 94.1 FL (ref 82.6–102.9)
METAMYELOCYTES ABSOLUTE COUNT: 0.25 K/UL
METAMYELOCYTES: 2 %
MONOCYTES # BLD: 6 % (ref 3–12)
MORPHOLOGY: NORMAL
NRBC AUTOMATED: 0 PER 100 WBC
PDW BLD-RTO: 16 % (ref 11.8–14.4)
PLATELET # BLD: 631 K/UL (ref 138–453)
PMV BLD AUTO: 8.9 FL (ref 8.1–13.5)
POTASSIUM SERPL-SCNC: 2.8 MMOL/L (ref 3.7–5.3)
PRO-BNP: 3599 PG/ML
RBC # BLD: 3.24 M/UL (ref 3.95–5.11)
S PYO AG THROAT QL: NEGATIVE
SARS-COV-2, RAPID: NOT DETECTED
SEG NEUTROPHILS: 85 % (ref 36–65)
SEGMENTED NEUTROPHILS ABSOLUTE COUNT: 10.62 K/UL (ref 1.5–8.1)
SODIUM BLD-SCNC: 140 MMOL/L (ref 135–144)
SOURCE: NORMAL
SPECIMEN DESCRIPTION: NORMAL
TOTAL PROTEIN: 7.1 G/DL (ref 6.4–8.3)
TROPONIN, HIGH SENSITIVITY: 27 NG/L (ref 0–14)
WBC # BLD: 12.5 K/UL (ref 3.5–11.3)

## 2022-04-20 PROCEDURE — 87880 STREP A ASSAY W/OPTIC: CPT

## 2022-04-20 PROCEDURE — 87635 SARS-COV-2 COVID-19 AMP PRB: CPT

## 2022-04-20 PROCEDURE — 83880 ASSAY OF NATRIURETIC PEPTIDE: CPT

## 2022-04-20 PROCEDURE — 83735 ASSAY OF MAGNESIUM: CPT

## 2022-04-20 PROCEDURE — 71045 X-RAY EXAM CHEST 1 VIEW: CPT

## 2022-04-20 PROCEDURE — 93005 ELECTROCARDIOGRAM TRACING: CPT | Performed by: STUDENT IN AN ORGANIZED HEALTH CARE EDUCATION/TRAINING PROGRAM

## 2022-04-20 PROCEDURE — 6360000004 HC RX CONTRAST MEDICATION: Performed by: STUDENT IN AN ORGANIZED HEALTH CARE EDUCATION/TRAINING PROGRAM

## 2022-04-20 PROCEDURE — 6360000002 HC RX W HCPCS: Performed by: STUDENT IN AN ORGANIZED HEALTH CARE EDUCATION/TRAINING PROGRAM

## 2022-04-20 PROCEDURE — 96367 TX/PROPH/DG ADDL SEQ IV INF: CPT

## 2022-04-20 PROCEDURE — 94640 AIRWAY INHALATION TREATMENT: CPT

## 2022-04-20 PROCEDURE — 96374 THER/PROPH/DIAG INJ IV PUSH: CPT

## 2022-04-20 PROCEDURE — 6370000000 HC RX 637 (ALT 250 FOR IP): Performed by: STUDENT IN AN ORGANIZED HEALTH CARE EDUCATION/TRAINING PROGRAM

## 2022-04-20 PROCEDURE — 36415 COLL VENOUS BLD VENIPUNCTURE: CPT

## 2022-04-20 PROCEDURE — 71260 CT THORAX DX C+: CPT

## 2022-04-20 PROCEDURE — 87804 INFLUENZA ASSAY W/OPTIC: CPT

## 2022-04-20 PROCEDURE — 96366 THER/PROPH/DIAG IV INF ADDON: CPT

## 2022-04-20 PROCEDURE — 94664 DEMO&/EVAL PT USE INHALER: CPT

## 2022-04-20 PROCEDURE — 80053 COMPREHEN METABOLIC PANEL: CPT

## 2022-04-20 PROCEDURE — 74177 CT ABD & PELVIS W/CONTRAST: CPT

## 2022-04-20 PROCEDURE — 83605 ASSAY OF LACTIC ACID: CPT

## 2022-04-20 PROCEDURE — 96365 THER/PROPH/DIAG IV INF INIT: CPT

## 2022-04-20 PROCEDURE — 84484 ASSAY OF TROPONIN QUANT: CPT

## 2022-04-20 PROCEDURE — 96375 TX/PRO/DX INJ NEW DRUG ADDON: CPT

## 2022-04-20 PROCEDURE — 85025 COMPLETE CBC W/AUTO DIFF WBC: CPT

## 2022-04-20 PROCEDURE — 83690 ASSAY OF LIPASE: CPT

## 2022-04-20 PROCEDURE — 99285 EMERGENCY DEPT VISIT HI MDM: CPT

## 2022-04-20 PROCEDURE — 2580000003 HC RX 258: Performed by: STUDENT IN AN ORGANIZED HEALTH CARE EDUCATION/TRAINING PROGRAM

## 2022-04-20 RX ORDER — POTASSIUM CHLORIDE 20 MEQ/1
40 TABLET, EXTENDED RELEASE ORAL 2 TIMES DAILY
Status: DISCONTINUED | OUTPATIENT
Start: 2022-04-20 | End: 2022-04-21 | Stop reason: HOSPADM

## 2022-04-20 RX ORDER — POTASSIUM CHLORIDE 7.45 MG/ML
INJECTION INTRAVENOUS
Status: DISCONTINUED
Start: 2022-04-20 | End: 2022-04-20 | Stop reason: WASHOUT

## 2022-04-20 RX ORDER — IPRATROPIUM BROMIDE AND ALBUTEROL SULFATE 2.5; .5 MG/3ML; MG/3ML
1 SOLUTION RESPIRATORY (INHALATION) ONCE
Status: COMPLETED | OUTPATIENT
Start: 2022-04-20 | End: 2022-04-20

## 2022-04-20 RX ORDER — ALBUTEROL SULFATE 2.5 MG/3ML
2.5 SOLUTION RESPIRATORY (INHALATION) EVERY 6 HOURS PRN
Status: DISCONTINUED | OUTPATIENT
Start: 2022-04-20 | End: 2022-04-21 | Stop reason: HOSPADM

## 2022-04-20 RX ORDER — POTASSIUM CHLORIDE 7.45 MG/ML
20 INJECTION INTRAVENOUS ONCE
Status: COMPLETED | OUTPATIENT
Start: 2022-04-20 | End: 2022-04-21

## 2022-04-20 RX ORDER — 0.9 % SODIUM CHLORIDE 0.9 %
1000 INTRAVENOUS SOLUTION INTRAVENOUS ONCE
Status: COMPLETED | OUTPATIENT
Start: 2022-04-20 | End: 2022-04-20

## 2022-04-20 RX ORDER — FUROSEMIDE 10 MG/ML
40 INJECTION INTRAMUSCULAR; INTRAVENOUS ONCE
Status: COMPLETED | OUTPATIENT
Start: 2022-04-20 | End: 2022-04-20

## 2022-04-20 RX ADMIN — PIPERACILLIN SODIUM AND TAZOBACTAM SODIUM 4500 MG: 4; .5 INJECTION, POWDER, LYOPHILIZED, FOR SOLUTION INTRAVENOUS at 22:48

## 2022-04-20 RX ADMIN — FUROSEMIDE 40 MG: 10 INJECTION, SOLUTION INTRAMUSCULAR; INTRAVENOUS at 22:39

## 2022-04-20 RX ADMIN — VANCOMYCIN HYDROCHLORIDE 1750 MG: 1 INJECTION, POWDER, LYOPHILIZED, FOR SOLUTION INTRAVENOUS at 23:41

## 2022-04-20 RX ADMIN — SODIUM CHLORIDE 1000 ML: 9 INJECTION, SOLUTION INTRAVENOUS at 22:30

## 2022-04-20 RX ADMIN — POTASSIUM CHLORIDE 20 MEQ: 7.46 INJECTION, SOLUTION INTRAVENOUS at 22:41

## 2022-04-20 RX ADMIN — IPRATROPIUM BROMIDE AND ALBUTEROL SULFATE 1 AMPULE: 2.5; .5 SOLUTION RESPIRATORY (INHALATION) at 21:43

## 2022-04-20 RX ADMIN — ALBUTEROL SULFATE 2.5 MG: 2.5 SOLUTION RESPIRATORY (INHALATION) at 21:54

## 2022-04-20 RX ADMIN — POTASSIUM CHLORIDE 40 MEQ: 20 TABLET, EXTENDED RELEASE ORAL at 22:37

## 2022-04-20 RX ADMIN — IOPAMIDOL 75 ML: 755 INJECTION, SOLUTION INTRAVENOUS at 21:18

## 2022-04-20 ASSESSMENT — ENCOUNTER SYMPTOMS
EYE PAIN: 0
COLOR CHANGE: 0
CHEST TIGHTNESS: 1
TROUBLE SWALLOWING: 1
CONSTIPATION: 0
RHINORRHEA: 0
SHORTNESS OF BREATH: 1
ABDOMINAL PAIN: 1
SORE THROAT: 1
VOMITING: 0
NAUSEA: 1
DIARRHEA: 0

## 2022-04-21 ENCOUNTER — HOSPITAL ENCOUNTER (INPATIENT)
Age: 67
LOS: 12 days | Discharge: NURSING, REHAB OR LONG-TERM CARE | DRG: 853 | End: 2022-05-03
Attending: EMERGENCY MEDICINE | Admitting: INTERNAL MEDICINE
Payer: MEDICARE

## 2022-04-21 ENCOUNTER — APPOINTMENT (OUTPATIENT)
Dept: CT IMAGING | Age: 67
DRG: 853 | End: 2022-04-21
Payer: MEDICARE

## 2022-04-21 ENCOUNTER — APPOINTMENT (OUTPATIENT)
Dept: INTERVENTIONAL RADIOLOGY/VASCULAR | Age: 67
DRG: 853 | End: 2022-04-21
Payer: MEDICARE

## 2022-04-21 DIAGNOSIS — K65.1 INTRA-ABDOMINAL ABSCESS (HCC): Primary | ICD-10-CM

## 2022-04-21 PROBLEM — J90 PLEURAL EFFUSION ON RIGHT: Status: ACTIVE | Noted: 2022-04-21

## 2022-04-21 PROBLEM — M46.20 VERTEBRAL OSTEOMYELITIS (HCC): Status: ACTIVE | Noted: 2022-04-21

## 2022-04-21 PROBLEM — E66.01 OBESITY, CLASS III, BMI 40-49.9 (MORBID OBESITY) (HCC): Status: ACTIVE | Noted: 2022-04-21

## 2022-04-21 PROBLEM — E87.6 HYPOKALEMIA: Status: ACTIVE | Noted: 2022-04-21

## 2022-04-21 PROBLEM — E83.51 HYPOCALCEMIA: Status: ACTIVE | Noted: 2022-04-21

## 2022-04-21 LAB
-: ABNORMAL
ABO/RH: NORMAL
ABSOLUTE EOS #: 0 K/UL (ref 0–0.4)
ABSOLUTE IMMATURE GRANULOCYTE: 0 K/UL (ref 0–0.3)
ABSOLUTE LYMPH #: 1.13 K/UL (ref 1–4.8)
ABSOLUTE MONO #: 0.99 K/UL (ref 0.1–0.8)
ANION GAP SERPL CALCULATED.3IONS-SCNC: 10 MMOL/L (ref 9–17)
ANION GAP SERPL CALCULATED.3IONS-SCNC: 10 MMOL/L (ref 9–17)
ANTIBODY SCREEN: NEGATIVE
ARM BAND NUMBER: NORMAL
BACTERIA: ABNORMAL
BASOPHILS # BLD: 0 % (ref 0–2)
BASOPHILS ABSOLUTE: 0 K/UL (ref 0–0.2)
BILIRUBIN URINE: NEGATIVE
BUN BLDV-MCNC: 14 MG/DL (ref 8–23)
BUN BLDV-MCNC: 14 MG/DL (ref 8–23)
CALCIUM SERPL-MCNC: 7.7 MG/DL (ref 8.6–10.4)
CALCIUM SERPL-MCNC: 7.7 MG/DL (ref 8.6–10.4)
CASTS UA: ABNORMAL /LPF (ref 0–8)
CHLORIDE BLD-SCNC: 103 MMOL/L (ref 98–107)
CHLORIDE BLD-SCNC: 104 MMOL/L (ref 98–107)
CO2: 25 MMOL/L (ref 20–31)
CO2: 26 MMOL/L (ref 20–31)
COLOR: YELLOW
CREAT SERPL-MCNC: 0.85 MG/DL (ref 0.5–0.9)
CREAT SERPL-MCNC: 0.91 MG/DL (ref 0.5–0.9)
CULTURE: NORMAL
EKG ATRIAL RATE: 108 BPM
EKG Q-T INTERVAL: 362 MS
EKG QRS DURATION: 132 MS
EKG QTC CALCULATION (BAZETT): 483 MS
EKG R AXIS: -25 DEGREES
EKG T AXIS: -4 DEGREES
EKG VENTRICULAR RATE: 107 BPM
EOSINOPHILS RELATIVE PERCENT: 0 % (ref 1–4)
EPITHELIAL CELLS UA: ABNORMAL /HPF (ref 0–5)
EXPIRATION DATE: NORMAL
GFR AFRICAN AMERICAN: >60 ML/MIN
GFR AFRICAN AMERICAN: >60 ML/MIN
GFR NON-AFRICAN AMERICAN: >60 ML/MIN
GFR NON-AFRICAN AMERICAN: >60 ML/MIN
GFR SERPL CREATININE-BSD FRML MDRD: ABNORMAL ML/MIN/{1.73_M2}
GFR SERPL CREATININE-BSD FRML MDRD: ABNORMAL ML/MIN/{1.73_M2}
GLUCOSE BLD-MCNC: 167 MG/DL (ref 70–99)
GLUCOSE BLD-MCNC: 193 MG/DL (ref 70–99)
GLUCOSE URINE: NEGATIVE
HCT VFR BLD CALC: 28.1 % (ref 36.3–47.1)
HEMOGLOBIN: 8.4 G/DL (ref 11.9–15.1)
IMMATURE GRANULOCYTES: 0 %
INR BLD: 1.6
KETONES, URINE: NEGATIVE
LACTIC ACID, SEPSIS WHOLE BLOOD: 1.8 MMOL/L (ref 0.5–1.9)
LACTIC ACID, WHOLE BLOOD: 1.4 MMOL/L (ref 0.7–2.1)
LEUKOCYTE ESTERASE, URINE: ABNORMAL
LYMPHOCYTES # BLD: 8 % (ref 24–44)
MAGNESIUM: 1.9 MG/DL (ref 1.6–2.6)
MCH RBC QN AUTO: 28 PG (ref 25.2–33.5)
MCHC RBC AUTO-ENTMCNC: 29.9 G/DL (ref 28.4–34.8)
MCV RBC AUTO: 93.7 FL (ref 82.6–102.9)
MONOCYTES # BLD: 7 % (ref 1–7)
MORPHOLOGY: ABNORMAL
NITRITE, URINE: NEGATIVE
NRBC AUTOMATED: 0 PER 100 WBC
PARTIAL THROMBOPLASTIN TIME: 28.2 SEC (ref 20.5–30.5)
PDW BLD-RTO: 15.9 % (ref 11.8–14.4)
PH UA: 5.5 (ref 5–8)
PLATELET # BLD: 607 K/UL (ref 138–453)
PMV BLD AUTO: 9.2 FL (ref 8.1–13.5)
POTASSIUM SERPL-SCNC: 2.9 MMOL/L (ref 3.7–5.3)
POTASSIUM SERPL-SCNC: 3.6 MMOL/L (ref 3.7–5.3)
PROTEIN UA: ABNORMAL
PROTHROMBIN TIME: 16.2 SEC (ref 9.1–12.3)
RBC # BLD: 3 M/UL (ref 3.95–5.11)
RBC UA: ABNORMAL /HPF (ref 0–4)
SEG NEUTROPHILS: 85 % (ref 36–66)
SEGMENTED NEUTROPHILS ABSOLUTE COUNT: 11.98 K/UL (ref 1.8–7.7)
SODIUM BLD-SCNC: 139 MMOL/L (ref 135–144)
SODIUM BLD-SCNC: 139 MMOL/L (ref 135–144)
SPECIFIC GRAVITY UA: 1.03 (ref 1–1.03)
SPECIMEN DESCRIPTION: NORMAL
TURBIDITY: ABNORMAL
URINE HGB: ABNORMAL
UROBILINOGEN, URINE: NORMAL
WBC # BLD: 14.1 K/UL (ref 3.5–11.3)
WBC UA: ABNORMAL /HPF (ref 0–5)

## 2022-04-21 PROCEDURE — 2500000003 HC RX 250 WO HCPCS: Performed by: CLINICAL NURSE SPECIALIST

## 2022-04-21 PROCEDURE — C1729 CATH, DRAINAGE: HCPCS

## 2022-04-21 PROCEDURE — 85610 PROTHROMBIN TIME: CPT

## 2022-04-21 PROCEDURE — 87040 BLOOD CULTURE FOR BACTERIA: CPT

## 2022-04-21 PROCEDURE — 6360000002 HC RX W HCPCS: Performed by: CLINICAL NURSE SPECIALIST

## 2022-04-21 PROCEDURE — 81001 URINALYSIS AUTO W/SCOPE: CPT

## 2022-04-21 PROCEDURE — 96361 HYDRATE IV INFUSION ADD-ON: CPT

## 2022-04-21 PROCEDURE — 0T9030Z DRAINAGE OF RIGHT KIDNEY WITH DRAINAGE DEVICE, PERCUTANEOUS APPROACH: ICD-10-PCS | Performed by: RADIOLOGY

## 2022-04-21 PROCEDURE — 96374 THER/PROPH/DIAG INJ IV PUSH: CPT

## 2022-04-21 PROCEDURE — 87086 URINE CULTURE/COLONY COUNT: CPT

## 2022-04-21 PROCEDURE — 93010 ELECTROCARDIOGRAM REPORT: CPT | Performed by: INTERNAL MEDICINE

## 2022-04-21 PROCEDURE — 83735 ASSAY OF MAGNESIUM: CPT

## 2022-04-21 PROCEDURE — 2709999900 HC NON-CHARGEABLE SUPPLY

## 2022-04-21 PROCEDURE — 87075 CULTR BACTERIA EXCEPT BLOOD: CPT

## 2022-04-21 PROCEDURE — 96375 TX/PRO/DX INJ NEW DRUG ADDON: CPT

## 2022-04-21 PROCEDURE — 76937 US GUIDE VASCULAR ACCESS: CPT

## 2022-04-21 PROCEDURE — 96365 THER/PROPH/DIAG IV INF INIT: CPT

## 2022-04-21 PROCEDURE — 6360000004 HC RX CONTRAST MEDICATION: Performed by: STUDENT IN AN ORGANIZED HEALTH CARE EDUCATION/TRAINING PROGRAM

## 2022-04-21 PROCEDURE — 2580000003 HC RX 258: Performed by: STUDENT IN AN ORGANIZED HEALTH CARE EDUCATION/TRAINING PROGRAM

## 2022-04-21 PROCEDURE — 85025 COMPLETE CBC W/AUTO DIFF WBC: CPT

## 2022-04-21 PROCEDURE — 99223 1ST HOSP IP/OBS HIGH 75: CPT | Performed by: INTERNAL MEDICINE

## 2022-04-21 PROCEDURE — 86901 BLOOD TYPING SEROLOGIC RH(D): CPT

## 2022-04-21 PROCEDURE — 74177 CT ABD & PELVIS W/CONTRAST: CPT

## 2022-04-21 PROCEDURE — 87077 CULTURE AEROBIC IDENTIFY: CPT

## 2022-04-21 PROCEDURE — 6370000000 HC RX 637 (ALT 250 FOR IP): Performed by: INTERNAL MEDICINE

## 2022-04-21 PROCEDURE — 87205 SMEAR GRAM STAIN: CPT

## 2022-04-21 PROCEDURE — 2580000003 HC RX 258: Performed by: CLINICAL NURSE SPECIALIST

## 2022-04-21 PROCEDURE — 86850 RBC ANTIBODY SCREEN: CPT

## 2022-04-21 PROCEDURE — 2060000000 HC ICU INTERMEDIATE R&B

## 2022-04-21 PROCEDURE — 0W9J30Z DRAINAGE OF PELVIC CAVITY WITH DRAINAGE DEVICE, PERCUTANEOUS APPROACH: ICD-10-PCS | Performed by: RADIOLOGY

## 2022-04-21 PROCEDURE — 6360000004 HC RX CONTRAST MEDICATION: Performed by: CLINICAL NURSE SPECIALIST

## 2022-04-21 PROCEDURE — 86900 BLOOD TYPING SEROLOGIC ABO: CPT

## 2022-04-21 PROCEDURE — 6370000000 HC RX 637 (ALT 250 FOR IP): Performed by: CLINICAL NURSE SPECIALIST

## 2022-04-21 PROCEDURE — C1769 GUIDE WIRE: HCPCS

## 2022-04-21 PROCEDURE — 87070 CULTURE OTHR SPECIMN AEROBIC: CPT

## 2022-04-21 PROCEDURE — A4216 STERILE WATER/SALINE, 10 ML: HCPCS | Performed by: STUDENT IN AN ORGANIZED HEALTH CARE EDUCATION/TRAINING PROGRAM

## 2022-04-21 PROCEDURE — 93005 ELECTROCARDIOGRAM TRACING: CPT | Performed by: STUDENT IN AN ORGANIZED HEALTH CARE EDUCATION/TRAINING PROGRAM

## 2022-04-21 PROCEDURE — 6360000002 HC RX W HCPCS: Performed by: EMERGENCY MEDICINE

## 2022-04-21 PROCEDURE — 2500000003 HC RX 250 WO HCPCS: Performed by: STUDENT IN AN ORGANIZED HEALTH CARE EDUCATION/TRAINING PROGRAM

## 2022-04-21 PROCEDURE — 6360000002 HC RX W HCPCS: Performed by: STUDENT IN AN ORGANIZED HEALTH CARE EDUCATION/TRAINING PROGRAM

## 2022-04-21 PROCEDURE — 83605 ASSAY OF LACTIC ACID: CPT

## 2022-04-21 PROCEDURE — 99222 1ST HOSP IP/OBS MODERATE 55: CPT | Performed by: INTERNAL MEDICINE

## 2022-04-21 PROCEDURE — 85730 THROMBOPLASTIN TIME PARTIAL: CPT

## 2022-04-21 PROCEDURE — 87186 SC STD MICRODIL/AGAR DIL: CPT

## 2022-04-21 PROCEDURE — 2580000003 HC RX 258: Performed by: EMERGENCY MEDICINE

## 2022-04-21 PROCEDURE — 80048 BASIC METABOLIC PNL TOTAL CA: CPT

## 2022-04-21 PROCEDURE — 36415 COLL VENOUS BLD VENIPUNCTURE: CPT

## 2022-04-21 PROCEDURE — 99285 EMERGENCY DEPT VISIT HI MDM: CPT

## 2022-04-21 PROCEDURE — 49405 IMAGE CATH FLUID COLXN VISC: CPT

## 2022-04-21 RX ORDER — SODIUM CHLORIDE, SODIUM LACTATE, POTASSIUM CHLORIDE, AND CALCIUM CHLORIDE .6; .31; .03; .02 G/100ML; G/100ML; G/100ML; G/100ML
1000 INJECTION, SOLUTION INTRAVENOUS ONCE
Status: COMPLETED | OUTPATIENT
Start: 2022-04-21 | End: 2022-04-21

## 2022-04-21 RX ORDER — METOPROLOL TARTRATE 50 MG/1
25 TABLET, FILM COATED ORAL 2 TIMES DAILY
Status: DISCONTINUED | OUTPATIENT
Start: 2022-04-21 | End: 2022-04-28

## 2022-04-21 RX ORDER — POTASSIUM CHLORIDE 20 MEQ/1
40 TABLET, EXTENDED RELEASE ORAL PRN
Status: DISCONTINUED | OUTPATIENT
Start: 2022-04-21 | End: 2022-05-03 | Stop reason: HOSPADM

## 2022-04-21 RX ORDER — SODIUM CHLORIDE 9 MG/ML
INJECTION, SOLUTION INTRAVENOUS PRN
Status: DISCONTINUED | OUTPATIENT
Start: 2022-04-21 | End: 2022-05-03 | Stop reason: HOSPADM

## 2022-04-21 RX ORDER — POLYETHYLENE GLYCOL 3350 17 G/17G
17 POWDER, FOR SOLUTION ORAL DAILY PRN
Status: DISCONTINUED | OUTPATIENT
Start: 2022-04-21 | End: 2022-05-03 | Stop reason: HOSPADM

## 2022-04-21 RX ORDER — POTASSIUM CHLORIDE 20 MEQ/1
20 TABLET, EXTENDED RELEASE ORAL EVERY EVENING
Status: DISCONTINUED | OUTPATIENT
Start: 2022-04-21 | End: 2022-05-03 | Stop reason: HOSPADM

## 2022-04-21 RX ORDER — MORPHINE SULFATE 4 MG/ML
2 INJECTION, SOLUTION INTRAMUSCULAR; INTRAVENOUS
Status: DISCONTINUED | OUTPATIENT
Start: 2022-04-21 | End: 2022-05-02

## 2022-04-21 RX ORDER — POTASSIUM CHLORIDE 7.45 MG/ML
40 INJECTION INTRAVENOUS ONCE
Status: COMPLETED | OUTPATIENT
Start: 2022-04-21 | End: 2022-04-21

## 2022-04-21 RX ORDER — ONDANSETRON 4 MG/1
4 TABLET, ORALLY DISINTEGRATING ORAL EVERY 8 HOURS PRN
Status: DISCONTINUED | OUTPATIENT
Start: 2022-04-21 | End: 2022-05-03 | Stop reason: HOSPADM

## 2022-04-21 RX ORDER — ONDANSETRON 2 MG/ML
4 INJECTION INTRAMUSCULAR; INTRAVENOUS ONCE
Status: COMPLETED | OUTPATIENT
Start: 2022-04-21 | End: 2022-04-21

## 2022-04-21 RX ORDER — SODIUM CHLORIDE, SODIUM LACTATE, POTASSIUM CHLORIDE, CALCIUM CHLORIDE 600; 310; 30; 20 MG/100ML; MG/100ML; MG/100ML; MG/100ML
INJECTION, SOLUTION INTRAVENOUS CONTINUOUS
Status: DISCONTINUED | OUTPATIENT
Start: 2022-04-21 | End: 2022-04-22

## 2022-04-21 RX ORDER — ACETAMINOPHEN 650 MG/1
650 SUPPOSITORY RECTAL EVERY 6 HOURS PRN
Status: DISCONTINUED | OUTPATIENT
Start: 2022-04-21 | End: 2022-05-03 | Stop reason: HOSPADM

## 2022-04-21 RX ORDER — SODIUM CHLORIDE 0.9 % (FLUSH) 0.9 %
10 SYRINGE (ML) INJECTION 2 TIMES DAILY
Status: DISCONTINUED | OUTPATIENT
Start: 2022-04-21 | End: 2022-05-03 | Stop reason: HOSPADM

## 2022-04-21 RX ORDER — SODIUM CHLORIDE 0.9 % (FLUSH) 0.9 %
5-40 SYRINGE (ML) INJECTION EVERY 12 HOURS SCHEDULED
Status: DISCONTINUED | OUTPATIENT
Start: 2022-04-21 | End: 2022-05-03 | Stop reason: HOSPADM

## 2022-04-21 RX ORDER — MORPHINE SULFATE 4 MG/ML
4 INJECTION, SOLUTION INTRAMUSCULAR; INTRAVENOUS ONCE
Status: COMPLETED | OUTPATIENT
Start: 2022-04-21 | End: 2022-04-21

## 2022-04-21 RX ORDER — ONDANSETRON 2 MG/ML
4 INJECTION INTRAMUSCULAR; INTRAVENOUS EVERY 6 HOURS PRN
Status: DISCONTINUED | OUTPATIENT
Start: 2022-04-21 | End: 2022-05-03 | Stop reason: HOSPADM

## 2022-04-21 RX ORDER — MORPHINE SULFATE 4 MG/ML
4 INJECTION, SOLUTION INTRAMUSCULAR; INTRAVENOUS
Status: DISCONTINUED | OUTPATIENT
Start: 2022-04-21 | End: 2022-05-02

## 2022-04-21 RX ORDER — MAGNESIUM SULFATE 1 G/100ML
1000 INJECTION INTRAVENOUS PRN
Status: DISCONTINUED | OUTPATIENT
Start: 2022-04-21 | End: 2022-05-03 | Stop reason: HOSPADM

## 2022-04-21 RX ORDER — POTASSIUM CHLORIDE 7.45 MG/ML
10 INJECTION INTRAVENOUS PRN
Status: DISCONTINUED | OUTPATIENT
Start: 2022-04-21 | End: 2022-05-03 | Stop reason: HOSPADM

## 2022-04-21 RX ORDER — MAGNESIUM SULFATE IN WATER 40 MG/ML
2000 INJECTION, SOLUTION INTRAVENOUS ONCE
Status: COMPLETED | OUTPATIENT
Start: 2022-04-21 | End: 2022-04-21

## 2022-04-21 RX ORDER — ACETAMINOPHEN 325 MG/1
650 TABLET ORAL EVERY 6 HOURS PRN
Status: DISCONTINUED | OUTPATIENT
Start: 2022-04-21 | End: 2022-05-03 | Stop reason: HOSPADM

## 2022-04-21 RX ORDER — SODIUM CHLORIDE 0.9 % (FLUSH) 0.9 %
10 SYRINGE (ML) INJECTION PRN
Status: DISCONTINUED | OUTPATIENT
Start: 2022-04-21 | End: 2022-05-03 | Stop reason: HOSPADM

## 2022-04-21 RX ADMIN — PIPERACILLIN AND TAZOBACTAM 3375 MG: 3; .375 INJECTION, POWDER, FOR SOLUTION INTRAVENOUS at 10:48

## 2022-04-21 RX ADMIN — METOPROLOL TARTRATE 25 MG: 25 TABLET ORAL at 10:43

## 2022-04-21 RX ADMIN — POTASSIUM CHLORIDE 10 MEQ: 10 INJECTION, SOLUTION INTRAVENOUS at 05:41

## 2022-04-21 RX ADMIN — FAMOTIDINE 20 MG: 10 INJECTION INTRAVENOUS at 02:20

## 2022-04-21 RX ADMIN — IOPAMIDOL 75 ML: 755 INJECTION, SOLUTION INTRAVENOUS at 09:10

## 2022-04-21 RX ADMIN — SODIUM CHLORIDE, POTASSIUM CHLORIDE, SODIUM LACTATE AND CALCIUM CHLORIDE: 600; 310; 30; 20 INJECTION, SOLUTION INTRAVENOUS at 20:50

## 2022-04-21 RX ADMIN — SODIUM CHLORIDE, PRESERVATIVE FREE 20 MG: 5 INJECTION INTRAVENOUS at 10:43

## 2022-04-21 RX ADMIN — POTASSIUM CHLORIDE 20 MEQ: 1500 TABLET, EXTENDED RELEASE ORAL at 17:51

## 2022-04-21 RX ADMIN — ONDANSETRON 4 MG: 2 INJECTION INTRAMUSCULAR; INTRAVENOUS at 04:01

## 2022-04-21 RX ADMIN — MAGNESIUM SULFATE 2000 MG: 2 INJECTION INTRAVENOUS at 03:44

## 2022-04-21 RX ADMIN — SODIUM CHLORIDE, POTASSIUM CHLORIDE, SODIUM LACTATE AND CALCIUM CHLORIDE: 600; 310; 30; 20 INJECTION, SOLUTION INTRAVENOUS at 07:46

## 2022-04-21 RX ADMIN — POTASSIUM CHLORIDE 10 MEQ: 10 INJECTION, SOLUTION INTRAVENOUS at 03:44

## 2022-04-21 RX ADMIN — MORPHINE SULFATE 4 MG: 4 INJECTION INTRAVENOUS at 02:20

## 2022-04-21 RX ADMIN — BENZOCAINE AND MENTHOL 1 LOZENGE: 15; 3.6 LOZENGE ORAL at 17:51

## 2022-04-21 RX ADMIN — SODIUM CHLORIDE, PRESERVATIVE FREE 20 MG: 5 INJECTION INTRAVENOUS at 20:45

## 2022-04-21 RX ADMIN — METOPROLOL TARTRATE 25 MG: 25 TABLET ORAL at 20:44

## 2022-04-21 RX ADMIN — VANCOMYCIN HYDROCHLORIDE 1250 MG: 10 INJECTION, POWDER, LYOPHILIZED, FOR SOLUTION INTRAVENOUS at 23:12

## 2022-04-21 RX ADMIN — OXYBUTYNIN CHLORIDE 15 MG: 10 TABLET, EXTENDED RELEASE ORAL at 10:43

## 2022-04-21 RX ADMIN — SODIUM CHLORIDE, POTASSIUM CHLORIDE, SODIUM LACTATE AND CALCIUM CHLORIDE: 600; 310; 30; 20 INJECTION, SOLUTION INTRAVENOUS at 10:56

## 2022-04-21 RX ADMIN — POTASSIUM CHLORIDE 10 MEQ: 7.46 INJECTION, SOLUTION INTRAVENOUS at 07:08

## 2022-04-21 RX ADMIN — POTASSIUM CHLORIDE 10 MEQ: 10 INJECTION, SOLUTION INTRAVENOUS at 04:45

## 2022-04-21 RX ADMIN — SODIUM CHLORIDE, POTASSIUM CHLORIDE, SODIUM LACTATE AND CALCIUM CHLORIDE 1000 ML: 600; 310; 30; 20 INJECTION, SOLUTION INTRAVENOUS at 02:21

## 2022-04-21 RX ADMIN — MORPHINE SULFATE 2 MG: 4 INJECTION INTRAVENOUS at 23:45

## 2022-04-21 RX ADMIN — PIPERACILLIN AND TAZOBACTAM 3375 MG: 3; .375 INJECTION, POWDER, FOR SOLUTION INTRAVENOUS at 18:30

## 2022-04-21 RX ADMIN — MORPHINE SULFATE 4 MG: 4 INJECTION, SOLUTION INTRAMUSCULAR; INTRAVENOUS at 00:18

## 2022-04-21 RX ADMIN — IOHEXOL 50 ML: 240 INJECTION, SOLUTION INTRATHECAL; INTRAVASCULAR; INTRAVENOUS; ORAL at 09:10

## 2022-04-21 RX ADMIN — MORPHINE SULFATE 2 MG: 4 INJECTION INTRAVENOUS at 10:52

## 2022-04-21 ASSESSMENT — PAIN DESCRIPTION - PAIN TYPE: TYPE: ACUTE PAIN

## 2022-04-21 ASSESSMENT — ENCOUNTER SYMPTOMS
NAUSEA: 1
RESPIRATORY NEGATIVE: 1
TROUBLE SWALLOWING: 0
SHORTNESS OF BREATH: 1
ABDOMINAL PAIN: 1
CONSTIPATION: 1
ABDOMINAL DISTENTION: 1
VOMITING: 0
COUGH: 0
TACHYPNEA: 1
SHORTNESS OF BREATH: 0
CONSTIPATION: 0
SORE THROAT: 0
DIARRHEA: 0
WHEEZING: 0
PHOTOPHOBIA: 0
BLOOD IN STOOL: 0

## 2022-04-21 ASSESSMENT — PAIN SCALES - GENERAL
PAINLEVEL_OUTOF10: 0
PAINLEVEL_OUTOF10: 10
PAINLEVEL_OUTOF10: 8
PAINLEVEL_OUTOF10: 6
PAINLEVEL_OUTOF10: 6

## 2022-04-21 ASSESSMENT — PAIN DESCRIPTION - DESCRIPTORS
DESCRIPTORS: DISCOMFORT;SHARP
DESCRIPTORS: SORE

## 2022-04-21 ASSESSMENT — PAIN DESCRIPTION - ONSET: ONSET: PROGRESSIVE

## 2022-04-21 ASSESSMENT — PAIN DESCRIPTION - LOCATION
LOCATION: ABDOMEN
LOCATION: THROAT;BACK

## 2022-04-21 ASSESSMENT — PAIN DESCRIPTION - ORIENTATION: ORIENTATION: RIGHT;LEFT

## 2022-04-21 ASSESSMENT — PAIN DESCRIPTION - FREQUENCY: FREQUENCY: INTERMITTENT

## 2022-04-21 NOTE — ED NOTES
Pt unable to tolerate po contrast. Resident and CT tech made aware.      Laurie Brasher RN  04/21/22 [id number]

## 2022-04-21 NOTE — PROGRESS NOTES
Patient seen and examined. C/o pain to LUQ. No pain to palpation to RUQ. Patient states her main complaint is the NG tube causing discomfort. CT reviewed with Dr. Yara Alfonso. Will consult IR for possible drainage of abscesses near liver.   Discussed with patient who verbalized understanding    Electronically signed by DONG Holliday CNP on 4/21/2022 at 11:06 AM

## 2022-04-21 NOTE — ED NOTES
Pt reports to the ED via life flight from [place] with complaints of a perforated bowel. Pt reports being constipated for a good week prior to deciding to come to the ED to be evaluated for pain. Potassium PTA was 2.8 and per EMS, pt received 40 of oral and 20 mEq of IV replacement. Pt also received pepcid, zosyn, vanco, 40 of lasix, 4 of morphine and 2 liter bolus PTA. Pt has current complaints of abdominal pain and heartburn. Pt is A&O x4 and speaking in complete sentences. Pt is resting in bed comfortably, NAD noted. EKG obtained.  Pt placed on full cardiac monitor     Katy Fields RN  04/21/22 [id number]

## 2022-04-21 NOTE — ED NOTES
Trauma resident at bedside. NG inserted per trauma resident. Pt tolerated well. Approx 200mL bile contents noted in collection canister. RN then begins po contrast per NG, pt tolerating well.       Ender López RN  04/21/22 2825

## 2022-04-21 NOTE — BRIEF OP NOTE
Brief Postoperative Note    Helen Chapa  YOB: 1955  [id number]    Pre-operative Diagnosis: Perinephric, perihepatic, and pelvic Abscess      Post-operative Diagnosis: Same    Procedure: Abscess drain placement    Medication Given: none    Anesthesia: 1%Lidocaine     Surgeons/Assistants:  Zonia Delgado MD and Shweta Vieira PA-C    Estimated Blood Loss: Minimal    Complications: none    Specimen: Was collected    Procedure:    Perihepatic Abscess: Successful placement of 12 [place] pigtail catheter in perihepatic abscess. Approximately 550 mL of green foul smelling fluid was obtained. Perinephric Abscess: Successful placement of 8 [place] pigtail catheter in Perinephric abscess. Approximately 10 mL of tan foul smelling fluid was obtained. Pelvic Abscess: Successful placement of 8 [place] pigtail catheter in pelvic abscess. Approximately 10 mL of yellow tinged foul smelling fluid was obtained. Drain was sutured to skin and stay fix was applied. LAURIE bulb was attached. Pt tolerated procedure well and left the department in stable condition.       Electronically signed by YUNIOR Lang on 4/21/2022 at 1:17 PM

## 2022-04-21 NOTE — PROGRESS NOTES
[address] [facility] Pharmacokinetic Monitoring Service - Vancomycin     Jenni Baxter is a 74 y.o. female starting on vancomycin therapy for intra-abdominal infection. Pharmacy consulted by Dr. Anjana Washington for monitoring and adjustment. Target Concentration: Goal AUC/MARY 400-600 mg*hr/L    Additional Antimicrobials: Zosyn    Pertinent Laboratory Values: Wt Readings from Last 1 Encounters:   04/20/22 245 lb (111.1 kg)     Temp Readings from Last 1 Encounters:   04/21/22 98.4 °F (36.9 °C)     Estimated Creatinine Clearance: 68 mL/min (A) (based on SCr of 0.91 mg/dL (H)). Recent Labs     04/20/22  2045 04/21/22  0214   CREATININE 0.95* 0.91*   WBC 12.5* 14.1*       MRSA Nasal Swab: N/A. Non-respiratory infection.     Plan:  Dosing recommendations based on Bayesian software  Patient received 1750mg IV x 1, will start 1250mg IV every 24 hours  Anticipated AUC of 480 and trough concentration of 12.3 at steady state  Renal labs as indicated   Pharmacy will continue to monitor patient and adjust therapy as indicated    Thank you for the consult,  Jeffrey Sorensen, [facility]  4/21/2022 6:25 AM

## 2022-04-21 NOTE — ED PROVIDER NOTES
[facility] ED  Emergency Department  Emergency Medicine Resident Sign-out     Care of Sharyle Drew was assumed from Dr. Eddie Johnston and is being seen for Abdominal Pain (perf bowel)  . The patient's initial evaluation and plan have been discussed with the prior provider who initially evaluated the patient.      EMERGENCY DEPARTMENT COURSE / MEDICAL DECISION MAKING:       MEDICATIONS GIVEN:  Orders Placed This Encounter   Medications    lactated ringers bolus    famotidine (PEPCID) 20 mg in sodium chloride (PF) 10 mL injection    morphine injection 4 mg    iopamidol (ISOVUE-370) 76 % injection 75 mL    iohexol (OMNIPAQUE 240) injection 50 mL    potassium chloride 10 mEq/100 mL IVPB (Peripheral Line)    magnesium sulfate 2000 mg in 50 mL IVPB premix    ondansetron (ZOFRAN) injection 4 mg       LABS / RADIOLOGY:     Labs Reviewed   CBC WITH AUTO DIFFERENTIAL - Abnormal; Notable for the following components:       Result Value    WBC 14.1 (*)     RBC 3.00 (*)     Hemoglobin 8.4 (*)     Hematocrit 28.1 (*)     RDW 15.9 (*)     Platelets 313 (*)     Seg Neutrophils 85 (*)     Lymphocytes 8 (*)     Eosinophils % 0 (*)     Segs Absolute 11.98 (*)     Absolute Mono # 0.99 (*)     All other components within normal limits   PROTIME-INR - Abnormal; Notable for the following components:    Protime 16.2 (*)     All other components within normal limits   URINALYSIS WITH MICROSCOPIC - Abnormal; Notable for the following components:    Turbidity UA Cloudy (*)     Urine Hgb SMALL (*)     Protein, UA 1+ (*)     Leukocyte Esterase, Urine SMALL (*)     Bacteria, UA FEW (*)     All other components within normal limits   BASIC METABOLIC PANEL W/ REFLEX TO MG FOR LOW K - Abnormal; Notable for the following components:    Glucose 193 (*)     CREATININE 0.91 (*)     Calcium 7.7 (*)     Potassium 2.9 (*)     All other components within normal limits   CULTURE, BLOOD 1   CULTURE, BLOOD 2   CULTURE, URINE LACTATE, SEPSIS   APTT   MAGNESIUM   TYPE AND SCREEN       CT ABDOMEN PELVIS W IV CONTRAST Additional Contrast? None    Result Date: 4/20/2022  EXAMINATION: CT OF THE ABDOMEN AND PELVIS WITH CONTRAST 4/20/2022 6:32 pm TECHNIQUE: CT of the abdomen and pelvis was performed with the administration of intravenous contrast. Multiplanar reformatted images are provided for review. Dose modulation, iterative reconstruction, and/or weight based adjustment of the mA/kV was utilized to reduce the radiation dose to as low as reasonably achievable. COMPARISON: Concurrent CT of the chest, CT abdomen pelvis 01/26/2022 and 01/08/2022 HISTORY: ORDERING SYSTEM PROVIDED HISTORY: abdominal pain with nausea and vomiting TECHNOLOGIST PROVIDED HISTORY: abdominal pain with nausea and vomiting Decision Support Exception - unselect if not a suspected or confirmed emergency medical condition->Emergency Medical Condition (MA) FINDINGS: Lower Chest: Partially visualized loculated right pleural effusion. Findings characterized to advantage and reported separately under same-day chest CT. Organs: Liver continues to enhance normally despite new multiple perihepatic collections which will be described below. Cholelithiasis without definite gallbladder wall thickening. Spleen is normal in size and attenuation. Pancreas is atrophic but unchanged. Adrenal glands are normal caliber. Markedly abnormal appearance of the right kidney and right collecting system with hypoenhancement of the renal parenchyma and gas both within a subcapsular gas and fluid collection that spans roughly 4.4 cm transverse by 3.2 cm AP and throughout the right renal pelvis and calices. The ureteral stent and percutaneous nephrostomy as well as percutaneous pigtail drainage catheters are no longer present. There are a few small residual stones throughout the collecting system on the right.   6 mm stone within the left upper pole calices with other smaller nonobstructing nephroliths. The left kidney continues to enhance appropriately without hydronephrosis, but there is perinephric stranding which is new from prior. No gas in or about the left kidney or left collecting system. GI/Bowel: Mild gaseous distension of the stomach. Mild gas distended dilated loops of small bowel in the left upper quadrant measuring up to 3.9 cm with overall waxing and waning caliber. These mildly distended loops do not have bowel wall thickening, but bowel loops in the pelvis appears thickened, though assessment is limited by the absence of enteric contrast.  The colon is normal caliber with a moderate volume of stool in the cecum and proximal ascending colon. There is colonic diverticulosis predominating in the sigmoid. Pelvis: There is a moderate volume of intraluminal gas in the bladder with mild bladder wall thickening and perivesicular stranding. The inferior bladder appears to be prolapsed, new from prior, and contains a few small calcifications. Hysterectomy. Neither ovary is able to be definitively identified. Peritoneum/Retroperitoneum: There are multifocal new mixed gas and fluid collections throughout the peritoneum and retroperitoneum which are highly suspicious for abscesses. Dominant collections are in the perihepatic region spanning approximately 13.5 cm transverse by 15 cm craniocaudal by 11 cm AP. The next largest collection is in the pelvic cul-de-sac spanning 7.1 cm transverse by 5.4 cm AP by 5.3 cm craniocaudal.  There is a collection tracking along the right psoas muscle which spans roughly 6.7 cm transverse by 2.6 cm AP by 10.2 cm craniocaudal.  There are mixed gas and fluid collections traversing the right posterior abdominal wall through the quadratus lumborum muscle and coursing along the superficial margin of the right posterior paraspinal musculature along the course of the previous pigtail drainage catheter.   Extensive free fluid along the mesentery with small foci of gas.  Aorta is atherosclerotic but patent and normal caliber. Bones/Soft Tissues: As previously mention there are new mixed gas and fluid collections traversing the right quadratus lumborum muscle and extending along the superficial margin of the right posterior paraspinal musculature. Soft tissue gas tracking along the right posterior paraspinal musculature extending cranially out of the imaged field of view. Dependent body wall edema which is new from prior. 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 New bladder prolapse with residual dependent stones in the bladder. Intraluminal gas in the bladder may be due to infection or recent instrumentation. Increased size of the previous right retroperitoneal abscess tracking along the psoas muscle (6.7 x 2.6 x 10.2 cm) which previously contained a percutaneous drainage catheter. There are abscesses tracking along the prior course of the drainage catheter through the right quadratus lumborum muscle and along the right posterior paraspinal musculature. Multifocal rim enhancing mixed gas and fluid collections worrisome for abscesses with the dominant collections in the perihepatic region spanning up to 15 cm and within the pelvic cul-de-sac spanning up to 7 cm. Free air along the mesentery in addition to the aforementioned fluid collections.   As there are some thickened loops of small bowel in the lower abdomen and pelvis, a concomitant bowel perforation is not able be excluded on this exam, with differential considerations including ischemic bowel. 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 Loculated right pleural effusion characterized to advantage on today's chest CT. Critical results were called by Dr. Alexandria Pan to Dr. Sweta Alan on 4/20/2022 at 22:21 EST. XR CHEST PORTABLE    Result Date: 4/20/2022  EXAMINATION: ONE XRAY VIEW OF THE CHEST 4/20/2022 6:06 pm COMPARISON: 01/06/2022 HISTORY: ORDERING SYSTEM PROVIDED HISTORY: shortness of breath TECHNOLOGIST PROVIDED HISTORY: shortness of breath FINDINGS: There is suboptimal inspiration. The cardiac size is normal. Mass like infiltrate in the right upper lobe. No pleural effusions. .  Pulmonary vascularity appears mildly congested. There is mild ectasia of the thoracic aorta. .No acute bony abnormalities. The hilar structures are normal.     Mass like infiltrate in the right upper lobe suggesting pneumonia or neoplasm. Underlying pulmonary vascular congestion RECOMMENDATION: Follow-up CT would be helpful. CT CHEST PULMONARY EMBOLISM W CONTRAST    Result Date: 4/20/2022  EXAMINATION: CTA OF THE CHEST 4/20/2022 6:31 pm TECHNIQUE: CTA of the chest was performed after the administration of intravenous contrast.  Multiplanar reformatted images are provided for review. MIP images are provided for review. Dose modulation, iterative reconstruction, and/or weight based adjustment of the mA/kV was utilized to reduce the radiation dose to as low as reasonably achievable. COMPARISON: None. HISTORY: ORDERING SYSTEM PROVIDED HISTORY: patient with shortness of breath with tachypnea and tachycardia. TECHNOLOGIST PROVIDED HISTORY: patient with shortness of breath with tachypnea and tachycardia. FINDINGS: Pulmonary Arteries: Pulmonary arteries are less than adequately opacified for evaluation. No obvious evidence of intraluminal filling defect to suggest pulmonary embolism. Main pulmonary artery is normal in caliber.  Mediastinum: There are a few small nonspecific lymph nodes seen in the middle mediastinum. No suspicious lymphadenopathy. Lungs/pleura: Moderately large right pleural effusion with areas of loculation accounting for the pseudotumor seen on chest x-ray. .  Mild bibasal atelectasis more notably on the right. .. No suspicious pulmonary masses are noted. Cardiomediastinal Structures: Coronary arterial calcifications are seen. Intimal calcifications associated with the thoracic aorta. No evidence of thoracic aortic aneurysm or dissection . Cardiac chambers are normal Upper Abdomen: Small hiatal hernia. Perihepatic fluid. Ectopic air seen in the abdomen. Soft Tissues/Bones: There are hypertrophic degenerative changes in the thoracic spine. No acute osseous abnormalities. Mild subcutaneous emphysema along the right posterior chest wall. Moderately large right pleural effusion with areas of loculation accounting for the pseudotumor seen on chest x-ray. . Mild bibasal atelectasis more notably on the right. .. Coronary artery/atherosclerotic disease No obvious evidence of pulmonary embolism. Mild subcutaneous emphysema along the right posterior chest wall. Perihepatic fluid. Ectopic air seen in the abdomen. Please refer to abdominal CT report RECOMMENDATIONS: No additional routine follow-up for incidental abdominal lesions. FLUORO FOR SURGICAL PROCEDURES    Result Date: 4/5/2022  Radiology exam is complete. No Radiologist dictation. Please follow up with ordering provider. RECENT VITALS:     Temp: 98.4 °F (36.9 °C),  Pulse: 114, Resp: 18, BP: 128/66, SpO2: 99 %      This patient is a 74 y.o. Female with abdominal pain, w/ intra - abdominal abscess, IV abx at 1030PM at outlying facilities, surgery and urology consulted and following, plan for admission. Intermed            OUTSTANDING TASKS / RECOMMENDATIONS:    1. Awaiting bed     FINAL IMPRESSION:     1.  Intra-abdominal abscess ([facility] [address]        DISPOSITION:         DISPOSITION:  [] Discharge   []  Transfer -    [x]  Admission -  Intermed    []  Against Medical Advice   []  Eloped   FOLLOW-UP: No follow-up provider specified.    DISCHARGE MEDICATIONS: New Prescriptions    No medications on file          Jan Huber MD  Emergency Medicine Resident  [address] [facility]       Jan Huber MD  Resident  04/21/22 1214

## 2022-04-21 NOTE — PROGRESS NOTES
Notified that patient not tolerating oral contrast. Patient seen and examined at bedside. Abdomen soft, nonperitoneal. In a fib, rate 90s. /70. Afebrile. Discussed with patient in detail the findings from her prior imaging, the purpose, risks, benefits and alternatives to performing a repeat CT scan with oral contrast. Offered NG tube placement and administration of contrast via NG. Patient agreeable. 18Fr NG tube placed through right nare and secured at 65cm. Confirmed placement with air insufflation. Patient tolerating administration of contrast per NG. CT notified and will plan for scan in 1 hour.      Thank you,   Kj Gifford MD  General Surgery PGY4

## 2022-04-21 NOTE — H&P
General Surgery H&P    PATIENT NAME: Clay Hu OF BIRTH: 1955    ADMISSION DATE: 4/21/2022  1:28 AM      TODAY'S DATE: 4/21/2022    CHIEF COMPLAINT:  Abdominal pain, feeling unwell      HISTORY OF PRESENT ILLNESS:  The patient is a 74 y.o. female  who presents with diffuse abdominal pain for the last few weeks. Pt had lithotripsy and ureteral stent exchange on April 5. Prior to that she had nephrostomy tubes and stents placed starting in January for complex UTI, hydronephrosis and perinephric abscess. Since the beginning of April she has had increasing abdominal pain that became unbearable yesterday. She presented to [place] on April 20 and was transferred to [facility] for fluid collections demonstrated on CT scan. On presentation patient continues to have abdominal pain however she is much more tender over her right flank than her abdomen. She denies nausea, vomiting, diarrhea, constipation, fever. She endorses some chills the last few days as well as weakness and fatigue. Last full meals was about a week ago. She has been attempting to drink Ensures daily for some nutrition. Other medical history includes DVT for which patient is on Xarelto. Surgical history includes tubal ligation and carpal tunnel release. She smokes 1/2 pack cigarettes daily.      Past Medical History:        Diagnosis Date    Carcinoma ([address]     Squamous Cell    Cellulitis     DVT (deep venous thrombosis) ([address] 2006    LLE    Kidney stone     Morbid obesity ([facility] [address]     Wears glasses        Past Surgical History:        Procedure Laterality Date    CARPAL TUNNEL RELEASE  1990s    CT ABSCESS DRAIN SUBCUTANEOUS  1/10/2022    CT ABSCESS DRAIN SUBCUTANEOUS 1/10/2022 [facility] CT SCAN    CYSTOSCOPY N/A 1/4/2022    CYSTOSCOPY URETERAL STENT INSERTION performed by Melissa Rosado MD at [place] [address] Right     HLL with stent    CYSTOSCOPY Right 3/1/2022    CYSTOSCOPY URETEROSCOPY [facility] HLL performed by Jenelle Medina Sophie Warner MD at [address] [facility] Right 3/1/2022    CYSTOSCOPY URETERAL STENT [address] performed by Layne Piedra MD at [address] [facility] Right 04/05/2022    Dr Justin Fajardo with stent insertion    CYSTOSCOPY Right 4/5/2022    CYSTOSCOPY URETEROSCOPY [facility]HLL performed by Layne Piedra MD at [address] [facility] Right 4/5/2022    CYSTOSCOPY URETERAL STENT [address] performed by Layne Piedra MD at [address] [facility] [address]  1/7/2022         IR NEPHROSTOMY PERCUTANEOUS RIGHT  1/5/2022    IR NEPHROSTOMY PERCUTANEOUS RIGHT 1/5/2022 Dolly Heard MD [facility] PROCED[facility]    ANNABELLA AND BSO      05/2019    TUBAL LIGATION  1993    TUBAL LIGATION      WISDOM TOOTH EXTRACTION         Medications Prior to Admission:   Not in a hospital admission. Allergies:  Estrogens    Social History:   Social History     Socioeconomic History    Marital status:      Spouse name: Not on file    Number of children: Not on file    Years of education: Not on file    Highest education level: Not on file   Occupational History    Not on file   Tobacco Use    Smoking status: Current Every Day Smoker     Packs/day: 0.50     Years: 42.00     Pack years: 21.00     Types: Cigarettes    Smokeless tobacco: Never Used   Vaping Use    Vaping Use: Never used   Substance and Sexual Activity    Alcohol use: No     Alcohol/week: 0.0 standard drinks    Drug use: No    Sexual activity: Not Currently   Other Topics Concern    Not on file   Social History Narrative    Not on file     Social Determinants of Health     Financial Resource Strain:     Difficulty of Paying Living Expenses: Not on file   Food Insecurity:     Worried About Running Out of Food in the Last Year: Not on file    Nabeel of Food in the Last Year: Not on file   Transportation Needs:     Lack of Transportation (Medical): Not on file    Lack of Transportation (Non-Medical):  Not on file   Physical Activity:     Days of Exercise per Week: Not on file    Minutes of Exercise per Session: Not on file   Stress:     Feeling of Stress : Not on file   Social Connections:     Frequency of Communication with Friends and Family: Not on file    Frequency of Social Gatherings with Friends and Family: Not on file    Attends Christianity Services: Not on file    Active Member of [address] [place] [address] or Organizations: Not on file    Attends Club or Organization Meetings: Not on file    Marital Status: Not on file   Intimate Partner Violence:     Fear of Current or Ex-Partner: Not on file    Emotionally Abused: Not on file    Physically Abused: Not on file    Sexually Abused: Not on file   Housing Stability:     Unable to Pay for Housing in the Last Year: Not on file    Number of [place] in the Last Year: Not on file    Unstable Housing in the Last Year: Not on file       Family History:       Adopted: Yes       REVIEW OF SYSTEMS:    General: Fatigue, weakness, chills  HEENT: Denies sore throat, sinus problems, allergic rhinosinusitis  Cardiovascular: Denies chest pain, palpitations, orthopnea  Pulmonary: Denies cough, shortness of breath  GI:  Abdominal and back pain, denies hematochezia, melena, nausea, vomiting  : Denies polyuria, dysuria, hematuria. Recent history of UTI, kidney stones  Endocrine: Denies diabetes, thyroid problems.   Hem/Onc: Denies hx of bleeding disorders, Denies hx of cancer   Neurological: Denies h/o CVA, TIA  Skin: Denies rashes, ulcers  Musculoskeletal: Denies muscle, joint, back pain    PHYSICAL EXAM:    VITALS:  /85   Pulse 98   Temp 98.4 °F (36.9 °C)   Resp 22   LMP  (LMP Unknown)   SpO2 98%   INTAKE/OUTPUT:   No intake or output data in the 24 hours ending 04/21/22 0312    CONSTITUTIONAL: awake, alert, cooperative, short of breath  HEAD: atraumatic, normocephalic  EYES: sclera clear, pupils equal and reactive to light  ENT: ears are symmetric, nares patent   HEENT: moist mucous membranes  NECK: Supple, symmetrical, trachea midline  LUNGS: no respiratory distress, no audible wheezing  CARDIOVASCULAR: irregularly irregular rhythm  ABDOMEN: soft, mildly tender to palpation, no masses or surgical scars noted. R CVA tenderness more severe than abdominal tenderness. MUSCULOSKELETAL: full range of motion noted  NEUROLOGIC: Awake, alert, oriented to name, place and time  EXTREMITIES: peripheral pulses normal, no pedal edema, no calf tenderness  SKIN: normal coloration and turgor      WBC:    Lab Results   Component Value Date    WBC 14.1 04/21/2022     BMP:    Lab Results   Component Value Date     04/21/2022    K PENDING 04/21/2022     04/21/2022    CO2 25 04/21/2022    BUN 14 04/21/2022    LABALBU 2.5 04/20/2022    CREATININE 0.91 04/21/2022    CALCIUM 7.7 04/21/2022    GFRAA >60 04/21/2022    LABGLOM >60 04/21/2022    GLUCOSE 193 04/21/2022       Pertinent Radiology:    Gas within both the right renal parenchyma and proximal right collecting   system while there has been recent instrumentation, the morphology is   worrisome for emphysematous pyelonephritis and pyelitis with a subcapsular   abscess spanning up to 4.4 cm.       New bladder prolapse with residual dependent stones in the bladder.    Intraluminal gas in the bladder may be due to infection or recent   instrumentation.       Increased size of the previous right retroperitoneal abscess tracking along   the psoas muscle (6.7 x 2.6 x 10.2 cm) which previously contained a   percutaneous drainage catheter.  There are abscesses tracking along the prior   course of the drainage catheter through the right quadratus lumborum muscle   and along the right posterior paraspinal musculature.       Multifocal rim enhancing mixed gas and fluid collections worrisome for   abscesses with the dominant collections in the perihepatic region spanning up   to 15 cm and within the pelvic cul-de-sac spanning up to 7 cm.       Free air along the mesentery in addition to the aforementioned fluid   collections.  As there are some thickened loops of small bowel in the lower   abdomen and pelvis, a concomitant bowel perforation is not able be excluded   on this exam, with differential considerations including ischemic bowel.       Discitis osteomyelitis at T12-L1, direct extension from the adjacent right   psoas inflammatory change.       Loculated right pleural effusion characterized to advantage on today's chest   CT.       ASSESSMENT     74year old female with abdominal pain and right CVA tenderness. Recent history of complex UTI, perinephric abscess and hydronephrosis. Pt transferred and General Surgery consulted for concern for possible bowel perforation with leukocytosis. Patient is non-peritoneal, has worse R CVA tenderness than abdominal tenderness, recently had a bowel movement and is passing gas. PLAN    1. Obtain CT abd/pelvis with PO contrast to rule out bowel perforation  2. Recommend urology consult for perinephric fat stranding and fluid accumulation  3. Recommend admit to medicine  4.  Further recommendations to follow pending imaging     Alana Del Rio DO PGY-1  4/21/22 3:12 AM

## 2022-04-21 NOTE — ED PROVIDER NOTES
[facility] ED  EMERGENCY DEPARTMENT ENCOUNTER      Pt Name: Logan Aviles  MRN: [id number]  [place] 1955  Date of evaluation: 4/20/2022  Provider: Travis Leyva MD     CHIEF COMPLAINT       Chief Complaint   Patient presents with    Abdominal Pain     Pt c/o generalized abdominal pain that began 3 days ago. Pt had lithotripsy 4/5 and has hx of kidney stents placement in January 2022     Shortness of Breath     Pt arrived via EMS. Pt received albuterol tx prior to arrival.          HISTORY OF PRESENT ILLNESS   (Location/Symptom, Timing/Onset, Context/Setting, Quality, Duration, Modifying Factors, Severity) Note limiting factors. I wore a surgical mask for the entirety of this encounter. HPI    Logan Aviles is a 74 y.o. female past medical history significant for tobacco abuse, DVTs, an iron d deficiency anemia, who presents to the emergency department presents the emergency department for evaluation for shortness of breath. Patient states has been having shortness of breath all day today. She states is accompanied by chills, chest pain, abdominal pain with nausea no vomiting. Patient states abdominal pain is localized to the mid abdomen and worse with deep breaths. She states she could not breathe in the afternoon hence calling EMS for transport to the hospital.  Patient denies a history of COPD or CHF. She also denies any sick contacts and sister is vaccinated against COVID but not for influenza. Patient also endorses sore throat, difficulty swallowing, and myalgias. Nursing Notes were reviewed. REVIEW OF SYSTEMS    (2+ for level 4; 10+ for level 5)   Review of Systems   Constitutional: Positive for chills and fatigue. Negative for activity change, diaphoresis and unexpected weight change. HENT: Positive for sore throat and trouble swallowing. Negative for congestion and rhinorrhea. Eyes: Negative for pain and visual disturbance.    Respiratory: Positive for chest tightness and shortness of breath. Cardiovascular: Positive for chest pain. Negative for palpitations. Gastrointestinal: Positive for abdominal pain and nausea. Negative for constipation, diarrhea and vomiting. Genitourinary: Negative for decreased urine volume and hematuria. Musculoskeletal: Negative for arthralgias and myalgias. Skin: Negative for color change and wound. Neurological: Negative for weakness and light-headedness. Psychiatric/Behavioral: Negative for confusion.        PAST MEDICAL HISTORY     Past Medical History:   Diagnosis Date    Carcinoma ([facility]     Squamous Cell    Cellulitis     DVT (deep venous thrombosis) ([facility] [address] [facility] 2006    LLE    Kidney stone     Morbid obesity ([facility] [address] [facility]     Wears glasses        SURGICAL HISTORY       Past Surgical History:   Procedure Laterality Date    CARPAL TUNNEL RELEASE  1990s    CT ABSCESS DRAIN SUBCUTANEOUS  1/10/2022    CT ABSCESS DRAIN SUBCUTANEOUS 1/10/2022 [facility] CT SCAN    CYSTOSCOPY N/A 1/4/2022    CYSTOSCOPY URETERAL STENT INSERTION performed by Cornelius Hoffman MD at [place] [address] Right     HLL with stent    CYSTOSCOPY Right 3/1/2022    CYSTOSCOPY URETEROSCOPY LASER-WTIH HLL performed by Cornelius Hoffman MD at [place] [address] Right 3/1/2022    CYSTOSCOPY URETERAL STENT [address] performed by Cornelius Hoffman MD at [place] [address] Right 04/05/2022    Dr Cisco Mcfadden with stent insertion    CYSTOSCOPY Right 4/5/2022    CYSTOSCOPY URETEROSCOPY LASER-HLL performed by Cornelius Hoffman MD at [place] [address] Right 4/5/2022    CYSTOSCOPY URETERAL STENT [address] performed by Cornelius Hoffman MD at [address] [facility]  1/7/2022         IR NEPHROSTOMY PERCUTANEOUS RIGHT  1/5/2022    IR NEPHROSTOMY PERCUTANEOUS RIGHT 1/5/2022 Anthony Grier MD [facility] SPECIAL PROCEDURES    ANNABELLA AND BSO      05/2019    TUBAL LIGATION  1993    TUBAL LIGATION      WISDOM TOOTH EXTRACTION Years: 42.00     Pack years: 21.00     Types: Cigarettes    Smokeless tobacco: Never Used   Vaping Use    Vaping Use: Never used   Substance and Sexual Activity    Alcohol use: No     Alcohol/week: 0.0 standard drinks    Drug use: No    Sexual activity: Not Currently   Other Topics Concern    None   Social History Narrative    None     Social Determinants of Health     Financial Resource Strain:     Difficulty of Paying Living Expenses: Not on file   Food Insecurity:     Worried About Running Out of Food in the Last Year: Not on file    Nabeel of Food in the Last Year: Not on file   Transportation Needs:     Lack of Transportation (Medical): Not on file    Lack of Transportation (Non-Medical): Not on file   Physical Activity:     Days of Exercise per Week: Not on file    Minutes of Exercise per Session: Not on file   Stress:     Feeling of Stress : Not on file   Social Connections:     Frequency of Communication with Friends and Family: Not on file    Frequency of Social Gatherings with Friends and Family: Not on file    Attends Quaker Services: Not on file    Active Member of [address] [place] [address] or Organizations: Not on file    Attends Club or Organization Meetings: Not on file    Marital Status: Not on file   Intimate Partner Violence:     Fear of Current or Ex-Partner: Not on file    Emotionally Abused: Not on file    Physically Abused: Not on file    Sexually Abused: Not on file   Housing Stability:     Unable to Pay for Housing in the Last Year: Not on file    Number of [place] in the Last Year: Not on file    Unstable Housing in the Last Year: Not on file       SCREENINGS           PHYSICAL EXAM    (up to 7 for level 4, 8 or more for level 5)     ED Triage Vitals [04/20/22 2038]   BP Temp Temp Source Pulse Resp SpO2 Height Weight   (!) 141/83 99.5 °F (37.5 °C) Tympanic 105 22 96 % 5' 1\" (1.549 m) 245 lb (111.1 kg)       Physical Exam  Vitals and nursing note reviewed.    Constitutional: General: She is not in acute distress. Appearance: She is not ill-appearing or toxic-appearing. HENT:      Head: Normocephalic and atraumatic. Right Ear: External ear normal.      Left Ear: External ear normal.      Nose: No congestion or rhinorrhea. Mouth/Throat:      Mouth: Mucous membranes are dry. Pharynx: Posterior oropharyngeal erythema (Mild erythema appreciated in the oropharynx with no exudate.) present. No oropharyngeal exudate. Eyes:      General: No scleral icterus. Right eye: No discharge. Left eye: No discharge. Conjunctiva/sclera: Conjunctivae normal.   Cardiovascular:      Rate and Rhythm: Normal rate. Pulses: Normal pulses. Heart sounds: No murmur heard. No gallop. Pulmonary:      Effort: Pulmonary effort is normal. Tachypnea present. No accessory muscle usage or respiratory distress. Breath sounds: No stridor. Wheezing present. No decreased breath sounds, rhonchi or rales. Abdominal:      General: Abdomen is flat. There is no distension. Palpations: Abdomen is soft. Tenderness: There is generalized abdominal tenderness. There is no right CVA tenderness or left CVA tenderness. Musculoskeletal:         General: No swelling, tenderness or deformity. Cervical back: Normal range of motion and neck supple. Skin:     General: Skin is warm. Capillary Refill: Capillary refill takes less than 2 seconds. Coloration: Skin is not jaundiced. Findings: No bruising or rash. Neurological:      General: No focal deficit present. Mental Status: She is alert and oriented to person, place, and time. Psychiatric:         Mood and Affect: Mood normal.         Behavior: Behavior normal.         Thought Content:  Thought content normal.         DIAGNOSTIC RESULTS     Interpretation per the Radiologist below, if available at the time of this note:  CT ABDOMEN PELVIS W IV CONTRAST Additional Contrast? None    Result Date: 4/20/2022  EXAMINATION: CT OF THE ABDOMEN AND PELVIS WITH CONTRAST 4/20/2022 6:32 pm TECHNIQUE: CT of the abdomen and pelvis was performed with the administration of intravenous contrast. Multiplanar reformatted images are provided for review. Dose modulation, iterative reconstruction, and/or weight based adjustment of the mA/kV was utilized to reduce the radiation dose to as low as reasonably achievable. COMPARISON: Concurrent CT of the chest, CT abdomen pelvis 01/26/2022 and 01/08/2022 HISTORY: ORDERING SYSTEM PROVIDED HISTORY: abdominal pain with nausea and vomiting TECHNOLOGIST PROVIDED HISTORY: abdominal pain with nausea and vomiting Decision Support Exception - unselect if not a suspected or confirmed emergency medical condition->Emergency Medical Condition (MA) FINDINGS: Lower Chest: Partially visualized loculated right pleural effusion. Findings characterized to advantage and reported separately under same-day chest CT. Organs: Liver continues to enhance normally despite new multiple perihepatic collections which will be described below. Cholelithiasis without definite gallbladder wall thickening. Spleen is normal in size and attenuation. Pancreas is atrophic but unchanged. Adrenal glands are normal caliber. Markedly abnormal appearance of the right kidney and right collecting system with hypoenhancement of the renal parenchyma and gas both within a subcapsular gas and fluid collection that spans roughly 4.4 cm transverse by 3.2 cm AP and throughout the right renal pelvis and calices. The ureteral stent and percutaneous nephrostomy as well as percutaneous pigtail drainage catheters are no longer present. There are a few small residual stones throughout the collecting system on the right. 6 mm stone within the left upper pole calices with other smaller nonobstructing nephroliths.   The left kidney continues to enhance appropriately without hydronephrosis, but there is perinephric stranding which is new from prior. No gas in or about the left kidney or left collecting system. GI/Bowel: Mild gaseous distension of the stomach. Mild gas distended dilated loops of small bowel in the left upper quadrant measuring up to 3.9 cm with overall waxing and waning caliber. These mildly distended loops do not have bowel wall thickening, but bowel loops in the pelvis appears thickened, though assessment is limited by the absence of enteric contrast.  The colon is normal caliber with a moderate volume of stool in the cecum and proximal ascending colon. There is colonic diverticulosis predominating in the sigmoid. Pelvis: There is a moderate volume of intraluminal gas in the bladder with mild bladder wall thickening and perivesicular stranding. The inferior bladder appears to be prolapsed, new from prior, and contains a few small calcifications. Hysterectomy. Neither ovary is able to be definitively identified. Peritoneum/Retroperitoneum: There are multifocal new mixed gas and fluid collections throughout the peritoneum and retroperitoneum which are highly suspicious for abscesses. Dominant collections are in the perihepatic region spanning approximately 13.5 cm transverse by 15 cm craniocaudal by 11 cm AP. The next largest collection is in the pelvic cul-de-sac spanning 7.1 cm transverse by 5.4 cm AP by 5.3 cm craniocaudal.  There is a collection tracking along the right psoas muscle which spans roughly 6.7 cm transverse by 2.6 cm AP by 10.2 cm craniocaudal.  There are mixed gas and fluid collections traversing the right posterior abdominal wall through the quadratus lumborum muscle and coursing along the superficial margin of the right posterior paraspinal musculature along the course of the previous pigtail drainage catheter. Extensive free fluid along the mesentery with small foci of gas. Aorta is atherosclerotic but patent and normal caliber. Bones/Soft Tissues:  As previously mention there are new mixed gas and fluid collections traversing the right quadratus lumborum muscle and extending along the superficial margin of the right posterior paraspinal musculature. Soft tissue gas tracking along the right posterior paraspinal musculature extending cranially out of the imaged field of view. Dependent body wall edema which is new from prior. 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 New bladder prolapse with residual dependent stones in the bladder. Intraluminal gas in the bladder may be due to infection or recent instrumentation. Increased size of the previous right retroperitoneal abscess tracking along the psoas muscle (6.7 x 2.6 x 10.2 cm) which previously contained a percutaneous drainage catheter. There are abscesses tracking along the prior course of the drainage catheter through the right quadratus lumborum muscle and along the right posterior paraspinal musculature. Multifocal rim enhancing mixed gas and fluid collections worrisome for abscesses with the dominant collections in the perihepatic region spanning up to 15 cm and within the pelvic cul-de-sac spanning up to 7 cm. Free air along the mesentery in addition to the aforementioned fluid collections. As there are some thickened loops of small bowel in the lower abdomen and pelvis, a concomitant bowel perforation is not able be excluded on this exam, with differential considerations including ischemic bowel. 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  Loculated right pleural effusion characterized to advantage on today's chest CT. Critical results were called by Dr. Indigo Obregon to Dr. Ame Marino on 4/20/2022 at 22:21 EST. XR CHEST PORTABLE    Result Date: 4/20/2022  EXAMINATION: ONE XRAY VIEW OF THE CHEST 4/20/2022 6:06 pm COMPARISON: 01/06/2022 HISTORY: ORDERING SYSTEM PROVIDED HISTORY: shortness of breath TECHNOLOGIST PROVIDED HISTORY: shortness of breath FINDINGS: There is suboptimal inspiration. The cardiac size is normal. Mass like infiltrate in the right upper lobe. No pleural effusions. .  Pulmonary vascularity appears mildly congested. There is mild ectasia of the thoracic aorta. .No acute bony abnormalities. The hilar structures are normal.     Mass like infiltrate in the right upper lobe suggesting pneumonia or neoplasm. Underlying pulmonary vascular congestion RECOMMENDATION: Follow-up CT would be helpful. CT CHEST PULMONARY EMBOLISM W CONTRAST    Result Date: 4/20/2022  EXAMINATION: CTA OF THE CHEST 4/20/2022 6:31 pm TECHNIQUE: CTA of the chest was performed after the administration of intravenous contrast.  Multiplanar reformatted images are provided for review. MIP images are provided for review. Dose modulation, iterative reconstruction, and/or weight based adjustment of the mA/kV was utilized to reduce the radiation dose to as low as reasonably achievable. COMPARISON: None. HISTORY: ORDERING SYSTEM PROVIDED HISTORY: patient with shortness of breath with tachypnea and tachycardia. TECHNOLOGIST PROVIDED HISTORY: patient with shortness of breath with tachypnea and tachycardia. FINDINGS: Pulmonary Arteries: Pulmonary arteries are less than adequately opacified for evaluation. No obvious evidence of intraluminal filling defect to suggest pulmonary embolism. Main pulmonary artery is normal in caliber. Mediastinum: There are a few small nonspecific lymph nodes seen in the middle mediastinum. No suspicious lymphadenopathy. Lungs/pleura:  Moderately Phosphatase 117 (*)     Albumin 2.5 (*)     Albumin/Globulin Ratio 0.5 (*)     GFR Non- 59 (*)     All other components within normal limits   LIPASE - Abnormal; Notable for the following components:    Lipase 9 (*)     All other components within normal limits   TROPONIN - Abnormal; Notable for the following components:    Troponin, High Sensitivity 27 (*)     All other components within normal limits   BRAIN NATRIURETIC PEPTIDE - Abnormal; Notable for the following components:    Pro-BNP 3,599 (*)     All other components within normal limits   LACTIC ACID - Abnormal; Notable for the following components:    Lactic Acid 4.4 (*)     All other components within normal limits   STREP SCREEN GROUP A THROAT   RAPID INFLUENZA A/B ANTIGENS   COVID-19, RAPID   MAGNESIUM        All other labs were within normal range or not returned as of this dictation.     EMERGENCY DEPARTMENT COURSE and DIFFERENTIAL DIAGNOSIS/MDM:   Vitals:    Vitals:    04/20/22 2231 04/20/22 2243 04/20/22 2258 04/20/22 2313   BP: (!) 130/52 (!) 125/30 (!) 112/52 120/67   Pulse: 116 112 116 111   Resp: (!) 38 (!) 31 (!) 39 (!) 31   Temp:       TempSrc:       SpO2: 92% 92% 92% 92%   Weight:       Height:           Medications   0.9 % sodium chloride bolus (0 mLs IntraVENous Stopped 4/20/22 2338)   ipratropium-albuterol (DUONEB) nebulizer solution 1 ampule (1 ampule Inhalation Given 4/20/22 2143)   iopamidol (ISOVUE-370) 76 % injection 75 mL (75 mLs IntraVENous Given 4/20/22 2118)   furosemide (LASIX) injection 40 mg (40 mg IntraVENous Given 4/20/22 2239)   potassium chloride 10 mEq/100 mL IVPB (Peripheral Line) (0 mEq IntraVENous Stopped 4/21/22 0042)   piperacillin-tazobactam (ZOSYN) 4,500 mg in dextrose 5 % 100 mL IVPB (mini-bag) (0 mg IntraVENous Stopped 4/20/22 2331)   vancomycin (VANCOCIN) 1,750 mg in dextrose 5 % 500 mL IVPB (0 mg IntraVENous Stopped 4/21/22 0041)   morphine injection 4 mg (4 mg IntraVENous Given 4/21/22 0018) HAYDE.    ED Course as of 04/21/22 0632   u Apr 21, 2022   1217 EKG with atrial fibrillation with a rate of 107. Patient with no ST elevations or depressions concerning for STEMI. EKG with left axis deviation. Right bundle branch block appreciated. Atrial fibrillation is new compared to old EKGs. Patient however has had a right bundle branch block on old EKGs. QTC also prolonged at 483. EKG interpreted by myself. [PK]      ED Course User Index  [PK] Malina Caballero MD     64-year-old male female presenting for evaluation of shortness of breath with chest tightness, chest pain, abdominal pain and nausea. Presentation is concerning for pneumonia versus PE versus cardiac etiology versus an intra-abdominal process versus a viral infection. Work-up in the department with EKG as noted above, leukocytosis with white count of 12.5 with a left shift, anemia with a hemoglobin of 9.1 which is stable for the patient, hypokalemia with a potassium of 2.8 repleted in the department, no renal function impairment, no transaminitis, no pancreatitis, BNP of 3599 with an elevated troponin of 27. Patient with a negative influenza and COVID test.  Strep screen negative. Lactic acidosis appreciated with a lactate of 4.4. Patient with no pancreatitis. Chest x-ray with a right upper lung mass concerning for neoplasm versus pneumonia. Obtained a CT chest for PE given history which was unremarkable for any PEs but with bibasilar atelectasis with a right pleural effusion with loculation. CT of the abdomen and pelvis given patient complaining of abdominal pain on exam with multiple abscesses with the largest being 15 cm next to the liver with free air in the abdomen concerning for perforated bowel. Discussed lab and imaging results with the patient and her . Discussed plan to transfer patient to a tertiary care facility for surgical evaluation and management.   They verbalized understanding of information given and agreed to plan. Patient was discussed with [facility] Biju Haley at [facility] who accepted patient for transfer. Patient was discussed with Dr. Agueda Tracey who accepted patient for transfer for ED to ED for surgical evaluation. Patient was transferred in stable but critical condition. REVAL:     Patient was initially hypoxic on arrival to the department. Patient was placed on oxygen at 3 L with improvement. While in the department patient stated pain was tolerable and did not require pain medication. However at time of transfer patient requested pain medication and was given medication in the department. Patient did receive antibiotics with vancomycin and Zosyn administered in the department. She also received 2 L of IV fluids. Given hypoxia she received 2 dose of bronchodilators with improvement of oxygenation. CRITICAL CARE TIME   Total Critical Care time was 50 minutes, excluding separately reportable procedures. There was a high probability of clinically significant/life threatening deterioration in the patient's condition which required my urgent intervention. CONSULTS:  None    PROCEDURES:  Unless otherwise noted below, none     Procedures    FINAL IMPRESSION      1. Pleural effusion on right    2. Intra-abdominal abscess ([address]    3. Perforated bowel ([address]    4. Hypervolemia, unspecified hypervolemia type    5. Hypoxia    6. Respiratory distress    7. Hypokalemia    8. Sepsis, due to unspecified organism, unspecified whether acute organ dysfunction present [facility])          DISPOSITION/PLAN   DISPOSITION Decision To Transfer 04/20/2022 10:30:15 PM      PATIENT REFERRED TO:  No follow-up provider specified.     DISCHARGE MEDICATIONS:  Discharge Medication List as of 4/21/2022 12:43 AM             (Please note:  Portions of this note were completed with a voice recognition program.  Efforts were made to edit the dictations but occasionally words and phrases are mis-transcribed.)  Form [id number]    Kevin Abdi MD (electronically signed)  Emergency Medicine Provider     Kevin Abdi MD  04/21/22 6910

## 2022-04-21 NOTE — ED PROVIDER NOTES
[facility] ED  Emergency Department Encounter  Emergency Medicine Resident     Pt Sheryl Gar  MRN: [id number]  [place] 1955  Date of evaluation: 4/21/22  PCP:  DONG Atwood CNP    CHIEF COMPLAINT       Chief Complaint   Patient presents with    Abdominal Pain     perf bowel       HISTORY OF PRESENT ILLNESS  (Location/Symptom, Timing/Onset, Context/Setting, Quality, Duration, Modifying Factors, Severity.)      Cheryl Jasso is a 74 y.o. female who presents as transfer from outside facility with concern for bowel perforation multiple intra-abdominal abscesses. Progressively worsening abdominal pain over the last several days prompted patient evaluation. Treated with Vanco Zosyn, initially lactic elevated at 4.4 treated with fluid bolus. Hypokalemic with potassium replaced. Patient with a significant urologic history with perinephric abscess, large kidney stones requiring stenting and surgical intervention, most recently 3/1/2022. 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 PAST MEDICAL / SURGICAL / SOCIAL / FAMILY HISTORY      has a past medical history of Carcinoma ([address] Cellulitis, DVT (deep venous thrombosis) ([address] Kidney stone, Morbid obesity ([address] and Wears glasses. has a past surgical history that includes Tubal ligation (1993); Carpal tunnel release (1990s); [place] tooth extraction; Tubal ligation; candy and bso (cervix removed); Cystoscopy (N/A, 1/4/2022); IR GUIDED NEPHROSTOMY CATH PLACEMENT RIGHT (1/5/2022); Insert Midline Catheter (1/7/2022); CT ABSCESS DRAINAGE (1/10/2022); Cystoscopy (Right); Cystoscopy (Right, 3/1/2022); Cystoscopy (Right, 3/1/2022); Cystoscopy (Right, 04/05/2022); Cystoscopy (Right, 4/5/2022); and Cystoscopy (Right, 4/5/2022).     Social History     Socioeconomic History    Marital status:      Spouse name: Not on file    Number of children: Not on file    Years of education: Not on file    Highest education level: Not on file   Occupational History    Not on file   Tobacco Use    Smoking status: Current Every Day Smoker     Packs/day: 0.50     Years: 42.00     Pack years: 21.00     Types: Cigarettes    Smokeless tobacco: Never Used   Vaping Use    Vaping Use: Never used   Substance and Sexual Activity    Alcohol use: No     Alcohol/week: 0.0 standard drinks    Drug use: No    Sexual activity: Not Currently   Other Topics Concern    Not on file   Social History Narrative    Not on file     Social Determinants of Health     Financial Resource Strain:     Difficulty of Paying Living Expenses: Not on file   Food Insecurity:     Worried About Running Out of Food in the Last Year: Not on file    Nabeel of Food in the Last Year: Not on file   Transportation Needs:     Lack of Transportation (Medical): Not on file    Lack of Transportation (Non-Medical): Not on file   Physical Activity:     Days of Exercise per Week: Not on file    Minutes of Exercise per Session: Not on file   Stress:     Feeling of Stress : Not on file   Social Connections:     Frequency of Communication with Friends and Family: Not on file    Frequency of Social Gatherings with Friends and Family: Not on file    Attends Jewish Services: Not on file    Active Member of Editorially Group or Organizations: Not on file    Attends Club or Organization Meetings: Not on file    Marital Status: Not on file   Intimate Partner Violence:     Fear of Current or Ex-Partner: Not on file    Emotionally Abused: Not on file    Physically Abused: Not on file    Sexually Abused: Not on file   Housing Stability:     Unable to Pay for Housing in the Last Year: Not on file    Number of [place] in the Last Year: Not on file    Unstable Housing in the Last Year: Not on file       Family History   Adopted:  Yes       Allergies:  Estrogens    Home Medications:  Prior to Admission medications    Medication Sig Start Date End Date Taking? Authorizing Provider   oxybutynin (DITROPAN XL) 15 MG extended release tablet Take 1 tablet by mouth daily 3/29/22   DONG Holland CNP   ketorolac (TORADOL) 10 MG tablet Take 10 mg by mouth every 6 hours as needed for Pain   Patient not taking: Reported on 4/12/2022 2/4/22   Historical Provider, MD   potassium chloride (KLOR-CON M) 20 MEQ TBCR extended release tablet Take 20 mEq by mouth every evening  Patient not taking: Reported on 4/5/2022    Historical Provider, MD   Misc Natural Products (OSTEO BI-FLEX ADV DOUBLE ST) TABS Take by mouth every morning    Historical Provider, MD   polyethylene glycol (GLYCOLAX) 17 g packet Take 17 g by mouth daily as needed for Constipation     Historical Provider, MD   ipratropium-albuterol (DUONEB) 0.5-2.5 (3) MG/3ML SOLN nebulizer solution Inhale 1 vial into the lungs every 4 hours  Patient not taking: Reported on 4/12/2022    Historical Provider, MD   loratadine (CLARITIN) 10 MG capsule Take 10 mg by mouth daily     Historical Provider, MD   metoprolol tartrate (LOPRESSOR) 25 MG tablet Take 1 tablet by mouth 2 times daily 1/12/22   Oumar Ladd MD   Ascorbic Acid (VITAMIN C) 250 MG tablet Take 250 mg by mouth daily    Historical Provider, MD   vitamin E 400 UNIT capsule Take 400 Units by mouth daily    Historical Provider, MD   vitamin D (CHOLECALCIFEROL) 25 MCG (1000 UT) TABS tablet Take 1,000 Units by mouth daily    Historical Provider, MD   rivaroxaban (XARELTO) 20 MG TABS tablet Take 1 tablet by mouth daily (with breakfast) 7/17/18   Olman Perez MD   Multiple Vitamins-Minerals (THERAPEUTIC MULTIVITAMIN-MINERALS) tablet Take 1 tablet by mouth daily    Historical Provider, MD       REVIEW OF SYSTEMS    (2-9 systems for level 4, 10 or more for level 5)      Review of Systems   Constitutional: Positive for fatigue. Negative for chills and fever.    HENT: Negative for sore throat and trouble swallowing. Respiratory: Negative for shortness of breath. Cardiovascular: Positive for leg swelling (Chronic). Negative for chest pain. Gastrointestinal: Positive for abdominal pain and nausea. Negative for constipation, diarrhea and vomiting. Genitourinary: Positive for flank pain. Negative for dysuria and vaginal discharge. Musculoskeletal: Negative for arthralgias and myalgias. Skin: Negative for wound. Neurological: Negative for light-headedness and headaches. Psychiatric/Behavioral: Negative for behavioral problems. PHYSICAL EXAM   (up to 7 for level 4, 8 or more for level 5)      INITIAL VITALS:   /66   Pulse 114   Temp 98.4 °F (36.9 °C)   Resp 18   LMP  (LMP Unknown)   SpO2 99%     Physical Exam  Vitals and nursing note reviewed. Constitutional:       General: She is not in acute distress. Appearance: Normal appearance. She is well-developed. She is obese. She is ill-appearing (Chronically). She is not diaphoretic. HENT:      Head: Normocephalic and atraumatic. Right Ear: External ear normal.      Left Ear: External ear normal.      Nose: Nose normal.      Mouth/Throat:      Pharynx: Uvula midline. No oropharyngeal exudate. Eyes:      General:         Right eye: No discharge. Left eye: No discharge. Conjunctiva/sclera: Conjunctivae normal.      Pupils: Pupils are equal, round, and reactive to light. Cardiovascular:      Rate and Rhythm: Regular rhythm. Tachycardia present. Heart sounds: Normal heart sounds. Pulmonary:      Effort: Pulmonary effort is normal. No respiratory distress. Abdominal:      General: Abdomen is protuberant. There is distension. Tenderness: There is generalized abdominal tenderness. There is no guarding or rebound. Musculoskeletal:         General: No deformity. Normal range of motion. Cervical back: Normal range of motion. Skin:     General: Skin is warm and dry.       Capillary Refill: Capillary refill takes less than 2 seconds. Neurological:      General: No focal deficit present. Mental Status: She is alert and oriented to person, place, and time. Psychiatric:         Mood and Affect: Mood normal.         Behavior: Behavior normal.         DIFFERENTIAL  DIAGNOSIS     PLAN (LABS / IMAGING / EKG):  Orders Placed This Encounter   Procedures    Culture, Blood 1    Culture, Blood 2    Culture, Urine    CT ABDOMEN PELVIS W IV CONTRAST Additional Contrast? Oral    CBC with Auto Differential    Protime-INR    APTT    Urinalysis with Microscopic    Basic Metabolic Panel w/ Reflex to MG    Magnesium    Inpatient consult to Hospitalist    Inpatient consult to Urology    TYPE AND SCREEN    ADMIT TO INPATIENT       MEDICATIONS ORDERED:  Orders Placed This Encounter   Medications    lactated ringers bolus    famotidine (PEPCID) 20 mg in sodium chloride (PF) 10 mL injection    morphine injection 4 mg    iopamidol (ISOVUE-370) 76 % injection 75 mL    iohexol (OMNIPAQUE 240) injection 50 mL    potassium chloride 10 mEq/100 mL IVPB (Peripheral Line)    magnesium sulfate 2000 mg in 50 mL IVPB premix    ondansetron (ZOFRAN) injection 4 mg       DDX: Intra-abdominal abscess with or without perforated bowel    DIAGNOSTIC RESULTS / EMERGENCY DEPARTMENT COURSE / MDM     LABS:  Results for orders placed or performed during the hospital encounter of 04/21/22   Culture, Blood 1    Specimen: Blood   Result Value Ref Range    Specimen Description . BLOOD     Special Requests RIGHT FOREARM 20ML     Culture NO GROWTH <24 HRS    Culture, Blood 2    Specimen: Blood   Result Value Ref Range    Specimen Description . BLOOD     Special Requests LEFT AC 10ML     Culture NO GROWTH <24 HRS    CBC with Auto Differential   Result Value Ref Range    WBC 14.1 (H) 3.5 - 11.3 k/uL    RBC 3.00 (L) 3.95 - 5.11 m/uL    Hemoglobin 8.4 (L) 11.9 - 15.1 g/dL    Hematocrit 28.1 (L) 36.3 - 47.1 %    MCV 93.7 82.6 - 102.9 fL MCH 28.0 25.2 - 33.5 pg    MCHC 29.9 28.4 - 34.8 g/dL    RDW 15.9 (H) 11.8 - 14.4 %    Platelets 222 (H) 151 - 453 k/uL    MPV 9.2 8.1 - 13.5 fL    NRBC Automated 0.0 0.0 per 100 WBC    Immature Granulocytes 0 0 %    Seg Neutrophils 85 (H) 36 - 66 %    Lymphocytes 8 (L) 24 - 44 %    Monocytes 7 1 - 7 %    Eosinophils % 0 (L) 1 - 4 %    Basophils 0 0 - 2 %    Absolute Immature Granulocyte 0.00 0.00 - 0.30 k/uL    Segs Absolute 11.98 (H) 1.8 - 7.7 k/uL    Absolute Lymph # 1.13 1.0 - 4.8 k/uL    Absolute Mono # 0.99 (H) 0.1 - 0.8 k/uL    Absolute Eos # 0.00 0.0 - 0.4 k/uL    Basophils Absolute 0.00 0.0 - 0.2 k/uL    Morphology ANISOCYTOSIS PRESENT    Lactate, Sepsis   Result Value Ref Range    Lactic Acid, Sepsis, Whole Blood 1.8 0.5 - 1.9 mmol/L   Protime-INR   Result Value Ref Range    Protime 16.2 (H) 9.1 - 12.3 sec    INR 1.6    APTT   Result Value Ref Range    PTT 28.2 20.5 - 30.5 sec   Basic Metabolic Panel w/ Reflex to MG   Result Value Ref Range    Glucose 193 (H) 70 - 99 mg/dL    BUN 14 8 - 23 mg/dL    CREATININE 0.91 (H) 0.50 - 0.90 mg/dL    Calcium 7.7 (L) 8.6 - 10.4 mg/dL    Sodium 139 135 - 144 mmol/L    Potassium 2.9 (LL) 3.7 - 5.3 mmol/L    Chloride 104 98 - 107 mmol/L    CO2 25 20 - 31 mmol/L    Anion Gap 10 9 - 17 mmol/L    GFR Non-African American >60 >60 mL/min    GFR African American >60 >60 mL/min    GFR Comment         Magnesium   Result Value Ref Range    Magnesium 1.9 1.6 - 2.6 mg/dL   TYPE AND SCREEN   Result Value Ref Range    Expiration Date 04/24/2022,2359     Arm Band Number [id number]     ABO/Rh B POSITIVE     Antibody Screen NEGATIVE          RADIOLOGY:  CT ABDOMEN PELVIS W IV CONTRAST Additional Contrast? None    Result Date: 4/20/2022  EXAMINATION: CT OF THE ABDOMEN AND PELVIS WITH CONTRAST 4/20/2022 6:32 pm TECHNIQUE: CT of the abdomen and pelvis was performed with the administration of intravenous contrast. Multiplanar reformatted images are provided for review.  Dose modulation, iterative reconstruction, and/or weight based adjustment of the mA/kV was utilized to reduce the radiation dose to as low as reasonably achievable. COMPARISON: Concurrent CT of the chest, CT abdomen pelvis 01/26/2022 and 01/08/2022 HISTORY: ORDERING SYSTEM PROVIDED HISTORY: abdominal pain with nausea and vomiting TECHNOLOGIST PROVIDED HISTORY: abdominal pain with nausea and vomiting Decision Support Exception - unselect if not a suspected or confirmed emergency medical condition->Emergency Medical Condition (MA) FINDINGS: Lower Chest: Partially visualized loculated right pleural effusion. Findings characterized to advantage and reported separately under same-day chest CT. Organs: Liver continues to enhance normally despite new multiple perihepatic collections which will be described below. Cholelithiasis without definite gallbladder wall thickening. Spleen is normal in size and attenuation. Pancreas is atrophic but unchanged. Adrenal glands are normal caliber. Markedly abnormal appearance of the right kidney and right collecting system with hypoenhancement of the renal parenchyma and gas both within a subcapsular gas and fluid collection that spans roughly 4.4 cm transverse by 3.2 cm AP and throughout the right renal pelvis and calices. The ureteral stent and percutaneous nephrostomy as well as percutaneous pigtail drainage catheters are no longer present. There are a few small residual stones throughout the collecting system on the right. 6 mm stone within the left upper pole calices with other smaller nonobstructing nephroliths. The left kidney continues to enhance appropriately without hydronephrosis, but there is perinephric stranding which is new from prior. No gas in or about the left kidney or left collecting system. GI/Bowel: Mild gaseous distension of the stomach.   Mild gas distended dilated loops of small bowel in the left upper quadrant measuring up to 3.9 cm with overall waxing and waning caliber. These mildly distended loops do not have bowel wall thickening, but bowel loops in the pelvis appears thickened, though assessment is limited by the absence of enteric contrast.  The colon is normal caliber with a moderate volume of stool in the cecum and proximal ascending colon. There is colonic diverticulosis predominating in the sigmoid. Pelvis: There is a moderate volume of intraluminal gas in the bladder with mild bladder wall thickening and perivesicular stranding. The inferior bladder appears to be prolapsed, new from prior, and contains a few small calcifications. Hysterectomy. Neither ovary is able to be definitively identified. Peritoneum/Retroperitoneum: There are multifocal new mixed gas and fluid collections throughout the peritoneum and retroperitoneum which are highly suspicious for abscesses. Dominant collections are in the perihepatic region spanning approximately 13.5 cm transverse by 15 cm craniocaudal by 11 cm AP. The next largest collection is in the pelvic cul-de-sac spanning 7.1 cm transverse by 5.4 cm AP by 5.3 cm craniocaudal.  There is a collection tracking along the right psoas muscle which spans roughly 6.7 cm transverse by 2.6 cm AP by 10.2 cm craniocaudal.  There are mixed gas and fluid collections traversing the right posterior abdominal wall through the quadratus lumborum muscle and coursing along the superficial margin of the right posterior paraspinal musculature along the course of the previous pigtail drainage catheter. Extensive free fluid along the mesentery with small foci of gas. Aorta is atherosclerotic but patent and normal caliber. Bones/Soft Tissues: As previously mention there are new mixed gas and fluid collections traversing the right quadratus lumborum muscle and extending along the superficial margin of the right posterior paraspinal musculature.  Soft tissue gas tracking along the right posterior paraspinal musculature extending cranially out of the imaged field of view. Dependent body wall edema which is new from prior. 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 New bladder prolapse with residual dependent stones in the bladder. Intraluminal gas in the bladder may be due to infection or recent instrumentation. Increased size of the previous right retroperitoneal abscess tracking along the psoas muscle (6.7 x 2.6 x 10.2 cm) which previously contained a percutaneous drainage catheter. There are abscesses tracking along the prior course of the drainage catheter through the right quadratus lumborum muscle and along the right posterior paraspinal musculature. Multifocal rim enhancing mixed gas and fluid collections worrisome for abscesses with the dominant collections in the perihepatic region spanning up to 15 cm and within the pelvic cul-de-sac spanning up to 7 cm. Free air along the mesentery in addition to the aforementioned fluid collections. As there are some thickened loops of small bowel in the lower abdomen and pelvis, a concomitant bowel perforation is not able be excluded on this exam, with differential considerations including ischemic bowel. 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 Loculated right pleural effusion characterized to advantage on today's chest CT. Critical results were called by Dr. Indigo Obregon to Dr. Ame Marino on 4/20/2022 at 22:21 EST.      XR CHEST PORTABLE    Result Date: 4/20/2022  EXAMINATION: ONE XRAY VIEW OF THE CHEST 4/20/2022 6:06 pm COMPARISON: 01/06/2022 HISTORY: ORDERING SYSTEM PROVIDED HISTORY: shortness of breath TECHNOLOGIST PROVIDED HISTORY: shortness of breath FINDINGS: There is suboptimal inspiration. The cardiac size is normal. Mass like infiltrate in the right upper lobe. No pleural effusions. .  Pulmonary vascularity appears mildly congested. There is mild ectasia of the thoracic aorta. .No acute bony abnormalities. The hilar structures are normal.     Mass like infiltrate in the right upper lobe suggesting pneumonia or neoplasm. Underlying pulmonary vascular congestion RECOMMENDATION: Follow-up CT would be helpful. CT CHEST PULMONARY EMBOLISM W CONTRAST    Result Date: 4/20/2022  EXAMINATION: CTA OF THE CHEST 4/20/2022 6:31 pm TECHNIQUE: CTA of the chest was performed after the administration of intravenous contrast.  Multiplanar reformatted images are provided for review. MIP images are provided for review. Dose modulation, iterative reconstruction, and/or weight based adjustment of the mA/kV was utilized to reduce the radiation dose to as low as reasonably achievable. COMPARISON: None. HISTORY: ORDERING SYSTEM PROVIDED HISTORY: patient with shortness of breath with tachypnea and tachycardia. TECHNOLOGIST PROVIDED HISTORY: patient with shortness of breath with tachypnea and tachycardia. FINDINGS: Pulmonary Arteries: Pulmonary arteries are less than adequately opacified for evaluation. No obvious evidence of intraluminal filling defect to suggest pulmonary embolism. Main pulmonary artery is normal in caliber. Mediastinum: There are a few small nonspecific lymph nodes seen in the middle mediastinum. No suspicious lymphadenopathy. Lungs/pleura: Moderately large right pleural effusion with areas of loculation accounting for the pseudotumor seen on chest x-ray. .  Mild bibasal atelectasis more notably on the right. .. No suspicious pulmonary masses are noted.  Cardiomediastinal Structures: Coronary arterial calcifications are seen. Intimal calcifications associated with the thoracic aorta. No evidence of thoracic aortic aneurysm or dissection . Cardiac chambers are normal Upper Abdomen: Small hiatal hernia. Perihepatic fluid. Ectopic air seen in the abdomen. Soft Tissues/Bones: There are hypertrophic degenerative changes in the thoracic spine. No acute osseous abnormalities. Mild subcutaneous emphysema along the right posterior chest wall. Moderately large right pleural effusion with areas of loculation accounting for the pseudotumor seen on chest x-ray. Mild bibasal atelectasis more notably on the right. .. Coronary artery/atherosclerotic disease No obvious evidence of pulmonary embolism. Mild subcutaneous emphysema along the right posterior chest wall. Perihepatic fluid. Ectopic air seen in the abdomen. Please refer to abdominal CT report RECOMMENDATIONS: No additional routine follow-up for incidental abdominal lesions. EKG  EKG Interpretation    Interpreted by me    Rhythm: Sinus tachycardia with frequent PACs  Rate: Tachycardic  Axis: Left  Ectopy: Frequent PACs  Conduction: Right bundle branch block  ST Segments: no acute change  T Waves: Nonspecific  Q Waves: none    Clinical Impression: Sinus tachycardia with frequent PACs, right bundle branch block old compared with EKG dated 4/20/2022 at 2015 no significant change appreciated. Prior EKG does not appear to be true A. fib. All EKG's are interpreted by the Emergency Department Physician who either signs or Co-signs this chart in the absence of a cardiologist.    EMERGENCY DEPARTMENT COURSE:  Patient found seated upright in bed, chronically ill-appearing but nontoxic. Engaged and cooperative in exam.  Physical exam notable for distended abdomen with diffuse tenderness and flank pain but is nonperitoneal.  Alert and oriented, normotensive.   Remainder of sepsis work-up completed with blood cultures and repeating of basic labs, consistent with elevated white count and low potassium. Potassium replenishment started at outside facility, continued here. Discussed case with surgery, plan for CT with p.o. contrast to evaluate for possible bowel perforation. Distribution of abscesses concerning for urologic etiology. Urology consulted, plan to follow-up for further evaluation. Discussed case with [facility] who agreed to admit patient. As antibiotics were administered 3 hours prior to arrival no repeat dosing indicated at this time. Elevated lactic at outside facility, normalized after fluid bolus on reevaluation evaluation here. Admission plan discussed with patient who is in agreement. PROCEDURES:  None    CONSULTS:  IP CONSULT TO HOSPITALIST  IP CONSULT TO UROLOGY    CRITICAL CARE:  None    FINAL IMPRESSION      1. Intra-abdominal abscess ([address]          DISPOSITION / PLAN     DISPOSITION Admitted 04/21/2022 03:43:12 AM      PATIENT REFERRED TO:  No follow-up provider specified.     DISCHARGE MEDICATIONS:  New Prescriptions    No medications on file       Geno Mota MD  Emergency Medicine Resident    (Please note that portions of this note were completed with a voice recognition program.  Efforts were made to edit the dictations but occasionally words are mis-transcribed.)          Geno Mota MD  Resident  04/21/22 [id number]

## 2022-04-21 NOTE — CONSULTS
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 female who presents as a transfer from [facility] after CT showed multiple abdominal abscesses. She was complaining of diffuse abdominal pain. 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 It also showed a right retroperitoneal abscess tracking along the psoas muscle, perihepatic and pelvic cul de sac abscess. She also has free air along the mesentery. She recently has a right ureteroscopy, lithotripsy and stent placement on 4/1/22 by Dr. Myrtle Santoro, stent was removed in office on 4/12/22. She does have a history of renal stones and recently had a right percutaneous nephrostomy tube in January for a renal abscess. WBC 12.63, Cr 0.95, lactic acid 4.4, got vanco and zosyn. She last took xarelto yesterday. She states her epigastric pain is the pain that is bothering her the most.     Patient's old records, notes and chart reviewed and summarized above.     Past Medical History:    Past Medical History:   Diagnosis Date    Carcinoma ([address]     Squamous Cell    Cellulitis     DVT (deep venous thrombosis) ([address] 2006    LLE    Kidney stone     Morbid obesity ([address]     Wears glasses        Past Surgical History:    Past Surgical History:   Procedure Laterality Date    CARPAL TUNNEL RELEASE  1990s    CT ABSCESS DRAIN SUBCUTANEOUS  1/10/2022    CT ABSCESS DRAIN SUBCUTANEOUS 1/10/2022 [facility] CT SCAN    CYSTOSCOPY N/A 1/4/2022    CYSTOSCOPY URETERAL STENT INSERTION performed by Gordo Huerta MD at [address] with stent    CYSTOSCOPY Right 3/1/2022    CYSTOSCOPY URETEROSCOPY [facility] performed by Gordo Huerta, MD at [address] Right 3/1/2022    CYSTOSCOPY URETERAL STENT Jacky Colón performed by Kashif Arevalo MD at [address] Right 04/05/2022    Dr Quiana Santana with stent insertion    CYSTOSCOPY Right 4/5/2022    CYSTOSCOPY URETEROSCOPY [facility] performed by Kashif Arevalo MD at [address] Right 4/5/2022    [address] performed by Kashif Arevalo MD at [address]  1/7/2022         IR NEPHROSTOMY PERCUTANEOUS RIGHT  1/5/2022    IR NEPHROSTOMY PERCUTANEOUS RIGHT 1/5/2022 Rani Tidwell MD [facility]    ANNABELLA AND BSO      05/2019    TUBAL LIGATION  1993    TUBAL LIGATION      WISDOM TOOTH EXTRACTION         Medications:      Current Facility-Administered Medications:     iopamidol (ISOVUE-370) 76 % injection 75 mL, 75 mL, IntraVENous, ONCE PRN, Lauren Avilez, DO    iohexol (OMNIPAQUE 240) injection 50 mL, 50 mL, Oral, ONCE PRN, Adryan Goyal, DO    potassium chloride 10 mEq/100 mL IVPB (Peripheral Line), 40 mEq, IntraVENous, Once, Joanie Lazar MD    magnesium sulfate 2000 mg in 50 mL IVPB premix, 2,000 mg, IntraVENous, Once, Joanie Lazar MD    Current Outpatient Medications:     oxybutynin (DITROPAN XL) 15 MG extended release tablet, Take 1 tablet by mouth daily, Disp: 30 tablet, Rfl: 3    ketorolac (TORADOL) 10 MG tablet, Take 10 mg by mouth every 6 hours as needed for Pain  (Patient not taking: Reported on 4/12/2022), Disp: , Rfl:     potassium chloride (KLOR-CON M) 20 MEQ TBCR extended release tablet, Take 20 mEq by mouth every evening (Patient not taking: Reported on 4/5/2022), Disp: , Rfl:     Misc Natural Products (OSTEO BI-FLEX ADV DOUBLE ST) TABS, Take by mouth every morning, Disp: , Rfl:     polyethylene glycol (GLYCOLAX) 17 g packet, Take 17 g by mouth daily as needed for Constipation , Disp: , Rfl:     ipratropium-albuterol (DUONEB) 0.5-2.5 (3) MG/3ML SOLN nebulizer solution, Inhale 1 vial into the lungs every 4 hours (Patient not taking: Reported on 4/12/2022), Disp: , Rfl:     loratadine (CLARITIN) 10 MG capsule, Take 10 mg by mouth daily , Disp: , Rfl:     metoprolol tartrate (LOPRESSOR) 25 MG tablet, Take 1 tablet by mouth 2 times daily, Disp: 60 tablet, Rfl: 3    Ascorbic Acid (VITAMIN C) 250 MG tablet, Take 250 mg by mouth daily, Disp: , Rfl:     vitamin E 400 UNIT capsule, Take 400 Units by mouth daily, Disp: , Rfl:     vitamin D (CHOLECALCIFEROL) 25 MCG (1000 UT) TABS tablet, Take 1,000 Units by mouth daily, Disp: , Rfl:     rivaroxaban (XARELTO) 20 MG TABS tablet, Take 1 tablet by mouth daily (with breakfast), Disp: 30 tablet, Rfl: 5    Multiple Vitamins-Minerals (THERAPEUTIC MULTIVITAMIN-MINERALS) tablet, Take 1 tablet by mouth daily, Disp: , Rfl:     Allergies: Allergies   Allergen Reactions    Estrogens Other (See Comments)     Hx of DVT       Social History:   Social History     Socioeconomic History    Marital status:      Spouse name: Not on file    Number of children: Not on file    Years of education: Not on file    Highest education level: Not on file   Occupational History    Not on file   Tobacco Use    Smoking status: Current Every Day Smoker     Packs/day: 0.50     Years: 42.00     Pack years: 21.00     Types: Cigarettes    Smokeless tobacco: Never Used   Vaping Use    Vaping Use: Never used   Substance and Sexual Activity    Alcohol use: No     Alcohol/week: 0.0 standard drinks    Drug use: No    Sexual activity: Not Currently   Other Topics Concern    Not on file   Social History Narrative    Not on file     Social Determinants of Health     Financial Resource Strain:     Difficulty of Paying Living Expenses: Not on file   Food Insecurity:     Worried About Running Out of Food in the Last Year: Not on file    Nabeel of Food in the Last Year: Not on file   Transportation Needs:     Lack of Transportation (Medical):  Not on file    Lack of Transportation (Non-Medical): Not on file   Physical Activity:     Days of Exercise per Week: Not on file    Minutes of Exercise per Session: Not on file   Stress:     Feeling of Stress : Not on file   Social Connections:     Frequency of Communication with Friends and Family: Not on file    Frequency of Social Gatherings with Friends and Family: Not on file    Attends Temple Services: Not on file    Active Member of [address] [place] [address] or Organizations: Not on file    Attends Club or Organization Meetings: Not on file    Marital Status: Not on file   Intimate Partner Violence:     Fear of Current or Ex-Partner: Not on file    Emotionally Abused: Not on file    Physically Abused: Not on file    Sexually Abused: Not on file   Housing Stability:     Unable to Pay for Housing in the Last Year: Not on file    Number of [place] in the Last Year: Not on file    Unstable Housing in the Last Year: Not on file       Family History:    Family History   Adopted: Yes       REVIEW OF SYSTEMS:  A comprehensive 14 point review of systems was obtained. Constitutional: No fatigue  Eyes: No blurry vision  Ears, nose, mouth, throat, face: No ringing in the ears; no facial droop. Respiratory: No cough or cold. Cardiovascular: No palpitations  Gastrointestinal: No diarrhea or constipation. Genitourinary: No burning with urination  Integument/Skin: No rashes  Hematologic/Lymphatic: No easy bruising  Musculoskeletal: No muscle pains  Neurologic: No weakness in the extremities. Psychiatric: No depression or suicidal thoughts. Endocrine: No heat or cold intolerances. Allergic/Immunologic: No current seasonal allergies; no skin hives. Physical Exam:      This a 74 y.o. female   Vitals:    04/21/22 0213   BP:    Pulse: 98   Resp: 22   Temp:    SpO2: 98%     Constitutional: Patient in no acute distress. Neuro: alert and oriented to person place and time. Head: Atraumatic and normocephalic.   Neck: Trachea midline. Ext: 2+ radial pulses bilaterally. Psych: Mood and affect normal.  Skin: No rashes or bruising present. Lungs: on 10L nonrebreather  Cardiovascular:  Regular rhythm. Abdomen: Soft, diffuse tenderness more on epigastric area, morbidly obese, non-distended. Neither side has CVA tenderness on exam.  Bladder non-tender and not distended. Lymphatics: no palpable lymphadenopathy  Lombardo in place with yellow urine    Labs:  Recent Labs     04/20/22 2045 04/21/22  0214   WBC 12.5* 14.1*   HGB 9.1* 8.4*   HCT 30.5* 28.1*   MCV 94.1 93.7   * 607*     Recent Labs     04/20/22 2045 04/21/22  0214    139   K 2.8* 2.9*    104   CO2 24 25   BUN 15 14   CREATININE 0.95* 0.91*       No results for input(s): COLORU, PHUR, LABCAST, WBCUA, RBCUA, MUCUS, TRICHOMONAS, YEAST, BACTERIA, CLARITYU, SPECGRAV, LEUKOCYTESUR, UROBILINOGEN, BILIRUBINUR, BLOODU in the last 72 hours. Invalid input(s): NITRATE, GLUCOSEUKETONESUAMORPHOUS        -----------------------------------------------------------------  Imaging Results:  No results found. Assessment and Plan   Impression:    74year old female  Active  problem list  Right emphysematous pyelonephritis/pyelitis with concern for subcapsular renal abscess up to 4.4cm  Right perihepatic, right retroperitoneal abscess tracking alongside psoas, pelvic cul de sac abscess  Free air       Plan:   Free air is not caused by renal infection. Her abdominal pain is more on the epigastric area.    Recommend to insert lombardo catheter for maximal  decompression   Obtain urine culture and urinalysis   Pending blood cultures  Pending CT A/P with contrast  Pain control and antiemetic as needed  Recommend continuing broad spectrum antibiotics   Glucose control   Appreciate general surgery recommendations  Appreciate medicine admission   Hold xarelto, last time she took xarelto was yesterday, coag studies normal   Depending on what the CT shows, will plan for IR intervention for the right emphysematous pyelonephritis  Will continue to follow     Carol Myles MD   Urology Resident PGY3  3:37 AM 4/21/2022

## 2022-04-21 NOTE — ED PROVIDER NOTES
[facility]     Emergency Department     Faculty Attestation    I performed a history and physical examination of the patient and discussed management with the resident. I have reviewed and agree with the residents findings including all diagnostic interpretations, and treatment plans as written. Any areas of disagreement are noted on the chart. I was personally present for the key portions of any procedures. I have documented in the chart those procedures where I was not present during the key portions. I have reviewed the emergency nurses triage note. I agree with the chief complaint, past medical history, past surgical history, allergies, medications, social and family history as documented unless otherwise noted below. Documentation of the HPI, Physical Exam and Medical Decision Making performed by scribgracy is based on my personal performance of the HPI, PE and MDM. For Physician Assistant/ Nurse Practitioner cases/documentation I have personally evaluated this patient and have completed at least one if not all key elements of the E/M (history, physical exam, and MDM). Additional findings are as noted. 73 yo F transferred from [place] for intraabdominal abscess, perforation,   No sob,   pe vss gcs 15, Shauna RN escort for exam:   Abdomen diffusely tender, protuberant, + guarding, no mass, mild rebound, lower extremities 3 + lower extremity edema, / lymphedema,     > surgery consult, had abx,   Admitted to [facility],     EKG Interpretation    Interpreted by me  Sinus tachycardia, heart rate 107, no ischemia, left axis, right bundle branch block, QT corrected 488    CRITICAL CARE: There was a high probability of clinically significant/life threatening deterioration in this patient's condition which required my urgent intervention. Total critical care time was 10 minutes. This excludes any time for separately reportable procedures.        Mita Norton DO     Elke Pizarro, DO  04/21/22 [id number] [facility], DO  04/21/22 [id number] [facility], DO  04/21/22 [id number]

## 2022-04-21 NOTE — CONSULTS
[facility] -   Infectious diseases evaluation  admission date 4/21/2022    reason for consultation:   Abdominal abscess, vertebral osteomyelitis, pyelonephritis/pyelitis. 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 · Hx of complicated UTI in January 2022, Positive for Klebsiella pneumoniae. · Hx of septicemia in January 2022, Positive for Klebsiella pneumoniae. · Hx of right psoas abscess drained in January 2022  · Hx right ureteral and renal stones with ureteral stent placement in march 2022 with stent exchanged in April 2022. Recommendations   · Continue empiric antimicrobial therapy with Zosyn and vancomycin  · The patient is status post IR drainage of pelvic and liver abscesses and is also status post nephrostomy placement by IR. · The gram stain has gram-positive cocci isolated thus far.   · Will likely need long-term intravenous antimicrobial coverage and follow-up imaging to ensure resolution of infection  · We will follow her current progress and adjust therapy accordingly    Infection Control Recommendations   · [place] Precautions  · Contact Isolation   · Airborne isolation  · Droplet Isolation  · [place] discontinuation  · Central line discontinuation    Antimicrobial Stewardship Recommendations   · Simplification of therapy  · Targeted therapy  · IV to oral conversion  · PK dosing  · Restricted antimicrobial use  · Discontinuation of therapy    Coordination ofOutpatient Care:   · Estimated Length of IV antimicrobials:  · Patient will need Midline / picc Catheter Insertion:   · Patient will need SNF:  · Patient will need outpatient wound care:     History of Present Illness:   Initial history:  Sharyle Drew is a 74 y.o.-year-old female presenting as a transfer from an outside facility due to conern for bowel perforation and multiple intra-abdominal abscesses. Aline Leyva was previously hospitalized in January 2022 with Klebsiella pneumoniae sepsis related to a perinephric abscess. She did have right-sided severely obstructive pyelonephritis for which she underwent stent placement as well as a right percutaneous nephrostomy tube placement. This infection was complicated by perinephric/psoas abscess which was aspirated and this drain was placed. Patient was seen by my partner Dr. Argelia Amador and was discharged on intravenous antimicrobial therapy with Rocephin 2 g daily through February 2, 2022  The patient on 3/1/2022 underwent a cystoscopy with right-sided ureteroscopy with holmium laser lithotripsy and right-sided ureteral stent placement  The patient underwent a second urological procedure on 4/5/2022 with a cystoscopy, right-sided ureteroscopy, right holmium laser lithotripsy and right ureteral stent exchange and the patient was found to have a copious amount of calculi which were crushed up and further ablated. The patient reports that ever since she had the second urological procedure she has had generalized malaise/fatigue and more recently progressing abdominal pain over the last several days. Initial lactic acid was 4.4 and treated with fluid bolus. Initial potassium was 2.8, now 3.6 after replacement.     A CT scan of the abdomen and pelvis 4/20/2022 showed gas within both the right renal parenchyma and proximal right collecting system and increased size of the previous right retroperitoneal abscess tracking along the psoas muscle and there is abscess tracking along the posterior course of the drainage catheter through the right quadratus lumbar muscle and along the right posterior paraspinal musculature  There are multifocal rim-enhancing mixed [place] fluid collections worrisome for abscess within the dominant collections of the perihepatic region spanning up to 15 cm within the pelvic cul-de-sac spanning up to 7 cm. There is free air along the mesentery in addition to the a forementioned fluid collections. Discitis/osteomyelitis at T12-L1 with direct extension from the adjacent right psoas inflammatory change. Loculated right pleural effusion    The patient has been admitted and started on broad-spectrum antimicrobial therapy and ultrasound-guided placement of 12 [place] drain in the perihepatic abscess with 550 mL of green following purulent material aspirated, and a CT-guided placement of right perinephric and deep pelvic abscess drainage catheters with 5 mL of purulent fluid removed from each collection sent for diagnostic tests. Interval changes  4/21/2022   Patient Vitals for the past 8 hrs:   BP Temp Temp src Pulse Resp SpO2 Height Weight   04/21/22 0947 118/68 98.5 °F (36.9 °C) Oral 75 19 -- -- --   04/21/22 0946 -- -- -- -- -- -- 5' 1\" (1.549 m) 272 lb 0.8 oz (123.4 kg)   04/21/22 0731 117/68 -- -- 83 -- 99 % -- --   04/21/22 0612 (!) 102/56 -- -- 94 27 98 % -- --   04/21/22 0445 107/63 -- -- 102 25 98 % -- --   04/21/22 0345 128/66 -- -- 114 18 99 % -- --       Summary of relevant labs:  Labs:  WBC 12.5 > 14.1  UA negative for UTI    Micro:  4/21 BC x2 no growth  4/21 UC x 2 no growth  4/21 Body fluid culture to be collected. Imaging:    CT Abdomen Pelvis w Contrast, 4/21/22  IMPRESSION:  1. Multiple intra-abdominal and pelvic fluid collections which have the  appearance of abscess formation. In the subcapsular area in the liver  extending into the subhepatic area a large abscess noted measuring 10 x 13.7  cm. A second more posterior collection measures 8.3 x 3.2 cm. Two dominant  pelvic collections are noted, one measuring 10 x 6 cm and the second  posteriorly 7 x 6.3 cm. The anterior collection is larger compared to the  previous evaluation. These likely abscesses contain air in them.   2. A small amount of free air is noted scattered in the abdomen and pelvis. Psoas retroperitoneal abscess extends ton the posterior soft tissues. 3. Right renal atrophy. A subcapsular air containing collection measures 5 x  3.9 cm also likely an abscess. 4. Partly loculated right pleural effusion and right lower lobe atelectatic  changes. Findings discussed with Dr Manoj Avelar 04/21/2022 00:10 a.m.     RECOMMENDATIONS:  Percutaneous drainage of multiple abscesses. CT Abdomen Pelvis w Contrast, 4/20/22  IMPRESSION:  Gas within both the right renal parenchyma and proximal right collecting  system while there has been recent instrumentation, the morphology is  worrisome for emphysematous pyelonephritis and pyelitis with a subcapsular  abscess spanning up to 4.4 cm.     New bladder prolapse with residual dependent stones in the bladder. Intraluminal gas in the bladder may be due to infection or recent  instrumentation.     Increased size of the previous right retroperitoneal abscess tracking along  the psoas muscle (6.7 x 2.6 x 10.2 cm) which previously contained a  percutaneous drainage catheter. There are abscesses tracking along the prior  course of the drainage catheter through the right quadratus lumborum muscle  and along the right posterior paraspinal musculature.     Multifocal rim enhancing mixed gas and fluid collections worrisome for  abscesses with the dominant collections in the perihepatic region spanning up  to 15 cm and within the pelvic cul-de-sac spanning up to 7 cm.     Free air along the mesentery in addition to the aforementioned fluid  collections.   As there are some thickened loops of small bowel in the lower  abdomen and pelvis, a concomitant bowel perforation is not able be excluded  on this exam, with differential considerations including ischemic bowel.     Discitis osteomyelitis at T12-L1, direct extension from the adjacent right  psoas inflammatory change.     Loculated right pleural effusion characterized to advantage on today's chest  CT. CT PE w Contrast, 4/20/22  IMPRESSION:  Moderately large right pleural effusion with areas of loculation accounting  for the pseudotumor seen on chest x-ray. .     Mild bibasal atelectasis more notably on the right. ..     Coronary artery/atherosclerotic disease     No obvious evidence of pulmonary embolism.     Mild subcutaneous emphysema along the right posterior chest wall.     Perihepatic fluid. Ectopic air seen in the abdomen. Please refer to  abdominal CT report  I have personally reviewed the past medical history, past surgical history, medications, social history, and family history, and I haveupdated the database accordingly. Allergies:   Estrogens     Review of Systems:     Review of Systems   Constitutional: Positive for fatigue. Respiratory: Negative. Cardiovascular: Positive for leg swelling. Gastrointestinal: Positive for abdominal distention and abdominal pain. Genitourinary: Negative. Musculoskeletal: Negative. Skin: Negative. Neurological: Negative. Psychiatric/Behavioral: Negative. Physical Examination :       Physical Exam  Constitutional:       Appearance: She is well-developed. She is obese. HENT:      Head: Normocephalic and atraumatic. Cardiovascular:      Rate and Rhythm: Regular rhythm. Heart sounds: Normal heart sounds. Pulmonary:      Effort: Pulmonary effort is normal.      Breath sounds: Normal breath sounds. Abdominal:      General: Bowel sounds are normal.      Palpations: Abdomen is soft. Musculoskeletal:      Cervical back: Normal range of motion and neck supple. Right lower leg: Edema present. Left lower leg: Edema present. Skin:     General: Skin is warm and dry. Findings: Rash (There is bilateral lower extremity dermatitis related to lymphedema) present. Neurological:      Mental Status: She is alert and oriented to person, place, and time.          Past Medical History:     Past Medical History:   Diagnosis Date    Carcinoma ([facility] [address]     Squamous Cell    Cellulitis     DVT (deep venous thrombosis) ([facility] [address] 2006    LLE    Kidney stone     Lymphedema     Morbid obesity ([address]     Psoas abscess, right ([facility] [address]     Pyelonephritis     Wears glasses        Past Surgical  History:     Past Surgical History:   Procedure Laterality Date    CARPAL TUNNEL RELEASE  1990s    CT ABSCESS DRAIN SUBCUTANEOUS  1/10/2022    CT ABSCESS DRAIN SUBCUTANEOUS 1/10/2022 [facility] CT SCAN    CYSTOSCOPY N/A 1/4/2022    CYSTOSCOPY URETERAL STENT INSERTION performed by Jose J Felipe [facility] at [facility] Right     HLL with stent    CYSTOSCOPY Right 3/1/2022    CYSTOSCOPY URETEROSCOPY LASER-WTIH HLL performed by Jose J Felipe MD at [facility] Right 3/1/2022    CYSTOSCOPY URETERAL STENT [address] performed by Jose J Felipe MD at [facility] Right 04/05/2022    Dr Davide Aguero with stent insertion    CYSTOSCOPY Right 4/5/2022    CYSTOSCOPY URETEROSCOPY LASER-HLL performed by [facility] MD at [facility] 4/5/2022    [address] performed by Jose J Felipe MD at [address]  1/7/2022         IR NEPHROSTOMY PERCUTANEOUS RIGHT  1/5/2022    IR NEPHROSTOMY PERCUTANEOUS RIGHT 1/5/2022 Beatriz Noriega MD [facility]    ANNABELLA AND BSO      05/2019    TUBAL LIGATION  1993    TUBAL LIGATION      WISDOM TOOTH EXTRACTION         Medications:      famotidine (PEPCID) injection  20 mg IntraVENous BID    metoprolol tartrate  25 mg Oral BID    oxybutynin  15 mg Oral Daily    potassium chloride  20 mEq Oral QPM    sodium chloride flush  5-40 mL IntraVENous 2 times per day    vancomycin (VANCOCIN) intermittent dosing (placeholder)   Other RX Placeholder    vancomycin  1,250 mg IntraVENous Q24H    piperacillin-tazobactam  3,375 mg IntraVENous q8h Social History:     Social History     Socioeconomic History    Marital status:      Spouse name: Not on file    Number of children: Not on file    Years of education: Not on file    Highest education level: Not on file   Occupational History    Not on file   Tobacco Use    Smoking status: Current Every Day Smoker     Packs/day: 0.50     Years: 42.00     Pack years: 21.00     Types: Cigarettes    Smokeless tobacco: Never Used   Vaping Use    Vaping Use: Never used   Substance and Sexual Activity    Alcohol use: No     Alcohol/week: 0.0 standard drinks    Drug use: No    Sexual activity: Not Currently   Other Topics Concern    Not on file   Social History Narrative    Not on file     Social Determinants of Health     Financial Resource Strain:     Difficulty of Paying Living Expenses: Not on file   Food Insecurity:     Worried About Running Out of Food in the Last Year: Not on file    Nabeel of Food in the Last Year: Not on file   Transportation Needs:     Lack of Transportation (Medical): Not on file    Lack of Transportation (Non-Medical):  Not on file   Physical Activity:     Days of Exercise per Week: Not on file    Minutes of Exercise per Session: Not on file   Stress:     Feeling of Stress : Not on file   Social Connections:     Frequency of Communication with Friends and Family: Not on file    Frequency of Social Gatherings with Friends and Family: Not on file    Attends Buddhist Services: Not on file    Active Member of Clubs or Organizations: Not on file    Attends Club or Organization Meetings: Not on file    Marital Status: Not on file   Intimate Partner Violence:     Fear of Current or Ex-Partner: Not on file    Emotionally Abused: Not on file    Physically Abused: Not on file    Sexually Abused: Not on file   Housing Stability:     Unable to Pay for Housing in the Last Year: Not on file    Number of Places Lived in the Last Year: Not on file    Unstable Housing in the Last Year: Not on file       Family History:     Family History   Adopted: Yes   Problem Relation Age of Onset    Cancer Mother         The patient reports her biologic mother did have bladder cancer      Medical Decision Making:   I have independently reviewed/ordered the following labs:    CBC with Differential:   Recent Labs     04/20/22 2045 04/21/22 0214   WBC 12.5* 14.1*   HGB 9.1* 8.4*   HCT 30.5* 28.1*   * 607*   LYMPHOPCT 7* 8*   MONOPCT 6 7     BMP:  Recent Labs     04/20/22 2045 04/20/22 2045 04/21/22 0214 04/21/22  0717      < > 139 139   K 2.8*   < > 2.9* 3.6*      < > 104 103   CO2 24   < > 25 26   BUN 15   < > 14 14   CREATININE 0.95*   < > 0.91* 0.85   MG 1.9  --  1.9  --     < > = values in this interval not displayed. Hepatic Function Panel:   Recent Labs     04/20/22 2045   PROT 7.1   LABALBU 2.5*   BILITOT 0.36   ALKPHOS 117*   ALT 18   AST 17     No results for input(s): RPR in the last 72 hours. No results for input(s): HIV in the last 72 hours. No results for input(s): BC in the last 72 hours. Lab Results   Component Value Date    CREATININE 0.85 04/21/2022    GLUCOSE 167 04/21/2022       Detailed results:    Culture, Anaerobic and Aerobic [[id number]] (Abnormal) Collected: 04/21/22 1342   Order Status: Completed Specimen: Abscess Updated: 04/21/22 1549    Specimen Description . ABSCESS . ASPIRATE    Direct Exam MODERATE NEUTROPHILS Abnormal      MODERATE GRAM POSITIVE COCCI IN CLUSTERS Abnormal     Culture PENDING   Culture, Anaerobic and Aerobic [[id number]] (Abnormal) Collected: 04/21/22 1340   Order Status: Completed Specimen: Abscess Updated: 04/21/22 1548    Specimen Description . ABSCESS . ASPIRATE    Direct Exam FEW NEUTROPHILS Abnormal      MODERATE GRAM POSITIVE COCCI IN CLUSTERS Abnormal     Culture PENDING   Culture, Anaerobic and Aerobic [[id number]] (Abnormal) Collected: 04/21/22 1337   Order Status: Completed Specimen: Abscess Updated: 04/21/22 1529    Specimen Description . ABSCESS . ASPIRATE    Direct Exam MANY NEUTROPHILS Abnormal      MODERATE GRAM POSITIVE COCCI IN PAIRS Abnormal      FEW GRAM POSITIVE RODS Abnormal     Culture PENDING   Culture, Blood 2 [[id number]] Collected: 04/21/22 0215   Order Status: Completed Specimen: Blood Updated: 04/21/22 1430    Specimen Description . BLOOD    Special Requests LEFT AC 10ML    Culture NO GROWTH 12 HOURS   Culture, Blood 1 [[id number]] Collected: 04/21/22 0216   Order Status: Completed Specimen: Blood Updated: 04/21/22 1429    Specimen Description . BLOOD    Special Requests RIGHT FOREARM 20ML    Culture NO GROWTH 12 HOURS   Culture, Body Fluid [[id number]] Collected: 04/21/22 1339   Order Status: Completed Specimen: Body Fluid from Aspirate Updated: 04/21/22 1348    Specimen Description . ASPIRATE . ABSCESS    Culture DUE TO THE SPECIMEN TYPE, THE ORDER WAS CANCELED AND REORDERED. PLEASE REFER TO: ANAEROBIC, AEROBIC CULTURE   Culture, Body Fluid [[id number]] Collected: 04/21/22 1338   Order Status: Completed Specimen: Body Fluid from Aspirate Updated: 04/21/22 1347    Specimen Description . ASPIRATE . ABSCESS    Culture DUE TO THE SPECIMEN TYPE, THE ORDER WAS CANCELED AND REORDERED. PLEASE REFER TO: ANAEROBIC, AEROBIC CULTURE   Culture, Anaerobic and Aerobic [[id number]]    Order Status: Canceled Specimen: Abscess    Culture, Blood 1 [[id number]]    Order Status: No result Specimen: Blood    Culture, Urine [[id number]] Collected: 04/21/22 0345   Order Status: Sent Specimen: Urine, clean catch Updated: 04/21/22 0353         Thank you for allowing us to participate in the care of this patient. Please call with questions.     This note is created with the assistance of a speech recognition program.  While intending to generate adocument that actually reflects the content of the visit, the document can still have some errors including those of syntax and sound a like substitutions which may escape proof reading. It such instances, actual meaningcan be extrapolated by contextual diversion. [facility]  Office: [phone]  Perfect serve / office [phone]    I have discussed the care of Trevon Brock including pertinent history and exam findings,  with the medical student. I have seen and examined the patient and the key elements of all parts of the encounter have been performed by me. I have made adjustments to the assessment, plan and orders.      Electronically signed by Kimberlee Thomas MD on 4/21/2022 at 4:21 PM  Perfect Serve messaging [phone]

## 2022-04-21 NOTE — ACP (ADVANCE CARE PLANNING)
..Advance Care Planning     Advance Care Planning Activator (Inpatient)  Conversation Note      Date of ACP Conversation: 4/21/2022     Conversation Conducted with: Patient with Decision Making Capacity    ACP Activator: Bianca Minaya, [address] [facility] Manuel :     Current Designated Health Care Decision Maker:   Pily Yoon [phone]    Click here to complete Healthcare Decision Makers including section of the Healthcare Decision Maker Relationship (ie \"Primary\")  Today we documented Decision Maker(s) consistent with Legal Next of Kin hierarchy. Care Preferences    Ventilation: \"If you were in your present state of health and suddenly became very ill and were unable to breathe on your own, what would your preference be about the use of a ventilator (breathing machine) if it were available to you? \"      Would the patient desire the use of ventilator (breathing machine)?: yes    \"If your health worsens and it becomes clear that your chance of recovery is unlikely, what would your preference be about the use of a ventilator (breathing machine) if it were available to you? \"     Would the patient desire the use of ventilator (breathing machine)?: Yes      Resuscitation  \"CPR works best to restart the heart when there is a sudden event, like a heart attack, in someone who is otherwise healthy. Unfortunately, CPR does not typically restart the heart for people who have serious health conditions or who are very sick. \"    \"In the event your heart stopped as a result of an underlying serious health condition, would you want attempts to be made to restart your heart (answer \"yes\" for attempt to resuscitate) or would you prefer a natural death (answer \"no\" for do not attempt to resuscitate)? \" yes       [x] Yes   [] No   Educated Patient / [place] regarding differences between Advance Directives and portable DNR orders.     Length of ACP Conversation in minutes:      Conversation Outcomes:  [x] ACP discussion completed  [] Existing advance directive reviewed with patient; no changes to patient's previously recorded wishes  [] New Advance Directive completed  [] Portable Do Not Rescitate prepared for Provider review and signature  [] POLST/POST/MOLST/MOST prepared for Provider review and signature      Follow-up plan:    [] Schedule follow-up conversation to continue planning  [] Referred individual to Provider for additional questions/concerns   [] Advised patient/agent/surrogate to review completed ACP document and update if needed with changes in condition, patient preferences or care setting    [] This note routed to one or more involved healthcare providers

## 2022-04-21 NOTE — ED NOTES
Jocelin  for transfer to [facility], connected call to Dr Terry Byers.       Becca Todd  04/20/22 4795

## 2022-04-21 NOTE — PROGRESS NOTES
Ct scan reviewed and discussed with patient. Patient adamant desires to avoid operative intervention at present. Discussed IR drainage with subsequent recs post results. Continue broad spectrum atb.

## 2022-04-21 NOTE — PROGRESS NOTES
[facility])  Occupational Therapy Not Seen Note    DATE: 2022    NAME: Lashanda Bryan  MRN: [id number]   : 1955      Patient not seen this date for Occupational Therapy due to:    Surgery/Procedure: IR for drainage of abscess near liver    Next Scheduled Treatment: 2022    Electronically signed by FIOR Mcfarland on 2022 at 12:12 PM

## 2022-04-21 NOTE — H&P
[facility]  Office: [address], DO, Manisha Morfin, DO, Christarenae Godinez, DO, Osmar Reed Blood, DO, Maddie Hall MD, Luis Boswell MD, Wallace Schroeder MD, Mainor Dubon MD, Jolynn Sutton MD, Fatmata Beard MD, Alyssa Driver MD, Davon Thapa, DO, Aylin Atwood, DO, Dylan Laureano MD,  Domingo Gusman DO, Khadra Cancino MD, Dagoberto Fay MD, Juice Clay MD, Chaka Marin DO, Malika Lozada MD, Jarrett Herrera MD, Star Arevalo, [facility], [facility] Nuris, CNP, Kierra Najera, CNP, Yemi Harris, CNP, Hollis Nixon, CNP, Livier Praks, CNP, Robert Rey PA-C, Candace Mccrary, [facility], Wendy Dolan, CNP, Lauren Ledezma, CNP, Tl Hedrick, CNP, Seth Nix, CNS, Galdino Fontenot, [facility], Juan Mendez, CNP, Manuel Jarrett, CNP, Teri Walsh, CNP         IN-PATIENT SERVICE  History and Physical  [facility]          Name: Cheryl Delgado  MRN: [id number]     [place]: [de-identified]  Room: 20/20    Admit Date: 4/21/2022  PCP: DONG Matthews CNP    Chief Complaint:  Chief Complaint   Patient presents with    Abdominal Pain     perf bowel        History of Present Illness:  Cheryl Delgado is a 74 y.o.  female who presents with Abdominal Pain (perf bowel)    Patient was admitted thru ER with:  Kimo Gordon is a 74 y.o. female who presents as transfer from outside facility with concern for bowel perforation multiple intra-abdominal abscesses. Progressively worsening abdominal pain over the last several days prompted patient evaluation. Treated with Vanco Zosyn, initially lactic elevated at 4.4 treated with fluid bolus. Hypokalemic with potassium replaced. Patient with a significant urologic history with perinephric abscess, large kidney stones requiring stenting and surgical intervention, most recently 3/1/2022. 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  \"     The patient has had a rather complicated urologic history  She states that she has been sick since the beginning of the year    Patient was admitted in January with:  Complicated UTI  She was found to have a 1.6 cm obstructing stone on the right with severe hydronephrosis and a right psoas abscess    Patient had been found to have Klebsiella bacteremia/sepsis:    Component 1/2/22 1646 Resulting Agency   Specimen Description . BLOOD  [address] [facility]   Special Requests R BICEP  [facility]   Culture POSITIVE BLOOD CULTURE, RN [address] [facility]   Culture DIRECT Alessandra Irelandyer STAIN FROM BOTTLE: [address] [facility]   Culture Detected: Klebsiella pneumoniae Detected: Methodology- Polymerase Chain Reaction ([email or web address] [facility]   Culture (NOTE) Direct Gram Stain from bottle result called to and read back by: Tony Carr RN [facility] [id number].  DK and Polymerase Chain Reaction (PCR) results called to and read back by: COREY Talley at 2391 on 1.3.2022 [facility]          Susceptibility      Klebsiella pneumoniae (4)    Antibiotic Interpretation Microscan Method Status    amikacin  NOT REPORTED BACTERIAL SUSCEPTIBILITY PANEL MARY Final    ampicillin Resistant >=32 BACTERIAL SUSCEPTIBILITY PANEL MARY Final    ampicillin-sulbactam  NOT REPORTED BACTERIAL SUSCEPTIBILITY PANEL MARY Final    aztreonam Sensitive <=1 BACTERIAL SUSCEPTIBILITY PANEL MARY Final    ceFAZolin Sensitive <=4 BACTERIAL SUSCEPTIBILITY PANEL MARY Final    cefepime  NOT REPORTED BACTERIAL SUSCEPTIBILITY PANEL MARY Final    cefTRIAXone Sensitive <=1 BACTERIAL SUSCEPTIBILITY PANEL MARY Final    ciprofloxacin Sensitive <=0.25 BACTERIAL SUSCEPTIBILITY PANEL MARY Final    ertapenem  NOT REPORTED BACTERIAL SUSCEPTIBILITY PANEL MARY Final    Confirmatory Extended Spectrum Beta-Lactamase Negative NEGATIVE BACTERIAL SUSCEPTIBILITY PANEL MARY Final    gentamicin Sensitive <=1 BACTERIAL SUSCEPTIBILITY PANEL MARY Final    meropenem  NOT REPORTED BACTERIAL SUSCEPTIBILITY PANEL MARY Final    nitrofurantoin  NOT REPORTED BACTERIAL SUSCEPTIBILITY PANEL MARY Final    tigecycline  NOT REPORTED BACTERIAL SUSCEPTIBILITY PANEL MARY Final    tobramycin Sensitive <=1 BACTERIAL SUSCEPTIBILITY PANEL MARY Final    trimethoprim-sulfamethoxazole Sensitive <=20 BACTERIAL SUSCEPTIBILITY PANEL MARY Final    piperacillin-tazobactam Sensitive <=4 BACTERIAL SUSCEPTIBILITY PANEL MARY Final          Specimen Collected: 01/02/22 16:46 Last Resulted: 01/04/22 22:21               On 1/5 the patient underwent:  Successful percutaneous nephrostomy tube placement.  120 mL of purulent   material was aspirated.  A defined separate retroperitoneal abscess was not   seen and expect findings on CT scan were just extension from the infection in   the kidney.  Patient be followed up with contrast CT scan when the drainage   subsides to exclude the presence of any remaining retroperitoneal abscess. On 1/10 she had: An IR-guided pigtail catheter was placed in the right psoas abscess on 1/10. Blood cultures x 2 grew Klebsiella pneumoniae. Infectious disease was consulted and recommended starting ceftriaxone and continuing this through 2/2/2022. On 3/1 the patient was admitted for:  PREOPERATIVE DIAGNOSES:  1. Right ureteral calculus. 2.  Right renal calculus.   She underwent:  Cystoscopy, right ureteroscopy, right Holmium  laser lithotripsy, and right ureteral stent placement. On 4/5 the patient was admitted for:  1. Right renal calculus. 2.  Right ureteral calculus. She underwent:  PROCEDURES PERFORMED:  Cystoscopy, right ureteroscopy, right holmium  laser lithotripsy, right ureteral stent exchange, staged.     Over the last week the patient has experienced increasing malaise, decreased energy, sweats, chills    Database has revealed:  CT scan:  Gas within both the right renal parenchyma and proximal right collecting system while there has been recent instrumentation, the morphology is worrisome for emphysematous pyelonephritis and pyelitis with a subcapsular abscess spanning up to 4.4 cm. New bladder prolapse with residual dependent stones in the bladder. Intraluminal gas in the bladder may be due to infection or recent instrumentation. Increased size of the previous right retroperitoneal abscess tracking along the psoas muscle (6.7 x 2.6 x 10.2 cm) which previously contained a percutaneous drainage catheter. There are abscesses tracking along the prior course of the drainage catheter through the right quadratus lumborum muscle and along the right posterior paraspinal musculature. Multifocal rim enhancing mixed gas and fluid collections worrisome for abscesses with the dominant collections in the perihepatic region spanning up to 15 cm and within the pelvic cul-de-sac spanning up to 7 cm. Free air along the mesentery in addition to the aforementioned fluid collections. As there are some thickened loops of small bowel in the lower abdomen and pelvis, a concomitant bowel perforation is not able be excluded on this exam, with differential considerations including ischemic bowel. 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 Loculated right pleural effusion characterized to advantage on today's chest CT.     YUV99.6 High k/uL RBC3.00 Low m/uL Hemoglobin8. 4 Low g/dL Hlfeicrlqx28.1 Low % MCV93.7fL MCH28.0pg MCHC29.9g/dL RDW15.9 High % [id number] High      Kywsznp142 High mg/dL     BRW93ye/dL   CREATININE0.85mg/dL     Calcium7. 7 Low mg/dL   Wltleo473ejyc/L   Potassium3. 6 Low        PMHx:  Past Medical History:   Diagnosis Date    Carcinoma ([facility] [address]     Squamous Cell    Cellulitis     DVT (deep venous thrombosis) ([facility] [address] 2006    LLE    Kidney stone     Lymphedema     Morbid obesity ([facility] [address]     Psoas abscess, right ([address]     Pyelonephritis     Wears glasses         PSHx:  Past Surgical History:   Procedure Laterality Date    CARPAL TUNNEL RELEASE  1990s    CT ABSCESS DRAIN SUBCUTANEOUS  1/10/2022    CT ABSCESS DRAIN SUBCUTANEOUS 1/10/2022 [facility] CT SCAN    CYSTOSCOPY N/A 1/4/2022    CYSTOSCOPY URETERAL STENT INSERTION performed by Lacy Hein MD at [facility] Right     HLL with stent    CYSTOSCOPY Right 3/1/2022    CYSTOSCOPY URETEROSCOPY LASER-WTIH HLL performed by Lacy Hein MD at [facility] Right 3/1/2022    CYSTOSCOPY URETERAL STENT [address] performed by Lacy Hein MD at [facility] Right 04/05/2022    Dr Bret Ryder with stent insertion    CYSTOSCOPY Right 4/5/2022    CYSTOSCOPY URETEROSCOPY LASER-HLL performed by Lacy Hein MD at [facility] Right 4/5/2022    CYSTOSCOPY URETERAL STENT [address] performed by Lacy Hein MD at [facility]  1/7/2022         IR NEPHROSTOMY PERCUTANEOUS RIGHT  1/5/2022    IR NEPHROSTOMY PERCUTANEOUS RIGHT [facility]/2022 [facility]    ANNABELLA AND BSO      05/2019    TUBAL LIGATION  1993    TUBAL LIGATION      WISDOM TOOTH EXTRACTION          Home Meds:  Prior to Admission medications    Medication Sig Start Date End Date Taking?  Authorizing Provider   oxybutynin (DITROPAN XL) 15 MG extended release tablet Take 1 tablet by mouth daily 3/29/22   Erenest Whitehouse, APRN - CNP   ketorolac (TORADOL) 10 MG tablet Take 10 mg by mouth every 6 hours as needed for Pain   Patient not taking: Reported on 4/12/2022 2/4/22   Historical Provider, MD   potassium chloride (KLOR-CON M) 20 MEQ TBCR extended release tablet Take 20 mEq by mouth every evening  Patient not taking: Reported on 4/5/2022    Historical Provider, MD   Misc Natural Products (OSTEO BI-FLEX ADV DOUBLE ST) TABS Take by mouth every morning    Historical Provider, MD   polyethylene glycol (GLYCOLAX) 17 g packet Take 17 g by mouth daily as needed for Constipation     Historical Provider, MD   ipratropium-albuterol (DUONEB) 0.5-2.5 (3) MG/3ML SOLN nebulizer solution Inhale 1 vial into the lungs every 4 hours  Patient not taking: Reported on 4/12/2022    Historical Provider, MD   loratadine (CLARITIN) 10 MG capsule Take 10 mg by mouth daily     Historical Provider, MD   metoprolol tartrate (LOPRESSOR) 25 MG tablet Take 1 tablet by mouth 2 times daily 1/12/22   Keaton Viramontes MD   Ascorbic Acid (VITAMIN C) 250 MG tablet Take 250 mg by mouth daily    Historical Provider, MD   vitamin E 400 UNIT capsule Take 400 Units by mouth daily    Historical Provider, MD   vitamin D (CHOLECALCIFEROL) 25 MCG (1000 UT) TABS tablet Take 1,000 Units by mouth daily    Historical Provider, MD   rivaroxaban (XARELTO) 20 MG TABS tablet Take 1 tablet by mouth daily (with breakfast) 7/17/18   Elodia Castellanos MD   Multiple Vitamins-Minerals (THERAPEUTIC MULTIVITAMIN-MINERALS) tablet Take 1 tablet by mouth daily    Historical Provider, MD         Allergies:   Estrogens    Social Hx:  Tobacco:    reports that she has been smoking cigarettes. She has a 21.00 pack-year smoking history. She has never used smokeless tobacco.  Alcohol:      reports no history of alcohol use. Drug Use:  reports no history of drug use. Family Hx; Family History   Adopted: Yes       ROS:  Review of Systems   Constitutional: Positive for activity change (Decreased), appetite change (Diminished), chills, diaphoresis, fatigue and fever. HENT: Negative for congestion and nosebleeds. Eyes: Negative for photophobia and visual disturbance. Respiratory: Positive for shortness of breath (Dyspnea on exertion). Negative for cough and wheezing. Cardiovascular: Positive for leg swelling (Chronic). Negative for chest pain and palpitations.    Gastrointestinal: Positive for abdominal distention, abdominal pain (Poorly localized), constipation and nausea. Negative for blood in stool and vomiting. Genitourinary: Positive for flank pain (Right flank). Negative for difficulty urinating, dysuria and hematuria. Musculoskeletal: Negative for arthralgias and myalgias. Skin: Negative for rash and wound. Elephantiasis, lymphedema   Neurological: Positive for weakness (Generalized). Negative for dizziness and light-headedness. Psychiatric/Behavioral: Positive for dysphoric mood. Physical Exam:  Vitals:  /68   Pulse 83   Temp 98.4 °F (36.9 °C)   Resp 27   LMP  (LMP Unknown)   SpO2 99%   Temp (24hrs), Av °F (37.2 °C), Min:98.4 °F (36.9 °C), Max:99.5 °F (37.5 °C)    Physical Exam  Vitals reviewed. Constitutional:       General: She is not in acute distress. Appearance: She is ill-appearing. She is not diaphoretic. HENT:      Head: Normocephalic. Nose: Nose normal.   Eyes:      General: No scleral icterus. Conjunctiva/sclera: Conjunctivae normal.   Neck:      Trachea: No tracheal deviation. Cardiovascular:      Rate and Rhythm: Normal rate and regular rhythm. Pulmonary:      Effort: Pulmonary effort is normal. No respiratory distress. Breath sounds: Normal breath sounds. No wheezing or rales. Chest:      Chest wall: No tenderness. Abdominal:      General: There is distension. Palpations: Abdomen is soft. Tenderness: There is abdominal tenderness. There is rebound. There is no guarding. Musculoskeletal:         General: No tenderness. Cervical back: Neck supple. Right lower leg: Edema present. Left lower leg: Edema present. Comments: Lymphedema  Elephantiasis  Stasis dermatitis   Skin:     General: Skin is warm and dry. Neurological:      Mental Status: She is alert.       Comments: Having some difficulty with detailed historical data           Data:  Laboratory Testing:  Recent Results (from the past 24 hour(s))   EKG 12 Lead    Collection Time: 22 8:15 PM   Result Value Ref Range    Ventricular Rate 107 BPM    Atrial Rate 108 BPM    QRS Duration 132 ms    Q-T Interval 362 ms    QTc Calculation (Bazett) 483 ms    R Axis -25 degrees    T Axis -4 degrees   CBC with Auto Differential    Collection Time: 04/20/22  8:45 PM   Result Value Ref Range    WBC 12.5 (H) 3.5 - 11.3 k/uL    RBC 3.24 (L) 3.95 - 5.11 m/uL    Hemoglobin 9.1 (L) 11.9 - 15.1 g/dL    Hematocrit 30.5 (L) 36.3 - 47.1 %    MCV 94.1 82.6 - 102.9 fL    MCH 28.1 25.2 - 33.5 pg    MCHC 29.8 28.4 - 34.8 g/dL    RDW 16.0 (H) 11.8 - 14.4 %    Platelets 276 (H) 434 - 453 k/uL    MPV 8.9 8.1 - 13.5 fL    NRBC Automated 0.0 0.0 per 100 WBC    Seg Neutrophils 85 (H) 36 - 65 %    Lymphocytes 7 (L) 24 - 43 %    Monocytes 6 3 - 12 %    Eosinophils % 0 (L) 1 - 4 %    Immature Granulocytes 0 0 %    Basophils 0 0 - 2 %    Metamyelocytes 2 (H) 0 %    Segs Absolute 10.62 (H) 1.50 - 8.10 k/uL    Absolute Lymph # 0.88 (L) 1.10 - 3.70 k/uL    Absolute Mono # 0.75 0.10 - 1.20 k/uL    Absolute Eos # 0.00 0.00 - 0.44 k/uL    Absolute Immature Granulocyte 0.00 0.00 - 0.30 k/uL    Basophils Absolute 0.00 0.0 - 0.2 k/uL    Metamyelocytes Absolute 0.25 (H) 0 k/uL    Morphology Normal    Comprehensive Metabolic Panel w/ Reflex to MG    Collection Time: 04/20/22  8:45 PM   Result Value Ref Range    Glucose 178 (H) 70 - 99 mg/dL    BUN 15 8 - 23 mg/dL    CREATININE 0.95 (H) 0.50 - 0.90 mg/dL    Bun/Cre Ratio 16 9 - 20    Calcium 8.5 (L) 8.6 - 10.4 mg/dL    Sodium 140 135 - 144 mmol/L    Potassium 2.8 (LL) 3.7 - 5.3 mmol/L    Chloride 103 98 - 107 mmol/L    CO2 24 20 - 31 mmol/L    Anion Gap 13 9 - 17 mmol/L    Alkaline Phosphatase 117 (H) 35 - 104 U/L    ALT 18 5 - 33 U/L    AST 17 <32 U/L    Total Bilirubin 0.36 0.3 - 1.2 mg/dL    Total Protein 7.1 6.4 - 8.3 g/dL    Albumin 2.5 (L) 3.5 - 5.2 g/dL    Albumin/Globulin Ratio 0.5 (L) 1.0 - 2.5    GFR Non- 59 (L) >60 mL/min    GFR African American >60 >60 mL/min    GFR Comment          GFR Staging         Lipase    Collection Time: 04/20/22  8:45 PM   Result Value Ref Range    Lipase 9 (L) 13 - 60 U/L   Troponin    Collection Time: 04/20/22  8:45 PM   Result Value Ref Range    Troponin, High Sensitivity 27 (H) 0 - 14 ng/L   Brain Natriuretic Peptide    Collection Time: 04/20/22  8:45 PM   Result Value Ref Range    Pro-BNP 3,599 (H) <300 pg/mL   Magnesium    Collection Time: 04/20/22  8:45 PM   Result Value Ref Range    Magnesium 1.9 1.6 - 2.6 mg/dL   Lactic Acid    Collection Time: 04/20/22  8:45 PM   Result Value Ref Range    Lactic Acid 4.4 (H) 0.5 - 2.2 mmol/L   COVID-19, Rapid    Collection Time: 04/20/22 10:40 PM    Specimen: Nasopharyngeal Swab   Result Value Ref Range    Specimen Description . NASOPHARYNGEAL SWAB     SARS-CoV-2, Rapid Not Detected Not Detected   Strep Screen Group A Throat    Collection Time: 04/20/22 10:45 PM    Specimen: Throat   Result Value Ref Range    Source . THROAT SWAB     Strep A Ag NEGATIVE NEGATIVE   Rapid influenza A/B antigens    Collection Time: 04/20/22 10:45 PM    Specimen: Nasopharyngeal   Result Value Ref Range    Flu A Antigen NEGATIVE NEGATIVE    Flu B Antigen NEGATIVE NEGATIVE   CBC with Auto Differential    Collection Time: 04/21/22  2:14 AM   Result Value Ref Range    WBC 14.1 (H) 3.5 - 11.3 k/uL    RBC 3.00 (L) 3.95 - 5.11 m/uL    Hemoglobin 8.4 (L) 11.9 - 15.1 g/dL    Hematocrit 28.1 (L) 36.3 - 47.1 %    MCV 93.7 82.6 - 102.9 fL    MCH 28.0 25.2 - 33.5 pg    MCHC 29.9 28.4 - 34.8 g/dL    RDW 15.9 (H) 11.8 - 14.4 %    Platelets 901 (H) 161 - 453 k/uL    MPV 9.2 8.1 - 13.5 fL    NRBC Automated 0.0 0.0 per 100 WBC    Immature Granulocytes 0 0 %    Seg Neutrophils 85 (H) 36 - 66 %    Lymphocytes 8 (L) 24 - 44 %    Monocytes 7 1 - 7 %    Eosinophils % 0 (L) 1 - 4 %    Basophils 0 0 - 2 %    Absolute Immature Granulocyte 0.00 0.00 - 0.30 k/uL    Segs Absolute 11.98 (H) 1.8 - 7.7 k/uL    Absolute Lymph # 1.13 1.0 - 4.8 k/uL    Absolute Mono # 0.99 (H) 0.1 - 0.8 k/uL    Absolute Eos # 0.00 0.0 - 0.4 k/uL    Basophils Absolute 0.00 0.0 - 0.2 k/uL    Morphology ANISOCYTOSIS PRESENT    Lactate, Sepsis    Collection Time: 04/21/22  2:14 AM   Result Value Ref Range    Lactic Acid, Sepsis, Whole Blood 1.8 0.5 - 1.9 mmol/L   Protime-INR    Collection Time: 04/21/22  2:14 AM   Result Value Ref Range    Protime 16.2 (H) 9.1 - 12.3 sec    INR 1.6    APTT    Collection Time: 04/21/22  2:14 AM   Result Value Ref Range    PTT 28.2 20.5 - 30.5 sec   TYPE AND SCREEN    Collection Time: 04/21/22  2:14 AM   Result Value Ref Range    Expiration Date 04/24/2022,2359     Arm Band Number BE [id number]     ABO/Rh B POSITIVE     Antibody Screen NEGATIVE    Basic Metabolic Panel w/ Reflex to MG    Collection Time: 04/21/22  2:14 AM   Result Value Ref Range    Glucose 193 (H) 70 - 99 mg/dL    BUN 14 8 - 23 mg/dL    CREATININE 0.91 (H) 0.50 - 0.90 mg/dL    Calcium 7.7 (L) 8.6 - 10.4 mg/dL    Sodium 139 135 - 144 mmol/L    Potassium 2.9 (LL) 3.7 - 5.3 mmol/L    Chloride 104 98 - 107 mmol/L    CO2 25 20 - 31 mmol/L    Anion Gap 10 9 - 17 mmol/L    GFR Non-African American >60 >60 mL/min    GFR African American >60 >60 mL/min    GFR Comment         Magnesium    Collection Time: 04/21/22  2:14 AM   Result Value Ref Range    Magnesium 1.9 1.6 - 2.6 mg/dL   Culture, Blood 2    Collection Time: 04/21/22  2:15 AM    Specimen: Blood   Result Value Ref Range    Specimen Description . BLOOD     Special Requests LEFT AC 10ML     Culture NO GROWTH <24 HRS    Culture, Blood 1    Collection Time: 04/21/22  2:16 AM    Specimen: Blood   Result Value Ref Range    Specimen Description . BLOOD     Special Requests RIGHT FOREARM 20ML     Culture NO GROWTH <24 HRS    Urinalysis with Microscopic    Collection Time: 04/21/22  3:33 AM   Result Value Ref Range    Color, UA Yellow Yellow    Turbidity UA Cloudy (A) Clear    Glucose, Ur NEGATIVE NEGATIVE    Bilirubin Urine NEGATIVE NEGATIVE    Ketones, Urine NEGATIVE NEGATIVE    Specific Gravity, UA 1.029 1.005 - 1.030    Urine Hgb SMALL (A) NEGATIVE    pH, UA 5.5 5.0 - 8.0    Protein, UA 1+ (A) NEGATIVE    Urobilinogen, Urine Normal Normal    Nitrite, Urine NEGATIVE NEGATIVE    Leukocyte Esterase, Urine SMALL (A) NEGATIVE    -          WBC, UA 10 TO 20 0 - 5 /HPF    RBC, UA 10 TO 20 0 - 4 /HPF    Casts UA  0 - 8 /LPF     0 TO 2 HYALINE Reference range defined for non-centrifuged specimen. Epithelial Cells UA 20 TO 50 0 - 5 /HPF    Bacteria, UA FEW (A) None   Basic Metabolic Panel    Collection Time: 04/21/22  7:17 AM   Result Value Ref Range    Glucose 167 (H) 70 - 99 mg/dL    BUN 14 8 - 23 mg/dL    CREATININE 0.85 0.50 - 0.90 mg/dL    Calcium 7.7 (L) 8.6 - 10.4 mg/dL    Sodium 139 135 - 144 mmol/L    Potassium 3.6 (L) 3.7 - 5.3 mmol/L    Chloride 103 98 - 107 mmol/L    CO2 26 20 - 31 mmol/L    Anion Gap 10 9 - 17 mmol/L    GFR Non-African American >60 >60 mL/min    GFR African American >60 >60 mL/min    GFR Comment             Imaging/Diagnostics:  CT ABDOMEN PELVIS W IV CONTRAST Additional Contrast? 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 New bladder prolapse with residual dependent stones in the bladder. Intraluminal gas in the bladder may be due to infection or recent instrumentation. Increased size of the previous right retroperitoneal abscess tracking along the psoas muscle (6.7 x 2.6 x 10.2 cm) which previously contained a percutaneous drainage catheter. There are abscesses tracking along the prior course of the drainage catheter through the right quadratus lumborum muscle and along the right posterior paraspinal musculature.  Multifocal rim enhancing mixed gas and fluid collections worrisome for abscesses with the dominant collections in the perihepatic region spanning up to 15 cm and within the pelvic cul-de-sac spanning up to 7 cm. Free air along the mesentery in addition to the aforementioned fluid collections. As there are some thickened loops of small bowel in the lower abdomen and pelvis, a concomitant bowel perforation is not able be excluded on this exam, with differential considerations including ischemic bowel. 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 Loculated right pleural effusion characterized to advantage on today's chest CT. Critical results were called by Dr. Ravinder Gillette to Dr. Mirtha Riojas on 4/20/2022 at 22:21 EST. XR CHEST PORTABLE    Result Date: 4/20/2022  Mass like infiltrate in the right upper lobe suggesting pneumonia or neoplasm. Underlying pulmonary vascular congestion RECOMMENDATION: Follow-up CT would be helpful. CT CHEST PULMONARY EMBOLISM W CONTRAST    Result Date: 4/20/2022  Moderately large right pleural effusion with areas of loculation accounting for the pseudotumor seen on chest x-ray. . Mild bibasal atelectasis more notably on the right. .. Coronary artery/atherosclerotic disease No obvious evidence of pulmonary embolism. Mild subcutaneous emphysema along the right posterior chest wall. Perihepatic fluid. Ectopic air seen in the abdomen. Please refer to abdominal CT report RECOMMENDATIONS: No additional routine follow-up for incidental abdominal lesions.        Assessment:  Primary Problem  Intra-abdominal abscess [facility])    Active Hospital Problems    Diagnosis Date Noted    Intra-abdominal abscess ([facility] [address] [K65.1] 04/21/2022     Priority: Medium    Obesity, Class III, BMI 40-49.9 (morbid obesity) ([facility] [address] [E66.01] 04/21/2022     Priority: Medium    Hypocalcemia [E83.51] 04/21/2022     Priority: Medium    Hypokalemia [E87.6] 04/21/2022     Priority: Medium    Vertebral osteomyelitis ([address] T12-L1 [M46.20] 04/21/2022 Priority: Medium    Pleural effusion on right [J90] 04/21/2022     Priority: Medium    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] 02/11/2020     Priority: Medium     Class: Chronic    Ureteral calculus [N20.1] 02/28/2022    Psoas muscle abscess ([address] [K68.12] 01/08/2022    Renal abscess, right [N15.1] 01/02/2022    Normocytic anemia [D64.9] 35/18/0050    Complicated UTI (urinary tract infection) [N39.0] 12/28/2020    Renal calculus [N20.0] 12/28/2020    History of recurrent deep vein thrombosis (DVT) [Z86.718] 04/26/2018    Tobacco abuse [Z72.0] 11/15/2016    Lymphedema of both lower extremities [I89.0] 11/08/2016    Long term current use of anticoagulant therapy [Z79.01] 09/05/2015       Plan:  [facility]   Urology reevaluation  General surgical consultation  Rule out perforated colon  CT with contrast pending  Antibiotics per Infectious Disease service  Blood cultures in progress  Urine cultures in progress  Pain management  Correct electrolyte abnormalities  Risk factor management / weight loss   Smoking cessation / education  Blood products as needed  History of DVT: Xarelto on hold  Blood Pressure - Monitor and control  Pain management    Patient is admitted as inpatient status because of co-morbidities listed above, severity of signs and symptoms as outlined, requirement for current medical therapies and most importantly because of direct risk to patient if care not provided in a hospital setting. Expected length of stay > 48 hours.      Consultations:   IP CONSULT TO HOSPITALIST  IP CONSULT TO UROLOGY  IP CONSULT TO PHARMACY  IP CONSULT TO INFECTIOUS DISEASES    Electronically signed by Chin Coello DO on 4/21/2022 at 8:58 AM

## 2022-04-21 NOTE — ED NOTES
Removed nonrebreather from pt and placed on nasal cannula at 6L. Pt SpO2 99%.       Parveen Lujan RN  04/21/22 6798

## 2022-04-21 NOTE — PROGRESS NOTES
POST OP NOTE    SUBJECTIVE  Pt s/p IR drain placements for perinephric, perihepatic and pelvic abscesses    OBJECTIVE  VITALS:  BP (!) 119/44   Pulse 71   Temp 98.2 °F (36.8 °C) (Oral)   Resp 23   Ht 5' 1\" (1.549 m)   Wt 272 lb 0.8 oz (123.4 kg)   LMP  (LMP Unknown)   SpO2 99%   BMI 51.40 kg/m²         GENERAL:  awake and alert. No acute distress  CARDIOVASCULAR:  regular rate and rhythm   LUNGS:  CTA Bilaterally  ABDOMEN:   3 drains in place, c/d/i, 80 mL output posterior buttock pigtail catheter, 500 mL output RLQ pigtail catheter, trace output R pigtail catheter, Abdomen soft, non-tender, non-distended  INCISION: Incision clean/dry/intact    ASSESSMENT  1. POD# 0 s/p IR placement of 3 pigtail catheter drains for perinephric, perihepatic, and pelvic abscesses    PLAN  1. Pain management- MMT  2. GI proph- Pepcid  3. Ok for ice chips  4.  LR @ 100 ml/hr      Clay Villeda MD  Trauma/Surgery Service  4/21/2022 at 5:40 PM

## 2022-04-22 ENCOUNTER — APPOINTMENT (OUTPATIENT)
Dept: GENERAL RADIOLOGY | Age: 67
DRG: 853 | End: 2022-04-22
Payer: MEDICARE

## 2022-04-22 ENCOUNTER — ANESTHESIA (OUTPATIENT)
Dept: OPERATING ROOM | Age: 67
DRG: 853 | End: 2022-04-22
Payer: MEDICARE

## 2022-04-22 ENCOUNTER — ANESTHESIA EVENT (OUTPATIENT)
Dept: OPERATING ROOM | Age: 67
DRG: 853 | End: 2022-04-22
Payer: MEDICARE

## 2022-04-22 VITALS — DIASTOLIC BLOOD PRESSURE: 55 MMHG | OXYGEN SATURATION: 94 % | SYSTOLIC BLOOD PRESSURE: 110 MMHG

## 2022-04-22 LAB
ABSOLUTE EOS #: 0 K/UL (ref 0–0.4)
ABSOLUTE IMMATURE GRANULOCYTE: 0 K/UL (ref 0–0.3)
ABSOLUTE LYMPH #: 0.9 K/UL (ref 1–4.8)
ABSOLUTE MONO #: 1.28 K/UL (ref 0.1–0.8)
ALBUMIN SERPL-MCNC: 2 G/DL (ref 3.5–5.2)
ALBUMIN/GLOBULIN RATIO: 0.5 (ref 1–2.5)
ALP BLD-CCNC: 99 U/L (ref 35–104)
ALT SERPL-CCNC: 12 U/L (ref 5–33)
ANION GAP SERPL CALCULATED.3IONS-SCNC: 9 MMOL/L (ref 9–17)
AST SERPL-CCNC: 14 U/L
BASOPHILS # BLD: 0 % (ref 0–2)
BASOPHILS ABSOLUTE: 0 K/UL (ref 0–0.2)
BILIRUB SERPL-MCNC: 0.16 MG/DL (ref 0.3–1.2)
BILIRUBIN DIRECT: <0.08 MG/DL
BILIRUBIN, INDIRECT: ABNORMAL MG/DL (ref 0–1)
BUN BLDV-MCNC: 16 MG/DL (ref 8–23)
CALCIUM IONIZED: 1.17 MMOL/L (ref 1.13–1.33)
CALCIUM SERPL-MCNC: 7.8 MG/DL (ref 8.6–10.4)
CHLORIDE BLD-SCNC: 106 MMOL/L (ref 98–107)
CO2: 25 MMOL/L (ref 20–31)
CREAT SERPL-MCNC: 0.81 MG/DL (ref 0.5–0.9)
EKG ATRIAL RATE: 107 BPM
EKG ATRIAL RATE: 59 BPM
EKG P AXIS: 29 DEGREES
EKG P AXIS: 53 DEGREES
EKG P-R INTERVAL: 166 MS
EKG P-R INTERVAL: 184 MS
EKG Q-T INTERVAL: 366 MS
EKG Q-T INTERVAL: 454 MS
EKG QRS DURATION: 128 MS
EKG QRS DURATION: 136 MS
EKG QTC CALCULATION (BAZETT): 449 MS
EKG QTC CALCULATION (BAZETT): 488 MS
EKG R AXIS: -20 DEGREES
EKG R AXIS: -25 DEGREES
EKG T AXIS: 1 DEGREES
EKG T AXIS: 16 DEGREES
EKG VENTRICULAR RATE: 107 BPM
EKG VENTRICULAR RATE: 59 BPM
EOSINOPHILS RELATIVE PERCENT: 0 % (ref 1–4)
GFR AFRICAN AMERICAN: >60 ML/MIN
GFR NON-AFRICAN AMERICAN: >60 ML/MIN
GFR SERPL CREATININE-BSD FRML MDRD: ABNORMAL ML/MIN/{1.73_M2}
GLUCOSE BLD-MCNC: 119 MG/DL (ref 70–99)
HCT VFR BLD CALC: 24.9 % (ref 36.3–47.1)
HCT VFR BLD CALC: 26.5 % (ref 36.3–47.1)
HEMOGLOBIN: 7.4 G/DL (ref 11.9–15.1)
HEMOGLOBIN: 8 G/DL (ref 11.9–15.1)
IMMATURE GRANULOCYTES: 0 %
INR BLD: 1.1
LYMPHOCYTES # BLD: 7 % (ref 24–44)
MCH RBC QN AUTO: 27.8 PG (ref 25.2–33.5)
MCH RBC QN AUTO: 28.3 PG (ref 25.2–33.5)
MCHC RBC AUTO-ENTMCNC: 29.7 G/DL (ref 28.4–34.8)
MCHC RBC AUTO-ENTMCNC: 30.2 G/DL (ref 28.4–34.8)
MCV RBC AUTO: 93.6 FL (ref 82.6–102.9)
MCV RBC AUTO: 93.6 FL (ref 82.6–102.9)
MONOCYTES # BLD: 10 % (ref 1–7)
MORPHOLOGY: ABNORMAL
MORPHOLOGY: ABNORMAL
NRBC AUTOMATED: 0 PER 100 WBC
NRBC AUTOMATED: 0 PER 100 WBC
PDW BLD-RTO: 15.9 % (ref 11.8–14.4)
PDW BLD-RTO: 16 % (ref 11.8–14.4)
PLATELET # BLD: 561 K/UL (ref 138–453)
PLATELET # BLD: 593 K/UL (ref 138–453)
PMV BLD AUTO: 9.1 FL (ref 8.1–13.5)
PMV BLD AUTO: 9.3 FL (ref 8.1–13.5)
POTASSIUM SERPL-SCNC: 4 MMOL/L (ref 3.7–5.3)
PROTHROMBIN TIME: 12 SEC (ref 9.1–12.3)
RBC # BLD: 2.66 M/UL (ref 3.95–5.11)
RBC # BLD: 2.83 M/UL (ref 3.95–5.11)
RBC # BLD: ABNORMAL 10*6/UL
SEG NEUTROPHILS: 83 % (ref 36–66)
SEGMENTED NEUTROPHILS ABSOLUTE COUNT: 10.62 K/UL (ref 1.8–7.7)
SODIUM BLD-SCNC: 140 MMOL/L (ref 135–144)
TOTAL PROTEIN: 5.8 G/DL (ref 6.4–8.3)
VANCOMYCIN RANDOM: 20.2 UG/ML
WBC # BLD: 12.8 K/UL (ref 3.5–11.3)
WBC # BLD: 15.8 K/UL (ref 3.5–11.3)

## 2022-04-22 PROCEDURE — 6360000002 HC RX W HCPCS: Performed by: ANESTHESIOLOGY

## 2022-04-22 PROCEDURE — 80202 ASSAY OF VANCOMYCIN: CPT

## 2022-04-22 PROCEDURE — 2700000000 HC OXYGEN THERAPY PER DAY

## 2022-04-22 PROCEDURE — 82248 BILIRUBIN DIRECT: CPT

## 2022-04-22 PROCEDURE — C2617 STENT, NON-COR, TEM W/O DEL: HCPCS | Performed by: UROLOGY

## 2022-04-22 PROCEDURE — 2060000000 HC ICU INTERMEDIATE R&B

## 2022-04-22 PROCEDURE — 2500000003 HC RX 250 WO HCPCS: Performed by: NURSE ANESTHETIST, CERTIFIED REGISTERED

## 2022-04-22 PROCEDURE — 80053 COMPREHEN METABOLIC PANEL: CPT

## 2022-04-22 PROCEDURE — 3700000001 HC ADD 15 MINUTES (ANESTHESIA): Performed by: UROLOGY

## 2022-04-22 PROCEDURE — 99232 SBSQ HOSP IP/OBS MODERATE 35: CPT | Performed by: NURSE PRACTITIONER

## 2022-04-22 PROCEDURE — 36415 COLL VENOUS BLD VENIPUNCTURE: CPT

## 2022-04-22 PROCEDURE — 6360000002 HC RX W HCPCS: Performed by: CLINICAL NURSE SPECIALIST

## 2022-04-22 PROCEDURE — 3600000002 HC SURGERY LEVEL 2 BASE: Performed by: UROLOGY

## 2022-04-22 PROCEDURE — 85027 COMPLETE CBC AUTOMATED: CPT

## 2022-04-22 PROCEDURE — 6370000000 HC RX 637 (ALT 250 FOR IP): Performed by: STUDENT IN AN ORGANIZED HEALTH CARE EDUCATION/TRAINING PROGRAM

## 2022-04-22 PROCEDURE — 6370000000 HC RX 637 (ALT 250 FOR IP): Performed by: CLINICAL NURSE SPECIALIST

## 2022-04-22 PROCEDURE — 6370000000 HC RX 637 (ALT 250 FOR IP): Performed by: ANESTHESIOLOGY

## 2022-04-22 PROCEDURE — C1769 GUIDE WIRE: HCPCS | Performed by: UROLOGY

## 2022-04-22 PROCEDURE — 2580000003 HC RX 258: Performed by: STUDENT IN AN ORGANIZED HEALTH CARE EDUCATION/TRAINING PROGRAM

## 2022-04-22 PROCEDURE — 99233 SBSQ HOSP IP/OBS HIGH 50: CPT | Performed by: INTERNAL MEDICINE

## 2022-04-22 PROCEDURE — 93005 ELECTROCARDIOGRAM TRACING: CPT | Performed by: INTERNAL MEDICINE

## 2022-04-22 PROCEDURE — 2580000003 HC RX 258: Performed by: CLINICAL NURSE SPECIALIST

## 2022-04-22 PROCEDURE — 6360000002 HC RX W HCPCS: Performed by: NURSE ANESTHETIST, CERTIFIED REGISTERED

## 2022-04-22 PROCEDURE — 7100000001 HC PACU RECOVERY - ADDTL 15 MIN: Performed by: UROLOGY

## 2022-04-22 PROCEDURE — 3600000012 HC SURGERY LEVEL 2 ADDTL 15MIN: Performed by: UROLOGY

## 2022-04-22 PROCEDURE — 2500000003 HC RX 250 WO HCPCS: Performed by: STUDENT IN AN ORGANIZED HEALTH CARE EDUCATION/TRAINING PROGRAM

## 2022-04-22 PROCEDURE — 82330 ASSAY OF CALCIUM: CPT

## 2022-04-22 PROCEDURE — 85025 COMPLETE CBC W/AUTO DIFF WBC: CPT

## 2022-04-22 PROCEDURE — 3209999900 FLUORO FOR SURGICAL PROCEDURES

## 2022-04-22 PROCEDURE — 6360000002 HC RX W HCPCS: Performed by: STUDENT IN AN ORGANIZED HEALTH CARE EDUCATION/TRAINING PROGRAM

## 2022-04-22 PROCEDURE — 2709999900 HC NON-CHARGEABLE SUPPLY: Performed by: UROLOGY

## 2022-04-22 PROCEDURE — 2500000003 HC RX 250 WO HCPCS: Performed by: CLINICAL NURSE SPECIALIST

## 2022-04-22 PROCEDURE — 3700000000 HC ANESTHESIA ATTENDED CARE: Performed by: UROLOGY

## 2022-04-22 PROCEDURE — 2580000003 HC RX 258: Performed by: ANESTHESIOLOGY

## 2022-04-22 PROCEDURE — 0T768DZ DILATION OF RIGHT URETER WITH INTRALUMINAL DEVICE, VIA NATURAL OR ARTIFICIAL OPENING ENDOSCOPIC: ICD-10-PCS | Performed by: STUDENT IN AN ORGANIZED HEALTH CARE EDUCATION/TRAINING PROGRAM

## 2022-04-22 PROCEDURE — 85610 PROTHROMBIN TIME: CPT

## 2022-04-22 PROCEDURE — 2580000003 HC RX 258: Performed by: UROLOGY

## 2022-04-22 PROCEDURE — 2580000003 HC RX 258: Performed by: PHYSICIAN ASSISTANT

## 2022-04-22 PROCEDURE — 7100000000 HC PACU RECOVERY - FIRST 15 MIN: Performed by: UROLOGY

## 2022-04-22 PROCEDURE — 94761 N-INVAS EAR/PLS OXIMETRY MLT: CPT

## 2022-04-22 DEVICE — URETERAL STENT
Type: IMPLANTABLE DEVICE | Site: URETER | Status: FUNCTIONAL
Brand: POLARIS™ ULTRA

## 2022-04-22 RX ORDER — MAGNESIUM HYDROXIDE 1200 MG/15ML
LIQUID ORAL PRN
Status: DISCONTINUED | OUTPATIENT
Start: 2022-04-22 | End: 2022-04-22 | Stop reason: HOSPADM

## 2022-04-22 RX ORDER — FENTANYL CITRATE 50 UG/ML
25 INJECTION, SOLUTION INTRAMUSCULAR; INTRAVENOUS EVERY 5 MIN PRN
Status: DISCONTINUED | OUTPATIENT
Start: 2022-04-22 | End: 2022-04-22 | Stop reason: HOSPADM

## 2022-04-22 RX ORDER — SODIUM CHLORIDE 0.9 % (FLUSH) 0.9 %
5-40 SYRINGE (ML) INJECTION EVERY 12 HOURS SCHEDULED
Status: DISCONTINUED | OUTPATIENT
Start: 2022-04-22 | End: 2022-04-22 | Stop reason: HOSPADM

## 2022-04-22 RX ORDER — PIPERACILLIN SODIUM, TAZOBACTAM SODIUM 3; .375 G/15ML; G/15ML
INJECTION, POWDER, LYOPHILIZED, FOR SOLUTION INTRAVENOUS PRN
Status: DISCONTINUED | OUTPATIENT
Start: 2022-04-22 | End: 2022-04-22 | Stop reason: SDUPTHER

## 2022-04-22 RX ORDER — LIDOCAINE HYDROCHLORIDE 10 MG/ML
INJECTION, SOLUTION EPIDURAL; INFILTRATION; INTRACAUDAL; PERINEURAL PRN
Status: DISCONTINUED | OUTPATIENT
Start: 2022-04-22 | End: 2022-04-22 | Stop reason: SDUPTHER

## 2022-04-22 RX ORDER — SODIUM CHLORIDE, SODIUM LACTATE, POTASSIUM CHLORIDE, CALCIUM CHLORIDE 600; 310; 30; 20 MG/100ML; MG/100ML; MG/100ML; MG/100ML
INJECTION, SOLUTION INTRAVENOUS CONTINUOUS
Status: DISCONTINUED | OUTPATIENT
Start: 2022-04-22 | End: 2022-04-22

## 2022-04-22 RX ORDER — IPRATROPIUM BROMIDE AND ALBUTEROL SULFATE 2.5; .5 MG/3ML; MG/3ML
1 SOLUTION RESPIRATORY (INHALATION) ONCE
Status: COMPLETED | OUTPATIENT
Start: 2022-04-22 | End: 2022-04-22

## 2022-04-22 RX ORDER — PROPOFOL 10 MG/ML
INJECTION, EMULSION INTRAVENOUS CONTINUOUS PRN
Status: DISCONTINUED | OUTPATIENT
Start: 2022-04-22 | End: 2022-04-22 | Stop reason: SDUPTHER

## 2022-04-22 RX ORDER — MAGNESIUM HYDROXIDE 1200 MG/15ML
LIQUID ORAL CONTINUOUS PRN
Status: DISCONTINUED | OUTPATIENT
Start: 2022-04-22 | End: 2022-04-22 | Stop reason: HOSPADM

## 2022-04-22 RX ORDER — ONDANSETRON 2 MG/ML
4 INJECTION INTRAMUSCULAR; INTRAVENOUS
Status: DISCONTINUED | OUTPATIENT
Start: 2022-04-22 | End: 2022-04-22 | Stop reason: HOSPADM

## 2022-04-22 RX ORDER — FENTANYL CITRATE 50 UG/ML
INJECTION, SOLUTION INTRAMUSCULAR; INTRAVENOUS PRN
Status: DISCONTINUED | OUTPATIENT
Start: 2022-04-22 | End: 2022-04-22 | Stop reason: SDUPTHER

## 2022-04-22 RX ADMIN — PIPERACILLIN AND TAZOBACTAM 3375 MG: 3; .375 INJECTION, POWDER, FOR SOLUTION INTRAVENOUS at 03:47

## 2022-04-22 RX ADMIN — MORPHINE SULFATE 2 MG: 4 INJECTION INTRAVENOUS at 08:15

## 2022-04-22 RX ADMIN — SODIUM CHLORIDE, PRESERVATIVE FREE 20 MG: 5 INJECTION INTRAVENOUS at 20:44

## 2022-04-22 RX ADMIN — SODIUM CHLORIDE, PRESERVATIVE FREE 10 ML: 5 INJECTION INTRAVENOUS at 08:14

## 2022-04-22 RX ADMIN — FENTANYL CITRATE 25 MCG: 50 INJECTION INTRAMUSCULAR; INTRAVENOUS at 12:36

## 2022-04-22 RX ADMIN — POTASSIUM CHLORIDE 20 MEQ: 1500 TABLET, EXTENDED RELEASE ORAL at 18:01

## 2022-04-22 RX ADMIN — PIPERACILLIN AND TAZOBACTAM 3.38 G: 3; .375 INJECTION, POWDER, FOR SOLUTION INTRAVENOUS at 11:43

## 2022-04-22 RX ADMIN — FLUCONAZOLE 400 MG: 2 INJECTION, SOLUTION INTRAVENOUS at 08:04

## 2022-04-22 RX ADMIN — SODIUM CHLORIDE, POTASSIUM CHLORIDE, SODIUM LACTATE AND CALCIUM CHLORIDE: 600; 310; 30; 20 INJECTION, SOLUTION INTRAVENOUS at 10:49

## 2022-04-22 RX ADMIN — LIDOCAINE HYDROCHLORIDE 50 MG: 10 INJECTION, SOLUTION EPIDURAL; INFILTRATION; INTRACAUDAL; PERINEURAL at 11:27

## 2022-04-22 RX ADMIN — OXYBUTYNIN CHLORIDE 15 MG: 10 TABLET, EXTENDED RELEASE ORAL at 08:13

## 2022-04-22 RX ADMIN — METOPROLOL TARTRATE 25 MG: 25 TABLET ORAL at 20:44

## 2022-04-22 RX ADMIN — PIPERACILLIN AND TAZOBACTAM 3375 MG: 3; .375 INJECTION, POWDER, FOR SOLUTION INTRAVENOUS at 18:05

## 2022-04-22 RX ADMIN — METOPROLOL TARTRATE 25 MG: 25 TABLET ORAL at 08:13

## 2022-04-22 RX ADMIN — SODIUM CHLORIDE, PRESERVATIVE FREE 20 MG: 5 INJECTION INTRAVENOUS at 08:13

## 2022-04-22 RX ADMIN — FENTANYL CITRATE 25 MCG: 50 INJECTION, SOLUTION INTRAMUSCULAR; INTRAVENOUS at 11:31

## 2022-04-22 RX ADMIN — IPRATROPIUM BROMIDE AND ALBUTEROL SULFATE 1 AMPULE: .5; 3 SOLUTION RESPIRATORY (INHALATION) at 12:23

## 2022-04-22 RX ADMIN — PROPOFOL 70 MCG/KG/MIN: 10 INJECTION, EMULSION INTRAVENOUS at 11:27

## 2022-04-22 ASSESSMENT — PULMONARY FUNCTION TESTS
PIF_VALUE: 0
PIF_VALUE: 1
PIF_VALUE: 1
PIF_VALUE: 0
PIF_VALUE: 0
PIF_VALUE: 1
PIF_VALUE: 0
PIF_VALUE: 1
PIF_VALUE: 0
PIF_VALUE: 1
PIF_VALUE: 0
PIF_VALUE: 0
PIF_VALUE: 1
PIF_VALUE: 0
PIF_VALUE: 1
PIF_VALUE: 0
PIF_VALUE: 1
PIF_VALUE: 0
PIF_VALUE: 0
PIF_VALUE: 1
PIF_VALUE: 0
PIF_VALUE: 1
PIF_VALUE: 0

## 2022-04-22 ASSESSMENT — PAIN DESCRIPTION - DESCRIPTORS
DESCRIPTORS: ACHING
DESCRIPTORS: BURNING
DESCRIPTORS: BURNING

## 2022-04-22 ASSESSMENT — PAIN DESCRIPTION - PAIN TYPE: TYPE: SURGICAL PAIN

## 2022-04-22 ASSESSMENT — PAIN DESCRIPTION - ORIENTATION
ORIENTATION: RIGHT

## 2022-04-22 ASSESSMENT — PAIN SCALES - GENERAL
PAINLEVEL_OUTOF10: 8
PAINLEVEL_OUTOF10: 8
PAINLEVEL_OUTOF10: 2
PAINLEVEL_OUTOF10: 6
PAINLEVEL_OUTOF10: 8

## 2022-04-22 ASSESSMENT — PAIN DESCRIPTION - LOCATION
LOCATION: VAGINA
LOCATION: NOSE;BACK
LOCATION: VAGINA

## 2022-04-22 ASSESSMENT — ENCOUNTER SYMPTOMS
GASTROINTESTINAL NEGATIVE: 1
RESPIRATORY NEGATIVE: 1

## 2022-04-22 ASSESSMENT — PAIN - FUNCTIONAL ASSESSMENT
PAIN_FUNCTIONAL_ASSESSMENT: INTOLERABLE, UNABLE TO DO ANY ACTIVE OR PASSIVE ACTIVITIES
PAIN_FUNCTIONAL_ASSESSMENT: INTOLERABLE, UNABLE TO DO ANY ACTIVE OR PASSIVE ACTIVITIES
PAIN_FUNCTIONAL_ASSESSMENT: NONE - DENIES PAIN

## 2022-04-22 NOTE — PROGRESS NOTES
0745: Patient's NGT was out past 40cm, advanced to where it was at yesterday and re-secured it to face. Patient is crying and stating it hurts her. Perfect served Trauma, will discuss in rounds. 0930: Rounding on patient and notice IV in RAJ is not laying on patient's arm. Arm is slightly swollen, patient denies pain. Will continue to monitor.

## 2022-04-22 NOTE — PROGRESS NOTES
Dillon Brumfield, Chip Bone, Clearance Damián, Khadijah Smiling  Urology Progress Note     Subjective:   AFVSS   No acute events overnight  Patient states her abdominal pain is much better this AM after drainage  No N/V, NGT in place  Right perinephric drain 45 cc/24 hours  Right perihepatic drain 710 cc/24 hours  Pelvic drain 102 cc/24 hours recorded  WBC 12.8 (14.1)       Patient Vitals for the past 24 hrs:   BP Temp Temp src Pulse Resp SpO2 Height Weight   04/21/22 2000 125/67 -- -- 76 18 97 % -- --   04/21/22 1600 (!) 119/44 98.2 °F (36.8 °C) Oral 71 23 99 % -- --   04/21/22 1400 (!) 121/55 -- -- 72 24 99 % -- --   04/21/22 1306 (!) 121/48 -- -- 75 26 97 % -- --   04/21/22 1305 (!) 121/48 -- -- 81 (!) 31 97 % -- --   04/21/22 1301 (!) 108/47 -- -- 73 22 97 % -- --   04/21/22 1259 (!) 108/47 -- -- 82 22 97 % -- --   04/21/22 1256 98/69 -- -- 72 30 98 % -- --   04/21/22 1254 98/69 -- -- 81 24 97 % -- --   04/21/22 1251 (!) 114/38 -- -- 78 25 98 % -- --   04/21/22 1249 (!) 114/38 -- -- 82 30 98 % -- --   04/21/22 1246 (!) 117/46 -- -- 80 26 98 % -- --   04/21/22 1244 (!) 117/46 -- -- 74 24 97 % -- --   04/21/22 1241 (!) 127/52 -- -- 79 24 98 % -- --   04/21/22 1239 (!) 127/52 -- -- 85 20 98 % -- --   04/21/22 1238 134/63 -- -- 77 26 97 % -- --   04/21/22 1217 (!) 141/59 -- -- 78 27 97 % -- --   04/21/22 1209 121/61 -- -- 76 24 99 % -- --   04/21/22 1205 (!) 141/64 -- -- 83 21 97 % -- --   04/21/22 1201 (!) 142/77 -- -- 84 26 99 % -- --   04/21/22 0947 118/68 98.5 °F (36.9 °C) Oral 75 19 -- -- --   04/21/22 0946 -- -- -- -- -- -- 5' 1\" (1.549 m) 272 lb 0.8 oz (123.4 kg)   04/21/22 0731 117/68 -- -- 83 -- 99 % -- --       Intake/Output Summary (Last 24 hours) at 4/22/2022 0639  Last data filed at 4/22/2022 0400  Gross per 24 hour   Intake 1000 ml   Output 1507 ml   Net -507 ml       Recent Labs     04/20/22  2045 04/21/22  0214 04/22/22  0222   WBC 12.5* 14.1* 12.8*   HGB 9.1* 8.4* 7.4*   HCT 30.5* 28.1* 24.9*   MCV 94.1 93.7 93.6   * 607* 561*     Recent Labs     04/21/22  0214 04/21/22  0717 04/22/22  0222    139 140   K 2.9* 3.6* 4.0    103 106   CO2 25 26 25   BUN 14 14 16   CREATININE 0.91* 0.85 0.81       Recent Labs     04/21/22  0333   COLORU Yellow   PHUR 5.5   WBCUA 10 TO 20   RBCUA 10 TO 20   BACTERIA FEW*   SPECGRAV 1.029   LEUKOCYTESUR SMALL*   UROBILINOGEN Normal   BILIRUBINUR NEGATIVE       Additional Lab/culture results:    Physical Exam:   General: A/O x 3, no acute distress  Head: atraumatic, normocephalic   HEENT: moist membranes, PERRLA, EOMI, NGT in place  Respiratory: normal respiratory effort on [facility]  Cardiac: regular rate  Abdomen: soft, nontender, nondistended, obese, right perihepatic drain with green pus, perinephric drain with tan pus, pelvic drain with tan pus   Extremities: moves all extremities  Psych: appropriate mood     Interval Imaging Findings:    Impression:    74year old female  Active  problem list  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 up to 4.4cm  Right perihepatic, right retroperitoneal abscess tracking alongside psoas, pelvic cul de sac abscess  S/P IR drainage     Plan:   Continue lombardo catheter for maximal  decompression   Urine culture growing 50 to 100,000 CFU, will add on diflucan IV  Blood cultures show no growth in 1 day   Pending IR aspirate cultures  Pain control and antiemetics as needed  On Zosyn and Vancomycin, appreciate ID recommendations  Patient has pyelitis, to OR today for cystoscopy, right stent placement   Keep patient NPO  Appreciate medical management per primary     Ravi Gusman MD  6:39 AM 4/22/2022

## 2022-04-22 NOTE — PROGRESS NOTES
SPIRITUAL CARE PROGRESS NOTE    Spiritual Assessment:  encountered patient as part of spiritual care rounding. Patient was approachable, receptive to 's presence when  entered room. Patient shared she was coping with hospitalization and shared narrative with  relating to hospitalization/health issues. Patient shared she has support through children and has one son whom plans on visiting her tomorrow that's involved in her care. Patient shared she identifies as [facility] and expressed appreciation for the visit from the . Intervention:  engaged the patient through active listening while also being a spiritual presence.  informed patient that spiritual care services are available to her upon request.     Outcome: Chaplains are available for specific request visits at any time and may be paged via [address] [place] [address].      Electronically signed by Leonidas Nguyen on 4/22/2022 at 5:40 PM.  [facility]  [phone]

## 2022-04-22 NOTE — OP NOTE
FACILITY: [address] [place] [address]  1955  [id number]    DATE:  04/22/22  SURGEON:  Dr. Misael Keller M.D.  [facility] [place]:  Dr. Akira Medina MD PGY3  PREOPERATIVE DIAGNOSIS: Right emphysematous pyelitis    POSTOPERATIVE DIAGNOSIS: Same  PROCEDURES PERFORMED:  1. Cystoscopy. 2. Right sided stent placement. 3. Right retrograde pyelogram  DRAINS: A right sided 6 x 26 cm double-J ureteral stent, a 16 Iranian lombardo catheter  SPECIMEN: None  ANESTHESIA: General  ESTIMATED BLOOD LOSS:  None.   COMPLICATIONS:  None.     INDICATIONS FOR PROCEDURE:  Jose Shaw is a 74 y.o. female presents for right emphysematous pyelitis. After the risks, benefits, alternatives, of the procedure were discussed with the patient, informed consent was obtained. The patient elected to proceed.     DETAILS OF THE PROCEDURE:  The patient was brought back to the preoperative holding area to the operating suite, and was transferred to the operating table where the patient lay in supine position. General endotracheal anesthesia was induced, and patient was prepped and draped in standard surgical fashion after being placed in dorsolithotomy position. A proper timeout was performed, preoperative antibiotics were given. A rigid cystoscope with a 22 Spanish sheath with 30 degree lens was inserted in the patient's urethral and into the bladder. Of note, she does have a large cystocele. We then focused our attention on the right ureteral orifice, which we cannulated with our glidewire. We passed a 6 x 24 double J ureteral stent but was noted to be too short. We then removed the stent and passed a 5 Iranian ureteral catheter and shot a retrograde pyelogram which showed no abnormalities. Over our glidewire we placed a 6 x 26 cm double-J ureteral stent and we noted appropriate placement in the upper collecting system using fluoroscopy. We then removed our wire. There was good proximal and distal curl.  We did not leave the string at the end of the stent. We then removed the scope and the procedure was subsequently terminated. We replaced her 16 Icelandic lombardo catheter. Dr. Coreen Chung was present and scrubbed for all critical portions of the procedure.     Plan:   The patient will be monitored overnight.

## 2022-04-22 NOTE — PROGRESS NOTES
Physical Therapy        Physical Therapy Cancel Note      DATE: 2022    NAME: Derek Clay  MRN: [id number]   : 1955      Patient not seen this date for Physical Therapy due to:    Surgery/Procedure: to OR today for cystoscopy, right stent placement       Electronically signed by Deborah Pritchett PT on  at 10:19 AM

## 2022-04-22 NOTE — PROGRESS NOTES
[facility] Pharmacokinetic Monitoring Service - Vancomycin    Consulting Provider: Martin Mckeon   Indication: intra-abdomnial infection   Target Concentration: Goal AUC/MARY 400-600 mg*hr/L  Day of Therapy: 3  Additional Antimicrobials: zosyn    Pertinent Laboratory Values: Wt Readings from Last 1 Encounters:   04/21/22 272 lb 0.8 oz (123.4 kg)     Temp Readings from Last 1 Encounters:   04/22/22 97.5 °F (36.4 °C) (Oral)     Estimated Creatinine Clearance: 82 mL/min (based on SCr of 0.81 mg/dL). Recent Labs     04/21/22  0214 04/21/22  0214 04/21/22  0717 04/22/22  0222   CREATININE 0.91*   < > 0.85 0.81   WBC 14.1*  --   --  12.8*    < > = values in this interval not displayed. Procalcitonin:     Pertinent Cultures:  Culture Date Source Results   04/21 abscess Mod Neutrophils, Mod GPC in clusters    MRSA Nasal Swab: N/A. Non-respiratory infection.     Recent vancomycin administrations                     vancomycin (VANCOCIN) 1250 mg in sodium chloride 0.9% 250 mL IVPB (mg) 1,250 mg New Bag 04/21/22 2312    vancomycin (VANCOCIN) 1,750 mg in dextrose 5 % 500 mL IVPB (mg) 1,750 mg New Bag 04/20/22 2341                    Assessment:  Date/Time Current Dose Concentration Timing of Concentration (h) AUC   4/22 1250 mg every 24 hours  20.2 3 hrs post dose  452   Note: Serum concentrations collected for AUC dosing may appear elevated if collected in close proximity to the dose administered, this is not necessarily an indication of toxicity    Plan:  Current dosing regimen is therapeutic  Continue current dose  Repeat vancomycin concentration as needed   Pharmacy will continue to monitor patient and adjust therapy as indicated    Thank you for the consult,  Ashleigh Nolasco [facility]  4/22/2022 8:21 AM

## 2022-04-22 NOTE — PROGRESS NOTES
[facility]  Office: [address], DO, Ross Carrillosar, DO, Calin Narrow, DO, Dianne Hoffman Blood, DO, Luis M Coyne MD, Burt Thakkar MD, Joe Dinh MD, Luciana Cross MD, Ulisses Ching MD, Tadeo Hopson MD, Rebecca Hastings MD, Caitie Yousif, DO, Clark Rahman, DO, Jadon Gupta MD,  Shae Liu, DO, Marcia King MD, Marjan Colon MD, Lidya Leos MD, Les Pardo, DO, Demetrius Girard MD, Yara Huitron MD, Brittney Fernandes, [facility], [facility] Gale[facility], CNP, Osorio Pavon, CNP, Briseida Aguilar, CNP, Bubba Szymanski, CNP, Marco Joshua, CNP, Isidro Huntley, PA-C, Martir Roberts, [facility], Nikia Rodas, CNP, Katalina Mota, CNP, Eneida Lobato, CNP, Laura Councilman, CNS, Meryl Child, [facility], Terrence Ho, CNP, Zacarias Barrett, CNP, Ainsley Gonzales, CNP         [address]    Progress Note    4/22/2022    3:42 PM    Name:   Jose Shaw  MRN:     [id number]     [place]:      [de-identified]   Room:   [facility] Day:  1  Admit Date:  4/21/2022  1:28 AM    PCP:   DONG Hough CNP  Code Status:  Full Code    Subjective:     C/C:   Chief Complaint   Patient presents with    Abdominal Pain     perf bowel     Interval History Status: not changed. Patient underwent cystoscopy today with replacement of stent, patient still on Zosyn and vancomycin for antibiotics. Drainage still persists from pelvic, liver and nephrostomy tube. Patient however feels much better than the previous day, she inquired about going home, we discussed that we will have to wait until drainage subsides before discussing discharge plan. We discussed that in if she continues to require IV antibiotics and has a prolonged hospitalization she may require short stay in rehab. She is agreeable, spoke with nursing and case management.     Brief History:     76-year-old female transferred from outside hospital for bowel perforation with multiple intra-abdominal abscesses. Patient initially was treated on vancomycin and Zosyn with elevated lactic acid and was given sepsis bolus of IV fluids. Patient was seen by multiple consultants including trauma surgery, general surgery and infectious disease and urology. Patient was recommended placement of multiple pigtail catheters for perihepatic and perinephric abscesses. Patient also had a successful placement of a pelvic abscess, patient the following day was seen by urology recommending replacement of right-sided stent and cystoscopy and right retrograde pyelogram.      Review of Systems:     Constitutional:  negative for chills, fevers, sweats  Respiratory:  negative for cough, dyspnea on exertion, shortness of breath, wheezing  Cardiovascular:  negative for chest pain, chest pressure/discomfort, lower extremity edema, palpitations  Gastrointestinal:  negative for abdominal pain, constipation, diarrhea, nausea, vomiting  Neurological:  negative for dizziness, headache    Medications: Allergies:     Allergies   Allergen Reactions    Estrogens Other (See Comments)     Hx of DVT       Current Meds:   Scheduled Meds:    fluconazole  400 mg IntraVENous Q24H    famotidine (PEPCID) injection  20 mg IntraVENous BID    metoprolol tartrate  25 mg Oral BID    oxybutynin  15 mg Oral Daily    potassium chloride  20 mEq Oral QPM    sodium chloride flush  5-40 mL IntraVENous 2 times per day    vancomycin (VANCOCIN) intermittent dosing (placeholder)   Other RX Placeholder    vancomycin  1,250 mg IntraVENous Q24H    piperacillin-tazobactam  3,375 mg IntraVENous q8h    sodium chloride flush  10 mL IntraCATHeter BID     Continuous Infusions:    sodium chloride       PRN Meds: morphine **OR** morphine, sodium chloride flush, sodium chloride, potassium chloride **OR** potassium alternative oral replacement **OR** potassium chloride, magnesium sulfate, ondansetron **OR** ondansetron, polyethylene glycol, acetaminophen **OR** acetaminophen, benzocaine-menthol    Data:     Past Medical History:   has a past medical history of Carcinoma ([facility] [address] Cellulitis, DVT (deep venous thrombosis) ([address] Kidney stone, Lymphedema, Morbid obesity ([address] Psoas abscess, right ([address] Pyelonephritis, and Wears glasses. Social History:   reports that she has been smoking cigarettes. She has a 21.00 pack-year smoking history. She has never used smokeless tobacco. She reports that she does not drink alcohol and does not use drugs. Family History:   Family History   Adopted: Yes   Problem Relation Age of Onset    Cancer Mother         The patient reports her biologic mother did have bladder cancer       Vitals:  /72   Pulse 58   Temp 97.2 °F (36.2 °C) (Temporal)   Resp 17   Ht 5' 1\" (1.549 m)   Wt 272 lb 0.8 oz (123.4 kg)   LMP  (LMP Unknown)   SpO2 100%   BMI 51.40 kg/m²   Temp (24hrs), Av.5 °F (36.4 °C), Min:97.2 °F (36.2 °C), Max:98.2 °F (36.8 °C)    No results for input(s): POCGLU in the last 72 hours. I/O (24Hr):     Intake/Output Summary (Last 24 hours) at 2022 1542  Last data filed at 2022 1504  Gross per 24 hour   Intake 1866.1 ml   Output 1012 ml   Net 854.1 ml       Labs:  Hematology:  Recent Labs     22  2045 22  0214 22  0222   WBC 12.5* 14.1* 12.8*   RBC 3.24* 3.00* 2.66*   HGB 9.1* 8.4* 7.4*   HCT 30.5* 28.1* 24.9*   MCV 94.1 93.7 93.6   MCH 28.1 28.0 27.8   MCHC 29.8 29.9 29.7   RDW 16.0* 15.9* 16.0*   * 607* 561*   MPV 8.9 9.2 9.3   INR  --  1.6 1.1     Chemistry:  Recent Labs     22  0214 22  0717 22  1418 22  0222      < > 139 139  --  140   K 2.8*   < > 2.9* 3.6*  --  4.0      < > 104 103  --  106   CO2 24   < > 25 26  --  25   GLUCOSE 178*   < > 193* 167*  --  119*   BUN 15   < > 14 14  --  16   CREATININE 0.95*   < > 0.91* 0.85  --  0.81   MG 1.9  --  1.9  --   --   --    ANIONGAP 13   < > 10 10  --  9   LABGLOM 59* < > >60 >60  --  >60   GFRAA >60   < > >60 >60  --  >60   CALCIUM 8.5*   < > 7.7* 7.7*  --  7.8*   CAION  --   --   --   --   --  1.17   PROBNP 3,599*  --   --   --   --   --    TROPHS 27*  --   --   --   --   --    LACTACIDWB  --   --   --   --  1.4  --     < > = values in this interval not displayed. Recent Labs     04/20/22 2045 04/22/22 0222   PROT 7.1 5.8*   LABALBU 2.5* 2.0*   AST 17 14   ALT 18 12   ALKPHOS 117* 99   BILITOT 0.36 0.16*   BILIDIR  --  <0.08   LIPASE 9*  --      ABG:  Lab Results   Component Value Date    PHART 7.466 07/19/2018    NJZ4OOP 35.5 07/19/2018    PO2ART 72.5 07/19/2018    CHW8EJU 25.0 07/19/2018    NBEA NOT REPORTED 07/19/2018    PBEA 1.7 07/19/2018    K0RYFYGF 95.5 07/19/2018    FIO2 21 07/19/2018     Lab Results   Component Value Date/Time    SPECIAL RIGHT FOREARM 20ML 04/21/2022 02:16 AM     Lab Results   Component Value Date/Time    CULTURE CULTURE IN PROGRESS 04/21/2022 01:42 PM       Radiology:  CT ABDOMEN PELVIS W IV CONTRAST Additional Contrast? Oral    Result Date: 4/21/2022  1. Multiple intra-abdominal and pelvic fluid collections which have the appearance of abscess formation. In the subcapsular area in the liver extending into the subhepatic area a large abscess noted measuring 10 x 13.7 cm. A second more posterior collection measures 8.3 x 3.2 cm. Two dominant pelvic collections are noted, one measuring 10 x 6 cm and the second posteriorly 7 x 6.3 cm. The anterior collection is larger compared to the previous evaluation. These likely abscesses contain air in them. 2. A small amount of free air is noted scattered in the abdomen and pelvis. Psoas retroperitoneal abscess extends ton the posterior soft tissues. 3. Right renal atrophy. A subcapsular air containing collection measures 5 x 3.9 cm also likely an abscess. 4. Partly loculated right pleural effusion and right lower lobe atelectatic changes.  Findings discussed with Dr Zee Bobo 04/21/2022 00:10 a.m. RECOMMENDATIONS: Percutaneous drainage of multiple abscesses. CT ABDOMEN PELVIS W IV CONTRAST Additional Contrast? 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 New bladder prolapse with residual dependent stones in the bladder. Intraluminal gas in the bladder may be due to infection or recent instrumentation. Increased size of the previous right retroperitoneal abscess tracking along the psoas muscle (6.7 x 2.6 x 10.2 cm) which previously contained a percutaneous drainage catheter. There are abscesses tracking along the prior course of the drainage catheter through the right quadratus lumborum muscle and along the right posterior paraspinal musculature. Multifocal rim enhancing mixed gas and fluid collections worrisome for abscesses with the dominant collections in the perihepatic region spanning up to 15 cm and within the pelvic cul-de-sac spanning up to 7 cm. Free air along the mesentery in addition to the aforementioned fluid collections. As there are some thickened loops of small bowel in the lower abdomen and pelvis, a concomitant bowel perforation is not able be excluded on this exam, with differential considerations including ischemic bowel. 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 Loculated right pleural effusion characterized to advantage on today's chest CT. Critical results were called by Dr. Ale Calix to Dr. Raj Bragg on 4/20/2022 at 22:21 EST. XR CHEST PORTABLE    Result Date: 4/20/2022  Mass like infiltrate in the right upper lobe suggesting pneumonia or neoplasm. Underlying pulmonary vascular congestion RECOMMENDATION: Follow-up CT would be helpful.      CT CHEST PULMONARY EMBOLISM W CONTRAST    Result Date: 4/20/2022  Moderately large right pleural effusion with areas of loculation accounting for the pseudotumor seen on chest x-ray. . Mild bibasal atelectasis more notably on the right. .. Coronary artery/atherosclerotic disease No obvious evidence of pulmonary embolism. Mild subcutaneous emphysema along the right posterior chest wall. Perihepatic fluid. Ectopic air seen in the abdomen. Please refer to abdominal CT report RECOMMENDATIONS: No additional routine follow-up for incidental abdominal lesions. CT ABSCESS DRAINAGE    Result Date: 4/21/2022  Successful CT guided placement of right Lisbeth nephric and deep pelvic abscess drainage catheters. IR ABSCESS DRAINAGE PERC    Result Date: 4/21/2022  Successful ultrasound-guided placement 12 [place]ish drain in perihepatic abscess. Sample sent for culture and sensitivity. 550 mL of green foul-smelling purulent material was aspirated.        Physical Examination:        General appearance:  alert, cooperative and no distress  Mental Status:  oriented to person, place and time and normal affect  Lungs:  clear to auscultation bilaterally, normal effort  Heart:  regular rate and rhythm, no murmur  Abdomen:  soft, nontender, nondistended, normal bowel sounds, no masses, hepatomegaly, splenomegaly, multiple abdominal drainage tubes  Extremities:  no edema, redness, tenderness in the calves  Skin: Chronic skin changes bilateral lower extremities    Assessment:        Hospital Problems           Last Modified POA    * (Principal) Intra-abdominal abscess ([address] 4/21/2022 Yes    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    Obesity, Class III, BMI 40-49.9 (morbid obesity) ([address] 4/21/2022 Yes    Hypocalcemia 4/21/2022 Yes    Hypokalemia 4/21/2022 Yes    Vertebral osteomyelitis (HCC) T12-L1 4/21/2022 Yes    Pleural effusion on right 4/21/2022 Yes    Long term current use of anticoagulant therapy 4/21/2022 Yes    Lymphedema of both lower extremities 4/21/2022 Yes    Tobacco abuse 4/21/2022 Yes    History of recurrent deep vein thrombosis (DVT) 8/79/9291 Yes    Complicated UTI (urinary tract infection) 4/21/2022 Yes    Renal calculus 4/21/2022 Yes    Normocytic anemia 4/21/2022 Yes    Renal abscess, right 4/21/2022 Yes    Psoas muscle abscess ([address] 4/21/2022 Yes    Ureteral calculus 4/21/2022 Yes          Plan:        Plan for cystoscopy with stent placement today with urology, cultures were pending  Infectious disease following for antibiotics  Significant drainage from multiple LAURIE drains, continue to monitor output, patient may need placement in rehab/skilled nursing until antibiotics are completed  Monitor output over the weekend, drains per general surgery  PT OT evaluation once all surgical inventions are completed  Start diet today, advance as tolerated, remove NG tube       Justino De Los Santos DO  4/22/2022  3:42 PM

## 2022-04-22 NOTE — PROGRESS NOTES
PROGRESS NOTE          PATIENT NAME: [address] [place] [address] RECORD NO. [id number]  DATE: 2022  SURGEON: Dr. Gillespie Shall: Claude Lagos, DONG - CNP    HD: # 1    ASSESSMENT    Patient Active Problem List   Diagnosis    Acute thromboembolism of deep veins of lower extremity ([address]    Long term current use of anticoagulant therapy    Bilateral cellulitis of lower leg    Acute shoulder pain due to trauma    Lymphedema of both lower extremities    Tobacco abuse    History of recurrent deep vein thrombosis (DVT)    Gross hematuria    Frequency of urination    Nocturia    Mixed incontinence    Constipation    Cellulitis of lower leg    Post-phlebitic syndrome    Elephantiasis nostra verrucosa    Cellulitis of left lower extremity    Complicated UTI (urinary tract infection)    Renal calculus    Cellulitis    Normocytic anemia    Iron deficiency anemia    Renal abscess, right    Bacteremia due to Klebsiella pneumoniae    Psoas muscle abscess (HCC)    Septicemia due to Klebsiella pneumoniae (HCC)    Ureteral calculus    Intra-abdominal abscess (HCC)    Obesity, Class III, BMI 40-49.9 (morbid obesity) (HCC)    Hypocalcemia    Hypokalemia    Vertebral osteomyelitis (HCC) T12-L1    Pleural effusion on right       MEDICAL DECISION MAKING AND PLAN    Perinephric and perihepatic abscesses   S/p IR drainage    Posterior buttock LAURIE-102 ml/24 hours   Right lateral abd drain-710 ml/24 hours   Right posterior drain-45 ml/24 hours   Will monitor drain output   Will consider repeat CT in a few days to re-evaluate abscesses   WBC trending down    Right emphysematous pyelitis   Urology following   Plan for diflucan and OR today for cystoscopy and right stent placement    ID following   ON zosyn and vanco      SUBJECTIVE    Rosa Marrufo was not seen as she was in [address] [place] [address].       OBJECTIVE  VITALS: Temp: Temp: 97.2 °F (36.2 °C)Temp  Av.5 °F (36.4 °C)  Min: 97.2 °F (36.2 °C) Max: 98.2 °F (88.0 °C) BP Systolic (52AOI), JXW:125 , Min:93 , WGF:023   Diastolic (11IKI), NZD:94, Min:44, Max:74   Pulse Pulse  Av.7  Min: 53  Max: 76 Resp Resp  Av.6  Min: 0  Max: 23 Pulse ox SpO2  Av.7 %  Min: 94 %  Max: 100 %  Not done-patient in OR  I/O last 3 completed shifts: In: 1000 [IV Piggyback:1000]  Out: 1507 [Urine:650; Drains:857]    Drain/tube output: In: 1300 [I.V.:300]  Out: 1532 [Urine:675; Drains:857]    LAB:  CBC:   Recent Labs     22   WBC 12.5* 14.1* 12.8*   HGB 9.1* 8.4* 7.4*   HCT 30.5* 28.1* 24.9*   MCV 94.1 93.7 93.6   * 607* 561*     BMP:   Recent Labs     22  0717 22    139 140   K 2.9* 3.6* 4.0    103 106   CO2 25 26 25   BUN 14 14 16   CREATININE 0.91* 0.85 0.81   GLUCOSE 193* 167* 119*     COAGS:   Recent Labs     22   APTT  --  28.2  --    PROT 7.1  --  5.8*   INR  --  1.6 1.1       RADIOLOGY:  No new images      DONG Fitzgerald - CNP  22, 2:13 PM     Attestation signed by Savanna Juárez MD    I personally evaluated the patient and directed the medical decision making with Resident/NUHA after the physical/radiologic exam and laboratory values were reviewed and confirmed. Feeling better. Continue drains.   May need repeat CT prior to tube removal.      Savanna Juárez MD

## 2022-04-22 NOTE — PROGRESS NOTES
Physician Progress Note      PATIENT:               Rigoberto Yang  CSN #:                  [id number]  :                       1955  ADMIT DATE:       2022 1:28 AM  DISCH DATE:  RESPONDING  PROVIDER #:        Juanita LUJAN          QUERY TEXT:    Pt admitted with multiple perihepatic, perinephric and pelvic abscesses  s/p   drain placement. . Pt noted to have WBC [de-identified] heart rate 80-100s on   admission currently 70s with RR of 25-27 on admission. glucose 193 on admit   with no history of DM . If possible, please clarify one of the following    The medical record reflects the following:  Risk Factors:  age, history kidney stones and previous abscesses , morbid   obesity  Clinical Indicators: admitted with multiple perihepatic, perinephric and   pelvic abscesses  s/p drain placement. . Pt noted to have WBC [de-identified] heart   rate 80-100s on admission currently 70s with RR of 25-27 on admission. glucose   193 on admit with no history of DM . Treatment: iv antibiotics, drain placement, id consult, lab monitoring    Thank Olivia Brandon RN BSN  CCDS  Email [email or web address]  Cell [phone]  office hours M-F 6am to 2:30pm  Options provided:  -- Sepsis, present on admission  -- Sepsis, present on admission, now resolved  -- multiple perihepatic, perinephric and pelvic abscesses  without Sepsis  -- Other - I will add my own diagnosis  -- Disagree - Not applicable / Not valid  -- Disagree - Clinically unable to determine / Unknown  -- Refer to Clinical Documentation Reviewer    PROVIDER RESPONSE TEXT:    This patient has sepsis which was present on admission. Query created by:  Nisha Sun on 2022 7:48 AM      Electronically signed by:  Juanita LUJAN 2022 8:58 AM

## 2022-04-22 NOTE — PROGRESS NOTES
Pt wheezing on arrival to pacu Dr Renny Hoang notified. Ordered breathing treatment. Gave treatment was still wheezing afterward treatment given. Notified physician again no further treatments ordered.

## 2022-04-22 NOTE — PROGRESS NOTES
[facility]  Progress Note    Hair Martinez  MRN: [id number]  Date: 4/22/2022  LOS: 1     Reason for F/U :   Abdominal abscess, vertebral osteomyelitis, pyelonephritis/pyelitis    Impression :   1. Multiple perihepatic abscesses, retroperitoneal abscess on the right psoas, and abscess in pelvic cul-de-sac  · S/p IR drainage of the perinephric and pelvic abscess with nephrostomy tube placement 4/21/2022  · Status post ultrasound-guided drainage of the liver abscess with 550 mL of green foul-smelling purulent fluid aspirated with drain placement 4/21/2022  2. Free air in the abdomen, possible emphysematous pyelonephritis  3. Osteomyelitis at T12-L1 due to direct extension from abscess over psoas muscle  4. History of complicated UTI in January 2022, positive for Klebsiella pneumoniae  5. History of septicemia in January 2022, positive for Klebsiella pneumoniae  6. History of right psoas abscess drained in January 2022  7. History of right ureteral and renal stones with ureteral stent placement in March 2022 with stent exchanged in April 2020    Recommendations:   · Patient continues on IV antimicrobial therapy with Zosyn and vancomycin  · She is status post IR drainage of the pelvic and liver abscesses and is also status post nephrostomy tube placement by interventional radiology  · The gram stain has gram-positive cocci isolated thus far  · Patient is going to the operating room today for cystoscopy with right-sided stent placement  · We will continue to follow cultures and adjust therapy accordingly  · She will likely need long-term IV antimicrobial coverage with follow-up imaging to ensure resolution of infection    Infection Control Recommendations:   Universal precautions    Discharge Planning:   Estimated Length of IV antimicrobials:  To be determined  Patient will need Midline Catheter Insertion/ PICC line Insertion: No  Patient will need: Home IV , [place],  SNF,  LTAC: Undetermined  Patient willneed outpatient wound care: No    Medical Decision making / Summary of Stay:   Helen Chapa is a 74y.o.-year-old female presenting as a transfer from an outside facility due to conern for bowel perforation and multiple intra-abdominal abscesses. Valarie Carlton was previously hospitalized in January 2022 with Klebsiella pneumoniae sepsis related to a perinephric abscess. She did have right-sided severely obstructive pyelonephritis for which she underwent stent placement as well as a right percutaneous nephrostomy tube placement. This infection was complicated by perinephric/psoas abscess which was aspirated and this drain was placed. Patient was seen by my partner Dr. Manuel Loo and was discharged on intravenous antimicrobial therapy with Rocephin 2 g daily through February 2, 2022  The patient on 3/1/2022 underwent a cystoscopy with right-sided ureteroscopy with holmium laser lithotripsy and right-sided ureteral stent placement  The patient underwent a second urological procedure on 4/5/2022 with a cystoscopy, right-sided ureteroscopy, right holmium laser lithotripsy and right ureteral stent exchange and the patient was found to have a copious amount of calculi which were crushed up and further ablated.     The patient reports that ever since she had the second urological procedure she has had generalized malaise/fatigue and more recently progressing abdominal pain over the last several days. Initial lactic acid was 4.4 and treated with fluid bolus.  Initial potassium was 2.8, now 3.6 after replacement.     A CT scan of the abdomen and pelvis 4/20/2022 showed gas within both the right renal parenchyma and proximal right collecting system and increased size of the previous right retroperitoneal abscess tracking along the psoas muscle and there is abscess tracking along the posterior course of the drainage catheter through the right quadratus lumbar muscle and along the right posterior paraspinal musculature  There are multifocal rim-enhancing mixed [place] fluid collections worrisome for abscess within the dominant collections of the perihepatic region spanning up to 15 cm within the pelvic cul-de-sac spanning up to 7 cm. There is free air along the mesentery in addition to the a forementioned fluid collections. Discitis/osteomyelitis at T12-L1 with direct extension from the adjacent right psoas inflammatory change. Loculated right pleural effusion     The patient has been admitted and started on broad-spectrum antimicrobial therapy and ultrasound-guided placement of 12 [place] drain in the perihepatic abscess with 550 mL of green following purulent material aspirated, and a CT-guided placement of right perinephric and deep pelvic abscess drainage catheters with 5 mL of purulent fluid removed from each collection sent for diagnostic tests. Current evaluation:2022    /60   Pulse 63   Temp 97.5 °F (36.4 °C) (Oral)   Resp 22   Ht 5' 1\" (1.549 m)   Wt 272 lb 0.8 oz (123.4 kg)   LMP  (LMP Unknown)   SpO2 94%   BMI 51.40 kg/m²     Temperature Range: Temp: 97.5 °F (36.4 °C) Temp  Av.1 °F (36.7 °C)  Min: 97.5 °F (36.4 °C)  Max: 98.5 °F (36.9 °C)    The patient was seen and evaluated lying in bed  RN is at bedside watching patient off  She is getting ready for stent placement today  She denies any needs at this time    Review of Systems   Constitutional: Negative. HENT: Negative. Respiratory: Negative. Cardiovascular: Negative. Gastrointestinal: Negative. Genitourinary: Negative. Musculoskeletal: Negative. Skin: Negative. Neurological: Negative. Psychiatric/Behavioral: Negative. Physical Examination :     Physical Exam  Constitutional:       Appearance: Normal appearance. She is obese. HENT:      Head: Normocephalic and atraumatic. Cardiovascular:      Rate and Rhythm: Normal rate and regular rhythm.    Pulmonary:      Effort: Pulmonary effort is normal.      Breath sounds: Normal breath sounds. Abdominal:      General: Abdomen is flat. Bowel sounds are normal.      Palpations: Abdomen is soft. Comments: 2 right-sided LAURIE drains, 1 left-sided LAURIE drain   Musculoskeletal:         General: Normal range of motion. Skin:     General: Skin is warm and dry. Neurological:      General: No focal deficit present. Mental Status: She is alert and oriented to person, place, and time. Mental status is at baseline. Psychiatric:         Mood and Affect: Mood normal.         Behavior: Behavior normal.         Thought Content: Thought content normal.         Laboratory data:   I have independently reviewed the followinglabs:  CBC with Differential:   Recent Labs     04/21/22 0214 04/22/22 0222   WBC 14.1* 12.8*   HGB 8.4* 7.4*   HCT 28.1* 24.9*   * 561*   LYMPHOPCT 8* 7*   MONOPCT 7 10*     BMP:   Recent Labs     04/20/22 2045 04/20/22 2045 04/21/22 0214 04/21/22 0214 04/21/22 0717 04/22/22 0222      < > 139   < > 139 140   K 2.8*   < > 2.9*   < > 3.6* 4.0      < > 104   < > 103 106   CO2 24   < > 25   < > 26 25   BUN 15   < > 14   < > 14 16   CREATININE 0.95*   < > 0.91*   < > 0.85 0.81   MG 1.9  --  1.9  --   --   --     < > = values in this interval not displayed.      Hepatic Function Panel:   Recent Labs     04/20/22 2045 04/22/22 0222   PROT 7.1 5.8*   LABALBU 2.5* 2.0*   BILIDIR  --  <0.08   IBILI  --  Can not be calculated   BILITOT 0.36 0.16*   ALKPHOS 117* 99   ALT 18 12   AST 17 14         Lab Results   Component Value Date    PROCAL 0.07 02/11/2020     Lab Results   Component Value Date    CRP 52.0 02/11/2020     Lab Results   Component Value Date    SEDRATE 91 (H) 02/11/2020         No results found for: DDIMER  Lab Results   Component Value Date    FERRITIN 42 01/23/2021     No results found for: LDH  No results found for: FIBRINOGEN    Results in Past 30 Days  Result Component Current Result Ref Range Previous Result Ref Range   SARS-CoV-2, Rapid Not Detected (4/20/2022) Not Detected Not in Time Range      Lab Results   Component Value Date    COVID19 Not Detected 04/20/2022    COVID19 Not Detected 01/12/2022    COVID19 Not Detected 01/03/2022       No results for input(s): JOYCE in the last 72 hours. Imaging Studies:   US guided drainage  Impression:        Successful ultrasound-guided placement 12 [place] drain in perihepatic   abscess.  Sample sent for culture and sensitivity.  550 mL of green   foul-smelling purulent material was aspirated. Cultures:     Culture, Blood 2 [[id number]] Collected: 04/21/22 0215   Order Status: Completed Specimen: Blood Updated: 04/22/22 0230    Specimen Description . BLOOD    Special Requests LEFT AC 10ML    Culture NO GROWTH 1 DAY   Culture, Blood 1 [[id number]] Collected: 04/21/22 0216   Order Status: Completed Specimen: Blood Updated: 04/22/22 0229    Specimen Description . BLOOD    Special Requests RIGHT FOREARM 20ML    Culture NO GROWTH 1 DAY   Culture, Urine [[id number]] Collected: 04/21/22 0345   Order Status: Completed Specimen: Urine, clean catch Updated: 04/21/22 2150    Specimen Description . CLEAN CATCH URINE    Culture Candida albicans/dubliniensis 50 to 100,000 CFU/ML   Culture, Anaerobic and Aerobic [[id number]] (Abnormal) Collected: 04/21/22 1342   Order Status: Completed Specimen: Abscess Updated: 04/21/22 1549    Specimen Description . ABSCESS . ASPIRATE    Direct Exam MODERATE NEUTROPHILS Abnormal      MODERATE GRAM POSITIVE COCCI IN CLUSTERS Abnormal     Culture PENDING   Culture, Anaerobic and Aerobic [[id number]] (Abnormal) Collected: 04/21/22 1340   Order Status: Completed Specimen: Abscess Updated: 04/21/22 1548    Specimen Description . ABSCESS . ASPIRATE    Direct Exam FEW NEUTROPHILS Abnormal      MODERATE GRAM POSITIVE COCCI IN CLUSTERS Abnormal     Culture PENDING   Culture, Anaerobic and Aerobic [[id number]] (Abnormal) Collected: 04/21/22 1337   Order Status: Completed Specimen: Abscess Updated: 04/21/22 1529    Specimen Description . ABSCESS . ASPIRATE    Direct Exam MANY NEUTROPHILS Abnormal      MODERATE GRAM POSITIVE COCCI IN PAIRS Abnormal      FEW GRAM POSITIVE RODS Abnormal     Culture PENDING       Medications:      fluconazole  400 mg IntraVENous Q24H    famotidine (PEPCID) injection  20 mg IntraVENous BID    metoprolol tartrate  25 mg Oral BID    oxybutynin  15 mg Oral Daily    potassium chloride  20 mEq Oral QPM    sodium chloride flush  5-40 mL IntraVENous 2 times per day    vancomycin (VANCOCIN) intermittent dosing (placeholder)   Other RX Placeholder    vancomycin  1,250 mg IntraVENous Q24H    piperacillin-tazobactam  3,375 mg IntraVENous q8h    sodium chloride flush  10 mL IntraCATHeter BID           [facility]  DONG Mejia CNP  Perfect Serve messaging  OFFICE: [phone]      Electronically signed by DONG Mejia CNP on 4/22/2022 at 9:23 AM  Thank you for allowing us to participate in the care of this patient. Please call with questions. This note iscreated with the assistance of a speech recognition program.  While intending to generate a document that actually reflects the content of the visit, the document can still have some errors including those of syntax andsound a like substitutions which may escape proof reading. In such instances, actual meaning can be extrapolated by contextual diversion.

## 2022-04-22 NOTE — ANESTHESIA PRE PROCEDURE
Multiple Vitamins-Minerals (THERAPEUTIC MULTIVITAMIN-MINERALS) tablet Take 1 tablet by mouth daily    Historical Provider, MD       Current medications:    Current Facility-Administered Medications   Medication Dose Route Frequency Provider Last Rate Last Admin    [MAR Hold] fluconazole (DIFLUCAN) 400 mg IVPB  400 mg IntraVENous Q24H Carol Myles  mL/hr at 04/22/22 0804 400 mg at 04/22/22 0804    [MAR Hold] famotidine (PEPCID) 20 mg in sodium chloride (PF) 10 mL injection  20 mg IntraVENous BID DONG Moser - CNS   20 mg at 04/22/22 0813    [MAR Hold] morphine injection 2 mg  2 mg IntraVENous Q2H PRN DONG Moser - CNS   2 mg at 04/22/22 0815    Or    [MAR Hold] morphine injection 4 mg  4 mg IntraVENous Q2H PRN DONG Moser - TEE       [facility] Hold] metoprolol tartrate (LOPRESSOR) tablet 25 mg  25 mg Oral BID DONG Moser - CNS   25 mg at 04/22/22 0813    [MAR Hold] oxybutynin (DITROPAN-XL) extended release tablet 15 mg  15 mg Oral Daily DONG Moser - CNS   15 mg at 04/22/22 0813    [MAR Hold] potassium chloride (KLOR-CON M) extended release tablet 20 mEq  20 mEq Oral QPM DONG Moser   20 mEq at 04/21/22 1751    [MAR Hold] sodium chloride flush 0.9 % injection 5-40 mL  5-40 mL IntraVENous 2 times per day DONG Moser - CNS   10 mL at 04/22/22 0814    [MAR Hold] sodium chloride flush 0.9 % injection 10 mL  10 mL IntraVENous PRN DONG Moser - TEE       [facility] Hold] 0.9 % sodium chloride infusion   IntraVENous PRN DONG Moser - TEE       [facility] Hold] potassium chloride (KLOR-CON M) extended release tablet 40 mEq  40 mEq Oral PRN DONG Moser - TEE        Or   [facility] Hold] potassium bicarb-citric acid (EFFER-K) effervescent tablet 40 mEq  40 mEq Oral PRN DONG Moser - TEE        Or   [facility] Hold] potassium chloride 10 mEq/100 mL IVPB (Peripheral Line)  10 mEq IntraVENous PRN Gareth ANDERSON Brian Berman, APRN -  mL/hr at 04/21/22 0708 10 mEq at 04/21/22 0708    [MAR Hold] magnesium sulfate 1000 mg in dextrose 5% 100 mL IVPB  1,000 mg IntraVENous PRN Les Stager, APRN - CNS       [facility] Hold] ondansetron (ZOFRAN-ODT) disintegrating tablet 4 mg  4 mg Oral Q8H PRN Les Stager, APRN - CNS        Or    [MAR Hold] ondansetron (ZOFRAN) injection 4 mg  4 mg IntraVENous Q6H PRN Les Stager, APRN - CNS        [MAR Hold] polyethylene glycol (GLYCOLAX) packet 17 g  17 g Oral Daily PRN Les Stager, APRN - CNS        [MAR Hold] acetaminophen (TYLENOL) tablet 650 mg  650 mg Oral Q6H PRN Les Stager, APRN - CNS        Or    [MAR Hold] acetaminophen (TYLENOL) suppository 650 mg  650 mg Rectal Q6H PRN Les Stager, APRN - CNS        [MAR Hold] vancomycin (VANCOCIN) intermittent dosing (placeholder)   Other RX Placeholder [place], DO        [MAR Hold] vancomycin (VANCOCIN) 1250 mg in sodium chloride 0.9% 250 mL IVPB  1,250 mg IntraVENous Q24H [place]\"", DO   Stopped at 04/22/22 0030    [MAR Hold] piperacillin-tazobactam (ZOSYN) 3,375 mg in sodium chloride 0.9 % 50 mL IVPB (mini-bag)  3,375 mg IntraVENous q8h Les Stager, APRN - CNS   Stopped at 04/22/22 0747    [MAR Hold] sodium chloride flush 0.9 % injection 10 mL  10 mL IntraCATHeter BID YUNIOR Sauer   10 mL at 04/22/22 0814    [MAR Hold] benzocaine-menthol (CEPACOL SORE THROAT) lozenge 1 lozenge  1 lozenge Oral Q2H PRN Logan Rivera DO   1 lozenge at 04/21/22 1751       Allergies:     Allergies   Allergen Reactions    Estrogens Other (See Comments)     Hx of DVT       Problem List:    Patient Active Problem List   Diagnosis Code    Acute thromboembolism of deep veins of lower extremity (HCC) I82.409    Long term current use of anticoagulant therapy Z79.01    Bilateral cellulitis of lower leg L03.116, L03.115    Acute shoulder pain due to trauma M25.519, G89.11    Lymphedema of both lower extremities I89.0  Tobacco abuse Z72.0    History of recurrent deep vein thrombosis (DVT) Z86.718    Gross hematuria R31.0    Frequency of urination R35.0    Nocturia R35.1    Mixed incontinence N39.46    Constipation K59.00    Cellulitis of lower leg L03.119    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 304    Complicated UTI (urinary tract infection) N39.0    Renal calculus N20.0    Cellulitis L03.90    Normocytic anemia D64.9    Iron deficiency anemia D50.9    Renal abscess, right N15.1    Bacteremia due to Klebsiella pneumoniae R78.81, B96.1    Psoas muscle abscess (HCC) K68.12    Septicemia due to Klebsiella pneumoniae ([facility]) A41.4    Ureteral calculus N20.1    Intra-abdominal abscess ([facility]) K65.1    Obesity, Class III, BMI 40-49.9 (morbid obesity) ([facility]) E66.01    Hypocalcemia E83.51    Hypokalemia E87.6    Vertebral osteomyelitis ([facility]) T12-L1 M46.20    Pleural effusion on right J90       Past Medical History:        Diagnosis Date    Carcinoma ([facility]     Squamous Cell    Cellulitis     DVT (deep venous thrombosis) ([facility] 2006    LLE    Kidney stone     Lymphedema     Morbid obesity (HCC)     Psoas abscess, right ([facility]     Pyelonephritis     Wears glasses        Past Surgical History:        Procedure Laterality Date    CARPAL TUNNEL RELEASE  1990s    CT ABSCESS DRAIN SUBCUTANEOUS  1/10/2022    CT ABSCESS DRAIN SUBCUTANEOUS 1/10/2022 [facility] CT SCAN    CT ABSCESS DRAIN SUBCUTANEOUS  4/21/2022    CT ABSCESS DRAIN SUBCUTANEOUS 4/21/2022 [facility] CT SCAN    CYSTOSCOPY N/A 1/4/2022    CYSTOSCOPY URETERAL STENT INSERTION performed by Earl Su MD at [place] [address] Right     HLL with stent    CYSTOSCOPY Right 3/1/2022    CYSTOSCOPY URETEROSCOPY LASER-WTIH HLL performed by Earl Su MD at [place] [address] Right 3/1/2022    CYSTOSCOPY URETERAL STENT INSERTION-EXCHANGE performed by Earl Su MD at [address] [place] Right 04/05/2022    Dr Cory Luque with stent insertion    CYSTOSCOPY Right 4/5/2022    CYSTOSCOPY URETEROSCOPY LASER-HLL performed by Vinh Leyva MD at [address] [place] Right 4/5/2022    CYSTOSCOPY URETERAL STENT Fabiola Cheese performed by Vinh Leyva MD at [address]  1/7/2022         IR NEPHROSTOMY PERCUTANEOUS RIGHT  1/5/2022    IR NEPHROSTOMY PERCUTANEOUS RIGHT 1/5/2022 Gui Nicholas MD [facility]    ANNABELLA AND BSO      05/2019    TUBAL LIGATION  1993    TUBAL LIGATION      WISDOM TOOTH EXTRACTION         Social History:    Social History     Tobacco Use    Smoking status: Current Every Day Smoker     Packs/day: 0.50     Years: 42.00     Pack years: 21.00     Types: Cigarettes    Smokeless tobacco: Never Used   Substance Use Topics    Alcohol use: No     Alcohol/week: 0.0 standard drinks                                Ready to quit: Not Answered  Counseling given: Not Answered      Vital Signs (Current):   Vitals:    04/21/22 1600 04/21/22 2000 04/22/22 0800 04/22/22 1008   BP: (!) 119/44 125/67 129/60 129/62   Pulse: 71 76 63 61   Resp: 23 18 22 18   Temp: 98.2 °F (36.8 °C)  97.5 °F (36.4 °C) 97.3 °F (36.3 °C)   TempSrc: Oral  Oral Temporal   SpO2: 99% 97% 94% 100%   Weight:       Height:                                                  BP Readings from Last 3 Encounters:   04/22/22 129/62   04/20/22 120/67   04/12/22 (!) 155/59       NPO Status: Time of last liquid consumption: 1200                        Time of last solid consumption: 1200                        Date of last liquid consumption: 04/20/22                        Date of last solid food consumption: 04/20/22    BMI:   Wt Readings from Last 3 Encounters:   04/21/22 272 lb 0.8 oz (123.4 kg)   04/20/22 245 lb (111.1 kg)   03/17/22 245 lb (111.1 kg)     Body mass index is 51.4 kg/m².     CBC:   Lab Results   Component Value Date    WBC 12.8 04/22/2022 RBC 2.66 04/22/2022    HGB 7.4 04/22/2022    HCT 24.9 04/22/2022    MCV 93.6 04/22/2022    RDW 16.0 04/22/2022     04/22/2022       CMP:   Lab Results   Component Value Date     04/22/2022    K 4.0 04/22/2022     04/22/2022    CO2 25 04/22/2022    BUN 16 04/22/2022    CREATININE 0.81 04/22/2022    GFRAA >60 04/22/2022    LABGLOM >60 04/22/2022    GLUCOSE 119 04/22/2022    PROT 5.8 04/22/2022    CALCIUM 7.8 04/22/2022    BILITOT 0.16 04/22/2022    ALKPHOS 99 04/22/2022    AST 14 04/22/2022    ALT 12 04/22/2022       POC Tests: No results for input(s): POCGLU, POCNA, POCK, POCCL, POCBUN, POCHEMO, POCHCT in the last 72 hours. Coags:   Lab Results   Component Value Date    PROTIME 12.0 04/22/2022    INR 1.1 04/22/2022    APTT 28.2 04/21/2022       HCG (If Applicable): No results found for: PREGTESTUR, PREGSERUM, HCG, HCGQUANT     ABGs:   Lab Results   Component Value Date    PHART 7.466 07/19/2018    PO2ART 72.5 07/19/2018    RWX6MUD 35.5 07/19/2018    AYX2VMG 25.0 07/19/2018    X6UKLGNW 95.5 07/19/2018        Type & Screen (If Applicable):  No results found for: LABABO, LABRH    Drug/Infectious Status (If Applicable):  No results found for: HIV, HEPCAB    COVID-19 Screening (If Applicable):   Lab Results   Component Value Date    COVID19 Not Detected 04/20/2022           Anesthesia Evaluation  Patient summary reviewed  Airway: Mallampati: III  TM distance: >3 FB   Neck ROM: full  Mouth opening: > = 3 FB and < 3 FB Dental:          Pulmonary: breath sounds clear to auscultation                             Cardiovascular:          ECG reviewed  Rhythm: regular  Rate: normal                    Neuro/Psych:   (+) neuromuscular disease:,             GI/Hepatic/Renal:   (+) renal disease:, morbid obesity          Endo/Other:                     Abdominal:   (+) obese,           Vascular:           Other Findings:             Anesthesia Plan      TIVA and MAC     ASA 3     (Reviewed all available relevant information, laboratory results and diagnostic tests. Discussed risks , benefits and alternatives of anesthetic and sedation options. Possible need  for blood transfusions discussed. Pt / family given opportunity to ask questions. All questions answered. TIVA,   Possible GA  ETT)  Induction: intravenous. MIPS: Prophylactic antiemetics administered. Anesthetic plan and risks discussed with patient. Plan discussed with CRNA.     Attending anesthesiologist reviewed and agrees with Angela Smith MD   4/22/2022

## 2022-04-22 NOTE — CARE COORDINATION
Case Management Initial Discharge Plan  Gabriella Whatley,             Met with:patient to discuss discharge plans. Information verified: address, contacts, phone number, , insurance Yes  Insurance Provider: [facility]    Emergency Contact/Next of Kin name & number:   Tarun Vitale (spouse) [phone]  Who are involved in patient's support system? Spouse, children    PCP: DONG Zhang CNP  Date of last visit: 2022      Discharge Planning    Living Arrangements:    lives w/  and daughter    Home has 2 stories  6 stairs to climb to get into front door, 14stairs to climb to reach second floor  Location of bedroom/bathroom in home main    Patient able to perform ADL's:Independent    Current Services (outpatient & in home) none  DME equipment: walker  DME provider:     Is patient receiving oral anticoagulation therapy? No    Does patient have any issues/concerns obtaining medications? No  If yes, what are patient's concerns? Is there a preferred Pharmacy after hours or on weekends? Yes    If yes, which pharmacy? Potential Assistance Needed:       Patient agreeable to home care: Yes  Freedom of choice provided:  n/a    Prior SNF/Rehab Placement and Facility: [address] [place] [address] Avenue to SNF/Rehab: No  [place] of choice provided: n/a     Evaluation: no    Expected Discharge date:       Patient expects to be discharged to: If home: is the family and/or caregiver wiling & able to provide support at home? Who will be providing this support? Follow Up Appointment: Best Day/ Time:      Transportation provider: family   Transportation arrangements needed for discharge: No    Readmission Risk              Risk of Unplanned Readmission:  18             Does patient have a readmission risk score greater than 14?: Yes  If yes, follow-up appointment must be made within 7 days of discharge.      Goals of Care:       Educated patient on transitional options, provided freedom of choice and are agreeable with plan      Discharge Plan: refusing SNF, will likely need home abx, states  and daughter are teachable. Referral to [place] and [place]    [address] [place] [address] with [facility] they can accept patient if anticipated start date is Monday.        Electronically signed by Devon Sicard, RN on 4/22/22 at 3:11 PM EDT

## 2022-04-23 LAB
ALBUMIN SERPL-MCNC: 1.9 G/DL (ref 3.5–5.2)
ANION GAP SERPL CALCULATED.3IONS-SCNC: 8 MMOL/L (ref 9–17)
BUN BLDV-MCNC: 17 MG/DL (ref 8–23)
CALCIUM SERPL-MCNC: 7.7 MG/DL (ref 8.6–10.4)
CHLORIDE BLD-SCNC: 106 MMOL/L (ref 98–107)
CO2: 25 MMOL/L (ref 20–31)
CREAT SERPL-MCNC: 0.86 MG/DL (ref 0.5–0.9)
GFR AFRICAN AMERICAN: >60 ML/MIN
GFR NON-AFRICAN AMERICAN: >60 ML/MIN
GFR SERPL CREATININE-BSD FRML MDRD: ABNORMAL ML/MIN/{1.73_M2}
GLUCOSE BLD-MCNC: 137 MG/DL (ref 70–99)
HCT VFR BLD CALC: 23.8 % (ref 36.3–47.1)
HCT VFR BLD CALC: 26.2 % (ref 36.3–47.1)
HEMOGLOBIN: 7.1 G/DL (ref 11.9–15.1)
HEMOGLOBIN: 7.9 G/DL (ref 11.9–15.1)
MAGNESIUM: 2.2 MG/DL (ref 1.6–2.6)
MCH RBC QN AUTO: 27.8 PG (ref 25.2–33.5)
MCH RBC QN AUTO: 27.9 PG (ref 25.2–33.5)
MCHC RBC AUTO-ENTMCNC: 29.8 G/DL (ref 28.4–34.8)
MCHC RBC AUTO-ENTMCNC: 30.2 G/DL (ref 28.4–34.8)
MCV RBC AUTO: 92.6 FL (ref 82.6–102.9)
MCV RBC AUTO: 93.3 FL (ref 82.6–102.9)
NRBC AUTOMATED: 0 PER 100 WBC
NRBC AUTOMATED: 0 PER 100 WBC
PDW BLD-RTO: 15.9 % (ref 11.8–14.4)
PDW BLD-RTO: 16 % (ref 11.8–14.4)
PHOSPHORUS: 3.5 MG/DL (ref 2.6–4.5)
PLATELET # BLD: 513 K/UL (ref 138–453)
PLATELET # BLD: 556 K/UL (ref 138–453)
PMV BLD AUTO: 9 FL (ref 8.1–13.5)
PMV BLD AUTO: 9.1 FL (ref 8.1–13.5)
POTASSIUM SERPL-SCNC: 4.4 MMOL/L (ref 3.7–5.3)
RBC # BLD: 2.55 M/UL (ref 3.95–5.11)
RBC # BLD: 2.83 M/UL (ref 3.95–5.11)
SODIUM BLD-SCNC: 139 MMOL/L (ref 135–144)
WBC # BLD: 12.6 K/UL (ref 3.5–11.3)
WBC # BLD: 13.3 K/UL (ref 3.5–11.3)

## 2022-04-23 PROCEDURE — 2500000003 HC RX 250 WO HCPCS: Performed by: STUDENT IN AN ORGANIZED HEALTH CARE EDUCATION/TRAINING PROGRAM

## 2022-04-23 PROCEDURE — 85027 COMPLETE CBC AUTOMATED: CPT

## 2022-04-23 PROCEDURE — 6370000000 HC RX 637 (ALT 250 FOR IP): Performed by: STUDENT IN AN ORGANIZED HEALTH CARE EDUCATION/TRAINING PROGRAM

## 2022-04-23 PROCEDURE — 2580000003 HC RX 258: Performed by: STUDENT IN AN ORGANIZED HEALTH CARE EDUCATION/TRAINING PROGRAM

## 2022-04-23 PROCEDURE — 6360000002 HC RX W HCPCS: Performed by: STUDENT IN AN ORGANIZED HEALTH CARE EDUCATION/TRAINING PROGRAM

## 2022-04-23 PROCEDURE — 2060000000 HC ICU INTERMEDIATE R&B

## 2022-04-23 PROCEDURE — 99233 SBSQ HOSP IP/OBS HIGH 50: CPT | Performed by: INTERNAL MEDICINE

## 2022-04-23 PROCEDURE — 80069 RENAL FUNCTION PANEL: CPT

## 2022-04-23 PROCEDURE — 83735 ASSAY OF MAGNESIUM: CPT

## 2022-04-23 PROCEDURE — 36415 COLL VENOUS BLD VENIPUNCTURE: CPT

## 2022-04-23 PROCEDURE — 99232 SBSQ HOSP IP/OBS MODERATE 35: CPT | Performed by: INTERNAL MEDICINE

## 2022-04-23 RX ORDER — LIDOCAINE HYDROCHLORIDE 10 MG/ML
5 INJECTION, SOLUTION EPIDURAL; INFILTRATION; INTRACAUDAL; PERINEURAL ONCE
Status: DISCONTINUED | OUTPATIENT
Start: 2022-04-23 | End: 2022-05-03 | Stop reason: HOSPADM

## 2022-04-23 RX ORDER — SODIUM CHLORIDE 0.9 % (FLUSH) 0.9 %
5-40 SYRINGE (ML) INJECTION EVERY 12 HOURS SCHEDULED
Status: DISCONTINUED | OUTPATIENT
Start: 2022-04-23 | End: 2022-05-03 | Stop reason: HOSPADM

## 2022-04-23 RX ORDER — DEXTROSE AND SODIUM CHLORIDE 5; .45 G/100ML; G/100ML
INJECTION, SOLUTION INTRAVENOUS CONTINUOUS
Status: DISCONTINUED | OUTPATIENT
Start: 2022-04-23 | End: 2022-04-24

## 2022-04-23 RX ORDER — SODIUM CHLORIDE 0.9 % (FLUSH) 0.9 %
5-40 SYRINGE (ML) INJECTION PRN
Status: DISCONTINUED | OUTPATIENT
Start: 2022-04-23 | End: 2022-05-03 | Stop reason: HOSPADM

## 2022-04-23 RX ORDER — SODIUM CHLORIDE 9 MG/ML
25 INJECTION, SOLUTION INTRAVENOUS PRN
Status: DISCONTINUED | OUTPATIENT
Start: 2022-04-23 | End: 2022-05-03 | Stop reason: HOSPADM

## 2022-04-23 RX ADMIN — OXYBUTYNIN CHLORIDE 15 MG: 10 TABLET, EXTENDED RELEASE ORAL at 08:05

## 2022-04-23 RX ADMIN — VANCOMYCIN HYDROCHLORIDE 1250 MG: 10 INJECTION, POWDER, LYOPHILIZED, FOR SOLUTION INTRAVENOUS at 00:32

## 2022-04-23 RX ADMIN — POTASSIUM CHLORIDE 20 MEQ: 1500 TABLET, EXTENDED RELEASE ORAL at 18:03

## 2022-04-23 RX ADMIN — SODIUM CHLORIDE, PRESERVATIVE FREE 10 ML: 5 INJECTION INTRAVENOUS at 08:05

## 2022-04-23 RX ADMIN — SODIUM CHLORIDE, PRESERVATIVE FREE 20 MG: 5 INJECTION INTRAVENOUS at 08:05

## 2022-04-23 RX ADMIN — PIPERACILLIN AND TAZOBACTAM 3375 MG: 3; .375 INJECTION, POWDER, FOR SOLUTION INTRAVENOUS at 18:04

## 2022-04-23 RX ADMIN — VANCOMYCIN HYDROCHLORIDE 1250 MG: 10 INJECTION, POWDER, LYOPHILIZED, FOR SOLUTION INTRAVENOUS at 22:59

## 2022-04-23 RX ADMIN — FLUCONAZOLE 400 MG: 2 INJECTION, SOLUTION INTRAVENOUS at 07:58

## 2022-04-23 RX ADMIN — SODIUM CHLORIDE, PRESERVATIVE FREE 20 MG: 5 INJECTION INTRAVENOUS at 19:42

## 2022-04-23 RX ADMIN — PIPERACILLIN AND TAZOBACTAM 3375 MG: 3; .375 INJECTION, POWDER, FOR SOLUTION INTRAVENOUS at 03:53

## 2022-04-23 RX ADMIN — SODIUM CHLORIDE, PRESERVATIVE FREE 10 ML: 5 INJECTION INTRAVENOUS at 20:59

## 2022-04-23 RX ADMIN — METOPROLOL TARTRATE 25 MG: 25 TABLET ORAL at 19:42

## 2022-04-23 RX ADMIN — DEXTROSE AND SODIUM CHLORIDE: 5; 450 INJECTION, SOLUTION INTRAVENOUS at 04:50

## 2022-04-23 RX ADMIN — PIPERACILLIN AND TAZOBACTAM 3375 MG: 3; .375 INJECTION, POWDER, FOR SOLUTION INTRAVENOUS at 11:39

## 2022-04-23 RX ADMIN — MORPHINE SULFATE 2 MG: 4 INJECTION INTRAVENOUS at 22:55

## 2022-04-23 ASSESSMENT — ENCOUNTER SYMPTOMS
BACK PAIN: 1
ABDOMINAL DISTENTION: 0
APNEA: 0
EYE DISCHARGE: 0
COLOR CHANGE: 0

## 2022-04-23 ASSESSMENT — PAIN DESCRIPTION - ORIENTATION: ORIENTATION: MID

## 2022-04-23 ASSESSMENT — PAIN DESCRIPTION - LOCATION: LOCATION: BACK

## 2022-04-23 ASSESSMENT — PAIN DESCRIPTION - DESCRIPTORS: DESCRIPTORS: ACHING

## 2022-04-23 ASSESSMENT — PAIN SCALES - GENERAL: PAINLEVEL_OUTOF10: 6

## 2022-04-23 NOTE — PROGRESS NOTES
Urology Progress Note  CC: Perinephric abscess  Subjective:   Devante Cristina is a 74 y.o. female. His/Her current Diet is: Diet NPO Exceptions are: Sips of Water with Meds, Ice Chips, Sips of Clear Liquids. The patient is 1 Day Post-Op from Procedure(s):  CYSTOSCOPY URETERAL STENT INSERTION  No acute urologic events overnight. No chest pain, shortness of breath, nausea, vomiting, fevers, chills  Pain much improved. She is not yet having bowel movements. Catheter with 300 cc in past 24 hours, perinephric drain with 20 cc in the past 24 hours.       Patient Vitals for the past 24 hrs:   BP Temp Temp src Pulse Resp SpO2   04/23/22 0356 133/73 98.5 °F (36.9 °C) Oral 62 23 98 %   04/22/22 2312 126/70 98.2 °F (36.8 °C) Oral 69 18 98 %   04/22/22 2041 -- 98.4 °F (36.9 °C) Oral -- -- 91 %   04/22/22 1605 120/66 97.7 °F (36.5 °C) Oral 61 20 --   04/22/22 1315 115/72 -- -- 58 17 100 %   04/22/22 1300 119/70 97.2 °F (36.2 °C) Temporal 56 17 99 %   04/22/22 1245 126/74 -- -- 53 15 100 %   04/22/22 1230 112/67 -- -- 57 19 100 %   04/22/22 1223 -- -- -- 56 16 100 %   04/22/22 1215 95/68 -- -- 64 21 100 %   04/22/22 1208 96/71 97.2 °F (36.2 °C) Temporal 58 20 100 %   04/22/22 1008 129/62 97.3 °F (36.3 °C) Temporal 61 18 100 %   04/22/22 0800 129/60 97.5 °F (36.4 °C) Oral 63 22 94 %       Intake/Output Summary (Last 24 hours) at 4/23/2022 0759  Last data filed at 4/23/2022 0540  Gross per 24 hour   Intake 866.1 ml   Output 410 ml   Net 456.1 ml       Recent Labs     04/22/22 0222 04/22/22  1856 04/23/22  0559   WBC 12.8* 15.8* 13.3*   HGB 7.4* 8.0* 7.1*   HCT 24.9* 26.5* 23.8*   MCV 93.6 93.6 93.3   * 593* 513*     Recent Labs     04/21/22  0717 04/22/22 0222 04/23/22  0559    140 139   K 3.6* 4.0 4.4    106 106   CO2 26 25 25   PHOS  --   --  3.5   BUN 14 16 17   CREATININE 0.85 0.81 0.86       Recent Labs     04/21/22  0333   COLORU Yellow   PHUR 5.5   WBCUA 10 TO 20   RBCUA 10 TO 20   BACTERIA FEW* SPECGRAV 1.029   LEUKOCYTESUR SMALL*   UROBILINOGEN Normal   BILIRUBINUR NEGATIVE       Current Facility-Administered Medications   Medication Dose Route Frequency Provider Last Rate Last Admin    dextrose 5 % and 0.45 % sodium chloride infusion   IntraVENous Continuous Atha Bumpers,  mL/hr at 04/23/22 0450 New Bag at 04/23/22 0450    fluconazole (DIFLUCAN) 400 mg IVPB  400 mg IntraVENous Q24H Shelley Cedeno  mL/hr at 04/23/22 0758 400 mg at 04/23/22 0758    famotidine (PEPCID) 20 mg in sodium chloride (PF) 10 mL injection  20 mg IntraVENous BID Shelley Cedeno MD   20 mg at 04/22/22 2044    morphine injection 2 mg  2 mg IntraVENous Q2H PRN Shelley Cedeno MD   2 mg at 04/22/22 0815    Or    morphine injection 4 mg  4 mg IntraVENous Q2H PRN Shelley Cedeno MD        metoprolol tartrate (LOPRESSOR) tablet 25 mg  25 mg Oral BID Shelley Cedeno MD   25 mg at 04/22/22 2044    oxybutynin (DITROPAN-XL) extended release tablet 15 mg  15 mg Oral Daily Shelley Cedeno MD   15 mg at 04/22/22 0813    potassium chloride (KLOR-CON M) extended release tablet 20 mEq  20 mEq Oral QPM Shelley Cedeno MD   20 mEq at 04/22/22 1801    sodium chloride flush 0.9 % injection 5-40 mL  5-40 mL IntraVENous 2 times per day Shelley Cedeno MD   10 mL at 04/22/22 0814    sodium chloride flush 0.9 % injection 10 mL  10 mL IntraVENous PRN Shelley Cedeno MD        0.9 % sodium chloride infusion   IntraVENous PRN Shelley Cedeno MD        potassium chloride (KLOR-CON M) extended release tablet 40 mEq  40 mEq Oral PRN Shelley Cedeno MD        Or    potassium bicarb-citric acid (EFFER-K) effervescent tablet 40 mEq  40 mEq Oral PRN Shelley Cedeno MD        Or    potassium chloride 10 mEq/100 mL IVPB (Peripheral Line)  10 mEq IntraVENous PRN Shelley Cedeno  mL/hr at 04/21/22 0708 10 mEq at 04/21/22 0708    magnesium sulfate 1000 mg in dextrose 5% 100 mL IVPB 1,000 mg IntraVENous PRN Deric Jiménez MD        ondansetron (ZOFRAN-ODT) disintegrating tablet 4 mg  4 mg Oral Q8H PRN Deric Jiménez MD        Or    ondansetron [facility]) injection 4 mg  4 mg IntraVENous Q6H PRN Deric Jiménez MD        polyethylene glycol (GLYCOLAX) packet 17 g  17 g Oral Daily PRN Deric Jiménez MD        acetaminophen (TYLENOL) tablet 650 mg  650 mg Oral Q6H PRN Deric Jiménez MD        Or    acetaminophen (TYLENOL) suppository 650 mg  650 mg Rectal Q6H PRN Deric Jiménez MD        vancomycin [facility]) intermittent dosing (placeholder)   Other RX Placeholder Deric Jiménez MD        vancomycin (VANCOCIN) 1250 mg in sodium chloride 0.9% 250 mL IVPB  1,250 mg IntraVENous Q24H Deric Jiménez MD   Stopped at 04/23/22 0233    piperacillin-tazobactam (ZOSYN) 3,375 mg in sodium chloride 0.9 % 50 mL IVPB (mini-bag)  3,375 mg IntraVENous q8h Deric Jiménez MD   Stopped at 04/23/22 0754    sodium chloride flush 0.9 % injection 10 mL  10 mL IntraCATHeter BID Deric Jiménez MD   10 mL at 04/22/22 0814    benzocaine-menthol (CEPACOL SORE THROAT) lozenge 1 lozenge  1 lozenge Oral Q2H PRN Deric Jiménez MD   1 lozenge at 04/21/22 1751            Additional Lab/culture results:       Physical Exam:   NAD  Awake   Peripheral pulses palpable  Regular rate    Respirations nonlabored, symmetric chest rise bilaterally  Soft, NT, ND  Perinephric drain and right-sided abdominal drain with purulent fluid left-sided abdominal drain with purulent fluid  Brooks catheter with yellow urine.   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 Pain control and antiemetics as needed  Patient has pyelitis,   Okay to advance diet from urology perspective  Maintain Lombardo catheter and drains for now. We may be able to void trial before discharge, I would leave perinephric drain in until output is negligible. Will need follow-up with Dr. Safia Mckay MD her urologist for stent removal and management of drain if that is required upon discharge.   Appreciate medical management per primary       Cami Arita MD,  PGY-5  Urology Resident  7:59 AM 4/23/2022

## 2022-04-23 NOTE — ANESTHESIA POSTPROCEDURE EVALUATION
Department of Anesthesiology  Postprocedure Note    Patient: Hair Martinez  MRN: [id number]  YOB: 1955  Date of evaluation: 4/23/2022  Time:  8:32 AM     Procedure Summary     Date: 04/22/22 Room / Location: [address]    Anesthesia Start: 1116 Anesthesia Stop: 0075    Procedure: CYSTOSCOPY URETERAL STENT INSERTION (Right ) Diagnosis: (PYELITIS)    Surgeons: Nixon Shaw MD Responsible Provider: Jarret Pratt MD    Anesthesia Type: TIVA, MAC ASA Status: 3          Anesthesia Type: TIVA, MAC    Fly Phase I: Fly Score: 8    Fly Phase II:      Last vitals: Reviewed and per EMR flowsheets.        Anesthesia Post Evaluation    Patient location during evaluation: floor  Patient participation: complete - patient participated  Level of consciousness: awake and alert  Airway patency: patent  Nausea & Vomiting: no nausea and no vomiting  Complications: no  Cardiovascular status: blood pressure returned to baseline  Respiratory status: acceptable  Hydration status: euvolemic  Comments: No known anesthesia related complication  Multimodal analgesia pain management approach

## 2022-04-23 NOTE — PROGRESS NOTES
PROGRESS NOTE      PATIENT NAME: [address] [place] [address] RECORD NO. [id number]  DATE: 4/23/2022  SURGEON: Dr. Judeen Klinefelter: Amy Cardoza, APRN - CNP    HD: # 2    ASSESSMENT    Patient Active Problem List   Diagnosis    Acute thromboembolism of deep veins of lower extremity ([address]    Long term current use of anticoagulant therapy    Bilateral cellulitis of lower leg    Acute shoulder pain due to trauma    Lymphedema of both lower extremities    Tobacco abuse    History of recurrent deep vein thrombosis (DVT)    Gross hematuria    Frequency of urination    Nocturia    Mixed incontinence    Constipation    Cellulitis of lower leg    Post-phlebitic syndrome    Elephantiasis nostra verrucosa    Cellulitis of left lower extremity    Complicated UTI (urinary tract infection)    Renal calculus    Cellulitis    Normocytic anemia    Iron deficiency anemia    Renal abscess, right    Bacteremia due to Klebsiella pneumoniae    Psoas muscle abscess (HCC)    Septicemia due to Klebsiella pneumoniae (HCC)    Ureteral calculus    Intra-abdominal abscess (HCC)    Obesity, Class III, BMI 40-49.9 (morbid obesity) (HCC)    Hypocalcemia    Hypokalemia    Vertebral osteomyelitis (HCC) T12-L1    Pleural effusion on right       MEDICAL DECISION MAKING AND PLAN    Perinephric and perihepatic abscesses   S/p IR drainage 4/21   Posterior buttock LAURIE - 0cc last shift   Right lateral abd drain - 20cc last shift   Right posterior drain - 0cc last shift   Will monitor drain output   Will consider repeat CT in a few days to re-evaluate abscesses   WBC trending down   OK for diet as tolerated    Right emphysematous pyelitis   Urology following - s/p cysto and stent placement on R    ID following   On zosyn, vanco, diflucan    SUBJECTIVE    Nobles Hug seen and examined at bedside. Reports significant improvement in her abdominal pain.       OBJECTIVE  VITALS: Temp: Temp: 97.9 °F (36.6 °C)Temp Av.8 °F (36.6 °C)  Min: 97.2 °F (36.2 °C)  Max: 98.5 °F (00.6 °C) BP Systolic (57MVU), FPW:625 , Min:93 , GMT:662   Diastolic (60TAO), KUY:39, Min:55, Max:74   Pulse Pulse  Av.2  Min: 47  Max: 69 Resp Resp  Av.7  Min: 0  Max: 23 Pulse ox SpO2  Av.8 %  Min: 91 %  Max: 100 %       CONSTITUTIONAL: awake, alert, cooperative, no apparent distress  HEAD: atraumatic, normocephalic  EYES: sclera clear, pupils equal and reactive to light  ENT: ears are symmetric, nares patent   HEENT: moist mucous membranes  NECK: Supple, symmetrical, trachea midline  LUNGS: no respiratory distress  CARDIOVASCULAR: RRR  ABDOMEN: soft, nondistended, minimally ttp at RUQ nd suprapubic region, LAURIE drains with minimal purulent output  NEUROLOGIC: Awake, alert, oriented to name, place and time  EXTREMITIES: peripheral pulses normal  SKIN: normal coloration and turgor    I/O last 3 completed shifts: In: 866.1 [P.O.:120; I.V.:591; IV Piggyback:155.1]  Out: 692 [Urine:500; Drains:192]    Drain/tube output: In: 866.1 [P.O.:120; I.V.:591]  Out: 410 [Urine:300; Drains:110]    LAB:  CBC:   Recent Labs     22  1856 22  0559   WBC 12.8* 15.8* 13.3*   HGB 7.4* 8.0* 7.1*   HCT 24.9* 26.5* 23.8*   MCV 93.6 93.6 93.3   * 593* 513*     BMP:   Recent Labs     22  0717 22  0559    140 139   K 3.6* 4.0 4.4    106 106   CO2 26 25 25   BUN 14 16 17   CREATININE 0.85 0.81 0.86   GLUCOSE 167* 119* 137*     COAGS:   Recent Labs     22  0214 22  0222   APTT  --  28.2  --    PROT 7.1  --  5.8*   INR  --  1.6 1.1       Clovis Ceballos MD  22, 8:34 AM         Attending Note      I have reviewed the above TECSS note(s) and I either performed the key elements of the medical history and physical exam or was present with the resident when the key elements of the medical history and physical exam were performed.  I have discussed the findings, established the care plan and recommendations with Resident, TECSS RN, bedside nurse.     Kerry Kawasaki, MD  4/23/2022  1:12 PM

## 2022-04-23 NOTE — PLAN OF CARE
Problem: Discharge Planning  Goal: Discharge to home or other facility with appropriate resources  4/22/2022 1754 by Radha Enrique RN  Outcome: Progressing     Problem: Skin/Tissue Integrity  Goal: Absence of new skin breakdown  Description: 1. Monitor for areas of redness and/or skin breakdown  2. Assess vascular access sites hourly  3. Every 4-6 hours minimum:  Change oxygen saturation probe site  4. Every 4-6 hours:  If on nasal continuous positive airway pressure, respiratory therapy assess nares and determine need for appliance change or resting period.   4/22/2022 1754 by Radha Enrique RN  Outcome: Progressing     Problem: Safety - Adult  Goal: Free from fall injury  4/23/2022 0518 by Jennifer Gunderson RN  Outcome: Progressing  4/22/2022 1754 by Radha Enrique RN  Outcome: Progressing     Problem: ABCDS Injury Assessment  Goal: Absence of physical injury  4/22/2022 1754 by Radha Enrique RN  Outcome: Progressing     Problem: Pain  Goal: Verbalizes/displays adequate comfort level or baseline comfort level  4/23/2022 0518 by Jennifer Gunderson RN  Outcome: Progressing  4/22/2022 1754 by Radha Enrique RN  Outcome: Progressing

## 2022-04-23 NOTE — PROGRESS NOTES
[facility]  Office: [address], DO, Judith Green, DO, Solitario Combs, DO, Amelie Madera, DO, Valiant Sicard, MD, Taiwo Quintana MD, Sung Martines MD, Addis Donnelly MD, Jodi Correa MD, Emigdio Perales MD, Kyle Calvillo MD, Kevin Carey, DO, Lucero Rao, DO, Lupe Hayes MD,  Miguel Yoder, DO, Virginia Levi MD, Brian Albarado MD, Abdiaziz Mora MD, Angie Montez DO, Hieu Markham MD, Nathan Lagunas MD, Osbaldo Mercer, [facility], [facility], CNP, Dyana Estrada, CNP, Candi Hicks, CNP, Rony Mena, CNP, Marlee Morales, CNP, Jeramie Dickinson, PA-C, Miguel Simons, [facility], Shannan Ayala, CNP, Gerald Hill, CNP, Hair Esparza, CNP, Cali Alvarenga, CNS, Weston George, [facility], Aneudy Gutierrez, CNP, Jeff Gonzalez, CNP, Nichole Morocho, CNP         [address]    Progress Note    4/23/2022    11:38 AM    Name:   Pato Newman  MRN:     [id number]     o[place]side:      [de-identified]   Room:   [facility]2627-34   Day:  2  Admit Date:  4/21/2022  1:28 AM    PCP:   DONG Dominique CNP  Code Status:  Full Code    Subjective:     C/C:   Chief Complaint   Patient presents with    Abdominal Pain     perf bowel     Interval History Status: not changed. Patient seen this morning, discussed slowly decreasing drainage from LAURIE drains. Discussed long-term antibiotics. Patient still eager to get out of bed and continue her diet. Brief History:     47-year-old female transferred from outside hospital for bowel perforation with multiple intra-abdominal abscesses. Patient initially was treated on vancomycin and Zosyn with elevated lactic acid and was given sepsis bolus of IV fluids. Patient was seen by multiple consultants including trauma surgery, general surgery and infectious disease and urology. Patient was recommended placement of multiple pigtail catheters for perihepatic and perinephric abscesses.   Patient also had a successful placement of a pelvic abscess, patient the following day was seen by urology recommending replacement of right-sided stent and cystoscopy and right retrograde pyelogram.      Review of Systems:     Constitutional:  negative for chills, fevers, sweats  Respiratory:  negative for cough, dyspnea on exertion, shortness of breath, wheezing  Cardiovascular:  negative for chest pain, chest pressure/discomfort, lower extremity edema, palpitations  Gastrointestinal:  negative for abdominal pain, constipation, diarrhea, nausea, vomiting  Neurological:  negative for dizziness, headache    Medications: Allergies:     Allergies   Allergen Reactions    Estrogens Other (See Comments)     Hx of DVT       Current Meds:   Scheduled Meds:    lidocaine 1 % injection  5 mL IntraDERmal Once    sodium chloride flush  5-40 mL IntraVENous 2 times per day    fluconazole  400 mg IntraVENous Q24H    famotidine (PEPCID) injection  20 mg IntraVENous BID    metoprolol tartrate  25 mg Oral BID    oxybutynin  15 mg Oral Daily    potassium chloride  20 mEq Oral QPM    sodium chloride flush  5-40 mL IntraVENous 2 times per day    vancomycin (VANCOCIN) intermittent dosing (placeholder)   Other RX Placeholder    vancomycin  1,250 mg IntraVENous Q24H    piperacillin-tazobactam  3,375 mg IntraVENous q8h    sodium chloride flush  10 mL IntraCATHeter BID     Continuous Infusions:    dextrose 5 % and 0.45 % NaCl 100 mL/hr at 04/23/22 0450    sodium chloride      sodium chloride       PRN Meds: sodium chloride flush, sodium chloride, morphine **OR** morphine, sodium chloride flush, sodium chloride, potassium chloride **OR** potassium alternative oral replacement **OR** potassium chloride, magnesium sulfate, ondansetron **OR** ondansetron, polyethylene glycol, acetaminophen **OR** acetaminophen, benzocaine-menthol    Data:     Past Medical History:   has a past medical history of Carcinoma ([facility] [address] Cellulitis, DVT (deep venous thrombosis) ([address] Kidney stone, Lymphedema, Morbid obesity ([address] Psoas abscess, right ([address] Pyelonephritis, and Wears glasses. Social History:   reports that she has been smoking cigarettes. She has a 21.00 pack-year smoking history. She has never used smokeless tobacco. She reports that she does not drink alcohol and does not use drugs. Family History:   Family History   Adopted: Yes   Problem Relation Age of Onset    Cancer Mother         The patient reports her biologic mother did have bladder cancer       Vitals:  /80   Pulse 65   Temp 97.6 °F (36.4 °C) (Oral)   Resp 16   Ht 5' 1\" (1.549 m)   Wt 272 lb 0.8 oz (123.4 kg)   LMP  (LMP Unknown)   SpO2 96%   BMI 51.40 kg/m²   Temp (24hrs), Av.8 °F (36.6 °C), Min:97.2 °F (36.2 °C), Max:98.5 °F (36.9 °C)    No results for input(s): POCGLU in the last 72 hours. I/O (24Hr): Intake/Output Summary (Last 24 hours) at 2022 1138  Last data filed at 2022 0540  Gross per 24 hour   Intake 866.1 ml   Output 410 ml   Net 456.1 ml       Labs:  Hematology:  Recent Labs     22  0214 22  0214 22  0222 22  1856 22  0559   WBC 14.1*   < > 12.8* 15.8* 13.3*   RBC 3.00*   < > 2.66* 2.83* 2.55*   HGB 8.4*   < > 7.4* 8.0* 7.1*   HCT 28.1*   < > 24.9* 26.5* 23.8*   MCV 93.7   < > 93.6 93.6 93.3   MCH 28.0   < > 27.8 28.3 27.8   MCHC 29.9   < > 29.7 30.2 29.8   RDW 15.9*   < > 16.0* 15.9* 16.0*   *   < > 561* 593* 513*   MPV 9.2   < > 9.3 9.1 9.1   INR 1.6  --  1.1  --   --     < > = values in this interval not displayed.      Chemistry:  Recent Labs     224 22  0214 22  0717 22  1418 22  0222 22  0559      < > 139   < > 139  --  140 139   K 2.8*   < > 2.9*   < > 3.6*  --  4.0 4.4      < > 104   < > 103  --  106 106   CO2 24   < > 25   < > 26  --  25 25   GLUCOSE 178*   < > 193*   < > 167*  --  119* 137*   BUN 15   < > 14   < > 14 --  16 17   CREATININE 0.95*   < > 0.91*   < > 0.85  --  0.81 0.86   MG 1.9  --  1.9  --   --   --   --  2.2   ANIONGAP 13   < > 10   < > 10  --  9 8*   LABGLOM 59*   < > >60   < > >60  --  >60 >60   GFRAA >60   < > >60   < > >60  --  >60 >60   CALCIUM 8.5*   < > 7.7*   < > 7.7*  --  7.8* 7.7*   CAION  --   --   --   --   --   --  1.17  --    PHOS  --   --   --   --   --   --   --  3.5   PROBNP 3,599*  --   --   --   --   --   --   --    TROPHS 27*  --   --   --   --   --   --   --    LACTACIDWB  --   --   --   --   --  1.4  --   --     < > = values in this interval not displayed. Recent Labs     04/20/22 2045 04/22/22 0222 04/23/22  0559   PROT 7.1 5.8*  --    LABALBU 2.5* 2.0* 1.9*   AST 17 14  --    ALT 18 12  --    ALKPHOS 117* 99  --    BILITOT 0.36 0.16*  --    BILIDIR  --  <0.08  --    LIPASE 9*  --   --      ABG:  Lab Results   Component Value Date    PHART 7.466 07/19/2018    OKL7PKL 35.5 07/19/2018    PO2ART 72.5 07/19/2018    ZZW8BWY 25.0 07/19/2018    NBEA NOT REPORTED 07/19/2018    PBEA 1.7 07/19/2018    G8PXBNHS 95.5 07/19/2018    FIO2 21 07/19/2018     Lab Results   Component Value Date/Time    SPECIAL RIGHT FOREARM 20ML 04/21/2022 02:16 AM     Lab Results   Component Value Date/Time    CULTURE CULTURE IN PROGRESS 04/21/2022 01:42 PM    CULTURE MIXED ANAEROBIC NAGI 04/21/2022 01:42 PM       Radiology:  CT ABDOMEN PELVIS W IV CONTRAST Additional Contrast? Oral    Result Date: 4/21/2022  1. Multiple intra-abdominal and pelvic fluid collections which have the appearance of abscess formation. In the subcapsular area in the liver extending into the subhepatic area a large abscess noted measuring 10 x 13.7 cm. A second more posterior collection measures 8.3 x 3.2 cm. Two dominant pelvic collections are noted, one measuring 10 x 6 cm and the second posteriorly 7 x 6.3 cm. The anterior collection is larger compared to the previous evaluation. These likely abscesses contain air in them.  2. A small amount of free air is noted scattered in the abdomen and pelvis. Psoas retroperitoneal abscess extends ton the posterior soft tissues. 3. Right renal atrophy. A subcapsular air containing collection measures 5 x 3.9 cm also likely an abscess. 4. Partly loculated right pleural effusion and right lower lobe atelectatic changes. Findings discussed with Dr Tamera Hancock 04/21/2022 00:10 a.m. RECOMMENDATIONS: Percutaneous drainage of multiple abscesses. CT ABDOMEN PELVIS W IV CONTRAST Additional Contrast? 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 New bladder prolapse with residual dependent stones in the bladder. Intraluminal gas in the bladder may be due to infection or recent instrumentation. Increased size of the previous right retroperitoneal abscess tracking along the psoas muscle (6.7 x 2.6 x 10.2 cm) which previously contained a percutaneous drainage catheter. There are abscesses tracking along the prior course of the drainage catheter through the right quadratus lumborum muscle and along the right posterior paraspinal musculature. Multifocal rim enhancing mixed gas and fluid collections worrisome for abscesses with the dominant collections in the perihepatic region spanning up to 15 cm and within the pelvic cul-de-sac spanning up to 7 cm. Free air along the mesentery in addition to the aforementioned fluid collections. As there are some thickened loops of small bowel in the lower abdomen and pelvis, a concomitant bowel perforation is not able be excluded on this exam, with differential considerations including ischemic bowel. 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  Loculated right pleural effusion characterized to advantage on today's chest CT. Critical results were called by Dr. Sanket Pelaez to Dr. Riley Gipson on 4/20/2022 at 22:21 EST. XR CHEST PORTABLE    Result Date: 4/20/2022  Mass like infiltrate in the right upper lobe suggesting pneumonia or neoplasm. Underlying pulmonary vascular congestion RECOMMENDATION: Follow-up CT would be helpful. CT CHEST PULMONARY EMBOLISM W CONTRAST    Result Date: 4/20/2022  Moderately large right pleural effusion with areas of loculation accounting for the pseudotumor seen on chest x-ray. . Mild bibasal atelectasis more notably on the right. .. Coronary artery/atherosclerotic disease No obvious evidence of pulmonary embolism. Mild subcutaneous emphysema along the right posterior chest wall. Perihepatic fluid. Ectopic air seen in the abdomen. Please refer to abdominal CT report RECOMMENDATIONS: No additional routine follow-up for incidental abdominal lesions. CT ABSCESS DRAINAGE    Result Date: 4/21/2022  Successful CT guided placement of right Lisbeth nephric and deep pelvic abscess drainage catheters. IR ABSCESS DRAINAGE PERC    Result Date: 4/21/2022  Successful ultrasound-guided placement 12 [place] drain in perihepatic abscess. Sample sent for culture and sensitivity. 550 mL of green foul-smelling purulent material was aspirated.        Physical Examination:        General appearance:  alert, cooperative and no distress  Mental Status:  oriented to person, place and time and normal affect  Lungs:  clear to auscultation bilaterally, normal effort  Heart:  regular rate and rhythm, no murmur  Abdomen:  soft, nontender, nondistended, normal bowel sounds, no masses, hepatomegaly, splenomegaly, multiple abdominal drainage tubes  Extremities:  no edema, redness, tenderness in the calves  Skin: Chronic skin changes bilateral lower extremities    Assessment:        Hospital Problems           Last Modified POA    * (Principal) Intra-abdominal abscess ([address] 4/21/2022 Yes    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    Obesity, Class III, BMI 40-49.9 (morbid obesity) ([facility] [address] 4/21/2022 Yes    Hypocalcemia 4/21/2022 Yes    Hypokalemia 4/21/2022 Yes    Vertebral osteomyelitis (H[facility]) T12-L1 4/21/2022 Yes    Pleural effusion on right 4/21/2022 Yes    Long term current use of anticoagulant therapy 4/21/2022 Yes    Lymphedema of both lower extremities 4/21/2022 Yes    Tobacco abuse 4/21/2022 Yes    History of recurrent deep vein thrombosis (DVT) 5/72/0070 Yes    Complicated UTI (urinary tract infection) 4/21/2022 Yes    Renal calculus 4/21/2022 Yes    Normocytic anemia 4/21/2022 Yes    Renal abscess, right 4/21/2022 Yes    Psoas muscle abscess ([address] 4/21/2022 Yes    Ureteral calculus 4/21/2022 Yes          Plan:        Monitor drainage, removal per urology/trauma surgery  Continue broad-spectrum antibiotics, cleared by infectious disease for PICC line placement  Out of bed once cleared by surgical services, supposedly we are planning repeat CT imaging today and she will be n.p.o. until then  Discharge planning possibly Monday once all drains are removed or definitive plans are finalized for drainage    Lucero Rao DO  4/23/2022  11:38 AM

## 2022-04-23 NOTE — PLAN OF CARE
Problem: Discharge Planning  Goal: Discharge to home or other facility with appropriate resources  Outcome: Progressing     Problem: Skin/Tissue Integrity  Goal: Absence of new skin breakdown  Description: 1. Monitor for areas of redness and/or skin breakdown  2. Assess vascular access sites hourly  3. Every 4-6 hours minimum:  Change oxygen saturation probe site  4. Every 4-6 hours:  If on nasal continuous positive airway pressure, respiratory therapy assess nares and determine need for appliance change or resting period.   Outcome: Progressing     Problem: Safety - Adult  Goal: Free from fall injury  4/23/2022 1808 by Aminata Chavez RN  Outcome: Progressing  4/23/2022 0518 by Ayaan Vitale RN  Outcome: Progressing     Problem: ABCDS Injury Assessment  Goal: Absence of physical injury  Outcome: Progressing     Problem: Pain  Goal: Verbalizes/displays adequate comfort level or baseline comfort level  4/23/2022 1808 by Aminata Chavez RN  Outcome: Progressing  4/23/2022 0518 by Ayaan Vitale RN  Outcome: Progressing

## 2022-04-23 NOTE — PROGRESS NOTES
[facility]  Progress Note    Melody Loving  MRN: [id number]  Date: 4/23/2022  LOS: 2     Reason for F/U :   Abdominal abscess, vertebral osteomyelitis, pyelonephritis/pyelitis    Impression :   1. Multiple perihepatic abscesses, retroperitoneal abscess on the right psoas, and abscess in pelvic cul-de-sac  2. 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 Free air in the abdomen,  4. Osteomyelitis at T12-L1 due to direct extension from abscess over psoas muscle  5. bandemia  6. History of complicated UTI in January 2022, positive for Klebsiella pneumoniae  7. History of septicemia in January 2022, positive for Klebsiella pneumoniae  8. History of right psoas abscess drained in January 2022  9. History of right ureteral and renal stones with ureteral stent placement in March 2022 with stent exchanged in April 2020    Recommendations:   · Patient continues on IV antimicrobial therapy with Zosyn and vancomycin - adjust per final cxs  · She is status post IR drainage of the pelvic and liver abscesses and is also status post nephrostomy tube placement by interventional radiology - all cx are still pend, unfortunately they were not labeled to the exact specimen  · She will likely need long-term IV antimicrobial coverage with follow-up imaging to ensure resolution of infection  · picc ordered for today    Infection Control Recommendations:   Universal precautions    Discharge Planning:   Estimated Length of IV antimicrobials:  To be determined  Patient will need Midline Catheter Insertion/ PICC line Insertion: No  Patient will need: Home IV , [facility],  SNF,  LTAC: Undetermined  Patient willneed outpatient wound care: No    Medical Decision making / Summary of Stay: Peace Villa is a 74y.o.-year-old female presenting as a transfer from an outside facility due to conern for bowel perforation and multiple intra-abdominal abscesses. He Brandt was previously hospitalized in January 2022 with Klebsiella pneumoniae sepsis related to a perinephric abscess. She did have right-sided severely obstructive pyelonephritis for which she underwent stent placement as well as a right percutaneous nephrostomy tube placement. This infection was complicated by perinephric/psoas abscess which was aspirated and this drain was placed. Patient was seen by my partner Dr. Regina Silva and was discharged on intravenous antimicrobial therapy with Rocephin 2 g daily through February 2, 2022  The patient on 3/1/2022 underwent a cystoscopy with right-sided ureteroscopy with holmium laser lithotripsy and right-sided ureteral stent placement  The patient underwent a second urological procedure on 4/5/2022 with a cystoscopy, right-sided ureteroscopy, right holmium laser lithotripsy and right ureteral stent exchange and the patient was found to have a copious amount of calculi which were crushed up and further ablated.     The patient reports that ever since she had the second urological procedure she has had generalized malaise/fatigue and more recently progressing abdominal pain over the last several days. Initial lactic acid was 4.4 and treated with fluid bolus.  Initial potassium was 2.8, now 3.6 after replacement.     A CT scan of the abdomen and pelvis 4/20/2022 showed gas within both the right renal parenchyma and proximal right collecting system and increased size of the previous right retroperitoneal abscess tracking along the psoas muscle and there is abscess tracking along the posterior course of the drainage catheter through the right quadratus lumbar muscle and along the right posterior paraspinal musculature  There are multifocal rim-enhancing mixed Greg fluid collections worrisome for abscess within the dominant collections of the perihepatic region spanning up to 15 cm within the pelvic cul-de-sac spanning up to 7 cm. There is free air along the mesentery in addition to the a forementioned fluid collections. Discitis/osteomyelitis at T12-L1 with direct extension from the adjacent right psoas inflammatory change. Loculated right pleural effusion     The patient has been admitted and started on broad-spectrum antimicrobial therapy and ultrasound-guided placement of 12 Armenian drain in the perihepatic abscess with 550 mL of green following purulent material aspirated, and a CT-guided placement of right perinephric and deep pelvic abscess drainage catheters with 5 mL of purulent fluid removed from each collection sent for diagnostic tests. Current evaluation:2022    /73   Pulse (!) 47   Temp 97.9 °F (36.6 °C) (Oral)   Resp 14   Ht 5' 1\" (1.549 m)   Wt 272 lb 0.8 oz (123.4 kg)   LMP  (LMP Unknown)   SpO2 98%   BMI 51.40 kg/m²     Temperature Range: Temp: 97.9 °F (36.6 °C) Temp  Av.8 °F (36.6 °C)  Min: 97.2 °F (36.2 °C)  Max: 98.5 °F (36.9 °C)    AFVSS, on 2L NC  WBC 15.8>13.3  All cx are still pend w mixed organisms, not labeled for the exact specimen  Plan to do a repeat CT in the coming days.  urology performed cystoscopy with rt sided stent placement, and right retrograde pyelogram.      Review of Systems   Constitutional: Negative for chills. HENT: Negative for congestion. Eyes: Negative for discharge. Respiratory: Negative for apnea. Cardiovascular: Negative for chest pain. Gastrointestinal: Negative for abdominal distention. Endocrine: Negative for cold intolerance. Genitourinary: Negative for dysuria. Musculoskeletal: Positive for arthralgias and back pain. Skin: Negative for color change. Allergic/Immunologic: Negative for environmental allergies. Neurological: Negative for dizziness. Hematological: Negative for adenopathy. Psychiatric/Behavioral: Negative for agitation. Physical Examination :     Physical Exam  Constitutional:       General: She is not in acute distress. Appearance: Normal appearance. She is obese. HENT:      Head: Normocephalic and atraumatic. Nose: Nose normal.      Mouth/Throat:      Mouth: Mucous membranes are moist.   Eyes:      General: No scleral icterus. Conjunctiva/sclera: Conjunctivae normal.   Cardiovascular:      Rate and Rhythm: Normal rate and regular rhythm. Pulmonary:      Effort: Pulmonary effort is normal.      Breath sounds: Normal breath sounds. Abdominal:      General: Abdomen is flat. Bowel sounds are normal.      Palpations: Abdomen is soft. Comments: 2 right-sided LAURIE drains, 1 left-sided LAURIE drain   Genitourinary:     Comments: Urine magalys  Musculoskeletal:         General: No tenderness. Normal range of motion. Cervical back: Neck supple. No rigidity. Skin:     General: Skin is warm and dry. Neurological:      General: No focal deficit present. Mental Status: She is alert and oriented to person, place, and time. Mental status is at baseline. Psychiatric:         Mood and Affect: Mood normal.         Behavior: Behavior normal.         Thought Content: Thought content normal.         Laboratory data:   I have independently reviewed the followinglabs:  CBC with Differential:   Recent Labs     04/21/22 0214 04/21/22  0214 04/22/22  0222 04/22/22 0222 04/22/22  1856 04/23/22  0559   WBC 14.1*   < > 12.8*   < > 15.8* 13.3*   HGB 8.4*   < > 7.4*   < > 8.0* 7.1*   HCT 28.1*   < > 24.9*   < > 26.5* 23.8*   *   < > 561*   < > 593* 513*   LYMPHOPCT 8*  --  7*  --   --   --    MONOPCT 7  --  10*  --   --   --     < > = values in this interval not displayed.      BMP:   Recent Labs     04/21/22  0214 04/21/22  0717 04/22/22 0222 04/23/22  0559      < > 140 139   K 2.9*   < > 4.0 4.4      < > 106 106   CO2 25   < > 25 25   BUN 14   < > 16 17   CREATININE 0.91*   < > 0.81 0.86   MG 1.9  --   --  2.2    < > = values in this interval not displayed. Hepatic Function Panel:   Recent Labs     04/20/22 2045 04/20/22 2045 04/22/22 0222 04/23/22  0559   PROT 7.1  --  5.8*  --    LABALBU 2.5*   < > 2.0* 1.9*   BILIDIR  --   --  <0.08  --    IBILI  --   --  Can not be calculated  --    BILITOT 0.36  --  0.16*  --    ALKPHOS 117*  --  99  --    ALT 18  --  12  --    AST 17  --  14  --     < > = values in this interval not displayed. Lab Results   Component Value Date    PROCAL 0.07 02/11/2020     Lab Results   Component Value Date    CRP 52.0 02/11/2020     Lab Results   Component Value Date    SEDRATE 91 (H) 02/11/2020         No results found for: DDIMER  Lab Results   Component Value Date    FERRITIN 42 01/23/2021     No results found for: LDH  No results found for: FIBRINOGEN    Results in Past 30 Days  Result Component Current Result Ref Range Previous Result Ref Range   SARS-CoV-2, Rapid Not Detected (4/20/2022) Not Detected Not in Time Range      Lab Results   Component Value Date    COVID19 Not Detected 04/20/2022    COVID19 Not Detected 01/12/2022    COVID19 Not Detected 01/03/2022       No results for input(s): VANCOTROUGH in the last 72 hours. Imaging Studies:   US guided drainage  Impression:        Successful ultrasound-guided placement 12 Hungarian drain in perihepatic   abscess.  Sample sent for culture and sensitivity.  550 mL of green   foul-smelling purulent material was aspirated. Cultures:     Culture, Blood 2 [[id number]] Collected: 04/21/22 0215   Order Status: Completed Specimen: Blood Updated: 04/22/22 0230    Specimen Description . BLOOD    Special Requests LEFT AC 10ML    Culture NO GROWTH 1 DAY   Culture, Blood 1 [[id number]] Collected: 04/21/22 0216   Order Status: Completed Specimen: Blood Updated: 04/22/22 0229    Specimen Description . BLOOD    Special Requests RIGHT FOREARM 20ML    Culture NO GROWTH 1 DAY Culture, Urine [[id number]] Collected: 04/21/22 0345   Order Status: Completed Specimen: Urine, clean catch Updated: 04/21/22 2150    Specimen Description . CLEAN CATCH URINE    Culture Candida albicans/dubliniensis 50 to 100,000 CFU/ML   Culture, Anaerobic and Aerobic [[id number]] (Abnormal) Collected: 04/21/22 1342   Order Status: Completed Specimen: Abscess Updated: 04/21/22 1549    Specimen Description . ABSCESS . ASPIRATE    Direct Exam MODERATE NEUTROPHILS Abnormal      MODERATE GRAM POSITIVE COCCI IN CLUSTERS Abnormal     Culture PENDING   Culture, Anaerobic and Aerobic [[id number]] (Abnormal) Collected: 04/21/22 1340   Order Status: Completed Specimen: Abscess Updated: 04/21/22 1548    Specimen Description . ABSCESS . ASPIRATE    Direct Exam FEW NEUTROPHILS Abnormal      MODERATE GRAM POSITIVE COCCI IN CLUSTERS Abnormal     Culture PENDING   Culture, Anaerobic and Aerobic [[id number]] (Abnormal) Collected: 04/21/22 1337   Order Status: Completed Specimen: Abscess Updated: 04/21/22 1529    Specimen Description . ABSCESS . ASPIRATE    Direct Exam MANY NEUTROPHILS Abnormal      MODERATE GRAM POSITIVE COCCI IN PAIRS Abnormal      FEW GRAM POSITIVE RODS Abnormal     Culture PENDING       Medications:      fluconazole  400 mg IntraVENous Q24H    famotidine (PEPCID) injection  20 mg IntraVENous BID    metoprolol tartrate  25 mg Oral BID    oxybutynin  15 mg Oral Daily    potassium chloride  20 mEq Oral QPM    sodium chloride flush  5-40 mL IntraVENous 2 times per day    vancomycin (VANCOCIN) intermittent dosing (placeholder)   Other RX Placeholder    vancomycin  1,250 mg IntraVENous Q24H    piperacillin-tazobactam  3,375 mg IntraVENous q8h    sodium chloride flush  10 mL IntraCATHeter BID         Denis Mohan MD  PGY-1, Internal Medicine Resident      I have discussed the care of the patient, including pertinent history and exam findings,  with the resident.  I have seen and examined the patient and the key elements of all parts of the encounter have been performed by me. I agree with the assessment, plan and orders as documented by the resident.     Kyung Olivo Hiss, [address] [facility]

## 2022-04-24 LAB
ALBUMIN SERPL-MCNC: 1.9 G/DL (ref 3.5–5.2)
ANION GAP SERPL CALCULATED.3IONS-SCNC: 8 MMOL/L (ref 9–17)
BUN BLDV-MCNC: 16 MG/DL (ref 8–23)
CALCIUM SERPL-MCNC: 7.8 MG/DL (ref 8.6–10.4)
CHLORIDE BLD-SCNC: 107 MMOL/L (ref 98–107)
CO2: 24 MMOL/L (ref 20–31)
CREAT SERPL-MCNC: 0.9 MG/DL (ref 0.5–0.9)
CULTURE: ABNORMAL
DIRECT EXAM: ABNORMAL
GFR AFRICAN AMERICAN: >60 ML/MIN
GFR NON-AFRICAN AMERICAN: >60 ML/MIN
GFR SERPL CREATININE-BSD FRML MDRD: ABNORMAL ML/MIN/{1.73_M2}
GLUCOSE BLD-MCNC: 118 MG/DL (ref 70–99)
HCT VFR BLD CALC: 25.2 % (ref 36.3–47.1)
HCT VFR BLD CALC: 25.7 % (ref 36.3–47.1)
HEMOGLOBIN: 7.6 G/DL (ref 11.9–15.1)
HEMOGLOBIN: 7.8 G/DL (ref 11.9–15.1)
MAGNESIUM: 2.2 MG/DL (ref 1.6–2.6)
MCH RBC QN AUTO: 27.9 PG (ref 25.2–33.5)
MCH RBC QN AUTO: 28 PG (ref 25.2–33.5)
MCHC RBC AUTO-ENTMCNC: 30.2 G/DL (ref 28.4–34.8)
MCHC RBC AUTO-ENTMCNC: 30.4 G/DL (ref 28.4–34.8)
MCV RBC AUTO: 92.1 FL (ref 82.6–102.9)
MCV RBC AUTO: 92.6 FL (ref 82.6–102.9)
NRBC AUTOMATED: 0 PER 100 WBC
NRBC AUTOMATED: 0 PER 100 WBC
PDW BLD-RTO: 15.9 % (ref 11.8–14.4)
PDW BLD-RTO: 15.9 % (ref 11.8–14.4)
PHOSPHORUS: 3.3 MG/DL (ref 2.6–4.5)
PLATELET # BLD: 542 K/UL (ref 138–453)
PLATELET # BLD: 549 K/UL (ref 138–453)
PMV BLD AUTO: 8.9 FL (ref 8.1–13.5)
PMV BLD AUTO: 9.3 FL (ref 8.1–13.5)
POTASSIUM SERPL-SCNC: 4.6 MMOL/L (ref 3.7–5.3)
PRO-BNP: 3975 PG/ML
RBC # BLD: 2.72 M/UL (ref 3.95–5.11)
RBC # BLD: 2.79 M/UL (ref 3.95–5.11)
SODIUM BLD-SCNC: 139 MMOL/L (ref 135–144)
SPECIMEN DESCRIPTION: ABNORMAL
WBC # BLD: 11.2 K/UL (ref 3.5–11.3)
WBC # BLD: 12.6 K/UL (ref 3.5–11.3)

## 2022-04-24 PROCEDURE — 2500000003 HC RX 250 WO HCPCS: Performed by: STUDENT IN AN ORGANIZED HEALTH CARE EDUCATION/TRAINING PROGRAM

## 2022-04-24 PROCEDURE — 83735 ASSAY OF MAGNESIUM: CPT

## 2022-04-24 PROCEDURE — 6360000002 HC RX W HCPCS: Performed by: STUDENT IN AN ORGANIZED HEALTH CARE EDUCATION/TRAINING PROGRAM

## 2022-04-24 PROCEDURE — 6370000000 HC RX 637 (ALT 250 FOR IP): Performed by: STUDENT IN AN ORGANIZED HEALTH CARE EDUCATION/TRAINING PROGRAM

## 2022-04-24 PROCEDURE — 80069 RENAL FUNCTION PANEL: CPT

## 2022-04-24 PROCEDURE — 2580000003 HC RX 258: Performed by: STUDENT IN AN ORGANIZED HEALTH CARE EDUCATION/TRAINING PROGRAM

## 2022-04-24 PROCEDURE — 99232 SBSQ HOSP IP/OBS MODERATE 35: CPT | Performed by: INTERNAL MEDICINE

## 2022-04-24 PROCEDURE — 36415 COLL VENOUS BLD VENIPUNCTURE: CPT

## 2022-04-24 PROCEDURE — 83880 ASSAY OF NATRIURETIC PEPTIDE: CPT

## 2022-04-24 PROCEDURE — 2060000000 HC ICU INTERMEDIATE R&B

## 2022-04-24 PROCEDURE — 6360000002 HC RX W HCPCS: Performed by: INTERNAL MEDICINE

## 2022-04-24 PROCEDURE — 85027 COMPLETE CBC AUTOMATED: CPT

## 2022-04-24 RX ORDER — FUROSEMIDE 10 MG/ML
40 INJECTION INTRAMUSCULAR; INTRAVENOUS DAILY
Status: DISCONTINUED | OUTPATIENT
Start: 2022-04-24 | End: 2022-04-25

## 2022-04-24 RX ADMIN — VANCOMYCIN HYDROCHLORIDE 1250 MG: 10 INJECTION, POWDER, LYOPHILIZED, FOR SOLUTION INTRAVENOUS at 22:43

## 2022-04-24 RX ADMIN — SODIUM CHLORIDE, PRESERVATIVE FREE 20 MG: 5 INJECTION INTRAVENOUS at 20:02

## 2022-04-24 RX ADMIN — PIPERACILLIN AND TAZOBACTAM 3375 MG: 3; .375 INJECTION, POWDER, FOR SOLUTION INTRAVENOUS at 04:05

## 2022-04-24 RX ADMIN — METOPROLOL TARTRATE 25 MG: 25 TABLET ORAL at 08:18

## 2022-04-24 RX ADMIN — SODIUM CHLORIDE, PRESERVATIVE FREE 20 MG: 5 INJECTION INTRAVENOUS at 08:18

## 2022-04-24 RX ADMIN — PIPERACILLIN AND TAZOBACTAM 3375 MG: 3; .375 INJECTION, POWDER, FOR SOLUTION INTRAVENOUS at 10:51

## 2022-04-24 RX ADMIN — FLUCONAZOLE 400 MG: 2 INJECTION, SOLUTION INTRAVENOUS at 08:20

## 2022-04-24 RX ADMIN — SODIUM CHLORIDE, PRESERVATIVE FREE 10 ML: 5 INJECTION INTRAVENOUS at 08:18

## 2022-04-24 RX ADMIN — SODIUM CHLORIDE, PRESERVATIVE FREE 10 ML: 5 INJECTION INTRAVENOUS at 20:02

## 2022-04-24 RX ADMIN — POTASSIUM CHLORIDE 20 MEQ: 1500 TABLET, EXTENDED RELEASE ORAL at 17:51

## 2022-04-24 RX ADMIN — METOPROLOL TARTRATE 25 MG: 25 TABLET ORAL at 20:02

## 2022-04-24 RX ADMIN — FUROSEMIDE 40 MG: 10 INJECTION, SOLUTION INTRAMUSCULAR; INTRAVENOUS at 10:45

## 2022-04-24 RX ADMIN — OXYBUTYNIN CHLORIDE 15 MG: 10 TABLET, EXTENDED RELEASE ORAL at 08:18

## 2022-04-24 RX ADMIN — PIPERACILLIN AND TAZOBACTAM 3375 MG: 3; .375 INJECTION, POWDER, FOR SOLUTION INTRAVENOUS at 17:54

## 2022-04-24 ASSESSMENT — ENCOUNTER SYMPTOMS
COLOR CHANGE: 0
APNEA: 0
EYE DISCHARGE: 0
BACK PAIN: 1
ABDOMINAL DISTENTION: 0
COUGH: 0
CHOKING: 0

## 2022-04-24 NOTE — PROGRESS NOTES
[facility]  Office: [address], DO, Colleen Simple, DO, Saeid Dorcas, DO, Kassidy Fernando Blood, DO, Dina Palemr MD, Jannette Doan MD, Livier Dailey MD, Sharlene aCi MD, Nicho Burton MD, Jose Daniel Hudson MD, Chacho Muñiz MD, Imani Diaz, DO, Alexandrea Beaulieu, DO, Ky Tavarez MD,  Tori Stephens, DO, Veronica Doherty MD, James Hall MD, Negar Doran MD, Antony Unger DO, Johann Tran MD, Fabby Salamanca MD, Magnolia Andrea, [facility], [facility], [facility], Rigo Jhaveri CNP, Lula Xavier CNP, Sebastian Zhou, CNP, Thien Gaspar, CNP, Marichuy Tam PA-C, Heydi Interiano, [facility], Jeff Farah, CNP, Buzz Vega CNP, Raman Abernathy CNP, Ann Puri, CNS, Kendra Connor, [facility], Judy Zuniga, CNP, Anlsey Lindsay, CNP, Javid Ayala, [facility] [address]    Progress Note    4/24/2022    2:05 PM    Name:   Sharyle Drew  MRN:     [id number]     [place]:      [de-identified]   Room:   [facility]7634-50   Day:  3  Admit Date:  4/21/2022  1:28 AM    PCP:   DONG Chan CNP  Code Status:  Full Code    Subjective:     C/C:   Chief Complaint   Patient presents with    Abdominal Pain     perf bowel     Interval History Status: not changed. Patient seen and examined, no complaints sitting in chair. Discussed discharge planning. No new complaints    Brief History:     49-year-old female transferred from outside hospital for bowel perforation with multiple intra-abdominal abscesses. Patient initially was treated on vancomycin and Zosyn with elevated lactic acid and was given sepsis bolus of IV fluids. Patient was seen by multiple consultants including trauma surgery, general surgery and infectious disease and urology. Patient was recommended placement of multiple pigtail catheters for perihepatic and perinephric abscesses.   Patient also had a successful placement of a pelvic abscess, patient the following day was seen by urology recommending replacement of right-sided stent and cystoscopy and right retrograde pyelogram.      Review of Systems:     Constitutional:  negative for chills, fevers, sweats  Respiratory:  negative for cough, dyspnea on exertion, shortness of breath, wheezing  Cardiovascular:  negative for chest pain, chest pressure/discomfort, lower extremity edema, palpitations  Gastrointestinal:  negative for abdominal pain, constipation, diarrhea, nausea, vomiting  Neurological:  negative for dizziness, headache    Medications: Allergies:     Allergies   Allergen Reactions    Estrogens Other (See Comments)     Hx of DVT       Current Meds:   Scheduled Meds:    furosemide  40 mg IntraVENous Daily    lidocaine 1 % injection  5 mL IntraDERmal Once    sodium chloride flush  5-40 mL IntraVENous 2 times per day    fluconazole  400 mg IntraVENous Q24H    famotidine (PEPCID) injection  20 mg IntraVENous BID    metoprolol tartrate  25 mg Oral BID    oxybutynin  15 mg Oral Daily    potassium chloride  20 mEq Oral QPM    sodium chloride flush  5-40 mL IntraVENous 2 times per day    vancomycin (VANCOCIN) intermittent dosing (placeholder)   Other RX Placeholder    vancomycin  1,250 mg IntraVENous Q24H    piperacillin-tazobactam  3,375 mg IntraVENous q8h    sodium chloride flush  10 mL IntraCATHeter BID     Continuous Infusions:    sodium chloride      sodium chloride       PRN Meds: sodium chloride flush, sodium chloride, morphine **OR** morphine, sodium chloride flush, sodium chloride, potassium chloride **OR** potassium alternative oral replacement **OR** potassium chloride, magnesium sulfate, ondansetron **OR** ondansetron, polyethylene glycol, acetaminophen **OR** acetaminophen, benzocaine-menthol    Data:     Past Medical History:   has a past medical history of Carcinoma ([facility] [address] Cellulitis, DVT (deep venous thrombosis) ([facility] [address] Kidney stone, Lymphedema, Morbid obesity ([facility] [address] Psoas abscess, right ([facility] [address] Pyelonephritis, and Wears glasses. Social History:   reports that she has been smoking cigarettes. She has a 21.00 pack-year smoking history. She has never used smokeless tobacco. She reports that she does not drink alcohol and does not use drugs. Family History:   Family History   Adopted: Yes   Problem Relation Age of Onset    Cancer Mother         The patient reports her biologic mother did have bladder cancer       Vitals:  /67   Pulse 60   Temp 97.4 °F (36.3 °C) (Oral)   Resp 20   Ht 5' 1\" (1.549 m)   Wt 272 lb 0.8 oz (123.4 kg)   LMP  (LMP Unknown)   SpO2 100%   BMI 51.40 kg/m²   Temp (24hrs), Av °F (36.7 °C), Min:97.4 °F (36.3 °C), Max:98.5 °F (36.9 °C)    No results for input(s): POCGLU in the last 72 hours. I/O (24Hr): Intake/Output Summary (Last 24 hours) at 2022 1405  Last data filed at 2022 2359  Gross per 24 hour   Intake 822.7 ml   Output 710 ml   Net 112.7 ml       Labs:  Hematology:  Recent Labs     22  0222 22  1856 22  0559 22  1751 22  0425   WBC 12.8*   < > 13.3* 12.6* 11.2   RBC 2.66*   < > 2.55* 2.83* 2.72*   HGB 7.4*   < > 7.1* 7.9* 7.6*   HCT 24.9*   < > 23.8* 26.2* 25.2*   MCV 93.6   < > 93.3 92.6 92.6   MCH 27.8   < > 27.8 27.9 27.9   MCHC 29.7   < > 29.8 30.2 30.2   RDW 16.0*   < > 16.0* 15.9* 15.9*   *   < > 513* 556* 542*   MPV 9.3   < > 9.1 9.0 9.3   INR 1.1  --   --   --   --     < > = values in this interval not displayed.      Chemistry:  Recent Labs     22  1418 22  0222 22  0559 22  0425   NA  --  140 139 139   K  --  4.0 4.4 4.6   CL  --  106 106 107   CO2  --  25 25 24   GLUCOSE  --  119* 137* 118*   BUN  --  16 17 16   CREATININE  --  0.81 0.86 0.90   MG  --   --  2.2 2.2   ANIONGAP  --  9 8* 8*   LABGLOM  --  >60 >60 >60   GFRAA  --  >60 >60 >60   CALCIUM  --  7.8* 7.7* 7.8*   CAION  --  1.17  --   --    PHOS  --   --  3.5 3.3   PROBNP  --   --   --  3,975*   LACTACIDWB 1.4  -- --   --      Recent Labs     04/22/22  0222 04/23/22  0559 04/24/22  0425   PROT 5.8*  --   --    LABALBU 2.0* 1.9* 1.9*   AST 14  --   --    ALT 12  --   --    ALKPHOS 99  --   --    BILITOT 0.16*  --   --    BILIDIR <0.08  --   --      ABG:  Lab Results   Component Value Date    PHART 7.466 07/19/2018    CNF3DSY 35.5 07/19/2018    PO2ART 72.5 07/19/2018    QSC7RWD 25.0 07/19/2018    NBEA NOT REPORTED 07/19/2018    PBEA 1.7 07/19/2018    E8TAGHGC 95.5 07/19/2018    FIO2 21 07/19/2018     Lab Results   Component Value Date/Time    SPECIAL RIGHT FOREARM 20ML 04/21/2022 02:16 AM     Lab Results   Component Value Date/Time    CULTURE CULTURE IN PROGRESS 04/21/2022 01:42 PM    CULTURE ENTEROCOCCUS FAECIUM MODERATE GROWTH (A) 04/21/2022 01:42 PM    CULTURE MIXED ANAEROBIC NAGI 04/21/2022 01:42 PM       Radiology:  CT ABDOMEN PELVIS W IV CONTRAST Additional Contrast? Oral    Result Date: 4/21/2022  1. Multiple intra-abdominal and pelvic fluid collections which have the appearance of abscess formation. In the subcapsular area in the liver extending into the subhepatic area a large abscess noted measuring 10 x 13.7 cm. A second more posterior collection measures 8.3 x 3.2 cm. Two dominant pelvic collections are noted, one measuring 10 x 6 cm and the second posteriorly 7 x 6.3 cm. The anterior collection is larger compared to the previous evaluation. These likely abscesses contain air in them. 2. A small amount of free air is noted scattered in the abdomen and pelvis. Psoas retroperitoneal abscess extends ton the posterior soft tissues. 3. Right renal atrophy. A subcapsular air containing collection measures 5 x 3.9 cm also likely an abscess. 4. Partly loculated right pleural effusion and right lower lobe atelectatic changes. Findings discussed with Dr Kolton Perez 04/21/2022 00:10 a.m. RECOMMENDATIONS: Percutaneous drainage of multiple abscesses.      CT ABDOMEN PELVIS W IV CONTRAST Additional Contrast? 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 New bladder prolapse with residual dependent stones in the bladder. Intraluminal gas in the bladder may be due to infection or recent instrumentation. Increased size of the previous right retroperitoneal abscess tracking along the psoas muscle (6.7 x 2.6 x 10.2 cm) which previously contained a percutaneous drainage catheter. There are abscesses tracking along the prior course of the drainage catheter through the right quadratus lumborum muscle and along the right posterior paraspinal musculature. Multifocal rim enhancing mixed gas and fluid collections worrisome for abscesses with the dominant collections in the perihepatic region spanning up to 15 cm and within the pelvic cul-de-sac spanning up to 7 cm. Free air along the mesentery in addition to the aforementioned fluid collections. As there are some thickened loops of small bowel in the lower abdomen and pelvis, a concomitant bowel perforation is not able be excluded on this exam, with differential considerations including ischemic bowel. 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 Loculated right pleural effusion characterized to advantage on today's chest CT. Critical results were called by Dr. Sanket Pelaez to Dr. Riley Gipson on 4/20/2022 at 22:21 EST. XR CHEST PORTABLE    Result Date: 4/20/2022  Mass like infiltrate in the right upper lobe suggesting pneumonia or neoplasm. Underlying pulmonary vascular congestion RECOMMENDATION: Follow-up CT would be helpful.      CT CHEST PULMONARY EMBOLISM W CONTRAST    Result Date: 4/20/2022  Moderately large right pleural effusion with areas of loculation accounting for the pseudotumor seen on chest x-ray. . Mild bibasal atelectasis more notably on the right. .. Coronary artery/atherosclerotic disease No obvious evidence of pulmonary embolism. Mild subcutaneous emphysema along the right posterior chest wall. Perihepatic fluid. Ectopic air seen in the abdomen. Please refer to abdominal CT report RECOMMENDATIONS: No additional routine follow-up for incidental abdominal lesions. CT ABSCESS DRAINAGE    Result Date: 4/21/2022  Successful CT guided placement of right Lisbeth nephric and deep pelvic abscess drainage catheters. IR ABSCESS DRAINAGE PERC    Result Date: 4/21/2022  Successful ultrasound-guided placement 12 Georgian drain in perihepatic abscess. Sample sent for culture and sensitivity. 550 mL of green foul-smelling purulent material was aspirated.        Physical Examination:        General appearance:  alert, cooperative and no distress  Mental Status:  oriented to person, place and time and normal affect  Lungs:  clear to auscultation bilaterally, normal effort  Heart:  regular rate and rhythm, no murmur  Abdomen:  soft, nontender, nondistended, normal bowel sounds, no masses, hepatomegaly, splenomegaly, multiple abdominal drainage tubes  Extremities:  no edema, redness, tenderness in the calves  Skin: Chronic skin changes bilateral lower extremities    Assessment:        Hospital Problems           Last Modified POA    * (Principal) Intra-abdominal abscess ([address] 4/21/2022 Yes    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    Obesity, Class III, BMI 40-49.9 (morbid obesity) ([address] 4/21/2022 Yes    Hypocalcemia 4/21/2022 Yes    Hypokalemia 4/21/2022 Yes    Vertebral osteomyelitis (HCC) T12-L1 4/21/2022 Yes    Pleural effusion on right 4/21/2022 Yes    CRP elevated 4/23/2022 Yes    Long term current use of anticoagulant therapy 4/21/2022 Yes    Lymphedema of both lower extremities 4/21/2022 Yes    Tobacco abuse 4/21/2022 Yes    History of recurrent deep vein thrombosis (DVT) 0/06/7841 Yes    Complicated UTI (urinary tract infection) 4/21/2022 Yes    Renal calculus 4/21/2022 Yes    Normocytic anemia 4/21/2022 Yes    Renal abscess, right 4/21/2022 Yes    Psoas muscle abscess ([address] 4/21/2022 Yes    Ureteral calculus 4/21/2022 Yes          Plan:        Monitor drainage, removal per urology/trauma surgery  Continue broad-spectrum antibiotics, cleared by infectious disease for PICC line placement  Discharge planning possibly Monday once definitive plan on antibiotics and drainage are completed    Louise Armstrong DO  4/24/2022  2:05 PM

## 2022-04-24 NOTE — PROGRESS NOTES
Discussed with nurse and case management. Patient needs precert to facility still and will need antibiotics. Final culture has not come back yet. Will wait for final antibiotic decision as to decide midline versus single lumen picc versus double lumen picc. Patient currently on diflucan, vancomycin and zosyn. Bedside RN agreed with plan and new PIV placed until ID makes final decision on antibiotic choices. Continue to monitor.

## 2022-04-24 NOTE — PROGRESS NOTES
PROGRESS NOTE      PATIENT NAME: [address] [place] [address] RECORD NO. [id number]  DATE: 4/24/2022  SURGEON: Dr. Bert Dennis: Louie Sandoval, APRN - CNP    HD: # 3    ASSESSMENT    Patient Active Problem List   Diagnosis    Acute thromboembolism of deep veins of lower extremity ([address]    Long term current use of anticoagulant therapy    Bilateral cellulitis of lower leg    Acute shoulder pain due to trauma    Lymphedema of both lower extremities    Tobacco abuse    History of recurrent deep vein thrombosis (DVT)    Gross hematuria    Frequency of urination    Nocturia    Mixed incontinence    Constipation    Cellulitis of lower leg    Post-phlebitic syndrome    Elephantiasis nostra verrucosa    Cellulitis of left lower extremity    Complicated UTI (urinary tract infection)    Renal calculus    Cellulitis    Normocytic anemia    Iron deficiency anemia    Renal abscess, right    Bacteremia due to Klebsiella pneumoniae    Psoas muscle abscess (HCC)    Septicemia due to Klebsiella pneumoniae (HCC)    Ureteral calculus    Intra-abdominal abscess (HCC)    Obesity, Class III, BMI 40-49.9 (morbid obesity) (HCC)    Hypocalcemia    Hypokalemia    Vertebral osteomyelitis (HCC) T12-L1    Pleural effusion on right    CRP elevated       MEDICAL DECISION MAKING AND PLAN    Perinephric and perihepatic abscesses   S/p IR drainage 4/21   Posterior buttock LAURIE - 25cc/24h   Right lateral abd drain - 45cc /24h   Right posterior drain - 40cc/24h   WBC trending down   OK for diet as tolerated    Right emphysematous pyelitis   Urology following - s/p cysto and stent placement on R    ID following   On zosyn, vanco, diflucan    DISPO: Continue medical management per primary. No surgical intervention from general surgery perspective. If patient's clinical picture worsens, may consider CT abdomen/pelvis to evaluate fluid collections. General surgery to sign off at this time.   Call with any questions. SUBJECTIVE    Vianne Ax seen and examined at bedside. No overnight events. Patient reports pain is well controlled and much improved. Tolerating regular diet. UOP through urinary catheter. VSS, afebrile. OBJECTIVE  VITALS: Temp: Temp: 97.4 °F (36.3 °C)Temp  Av °F (36.7 °C)  Min: 97.4 °F (36.3 °C)  Max: 98.5 °F (62.6 °C) BP Systolic (84ZMN), RHX:009 , Min:124 , VNP:867   Diastolic (70VRS), IHV:50, Min:65, Max:82   Pulse Pulse  Av.3  Min: 62  Max: 64 Resp Resp  Av.3  Min: 19  Max: 29 Pulse ox SpO2  Av %  Min: 98 %  Max: 100 %       CONSTITUTIONAL: awake, alert, cooperative, no apparent distress  HEAD: atraumatic, normocephalic  EYES: sclera clear, pupils equal and reactive to light  ENT: ears are symmetric, nares patent   HEENT: moist mucous membranes  NECK: Supple, symmetrical, trachea midline  LUNGS: no respiratory distress  CARDIOVASCULAR: RRR  ABDOMEN: soft, nondistended, minimally ttp at RUQ nd suprapubic region, LAURIE drains with minimal purulent output  NEUROLOGIC: Awake, alert, oriented to name, place and time  EXTREMITIES: peripheral pulses normal  SKIN: normal coloration and turgor    I/O last 3 completed shifts: In: 822.7 [P.O.:240; I.V.:582.7]  Out: 930 [Urine:800; Drains:130]    Drain/tube output: In: 822.7 [P.O.:240; I.V.:582.7]  Out: 710 [Urine:600; Drains:110]    LAB:  CBC:   Recent Labs     22  0559 22  1751 22  0425   WBC 13.3* 12.6* 11.2   HGB 7.1* 7.9* 7.6*   HCT 23.8* 26.2* 25.2*   MCV 93.3 92.6 92.6   * 556* 542*     BMP:   Recent Labs     22  0222 22  0559 22  0425    139 139   K 4.0 4.4 4.6    106 107   CO2 25 25 24   BUN 16 17 16   CREATININE 0.81 0.86 0.90   GLUCOSE 119* 137* 118*     COAGS:   Recent Labs     22   PROT 5.8*   INR 1.1       Radiology:  No results found.       Joseph Montez DO  2022  12:36 PM         Attending Note      I have reviewed the above TECSS note(s) and I either performed the key elements of the medical history and physical exam or was present with the resident when the key elements of the medical history and physical exam were performed. I have discussed the findings, established the care plan and recommendations with Resident, WILLIAM RN, bedside nurse.     Jose Torres MD  4/24/2022  12:49 PM

## 2022-04-24 NOTE — PROGRESS NOTES
[facility]  Progress Note    Afshin Alicea  MRN: [id number]  Date: 4/24/2022  LOS: 3     Reason for F/U :   Abdominal abscess, vertebral osteomyelitis, pyelonephritis/pyelitis    Impression :   1. Multiple perihepatic abscesses, retroperitoneal abscess on the right psoas, and abscess in pelvic cul-de-sac  2. 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 Free air in the abdomen,  4. Osteomyelitis at T12-L1 due to direct extension from abscess over psoas muscle  5. bandemia  6. History of complicated UTI in January 2022, positive for Klebsiella pneumoniae  7. History of septicemia in January 2022, positive for Klebsiella pneumoniae  8. History of right psoas abscess drained in January 2022  9. History of right ureteral and renal stones with ureteral stent placement in March 2022 with stent exchanged in April 2020    Recommendations:   · Patient continues on IV antimicrobial therapy with Zosyn and vancomycin - adjust per final cxs  · She is status post IR drainage of the pelvic and liver abscesses and is also status post nephrostomy tube placement by interventional radiology - all cx are still pend, unfortunately they were not labeled to the exact specimen  · She will likely need long-term IV antimicrobial coverage with follow-up imaging to ensure resolution of infection  · picc     Infection Control Recommendations:   Universal precautions    Discharge Planning:   Estimated Length of IV antimicrobials:  To be determined  Patient will need Midline Catheter Insertion/ PICC line Insertion: No  Patient will need: Home IV , [facility],  SNF,  LTAC: Undetermined  Patient willneed outpatient wound care: No    Medical Decision making / Summary of Stay:   Afshin Alicea is a 74y.o.-year-old female presenting as a transfer from an outside facility due to conern for bowel perforation and multiple intra-abdominal abscesses. Niko vincent was previously hospitalized in January 2022 with Klebsiella pneumoniae sepsis related to a perinephric abscess. She did have right-sided severely obstructive pyelonephritis for which she underwent stent placement as well as a right percutaneous nephrostomy tube placement. This infection was complicated by perinephric/psoas abscess which was aspirated and this drain was placed. Patient was seen by my partner Dr. Joel Jackson and was discharged on intravenous antimicrobial therapy with Rocephin 2 g daily through February 2, 2022  The patient on 3/1/2022 underwent a cystoscopy with right-sided ureteroscopy with holmium laser lithotripsy and right-sided ureteral stent placement  The patient underwent a second urological procedure on 4/5/2022 with a cystoscopy, right-sided ureteroscopy, right holmium laser lithotripsy and right ureteral stent exchange and the patient was found to have a copious amount of calculi which were crushed up and further ablated.     The patient reports that ever since she had the second urological procedure she has had generalized malaise/fatigue and more recently progressing abdominal pain over the last several days. Initial lactic acid was 4.4 and treated with fluid bolus.  Initial potassium was 2.8, now 3.6 after replacement.     A CT scan of the abdomen and pelvis 4/20/2022 showed gas within both the right renal parenchyma and proximal right collecting system and increased size of the previous right retroperitoneal abscess tracking along the psoas muscle and there is abscess tracking along the posterior course of the drainage catheter through the right quadratus lumbar muscle and along the right posterior paraspinal musculature  There are multifocal rim-enhancing mixed Greg fluid collections worrisome for abscess within the dominant collections of the perihepatic region spanning up to 15 cm within the pelvic cul-de-sac spanning up to 7 cm. There is free air along the mesentery in addition to the a forementioned fluid collections. Discitis/osteomyelitis at T12-L1 with direct extension from the adjacent right psoas inflammatory change. Loculated right pleural effusion     The patient has been admitted and started on broad-spectrum antimicrobial therapy and ultrasound-guided placement of 12 [place] drain in the perihepatic abscess with 550 mL of green following purulent material aspirated, and a CT-guided placement of right perinephric and deep pelvic abscess drainage catheters with 5 mL of purulent fluid removed from each collection sent for diagnostic tests. Current evaluation:2022    BP (!) 143/73   Pulse 58   Temp 97.9 °F (36.6 °C) (Oral)   Resp 20   Ht 5' 1\" (1.549 m)   Wt 272 lb 0.8 oz (123.4 kg)   LMP  (LMP Unknown)   SpO2 99%   BMI 51.40 kg/m²     Temperature Range: Temp: 97.9 °F (36.6 °C) Temp  Av.1 °F (36.7 °C)  Min: 97.6 °F (36.4 °C)  Max: 98.5 °F (36.9 °C)    No fever  Wbc better 12 and creat  0.7  abd soft and abd abscess cx are still pend, mixed bacteriae - clusters but no clear SA yet    Plan to do a repeat CT in the coming days.  urology performed cystoscopy with rt sided stent placement, and right retrograde pyelogram.      Review of Systems   Constitutional: Negative for chills. HENT: Negative for congestion. Eyes: Negative for discharge. Respiratory: Negative for apnea, cough and choking. Cardiovascular: Negative for chest pain. Gastrointestinal: Negative for abdominal distention. Endocrine: Negative for cold intolerance. Genitourinary: Negative for dysuria and flank pain. Musculoskeletal: Positive for arthralgias and back pain. Skin: Negative for color change. Allergic/Immunologic: Negative for environmental allergies.    Neurological: Negative for dizziness and headaches. Hematological: Negative for adenopathy. Psychiatric/Behavioral: Negative for agitation. Physical Examination :     Physical Exam  Constitutional:       General: She is not in acute distress. Appearance: Normal appearance. She is obese. She is not ill-appearing. HENT:      Head: Normocephalic and atraumatic. Nose: Nose normal.      Mouth/Throat:      Mouth: Mucous membranes are moist.   Eyes:      General: No scleral icterus. Conjunctiva/sclera: Conjunctivae normal.   Cardiovascular:      Rate and Rhythm: Normal rate and regular rhythm. Pulmonary:      Effort: Pulmonary effort is normal.      Breath sounds: Normal breath sounds. Abdominal:      General: Abdomen is flat. Bowel sounds are normal.      Palpations: Abdomen is soft. Comments: 2 right-sided LAURIE drains, 1 left-sided LAURIE drain   Genitourinary:     Comments: Urine amgalys  Musculoskeletal:         General: No tenderness. Normal range of motion. Cervical back: Neck supple. No rigidity or tenderness. Skin:     General: Skin is warm and dry. Coloration: Skin is not jaundiced or pale. Neurological:      General: No focal deficit present. Mental Status: She is alert and oriented to person, place, and time. Mental status is at baseline. Psychiatric:         Mood and Affect: Mood normal.         Behavior: Behavior normal.         Thought Content: Thought content normal.         Laboratory data:   I have independently reviewed the followinglabs:  CBC with Differential:   Recent Labs     04/22/22  0222 04/22/22  1856 04/23/22  1751 04/24/22  0425   WBC 12.8*   < > 12.6* 11.2   HGB 7.4*   < > 7.9* 7.6*   HCT 24.9*   < > 26.2* 25.2*   *   < > 556* 542*   LYMPHOPCT 7*  --   --   --    MONOPCT 10*  --   --   --     < > = values in this interval not displayed.      BMP:   Recent Labs     04/23/22  0559 04/24/22  0425    139   K 4.4 4.6    107   CO2 25 24   BUN 17 16   CREATININE 0.86 0.90   MG 2.2 2.2     Hepatic Function Panel:   Recent Labs     04/22/22 0222 04/22/22 0222 04/23/22  0559 04/24/22  0425   PROT 5.8*  --   --   --    LABALBU 2.0*   < > 1.9* 1.9*   BILIDIR <0.08  --   --   --    IBILI Can not be calculated  --   --   --    BILITOT 0.16*  --   --   --    ALKPHOS 99  --   --   --    ALT 12  --   --   --    AST 14  --   --   --     < > = values in this interval not displayed. Lab Results   Component Value Date    PROCAL 0.07 02/11/2020     Lab Results   Component Value Date    CRP 52.0 02/11/2020     Lab Results   Component Value Date    SEDRATE 91 (H) 02/11/2020         No results found for: DDIMER  Lab Results   Component Value Date    FERRITIN 42 01/23/2021     No results found for: LDH  No results found for: FIBRINOGEN    Results in Past 30 Days  Result Component Current Result Ref Range Previous Result Ref Range   SARS-CoV-2, Rapid Not Detected (4/20/2022) Not Detected Not in Time Range      Lab Results   Component Value Date    COVID19 Not Detected 04/20/2022    COVID19 Not Detected 01/12/2022    COVID19 Not Detected 01/03/2022       No results for input(s): VANCOTROUGH in the last 72 hours. Imaging Studies:   US guided drainage  Impression:        Successful ultrasound-guided placement 12 [place] drain in perihepatic   abscess.  Sample sent for culture and sensitivity.  550 mL of green   foul-smelling purulent material was aspirated. Cultures:     Culture, Blood 2 [[id number]] Collected: 04/21/22 0215   Order Status: Completed Specimen: Blood Updated: 04/22/22 0230    Specimen Description . BLOOD    Special Requests LEFT AC 10ML    Culture NO GROWTH 1 DAY   Culture, Blood 1 [[id number]] Collected: 04/21/22 0216   Order Status: Completed Specimen: Blood Updated: 04/22/22 0229    Specimen Description . BLOOD    Special Requests RIGHT FOREARM 20ML    Culture NO GROWTH 1 DAY   Culture, Urine [[id number]] Collected: 04/21/22 5742   Order Status: Completed Specimen: Urine, clean catch Updated: 04/21/22 2150    Specimen Description . CLEAN CATCH URINE    Culture Candida albicans/dubliniensis 50 to 100,000 CFU/ML   Culture, Anaerobic and Aerobic [[id number]] (Abnormal) Collected: 04/21/22 1342   Order Status: Completed Specimen: Abscess Updated: 04/21/22 1549    Specimen Description . ABSCESS . ASPIRATE    Direct Exam MODERATE NEUTROPHILS Abnormal      MODERATE GRAM POSITIVE COCCI IN CLUSTERS Abnormal     Culture PENDING   Culture, Anaerobic and Aerobic [[id number]] (Abnormal) Collected: 04/21/22 1340   Order Status: Completed Specimen: Abscess Updated: 04/21/22 1548    Specimen Description . ABSCESS . ASPIRATE    Direct Exam FEW NEUTROPHILS Abnormal      MODERATE GRAM POSITIVE COCCI IN CLUSTERS Abnormal     Culture PENDING   Culture, Anaerobic and Aerobic [[id number]] (Abnormal) Collected: 04/21/22 1337   Order Status: Completed Specimen: Abscess Updated: 04/21/22 1529    Specimen Description . ABSCESS . ASPIRATE    Direct Exam MANY NEUTROPHILS Abnormal      MODERATE GRAM POSITIVE COCCI IN PAIRS Abnormal      FEW GRAM POSITIVE RODS Abnormal     Culture PENDING       Medications:      furosemide  40 mg IntraVENous Daily    lidocaine 1 % injection  5 mL IntraDERmal Once    sodium chloride flush  5-40 mL IntraVENous 2 times per day    fluconazole  400 mg IntraVENous Q24H    famotidine (PEPCID) injection  20 mg IntraVENous BID    metoprolol tartrate  25 mg Oral BID    oxybutynin  15 mg Oral Daily    potassium chloride  20 mEq Oral QPM    sodium chloride flush  5-40 mL IntraVENous 2 times per day    vancomycin (VANCOCIN) intermittent dosing (placeholder)   Other RX Placeholder    vancomycin  1,250 mg IntraVENous Q24H    piperacillin-tazobactam  3,375 mg IntraVENous q8h    sodium chloride flush  10 mL IntraCATHeter BID         Leo Magana MD  PGY-1, Internal Medicine Resident      I have discussed the care of the patient, including pertinent history and exam findings,  with the resident. I have seen and examined the patient and the key elements of all parts of the encounter have been performed by me. I agree with the assessment, plan and orders as documented by the resident.     Kyung Smith, [facility]

## 2022-04-24 NOTE — CARE COORDINATION
Transitional Planning    Received update that pt is requesting referral to [facility]. Referral sent.  D/c needs- IV abx, PT/OT

## 2022-04-24 NOTE — PLAN OF CARE

## 2022-04-25 ENCOUNTER — APPOINTMENT (OUTPATIENT)
Dept: CT IMAGING | Age: 67
DRG: 853 | End: 2022-04-25
Payer: MEDICARE

## 2022-04-25 LAB
ALBUMIN SERPL-MCNC: 2 G/DL (ref 3.5–5.2)
ANION GAP SERPL CALCULATED.3IONS-SCNC: 8 MMOL/L (ref 9–17)
BUN BLDV-MCNC: 13 MG/DL (ref 8–23)
CALCIUM SERPL-MCNC: 7.7 MG/DL (ref 8.6–10.4)
CHLORIDE BLD-SCNC: 105 MMOL/L (ref 98–107)
CO2: 25 MMOL/L (ref 20–31)
CREAT SERPL-MCNC: 0.85 MG/DL (ref 0.5–0.9)
CULTURE: ABNORMAL
DIRECT EXAM: ABNORMAL
DIRECT EXAM: ABNORMAL
GFR AFRICAN AMERICAN: >60 ML/MIN
GFR NON-AFRICAN AMERICAN: >60 ML/MIN
GFR SERPL CREATININE-BSD FRML MDRD: ABNORMAL ML/MIN/{1.73_M2}
GLUCOSE BLD-MCNC: 106 MG/DL (ref 70–99)
HCT VFR BLD CALC: 26.3 % (ref 36.3–47.1)
HCT VFR BLD CALC: 26.6 % (ref 36.3–47.1)
HEMOGLOBIN: 7.9 G/DL (ref 11.9–15.1)
HEMOGLOBIN: 7.9 G/DL (ref 11.9–15.1)
MAGNESIUM: 2.1 MG/DL (ref 1.6–2.6)
MCH RBC QN AUTO: 27.9 PG (ref 25.2–33.5)
MCH RBC QN AUTO: 27.9 PG (ref 25.2–33.5)
MCHC RBC AUTO-ENTMCNC: 29.7 G/DL (ref 28.4–34.8)
MCHC RBC AUTO-ENTMCNC: 30 G/DL (ref 28.4–34.8)
MCV RBC AUTO: 92.9 FL (ref 82.6–102.9)
MCV RBC AUTO: 94 FL (ref 82.6–102.9)
NRBC AUTOMATED: 0 PER 100 WBC
NRBC AUTOMATED: 0 PER 100 WBC
PDW BLD-RTO: 15.9 % (ref 11.8–14.4)
PDW BLD-RTO: 15.9 % (ref 11.8–14.4)
PHOSPHORUS: 3.1 MG/DL (ref 2.6–4.5)
PLATELET # BLD: 550 K/UL (ref 138–453)
PLATELET # BLD: 577 K/UL (ref 138–453)
PMV BLD AUTO: 9 FL (ref 8.1–13.5)
PMV BLD AUTO: 9.1 FL (ref 8.1–13.5)
POTASSIUM SERPL-SCNC: 4.1 MMOL/L (ref 3.7–5.3)
RBC # BLD: 2.83 M/UL (ref 3.95–5.11)
RBC # BLD: 2.83 M/UL (ref 3.95–5.11)
SODIUM BLD-SCNC: 138 MMOL/L (ref 135–144)
SPECIMEN DESCRIPTION: ABNORMAL
VANCOMYCIN RANDOM: 17.9 UG/ML
WBC # BLD: 11.3 K/UL (ref 3.5–11.3)
WBC # BLD: 12.3 K/UL (ref 3.5–11.3)

## 2022-04-25 PROCEDURE — 36415 COLL VENOUS BLD VENIPUNCTURE: CPT

## 2022-04-25 PROCEDURE — 80202 ASSAY OF VANCOMYCIN: CPT

## 2022-04-25 PROCEDURE — 97162 PT EVAL MOD COMPLEX 30 MIN: CPT

## 2022-04-25 PROCEDURE — 2060000000 HC ICU INTERMEDIATE R&B

## 2022-04-25 PROCEDURE — 2580000003 HC RX 258: Performed by: STUDENT IN AN ORGANIZED HEALTH CARE EDUCATION/TRAINING PROGRAM

## 2022-04-25 PROCEDURE — 6370000000 HC RX 637 (ALT 250 FOR IP): Performed by: STUDENT IN AN ORGANIZED HEALTH CARE EDUCATION/TRAINING PROGRAM

## 2022-04-25 PROCEDURE — 97166 OT EVAL MOD COMPLEX 45 MIN: CPT

## 2022-04-25 PROCEDURE — 6360000002 HC RX W HCPCS: Performed by: STUDENT IN AN ORGANIZED HEALTH CARE EDUCATION/TRAINING PROGRAM

## 2022-04-25 PROCEDURE — 99232 SBSQ HOSP IP/OBS MODERATE 35: CPT | Performed by: INTERNAL MEDICINE

## 2022-04-25 PROCEDURE — 97535 SELF CARE MNGMENT TRAINING: CPT

## 2022-04-25 PROCEDURE — 2500000003 HC RX 250 WO HCPCS: Performed by: STUDENT IN AN ORGANIZED HEALTH CARE EDUCATION/TRAINING PROGRAM

## 2022-04-25 PROCEDURE — 97530 THERAPEUTIC ACTIVITIES: CPT

## 2022-04-25 PROCEDURE — 83735 ASSAY OF MAGNESIUM: CPT

## 2022-04-25 PROCEDURE — 2580000003 HC RX 258: Performed by: INTERNAL MEDICINE

## 2022-04-25 PROCEDURE — 6360000002 HC RX W HCPCS: Performed by: INTERNAL MEDICINE

## 2022-04-25 PROCEDURE — 94761 N-INVAS EAR/PLS OXIMETRY MLT: CPT

## 2022-04-25 PROCEDURE — 2700000000 HC OXYGEN THERAPY PER DAY

## 2022-04-25 PROCEDURE — 85027 COMPLETE CBC AUTOMATED: CPT

## 2022-04-25 PROCEDURE — 74176 CT ABD & PELVIS W/O CONTRAST: CPT

## 2022-04-25 PROCEDURE — 80069 RENAL FUNCTION PANEL: CPT

## 2022-04-25 RX ORDER — FUROSEMIDE 10 MG/ML
40 INJECTION INTRAMUSCULAR; INTRAVENOUS 2 TIMES DAILY
Status: DISCONTINUED | OUTPATIENT
Start: 2022-04-25 | End: 2022-04-26

## 2022-04-25 RX ADMIN — POTASSIUM CHLORIDE 20 MEQ: 1500 TABLET, EXTENDED RELEASE ORAL at 17:15

## 2022-04-25 RX ADMIN — SODIUM CHLORIDE, PRESERVATIVE FREE 10 ML: 5 INJECTION INTRAVENOUS at 20:33

## 2022-04-25 RX ADMIN — OXYBUTYNIN CHLORIDE 15 MG: 10 TABLET, EXTENDED RELEASE ORAL at 08:29

## 2022-04-25 RX ADMIN — FUROSEMIDE 40 MG: 10 INJECTION, SOLUTION INTRAMUSCULAR; INTRAVENOUS at 09:01

## 2022-04-25 RX ADMIN — FLUCONAZOLE 400 MG: 2 INJECTION, SOLUTION INTRAVENOUS at 07:29

## 2022-04-25 RX ADMIN — SODIUM CHLORIDE, PRESERVATIVE FREE 20 MG: 5 INJECTION INTRAVENOUS at 08:28

## 2022-04-25 RX ADMIN — FUROSEMIDE 40 MG: 10 INJECTION, SOLUTION INTRAMUSCULAR; INTRAVENOUS at 17:15

## 2022-04-25 RX ADMIN — SODIUM CHLORIDE, PRESERVATIVE FREE 10 ML: 5 INJECTION INTRAVENOUS at 08:29

## 2022-04-25 RX ADMIN — METOPROLOL TARTRATE 25 MG: 25 TABLET ORAL at 20:20

## 2022-04-25 RX ADMIN — SODIUM CHLORIDE, PRESERVATIVE FREE 10 ML: 5 INJECTION INTRAVENOUS at 20:35

## 2022-04-25 RX ADMIN — METOPROLOL TARTRATE 25 MG: 25 TABLET ORAL at 08:30

## 2022-04-25 RX ADMIN — VANCOMYCIN HYDROCHLORIDE 1250 MG: 10 INJECTION, POWDER, LYOPHILIZED, FOR SOLUTION INTRAVENOUS at 22:47

## 2022-04-25 RX ADMIN — SODIUM CHLORIDE, PRESERVATIVE FREE 20 MG: 5 INJECTION INTRAVENOUS at 20:20

## 2022-04-25 RX ADMIN — PIPERACILLIN AND TAZOBACTAM 3375 MG: 3; .375 INJECTION, POWDER, FOR SOLUTION INTRAVENOUS at 02:58

## 2022-04-25 ASSESSMENT — ENCOUNTER SYMPTOMS
COLOR CHANGE: 0
CHOKING: 0
PHOTOPHOBIA: 0
ABDOMINAL DISTENTION: 0
APNEA: 0
EYE DISCHARGE: 0
BACK PAIN: 1
COUGH: 0

## 2022-04-25 ASSESSMENT — PAIN SCALES - GENERAL: PAINLEVEL_OUTOF10: 0

## 2022-04-25 NOTE — PROGRESS NOTES
[facility]  Progress Note    Sai Mabry  MRN: [id number]  Date: 4/25/2022  LOS: 4     Reason for F/U :   Abdominal abscess, vertebral osteomyelitis, pyelonephritis/pyelitis    Impression :   1. Multiple perihepatic abscesses, retroperitoneal abscess on the right psoas, and abscess in pelvic cul-de-sac  2. 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 Free air in the abdomen,  4. Osteomyelitis at T12-L1 due to direct extension from abscess over psoas muscle  5. bandemia  6. History of complicated UTI in January 2022, positive for Klebsiella pneumoniae  7. History of septicemia in January 2022, positive for Klebsiella pneumoniae  8. History of right psoas abscess drained in January 2022  9. History of right ureteral and renal stones with ureteral stent placement in March 2022 with stent exchanged in April 2020    Recommendations:   Per micro lab, no GNR and no anaerobes, no SA in the abd fluid 4/21/22  Only strep and enterococcus fecium R amp but S vanco      On another fluid cx 4/21 she had E fecium S amp and vanco     Plan:  Stop fluconazole used to treat UTI - it can not eradicate the yeast of the urine as long as she is on other AB. Stop zosyn and place a picc for vanco  Get a CT AP before DC to see if the drains need to be repositioned  Keep vanco and watch creat tiw - x 4 weeks and follow w zyvox 2 weeks then repeat CT AP to ck on resolution of the abscesses  picc line  reconsiled - disc w medicine    Infection Control Recommendations:   Universal precautions    Discharge Planning:   Estimated Length of IV antimicrobials:  To be determined  Patient will need Midline Catheter Insertion/ PICC line Insertion: No  Patient will need: Home IV , [facility],  LTAC: Undetermined  Patient willneed outpatient wound care: No    Medical Decision making / Summary of Stay:   Janet Grace is a 74y.o.-year-old female presenting as a transfer from an outside facility due to conern for bowel perforation and multiple intra-abdominal abscesses. Dax Mattson was previously hospitalized in January 2022 with Klebsiella pneumoniae sepsis related to a perinephric abscess. She did have right-sided severely obstructive pyelonephritis for which she underwent stent placement as well as a right percutaneous nephrostomy tube placement. This infection was complicated by perinephric/psoas abscess which was aspirated and this drain was placed. Patient was seen by my partner Dr. Feliciano Cortes and was discharged on intravenous antimicrobial therapy with Rocephin 2 g daily through February 2, 2022  The patient on 3/1/2022 underwent a cystoscopy with right-sided ureteroscopy with holmium laser lithotripsy and right-sided ureteral stent placement  The patient underwent a second urological procedure on 4/5/2022 with a cystoscopy, right-sided ureteroscopy, right holmium laser lithotripsy and right ureteral stent exchange and the patient was found to have a copious amount of calculi which were crushed up and further ablated.     The patient reports that ever since she had the second urological procedure she has had generalized malaise/fatigue and more recently progressing abdominal pain over the last several days. Initial lactic acid was 4.4 and treated with fluid bolus.  Initial potassium was 2.8, now 3.6 after replacement.     A CT scan of the abdomen and pelvis 4/20/2022 showed gas within both the right renal parenchyma and proximal right collecting system and increased size of the previous right retroperitoneal abscess tracking along the psoas muscle and there is abscess tracking along the posterior course of the drainage catheter through the right quadratus lumbar muscle and along the right posterior paraspinal musculature  There are multifocal rim-enhancing mixed [place] fluid collections worrisome for abscess within the dominant collections of the perihepatic region spanning up to 15 cm within the pelvic cul-de-sac spanning up to 7 cm. There is free air along the mesentery in addition to the a forementioned fluid collections. Discitis/osteomyelitis at T12-L1 with direct extension from the adjacent right psoas inflammatory change. Loculated right pleural effusion     The patient has been admitted and started on broad-spectrum antimicrobial therapy and ultrasound-guided placement of 12 [place] drain in the perihepatic abscess with 550 mL of green following purulent material aspirated, and a CT-guided placement of right perinephric and deep pelvic abscess drainage catheters with 5 mL of purulent fluid removed from each collection sent for diagnostic tests. Current evaluation:2022    BP (!) 146/67   Pulse 59   Temp 97.5 °F (36.4 °C) (Oral)   Resp 29   Ht 5' 1\" (1.549 m)   Wt 272 lb 0.8 oz (123.4 kg)   LMP  (LMP Unknown)   SpO2 92%   BMI 51.40 kg/m²     Temperature Range: Temp: 97.5 °F (36.4 °C) Temp  Av.7 °F (36.5 °C)  Min: 97.4 °F (36.3 °C)  Max: 98.4 °F (36.9 °C)    - AFVSS  - Saturating well on 2L  - Order in for Midline vs PICC line  - Abscess culture- MANY NEUTROPHILS Abnormal MODERATE GRAM POSITIVE COCCI IN PAIRS Abnormal FEW GRAM POSITIVE RODS Abnormal Culture ENTEROCOCCUS FAECIUM LIGHT GROWTH Abnormal VIRIDANS STREPTOCOCCUS GROUP LIGHT GROWTH Susceptibilities have not been performed. Notify the laboratory within 7 days for further workup if clinically indicated. Abnormal     Abd soft and abd abscess cx are still pend, mixed bacteriae - clusters but no clear SA yet. Plan to do a repeat CT in the coming days.  urology performed cystoscopy with rt sided stent placement, and right retrograde pyelogram.      Review of Systems   Constitutional: Negative for chills. HENT: Negative for congestion. Eyes: Negative for photophobia, discharge and visual disturbance. Respiratory: Negative for apnea, cough and choking. Cardiovascular: Negative for chest pain. Gastrointestinal: Negative for abdominal distention. Endocrine: Negative for cold intolerance. Genitourinary: Negative for dysuria and flank pain. Musculoskeletal: Positive for arthralgias and back pain. Skin: Negative for color change. Allergic/Immunologic: Negative for environmental allergies. Neurological: Negative for dizziness and headaches. Hematological: Negative for adenopathy. Psychiatric/Behavioral: Negative for agitation. Physical Examination :     Physical Exam  Constitutional:       General: She is not in acute distress. Appearance: Normal appearance. She is obese. She is not ill-appearing. HENT:      Head: Normocephalic and atraumatic. Nose: Nose normal.      Mouth/Throat:      Mouth: Mucous membranes are moist.   Eyes:      General: No scleral icterus. Conjunctiva/sclera: Conjunctivae normal.   Cardiovascular:      Rate and Rhythm: Normal rate and regular rhythm. Pulmonary:      Effort: Pulmonary effort is normal.      Breath sounds: Normal breath sounds. Abdominal:      General: Abdomen is flat. Bowel sounds are normal.      Palpations: Abdomen is soft. Comments: 2 right-sided LAURIE drains, 1 left-sided LAURIE drain   Genitourinary:     Comments: Urine magalys  Musculoskeletal:         General: No tenderness. Normal range of motion. Cervical back: Neck supple. No rigidity or tenderness. Skin:     General: Skin is warm and dry. Coloration: Skin is not jaundiced or pale. Findings: No bruising or erythema. Neurological:      General: No focal deficit present. Mental Status: She is alert and oriented to person, place, and time. Mental status is at baseline.    Psychiatric:         Mood and Affect: Mood normal.         Behavior: Behavior normal. Thought Content: Thought content normal.         Laboratory data:   I have independently reviewed the followinglabs:  CBC with Differential:   Recent Labs     04/24/22  1805 04/25/22  0700   WBC 12.6* 12.3*   HGB 7.8* 7.9*   HCT 25.7* 26.3*   * 550*     BMP:   Recent Labs     04/24/22  0425 04/25/22  0700    138   K 4.6 4.1    105   CO2 24 25   BUN 16 13   CREATININE 0.90 0.85   MG 2.2 2.1     Hepatic Function Panel:   Recent Labs     04/24/22  0425 04/25/22  0700   LABALBU 1.9* 2.0*         Lab Results   Component Value Date    PROCAL 0.07 02/11/2020     Lab Results   Component Value Date    CRP 52.0 02/11/2020     Lab Results   Component Value Date    SEDRATE 91 (H) 02/11/2020         No results found for: DDIMER  Lab Results   Component Value Date    FERRITIN 42 01/23/2021     No results found for: LDH  No results found for: FIBRINOGEN    Results in Past 30 Days  Result Component Current Result Ref Range Previous Result Ref Range   SARS-CoV-2, Rapid Not Detected (4/20/2022) Not Detected Not in Time Range      Lab Results   Component Value Date    COVID19 Not Detected 04/20/2022    COVID19 Not Detected 01/12/2022    COVID19 Not Detected 01/03/2022       No results for input(s): [facility] in the last 72 hours. Imaging Studies:   US guided drainage  Impression:        Successful ultrasound-guided placement 12 [place]vor[place] drain in perihepatic   abscess.  Sample sent for culture and sensitivity.  550 mL of green   foul-smelling purulent material was aspirated. Cultures:     Culture, Blood 2 [[id number]] Collected: 04/21/22 0215   Order Status: Completed Specimen: Blood Updated: 04/22/22 0230    Specimen Description . BLOOD    Special Requests LEFT AC 10ML    Culture NO GROWTH 1 DAY   Culture, Blood 1 [[id number]] Collected: 04/21/22 0216   Order Status: Completed Specimen: Blood Updated: 04/22/22 0229    Specimen Description . BLOOD    Special Requests RIGHT FOREARM 20ML    Culture NO GROWTH 1 DAY   Culture, Urine [[id number]] Collected: 04/21/22 0345   Order Status: Completed Specimen: Urine, clean catch Updated: 04/21/22 2150    Specimen Description . CLEAN CATCH URINE    Culture Candida albicans/dubliniensis 50 to 100,000 CFU/ML   Culture, Anaerobic and Aerobic [[id number]] (Abnormal) Collected: 04/21/22 1342   Order Status: Completed Specimen: Abscess Updated: 04/21/22 1545    Specimen Description . ABSCESS . ASPIRATE    Direct Exam MODERATE NEUTROPHILS Abnormal      MODERATE GRAM POSITIVE COCCI IN CLUSTERS Abnormal     Culture PENDING   Culture, Anaerobic and Aerobic [[id number]] (Abnormal) Collected: 04/21/22 1340   Order Status: Completed Specimen: Abscess Updated: 04/21/22 1548    Specimen Description . ABSCESS . ASPIRATE    Direct Exam FEW NEUTROPHILS Abnormal      MODERATE GRAM POSITIVE COCCI IN CLUSTERS Abnormal     Culture PENDING   Culture, Anaerobic and Aerobic [[id number]] (Abnormal) Collected: 04/21/22 1337   Order Status: Completed Specimen: Abscess Updated: 04/21/22 2087    Specimen Description . ABSCESS . ASPIRATE    Direct Exam MANY NEUTROPHILS Abnormal      MODERATE GRAM POSITIVE COCCI IN PAIRS Abnormal      FEW GRAM POSITIVE RODS Abnormal     Culture PENDING       Medications:      furosemide  40 mg IntraVENous Daily    lidocaine 1 % injection  5 mL IntraDERmal Once    sodium chloride flush  5-40 mL IntraVENous 2 times per day    fluconazole  400 mg IntraVENous Q24H    famotidine (PEPCID) injection  20 mg IntraVENous BID    metoprolol tartrate  25 mg Oral BID    oxybutynin  15 mg Oral Daily    potassium chloride  20 mEq Oral QPM    sodium chloride flush  5-40 mL IntraVENous 2 times per day    vancomycin (VANCOCIN) intermittent dosing (placeholder)   Other RX Placeholder    vancomycin  1,250 mg IntraVENous Q24H    piperacillin-tazobactam  3,375 mg IntraVENous q8h    sodium chloride flush  10 mL IntraCATHeter BID         Maria T Ritter MD  PGY-1, Internal Medicine Resident  [facility]            I have discussed the care of the patient, including pertinent history and exam findings,  with the resident. I have seen and examined the patient and the key elements of all parts of the encounter have been performed by me. I agree with the assessment, plan and orders as documented by the resident.     Kyung Lara, Infectious Diseases

## 2022-04-25 NOTE — PROGRESS NOTES
Physical Therapy  Facility/Department: [address] STEPDOWN  Physical Therapy Initial Assessment    Name: Rosa Marrufo  : 1955  MRN: [id number]  Date of Service: 2022    Discharge Recommendations: Further therapy recommended at discharge. Chief Complaint   Patient presents with    Abdominal Pain     perf bowel     \"Yolie Kay is a 74 y. o. female who presents as transfer from outside facility with concern for bowel perforation multiple intra-abdominal abscesses.  Progressively worsening abdominal pain over the last several days prompted patient evaluation.  Treated with Vanco Zosyn, initially lactic elevated at 4.4 treated with fluid bolus.  Hypokalemic with potassium replaced.  Patient with a significant urologic history with perinephric abscess, large kidney stones requiring stenting and surgical intervention, most recently 3/1/2022.  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 \"       PT Equipment Recommendations  Equipment Needed: No (owns 5JE)      Patient Diagnosis(es): The encounter diagnosis was Intra-abdominal abscess ([address] Past Medical History:  has a past medical history of Carcinoma ([address] Cellulitis, DVT (deep venous thrombosis) ([address] Kidney stone, Lymphedema, Morbid obesity ([address] Psoas abscess, right ([address] Pyelonephritis, and Wears glasses. Past Surgical History:  has a past surgical history that includes Tubal ligation (); Carpal tunnel release (); Topaz tooth extraction; Tubal ligation; candy and bso (cervix removed); Cystoscopy (N/A, 2022); IR GUIDED NEPHROSTOMY CATH PLACEMENT RIGHT (2022); Insert Midline Catheter (2022); CT ABSCESS DRAINAGE (01/10/2022); Cystoscopy (Right); Cystoscopy (Right, 2022); Cystoscopy (Right, 2022); Cystoscopy (Right, 2022); Cystoscopy (Right, 2022);  Cystoscopy (Right, 2022); CT ABSCESS DRAINAGE (2022); Cystoscopy (Right, 04/22/2022); and Cystoscopy (Right, 4/22/2022). Assessment   Body Structures, Functions, Activity Limitations Requiring Skilled Therapeutic Intervention: Decreased functional mobility ; Decreased endurance;Decreased high-level IADLs;Decreased strength;Decreased balance;Decreased safe awareness  Assessment: Pt ambulates 3 ft RW min A. Pt currently unsafe to return to prior living situation d/t inability to safely ambulate household distances and high fall risk from decreased balance and endurance. Pt would benefit from continued therapy to promote endurance, balance, and strengthening. Therapy Prognosis: Good  Decision Making: Medium Complexity  Barriers to Learning: none  Activity Tolerance  Activity Tolerance: Patient limited by endurance; Patient limited by fatigue     Plan   Plan  Plan: 5-7 times per week  Current Treatment Recommendations: Strengthening,IADL training,Neuromuscular re-education,Home exercise program,Safety education & training,Endurance training,Functional mobility training,Transfer training,Patient/Caregiver education & training,Gait training,Stair training,ADL/Self-care training,Therapeutic activities  Safety Devices  Type of Devices: Call light within reach,Gait belt,All fall risk precautions in place,Left in chair,Nurse notified  Restraints  Restraints Initially in Place: No     Restrictions  Restrictions/Precautions  Restrictions/Precautions: Fall Risk,General Precautions  Required Braces or Orthoses?: No  Position Activity Restriction  Other position/activity restrictions: up with A, 2 drains     Subjective   General  Patient assessed for rehabilitation services?: Yes  Response To Previous Treatment: Not applicable  Family / Caregiver Present: No  Follows Commands: Within Functional Limits  Subjective  Subjective: RN and pt agreeable to PT. pt agreeable, requires encouragement to participate. Pt supine in bed at start of session.          Social/Functional History  Social/Functional History  Lives With: Spouse,Daughter (43 yo dtr)  Type of Home: House  Home Layout: Two level,Performs ADL's on one level,Able to Live on Main level with bedroom/bathroom  Home Access: Stairs to enter with rails  Entrance Stairs - Number of Steps: 6 VICKI  Entrance Stairs - Rails: Left  Bathroom Shower/Tub: Walk-in shower (walk-in is upstairs, pt performs sponge-bathing on main level at baseline)  Bathroom Toilet: Standard  Bathroom Accessibility: Accessible  Home Equipment: [employer], 4 wheeled (used 6HD for ambulation)  Has the patient had two or more falls in the past year or any fall with injury in the past year?: Unknown  ADL Assistance: Needs assistance (Only requiring assistance with socks and washing feet at baseline)  Homemaking Assistance: Needs assistance  Homemaking Responsibilities: No (Family performs all, spouse and dtr share)  Ambulation Assistance: Independent (using 4ww)  Transfer Assistance: Independent  Active : Yes  Occupation: Retired  Leisure & Hobbies: HGTV channel  Vision/Hearing  Vision Exceptions: Wears glasses for distance  Hearing: Within functional limits    Cognition   Orientation  Overall Orientation Status: Within Functional Limits     Objective         Gross Assessment  Sensation: Intact (pt denies n/t)     AROM RLE (degrees)  RLE AROM: WFL  AROM LLE (degrees)  LLE AROM : WFL  AROM RUE (degrees)  RUE AROM : WFL  AROM LUE (degrees)  LUE AROM : WFL  Strength RLE  Strength RLE: Exception  R Hip Flexion: 3/5  R Knee Flexion: 4+/5  R Knee Extension: 4+/5  R Ankle Dorsiflexion: 4+/5  R Ankle Plantar flexion: 4+/5  Strength LLE  Strength LLE: Exception  L Hip Flexion: 3/5  L Knee Flexion: 4+/5  L Knee Extension: 4+/5  L Ankle Dorsiflexion: 4+/5  L Ankle Plantar Flexion: 4+/5  Strength RUE  Strength RUE: WFL  Strength LUE  Strength LUE: WFL           Bed mobility  Supine to Sit: Moderate assistance (for BLE and trunk progressio)  Sit to Supine: Unable to assess (pt ends in recliner)  Scooting: Minimal assistance  Transfers  Sit to Stand: Contact guard assistance  Stand to sit: Contact guard assistance  Comment: RW for UE support  Ambulation  Surface: level tile  Device: Rolling Walker  Other Apparatus: O2  Assistance: Minimal assistance  Gait Deviations: Slow Cheryl;Decreased step length;Decreased step height  Distance: 3 ft  Comments: Pt grossly unsteady, one minimal LOB, able to self correct.   More Ambulation?: No  Stairs/Curb  Stairs?: No     Balance  Posture: Good  Sitting - Static: Good  Sitting - Dynamic: Good;-  Standing - Static: Fair  Standing - Dynamic: Fair;-  Comments: Assessed with RW                               AM-PAC Score  AM-PAC Inpatient Mobility Raw Score : 15 (04/25/22 1605)  AM-PAC Inpatient T-Scale Score : 39.45 (04/25/22 1605)  Mobility Inpatient CMS 0-100% Score: 57.7 (04/25/22 1605)  Mobility Inpatient CMS G-Code Modifier : CK (04/25/22 1605)          Goals  Short Term Goals  Time Frame for Short term goals: 14 visits  Short term goal 1: Complete transfers with RW and mod I  Short term goal 2: Complete 200 ft of gait with RW and mod I  Short term goal 3: Complete 6 steps with LHR and mod I  Short term goal 4: Participate in 30 minutes of therapy to promote endurance       Therapy Time   Individual Concurrent Group Co-treatment   Time In 1358         Time Out 1428         Minutes 30         Timed Code Treatment Minutes: 25 Minutes       Deshaun Ponce, PT

## 2022-04-25 NOTE — PROGRESS NOTES
[facility] Pharmacokinetic Monitoring Service - Vancomycin    Consulting Provider:  Marylou Cardenas MD    Indication: Multiple abscesses  Target Concentration: Goal AUC/MARY 400-600 mg*hr/L  Day of Therapy: 6  Additional Antimicrobials: Zosyn    Pertinent Laboratory Values: Wt Readings from Last 1 Encounters:   04/21/22 272 lb 0.8 oz (123.4 kg)     Temp Readings from Last 1 Encounters:   04/25/22 97.4 °F (36.3 °C) (Oral)     Estimated Creatinine Clearance: 78 mL/min (based on SCr of 0.85 mg/dL). Recent Labs     04/24/22  0425 04/24/22  0425 04/24/22  1805 04/25/22  0700   CREATININE 0.90  --   --  0.85   WBC 11.2   < > 12.6* 12.3*    < > = values in this interval not displayed. Procalcitonin: n/a    Pertinent Cultures:  Culture Date Source Results   4/21/22 abscess Mod gm+ cocci in pairs/clusters. Light growth Group D Enterococcus & Viridans Strep       MRSA Nasal Swab: N/A. Non-respiratory infection.     Recent vancomycin administrations                     vancomycin (VANCOCIN) 1250 mg in sodium chloride 0.9% 250 mL IVPB (mg) 1,250 mg New Bag 04/24/22 2243     1,250 mg New Bag 04/23/22 2259     1,250 mg New Bag  0032                     Ref. Range 4/25/2022 12:26   Vancomycin  Latest Units: ug/mL 17.9     Extrapolated levels:  Trough: 13  AUC/MARY: 449    Plan:  Current dosing regimen is  therapeutic  Continue current dose   Pharmacy will continue to monitor patient and adjust therapy as indicated    Thank you for the consult,  Georgette Gunderson [facility]  4/25/2022 1:37 PM

## 2022-04-25 NOTE — PLAN OF CARE
Per micro lab, no GNR and no anaerobes, no SA in the abd fluid 4/21/22  Only strep and enterococcus fecium R amp but S vanco     On another fluid cx 4/21 she had E fecium S amp and vanco    Plan:  Stop fluconazole used to treat UTI - it can not eradicate the yeast of the urine as long as she is on other AB.   Stop zosyn and place a picc   Keep vanco and watch creat tiw - x 4 weeks and follow w zyvox 2 weeks then repeat CT AP to ck on resolution of the abscesses    Delia Barakat MD. Infectious Diseases

## 2022-04-25 NOTE — CARE COORDINATION
Transitional planning. [address] [place] [address]  and left message with Aki in admissions. CM asks for call back and is aware pt needs PT/OT note and will follow. [facility] calls from [address] [place] [address]. They are OON. [phone] with pt. She clarifies she wants SNF in [place] and [address] [place] [address] Dr is her 1st choice. Then The [address] at [facility] and [address] [facility]. 1240 Referral made to [facility] and spoke with Maribell in admissions. She will review. Aware they are pt's 1st choice. Referral made to [address] at [facility] and spoke with Heidi in admissions. Referral made to [address] and left VM with Becky Berkowitz in admissions.     1400 Received calls from [address] and Bassem 'OJ'  at [facility]

## 2022-04-25 NOTE — PROGRESS NOTES
Consult noted. Known from prior admission. Primary RN clarified the patient is requesting ACE wraps to her BLE. She declines Unna's dressings/compression. She has no wounds. She also requested compression stockings. We do not have therapeutic mmHg compression stockings available at the hospital. Discussed with primary RN if ACE wraps to be used begin wrap at the base of the toes and wrap to the knee. Lymphedema treatment is a long term treatment modality offered by occupational therapists in an outpatient setting. Successful treatment requires compliance from the patient as lymphatic massage and short stretch wrap treatments require weekly follow up to offer any benefit. There is no short term, acute solution to lymphedema. Wound Ostomy RN to sign off.

## 2022-04-25 NOTE — PROGRESS NOTES
[facility]  Office: [address], DO, Bhavani Cease, DO, Manny Marquez, DO, Josué Madera, DO, Michelle Braun MD, Adan Hassan MD, Reid Aburto MD, Abel Rueda MD, Brandie Clark MD, Linda Wilson MD, Silvestre Estes MD, Violet Yanez, DO, Josef Porter, DO, Merly Platt MD,  Dwight Michael DO, Darion Padilla MD, Shira Murphy MD, Ed Parra MD, Raymond Shelton, DO, Laura Pedro MD, Yael Mathew MD, Primitivo Sloan, RODRIGUEZ, [facility], CNP, Milla Cano, CNP, Magaly Dwyer, CNP, Cony Mcnair, CNP, Verona Mejía, CNP, Yonis Anglin, PA-C, Kyara Bolaños, DNP, Russell Dupree, CNP, Padmini Page, CNP, Dwain Carney, CNP, Bandar Mendoza, CNS, Gertrude Floyd, DNP, Cara Velasco, CNP, Sherine Slater, CNP, Elen Kilgore, CNP         [facility] [address]    Progress Note    4/25/2022    10:56 AM    Name:   Selwyn Allen  MRN:     [id number]     [place]:      [de-identified]   Room:   [facility]8833-29   Day:  4  Admit Date:  4/21/2022  1:28 AM    PCP:   DONG Irizarry CNP  Code Status:  Full Code    Subjective:     C/C:   Chief Complaint   Patient presents with    Abdominal Pain     perf bowel     Interval History Status: not changed. Discussed with resident on infectious disease, PICC was not placed yesterday as antibiotics has not been finalized. Patient also having minimal drainage from multiple drains. Interventional radiology request no drainage for 3 days before removal of LAURIE drains. Patient currently has 1 that possibly can be removed, 2 other ones are down to 10 to 20 cc daily. Brief History:     67-year-old female transferred from outside hospital for bowel perforation with multiple intra-abdominal abscesses. Patient initially was treated on vancomycin and Zosyn with elevated lactic acid and was given sepsis bolus of IV fluids.   Patient was seen by multiple consultants including trauma surgery, general surgery and infectious disease and urology. Patient was recommended placement of multiple pigtail catheters for perihepatic and perinephric abscesses. Patient also had a successful placement of a pelvic abscess, patient the following day was seen by urology recommending replacement of right-sided stent and cystoscopy and right retrograde pyelogram.      Review of Systems:     Constitutional:  negative for chills, fevers, sweats  Respiratory:  negative for cough, dyspnea on exertion, shortness of breath, wheezing  Cardiovascular:  negative for chest pain, chest pressure/discomfort, lower extremity edema, palpitations  Gastrointestinal:  negative for abdominal pain, constipation, diarrhea, nausea, vomiting  Neurological:  negative for dizziness, headache    Medications: Allergies:     Allergies   Allergen Reactions    Estrogens Other (See Comments)     Hx of DVT       Current Meds:   Scheduled Meds:    furosemide  40 mg IntraVENous BID    lidocaine 1 % injection  5 mL IntraDERmal Once    sodium chloride flush  5-40 mL IntraVENous 2 times per day    famotidine (PEPCID) injection  20 mg IntraVENous BID    metoprolol tartrate  25 mg Oral BID    oxybutynin  15 mg Oral Daily    potassium chloride  20 mEq Oral QPM    sodium chloride flush  5-40 mL IntraVENous 2 times per day    vancomycin (VANCOCIN) intermittent dosing (placeholder)   Other RX Placeholder    vancomycin  1,250 mg IntraVENous Q24H    sodium chloride flush  10 mL IntraCATHeter BID     Continuous Infusions:    sodium chloride      sodium chloride       PRN Meds: sodium chloride flush, sodium chloride, morphine **OR** morphine, sodium chloride flush, sodium chloride, potassium chloride **OR** potassium alternative oral replacement **OR** potassium chloride, magnesium sulfate, ondansetron **OR** ondansetron, polyethylene glycol, acetaminophen **OR** acetaminophen, benzocaine-menthol    Data:     Past Medical History:   has a past medical history of Carcinoma ([address] Cellulitis, DVT (deep venous thrombosis) ([address] Kidney stone, Lymphedema, Morbid obesity ([address] Psoas abscess, right ([address] Pyelonephritis, and Wears glasses. Social History:   reports that she has been smoking cigarettes. She has a 21.00 pack-year smoking history. She has never used smokeless tobacco. She reports that she does not drink alcohol and does not use drugs. Family History:   Family History   Adopted: Yes   Problem Relation Age of Onset    Cancer Mother         The patient reports her biologic mother did have bladder cancer       Vitals:  BP (!) 146/67   Pulse 59   Temp 97.5 °F (36.4 °C) (Oral)   Resp 29   Ht 5' 1\" (1.549 m)   Wt 272 lb 0.8 oz (123.4 kg)   LMP  (LMP Unknown)   SpO2 92%   BMI 51.40 kg/m²   Temp (24hrs), Av.7 °F (36.5 °C), Min:97.4 °F (36.3 °C), Max:98.4 °F (36.9 °C)    No results for input(s): POCGLU in the last 72 hours. I/O (24Hr):     Intake/Output Summary (Last 24 hours) at 2022 1056  Last data filed at 2022 0804  Gross per 24 hour   Intake 540 ml   Output 1920 ml   Net -1380 ml       Labs:  Hematology:  Recent Labs     22  04222  1805 22  0700   WBC 11.2 12.6* 12.3*   RBC 2.72* 2.79* 2.83*   HGB 7.6* 7.8* 7.9*   HCT 25.2* 25.7* 26.3*   MCV 92.6 92.1 92.9   MCH 27.9 28.0 27.9   MCHC 30.2 30.4 30.0   RDW 15.9* 15.9* 15.9*   * 549* 550*   MPV 9.3 8.9 9.0     Chemistry:  Recent Labs     22  0559 22  0425 22  0700    139 138   K 4.4 4.6 4.1    107 105   CO2 25 24 25   GLUCOSE 137* 118* 106*   BUN 17 16 13   CREATININE 0.86 0.90 0.85   MG 2.2 2.2 2.1   ANIONGAP 8* 8* 8*   LABGLOM >60 >60 >60   GFRAA >60 >60 >60   CALCIUM 7.7* 7.8* 7.7*   PHOS 3.5 3.3 3.1   PROBNP  --  3,975*  --      Recent Labs     22  0559 22  0425 22  0700   LABALBU 1.9* 1.9* 2.0*     ABG:  Lab Results   Component Value Date    PHART 7.466 2018    GVV9GAN 35.5 2018 PO2ART 72.5 07/19/2018    NZZ7EZW 25.0 07/19/2018    NBEA NOT REPORTED 07/19/2018    PBEA 1.7 07/19/2018    R1KJNELW 95.5 07/19/2018    FIO2 21 07/19/2018     Lab Results   Component Value Date/Time    SPECIAL RIGHT FOREARM 20ML 04/21/2022 02:16 AM     Lab Results   Component Value Date/Time    CULTURE CULTURE IN PROGRESS 04/21/2022 01:42 PM    CULTURE ENTEROCOCCUS FAECIUM MODERATE GROWTH (A) 04/21/2022 01:42 PM    CULTURE MIXED ANAEROBIC NAGI 04/21/2022 01:42 PM       Radiology:  CT ABDOMEN PELVIS W IV CONTRAST Additional Contrast? Oral    Result Date: 4/21/2022  1. Multiple intra-abdominal and pelvic fluid collections which have the appearance of abscess formation. In the subcapsular area in the liver extending into the subhepatic area a large abscess noted measuring 10 x 13.7 cm. A second more posterior collection measures 8.3 x 3.2 cm. Two dominant pelvic collections are noted, one measuring 10 x 6 cm and the second posteriorly 7 x 6.3 cm. The anterior collection is larger compared to the previous evaluation. These likely abscesses contain air in them. 2. A small amount of free air is noted scattered in the abdomen and pelvis. Psoas retroperitoneal abscess extends ton the posterior soft tissues. 3. Right renal atrophy. A subcapsular air containing collection measures 5 x 3.9 cm also likely an abscess. 4. Partly loculated right pleural effusion and right lower lobe atelectatic changes. Findings discussed with Dr Manoj Avelar 04/21/2022 00:10 a.m. RECOMMENDATIONS: Percutaneous drainage of multiple abscesses. CT ABDOMEN PELVIS W IV CONTRAST Additional Contrast? 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  New bladder prolapse with residual dependent stones in the bladder. Intraluminal gas in the bladder may be due to infection or recent instrumentation. Increased size of the previous right retroperitoneal abscess tracking along the psoas muscle (6.7 x 2.6 x 10.2 cm) which previously contained a percutaneous drainage catheter. There are abscesses tracking along the prior course of the drainage catheter through the right quadratus lumborum muscle and along the right posterior paraspinal musculature. Multifocal rim enhancing mixed gas and fluid collections worrisome for abscesses with the dominant collections in the perihepatic region spanning up to 15 cm and within the pelvic cul-de-sac spanning up to 7 cm. Free air along the mesentery in addition to the aforementioned fluid collections. As there are some thickened loops of small bowel in the lower abdomen and pelvis, a concomitant bowel perforation is not able be excluded on this exam, with differential considerations including ischemic bowel. 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 Loculated right pleural effusion characterized to advantage on today's chest CT. Critical results were called by Dr. Alexandria Pan to Dr. Sweta Alan on 4/20/2022 at 22:21 EST. XR CHEST PORTABLE    Result Date: 4/20/2022  Mass like infiltrate in the right upper lobe suggesting pneumonia or neoplasm. Underlying pulmonary vascular congestion RECOMMENDATION: Follow-up CT would be helpful. CT CHEST PULMONARY EMBOLISM W CONTRAST    Result Date: 4/20/2022  Moderately large right pleural effusion with areas of loculation accounting for the pseudotumor seen on chest x-ray. . Mild bibasal atelectasis more notably on the right. .. Coronary artery/atherosclerotic disease No obvious evidence of pulmonary embolism. Mild subcutaneous emphysema along the right posterior chest wall. Perihepatic fluid. Ectopic air seen in the abdomen.   Please refer to abdominal CT report RECOMMENDATIONS: No additional routine follow-up for incidental abdominal lesions. CT ABSCESS DRAINAGE    Result Date: 4/21/2022  Successful CT guided placement of right Lisbeth nephric and deep pelvic abscess drainage catheters. IR ABSCESS DRAINAGE PERC    Result Date: 4/21/2022  Successful ultrasound-guided placement 12 Croatian drain in perihepatic abscess. Sample sent for culture and sensitivity. 550 mL of green foul-smelling purulent material was aspirated.        Physical Examination:        General appearance:  alert, cooperative and no distress  Mental Status:  oriented to person, place and time and normal affect  Lungs:  clear to auscultation bilaterally, normal effort  Heart:  regular rate and rhythm, no murmur  Abdomen:  soft, nontender, nondistended, normal bowel sounds, no masses, hepatomegaly, splenomegaly, multiple abdominal drainage tubes  Extremities:  no edema, redness, tenderness in the calves  Skin: Chronic skin changes bilateral lower extremities    Assessment:        Hospital Problems           Last Modified POA    * (Principal) Intra-abdominal abscess ([address] 4/21/2022 Yes    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    Obesity, Class III, BMI 40-49.9 (morbid obesity) ([address] 4/21/2022 Yes    Hypocalcemia 4/21/2022 Yes    Hypokalemia 4/21/2022 Yes    Vertebral osteomyelitis (HCC) T12-L1 4/21/2022 Yes    Pleural effusion on right 4/21/2022 Yes    CRP elevated 4/23/2022 Yes    Long term current use of anticoagulant therapy 4/21/2022 Yes    Lymphedema of both lower extremities 4/21/2022 Yes    Tobacco abuse 4/21/2022 Yes    History of recurrent deep vein thrombosis (DVT) 5/87/1746 Yes    Complicated UTI (urinary tract infection) 4/21/2022 Yes    Renal calculus 4/21/2022 Yes    Normocytic anemia 4/21/2022 Yes    Renal abscess, right 4/21/2022 Yes    Psoas muscle abscess ([address] 4/21/2022 Yes    Ureteral calculus 4/21/2022 Yes          Plan:        Multiple intra-abdominal abscess -eventual radiology to assess posterior buttock bulb, has had minimal drainage for the past 2 days. Patient still has 20 cc of drainage from lateral abdomen bulb, 20 cc from posterior bulb. Eventual radiology request 3 days of no output before removal.  Infectious disease still recommending vancomycin for antibiotics. Will need PICC line placement. Positive for Enterococcus  History of VTE -patient normally on Xarelto, will need to restart once drains are removed. Essential hypertension  Lymphedema-patient has been more swollen since admission due to multiple IV fluids. Will start Lasix twice daily, goal of 3 L daily.   Can reassess tomorrow  Morbid obesity  Pleural effusion  Plan  Await drainage to improve from LAURIE drains, remove when able per direction from interventional radiology  Restart Xarelto once cleared by interventional radiology  Continue Lasix 40 mg twice daily for 24 hours, if her lymphedema improves then we can continue daily with oral Lasix  PICC line today    Ileana Magana DO  4/25/2022  10:56 AM

## 2022-04-25 NOTE — PROGRESS NOTES
Occupational Therapy  [facility] [address] [facility]  [facility] Initial Assessment    Name: Juana Torres  : 1955  MRN: [id number]  Date of Service: 2022  Chief Complaint   Patient presents with    Abdominal Pain     perf bowel       Discharge Recommendations:   Patient would benefit from continued therapy after discharge     Patient Diagnosis(es): The encounter diagnosis was Intra-abdominal abscess ([address] Past Medical History:  has a past medical history of Carcinoma ([address] Cellulitis, DVT (deep venous thrombosis) ([address] Kidney stone, Lymphedema, Morbid obesity ([address] Psoas abscess, right ([facility] Pyelonephritis, and Wears glasses. Past Surgical History:  has a past surgical history that includes Tubal ligation (); Carpal tunnel release (); [place] tooth extraction; Tubal ligation; candy and bso (cervix removed); Cystoscopy (N/A, 2022); IR GUIDED NEPHROSTOMY CATH PLACEMENT RIGHT (2022); Insert Midline Catheter (2022); CT ABSCESS DRAINAGE (01/10/2022); Cystoscopy (Right); Cystoscopy (Right, 2022); Cystoscopy (Right, 2022); Cystoscopy (Right, 2022); Cystoscopy (Right, 2022); Cystoscopy (Right, 2022); CT ABSCESS DRAINAGE (2022); and Cystoscopy (Right, 2022). Assessment   Performance deficits / Impairments: Decreased functional mobility ; Decreased endurance;Decreased ADL status; Decreased balance;Decreased high-level IADLs  Prognosis: Good  Decision Making: Medium Complexity  REQUIRES OT FOLLOW-UP: Yes  Activity Tolerance  Activity Tolerance: Patient limited by fatigue;Patient limited by pain        Plan   Plan  Times per Week: 3-4x     Restrictions  Restrictions/Precautions  Restrictions/Precautions: Fall Risk,General Precautions  Required Braces or Orthoses?: No  Position Activity Restriction  Other position/activity restrictions: up with A, 7.9 Hgb, 2 drains    Subjective   General  Patient assessed for rehabilitation services?: Yes  Family / Caregiver Present: No  Diagnosis: Perforated bowel, perinephric & perihepatic abscesses, 4/22 cysto and stant placement  Pre Treatment Pain Screening  Pain at present: 3 (Back and Headache)  Scale Used: Numeric Score  Intervention List: Patient able to continue with treatment  Vital Signs  Level of Consciousness: Alert (0)  Social/Functional History  Social/Functional History  Lives With: Spouse,Daughter (45 yo dtr)  Type of Home: House  Home Layout: Two level,Performs ADL's on one level,Able to Live on Main level with bedroom/bathroom  Home Access: Stairs to enter without rails  Entrance Stairs - Number of Steps: 6 VICKI  Bathroom Shower/Tub: Walk-in shower (walk-in is upstairs, pt performs sponge-bathing on main level at baseline)  Bathroom Toilet: Standard  Bathroom Accessibility: Accessible  Home Equipment: Izell Sarks, rolling,Walker, 4 wheeled  Has the patient had two or more falls in the past year or any fall with injury in the past year?: Unknown  ADL Assistance: Needs assistance (Only requiring assistance with socks and washing feet at baseline)  Homemaking Assistance: Needs assistance  Homemaking Responsibilities: No (Family performs all, spouse and dtr share)  Ambulation Assistance: Independent (using 4ww)  Transfer Assistance: Independent  Active : Yes  Occupation: Retired  Leisure & Hobbies: [employer] channel     Objective   Vision Exceptions: Wears glasses for distance  Hearing: Within functional limits          Safety Devices  Type of Devices: Call light within reach;Gait belt; All fall risk precautions in place; Left in chair;Nurse notified  Restraints  Restraints Initially in Place: No  Balance  Sitting Balance: Stand by assistance  Standing Balance: Stand by assistance  Standing Balance  Time: 6 min  Activity: Pt stood chairside x2 for bottom-care and brief/ellie change  Functional Mobility  Functional - Mobility Device: Rolling Walker  Activity: Other  Assist Level: Minimal assistance  Functional Mobility Comments: Some unsteadiness and flexed posture noted, pt took 2 steps forwards/backwards at chairside only, on 2L O2 for nearly entire session  AROM: Grossly decreased, non-functional (R shoulder limited AROM, L shoulder 4-/5 strength, B/L elbows grossly 4/5, B//L hands 4+/5 grossly)  Strength: Grossly decreased, non-functional  Coordination: Generally decreased, functional (Slow speed, chronic R shoulder deficits)  Tone: Normal  Sensation: Intact  ADL  Feeding: Modified independent ;Setup  Grooming: Setup; Increased time to complete;Supervision  UE Bathing: Setup; Increased time to complete;Minimal assistance  LE Bathing: Setup; Increased time to complete;Maximum assistance  UE Dressing: Increased time to complete;Setup; Moderate assistance  LE Dressing: Setup; Increased time to complete;Maximum assistance  Toileting: Maximum assistance; Increased time to complete;Setup  Additional Comments: Pt fatigues quickly and notyed to be SOB when simply speaking to writer on room air, RT arrived and placed pt on 2L O2, pt leaned forwards sitting in recliner and became incontinent of stool, pt stood x2 at chairside and required max A for thorough Bottom-care for total of 15 min for brief change, etc. Pt asked to perform nathalia-care and pt declined stating \"I can do that later\", poor activity tolerance, pt admits to having difficulty with doffing/donning socks at baseline        Bed mobility  Supine to Sit: Unable to assess  Sit to Supine: Unable to assess  Scooting: Maximal assistance  Comment: Pt with poor ability to scoot backwards sitting in recliner, pt with significant BLE edema and short stature are factors, pt sitting in recliner with legs elevated upon arrival/exit this date  Transfers  Sit to stand: Maximum assistance  Stand to sit: Moderate assistance  Transfer Comments: RW used      Education Given To: Patient  Education Provided: Plan of Care;Role of Therapy; Equipment;ADL Adaptive Strategies;Transfer Training  Education Method: Verbal;Demonstration  Barriers to Learning: Vision  Education Outcome: Verbalized understanding  LUE AROM (degrees)  LUE AROM : WFL  RUE AROM (degrees)  RUE AROM : Exceptions  R Shoulder Flexion 0-180: Limited to ~50 degrees at baseline  R Elbow Flexion 0-145: WFL  R Elbow Extension 145-0: WFL            AM-PAC Score        AM-PAC Inpatient Daily Activity Raw Score: 15 (04/25/22 1338)  AM-PAC Inpatient ADL T-Scale Score : 34.69 (04/25/22 1338)  ADL Inpatient CMS 0-100% Score: 56.46 (04/25/22 1338)  ADL Inpatient CMS G-Code Modifier : CK (04/25/22 1338)    Goals  Short Term Goals  Time Frame for Short term goals: Pt will by discharge  Short Term Goal 1: demo good safety awareness during func mob around room using LRD PRN and SUP  Short Term Goal 2: demo ADL UB bathing/dressing activity at SUP with setup and increased time  Short Term Goal 3: demo ADL LB bathing/dressing activity at min A, using sock-aid/reacher PRN, with setup and increased time  Short Term Goal 4: demo SBA for all bed mobility using bed rails PRN  Short Term Goal 5: demo activity tolerance for 35 min+       Therapy Time   Individual Concurrent Group Co-treatment   Time In 1063         Time Out 0829         Minutes 34         Timed Code Treatment Minutes: [address] [facility], OT

## 2022-04-26 LAB
ALBUMIN SERPL-MCNC: 2 G/DL (ref 3.5–5.2)
ANION GAP SERPL CALCULATED.3IONS-SCNC: 8 MMOL/L (ref 9–17)
BUN BLDV-MCNC: 11 MG/DL (ref 8–23)
CALCIUM SERPL-MCNC: 7.6 MG/DL (ref 8.6–10.4)
CHLORIDE BLD-SCNC: 101 MMOL/L (ref 98–107)
CO2: 29 MMOL/L (ref 20–31)
CREAT SERPL-MCNC: 0.91 MG/DL (ref 0.5–0.9)
CULTURE: NORMAL
CULTURE: NORMAL
FERRITIN: 173 NG/ML (ref 13–150)
GFR AFRICAN AMERICAN: >60 ML/MIN
GFR NON-AFRICAN AMERICAN: >60 ML/MIN
GFR SERPL CREATININE-BSD FRML MDRD: ABNORMAL ML/MIN/{1.73_M2}
GLUCOSE BLD-MCNC: 111 MG/DL (ref 70–99)
HCT VFR BLD CALC: 24.8 % (ref 36.3–47.1)
HEMOGLOBIN: 7.7 G/DL (ref 11.9–15.1)
IRON SATURATION: 17 % (ref 20–55)
IRON: 19 UG/DL (ref 37–145)
Lab: NORMAL
Lab: NORMAL
MAGNESIUM: 2 MG/DL (ref 1.6–2.6)
MCH RBC QN AUTO: 28.2 PG (ref 25.2–33.5)
MCHC RBC AUTO-ENTMCNC: 31 G/DL (ref 28.4–34.8)
MCV RBC AUTO: 90.8 FL (ref 82.6–102.9)
NRBC AUTOMATED: 0.2 PER 100 WBC
PDW BLD-RTO: 16 % (ref 11.8–14.4)
PHOSPHORUS: 3.1 MG/DL (ref 2.6–4.5)
PLATELET # BLD: 496 K/UL (ref 138–453)
PMV BLD AUTO: 9 FL (ref 8.1–13.5)
POTASSIUM SERPL-SCNC: 3.5 MMOL/L (ref 3.7–5.3)
RBC # BLD: 2.73 M/UL (ref 3.95–5.11)
SODIUM BLD-SCNC: 138 MMOL/L (ref 135–144)
SPECIMEN DESCRIPTION: NORMAL
SPECIMEN DESCRIPTION: NORMAL
TOTAL IRON BINDING CAPACITY: 115 UG/DL (ref 250–450)
TSH SERPL DL<=0.05 MIU/L-ACNC: 3.93 UIU/ML (ref 0.3–5)
UNSATURATED IRON BINDING CAPACITY: 96 UG/DL (ref 112–347)
WBC # BLD: 10.1 K/UL (ref 3.5–11.3)

## 2022-04-26 PROCEDURE — 6360000002 HC RX W HCPCS: Performed by: PHYSICIAN ASSISTANT

## 2022-04-26 PROCEDURE — 2580000003 HC RX 258: Performed by: STUDENT IN AN ORGANIZED HEALTH CARE EDUCATION/TRAINING PROGRAM

## 2022-04-26 PROCEDURE — 99232 SBSQ HOSP IP/OBS MODERATE 35: CPT | Performed by: PHYSICIAN ASSISTANT

## 2022-04-26 PROCEDURE — 6370000000 HC RX 637 (ALT 250 FOR IP): Performed by: INTERNAL MEDICINE

## 2022-04-26 PROCEDURE — 84443 ASSAY THYROID STIM HORMONE: CPT

## 2022-04-26 PROCEDURE — 80069 RENAL FUNCTION PANEL: CPT

## 2022-04-26 PROCEDURE — 83540 ASSAY OF IRON: CPT

## 2022-04-26 PROCEDURE — 2580000003 HC RX 258: Performed by: INTERNAL MEDICINE

## 2022-04-26 PROCEDURE — 2500000003 HC RX 250 WO HCPCS: Performed by: STUDENT IN AN ORGANIZED HEALTH CARE EDUCATION/TRAINING PROGRAM

## 2022-04-26 PROCEDURE — 83550 IRON BINDING TEST: CPT

## 2022-04-26 PROCEDURE — 6360000002 HC RX W HCPCS: Performed by: INTERNAL MEDICINE

## 2022-04-26 PROCEDURE — 36415 COLL VENOUS BLD VENIPUNCTURE: CPT

## 2022-04-26 PROCEDURE — 85027 COMPLETE CBC AUTOMATED: CPT

## 2022-04-26 PROCEDURE — 83735 ASSAY OF MAGNESIUM: CPT

## 2022-04-26 PROCEDURE — 6370000000 HC RX 637 (ALT 250 FOR IP): Performed by: STUDENT IN AN ORGANIZED HEALTH CARE EDUCATION/TRAINING PROGRAM

## 2022-04-26 PROCEDURE — 82728 ASSAY OF FERRITIN: CPT

## 2022-04-26 PROCEDURE — C1751 CATH, INF, PER/CENT/MIDLINE: HCPCS

## 2022-04-26 PROCEDURE — 99232 SBSQ HOSP IP/OBS MODERATE 35: CPT | Performed by: INTERNAL MEDICINE

## 2022-04-26 PROCEDURE — 36569 INSJ PICC 5 YR+ W/O IMAGING: CPT

## 2022-04-26 PROCEDURE — 2060000000 HC ICU INTERMEDIATE R&B

## 2022-04-26 PROCEDURE — 6370000000 HC RX 637 (ALT 250 FOR IP): Performed by: NURSE PRACTITIONER

## 2022-04-26 PROCEDURE — 02HV33Z INSERTION OF INFUSION DEVICE INTO SUPERIOR VENA CAVA, PERCUTANEOUS APPROACH: ICD-10-PCS | Performed by: INTERNAL MEDICINE

## 2022-04-26 PROCEDURE — 76937 US GUIDE VASCULAR ACCESS: CPT

## 2022-04-26 PROCEDURE — 2709999900 HC NON-CHARGEABLE SUPPLY

## 2022-04-26 RX ORDER — FAMOTIDINE 20 MG/1
20 TABLET, FILM COATED ORAL 2 TIMES DAILY
Status: DISCONTINUED | OUTPATIENT
Start: 2022-04-26 | End: 2022-05-03 | Stop reason: HOSPADM

## 2022-04-26 RX ORDER — ENOXAPARIN SODIUM 100 MG/ML
30 INJECTION SUBCUTANEOUS 2 TIMES DAILY
Status: DISCONTINUED | OUTPATIENT
Start: 2022-04-26 | End: 2022-04-27

## 2022-04-26 RX ORDER — IBUPROFEN 600 MG/1
600 TABLET ORAL EVERY 6 HOURS PRN
Status: DISCONTINUED | OUTPATIENT
Start: 2022-04-26 | End: 2022-05-03 | Stop reason: HOSPADM

## 2022-04-26 RX ADMIN — METOPROLOL TARTRATE 25 MG: 25 TABLET ORAL at 08:30

## 2022-04-26 RX ADMIN — ENOXAPARIN SODIUM 30 MG: 100 INJECTION SUBCUTANEOUS at 15:06

## 2022-04-26 RX ADMIN — SODIUM CHLORIDE, PRESERVATIVE FREE 10 ML: 5 INJECTION INTRAVENOUS at 20:33

## 2022-04-26 RX ADMIN — POTASSIUM CHLORIDE 40 MEQ: 1500 TABLET, EXTENDED RELEASE ORAL at 07:01

## 2022-04-26 RX ADMIN — OXYBUTYNIN CHLORIDE 15 MG: 10 TABLET, EXTENDED RELEASE ORAL at 08:30

## 2022-04-26 RX ADMIN — IBUPROFEN 600 MG: 600 TABLET, FILM COATED ORAL at 03:51

## 2022-04-26 RX ADMIN — POTASSIUM CHLORIDE 20 MEQ: 1500 TABLET, EXTENDED RELEASE ORAL at 17:26

## 2022-04-26 RX ADMIN — ENOXAPARIN SODIUM 30 MG: 100 INJECTION SUBCUTANEOUS at 20:28

## 2022-04-26 RX ADMIN — SODIUM CHLORIDE, PRESERVATIVE FREE 10 ML: 5 INJECTION INTRAVENOUS at 08:30

## 2022-04-26 RX ADMIN — SODIUM CHLORIDE, PRESERVATIVE FREE 20 MG: 5 INJECTION INTRAVENOUS at 08:30

## 2022-04-26 RX ADMIN — FAMOTIDINE 20 MG: 20 TABLET, FILM COATED ORAL at 20:28

## 2022-04-26 RX ADMIN — FUROSEMIDE 40 MG: 10 INJECTION, SOLUTION INTRAMUSCULAR; INTRAVENOUS at 09:20

## 2022-04-26 RX ADMIN — SODIUM CHLORIDE, PRESERVATIVE FREE 10 ML: 5 INJECTION INTRAVENOUS at 08:31

## 2022-04-26 ASSESSMENT — PAIN DESCRIPTION - LOCATION
LOCATION: HEAD
LOCATION: HEAD

## 2022-04-26 ASSESSMENT — PAIN SCALES - GENERAL
PAINLEVEL_OUTOF10: 6
PAINLEVEL_OUTOF10: 0
PAINLEVEL_OUTOF10: 6

## 2022-04-26 ASSESSMENT — ENCOUNTER SYMPTOMS
SHORTNESS OF BREATH: 0
BACK PAIN: 1
EYE DISCHARGE: 0
COUGH: 0
COLOR CHANGE: 0
PHOTOPHOBIA: 0
APNEA: 0
ABDOMINAL DISTENTION: 0

## 2022-04-26 ASSESSMENT — PAIN - FUNCTIONAL ASSESSMENT: PAIN_FUNCTIONAL_ASSESSMENT: ACTIVITIES ARE NOT PREVENTED

## 2022-04-26 NOTE — PROGRESS NOTES
[facility]  Office: [address], DO, Luis Agudelo, DO, Nichole Padilla, DO, Sylvia Reinoso Blood, DO, Rosanna Doty MD, Carmelita Rowland MD, Gabbi Pike MD, Dulce Ricks MD, Jeanne Machuca MD, Gonzalez Couch MD, Yareli Spears MD, Simon Chavez, DO, Angela Winston, DO, Stevie Ordaz MD,  Dahlia Dean, DO, Johnathon Concepcion MD, Anya Billy MD, Narinder Vázquez MD, Dalia Nolan, DO, Joyce Berman MD, Maryam Tillman MD, Yunior Marrufo, [facility], [facility], CNP, Crystal Doherty, CNP, Robert Li, CNP, Johnny Gilbert, CNP, Nichole Maier, CNP, [place] PANormaC, Lakesha Ronquillo, DNP, Yessi Pedroza, CNP, Ayan Langley, CNP, Alvarez Lopez, CNP, Nichol Shabazz, CNS, Radhames Alcazar, DNP, Cash Brown, CNP, Antony Blanco, CNP, Mike Lenz, CNP         [facility]   [address] [facility]    Progress Note    4/26/2022    1:29 PM    Name:   aRad Bowie  MRN:     [id number]     [place]:      [de-identified]   Room:   [facility]7557-65   Day:  5  Admit Date:  4/21/2022  1:28 AM    PCP:   Carmie Phalen, APRN - CNP  Code Status:  Full Code    Subjective:     C/C:   Chief Complaint   Patient presents with    Abdominal Pain     perf bowel     Interval History Status: not changed. Pt seen and evaluated this morning. Pt with no new complaints. Denying fever, chills, abdominal pain. Drainage has been minimal from LAURIE drains. Spoke with IR this morning who plans to pull drains. Ongoing issue is management of enlarging central pelvic abscess that is not amenable to percutaneous drainage. Discussed with surgery who is considering surgical intervention. Brief History:     20-year-old female transferred from outside hospital for bowel perforation with multiple intra-abdominal abscesses. Patient initially was treated on vancomycin and Zosyn with elevated lactic acid and was given sepsis bolus of IV fluids.   Patient was seen by multiple consultants including trauma surgery, general surgery and infectious disease and urology. Patient was recommended placement of multiple pigtail catheters for perihepatic and perinephric abscesses. Patient also had a successful placement of a pelvic abscess, patient the following day was seen by urology recommending replacement of right-sided stent and cystoscopy and right retrograde pyelogram.      Review of Systems:     Constitutional:  negative for chills, fevers, sweats  Respiratory:  negative for cough, dyspnea on exertion, shortness of breath, wheezing  Cardiovascular:  negative for chest pain, chest pressure/discomfort, lower extremity edema, palpitations  Gastrointestinal:  negative for abdominal pain, constipation, diarrhea, nausea, vomiting  Neurological:  negative for dizziness, headache    Medications: Allergies:     Allergies   Allergen Reactions    Estrogens Other (See Comments)     Hx of DVT       Current Meds:   Scheduled Meds:    [Held by provider] rivaroxaban  20 mg Oral Daily    lidocaine 1 % injection  5 mL IntraDERmal Once    sodium chloride flush  5-40 mL IntraVENous 2 times per day    famotidine (PEPCID) injection  20 mg IntraVENous BID    metoprolol tartrate  25 mg Oral BID    oxybutynin  15 mg Oral Daily    potassium chloride  20 mEq Oral QPM    sodium chloride flush  5-40 mL IntraVENous 2 times per day    vancomycin (VANCOCIN) intermittent dosing (placeholder)   Other RX Placeholder    vancomycin  1,250 mg IntraVENous Q24H    sodium chloride flush  10 mL IntraCATHeter BID     Continuous Infusions:    sodium chloride      sodium chloride       PRN Meds: ibuprofen, sodium chloride flush, sodium chloride, morphine **OR** morphine, sodium chloride flush, sodium chloride, potassium chloride **OR** potassium alternative oral replacement **OR** potassium chloride, magnesium sulfate, ondansetron **OR** ondansetron, polyethylene glycol, acetaminophen **OR** acetaminophen, benzocaine-menthol    Data:     Past Medical History:   has a past medical history of Carcinoma ([address] Cellulitis, DVT (deep venous thrombosis) ([address] Kidney stone, Lymphedema, Morbid obesity ([address] Psoas abscess, right ([address] Pyelonephritis, and Wears glasses. Social History:   reports that she has been smoking cigarettes. She has a 21.00 pack-year smoking history. She has never used smokeless tobacco. She reports that she does not drink alcohol and does not use drugs. Family History:   Family History   Adopted: Yes   Problem Relation Age of Onset    Cancer Mother         The patient reports her biologic mother did have bladder cancer       Vitals:  /63   Pulse 54   Temp 97.1 °F (36.2 °C) (Temporal)   Resp 22   Ht 5' 1\" (1.549 m)   Wt 262 lb 5.6 oz (119 kg)   LMP  (LMP Unknown)   SpO2 92%   BMI 49.57 kg/m²   Temp (24hrs), Av.1 °F (36.7 °C), Min:97.1 °F (36.2 °C), Max:99 °F (37.2 °C)    No results for input(s): POCGLU in the last 72 hours. I/O (24Hr):     Intake/Output Summary (Last 24 hours) at 2022 1329  Last data filed at 2022 1231  Gross per 24 hour   Intake 1300 ml   Output 5970 ml   Net -4670 ml       Labs:  Hematology:  Recent Labs     22  0700 22  1833 22  0539   WBC 12.3* 11.3 10.1   RBC 2.83* 2.83* 2.73*   HGB 7.9* 7.9* 7.7*   HCT 26.3* 26.6* 24.8*   MCV 92.9 94.0 90.8   MCH 27.9 27.9 28.2   MCHC 30.0 29.7 31.0   RDW 15.9* 15.9* 16.0*   * 577* 496*   MPV 9.0 9.1 9.0     Chemistry:  Recent Labs     22  0425 22  0700 22  0539    138 138   K 4.6 4.1 3.5*    105 101   CO2 24 25 29   GLUCOSE 118* 106* 111*   BUN 16 13 11   CREATININE 0.90 0.85 0.91*   MG 2.2 2.1 2.0   ANIONGAP 8* 8* 8*   LABGLOM >60 >60 >60   GFRAA >60 >60 >60   CALCIUM 7.8* 7.7* 7.6*   PHOS 3.3 3.1 3.1   PROBNP 3,975*  --   --      Recent Labs     22  0425 22  0700 22  0539   LABALBU 1.9* 2.0* 2.0*     ABG:  Lab Results   Component Value Date    PHART 7.466 2018 QTP0AXA 35.5 07/19/2018    PO2ART 72.5 07/19/2018    BDN7NAG 25.0 07/19/2018    NBEA NOT REPORTED 07/19/2018    PBEA 1.7 07/19/2018    T3DZQCEN 95.5 07/19/2018    FIO2 21 07/19/2018     Lab Results   Component Value Date/Time    SPECIAL RIGHT FOREARM 20ML 04/21/2022 02:16 AM     Lab Results   Component Value Date/Time    CULTURE CULTURE IN PROGRESS 04/21/2022 01:42 PM    CULTURE ENTEROCOCCUS FAECIUM MODERATE GROWTH (A) 04/21/2022 01:42 PM    CULTURE MIXED ANAEROBIC NAGI 04/21/2022 01:42 PM       Radiology:  CT ABDOMEN PELVIS W IV CONTRAST Additional Contrast? Oral    Result Date: 4/21/2022  1. Multiple intra-abdominal and pelvic fluid collections which have the appearance of abscess formation. In the subcapsular area in the liver extending into the subhepatic area a large abscess noted measuring 10 x 13.7 cm. A second more posterior collection measures 8.3 x 3.2 cm. Two dominant pelvic collections are noted, one measuring 10 x 6 cm and the second posteriorly 7 x 6.3 cm. The anterior collection is larger compared to the previous evaluation. These likely abscesses contain air in them. 2. A small amount of free air is noted scattered in the abdomen and pelvis. Psoas retroperitoneal abscess extends ton the posterior soft tissues. 3. Right renal atrophy. A subcapsular air containing collection measures 5 x 3.9 cm also likely an abscess. 4. Partly loculated right pleural effusion and right lower lobe atelectatic changes. Findings discussed with Dr Anand Wilson 04/21/2022 00:10 a.m. RECOMMENDATIONS: Percutaneous drainage of multiple abscesses. CT ABDOMEN PELVIS W IV CONTRAST Additional Contrast? 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  New bladder prolapse with residual dependent stones in the bladder. Intraluminal gas in the bladder may be due to infection or recent instrumentation. Increased size of the previous right retroperitoneal abscess tracking along the psoas muscle (6.7 x 2.6 x 10.2 cm) which previously contained a percutaneous drainage catheter. There are abscesses tracking along the prior course of the drainage catheter through the right quadratus lumborum muscle and along the right posterior paraspinal musculature. Multifocal rim enhancing mixed gas and fluid collections worrisome for abscesses with the dominant collections in the perihepatic region spanning up to 15 cm and within the pelvic cul-de-sac spanning up to 7 cm. Free air along the mesentery in addition to the aforementioned fluid collections. As there are some thickened loops of small bowel in the lower abdomen and pelvis, a concomitant bowel perforation is not able be excluded on this exam, with differential considerations including ischemic bowel. 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 Loculated right pleural effusion characterized to advantage on today's chest CT. Critical results were called by Dr. Abraham Fuentes to Dr. Selwyn Mejia on 4/20/2022 at 22:21 EST. XR CHEST PORTABLE    Result Date: 4/20/2022  Mass like infiltrate in the right upper lobe suggesting pneumonia or neoplasm. Underlying pulmonary vascular congestion RECOMMENDATION: Follow-up CT would be helpful. CT CHEST PULMONARY EMBOLISM W CONTRAST    Result Date: 4/20/2022  Moderately large right pleural effusion with areas of loculation accounting for the pseudotumor seen on chest x-ray. . Mild bibasal atelectasis more notably on the right. .. Coronary artery/atherosclerotic disease No obvious evidence of pulmonary embolism. Mild subcutaneous emphysema along the right posterior chest wall. Perihepatic fluid. Ectopic air seen in the abdomen.   Please refer to abdominal CT report RECOMMENDATIONS: No additional routine follow-up for incidental abdominal lesions. CT ABSCESS DRAINAGE    Result Date: 4/21/2022  Successful CT guided placement of right Lisbeth nephric and deep pelvic abscess drainage catheters. IR ABSCESS DRAINAGE PERC    Result Date: 4/21/2022  Successful ultrasound-guided placement 12 Wolof drain in perihepatic abscess. Sample sent for culture and sensitivity. 550 mL of green foul-smelling purulent material was aspirated. Physical Examination:        General appearance:  alert, cooperative and no distress  Mental Status:  oriented to person, place and time and normal affect  Lungs:  clear to auscultation bilaterally, normal effort  Heart:  regular rate and rhythm, no murmur  Abdomen:  soft, nontender, nondistended, normal bowel sounds, no masses, hepatomegaly, splenomegaly, multiple abdominal drainage tubes  Extremities:  no edema, redness, tenderness in the calves  Skin: Chronic skin changes bilateral lower extremities    Assessment:        Hospital Problems           Last Modified POA    * (Principal) Intra-abdominal abscess ([address] 4/21/2022 Yes    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    Obesity, Class III, BMI 40-49.9 (morbid obesity) ([address] 4/21/2022 Yes    Hypocalcemia 4/21/2022 Yes    Hypokalemia 4/21/2022 Yes    Vertebral osteomyelitis (HCC) T12-L1 4/21/2022 Yes    Pleural effusion on right 4/21/2022 Yes    CRP elevated 4/23/2022 Yes    Long term current use of anticoagulant therapy 4/21/2022 Yes    Lymphedema of both lower extremities 4/21/2022 Yes    Tobacco abuse 4/21/2022 Yes    History of recurrent deep vein thrombosis (DVT) 9/76/8280 Yes    Complicated UTI (urinary tract infection) 4/21/2022 Yes    Renal calculus 4/21/2022 Yes    Normocytic anemia 4/21/2022 Yes    Renal abscess, right 4/21/2022 Yes    Psoas muscle abscess ([address] 4/21/2022 Yes    Ureteral calculus 4/21/2022 Yes          Plan:        1.  Multiple intra-abdominal abscess -interventional radiology to assess posterior buttock bulb, has had minimal drainage for the past 2 days. Infectious disease still recommending vancomycin for antibiotics. Will need PICC line placement. Positive for Enterococcus. Discussed with surgery team regarding intervention for enlarging central pelvic abscess, recommendations forthcoming  History of VTE -patient normally on Xarelto, will need to restart once drains are removed and surgical plan finalized. Sinus bradycardia: asymptomatic, hold metoprolol. Check TSH. Continue to monitor. Essential hypertension  Lymphedema- stop lasix  Morbid obesity  Pleural effusion, trace.  Repeat CXR tomorrow, if enlarging will pursue thoracentesis  Lovenox DVT prophylaxis    Cate Fuentes PA-C  4/26/2022  1:29 PM

## 2022-04-26 NOTE — PLAN OF CARE
Problem: Discharge Planning  Goal: Discharge to home or other facility with appropriate resources  4/26/2022 1733 by Ana Greer RN  Outcome: Progressing  4/26/2022 0439 by Romana Robins, RN  Outcome: Progressing     Problem: Skin/Tissue Integrity  Goal: Absence of new skin breakdown  Description: 1. Monitor for areas of redness and/or skin breakdown  2. Assess vascular access sites hourly  3. Every 4-6 hours minimum:  Change oxygen saturation probe site  4. Every 4-6 hours:  If on nasal continuous positive airway pressure, respiratory therapy assess nares and determine need for appliance change or resting period.   4/26/2022 1733 by Ana Greer RN  Outcome: Progressing  4/26/2022 0439 by Romana Robins, RN  Outcome: Progressing     Problem: Safety - Adult  Goal: Free from fall injury  4/26/2022 1733 by Ana Greer RN  Outcome: Progressing  4/26/2022 0439 by Romana Robins, RN  Outcome: Progressing     Problem: ABCDS Injury Assessment  Goal: Absence of physical injury  4/26/2022 1733 by Ana Greer RN  Outcome: Progressing  4/26/2022 0439 by Romana Robins, RN  Outcome: Progressing     Problem: Pain  Goal: Verbalizes/displays adequate comfort level or baseline comfort level  4/26/2022 1733 by Ana Greer RN  Outcome: Progressing  4/26/2022 0439 by Romana Robins, RN  Outcome: Progressing

## 2022-04-26 NOTE — PROGRESS NOTES
PROGRESS NOTE      PATIENT NAME: [address] [place] [address] RECORD NO. [id number]  DATE: 2022  SURGEON: Dr. Dawson Sow: Love Cruz, DONG - CNP    HD: # 5    ASSESSMENT    Patient Active Problem List   Diagnosis    Acute thromboembolism of deep veins of lower extremity ([address]    Long term current use of anticoagulant therapy    Bilateral cellulitis of lower leg    Acute shoulder pain due to trauma    Lymphedema of both lower extremities    Tobacco abuse    History of recurrent deep vein thrombosis (DVT)    Gross hematuria    Frequency of urination    Nocturia    Mixed incontinence    Constipation    Cellulitis of lower leg    Post-phlebitic syndrome    Elephantiasis nostra verrucosa    Cellulitis of left lower extremity    Complicated UTI (urinary tract infection)    Renal calculus    Cellulitis    Normocytic anemia    Iron deficiency anemia    Renal abscess, right    Bacteremia due to Klebsiella pneumoniae    Psoas muscle abscess (HCC)    Septicemia due to Klebsiella pneumoniae (HCC)    Ureteral calculus    Intra-abdominal abscess (HCC)    Obesity, Class III, BMI 40-49.9 (morbid obesity) (HCC)    Hypocalcemia    Hypokalemia    Vertebral osteomyelitis (HCC) T12-L1    Pleural effusion on right    CRP elevated       MEDICAL DECISION MAKING AND PLAN    Perinephric and perihepatic abscesses   S/p IR drainage   Pelvic abscess    Increased in size, per IR not drainable    xarelto holding,   plan will be rescan abd ct on Friday MORNING to determine operative or IR planning          OBJECTIVE  VITALS: Temp: Temp: 97.1 °F (36.2 °C)Temp  Av.1 °F (36.7 °C)  Min: 97.1 °F (36.2 °C)  Max: 99 °F (39.8 °C) BP Systolic (51TPO), ANTONIA:019 , Min:108 , SVETLANA:597   Diastolic (39IIM), JSL:43, Min:63, Max:86   Pulse Pulse  Av.9  Min: 54  Max: 74 Resp Resp  Av.1  Min: 16  Max: 22 Pulse ox SpO2  Av.1 %  Min: 92 %  Max: 100 %       Awake, alert, comfortable  No abd pain, tolerating po, lombardo, bm  Drains min amount purulent white  Bilateral leg stasis changes        I/O last 3 completed shifts: In: 1060 [P.O.:860; IV Piggyback:200]  Out: 5010 [Urine:4900; Drains:110]    Drain/tube output: In: 5375 [P.O.:1220]  Out: 1309 [Urine:6550; Drains:110]    LAB:  CBC:   Recent Labs     04/25/22  0700 04/25/22  1833 04/26/22  0539   WBC 12.3* 11.3 10.1   HGB 7.9* 7.9* 7.7*   HCT 26.3* 26.6* 24.8*   MCV 92.9 94.0 90.8   * 577* 496*     BMP:   Recent Labs     04/24/22  0425 04/25/22  0700 04/26/22  0539    138 138   K 4.6 4.1 3.5*    105 101   CO2 24 25 29   BUN 16 13 11   CREATININE 0.90 0.85 0.91*   GLUCOSE 118* 106* 111*     COAGS:   No results for input(s): APTT, PROT, INR in the last 72 hours. Radiology:  No results found. DONG ABRAHAM CNP  4/26/2022  3:23 PM         Attending Note      I have reviewed the above [facility] note(s) and I either performed the key elements of the medical history and physical exam or was present with the resident when the key elements of the medical history and physical exam were performed. I have discussed the findings, established the care plan and recommendations with Resident, WILLIAM RN, bedside nurse.     DONG Braun CNP  4/26/2022  3:23 PM

## 2022-04-26 NOTE — PROCEDURES
History/labs/allergies reviewed    Benefits include stable long term intravenous access. Risks include failure to obtain the desired result(s) of the procedure, discomfort, injury, the need for additional therapies, permanent loss of body function, bleeding from the site, arterial puncture, air embolism, nerve damage, hematoma, phlebitis, catheter fracture/rupture, catheter embolism, catheter occlusion, catheter migration, catheter site infection, unintentional/accidental removal of catheter, bloodstream infection, infiltration, cardiac arrhythmia, vein thrombosis, difficult catheter removal, pleural effusion, pericardial effusion/cardiac tamponade, and death. Alternatives discussed including centrally inserted central catheter, as well as less invasive procedures such as multiple peripheral IVs, extended dwell catheters and midline placement    Consent signed and obtained by proceduralist  Placed by NORIS Ponce RN  Assisted by NICOLASA  Consent signed and obtained by physician  Time out performed using two identifiers  Catheter type single lumen picc  Product type: Bard 4 Fr single lumen PowerPicc  Lot # [id number]  Expiration date 5/31/2023  Catheter size 4 [place] Islander  Trimmed at 40 cm  Total length inserted 39 cm  External catheter length 1 cm  Location right basilic vein  Number of attempts 1  Estimated blood loss 1 ml  Pre procedure cardiac Rhythm normal sinus rhythm per bedside telemetry and vps rhythm  Placement verified by- vps rhythm Max P wave noted by amplitude changes of the P wave, positive blood return, flushes easily  Special equipment used- ultrasound, micro-introducer technique and vps rhythm  Catheter secured with statlock  Dressing applied- Tegaderm CHG  Lidocaine administered intradermally conc. 1% 2 mL  PICC line education:   [ x ] Discussed with patient/Family or POA prior to signing Informed Procedural Consent.   Risks and Benefits along with reason for procedure were discussed and teaching was reinforced with an education handout on PICC insertion. [facility] FAQ Catheter Associated Blood Stream Infections and [address] [facility] [id number] REV. 7/13 Nursing and Booklet left at bedside or in chart. Patient (Family or POA) acknowledged understanding of information taught and agreed to procedure. [  ] Was not discussed with patient/family or POA due to pts medical status at time of procedure. pts family or POA not available to discuss PICC education.  [facility] FAQ Catheter Associated Blood Stream Infections and [address] [facility] REV. 7/13 Nursing and Booklet left at bedside or in chart        Marty Meade RN

## 2022-04-26 NOTE — PROGRESS NOTES
[facility]  Progress Note    Cheryl Delgado  MRN: [id number]  Date: 4/26/2022  LOS: 5     Reason for F/U :   Abdominal abscess, vertebral osteomyelitis, pyelonephritis/pyelitis    Impression :   1. Multiple perihepatic abscesses, retroperitoneal abscess on the right psoas, and abscess in pelvic cul-de-sac  2. 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 Free air in the abdomen,  4. Osteomyelitis at T12-L1 due to direct extension from abscess over psoas muscle  5. bandemia  6. History of complicated UTI in January 2022, positive for Klebsiella pneumoniae  7. History of septicemia in January 2022, positive for Klebsiella pneumoniae  8. History of right psoas abscess drained in January 2022  9. History of right ureteral and renal stones with ureteral stent placement in March 2022 with stent exchanged in April 2020    Recommendations:   Per micro lab, no GNR and no anaerobes, no SA in the abd fluid 4/21/22  Only strep and enterococcus fecium R amp but S vanco      On another fluid cx 4/21 she had E fecium S amp and vanco     Plan:  · Stop fluconazole used to treat UTI - it can not eradicate the yeast of the urine as long as she is on other AB. · Stop zosyn and place a picc for vanco  · CT AP w pelvic abscess not amenable to drainage by IR - await surg decision from [address] [place] [address]  · Keep vanco and watch creat tiw - x 4 weeks and follow w zyvox 2 weeks then repeat CT AP to ck on resolution of the abscesses  · PICC line        Infection Control Recommendations:   Universal precautions    Discharge Planning:   Estimated Length of IV antimicrobials:  To be determined  Patient will need Midline Catheter Insertion/ PICC line Insertion: No  Patient will need: Home IV , [facility],  LTAC: Undetermined  Patient willneed outpatient wound care: No    Medical Decision making / Summary of Stay:   Derek Clay is a 74y.o.-year-old female presenting as a transfer from an outside facility due to conern for bowel perforation and multiple intra-abdominal abscesses. Carlee Hawthorne was previously hospitalized in January 2022 with Klebsiella pneumoniae sepsis related to a perinephric abscess. She did have right-sided severely obstructive pyelonephritis for which she underwent stent placement as well as a right percutaneous nephrostomy tube placement. This infection was complicated by perinephric/psoas abscess which was aspirated and this drain was placed. Patient was seen by my partner Dr. Karri Benavides and was discharged on intravenous antimicrobial therapy with Rocephin 2 g daily through February 2, 2022  The patient on 3/1/2022 underwent a cystoscopy with right-sided ureteroscopy with holmium laser lithotripsy and right-sided ureteral stent placement  The patient underwent a second urological procedure on 4/5/2022 with a cystoscopy, right-sided ureteroscopy, right holmium laser lithotripsy and right ureteral stent exchange and the patient was found to have a copious amount of calculi which were crushed up and further ablated.     The patient reports that ever since she had the second urological procedure she has had generalized malaise/fatigue and more recently progressing abdominal pain over the last several days. Initial lactic acid was 4.4 and treated with fluid bolus.  Initial potassium was 2.8, now 3.6 after replacement.     A CT scan of the abdomen and pelvis 4/20/2022 showed gas within both the right renal parenchyma and proximal right collecting system and increased size of the previous right retroperitoneal abscess tracking along the psoas muscle and there is abscess tracking along the posterior course of the drainage catheter through the right quadratus lumbar muscle and along the right posterior paraspinal musculature  There are multifocal rim-enhancing mixed Greg fluid collections worrisome for abscess within the dominant collections of the perihepatic region spanning up to 15 cm within the pelvic cul-de-sac spanning up to 7 cm. There is free air along the mesentery in addition to the a forementioned fluid collections. Discitis/osteomyelitis at T12-L1 with direct extension from the adjacent right psoas inflammatory change. Loculated right pleural effusion     The patient has been admitted and started on broad-spectrum antimicrobial therapy and ultrasound-guided placement of 12 Montenegrin drain in the perihepatic abscess with 550 mL of green following purulent material aspirated, and a CT-guided placement of right perinephric and deep pelvic abscess drainage catheters with 5 mL of purulent fluid removed from each collection sent for diagnostic tests. 22 - Urology performed cystoscopy with rt sided stent placement, and right retrograde pyelogram.    22 - CT abd/pelvis shows near resolution of the 3 abscess accessed for drainage. Perihepatic abscess was 13.7 x 10.3 cm no 3.0 x 2.4cm. Enlargement of the 10.8 x 7.5 cm presumed abscess in right central pelvis, not able to access for percutaneous drainage. Current evaluation:2022    BP (!) 141/72   Pulse 59   Temp 98.3 °F (36.8 °C) (Oral)   Resp 19   Ht 5' 1\" (1.549 m)   Wt 262 lb 5.6 oz (119 kg)   LMP  (LMP Unknown)   SpO2 99%   BMI 49.57 kg/m²     Temperature Range: Temp: 98.3 °F (36.8 °C) Temp  Av °F (36.7 °C)  Min: 97.4 °F (36.3 °C)  Max: 98.3 °F (36.8 °C)    Patient seen at bedside today. Afebrile, vital signs stable. Patient reports no new complaints. 3 drains still present, with minimal drainage in bulbs.   PICC line still pending.    - Abscess culture- MANY NEUTROPHILS Abnormal MODERATE GRAM POSITIVE COCCI IN PAIRS Abnormal FEW GRAM POSITIVE RODS Abnormal Culture ENTEROCOCCUS FAECIUM LIGHT GROWTH Abnormal VIRIDANS STREPTOCOCCUS GROUP LIGHT GROWTH Susceptibilities have not been performed. Notify the laboratory within 7 days for further workup if clinically indicated. Abnormal     Abd soft and abd abscess cx are still pend, mixed bacteriae - clusters but no clear SA yet. Review of Systems   Constitutional: Negative for chills. HENT: Negative for congestion. Eyes: Negative for photophobia, discharge and visual disturbance. Respiratory: Negative for apnea, cough and shortness of breath. Cardiovascular: Negative for chest pain. Gastrointestinal: Negative for abdominal distention. Endocrine: Negative for cold intolerance. Genitourinary: Negative for dysuria and flank pain. Musculoskeletal: Positive for arthralgias and back pain. Skin: Negative for color change. Allergic/Immunologic: Negative for environmental allergies. Neurological: Negative for dizziness, tremors, syncope and headaches. Hematological: Negative for adenopathy. Psychiatric/Behavioral: Negative for agitation. Physical Examination :     Physical Exam  Constitutional:       General: She is not in acute distress. Appearance: Normal appearance. She is obese. She is not ill-appearing. HENT:      Head: Normocephalic and atraumatic. Nose: Nose normal.      Mouth/Throat:      Mouth: Mucous membranes are moist.   Eyes:      General: No scleral icterus. Conjunctiva/sclera: Conjunctivae normal.   Cardiovascular:      Rate and Rhythm: Normal rate and regular rhythm. Pulses: Normal pulses. Pulmonary:      Effort: Pulmonary effort is normal.      Breath sounds: Normal breath sounds. No wheezing. Abdominal:      General: Abdomen is flat. Bowel sounds are normal.      Palpations: Abdomen is soft. Comments: 2 right-sided LAURIE drains, 1 left-sided LAURIE drain   Genitourinary:     Comments: Urine magalys  Musculoskeletal:         General: No swelling or tenderness. Normal range of motion.       Cervical back: Neck supple. No rigidity or tenderness. Right lower leg: Edema present. Left lower leg: Edema present. Skin:     General: Skin is warm and dry. Coloration: Skin is not jaundiced or pale. Findings: No bruising or erythema. Neurological:      General: No focal deficit present. Mental Status: She is alert and oriented to person, place, and time. Mental status is at baseline. Psychiatric:         Mood and Affect: Mood normal.         Behavior: Behavior normal.         Thought Content: Thought content normal.         Laboratory data:   I have independently reviewed the followinglabs:  CBC with Differential:   Recent Labs     04/25/22  1833 04/26/22  0539   WBC 11.3 10.1   HGB 7.9* 7.7*   HCT 26.6* 24.8*   * 496*     BMP:   Recent Labs     04/25/22  0700 04/26/22  0539    138   K 4.1 3.5*    101   CO2 25 29   BUN 13 11   CREATININE 0.85 0.91*   MG 2.1 2.0     Hepatic Function Panel:   Recent Labs     04/25/22  0700 04/26/22  0539   LABALBU 2.0* 2.0*         Lab Results   Component Value Date    PROCAL 0.07 02/11/2020     Lab Results   Component Value Date    CRP 52.0 02/11/2020     Lab Results   Component Value Date    SEDRATE 91 (H) 02/11/2020         No results found for: DDIMER  Lab Results   Component Value Date    FERRITIN 42 01/23/2021     No results found for: LDH  No results found for: FIBRINOGEN    Results in Past 30 Days  Result Component Current Result Ref Range Previous Result Ref Range   SARS-CoV-2, Rapid Not Detected (4/20/2022) Not Detected Not in Time Range      Lab Results   Component Value Date    COVID19 Not Detected 04/20/2022    COVID19 Not Detected 01/12/2022    COVID19 Not Detected 01/03/2022       No results for input(s): VANCOTROUGH in the last 72 hours.     Imaging Studies:   US guided drainage  Impression:        Successful ultrasound-guided placement 12 Lithuanian drain in perihepatic   abscess.  Sample sent for culture and sensitivity.  550 mL of green foul-smelling purulent material was aspirated. Cultures:     Culture, Blood 2 [[id number]] Collected: 04/21/22 0215   Order Status: Completed Specimen: Blood Updated: 04/22/22 0230    Specimen Description . BLOOD    Special Requests LEFT AC 10ML    Culture NO GROWTH 1 DAY   Culture, Blood 1 [[id number]] Collected: 04/21/22 0216   Order Status: Completed Specimen: Blood Updated: 04/22/22 0229    Specimen Description . BLOOD    Special Requests RIGHT FOREARM 20ML    Culture NO GROWTH 1 DAY   Culture, Urine [[id number]] Collected: 04/21/22 0345   Order Status: Completed Specimen: Urine, clean catch Updated: 04/21/22 2150    Specimen Description . CLEAN CATCH URINE    Culture Candida albicans/dubliniensis 50 to 100,000 CFU/ML   Culture, Anaerobic and Aerobic [[id number]] (Abnormal) Collected: 04/21/22 1342   Order Status: Completed Specimen: Abscess Updated: 04/21/22 1549    Specimen Description . ABSCESS . ASPIRATE    Direct Exam MODERATE NEUTROPHILS Abnormal      MODERATE GRAM POSITIVE COCCI IN CLUSTERS Abnormal     Culture PENDING   Culture, Anaerobic and Aerobic [[id number]] (Abnormal) Collected: 04/21/22 1340   Order Status: Completed Specimen: Abscess Updated: 04/21/22 1548    Specimen Description . ABSCESS . ASPIRATE    Direct Exam FEW NEUTROPHILS Abnormal      MODERATE GRAM POSITIVE COCCI IN CLUSTERS Abnormal     Culture PENDING   Culture, Anaerobic and Aerobic [[id number]] (Abnormal) Collected: 04/21/22 1337   Order Status: Completed Specimen: Abscess Updated: 04/21/22 1529    Specimen Description . ABSCESS . ASPIRATE    Direct Exam MANY NEUTROPHILS Abnormal      MODERATE GRAM POSITIVE COCCI IN PAIRS Abnormal      FEW GRAM POSITIVE RODS Abnormal     Culture PENDING       Medications:      furosemide  40 mg IntraVENous BID    rivaroxaban  20 mg Oral Daily    lidocaine 1 % injection  5 mL IntraDERmal Once    sodium chloride flush  5-40 mL IntraVENous 2 times per day    famotidine (PEPCID) injection  20 mg IntraVENous BID    metoprolol tartrate  25 mg Oral BID    oxybutynin  15 mg Oral Daily    potassium chloride  20 mEq Oral QPM    sodium chloride flush  5-40 mL IntraVENous 2 times per day    vancomycin (VANCOCIN) intermittent dosing (placeholder)   Other RX Placeholder    vancomycin  1,250 mg IntraVENous Q24H    sodium chloride flush  10 mL IntraCATHeter BID         Madison Pham, MS4      I have discussed the care of the patient, including pertinent history and exam findings,  with the resident. I have seen and examined the patient and the key elements of all parts of the encounter have been performed by me. I agree with the assessment, plan and orders as documented by the resident.     Kyung Lara, Infectious Diseases

## 2022-04-26 NOTE — PROGRESS NOTES
Pharmacy Note     Enoxaparin Dose Adjustment    Logan Aviles is a 74 y.o. female. Pharmacist assessment of enoxaparin dose for VTE prophylaxis. Recent Labs     04/25/22  0700 04/26/22  0539   BUN 13 11       Recent Labs     04/25/22  0700 04/26/22  0539   CREATININE 0.85 0.91*       Estimated Creatinine Clearance: 71 mL/min (A) (based on SCr of 0.91 mg/dL (H)).       Height:   Ht Readings from Last 1 Encounters:   04/21/22 5' 1\" (1.549 m)     Weight:  Wt Readings from Last 1 Encounters:   04/26/22 262 lb 5.6 oz (119 kg)       The following enoxaparin dose has been adjusted based upon renal function and/or patient weight per P&T Guidelines:             Enoxaparin 40mg SC daily changed to Enoxaparin 30mg SC BID (patient weight 101-150 kg with CrCl 30 or above.)

## 2022-04-26 NOTE — PLAN OF CARE
Problem: Discharge Planning  Goal: Discharge to home or other facility with appropriate resources  4/26/2022 0439 by Panchito Javier RN  Outcome: Progressing  4/25/2022 1818 by Margot Dwyer RN  Outcome: Progressing     Problem: Skin/Tissue Integrity  Goal: Absence of new skin breakdown  Description: 1. Monitor for areas of redness and/or skin breakdown  2. Assess vascular access sites hourly  3. Every 4-6 hours minimum:  Change oxygen saturation probe site  4. Every 4-6 hours:  If on nasal continuous positive airway pressure, respiratory therapy assess nares and determine need for appliance change or resting period.   4/26/2022 0439 by Panchito Javier RN  Outcome: Progressing  4/25/2022 1818 by Margot Dwyer RN  Outcome: Progressing     Problem: Safety - Adult  Goal: Free from fall injury  4/26/2022 0439 by Panchito Javier RN  Outcome: Progressing  4/25/2022 1818 by Margot Dwyer RN  Outcome: Progressing     Problem: ABCDS Injury Assessment  Goal: Absence of physical injury  4/26/2022 0439 by Panchito Javier RN  Outcome: Progressing  4/25/2022 1818 by Margot Dwyer RN  Outcome: Progressing     Problem: Pain  Goal: Verbalizes/displays adequate comfort level or baseline comfort level  4/26/2022 0439 by Panchito Javier RN  Outcome: Progressing  4/25/2022 1818 by Margot Dwyer RN  Outcome: Progressing

## 2022-04-26 NOTE — CARE COORDINATION
Transitional Planning    Received call from [facility] [phone] at [facility]. They are able to accept patient, requesting update of when to start precert. [address] with [facility], [address]. Plan for surgery so pt will be here through the week. Updated [facility], she will hold off starting precert.

## 2022-04-27 ENCOUNTER — APPOINTMENT (OUTPATIENT)
Dept: GENERAL RADIOLOGY | Age: 67
DRG: 853 | End: 2022-04-27
Payer: MEDICARE

## 2022-04-27 LAB
ALBUMIN SERPL-MCNC: 2.2 G/DL (ref 3.5–5.2)
ANION GAP SERPL CALCULATED.3IONS-SCNC: 9 MMOL/L (ref 9–17)
BUN BLDV-MCNC: 10 MG/DL (ref 8–23)
CALCIUM SERPL-MCNC: 7.6 MG/DL (ref 8.6–10.4)
CHLORIDE BLD-SCNC: 103 MMOL/L (ref 98–107)
CO2: 30 MMOL/L (ref 20–31)
CREAT SERPL-MCNC: 0.85 MG/DL (ref 0.5–0.9)
CULTURE: ABNORMAL
DIRECT EXAM: ABNORMAL
DIRECT EXAM: ABNORMAL
GFR AFRICAN AMERICAN: >60 ML/MIN
GFR NON-AFRICAN AMERICAN: >60 ML/MIN
GFR SERPL CREATININE-BSD FRML MDRD: ABNORMAL ML/MIN/{1.73_M2}
GLUCOSE BLD-MCNC: 114 MG/DL (ref 70–99)
HCT VFR BLD CALC: 26 % (ref 36.3–47.1)
HEMOGLOBIN: 8 G/DL (ref 11.9–15.1)
MAGNESIUM: 1.9 MG/DL (ref 1.6–2.6)
MCH RBC QN AUTO: 28.2 PG (ref 25.2–33.5)
MCHC RBC AUTO-ENTMCNC: 30.8 G/DL (ref 28.4–34.8)
MCV RBC AUTO: 91.5 FL (ref 82.6–102.9)
NRBC AUTOMATED: 0.3 PER 100 WBC
PDW BLD-RTO: 16.3 % (ref 11.8–14.4)
PHOSPHORUS: 2.7 MG/DL (ref 2.6–4.5)
PLATELET # BLD: 519 K/UL (ref 138–453)
PMV BLD AUTO: 9.3 FL (ref 8.1–13.5)
POTASSIUM SERPL-SCNC: 3.6 MMOL/L (ref 3.7–5.3)
RBC # BLD: 2.84 M/UL (ref 3.95–5.11)
SODIUM BLD-SCNC: 142 MMOL/L (ref 135–144)
SPECIMEN DESCRIPTION: ABNORMAL
WBC # BLD: 9.2 K/UL (ref 3.5–11.3)

## 2022-04-27 PROCEDURE — 99232 SBSQ HOSP IP/OBS MODERATE 35: CPT | Performed by: INTERNAL MEDICINE

## 2022-04-27 PROCEDURE — 83735 ASSAY OF MAGNESIUM: CPT

## 2022-04-27 PROCEDURE — 36415 COLL VENOUS BLD VENIPUNCTURE: CPT

## 2022-04-27 PROCEDURE — 85027 COMPLETE CBC AUTOMATED: CPT

## 2022-04-27 PROCEDURE — 80069 RENAL FUNCTION PANEL: CPT

## 2022-04-27 PROCEDURE — 2060000000 HC ICU INTERMEDIATE R&B

## 2022-04-27 PROCEDURE — 97535 SELF CARE MNGMENT TRAINING: CPT

## 2022-04-27 PROCEDURE — 6370000000 HC RX 637 (ALT 250 FOR IP): Performed by: NURSE PRACTITIONER

## 2022-04-27 PROCEDURE — 71045 X-RAY EXAM CHEST 1 VIEW: CPT

## 2022-04-27 PROCEDURE — 2580000003 HC RX 258: Performed by: INTERNAL MEDICINE

## 2022-04-27 PROCEDURE — 6370000000 HC RX 637 (ALT 250 FOR IP): Performed by: INTERNAL MEDICINE

## 2022-04-27 PROCEDURE — 6370000000 HC RX 637 (ALT 250 FOR IP): Performed by: STUDENT IN AN ORGANIZED HEALTH CARE EDUCATION/TRAINING PROGRAM

## 2022-04-27 PROCEDURE — 6360000002 HC RX W HCPCS: Performed by: PHYSICIAN ASSISTANT

## 2022-04-27 PROCEDURE — 2580000003 HC RX 258: Performed by: STUDENT IN AN ORGANIZED HEALTH CARE EDUCATION/TRAINING PROGRAM

## 2022-04-27 PROCEDURE — 6360000002 HC RX W HCPCS: Performed by: STUDENT IN AN ORGANIZED HEALTH CARE EDUCATION/TRAINING PROGRAM

## 2022-04-27 PROCEDURE — 99232 SBSQ HOSP IP/OBS MODERATE 35: CPT | Performed by: PHYSICIAN ASSISTANT

## 2022-04-27 RX ORDER — ENOXAPARIN SODIUM 100 MG/ML
90 INJECTION SUBCUTANEOUS ONCE
Status: COMPLETED | OUTPATIENT
Start: 2022-04-27 | End: 2022-04-27

## 2022-04-27 RX ORDER — ENOXAPARIN SODIUM 150 MG/ML
1 INJECTION SUBCUTANEOUS 2 TIMES DAILY
Status: DISCONTINUED | OUTPATIENT
Start: 2022-04-27 | End: 2022-04-29

## 2022-04-27 RX ORDER — TAMSULOSIN HYDROCHLORIDE 0.4 MG/1
0.4 CAPSULE ORAL DAILY
Qty: 30 CAPSULE | Refills: 0 | Status: ON HOLD | OUTPATIENT
Start: 2022-04-27 | End: 2022-08-11

## 2022-04-27 RX ADMIN — SODIUM CHLORIDE, PRESERVATIVE FREE 10 ML: 5 INJECTION INTRAVENOUS at 08:25

## 2022-04-27 RX ADMIN — ENOXAPARIN SODIUM 90 MG: 100 INJECTION SUBCUTANEOUS at 17:31

## 2022-04-27 RX ADMIN — SODIUM CHLORIDE, PRESERVATIVE FREE 10 ML: 5 INJECTION INTRAVENOUS at 20:40

## 2022-04-27 RX ADMIN — FAMOTIDINE 20 MG: 20 TABLET, FILM COATED ORAL at 08:23

## 2022-04-27 RX ADMIN — SODIUM CHLORIDE, PRESERVATIVE FREE 10 ML: 5 INJECTION INTRAVENOUS at 20:41

## 2022-04-27 RX ADMIN — OXYBUTYNIN CHLORIDE 15 MG: 10 TABLET, EXTENDED RELEASE ORAL at 08:23

## 2022-04-27 RX ADMIN — FAMOTIDINE 20 MG: 20 TABLET, FILM COATED ORAL at 20:38

## 2022-04-27 RX ADMIN — SODIUM CHLORIDE, PRESERVATIVE FREE 10 ML: 5 INJECTION INTRAVENOUS at 11:21

## 2022-04-27 RX ADMIN — VANCOMYCIN HYDROCHLORIDE 1250 MG: 10 INJECTION, POWDER, LYOPHILIZED, FOR SOLUTION INTRAVENOUS at 22:55

## 2022-04-27 RX ADMIN — ENOXAPARIN SODIUM 30 MG: 100 INJECTION SUBCUTANEOUS at 08:23

## 2022-04-27 RX ADMIN — SODIUM CHLORIDE, PRESERVATIVE FREE 10 ML: 5 INJECTION INTRAVENOUS at 20:39

## 2022-04-27 RX ADMIN — ENOXAPARIN SODIUM 120 MG: 120 INJECTION SUBCUTANEOUS at 21:50

## 2022-04-27 RX ADMIN — POTASSIUM CHLORIDE 20 MEQ: 1500 TABLET, EXTENDED RELEASE ORAL at 17:31

## 2022-04-27 RX ADMIN — IBUPROFEN 600 MG: 600 TABLET, FILM COATED ORAL at 08:23

## 2022-04-27 ASSESSMENT — ENCOUNTER SYMPTOMS
COUGH: 0
ABDOMINAL DISTENTION: 0
FACIAL SWELLING: 0
SHORTNESS OF BREATH: 0
APNEA: 0
COLOR CHANGE: 0
PHOTOPHOBIA: 0
BACK PAIN: 1
EYE DISCHARGE: 0

## 2022-04-27 NOTE — PLAN OF CARE
Problem: Discharge Planning  Goal: Discharge to home or other facility with appropriate resources  4/26/2022 2350 by Ronny Daniels RN  Outcome: Progressing  4/26/2022 1733 by Annie Hebert RN  Outcome: Progressing     Problem: Skin/Tissue Integrity  Goal: Absence of new skin breakdown  Description: 1. Monitor for areas of redness and/or skin breakdown  2. Assess vascular access sites hourly  3. Every 4-6 hours minimum:  Change oxygen saturation probe site  4. Every 4-6 hours:  If on nasal continuous positive airway pressure, respiratory therapy assess nares and determine need for appliance change or resting period.   4/26/2022 2350 by Ronny Daniels RN  Outcome: Progressing  4/26/2022 1733 by Annie Hebert RN  Outcome: Progressing     Problem: Safety - Adult  Goal: Free from fall injury  4/26/2022 2350 by Ronny Daniels RN  Outcome: Progressing  4/26/2022 1733 by Annie Hebert RN  Outcome: Progressing     Problem: ABCDS Injury Assessment  Goal: Absence of physical injury  4/26/2022 2350 by Ronny Daniels RN  Outcome: Progressing  4/26/2022 1733 by Annie Hebert RN  Outcome: Progressing     Problem: Pain  Goal: Verbalizes/displays adequate comfort level or baseline comfort level  4/26/2022 2350 by Ronny Daniels RN  Outcome: Progressing  4/26/2022 1733 by Annie Hebert RN  Outcome: Progressing

## 2022-04-27 NOTE — PROGRESS NOTES
Physical Therapy        Physical Therapy Cancel Note      DATE: 2022    NAME: Len Calderon  MRN: [id number]   : 1955      Patient not seen this date for Physical Therapy due to:    Patient Declined: states wants to rest. encouraged and edu on importance of mobility but pt continued to decline.  ck       Electronically signed by Eli Quiros PT on [id number] at 3:15 PM

## 2022-04-27 NOTE — PROGRESS NOTES
[facility]  Progress Note    Taya Rosas  MRN: [id number]  Date: 4/27/2022  LOS: 6     Reason for F/U :   Abdominal abscess, vertebral osteomyelitis, pyelonephritis/pyelitis    Impression :   1. Multiple perihepatic abscesses, retroperitoneal abscess on the right psoas, and abscess in pelvic cul-de-sac  2. 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 Free air in the abdomen,  4. Osteomyelitis at T12-L1 due to direct extension from abscess over psoas muscle  5. bandemia  6. History of complicated UTI in January 2022, positive for Klebsiella pneumoniae  7. History of septicemia in January 2022, positive for Klebsiella pneumoniae  8. History of right psoas abscess drained in January 2022  9. History of right ureteral and renal stones with ureteral stent placement in March 2022 with stent exchanged in April 2020    Recommendations:   Per micro lab, no GNR and no anaerobes, no SA in the abd fluid 4/21/22  Only strep and enterococcus fecium R amp but S vanco      On another fluid cx 4/21 she had E fecium S amp and vanco     Plan:  · Stopped fluconazole used to treat UTI - it can not eradicate the yeast of the urine as long as she is on other AB. · CT AP w pelvic abscess not amenable to drainage by IR - Gen surg are going to repeat CT on Friday to determine if abscess will be operative or should be drained by IR  · Keep vanco and watch creat tiw - x 4 weeks and follow w zyvox 2 weeks then repeat CT AP to check on resolution of the abscesses  · PICC line placed on 4/26. Infection Control Recommendations:   Universal precautions    Discharge Planning:   Estimated Length of IV antimicrobials:  To be determined  Patient will need Midline Catheter Insertion/ PICC line Insertion: No  Patient will need: Home IV , [place],  [facility],  LTAC: Undetermined  Patient willneed outpatient wound care: No    Medical Decision making / Summary of Stay:   Michel Hyde is a 74y.o.-year-old female presenting as a transfer from an outside facility due to conern for bowel perforation and multiple intra-abdominal abscesses. Macy Rios was previously hospitalized in January 2022 with Klebsiella pneumoniae sepsis related to a perinephric abscess. She did have right-sided severely obstructive pyelonephritis for which she underwent stent placement as well as a right percutaneous nephrostomy tube placement. This infection was complicated by perinephric/psoas abscess which was aspirated and this drain was placed. Patient was seen by my partner Dr. Colt Mcdermott and was discharged on intravenous antimicrobial therapy with Rocephin 2 g daily through February 2, 2022  The patient on 3/1/2022 underwent a cystoscopy with right-sided ureteroscopy with holmium laser lithotripsy and right-sided ureteral stent placement  The patient underwent a second urological procedure on 4/5/2022 with a cystoscopy, right-sided ureteroscopy, right holmium laser lithotripsy and right ureteral stent exchange and the patient was found to have a copious amount of calculi which were crushed up and further ablated.     The patient reports that ever since she had the second urological procedure she has had generalized malaise/fatigue and more recently progressing abdominal pain over the last several days. Initial lactic acid was 4.4 and treated with fluid bolus.  Initial potassium was 2.8, now 3.6 after replacement.     A CT scan of the abdomen and pelvis 4/20/2022 showed gas within both the right renal parenchyma and proximal right collecting system and increased size of the previous right retroperitoneal abscess tracking along the psoas muscle and there is abscess tracking along the posterior course of the drainage catheter through the right quadratus lumbar muscle and along the right posterior paraspinal musculature  There are multifocal rim-enhancing mixed Greg fluid collections worrisome for abscess within the dominant collections of the perihepatic region spanning up to 15 cm within the pelvic cul-de-sac spanning up to 7 cm. There is free air along the mesentery in addition to the a forementioned fluid collections. Discitis/osteomyelitis at T12-L1 with direct extension from the adjacent right psoas inflammatory change. Loculated right pleural effusion     The patient has been admitted and started on broad-spectrum antimicrobial therapy and ultrasound-guided placement of 12 Dutch drain in the perihepatic abscess with 550 mL of green following purulent material aspirated, and a CT-guided placement of right perinephric and deep pelvic abscess drainage catheters with 5 mL of purulent fluid removed from each collection sent for diagnostic tests. 22 - Urology performed cystoscopy with rt sided stent placement, and right retrograde pyelogram.    22 - CT abd/pelvis shows near resolution of the 3 abscess accessed for drainage. Perihepatic abscess was 13.7 x 10.3 cm no 3.0 x 2.4cm. Enlargement of the 10.8 x 7.5 cm presumed abscess in right central pelvis, not able to access for percutaneous drainage. Current evaluation:2022    BP (!) 149/62   Pulse 65   Temp 98.1 °F (36.7 °C) (Oral)   Resp 22   Ht 5' 1\" (1.549 m)   Wt 262 lb 5.6 oz (119 kg)   LMP  (LMP Unknown)   SpO2 94%   BMI 49.57 kg/m²     Temperature Range: Temp: 98.1 °F (36.7 °C) Temp  Av.2 °F (36.8 °C)  Min: 97.1 °F (36.2 °C)  Max: 99 °F (37.2 °C)    - Afebrile, BP on the higher side otherwise other vitals stable  - Did not have a good sleep overnight  - Weaned down to room air yesterday, back on 1L NC. Feeling much better now.   - Gen surgery seen patient yesterday, they are planning to do a repeat CT on Friday to determine further plan (surgery vs IR drainage for the abscess)  - Labs reviewed:        WBC 11.3> 10.1> 9.2       creatinine 0.91> 0.85       hemoglobin 7.9> 7.7> 8.0       platelets 560> 695  - PICC placed on 4/26. - Abscess culture- MANY NEUTROPHILS Abnormal MODERATE GRAM POSITIVE COCCI IN PAIRS Abnormal FEW GRAM POSITIVE RODS Abnormal Culture ENTEROCOCCUS FAECIUM LIGHT GROWTH Abnormal VIRIDANS STREPTOCOCCUS GROUP LIGHT GROWTH Susceptibilities have not been performed. Notify the laboratory within 7 days for further workup if clinically indicated. Abnormal       Review of Systems   Constitutional: Negative for chills. HENT: Negative for congestion, facial swelling and hearing loss. Eyes: Negative for photophobia, discharge and visual disturbance. Respiratory: Negative for apnea, cough and shortness of breath. Cardiovascular: Negative for chest pain and palpitations. Gastrointestinal: Negative for abdominal distention. Endocrine: Negative for cold intolerance. Genitourinary: Negative for dysuria and flank pain. Musculoskeletal: Positive for arthralgias and back pain. Skin: Negative for color change. Allergic/Immunologic: Negative for environmental allergies. Neurological: Positive for weakness. Negative for dizziness, tremors, syncope and headaches. Hematological: Negative for adenopathy. Psychiatric/Behavioral: Negative for agitation. Physical Examination :     Physical Exam  Constitutional:       General: She is not in acute distress. Appearance: Normal appearance. She is obese. She is not ill-appearing. HENT:      Head: Normocephalic and atraumatic. Nose: Nose normal.      Mouth/Throat:      Mouth: Mucous membranes are moist.   Eyes:      General: No scleral icterus. Conjunctiva/sclera: Conjunctivae normal.   Cardiovascular:      Rate and Rhythm: Normal rate and regular rhythm. Pulses: Normal pulses.    Pulmonary:      Effort: Pulmonary effort is normal.      Breath sounds: Normal breath sounds. No wheezing. Abdominal:      General: Abdomen is flat. Bowel sounds are normal.      Palpations: Abdomen is soft. Comments: 2 right-sided LAURIE drains   Genitourinary:     Comments: Brooks catheter in place    Musculoskeletal:         General: No swelling or tenderness. Normal range of motion. Cervical back: Neck supple. No rigidity or tenderness. Right lower leg: Edema present. Left lower leg: Edema present. Skin:     General: Skin is warm and dry. Coloration: Skin is not jaundiced or pale. Findings: No bruising or erythema. Neurological:      General: No focal deficit present. Mental Status: She is alert and oriented to person, place, and time. Mental status is at baseline. Psychiatric:         Mood and Affect: Mood normal.         Behavior: Behavior normal.         Thought Content:  Thought content normal.         Laboratory data:   I have independently reviewed the followinglabs:  CBC with Differential:   Recent Labs     04/26/22  0539 04/27/22 0456   WBC 10.1 9.2   HGB 7.7* 8.0*   HCT 24.8* 26.0*   * 519*     BMP:   Recent Labs     04/26/22  0539 04/27/22  0456    142   K 3.5* 3.6*    103   CO2 29 30   BUN 11 10   CREATININE 0.91* 0.85   MG 2.0 1.9     Hepatic Function Panel:   Recent Labs     04/26/22  0539 04/27/22 0456   LABALBU 2.0* 2.2*         Lab Results   Component Value Date    PROCAL 0.07 02/11/2020     Lab Results   Component Value Date    CRP 52.0 02/11/2020     Lab Results   Component Value Date    SEDRATE 91 (H) 02/11/2020         No results found for: DDIMER  Lab Results   Component Value Date    FERRITIN 173 04/26/2022    FERRITIN 42 01/23/2021     No results found for: LDH  No results found for: FIBRINOGEN    Results in Past 30 Days  Result Component Current Result Ref Range Previous Result Ref Range   SARS-CoV-2, Rapid Not Detected (4/20/2022) Not Detected Not in Time Range      Lab Results   Component Value Date    COVID19 Not Detected 04/20/2022    COVID19 Not Detected 01/12/2022    COVID19 Not Detected 01/03/2022       No results for input(s): JOYCE in the last 72 hours. Imaging Studies:   US guided drainage  Impression:        Successful ultrasound-guided placement 12 Serbian drain in perihepatic   abscess.  Sample sent for culture and sensitivity.  550 mL of green   foul-smelling purulent material was aspirated. Cultures:     Culture, Blood 2 [[id number]] Collected: 04/21/22 0215   Order Status: Completed Specimen: Blood Updated: 04/22/22 0230    Specimen Description . BLOOD    Special Requests LEFT AC 10ML    Culture NO GROWTH 1 DAY   Culture, Blood 1 [[id number]] Collected: 04/21/22 0216   Order Status: Completed Specimen: Blood Updated: 04/22/22 0229    Specimen Description . BLOOD    Special Requests RIGHT FOREARM 20ML    Culture NO GROWTH 1 DAY   Culture, Urine [[id number]] Collected: 04/21/22 0345   Order Status: Completed Specimen: Urine, clean catch Updated: 04/21/22 2150    Specimen Description . CLEAN CATCH URINE    Culture Candida albicans/dubliniensis 50 to 100,000 CFU/ML   Culture, Anaerobic and Aerobic [[id number]] (Abnormal) Collected: 04/21/22 1342   Order Status: Completed Specimen: Abscess Updated: 04/21/22 1549    Specimen Description . ABSCESS . ASPIRATE    Direct Exam MODERATE NEUTROPHILS Abnormal      MODERATE GRAM POSITIVE COCCI IN CLUSTERS Abnormal     Culture PENDING   Culture, Anaerobic and Aerobic [[id number]] (Abnormal) Collected: 04/21/22 1340   Order Status: Completed Specimen: Abscess Updated: 04/21/22 1548    Specimen Description . ABSCESS . ASPIRATE    Direct Exam FEW NEUTROPHILS Abnormal      MODERATE GRAM POSITIVE COCCI IN CLUSTERS Abnormal     Culture PENDING   Culture, Anaerobic and Aerobic [[id number]] (Abnormal) Collected: 04/21/22 1337   Order Status: Completed Specimen: Abscess Updated: 04/21/22 1529    Specimen Description . ABSCESS . ASPIRATE    Direct Exam MANY NEUTROPHILS Abnormal  MODERATE GRAM POSITIVE COCCI IN PAIRS Abnormal      FEW GRAM POSITIVE RODS Abnormal     Culture PENDING       Medications:      enoxaparin  30 mg SubCUTAneous BID    famotidine  20 mg Oral BID    [Held by provider] rivaroxaban  20 mg Oral Daily    lidocaine 1 % injection  5 mL IntraDERmal Once    sodium chloride flush  5-40 mL IntraVENous 2 times per day    [Held by provider] metoprolol tartrate  25 mg Oral BID    oxybutynin  15 mg Oral Daily    potassium chloride  20 mEq Oral QPM    sodium chloride flush  5-40 mL IntraVENous 2 times per day    vancomycin (VANCOCIN) intermittent dosing (placeholder)   Other RX Placeholder    vancomycin  1,250 mg IntraVENous Q24H    sodium chloride flush  10 mL IntraCATHeter BID         Isaias Ridley MD  PGY-1, Internal Medicine Resident  [facility]            I have discussed the care of the patient, including pertinent history and exam findings,  with the resident. I have seen and examined the patient and the key elements of all parts of the encounter have been performed by me. I agree with the assessment, plan and orders as documented by the resident.     Kyung Lara, Infectious Diseases

## 2022-04-27 NOTE — PROGRESS NOTES
Occupational Therapy  Facility/Department: [address] STEPDOWN  Occupational Therapy Daily Treatment Note    Name: Gabriella Whatley  : 1955  MRN: [id number]  Date of Service: 2022    Discharge Recommendations:  Patient would benefit from continued therapy after discharge    Patient Diagnosis(es): The encounter diagnosis was Intra-abdominal abscess ([address] Assessment   Performance deficits / Impairments: Decreased functional mobility ; Decreased endurance;Decreased ADL status; Decreased balance;Decreased high-level IADLs  Assessment: Pt participatine in OT treatment this session with good tolerance. Pt will continue to require OT services following discharge from [facility] hospital to address above deficits  Prognosis: Good  Activity Tolerance  Activity Tolerance: Patient Tolerated treatment well  Activity Tolerance: Pt tolerated 38 minute OT treatment of ADL, balance and bed mobility        Plan   Plan  Times per Week: 3-4x     Restrictions  Restrictions/Precautions  Restrictions/Precautions: Fall Risk,General Precautions  Required Braces or Orthoses?: No  Position Activity Restriction  Other position/activity restrictions: up with A, 2 drains    Subjective   General  Patient assessed for rehabilitation services?: Yes  Response to previous treatment: Patient with no complaints from previous session  Family / Caregiver Present: No  Diagnosis: Perforated bowel, perinephric & perihepatic abscesses,  cysto and stant placement  Subjective  Subjective: RN ok'd patient for OT treatment this afternoon. Pt supine in bed upon JENSEN arrival. Pt cooperative     Objective   Safety Devices  Type of Devices: Call light within reach; All fall risk precautions in place;Nurse notified; Bed alarm in place; Left in bed  Restraints  Restraints Initially in Place: No   Pain: pt initially reports pain at 8/10 upon JENSEN arrival to room and once patient seated EOB for ~15 minutes pt reports pain has improved to 6/10.  Repositioned patient once returned to supine with patient verbal satisfaction. ADL  Grooming: Setup; Increased time to complete;Supervision (Seated EOB for balance while unsupported. Oral and hair care, wash face)  LE Dressing: Maximum assistance (don slipper socks)  Toileting:  (lombardo catheter present)  Additional Comments: Pt demonstrates increase activity tolerance this session. Pt reports she had been bathed this morning and fresh gown. Pt sat EOB with focus on grooming as noted and MAX A don slipper socks. No LOB while sitting unsupported. Pt sat EOB 25 minutes   Bed mobility  Rolling: R and L with heavy bed rail use following instructions and MIN>MOD A and mod verbal cues. Completed x2 rolls each direction. Supine to Sit: Moderate assistance (MIN A BLE off bed and MOD A trunk to upright position)  Sit to Supine: Moderate assistance (BLE onto bed and position)  Scooting: Minimal assistance  Comment: Pt required mod verbal instruction for body mechanics and hand placement with HOB raised ~45 degrees and bed rail use. At length education on body mechanics for all rolling and sup<>sit with good understanding verbal and demonstration.   Transfers  Transfer Comments: Pt declined standing and transfer to [facility] this afternoon due to sleepiness  Cognition  Overall Cognitive Status: [facility]   Education Provided: ADL Adaptive Strategies;Transfer Training  Education Provided Comments: Bed mobility and body mechanics to improve bed mobility with fair+ return demonstration  Education Method: Verbal;Demonstration  Education Outcome: Verbalized understanding    AM-PAC Score   AM-PAC Inpatient Daily Activity Raw Score: 15 (04/27/22 1610)  AM-PAC Inpatient ADL T-Scale Score : 34.69 (04/27/22 1610)  ADL Inpatient CMS 0-100% Score: 56.46 (04/27/22 1610)  ADL Inpatient CMS G-Code Modifier : CK (04/27/22 1610)    Goals  Short Term Goals  Time Frame for Short term goals: Pt will by discharge  Short Term Goal 1: demo good safety awareness during func mob around room using LRD PRN and SUP  Short Term Goal 2: demo ADL UB bathing/dressing activity at SUP with setup and increased time  Short Term Goal 3: demo ADL LB bathing/dressing activity at min A, using sock-aid/reacher PRN, with setup and increased time  Short Term Goal 4: demo SBA for all bed mobility using bed rails PRN  Short Term Goal 5: demo activity tolerance for 35 min+       Therapy Time   Individual Concurrent Group Co-treatment   Time In 1522         Time Out 1600         Minutes 38         Timed Code Treatment Minutes: 25 [facility], JENSEN/L

## 2022-04-27 NOTE — PROGRESS NOTES
[facility]  Office: [address], DO, Levell Ebbs, DO, Geoffrey List, DO, Adelaida Osuna Blood, DO, Kings Farley MD, Mirta Adams MD, Kelly Law MD, Claudia Simpson MD, Rula Orozco MD, Pebbles Scruggs MD, Dre Douglass MD, Timi Hawley, DO, Jez Infante, DO, Ashly Davis MD,  Milan Melendez, DO, Concha Silva MD, Raenette Felty, MD, Ny Gallardo MD, Bronwyn Lerma DO, Jed Gavin MD, Beulah Hedrick MD, Skye Verde, [facility], [facility], [facility], Jadon Waite, CNP, Mark Barroso, CNP, Yaya Walker, CNP, Abiola Muñoz, CNP, Bruce Neal, PA-C, Laila Marshall, DNP, Gloria Asters, CNP, Trav Spray, CNP, Diamond Garcia, CNP, Elier Croft, CNS, Denice Ruano, [facility], Tova Aguilar, CNP, Fred Limb, CNP, Nikolas Cosme, CNP         [facility] [place]    Progress Note    4/27/2022    10:26 AM    Name:   Jenni Baxter  MRN:     [id number]     [place]:      [de-identified]   Room:   [facility]1404-82   Day:  6  Admit Date:  4/21/2022  1:28 AM    PCP:   DONG Monson CNP  Code Status:  Full Code    Subjective:     C/C:   Chief Complaint   Patient presents with    Abdominal Pain     perf bowel     Interval History Status: not changed. Pt seen and evaluated this morning. She reports to me that she feels a little better today. She does complain of some nausea. She is denying abdominal pain, fever, chills. I discussed the plan to repeat scan of her abdomen on Friday to determine if she will need surgical intervention for her intra-abdominal abscess. Brief History:     67-year-old female transferred from outside hospital for bowel perforation with multiple intra-abdominal abscesses. Patient initially was treated on vancomycin and Zosyn with elevated lactic acid and was given sepsis bolus of IV fluids.   Patient was seen by multiple consultants including trauma surgery, general surgery and infectious disease and urology. Patient was recommended placement of multiple pigtail catheters for perihepatic and perinephric abscesses. Patient also had a successful placement of a pelvic abscess, patient the following day was seen by urology recommending replacement of right-sided stent and cystoscopy and right retrograde pyelogram.      Review of Systems:     Constitutional:  negative for chills, fevers, sweats  Respiratory:  negative for cough, dyspnea on exertion, shortness of breath, wheezing  Cardiovascular:  negative for chest pain, chest pressure/discomfort, lower extremity edema, palpitations  Gastrointestinal:  negative for abdominal pain, constipation, diarrhea, vomiting. + nausea. Neurological:  negative for dizziness, headache    Medications: Allergies:     Allergies   Allergen Reactions    Estrogens Other (See Comments)     Hx of DVT       Current Meds:   Scheduled Meds:    enoxaparin  1 mg/kg SubCUTAneous BID    famotidine  20 mg Oral BID    [Held by provider] rivaroxaban  20 mg Oral Daily    lidocaine 1 % injection  5 mL IntraDERmal Once    sodium chloride flush  5-40 mL IntraVENous 2 times per day    [Held by provider] metoprolol tartrate  25 mg Oral BID    oxybutynin  15 mg Oral Daily    potassium chloride  20 mEq Oral QPM    sodium chloride flush  5-40 mL IntraVENous 2 times per day    vancomycin (VANCOCIN) intermittent dosing (placeholder)   Other RX Placeholder    vancomycin  1,250 mg IntraVENous Q24H    sodium chloride flush  10 mL IntraCATHeter BID     Continuous Infusions:    sodium chloride      sodium chloride       PRN Meds: ibuprofen, sodium chloride flush, sodium chloride, morphine **OR** morphine, sodium chloride flush, sodium chloride, potassium chloride **OR** potassium alternative oral replacement **OR** potassium chloride, magnesium sulfate, ondansetron **OR** ondansetron, polyethylene glycol, acetaminophen **OR** acetaminophen, benzocaine-menthol    Data:     Past Medical History:   has a past medical history of Carcinoma ([address] Cellulitis, DVT (deep venous thrombosis) ([address] Kidney stone, Lymphedema, Morbid obesity ([address] Psoas abscess, right ([address] Pyelonephritis, and Wears glasses. Social History:   reports that she has been smoking cigarettes. She has a 21.00 pack-year smoking history. She has never used smokeless tobacco. She reports that she does not drink alcohol and does not use drugs. Family History:   Family History   Adopted: Yes   Problem Relation Age of Onset    Cancer Mother         The patient reports her biologic mother did have bladder cancer       Vitals:  BP (!) 149/62   Pulse 65   Temp 98.1 °F (36.7 °C) (Oral)   Resp 22   Ht 5' 1\" (1.549 m)   Wt 262 lb 5.6 oz (119 kg)   LMP  (LMP Unknown)   SpO2 94%   BMI 49.57 kg/m²   Temp (24hrs), Av °F (36.7 °C), Min:97.1 °F (36.2 °C), Max:98.9 °F (37.2 °C)    No results for input(s): POCGLU in the last 72 hours. I/O (24Hr):     Intake/Output Summary (Last 24 hours) at 2022 1026  Last data filed at 2022 2664  Gross per 24 hour   Intake 360 ml   Output 2210 ml   Net -1850 ml       Labs:  Hematology:  Recent Labs     22  1833 22  0539 22  0456   WBC 11.3 10.1 9.2   RBC 2.83* 2.73* 2.84*   HGB 7.9* 7.7* 8.0*   HCT 26.6* 24.8* 26.0*   MCV 94.0 90.8 91.5   MCH 27.9 28.2 28.2   MCHC 29.7 31.0 30.8   RDW 15.9* 16.0* 16.3*   * 496* 519*   MPV 9.1 9.0 9.3     Chemistry:  Recent Labs     22  0700 22  0539 22  0456    138 142   K 4.1 3.5* 3.6*    101 103   CO2 25 29 30   GLUCOSE 106* 111* 114*   BUN 13 11 10   CREATININE 0.85 0.91* 0.85   MG 2.1 2.0 1.9   ANIONGAP 8* 8* 9   LABGLOM >60 >60 >60   GFRAA >60 >60 >60   CALCIUM 7.7* 7.6* 7.6*   PHOS 3.1 3.1 2.7     Recent Labs     22  0700 22  0539 22  0456   LABALBU 2.0* 2.0* 2.2*   TSH  --  3.93  --      ABG:  Lab Results   Component Value Date    PHART 7.466 2018    CGI1DJQ 35.5 07/19/2018    PO2ART 72.5 07/19/2018    TFA2DKB 25.0 07/19/2018    NBEA NOT REPORTED 07/19/2018    PBEA 1.7 07/19/2018    H4QXTNLM 95.5 07/19/2018    FIO2 21 07/19/2018     Lab Results   Component Value Date/Time    SPECIAL RIGHT FOREARM 20ML 04/21/2022 02:16 AM     Lab Results   Component Value Date/Time    CULTURE CULTURE IN PROGRESS 04/21/2022 01:42 PM    CULTURE ENTEROCOCCUS FAECIUM MODERATE GROWTH (A) 04/21/2022 01:42 PM    CULTURE MIXED ANAEROBIC NAGI 04/21/2022 01:42 PM       Radiology:  CT ABDOMEN PELVIS W IV CONTRAST Additional Contrast? Oral    Result Date: 4/21/2022  1. Multiple intra-abdominal and pelvic fluid collections which have the appearance of abscess formation. In the subcapsular area in the liver extending into the subhepatic area a large abscess noted measuring 10 x 13.7 cm. A second more posterior collection measures 8.3 x 3.2 cm. Two dominant pelvic collections are noted, one measuring 10 x 6 cm and the second posteriorly 7 x 6.3 cm. The anterior collection is larger compared to the previous evaluation. These likely abscesses contain air in them. 2. A small amount of free air is noted scattered in the abdomen and pelvis. Psoas retroperitoneal abscess extends ton the posterior soft tissues. 3. Right renal atrophy. A subcapsular air containing collection measures 5 x 3.9 cm also likely an abscess. 4. Partly loculated right pleural effusion and right lower lobe atelectatic changes. Findings discussed with Dr Roque Costa 04/21/2022 00:10 a.m. RECOMMENDATIONS: Percutaneous drainage of multiple abscesses. CT ABDOMEN PELVIS W IV CONTRAST Additional Contrast? 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  New bladder prolapse with residual dependent stones in the bladder. Intraluminal gas in the bladder may be due to infection or recent instrumentation. Increased size of the previous right retroperitoneal abscess tracking along the psoas muscle (6.7 x 2.6 x 10.2 cm) which previously contained a percutaneous drainage catheter. There are abscesses tracking along the prior course of the drainage catheter through the right quadratus lumborum muscle and along the right posterior paraspinal musculature. Multifocal rim enhancing mixed gas and fluid collections worrisome for abscesses with the dominant collections in the perihepatic region spanning up to 15 cm and within the pelvic cul-de-sac spanning up to 7 cm. Free air along the mesentery in addition to the aforementioned fluid collections. As there are some thickened loops of small bowel in the lower abdomen and pelvis, a concomitant bowel perforation is not able be excluded on this exam, with differential considerations including ischemic bowel. 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 Loculated right pleural effusion characterized to advantage on today's chest CT. Critical results were called by Dr. Gian Avalos to Dr. Neville Camejo on 4/20/2022 at 22:21 EST. XR CHEST PORTABLE    Result Date: 4/20/2022  Mass like infiltrate in the right upper lobe suggesting pneumonia or neoplasm. Underlying pulmonary vascular congestion RECOMMENDATION: Follow-up CT would be helpful. CT CHEST PULMONARY EMBOLISM W CONTRAST    Result Date: 4/20/2022  Moderately large right pleural effusion with areas of loculation accounting for the pseudotumor seen on chest x-ray. . Mild bibasal atelectasis more notably on the right. .. Coronary artery/atherosclerotic disease No obvious evidence of pulmonary embolism. Mild subcutaneous emphysema along the right posterior chest wall. Perihepatic fluid. Ectopic air seen in the abdomen.   Please refer to abdominal CT report RECOMMENDATIONS: No additional routine follow-up for incidental abdominal lesions. CT ABSCESS DRAINAGE    Result Date: 4/21/2022  Successful CT guided placement of right Lisbeth nephric and deep pelvic abscess drainage catheters. IR ABSCESS DRAINAGE PERC    Result Date: 4/21/2022  Successful ultrasound-guided placement 12 Japanese drain in perihepatic abscess. Sample sent for culture and sensitivity. 550 mL of green foul-smelling purulent material was aspirated.        Physical Examination:        General appearance:  alert, cooperative and no distress  Mental Status:  oriented to person, place and time and normal affect  Lungs:  clear to auscultation bilaterally, normal effort  Heart:  regular rate and rhythm, no murmur  Abdomen:  soft, nontender, nondistended, normal bowel sounds, no masses, hepatomegaly, splenomegaly, multiple abdominal drainage tubes  Extremities:  no edema, redness, tenderness in the calves  Skin: Chronic skin changes bilateral lower extremities    Assessment:        Hospital Problems           Last Modified POA    * (Principal) Intra-abdominal abscess ([address] 4/21/2022 Yes    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    Obesity, Class III, BMI 40-49.9 (morbid obesity) ([address] 4/21/2022 Yes    Hypocalcemia 4/21/2022 Yes    Hypokalemia 4/21/2022 Yes    Vertebral osteomyelitis (H[facility]) T12-L1 4/21/2022 Yes    Pleural effusion on right 4/21/2022 Yes    CRP elevated 4/23/2022 Yes    Long term current use of anticoagulant therapy 4/21/2022 Yes    Lymphedema of both lower extremities 4/21/2022 Yes    Tobacco abuse 4/21/2022 Yes    History of recurrent deep vein thrombosis (DVT) 4/19/1499 Yes    Complicated UTI (urinary tract infection) 4/21/2022 Yes    Renal calculus 4/21/2022 Yes    Normocytic anemia 4/21/2022 Yes    Renal abscess, right 4/21/2022 Yes    Psoas muscle abscess ([address] 4/21/2022 Yes    Ureteral calculus 4/21/2022 Yes          Plan:        Multiple intra-abdominal abscess -interventional radiology removed buttock bulb yesterday. 2 right sided LAURIE drains remain. Infectious disease recommending vancomycin for antibiotics. PICC placed. Cultures positive for Enterococcus. Discussed with surgery team regarding intervention for enlarging central pelvic abscess, will repeat CT on Friday and determine surgical plan. History of VTE -patient normally on Xarelto for recurrent DVT. Will place on therapeutic lovenox in the interim. Monitor for bleeding. Sinus bradycardia: asymptomatic, hold metoprolol. Check TSH. Continue to monitor. Essential hypertension  Lymphedema- stop lasix  Morbid obesity  Pleural effusion, trace.  Repeat CXR today, if enlarging will pursue thoracentesis  Lovenox DVT prophylaxis    Conrado Lopez PA-C  4/27/2022  10:26 AM

## 2022-04-28 LAB
ANION GAP SERPL CALCULATED.3IONS-SCNC: 6 MMOL/L (ref 9–17)
BUN BLDV-MCNC: 9 MG/DL (ref 8–23)
CALCIUM SERPL-MCNC: 7.5 MG/DL (ref 8.6–10.4)
CHLORIDE BLD-SCNC: 101 MMOL/L (ref 98–107)
CO2: 31 MMOL/L (ref 20–31)
CREAT SERPL-MCNC: 0.74 MG/DL (ref 0.5–0.9)
GFR AFRICAN AMERICAN: >60 ML/MIN
GFR NON-AFRICAN AMERICAN: >60 ML/MIN
GFR SERPL CREATININE-BSD FRML MDRD: ABNORMAL ML/MIN/{1.73_M2}
GLUCOSE BLD-MCNC: 98 MG/DL (ref 70–99)
HCT VFR BLD CALC: 25.6 % (ref 36.3–47.1)
HEMOGLOBIN: 8 G/DL (ref 11.9–15.1)
MCH RBC QN AUTO: 28.5 PG (ref 25.2–33.5)
MCHC RBC AUTO-ENTMCNC: 31.3 G/DL (ref 28.4–34.8)
MCV RBC AUTO: 91.1 FL (ref 82.6–102.9)
NRBC AUTOMATED: 0.2 PER 100 WBC
PDW BLD-RTO: 16.4 % (ref 11.8–14.4)
PLATELET # BLD: 461 K/UL (ref 138–453)
PMV BLD AUTO: 9.6 FL (ref 8.1–13.5)
POTASSIUM SERPL-SCNC: 3.6 MMOL/L (ref 3.7–5.3)
RBC # BLD: 2.81 M/UL (ref 3.95–5.11)
SODIUM BLD-SCNC: 138 MMOL/L (ref 135–144)
WBC # BLD: 9.1 K/UL (ref 3.5–11.3)

## 2022-04-28 PROCEDURE — 2580000003 HC RX 258: Performed by: STUDENT IN AN ORGANIZED HEALTH CARE EDUCATION/TRAINING PROGRAM

## 2022-04-28 PROCEDURE — 80048 BASIC METABOLIC PNL TOTAL CA: CPT

## 2022-04-28 PROCEDURE — 99232 SBSQ HOSP IP/OBS MODERATE 35: CPT | Performed by: INTERNAL MEDICINE

## 2022-04-28 PROCEDURE — 85027 COMPLETE CBC AUTOMATED: CPT

## 2022-04-28 PROCEDURE — 2580000003 HC RX 258: Performed by: INTERNAL MEDICINE

## 2022-04-28 PROCEDURE — 6370000000 HC RX 637 (ALT 250 FOR IP): Performed by: NURSE PRACTITIONER

## 2022-04-28 PROCEDURE — 99232 SBSQ HOSP IP/OBS MODERATE 35: CPT | Performed by: PHYSICIAN ASSISTANT

## 2022-04-28 PROCEDURE — 2060000000 HC ICU INTERMEDIATE R&B

## 2022-04-28 PROCEDURE — 6360000002 HC RX W HCPCS: Performed by: STUDENT IN AN ORGANIZED HEALTH CARE EDUCATION/TRAINING PROGRAM

## 2022-04-28 PROCEDURE — 6370000000 HC RX 637 (ALT 250 FOR IP): Performed by: INTERNAL MEDICINE

## 2022-04-28 PROCEDURE — 6370000000 HC RX 637 (ALT 250 FOR IP): Performed by: STUDENT IN AN ORGANIZED HEALTH CARE EDUCATION/TRAINING PROGRAM

## 2022-04-28 PROCEDURE — 36415 COLL VENOUS BLD VENIPUNCTURE: CPT

## 2022-04-28 RX ORDER — LISINOPRIL 5 MG/1
5 TABLET ORAL DAILY
Status: DISCONTINUED | OUTPATIENT
Start: 2022-04-28 | End: 2022-04-28

## 2022-04-28 RX ORDER — LISINOPRIL 5 MG/1
5 TABLET ORAL DAILY
Status: DISCONTINUED | OUTPATIENT
Start: 2022-04-29 | End: 2022-04-30

## 2022-04-28 RX ADMIN — SODIUM CHLORIDE, PRESERVATIVE FREE 10 ML: 5 INJECTION INTRAVENOUS at 10:11

## 2022-04-28 RX ADMIN — IBUPROFEN 600 MG: 600 TABLET, FILM COATED ORAL at 06:36

## 2022-04-28 RX ADMIN — FAMOTIDINE 20 MG: 20 TABLET, FILM COATED ORAL at 21:31

## 2022-04-28 RX ADMIN — VANCOMYCIN HYDROCHLORIDE 1250 MG: 10 INJECTION, POWDER, LYOPHILIZED, FOR SOLUTION INTRAVENOUS at 23:01

## 2022-04-28 RX ADMIN — SODIUM CHLORIDE, PRESERVATIVE FREE 10 ML: 5 INJECTION INTRAVENOUS at 21:26

## 2022-04-28 RX ADMIN — FAMOTIDINE 20 MG: 20 TABLET, FILM COATED ORAL at 09:53

## 2022-04-28 RX ADMIN — SODIUM CHLORIDE, PRESERVATIVE FREE 10 ML: 5 INJECTION INTRAVENOUS at 21:27

## 2022-04-28 RX ADMIN — METOPROLOL TARTRATE 25 MG: 25 TABLET ORAL at 09:54

## 2022-04-28 RX ADMIN — POTASSIUM CHLORIDE 20 MEQ: 1500 TABLET, EXTENDED RELEASE ORAL at 21:31

## 2022-04-28 RX ADMIN — OXYBUTYNIN CHLORIDE 15 MG: 10 TABLET, EXTENDED RELEASE ORAL at 09:54

## 2022-04-28 ASSESSMENT — PAIN DESCRIPTION - ORIENTATION: ORIENTATION: OTHER (COMMENT)

## 2022-04-28 ASSESSMENT — ENCOUNTER SYMPTOMS
SHORTNESS OF BREATH: 0
COUGH: 0
BACK PAIN: 1
FACIAL SWELLING: 0
APNEA: 0
ABDOMINAL DISTENTION: 0
EYE DISCHARGE: 0
PHOTOPHOBIA: 0
COLOR CHANGE: 0

## 2022-04-28 ASSESSMENT — PAIN DESCRIPTION - LOCATION: LOCATION: HEAD

## 2022-04-28 ASSESSMENT — PAIN SCALES - GENERAL: PAINLEVEL_OUTOF10: 8

## 2022-04-28 ASSESSMENT — PAIN DESCRIPTION - DESCRIPTORS: DESCRIPTORS: ACHING

## 2022-04-28 NOTE — PROGRESS NOTES
[facility]  Progress Note    Reji Danielle  MRN: [id number]  Date: 4/28/2022  LOS: 7     Reason for F/U :   Abdominal abscess, vertebral osteomyelitis, pyelonephritis/pyelitis    Impression :   1. Multiple perihepatic abscesses, retroperitoneal abscess on the right psoas, and abscess in pelvic cul-de-sac  2. 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 Free air in the abdomen,  4. Osteomyelitis at T12-L1 due to direct extension from abscess over psoas muscle  5. bandemia  6. History of complicated UTI in January 2022, positive for Klebsiella pneumoniae  7. History of septicemia in January 2022, positive for Klebsiella pneumoniae  8. History of right psoas abscess drained in January 2022  9. History of right ureteral and renal stones with ureteral stent placement in March 2022 with stent exchanged in April 2020    Recommendations:   Per micro lab, no GNR and no anaerobes, no SA in the abd fluid 4/21/22  Only strep and enterococcus fecium R amp but S vanco      On another fluid cx 4/21 she had E fecium S amp and vanco     Plan:  · Stopped fluconazole used to treat UTI - it can not eradicate the yeast of the urine as long as she is on other AB. · CT AP w pelvic abscess not amenable to drainage by IR - Gen surg are going to repeat CT on Friday to determine if abscess will be operative or should be drained by IR  · Keep vanco and watch creat tiw - x 4 weeks and follow w zyvox 2 weeks then repeat CT AP to check on resolution of the abscesses  · PICC line placed on 4/26. Infection Control Recommendations:   Universal precautions    Discharge Planning:   Estimated Length of IV antimicrobials:  To be determined  Patient will need Midline Catheter Insertion/ PICC line Insertion: No  Patient will need: Home IV , [facility],  SNF,  LTAC: Undetermined  Patient willneed outpatient wound care: No    Medical Decision making / Summary of Stay:   Matt Nice is a 74y.o.-year-old female presenting as a transfer from an outside facility due to conern for bowel perforation and multiple intra-abdominal abscesses. Racquel Singh was previously hospitalized in January 2022 with Klebsiella pneumoniae sepsis related to a perinephric abscess. She did have right-sided severely obstructive pyelonephritis for which she underwent stent placement as well as a right percutaneous nephrostomy tube placement. This infection was complicated by perinephric/psoas abscess which was aspirated and this drain was placed. Patient was seen by my partner Dr. Renetta Guillermo and was discharged on intravenous antimicrobial therapy with Rocephin 2 g daily through February 2, 2022  The patient on 3/1/2022 underwent a cystoscopy with right-sided ureteroscopy with holmium laser lithotripsy and right-sided ureteral stent placement  The patient underwent a second urological procedure on 4/5/2022 with a cystoscopy, right-sided ureteroscopy, right holmium laser lithotripsy and right ureteral stent exchange and the patient was found to have a copious amount of calculi which were crushed up and further ablated.     The patient reports that ever since she had the second urological procedure she has had generalized malaise/fatigue and more recently progressing abdominal pain over the last several days. Initial lactic acid was 4.4 and treated with fluid bolus.  Initial potassium was 2.8, now 3.6 after replacement.     A CT scan of the abdomen and pelvis 4/20/2022 showed gas within both the right renal parenchyma and proximal right collecting system and increased size of the previous right retroperitoneal abscess tracking along the psoas muscle and there is abscess tracking along the posterior course of the drainage catheter through the right quadratus lumbar muscle and along the right posterior paraspinal musculature  There are multifocal rim-enhancing mixed Greg fluid collections worrisome for abscess within the dominant collections of the perihepatic region spanning up to 15 cm within the pelvic cul-de-sac spanning up to 7 cm. There is free air along the mesentery in addition to the a forementioned fluid collections. Discitis/osteomyelitis at T12-L1 with direct extension from the adjacent right psoas inflammatory change. Loculated right pleural effusion     The patient has been admitted and started on broad-spectrum antimicrobial therapy and ultrasound-guided placement of 12 [place] drain in the perihepatic abscess with 550 mL of green following purulent material aspirated, and a CT-guided placement of right perinephric and deep pelvic abscess drainage catheters with 5 mL of purulent fluid removed from each collection sent for diagnostic tests. 22 - Urology performed cystoscopy with rt sided stent placement, and right retrograde pyelogram.    22 - CT abd/pelvis shows near resolution of the 3 abscess accessed for drainage. Perihepatic abscess was 13.7 x 10.3 cm no 3.0 x 2.4cm. Enlargement of the 10.8 x 7.5 cm presumed abscess in right central pelvis, not able to access for percutaneous drainage.  - PICC placed    Current evaluation:2022    BP (!) 151/90   Pulse 69   Temp 98.3 °F (36.8 °C) (Temporal)   Resp 25   Ht 5' 1\" (1.549 m)   Wt 262 lb 5.6 oz (119 kg)   LMP  (LMP Unknown)   SpO2 98%   BMI 49.57 kg/m²     Temperature Range: Temp: 98.3 °F (36.8 °C) Temp  Av °F (36.7 °C)  Min: 97.5 °F (36.4 °C)  Max: 98.7 °F (37.1 °C)    Afebrile, SBP in 150s, otherwise vital signs stable. Mild dyspnea, denies any SOB. Continues on oxygen at 1 LPM, sats in upper 90s. Requesting PT today after declining yesterday. Expresses desire to get fresh air multiple times.   Abd tenderness to mid and left abd.  CT abd/pelvis planned for today. 2 right sided LAURIE drains remain    - Abscess culture- MANY NEUTROPHILS Abnormal MODERATE GRAM POSITIVE COCCI IN PAIRS Abnormal FEW GRAM POSITIVE RODS Abnormal Culture ENTEROCOCCUS FAECIUM LIGHT GROWTH Abnormal VIRIDANS STREPTOCOCCUS GROUP LIGHT GROWTH Susceptibilities have not been performed. Notify the laboratory within 7 days for further workup if clinically indicated. Abnormal       Review of Systems   Constitutional: Negative for chills and fever. HENT: Negative for congestion, facial swelling and hearing loss. Eyes: Negative for photophobia, discharge and visual disturbance. Respiratory: Negative for apnea, cough and shortness of breath. Cardiovascular: Negative for chest pain and palpitations. Gastrointestinal: Negative for abdominal distention. Endocrine: Negative for cold intolerance, polydipsia and polyphagia. Genitourinary: Negative for dysuria and flank pain. Musculoskeletal: Positive for arthralgias and back pain. Skin: Negative for color change. Allergic/Immunologic: Negative for environmental allergies. Neurological: Positive for weakness. Negative for dizziness, tremors, syncope and headaches. Hematological: Negative for adenopathy. Psychiatric/Behavioral: Negative for agitation. Physical Examination :     Physical Exam  Constitutional:       General: She is not in acute distress. Appearance: Normal appearance. She is obese. She is not ill-appearing, toxic-appearing or diaphoretic. HENT:      Head: Normocephalic and atraumatic. Nose: Nose normal.      Mouth/Throat:      Mouth: Mucous membranes are moist.   Eyes:      General: No scleral icterus. Conjunctiva/sclera: Conjunctivae normal.   Cardiovascular:      Rate and Rhythm: Normal rate and regular rhythm. Pulses: Normal pulses. Pulmonary:      Effort: Pulmonary effort is normal.      Breath sounds: Rales (mild) present. No wheezing.    Abdominal: General: Bowel sounds are normal.      Palpations: Abdomen is soft. Tenderness: There is abdominal tenderness. Comments: 2 right-sided LAURIE drains   Genitourinary:     Comments: Brooks catheter in place    Musculoskeletal:         General: No swelling or tenderness. Normal range of motion. Cervical back: Neck supple. No rigidity or tenderness. Right lower leg: Edema present. Left lower leg: Edema present. Skin:     General: Skin is warm and dry. Coloration: Skin is not jaundiced or pale. Findings: No bruising or erythema. Neurological:      Mental Status: She is alert and oriented to person, place, and time. Psychiatric:         Behavior: Behavior normal.         Laboratory data:   I have independently reviewed the followinglabs:  CBC with Differential:   Recent Labs     04/27/22 0456 04/28/22  0543   WBC 9.2 9.1   HGB 8.0* 8.0*   HCT 26.0* 25.6*   * 461*     BMP:   Recent Labs     04/26/22  0539 04/26/22  0539 04/27/22 0456 04/28/22  0543      < > 142 138   K 3.5*   < > 3.6* 3.6*      < > 103 101   CO2 29   < > 30 31   BUN 11   < > 10 9   CREATININE 0.91*   < > 0.85 0.74   MG 2.0  --  1.9  --     < > = values in this interval not displayed.      Hepatic Function Panel:   Recent Labs     04/26/22  0539 04/27/22 0456   LABALBU 2.0* 2.2*         Lab Results   Component Value Date    PROCAL 0.07 02/11/2020     Lab Results   Component Value Date    CRP 52.0 02/11/2020     Lab Results   Component Value Date    SEDRATE 91 (H) 02/11/2020         No results found for: [facility] [place]  Lab Results   Component Value Date    FERRITIN 173 04/26/2022    FERRITIN 42 01/23/2021     No results found for: LDH  No results found for: FIBRINOGEN    Results in Past 30 Days  Result Component Current Result Ref Range Previous Result Ref Range   SARS-CoV-2, Rapid Not Detected (4/20/2022) Not Detected Not in Time Range      Lab Results   Component Value Date    COVID19 Not Detected 04/20/2022    COVID19 Not Detected 01/12/2022    COVID19 Not Detected 01/03/2022       No results for input(s): JOYCE in the last 72 hours. Imaging Studies:   US guided drainage  Impression:        Successful ultrasound-guided placement 12 [place]vian drain in perihepatic   abscess.  Sample sent for culture and sensitivity.  550 mL of green   foul-smelling purulent material was aspirated. Cultures:     Culture, Blood 2 [[id number]] Collected: 04/21/22 0215   Order Status: Completed Specimen: Blood Updated: 04/22/22 0230    Specimen Description . BLOOD    Special Requests LEFT AC 10ML    Culture NO GROWTH 1 DAY   Culture, Blood 1 [[id number]] Collected: 04/21/22 0216   Order Status: Completed Specimen: Blood Updated: 04/22/22 0229    Specimen Description . BLOOD    Special Requests RIGHT FOREARM 20ML    Culture NO GROWTH 1 DAY   Culture, Urine [[id number]] Collected: 04/21/22 0345   Order Status: Completed Specimen: Urine, clean catch Updated: 04/21/22 2150    Specimen Description . CLEAN CATCH URINE    Culture Candida albicans/dubliniensis 50 to 100,000 CFU/ML   Culture, Anaerobic and Aerobic [[id number]] (Abnormal) Collected: 04/21/22 1342   Order Status: Completed Specimen: Abscess Updated: 04/21/22 1549    Specimen Description . ABSCESS . ASPIRATE    Direct Exam MODERATE NEUTROPHILS Abnormal      MODERATE GRAM POSITIVE COCCI IN CLUSTERS Abnormal     Culture PENDING   Culture, Anaerobic and Aerobic [[id number]] (Abnormal) Collected: 04/21/22 1340   Order Status: Completed Specimen: Abscess Updated: 04/21/22 1548    Specimen Description . ABSCESS . ASPIRATE    Direct Exam FEW NEUTROPHILS Abnormal      MODERATE GRAM POSITIVE COCCI IN CLUSTERS Abnormal     Culture PENDING   Culture, Anaerobic and Aerobic [[id number]] (Abnormal) Collected: 04/21/22 1337   Order Status: Completed Specimen: Abscess Updated: 04/21/22 1529    Specimen Description . ABSCESS . ASPIRATE    Direct Exam MANY NEUTROPHILS Abnormal  MODERATE GRAM POSITIVE COCCI IN PAIRS Abnormal      FEW GRAM POSITIVE RODS Abnormal     Culture PENDING       Medications:      enoxaparin  1 mg/kg SubCUTAneous BID    famotidine  20 mg Oral BID    [Held by provider] rivaroxaban  20 mg Oral Daily    lidocaine 1 % injection  5 mL IntraDERmal Once    sodium chloride flush  5-40 mL IntraVENous 2 times per day    [Held by provider] metoprolol tartrate  25 mg Oral BID    oxybutynin  15 mg Oral Daily    potassium chloride  20 mEq Oral QPM    sodium chloride flush  5-40 mL IntraVENous 2 times per day    vancomycin (VANCOCIN) intermittent dosing (placeholder)   Other RX Placeholder    vancomycin  1,250 mg IntraVENous Q24H    sodium chloride flush  10 mL IntraCATHeter BID         Eli Lynn, MS4        I have discussed the care of the patient, including pertinent history and exam findings,  with the resident. I have seen and examined the patient and the key elements of all parts of the encounter have been performed by me. I agree with the assessment, plan and orders as documented by the resident.     Kyung Lara, Infectious Diseases

## 2022-04-28 NOTE — PROGRESS NOTES
[facility]  Office: [address], DO, Azul Valentine, DO, Yonis Nguyen, DO, Flakito Khanna Blood, DO, Vivien Chaudhry MD, Sneha Suarez MD, Jose Hanson MD, Sean Landry MD, Tena Patel MD, Akin De Leon MD, Debi Ragland MD, Tiffany Shankar, DO, Weston Alonzo, DO, Gaviota Baldwin MD,  Laurita Cohn, DO, Robert Gaffney MD, Anita Jeff MD, Hope Opitz, MD, Princess Cates, DO, Pippa Zabala MD, Balaji Peck MD, Hay Hernandez, [facility], [facility], CNP, Travis Mckay, CNP, Silvestre Parmar, CNP, Ivan Purvis, CNP, Jaye Quispe, CNP, Nicki Meehan PA-C, Kate Evans, [facility], Alena Hooper, CNP, Sondra Nguyen, CNP, Mesha Lin, CNP, Aliya Hale, CNS, Rose Mary Chapa, [facility], Katherine Hook, CNP, Beba Whitehead, CNP, Royal Menendez, CNP         [address]    Progress Note    4/28/2022    10:21 AM    Name:   Derek Clay  MRN:     [id number]     [place]:      [de-identified]   Room:   91/2885-75   Day:  7  Admit Date:  4/21/2022  1:28 AM    PCP:   DONG Pinzon CNP  Code Status:  Full Code    Subjective:     C/C:   Chief Complaint   Patient presents with    Abdominal Pain     perf bowel     Interval History Status: not changed. Pt seen and evaluated this morning. Her condition is relatively unchanged. She continues to feel nauseous. No vomiting. Denying abdominal pain, fevers, chills. Brief History:     56-year-old female transferred from outside hospital for bowel perforation with multiple intra-abdominal abscesses. Patient initially was treated on vancomycin and Zosyn with elevated lactic acid and was given sepsis bolus of IV fluids. Patient was seen by multiple consultants including trauma surgery, general surgery and infectious disease and urology. Patient was recommended placement of multiple pigtail catheters for perihepatic and perinephric abscesses.   Patient also had a successful placement of a pelvic abscess, patient the following day was seen by urology recommending replacement of right-sided stent and cystoscopy and right retrograde pyelogram.      Review of Systems:     Constitutional:  negative for chills, fevers, sweats  Respiratory:  negative for cough, dyspnea on exertion, shortness of breath, wheezing  Cardiovascular:  negative for chest pain, chest pressure/discomfort, lower extremity edema, palpitations  Gastrointestinal:  negative for abdominal pain, constipation, diarrhea, vomiting. + nausea. Neurological:  negative for dizziness, headache    Medications: Allergies:     Allergies   Allergen Reactions    Estrogens Other (See Comments)     Hx of DVT       Current Meds:   Scheduled Meds:    [Held by provider] enoxaparin  1 mg/kg SubCUTAneous BID    famotidine  20 mg Oral BID    [Held by provider] rivaroxaban  20 mg Oral Daily    lidocaine 1 % injection  5 mL IntraDERmal Once    sodium chloride flush  5-40 mL IntraVENous 2 times per day    metoprolol tartrate  25 mg Oral BID    oxybutynin  15 mg Oral Daily    potassium chloride  20 mEq Oral QPM    sodium chloride flush  5-40 mL IntraVENous 2 times per day    vancomycin (VANCOCIN) intermittent dosing (placeholder)   Other RX Placeholder    vancomycin  1,250 mg IntraVENous Q24H    sodium chloride flush  10 mL IntraCATHeter BID     Continuous Infusions:    sodium chloride      sodium chloride       PRN Meds: iohexol, ibuprofen, sodium chloride flush, sodium chloride, morphine **OR** morphine, sodium chloride flush, sodium chloride, potassium chloride **OR** potassium alternative oral replacement **OR** potassium chloride, magnesium sulfate, ondansetron **OR** ondansetron, polyethylene glycol, acetaminophen **OR** acetaminophen, benzocaine-menthol    Data:     Past Medical History:   has a past medical history of Carcinoma ([facility] [address] Cellulitis, DVT (deep venous thrombosis) ([facility] [address] Kidney stone, Lymphedema, Morbid obesity ([facility] [address] Psoas abscess, right ([address] Pyelonephritis, and Wears glasses. Social History:   reports that she has been smoking cigarettes. She has a 21.00 pack-year smoking history. She has never used smokeless tobacco. She reports that she does not drink alcohol and does not use drugs. Family History:   Family History   Adopted: Yes   Problem Relation Age of Onset    Cancer Mother         The patient reports her biologic mother did have bladder cancer       Vitals:  BP (!) 151/90   Pulse 69   Temp 98.3 °F (36.8 °C) (Temporal)   Resp 25   Ht 5' 1\" (1.549 m)   Wt 262 lb 5.6 oz (119 kg)   LMP  (LMP Unknown)   SpO2 98%   BMI 49.57 kg/m²   Temp (24hrs), Av °F (36.7 °C), Min:97.5 °F (36.4 °C), Max:98.7 °F (37.1 °C)    No results for input(s): POCGLU in the last 72 hours. I/O (24Hr):     Intake/Output Summary (Last 24 hours) at 2022 1021  Last data filed at 2022 0541  Gross per 24 hour   Intake --   Output 1930 ml   Net -1930 ml       Labs:  Hematology:  Recent Labs     22  0539 22  0543   WBC 10.1 9.2 9.1   RBC 2.73* 2.84* 2.81*   HGB 7.7* 8.0* 8.0*   HCT 24.8* 26.0* 25.6*   MCV 90.8 91.5 91.1   MCH 28.2 28.2 28.5   MCHC 31.0 30.8 31.3   RDW 16.0* 16.3* 16.4*   * 519* 461*   MPV 9.0 9.3 9.6     Chemistry:  Recent Labs     2239 22  0543    142 138   K 3.5* 3.6* 3.6*    103 101   CO2 29 30 31   GLUCOSE 111* 114* 98   BUN 11 10 9   CREATININE 0.91* 0.85 0.74   MG 2.0 1.9  --    ANIONGAP 8* 9 6*   LABGLOM >60 >60 >60   GFRAA >60 >60 >60   CALCIUM 7.6* 7.6* 7.5*   PHOS 3.1 2.7  --      Recent Labs     22  0539 22  0456   LABALBU 2.0* 2.2*   TSH 3.93  --      ABG:  Lab Results   Component Value Date    PHART 7.466 2018    QWJ4UZI 35.5 2018    PO2ART 72.5 2018    HCA8QBG 25.0 2018    NBEA NOT REPORTED 2018    PBEA 1.7 2018    U9FGKDJF 95.5 2018    FIO2 21 2018     Lab Results Component Value Date/Time    SPECIAL RIGHT FOREARM 20ML 04/21/2022 02:16 AM     Lab Results   Component Value Date/Time    CULTURE (A) 04/21/2022 01:42 PM     VIRIDANS STREPTOCOCCUS GROUP LIGHT GROWTH Susceptibilities have not been performed. Notify the laboratory within 7 days for further workup if clinically indicated. CULTURE ENTEROCOCCUS FAECIUM MODERATE GROWTH (A) 04/21/2022 01:42 PM    CULTURE MIXED ANAEROBIC NAGI 04/21/2022 01:42 PM       Radiology:  CT ABDOMEN PELVIS W IV CONTRAST Additional Contrast? Oral    Result Date: 4/21/2022  1. Multiple intra-abdominal and pelvic fluid collections which have the appearance of abscess formation. In the subcapsular area in the liver extending into the subhepatic area a large abscess noted measuring 10 x 13.7 cm. A second more posterior collection measures 8.3 x 3.2 cm. Two dominant pelvic collections are noted, one measuring 10 x 6 cm and the second posteriorly 7 x 6.3 cm. The anterior collection is larger compared to the previous evaluation. These likely abscesses contain air in them. 2. A small amount of free air is noted scattered in the abdomen and pelvis. Psoas retroperitoneal abscess extends ton the posterior soft tissues. 3. Right renal atrophy. A subcapsular air containing collection measures 5 x 3.9 cm also likely an abscess. 4. Partly loculated right pleural effusion and right lower lobe atelectatic changes. Findings discussed with Dr Javon Lombardo 04/21/2022 00:10 a.m. RECOMMENDATIONS: Percutaneous drainage of multiple abscesses. CT ABDOMEN PELVIS W IV CONTRAST Additional Contrast? 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 New bladder prolapse with residual dependent stones in the bladder.  Intraluminal gas in the bladder may be due to infection or recent instrumentation. Increased size of the previous right retroperitoneal abscess tracking along the psoas muscle (6.7 x 2.6 x 10.2 cm) which previously contained a percutaneous drainage catheter. There are abscesses tracking along the prior course of the drainage catheter through the right quadratus lumborum muscle and along the right posterior paraspinal musculature. Multifocal rim enhancing mixed gas and fluid collections worrisome for abscesses with the dominant collections in the perihepatic region spanning up to 15 cm and within the pelvic cul-de-sac spanning up to 7 cm. Free air along the mesentery in addition to the aforementioned fluid collections. As there are some thickened loops of small bowel in the lower abdomen and pelvis, a concomitant bowel perforation is not able be excluded on this exam, with differential considerations including ischemic bowel. 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 Loculated right pleural effusion characterized to advantage on today's chest CT. Critical results were called by Dr. Eduardo Ny to Dr. Loretta Sterling on 4/20/2022 at 22:21 EST. XR CHEST PORTABLE    Result Date: 4/20/2022  Mass like infiltrate in the right upper lobe suggesting pneumonia or neoplasm. Underlying pulmonary vascular congestion RECOMMENDATION: Follow-up CT would be helpful. CT CHEST PULMONARY EMBOLISM W CONTRAST    Result Date: 4/20/2022  Moderately large right pleural effusion with areas of loculation accounting for the pseudotumor seen on chest x-ray. . Mild bibasal atelectasis more notably on the right. .. Coronary artery/atherosclerotic disease No obvious evidence of pulmonary embolism. Mild subcutaneous emphysema along the right posterior chest wall. Perihepatic fluid. Ectopic air seen in the abdomen.   Please refer to abdominal CT report RECOMMENDATIONS: No additional routine follow-up for incidental abdominal lesions. CT ABSCESS DRAINAGE    Result Date: 4/21/2022  Successful CT guided placement of right Lisbeth nephric and deep pelvic abscess drainage catheters. IR ABSCESS DRAINAGE PERC    Result Date: 4/21/2022  Successful ultrasound-guided placement 12 Portuguese drain in perihepatic abscess. Sample sent for culture and sensitivity. 550 mL of green foul-smelling purulent material was aspirated. Physical Examination:        General appearance:  alert, cooperative and no distress  Mental Status:  oriented to person, place and time and normal affect  Lungs:  clear to auscultation bilaterally, normal effort  Heart:  regular rate and rhythm, no murmur  Abdomen:  soft, nontender, nondistended, normal bowel sounds, no masses, hepatomegaly, splenomegaly, multiple abdominal drainage tubes  Extremities:  Bilateral lymphedema, venous stasis changes.  No redness, tenderness in the calves  Skin: Chronic skin changes bilateral lower extremities    Assessment:        Hospital Problems           Last Modified POA    * (Principal) Intra-abdominal abscess ([address] 4/21/2022 Yes    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    Obesity, Class III, BMI 40-49.9 (morbid obesity) ([address] 4/21/2022 Yes    Hypocalcemia 4/21/2022 Yes    Hypokalemia 4/21/2022 Yes    Vertebral osteomyelitis (HCC) T12-L1 4/21/2022 Yes    Pleural effusion on right 4/21/2022 Yes    CRP elevated 4/23/2022 Yes    Long term current use of anticoagulant therapy 4/21/2022 Yes    Lymphedema of both lower extremities 4/21/2022 Yes    Tobacco abuse 4/21/2022 Yes    History of recurrent deep vein thrombosis (DVT) 0/85/2518 Yes    Complicated UTI (urinary tract infection) 4/21/2022 Yes    Renal calculus 4/21/2022 Yes    Normocytic anemia 4/21/2022 Yes    Renal abscess, right 4/21/2022 Yes    Psoas muscle abscess ([address] 4/21/2022 Yes    Ureteral calculus 4/21/2022 Yes          Plan:        Multiple intra-abdominal abscess - 2 right sided LAURIE drains remain. Infectious disease recommending vancomycin for antibiotics. PICC placed. Cultures positive for Enterococcus. Discussed with surgery team regarding intervention for enlarging central pelvic abscess, will repeat CT on Friday and determine surgical plan. Will hold therapeutic lovenox today in preparation for potential surgical intervention. Initiate prophylactic dosing. History of VTE -patient normally on Xarelto for recurrent DVT. Will place on therapeutic lovenox in the interim. Monitor for bleeding. Sinus bradycardia: improved with holding metop. Will resume today given blood pressure elevation and continue to monitor heart rate. TSH unremarkable.   Essential hypertension  Lymphedema- stop lasix  Morbid obesity  Anemia of chronic disease  Lovenox DVT prophylaxis    Ceci Ontiveros PA-C  4/28/2022  10:21 AM

## 2022-04-28 NOTE — FLOWSHEET NOTE
04/28/22 1116   Encounter Summary   Encounter Overview/Reason  Volunteer Encounter   Service Provided For: Patient   Referral/Consult From: Debbie   Last Encounter    (4/28/2022)   Complexity of Encounter Low   Begin Time 1040   End Time  1055   Total Time Calculated 15 min   Spiritual/Emotional needs   Type Spiritual Support

## 2022-04-28 NOTE — PROGRESS NOTES
Comprehensive Nutrition Assessment    Type and Reason for Visit:  RD Nutrition Re-Screen/LOS    Nutrition Recommendations/Plan:   1. Continue current diet, Regular w/ 1.8 L FR  2. Continue High kcal/High PRO ONS TID  3. Monitor/encourage PO intake     Malnutrition Assessment:  Malnutrition Status: At risk for malnutrition (Comment) (04/28/22 9726)    Context:  Chronic Illness     Findings of the 6 clinical characteristics of malnutrition:  Energy Intake:  Mild decrease in energy intake (Comment)  Weight Loss:  No significant weight loss     Body Fat Loss:  Unable to assess     Muscle Mass Loss:  Unable to assess    Fluid Accumulation:  Severe (Moderate) Extremities   Strength:  Not Performed    Nutrition Assessment:    75 yo F admiteed w/ intra-abdominal abscess and suspected bowel perf. Per pt, poor appetite for \"several weeks\" pta. Pt appetite this admission is fair, eating ~50% of meals and drinking 1 Ensure daily. Pt feels she has had some wt loss, as clothes are fitting looser, but unsure how much. No wt loss per chart review, however, edema noted. Encouraged pt to drink the Ensures being sent when appetite/intake is poor, pt agreeable. Nutrition Related Findings:    Labs/meds reviewed. LBM 4/27. +1 BUE, +3 BLE edema noted. Wound Type: None       Current Nutrition Intake & Therapies:    Average Meal Intake:  (~50%, per pt)  Average Supplements Intake:  (1 daily)  ADULT ORAL NUTRITION SUPPLEMENT; Breakfast, Lunch, Dinner; Standard High Calorie/High Protein Oral Supplement  ADULT DIET; Regular; 1800 ml    Anthropometric Measures:  Height: 5' 1\" (154.9 cm)  Ideal Body Weight (IBW): 105 lbs (48 kg)       Current Body Weight: 262 lb 5.6 oz (119 kg) (4/26, bedscale), 249.9 % IBW.     Current BMI (kg/m2): 49.6  Usual Body Weight: 260 lb (117.9 kg) (Per pt)  % Weight Change (Calculated): 0.9  Weight Adjustment For: No Adjustment                 BMI Categories: Obese Class 3 (BMI 40.0 or greater)    Estimated Daily Nutrient Needs:  Energy Requirements Based On: Formula  Weight Used for Energy Requirements: Current  Energy (kcal/day): 2100 to 2300 kcal/day (1.3-1.4 factor)  Weight Used for Protein Requirements: Ideal  Protein (g/day): 60 to 70 g/day (1.2-1.5 g/kg)  Method Used for Fluid Requirements: Other (Comment)  Fluid (ml/day): per MD    Nutrition Diagnosis:   · Inadequate oral intake related to  (current medical condition, poor appetite) as evidenced by intake 26-50%      Nutrition Interventions:   Food and/or Nutrient Delivery: Continue Current Diet,Continue Oral Nutrition Supplement  Nutrition Education/Counseling: No recommendation at this time  Coordination of Nutrition Care: Continue to monitor while inpatient       Goals:  Previous Goal Met:  (Goal set)  Goals: PO intake 75% or greater       Nutrition Monitoring and Evaluation:   Behavioral-Environmental Outcomes: None Identified  Food/Nutrient Intake Outcomes: Food and Nutrient Intake,Supplement Intake  Physical Signs/Symptoms Outcomes: Biochemical Data,Weight    Discharge Planning:    No discharge needs at this time     Catie Hawk MS, RD, LD  Contact: [phone]

## 2022-04-29 ENCOUNTER — APPOINTMENT (OUTPATIENT)
Dept: INTERVENTIONAL RADIOLOGY/VASCULAR | Age: 67
DRG: 853 | End: 2022-04-29
Payer: MEDICARE

## 2022-04-29 ENCOUNTER — APPOINTMENT (OUTPATIENT)
Dept: CT IMAGING | Age: 67
DRG: 853 | End: 2022-04-29
Payer: MEDICARE

## 2022-04-29 LAB
CULTURE: NORMAL
HCT VFR BLD CALC: 27.9 % (ref 36.3–47.1)
HEMOGLOBIN: 8.4 G/DL (ref 11.9–15.1)
INR BLD: 1
MCH RBC QN AUTO: 27.9 PG (ref 25.2–33.5)
MCHC RBC AUTO-ENTMCNC: 30.1 G/DL (ref 28.4–34.8)
MCV RBC AUTO: 92.7 FL (ref 82.6–102.9)
NRBC AUTOMATED: 0.2 PER 100 WBC
PDW BLD-RTO: 16.8 % (ref 11.8–14.4)
PLATELET # BLD: 480 K/UL (ref 138–453)
PMV BLD AUTO: 9.2 FL (ref 8.1–13.5)
PROTHROMBIN TIME: 10.4 SEC (ref 9.1–12.3)
RBC # BLD: 3.01 M/UL (ref 3.95–5.11)
SPECIMEN DESCRIPTION: NORMAL
WBC # BLD: 10.8 K/UL (ref 3.5–11.3)

## 2022-04-29 PROCEDURE — 87070 CULTURE OTHR SPECIMN AEROBIC: CPT

## 2022-04-29 PROCEDURE — 87205 SMEAR GRAM STAIN: CPT

## 2022-04-29 PROCEDURE — 94150 VITAL CAPACITY TEST: CPT

## 2022-04-29 PROCEDURE — 74177 CT ABD & PELVIS W/CONTRAST: CPT

## 2022-04-29 PROCEDURE — 2580000003 HC RX 258: Performed by: STUDENT IN AN ORGANIZED HEALTH CARE EDUCATION/TRAINING PROGRAM

## 2022-04-29 PROCEDURE — 99232 SBSQ HOSP IP/OBS MODERATE 35: CPT | Performed by: PHYSICIAN ASSISTANT

## 2022-04-29 PROCEDURE — 6360000002 HC RX W HCPCS: Performed by: STUDENT IN AN ORGANIZED HEALTH CARE EDUCATION/TRAINING PROGRAM

## 2022-04-29 PROCEDURE — 2709999900 CT ABSCESS DRAINAGE

## 2022-04-29 PROCEDURE — 87075 CULTR BACTERIA EXCEPT BLOOD: CPT

## 2022-04-29 PROCEDURE — 2700000000 HC OXYGEN THERAPY PER DAY

## 2022-04-29 PROCEDURE — 6370000000 HC RX 637 (ALT 250 FOR IP): Performed by: NURSE PRACTITIONER

## 2022-04-29 PROCEDURE — 6360000004 HC RX CONTRAST MEDICATION: Performed by: NURSE PRACTITIONER

## 2022-04-29 PROCEDURE — 2580000003 HC RX 258: Performed by: INTERNAL MEDICINE

## 2022-04-29 PROCEDURE — 6370000000 HC RX 637 (ALT 250 FOR IP): Performed by: PHYSICIAN ASSISTANT

## 2022-04-29 PROCEDURE — 6370000000 HC RX 637 (ALT 250 FOR IP): Performed by: INTERNAL MEDICINE

## 2022-04-29 PROCEDURE — 6370000000 HC RX 637 (ALT 250 FOR IP): Performed by: STUDENT IN AN ORGANIZED HEALTH CARE EDUCATION/TRAINING PROGRAM

## 2022-04-29 PROCEDURE — 36415 COLL VENOUS BLD VENIPUNCTURE: CPT

## 2022-04-29 PROCEDURE — 94664 DEMO&/EVAL PT USE INHALER: CPT

## 2022-04-29 PROCEDURE — 2060000000 HC ICU INTERMEDIATE R&B

## 2022-04-29 PROCEDURE — 85610 PROTHROMBIN TIME: CPT

## 2022-04-29 PROCEDURE — 2709999900 HC NON-CHARGEABLE SUPPLY

## 2022-04-29 PROCEDURE — 85027 COMPLETE CBC AUTOMATED: CPT

## 2022-04-29 PROCEDURE — 94761 N-INVAS EAR/PLS OXIMETRY MLT: CPT

## 2022-04-29 PROCEDURE — 87076 CULTURE ANAEROBE IDENT EACH: CPT

## 2022-04-29 PROCEDURE — 99232 SBSQ HOSP IP/OBS MODERATE 35: CPT | Performed by: INTERNAL MEDICINE

## 2022-04-29 PROCEDURE — 87185 SC STD ENZYME DETCJ PER NZM: CPT

## 2022-04-29 PROCEDURE — 6360000002 HC RX W HCPCS: Performed by: RADIOLOGY

## 2022-04-29 RX ORDER — MIDAZOLAM HYDROCHLORIDE 2 MG/2ML
INJECTION, SOLUTION INTRAMUSCULAR; INTRAVENOUS
Status: COMPLETED | OUTPATIENT
Start: 2022-04-29 | End: 2022-04-29

## 2022-04-29 RX ADMIN — SODIUM CHLORIDE, PRESERVATIVE FREE 10 ML: 5 INJECTION INTRAVENOUS at 20:43

## 2022-04-29 RX ADMIN — OXYBUTYNIN CHLORIDE 15 MG: 10 TABLET, EXTENDED RELEASE ORAL at 08:34

## 2022-04-29 RX ADMIN — SODIUM CHLORIDE, PRESERVATIVE FREE 10 ML: 5 INJECTION INTRAVENOUS at 08:36

## 2022-04-29 RX ADMIN — MIDAZOLAM HYDROCHLORIDE 0.5 MG: 1 INJECTION, SOLUTION INTRAMUSCULAR; INTRAVENOUS at 12:39

## 2022-04-29 RX ADMIN — ONDANSETRON 4 MG: 2 INJECTION INTRAMUSCULAR; INTRAVENOUS at 01:44

## 2022-04-29 RX ADMIN — IOPAMIDOL 75 ML: 755 INJECTION, SOLUTION INTRAVENOUS at 03:05

## 2022-04-29 RX ADMIN — FAMOTIDINE 20 MG: 20 TABLET, FILM COATED ORAL at 08:34

## 2022-04-29 RX ADMIN — VANCOMYCIN HYDROCHLORIDE 1250 MG: 10 INJECTION, POWDER, LYOPHILIZED, FOR SOLUTION INTRAVENOUS at 22:59

## 2022-04-29 RX ADMIN — SODIUM CHLORIDE, PRESERVATIVE FREE 10 ML: 5 INJECTION INTRAVENOUS at 20:42

## 2022-04-29 RX ADMIN — FAMOTIDINE 20 MG: 20 TABLET, FILM COATED ORAL at 20:42

## 2022-04-29 RX ADMIN — IOHEXOL 50 ML: 240 INJECTION, SOLUTION INTRATHECAL; INTRAVASCULAR; INTRAVENOUS; ORAL at 01:06

## 2022-04-29 RX ADMIN — POTASSIUM CHLORIDE 20 MEQ: 1500 TABLET, EXTENDED RELEASE ORAL at 18:22

## 2022-04-29 RX ADMIN — RIVAROXABAN 20 MG: 20 TABLET, FILM COATED ORAL at 18:22

## 2022-04-29 RX ADMIN — LISINOPRIL 5 MG: 5 TABLET ORAL at 08:34

## 2022-04-29 RX ADMIN — IBUPROFEN 600 MG: 600 TABLET, FILM COATED ORAL at 08:38

## 2022-04-29 ASSESSMENT — PAIN SCALES - GENERAL: PAINLEVEL_OUTOF10: 8

## 2022-04-29 ASSESSMENT — ENCOUNTER SYMPTOMS
COUGH: 0
EYE DISCHARGE: 0
VOMITING: 1
BACK PAIN: 1
NAUSEA: 1
COLOR CHANGE: 0
SHORTNESS OF BREATH: 0
ABDOMINAL PAIN: 1
ABDOMINAL DISTENTION: 0

## 2022-04-29 ASSESSMENT — PAIN DESCRIPTION - LOCATION: LOCATION: HEAD

## 2022-04-29 NOTE — BRIEF OP NOTE
Brief Postoperative Note    Melody Loving  YOB: 1955  [id number]    Pre-operative Diagnosis: Pelvic Abscess      Post-operative Diagnosis: Same    Procedure: Abscess drain placement    Medication Given: fentanyl and versed    Anesthesia: 1%Lidocaine     Surgeons/Assistants:  Sharri Willis MD and Rhea Vieira PA-C    Estimated Blood Loss: Minimal    Complications: none    Specimen: Was collected    Procedure:  Successful placement of 10 English pigtail catheter in pelvic abscess. Approximately 330 mL of Yellow/green fluid was obtained. Drain was sutured to skin and stay fix was applied. LAURIE bulb was attached. Pt tolerated procedure well and left the department in stable condition.       Electronically signed by YUNIOR Mccrary on 4/29/2022 at 1:10 PM

## 2022-04-29 NOTE — OR NURSING
330ml of yellow green purulent drainage aspirated, specimen collected and to be sent to lab by Aracelis Simpson.

## 2022-04-29 NOTE — CARE COORDINATION
[facility] Flow/Interdisciplinary Rounds Progress Note    Quality Flow Rounds held on April 29, 2022 at [address] [facility] Attending:  Bedside Nurse, ,  and Nursing Unit Leadership    Barriers to Discharge:     Anticipated Discharge Date:       Anticipated Discharge Disposition:    Readmission Risk              Risk of Unplanned Readmission:  19           Discussed patient goal for the day, patient clinical progression, and barriers to discharge. The following Goal(s) of the Day/Commitment(s) have been identified:      IR today for a third LAURIE drain insertion. Long Term IV antibiotics, patient accepted at [facility]. Will need precert started when patient is closer to discharge.       Ariella Webb RN  April 29, 2022

## 2022-04-29 NOTE — CONSULTS
[facility]    Consult / H&P               Today's Date: 4/29/2022  Patient Name: Trevon Brock  Date of admission: 4/21/2022  1:28 AM  Patient's age: 74 y. o., 1955  Admission Dx: Intra-abdominal abscess ([address] [K65.1]    Reason for Consult:  Cardiac evaluation    Requesting Physician: No admitting provider for patient encounter. CHIEF COMPLAINT: Risk stratification    History Obtained From:  patient, electronic medical record    HISTORY OF PRESENT ILLNESS:      The patient is a 74 y.o.  female who admitted to the hospital with abdominal pain found to have multiple intra-abdominal abscesses status post recent ureteral stent placement secondary to pyelonephritis. CT scan showed worsening of intra-abdominal abscess. Plan for possible IR guided abscess drainage or alternative procedure by general surgery. Patient currently does not have any chest pain or shortness of breath, denied any history of heart problems. Has chronic RBBB. Essential hypertension. History of DVT on Xarelto since 2006. Recent echocardiogram done in February showed preserved EF. Patient has limited mobility at home due to lymphedema and uses a walker as a help. Past Medical History:   has a past medical history of Carcinoma ([address] Cellulitis, DVT (deep venous thrombosis) ([address] Kidney stone, Lymphedema, Morbid obesity ([address] Psoas abscess, right ([address] Pyelonephritis, and Wears glasses. Past Surgical History:   has a past surgical history that includes Tubal ligation (1993); Carpal tunnel release (1990s); [place] tooth extraction; Tubal ligation; candy and bso (cervix removed); Cystoscopy (N/A, 01/04/2022); IR GUIDED NEPHROSTOMY CATH PLACEMENT RIGHT (01/05/2022); Insert Midline Catheter (01/07/2022); CT ABSCESS DRAINAGE (01/10/2022); Cystoscopy (Right); Cystoscopy (Right, 03/01/2022); Cystoscopy (Right, 03/01/2022); Cystoscopy (Right, 04/05/2022); Cystoscopy (Right, 04/05/2022);  Cystoscopy (Right, 04/05/2022); CT ABSCESS DRAINAGE (04/21/2022); Cystoscopy (Right, 04/22/2022); Cystoscopy (Right, 4/22/2022); and   picc powerpic single (4/26/2022). Home Medications:    Prior to Admission medications    Medication Sig Start Date End Date Taking?  Authorizing Provider   tamsulosin (FLOMAX) 0.4 MG capsule Take 1 capsule by mouth daily 4/27/22  Yes Haris Posey MD   vancomycin (VANCOCIN) infusion Infuse 1,250 mg intravenously every 24 hours for 24 days Stop after 5/20/22 - ( I will start zyvox 2 weeks after that po)  Watch creat 3 x per week and vanco trough 3 x per week  Pharmacy to dose vanco for a trough 14-17 for osteo of the spine 4/25/22 5/19/22 Yes Elsa Mansfield MD   oxybutynin (DITROPAN XL) 15 MG extended release tablet Take 1 tablet by mouth daily 3/29/22   DONG Abarca - CNP   ketorolac (TORADOL) 10 MG tablet Take 10 mg by mouth every 6 hours as needed for Pain   Patient not taking: Reported on 4/12/2022 2/4/22   Historical Provider, MD   potassium chloride (KLOR-CON M) 20 MEQ TBCR extended release tablet Take 20 mEq by mouth every evening  Patient not taking: Reported on 4/5/2022    Historical Provider, MD   Misc Natural Products (OSTEO BI-FLEX ADV DOUBLE ST) TABS Take by mouth every morning    Historical Provider, MD   polyethylene glycol (GLYCOLAX) 17 g packet Take 17 g by mouth daily as needed for Constipation     Historical Provider, MD   ipratropium-albuterol (DUONEB) 0.5-2.5 (3) MG/3ML SOLN nebulizer solution Inhale 1 vial into the lungs every 4 hours  Patient not taking: Reported on 4/12/2022    Historical Provider, MD   loratadine (CLARITIN) 10 MG capsule Take 10 mg by mouth daily     Historical Provider, MD   metoprolol tartrate (LOPRESSOR) 25 MG tablet Take 1 tablet by mouth 2 times daily 1/12/22   Marjan Christianson MD   Ascorbic Acid (VITAMIN C) 250 MG tablet Take 250 mg by mouth daily    Historical Provider, MD   vitamin E 400 UNIT capsule Take 400 Units by mouth daily Historical Provider, MD   vitamin D (CHOLECALCIFEROL) 25 MCG (1000 UT) TABS tablet Take 1,000 Units by mouth daily    Historical Provider, MD   rivaroxaban (XARELTO) 20 MG TABS tablet Take 1 tablet by mouth daily (with breakfast) 7/17/18   Keith Beltre MD   Multiple Vitamins-Minerals (THERAPEUTIC MULTIVITAMIN-MINERALS) tablet Take 1 tablet by mouth daily    Historical Provider, MD      Current Facility-Administered Medications: lisinopril (PRINIVIL;ZESTRIL) tablet 5 mg, 5 mg, Oral, Daily  [Held by provider] enoxaparin (LOVENOX) injection 120 mg, 1 mg/kg, SubCUTAneous, BID  ibuprofen (ADVIL;MOTRIN) tablet 600 mg, 600 mg, Oral, Q6H PRN  famotidine (PEPCID) tablet 20 mg, 20 mg, Oral, BID  [Held by provider] rivaroxaban (XARELTO) tablet 20 mg, 20 mg, Oral, Daily  lidocaine PF 1 % injection 5 mL, 5 mL, IntraDERmal, Once  sodium chloride flush 0.9 % injection 5-40 mL, 5-40 mL, IntraVENous, 2 times per day  sodium chloride flush 0.9 % injection 5-40 mL, 5-40 mL, IntraVENous, PRN  0.9 % sodium chloride infusion, 25 mL, IntraVENous, PRN  morphine injection 2 mg, 2 mg, IntraVENous, Q2H PRN **OR** morphine injection 4 mg, 4 mg, IntraVENous, Q2H PRN  oxybutynin (DITROPAN-XL) extended release tablet 15 mg, 15 mg, Oral, Daily  potassium chloride (KLOR-CON M) extended release tablet 20 mEq, 20 mEq, Oral, QPM  sodium chloride flush 0.9 % injection 5-40 mL, 5-40 mL, IntraVENous, 2 times per day  sodium chloride flush 0.9 % injection 10 mL, 10 mL, IntraVENous, PRN  0.9 % sodium chloride infusion, , IntraVENous, PRN  potassium chloride (KLOR-CON M) extended release tablet 40 mEq, 40 mEq, Oral, PRN **OR** potassium bicarb-citric acid (EFFER-K) effervescent tablet 40 mEq, 40 mEq, Oral, PRN **OR** potassium chloride 10 mEq/100 mL IVPB (Peripheral Line), 10 mEq, IntraVENous, PRN  magnesium sulfate 1000 mg in dextrose 5% 100 mL IVPB, 1,000 mg, IntraVENous, PRN  ondansetron (ZOFRAN-ODT) disintegrating tablet 4 mg, 4 mg, Oral, Q8H PRN **OR** ondansetron (ZOFRAN) injection 4 mg, 4 mg, IntraVENous, Q6H PRN  polyethylene glycol (GLYCOLAX) packet 17 g, 17 g, Oral, Daily PRN  acetaminophen (TYLENOL) tablet 650 mg, 650 mg, Oral, Q6H PRN **OR** acetaminophen (TYLENOL) suppository 650 mg, 650 mg, Rectal, Q6H PRN  vancomycin (VANCOCIN) intermittent dosing (placeholder), , Other, RX Placeholder  vancomycin (VANCOCIN) 1250 mg in sodium chloride 0.9% 250 mL IVPB, 1,250 mg, IntraVENous, Q24H  sodium chloride flush 0.9 % injection 10 mL, 10 mL, IntraCATHeter, BID  benzocaine-menthol (CEPACOL SORE THROAT) lozenge 1 lozenge, 1 lozenge, Oral, Q2H PRN    Allergies:  Estrogens    Social History:   reports that she has been smoking cigarettes. She has a 21.00 pack-year smoking history. She has never used smokeless tobacco. She reports that she does not drink alcohol and does not use drugs. Family History: family history includes Cancer in her mother. She was adopted. No h/o sudden cardiac death. No for premature CAD    REVIEW OF SYSTEMS:    · Constitutional: Sleeping comfortably, in mild distress. · Eyes: No visual changes or diplopia. No scleral icterus. · ENT: No Headaches  · Cardiovascular: No cardiac history  · Respiratory: No previous pulmonary problems, No cough  · Gastrointestinal: No abdominal pain. No change in bowel or bladder habits. · Genitourinary: No dysuria, trouble voiding, or hematuria. · Musculoskeletal:  No gait disturbance, No weakness or joint complaints. · Integumentary: No rash or pruritis. · Neurological: No headache, diplopia, change in muscle strength, numbness or tingling. No change in gait, balance, coordination, mood, affect, memory, mentation, behavior. · Psychiatric: No anxiety, or depression. · Endocrine: No temperature intolerance. No excessive thirst, fluid intake, or urination. No tremor. · Hematologic/Lymphatic: No abnormal bruising or bleeding, blood clots or swollen lymph nodes.   · Allergic/Immunologic: No nasal congestion or hives. PHYSICAL EXAM:      BP (!) 152/74   Pulse 74   Temp 97.5 °F (36.4 °C) (Temporal)   Resp 22   Ht 5' 1\" (1.549 m)   Wt 262 lb 5.6 oz (119 kg)   LMP  (LMP Unknown)   SpO2 95%   BMI 49.57 kg/m²    Constitutional and General Appearance: alert, cooperative, no distress and appears stated age  HEENT: PERRL, no cervical lymphadenopathy. No masses palpable. Normal oral mucosa  Respiratory:  · Normal excursion and expansion without use of accessory muscles  · Resp Auscultation: Good respiratory effort. No for increased work of breathing. On auscultation: clear to auscultation bilaterally  Cardiovascular:  · The apical impulse is not displaced  · Heart tones are crisp and normal. regular S1 and S2.  · Jugular venous pulsation Normal  · The carotid upstroke is normal in amplitude and contour without delay or bruit  · Peripheral pulses are symmetrical and full   Abdomen:   · No masses or tenderness  · Has a drain placed on the right upper quadrant. · Bowel sounds present  Extremities:  ·  No Cyanosis or Clubbing  ·  Lower extremity edema: No  ·  Skin: Warm and dry  Neurological:  · Alert and oriented. · Moves all extremities well  · No abnormalities of mood, affect, memory, mentation, or behavior are noted    DATA:    Diagnostics:      ECHO 1/7/2022: EF 62%, mild concentric LVH, mild AS with peak gradient 23 mmHg and mean gradient 12 mmHg.      STRESS 8/17/18: No ischemia/infarct. EF 57%. Labs:     CBC:   Recent Labs     04/28/22  0543 04/29/22  0554   WBC 9.1 10.8   HGB 8.0* 8.4*   HCT 25.6* 27.9*   * 480*     BMP:   Recent Labs     04/27/22  0456 04/28/22  0543    138   K 3.6* 3.6*   CO2 30 31   BUN 10 9   CREATININE 0.85 0.74   LABGLOM >60 >60   GLUCOSE 114* 98     BNP: No results for input(s): BNP in the last 72 hours. PT/INR:   Recent Labs     04/29/22  0800   PROTIME 10.4   INR 1.0     APTT:No results for input(s): APTT in the last 72 hours.   CARDIAC ENZYMES:No results for input(s): CKTOTAL, CKMB, CKMBINDEX, TROPONINI in the last 72 hours. FASTING LIPID PANEL:No results found for: HDL, LDLDIRECT, LDLCALC, TRIG  LIVER PROFILE:  Recent Labs     04/27/22  0456   LABALBU 2.2*       IMPRESSION:    1. Multiple intra-abdominal abscesses, 1 LAURIE drain   2. History of DVT   3. Lymphedema  4. Morbid obesity  5. Essential hypertension    Patient Active Problem List   Diagnosis    Acute thromboembolism of deep veins of lower extremity ([address]    Long term current use of anticoagulant therapy    Bilateral cellulitis of lower leg    Acute shoulder pain due to trauma    Lymphedema of both lower extremities    Tobacco abuse    History of recurrent deep vein thrombosis (DVT)    Gross hematuria    Frequency of urination    Nocturia    Mixed incontinence    Constipation    Cellulitis of lower leg    Post-phlebitic syndrome    Elephantiasis nostra verrucosa    Cellulitis of left lower extremity    Complicated UTI (urinary tract infection)    Renal calculus    Cellulitis    Normocytic anemia    Iron deficiency anemia    Renal abscess, right    Bacteremia due to Klebsiella pneumoniae    Psoas muscle abscess (HCC)    Septicemia due to Klebsiella pneumoniae (HCC)    Ureteral calculus    Intra-abdominal abscess (HCC)    Obesity, Class III, BMI 40-49.9 (morbid obesity) (HCC)    Hypocalcemia    Hypokalemia    Vertebral osteomyelitis (HCC) T12-L1    Pleural effusion on right    CRP elevated       RECOMMENDATIONS:  1. Intra-abdominal abscesses. Patient n.p.o. for possible IR guided drain placement/alternative surgical procedure. Patient low to moderate risk for surgical intervention. Risk and benefits explained. Will discuss with rounding attending  for final recommendations.     Carla Cobb MD  Cardiology Service  Internal Medicine Residency Program, PGY-3  [facility]      Attending Physician Statement  I have discussed the care of Angelina Navya Jenna Ortiz, including pertinent history and exam findings,  with the cardiology fellow/resident. I have seen and examined the patient and the key elements of all parts of the encounter have been performed by me.        Carlos Mercer MD, [facility], [place]

## 2022-04-29 NOTE — PROGRESS NOTES
PROGRESS NOTE    PATIENT NAME: [address] [place] [address] RECORD NO. [id number]  DATE: 4/29/2022  SURGEON:   PRIMARY CARE PHYSICIAN: DONG Velazquez CNP    HD: # 8    ASSESSMENT    Patient Active Problem List   Diagnosis    Acute thromboembolism of deep veins of lower extremity ([facility]    Long term current use of anticoagulant therapy    Bilateral cellulitis of lower leg    Acute shoulder pain due to trauma    Lymphedema of both lower extremities    Tobacco abuse    History of recurrent deep vein thrombosis (DVT)    Gross hematuria    Frequency of urination    Nocturia    Mixed incontinence    Constipation    Cellulitis of lower leg    Post-phlebitic syndrome    Elephantiasis nostra verrucosa    Cellulitis of left lower extremity    Complicated UTI (urinary tract infection)    Renal calculus    Cellulitis    Normocytic anemia    Iron deficiency anemia    Renal abscess, right    Bacteremia due to Klebsiella pneumoniae    Psoas muscle abscess (HCC)    Septicemia due to Klebsiella pneumoniae (HCC)    Ureteral calculus    Intra-abdominal abscess (HCC)    Obesity, Class III, BMI 40-49.9 (morbid obesity) (HCC)    Hypocalcemia    Hypokalemia    Vertebral osteomyelitis (HCC) T12-L1    Pleural effusion on right    CRP elevated       MEDICAL DECISION MAKING AND PLAN  1. NPO until discussion with IR for possible perc drainage given slight increase in abscess size. Clinical pt with little abd pain, no leukocytosis and having bowel function. Will discuss alternative options with team if IR unable to place drain/aspirate collection  2. Cont care and recs per primary   3. ID for Abx       SUBJECTIVE    [facility] is ok this am. Pt admits to some nausea that started after drinking all the contrast for the CT A/P this am. She did throw some of the contrast up, otherwise prior to the contrast she denied any nausea or emesis.  No acute issues overnight until after drinking the contrast. Denies any f/c, sob or chest pain. +flatus and BM recently in last 12hrs. OBJECTIVE  VITALS: Temp: Temp:  (Off Unit)Temp  Av.8 °F (36.6 °C)  Min: 97.3 °F (36.3 °C)  Max: 98.6 °F (37 °C) BP Systolic (74SLO), ZQP:971 , Min:142 , HJP:644   Diastolic (58WJB), XNW:99, Min:63, Max:82   Pulse Pulse  Av.1  Min: 61  Max: 83 Resp Resp  Av.8  Min: 20  Max: 25 Pulse ox SpO2  Av.4 %  Min: 91 %  Max: 98 %  GENERAL: alert, no distress  NEURO: positive findings: none  HEENT: Eye: positive findings: none  LUNGS: equal chest rise and fall  HEART: normal rate and regular rhythm  ABDOMEN: soft, non-tender, mildly-distended and no guarding or peritoneal signs present  EXTERMITY: no cyanosis, clubbing or edema    I/O last 3 completed shifts: In: [address] [facility] [address]: 7353 [Urine:4450; Drains:70]    Drain/tube output: In: 600 [Other:600]  Out:  [Urine:3000; Drains:40]    LAB:  CBC:   Recent Labs     22  0543 22  0554   WBC 9.2 9.1 10.8   HGB 8.0* 8.0* 8.4*   HCT 26.0* 25.6* 27.9*   MCV 91.5 91.1 92.7   * 461* 480*     BMP:   Recent Labs     22  0543    138   K 3.6* 3.6*    101   CO2 30 31   BUN 10 9   CREATININE 0.85 0.74   GLUCOSE 114* 98     COAGS:   Recent Labs     22  0800   INR 1.0       RADIOLOGY:  EXAMINATION:   CT OF THE ABDOMEN AND PELVIS WITH CONTRAST 2022 3:05 am       TECHNIQUE:   CT of the abdomen and pelvis was performed with the administration of   intravenous contrast. Multiplanar reformatted images are provided for review.    Dose modulation, iterative reconstruction, and/or weight based adjustment of   the mA/kV was utilized to reduce the radiation dose to as low as reasonably   achievable.       COMPARISON:   2022 and 2022       HISTORY:   ORDERING SYSTEM PROVIDED HISTORY: pelvic abscess   TECHNOLOGIST PROVIDED HISTORY:   pelvic abscess           FINDINGS:   Lower Chest: Small right pleural effusion with atelectasis in the inferior   right lower lobe is redemonstrated.  The appearance in the lower lungs is not   significantly changed.       Organs: There is no new mass or fluid collection within the liver parenchyma. Gallbladder appears stable and may have some small stones in the dependent   portion.  Spleen is not enlarged.  There is no pancreatic calcification or   ductal dilatation.  The adrenal glands are unremarkable.  Calculus in the   interpolar left kidney but no left-sided hydronephrosis or hydroureter.  Left   renal size is maintained.  Right kidney remains atrophic.  Few small   calcifications in the right kidney but without hydronephrosis at this time. Right ureteral stent is present with the coil at the renal pelvis and   distally in the urinary bladder.       GI/Bowel: The stomach is distended with gas and oral contrast.  Mild   dilatation of multiple loops of proximal small bowel and several nondilated   but distended loops in the pelvis.  Some interposed collapsed segments and   mostly collapsed distal small bowel.       Pigtail drain along the inferolateral edge of the liver is redemonstrated   without a significant remaining fluid collection. Pigtail catheter along the   lateral edge of the right kidney with only a thin crescent of fluid along the   renal margin and fat stranding does remain in the perirenal and pararenal   space.  Stranding along the right psoas muscle without a drainable collection   at this time.  Loculated fluid collection with small amount a gas in the   lumen within the central lower abdomen to upper pelvis is redemonstrated.    Size measures up to 11.5 x 6 cm in the greatest axial plane and 7.5 cm in the   craniocaudal plane.  This collection has slightly increased from the recent   studies.  The pigtail catheter adjacent to the rectum on the previous study   has been removed. Lorenzo Rose has been reaccumulation of a 4.5 x 3 x 3 cm fluid   collection at the site. [place] Kayear may be a thin tract communicating between the   perirectal collection and the central upper pelvic collection.       Pelvis: Urinary bladder is collapsed.  A low lying Brooks catheter is seen. Previous hysterectomy.       Peritoneum/Retroperitoneum: No abdominal aortic aneurysm but does have   moderate atherosclerotic calcification.       Bones/Soft Tissues: Generalized edema in the subcutaneous fat in the flanks   and buttocks.  The bones appear stable with degenerative changes in the spine   and pelvis.           Impression   1.  Abscess in the central upper pelvis is again noted and has slightly   increased in size.  The previous perirectal pigtail catheter has been removed   with re-accumulation of a 4.5 x 3 x 3 cm abscess. [place] may be a thin tract   communicating between these 2 collections.       2.  Pigtail catheters remain adjacent to the inferior edge of the liver and   lateral edge of the right kidney without a significant abscess remaining at   these sites. [place] is residual inflammatory stranding around the right   kidney and psoas muscle at the previous abscesses.       3.  Right ureteral stent is present without hydronephrosis or gas in the   collecting system.  Stone remains at the left kidney without hydronephrosis.       4.  There are dilated proximal small bowel loops and some nondilated   gas-filled the bowel loops in the pelvis.  Features may relate to ileus or   partial obstruction, had similar features on the recent exam.       5.  Small amount of right pleural effusion with atelectasis along the   adjacent lower lobe is unchanged. Peter Dupree MD  4/29/22, 12:41 PM         Attending Note    Patient has been non symptomatic with the centrally located pelvic fluid collection. Awaiting IR opinion as to feasibility of drainage.  If is remains  inaccessable, consider discharge to facility on antibiotic with repeat CT scan prior to clinic visit  I have reviewed the above TECSS note(s) and I either performed the key elements of the medical history and physical exam or was present with the resident when the key elements of the medical history and physical exam were performed. I have discussed the findings, established the care plan and recommendations with Resident, WILLIAM RN, bedside nurse.     Diaz Riddle MD  4/29/2022  12:41 PM

## 2022-04-29 NOTE — PROGRESS NOTES
Physical Therapy        Physical Therapy Cancel Note      DATE: 2022    NAME: Helen Chapa  MRN: [id number]   : 1955      Patient not seen this date for Physical Therapy due to: Other: Per RN pt inappropriate for PT at this time, will check back tomorrow.       Electronically signed by Bryant Day PTA on 2022 at 3:01 PM

## 2022-04-29 NOTE — PROGRESS NOTES
[facility]  Documentation Note                Admission Dx: Intra-abdominal abscess ([address] [K65.1]    Past Medical History:   has a past medical history of Carcinoma ([address] Cellulitis, DVT (deep venous thrombosis) ([address] Kidney stone, Lymphedema, Morbid obesity ([address] Psoas abscess, right ([address] Pyelonephritis, and Wears glasses. Previous Testing:     ECHO 1/7/2022: EF 62%, mild concentric LVH, mild AS with peak gradient 23 mmHg and mean gradient 12 mmHg. STRESS 8/17/18: No ischemia/infarct. EF 57%. Previous office/hospital visit:   Dr. Virginia Gomez 2/22/2022:   1. History of recurrent deep vein thrombosis, on longstanding Xarelto. 2. History of DJD. 3. Normal Lexiscan stress test from 8/17/18 at [facility], showing LVEF 57%, with no ischemia or infarction. 4. 2D echo from 8/17/18 showing LVEF greater than 55%, with mild aortic stenosis, with mean gradient 10.5 mmHg, with no aortic insufficiency and mitral annular calcification, however, no mitral stenosis or regurgitation. 5. History of chronic right bundle branch block and left axis deviation, with normal NV interval.  6. Essential HTN    Plan --   1. Moderate CV risk for anticipated urologic surgery  2. Essential HTN  3. Chronic RBBB and left axis deviation  4. PVC's, asymptomatic  5. h/o recurrent DVT, on chronic Xarelto    1. Acceptable cardiac risk to proceed with urologic surgery next week. 2. Continue lisinopril; will plan for close f/u in 8 weeks  3. Pt was advised to check home BP three times weekly. 4. Will reassess LVEF with 2D echo. 5. Continue Xarelto, holding temporarily 3 days prior to surgery.     Viviane Briones, [facility]

## 2022-04-29 NOTE — PROGRESS NOTES
PROVIDE ADEQUATE OXYGENATION WITH ACCEPTABLE SP02/ABG'S     [x]         IDENTIFY APPROPRIATE OXYGEN THERAPY  [x]         MONITOR SP02/ABG'S AS NEEDED   [x]         PATIENT EDUCATION AS NEEDED        04/29/22 5643   Treatment   Treatment Type IS   Incentive Spirometry Tx   Treatment Effort Assisted by RT; Instructed; Well   Achieved Volume (mL) 1000 mL     Incentive Spirometry education and demonstration given by Respiratory Therapy.      Richard Ruiz, AB[facility]

## 2022-04-29 NOTE — PROGRESS NOTES
[facility]  Progress Note    Daniel Burnham  MRN: [id number]  Date: 4/29/2022  LOS: 8     Reason for F/U :   Abdominal abscess, vertebral osteomyelitis, pyelonephritis/pyelitis    Impression :   1. Multiple perihepatic abscesses, retroperitoneal abscess on the right psoas, and abscess in pelvic cul-de-sac  2. 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 Free air in the abdomen,  4. Osteomyelitis at T12-L1 due to direct extension from abscess over psoas muscle  5. bandemia  6. History of complicated UTI in January 2022, positive for Klebsiella pneumoniae  7. History of septicemia in January 2022, positive for Klebsiella pneumoniae  8. History of right psoas abscess drained in January 2022  9. History of right ureteral and renal stones with ureteral stent placement in March 2022 with stent exchanged in April 2020    Recommendations:   abd fluid 4/21/22  Only strep and enterococcus fecium R amp but S vanco   On another fluid cx 4/21 she had E fecium S amp and vanco  Await cx from 4/29 drainage     Plan:. · CT AP w pelvic abscess drained by IR   · Keep vanco and watch creat tiw - x 4 weeks and follow w zyvox 2 weeks then repeat CT AP to check on resolution of the abscesses  · PICC line placed on 4/26. · FU in office    Infection Control Recommendations:   Universal precautions    Discharge Planning:   Estimated Length of IV antimicrobials:  To be determined  Patient will need Midline Catheter Insertion/ PICC line Insertion: No  Patient will need: Home IV , [facility],  SNF,  LTAC: Undetermined  Patient willneed outpatient wound care: No    Medical Decision making / Summary of Stay:   Daniel Burnham is a 74y.o.-year-old female presenting as a transfer from an outside facility due to conern for bowel perforation and multiple intra-abdominal abscesses. Manda Mireles was previously hospitalized in January 2022 with Klebsiella pneumoniae sepsis related to a perinephric abscess. She did have right-sided severely obstructive pyelonephritis for which she underwent stent placement as well as a right percutaneous nephrostomy tube placement. This infection was complicated by perinephric/psoas abscess which was aspirated and this drain was placed. Patient was seen by my partner Dr. Verneda Cheadle and was discharged on intravenous antimicrobial therapy with Rocephin 2 g daily through February 2, 2022  The patient on 3/1/2022 underwent a cystoscopy with right-sided ureteroscopy with holmium laser lithotripsy and right-sided ureteral stent placement  The patient underwent a second urological procedure on 4/5/2022 with a cystoscopy, right-sided ureteroscopy, right holmium laser lithotripsy and right ureteral stent exchange and the patient was found to have a copious amount of calculi which were crushed up and further ablated.     The patient reports that ever since she had the second urological procedure she has had generalized malaise/fatigue and more recently progressing abdominal pain over the last several days. Initial lactic acid was 4.4 and treated with fluid bolus.  Initial potassium was 2.8, now 3.6 after replacement.     A CT scan of the abdomen and pelvis 4/20/2022 showed gas within both the right renal parenchyma and proximal right collecting system and increased size of the previous right retroperitoneal abscess tracking along the psoas muscle and there is abscess tracking along the posterior course of the drainage catheter through the right quadratus lumbar muscle and along the right posterior paraspinal musculature  There are multifocal rim-enhancing mixed Greg fluid collections worrisome for abscess within the dominant collections of the perihepatic region spanning up to 15 cm within the pelvic cul-de-sac spanning up to 7 cm. There is free air along the mesentery in addition to the a forementioned fluid collections. Discitis/osteomyelitis at T12-L1 with direct extension from the adjacent right psoas inflammatory change. Loculated right pleural effusion     The patient has been admitted and started on broad-spectrum antimicrobial therapy and ultrasound-guided placement of 12 Israeli drain in the perihepatic abscess with 550 mL of green following purulent material aspirated, and a CT-guided placement of right perinephric and deep pelvic abscess drainage catheters with 5 mL of purulent fluid removed from each collection sent for diagnostic tests. 22 - Urology performed cystoscopy with rt sided stent placement, and right retrograde pyelogram.    22 - CT abd/pelvis shows near resolution of the 3 abscess accessed for drainage. Perihepatic abscess was 13.7 x 10.3 cm no 3.0 x 2.4cm. Enlargement of the 10.8 x 7.5 cm presumed abscess in right central pelvis, not able to access for percutaneous drainage. 22 - PICC placed      Current evaluation:2022    BP (!) 152/74   Pulse 74   Temp 97.5 °F (36.4 °C) (Temporal)   Resp 22   Ht 5' 1\" (1.549 m)   Wt 262 lb 5.6 oz (119 kg)   LMP  (LMP Unknown)   SpO2 95%   BMI 49.57 kg/m²     Temperature Range: Temp: 97.5 °F (36.4 °C) Temp  Av.9 °F (36.6 °C)  Min: 97.3 °F (36.3 °C)  Max: 98.6 °F (37 °C)    Afebrile, SBP in 150s, otherwise vital signs stable. Continues on oxygen at 1.5 LPM, sats in upper 90s. Nausea, vomiting, and heart burn after CT with contrast.  2 right sided drains    IRplsced a pelvic collection drain  - 300 cc pus out - cx pend  Creat stable  In good spirits  Wants to be DC d    CT abd/pelvis repeated this AM - shows previous perirectal abscess reincrease in size since pigtail removed, now 4.5 x 3 x 3 cm . Central upper pelvis abscess increase in size.  Right ureteral stent remains in place no hydronephrosis. Navneet Stewart remains in left kidney    Gen surgery to discuss next steps with IR.    - Abscess culture- MANY NEUTROPHILS Abnormal MODERATE GRAM POSITIVE COCCI IN PAIRS Abnormal FEW GRAM POSITIVE RODS Abnormal Culture ENTEROCOCCUS FAECIUM LIGHT GROWTH Abnormal VIRIDANS STREPTOCOCCUS GROUP LIGHT GROWTH Susceptibilities have not been performed. Notify the laboratory within 7 days for further workup if clinically indicated. Abnormal       Review of Systems   Constitutional: Negative for chills and fever. HENT: Negative for congestion. Eyes: Negative for pain and discharge. Respiratory: Negative for cough and shortness of breath. Cardiovascular: Negative for chest pain and palpitations. Gastrointestinal: Positive for abdominal pain (heart burn), nausea and vomiting. Negative for abdominal distention. Endocrine: Negative for cold intolerance and polydipsia. Genitourinary: Negative for dysuria and flank pain. Musculoskeletal: Positive for arthralgias and back pain. Skin: Negative for color change. Allergic/Immunologic: Negative for environmental allergies. Neurological: Positive for weakness. Negative for dizziness and headaches. Hematological: Negative for adenopathy. Psychiatric/Behavioral: Negative for agitation. Physical Examination :     Physical Exam  Constitutional:       General: She is not in acute distress. Appearance: Normal appearance. She is obese. She is not ill-appearing, toxic-appearing or diaphoretic. HENT:      Head: Normocephalic and atraumatic. Nose: Nose normal.      Mouth/Throat:      Mouth: Mucous membranes are moist.   Eyes:      General: No scleral icterus. Conjunctiva/sclera: Conjunctivae normal.   Cardiovascular:      Rate and Rhythm: Normal rate and regular rhythm. Pulses: Normal pulses. Heart sounds: No murmur heard. No friction rub. Pulmonary:      Effort: Pulmonary effort is normal. No respiratory distress.       Breath sounds: No wheezing. Rales: mild. Abdominal:      General: Bowel sounds are normal.      Palpations: Abdomen is soft. Tenderness: There is abdominal tenderness. Comments: 2 right-sided LAURIE drains   Genitourinary:     Comments: Brooks catheter in place    Musculoskeletal:         General: No swelling or tenderness. Normal range of motion. Cervical back: Neck supple. No rigidity or tenderness. Right lower leg: Edema present. Left lower leg: Edema present. Skin:     General: Skin is warm and dry. Coloration: Skin is not jaundiced or pale. Findings: No bruising or erythema. Neurological:      Mental Status: She is alert and oriented to person, place, and time.    Psychiatric:         Behavior: Behavior normal.         Laboratory data:   I have independently reviewed the followinglabs:  CBC with Differential:   Recent Labs     04/28/22  0543 04/29/22  0554   WBC 9.1 10.8   HGB 8.0* 8.4*   HCT 25.6* 27.9*   * 480*     BMP:   Recent Labs     04/27/22  0456 04/28/22  0543    138   K 3.6* 3.6*    101   CO2 30 31   BUN 10 9   CREATININE 0.85 0.74   MG 1.9  --      Hepatic Function Panel:   Recent Labs     04/27/22  0456   LABALBU 2.2*         Lab Results   Component Value Date    PROCAL 0.07 02/11/2020     Lab Results   Component Value Date    CRP 52.0 02/11/2020     Lab Results   Component Value Date    SEDRATE 91 (H) 02/11/2020         No results found for: [facility]  Lab Results   Component Value Date    FERRITIN 173 04/26/2022    FERRITIN 42 01/23/2021     No results found for: [facility]  No results found for: FIBRINOGEN    Results in Past 30 Days  Result Component Current Result Ref Range Previous Result Ref Range   SARS-CoV-2, Rapid Not Detected (4/20/2022) Not Detected Not in Time Range      Lab Results   Component Value Date    COVID19 Not Detected 04/20/2022    COVID19 Not Detected 01/12/2022    COVID19 Not Detected 01/03/2022       No results for input(s): Roger Phipps in the last 72 hours. Imaging Studies:   US guided drainage  Impression:        Successful ultrasound-guided placement 12 Icelandic drain in perihepatic   abscess.  Sample sent for culture and sensitivity.  550 mL of green   foul-smelling purulent material was aspirated. Cultures:     Culture, Blood 2 [[id number]] Collected: 04/21/22 0215   Order Status: Completed Specimen: Blood Updated: 04/22/22 0230    Specimen Description . BLOOD    Special Requests LEFT AC 10ML    Culture NO GROWTH 1 DAY   Culture, Blood 1 [[id number]] Collected: 04/21/22 0216   Order Status: Completed Specimen: Blood Updated: 04/22/22 0229    Specimen Description . BLOOD    Special Requests RIGHT FOREARM 20ML    Culture NO GROWTH 1 DAY   Culture, Urine [[id number]] Collected: 04/21/22 0345   Order Status: Completed Specimen: Urine, clean catch Updated: 04/21/22 2150    Specimen Description . CLEAN CATCH URINE    Culture Candida albicans/dubliniensis 50 to 100,000 CFU/ML   Culture, Anaerobic and Aerobic [[id number]] (Abnormal) Collected: 04/21/22 1342   Order Status: Completed Specimen: Abscess Updated: 04/21/22 1549    Specimen Description . ABSCESS . ASPIRATE    Direct Exam MODERATE NEUTROPHILS Abnormal      MODERATE GRAM POSITIVE COCCI IN CLUSTERS Abnormal     Culture PENDING   Culture, Anaerobic and Aerobic [[id number]] (Abnormal) Collected: 04/21/22 1340   Order Status: Completed Specimen: Abscess Updated: 04/21/22 1548    Specimen Description . ABSCESS . ASPIRATE    Direct Exam FEW NEUTROPHILS Abnormal      MODERATE GRAM POSITIVE COCCI IN CLUSTERS Abnormal     Culture PENDING   Culture, Anaerobic and Aerobic [[id number]] (Abnormal) Collected: 04/21/22 1337   Order Status: Completed Specimen: Abscess Updated: 04/21/22 1529    Specimen Description . ABSCESS . ASPIRATE    Direct Exam MANY NEUTROPHILS Abnormal      MODERATE GRAM POSITIVE COCCI IN PAIRS Abnormal      FEW GRAM POSITIVE RODS Abnormal     Culture PENDING       Medications:      lisinopril  5 mg Oral Daily    [Held by provider] enoxaparin  1 mg/kg SubCUTAneous BID    famotidine  20 mg Oral BID    [Held by provider] rivaroxaban  20 mg Oral Daily    lidocaine 1 % injection  5 mL IntraDERmal Once    sodium chloride flush  5-40 mL IntraVENous 2 times per day    oxybutynin  15 mg Oral Daily    potassium chloride  20 mEq Oral QPM    sodium chloride flush  5-40 mL IntraVENous 2 times per day    vancomycin (VANCOCIN) intermittent dosing (placeholder)   Other RX Placeholder    vancomycin  1,250 mg IntraVENous Q24H    sodium chloride flush  10 mL IntraCATHeter BID         Malika Mason, MS4        I have discussed the care of the patient, including pertinent history and exam findings,  with the resident. I have seen and examined the patient and the key elements of all parts of the encounter have been performed by me. I agree with the assessment, plan and orders as documented by the resident.     Kyung Lara, Infectious Diseases

## 2022-04-29 NOTE — PROGRESS NOTES
[facility]  Office: [address], DO, Valentina Estes, DO, Rickie Mejia, DO, Eleazarmonica Red Blood, DO, Greg Estrelal MD, Cheyanne Covarrubias MD, Tita Elizondo MD, Ginger Tubbs MD, Rajesh Palacios MD, Carmita Chavez MD, Julia Hamilton MD, Nori Richter, DO, Matias Thayer, DO, Shwetha Novak MD,  Joel Solorzano DO, Laura Quiroz MD, Marilyn Vera MD, Hermilo Lubin MD, Elias Matthew DO, Chloé Farah MD, Scott Borrego MD, [place] [facility], [facility], CNP, Arvind Desir CNP, Kristie Estes, CNP, Betito Zapata, CNP, J Luis Rubio, CNP, MELVIN MoraC, Diann Schumacher, [facility], Diana Alves, [facility], Michael Flaherty, CNP, Rjaesh Larios, CNP, Lillian Ricks, CNS, Ana Garcia [facility], Jamel Palacios, CNP, Sabina Hernandez CNP, Selma Cox, [facility]    Progress Note    4/29/2022    12:03 PM    Name:   Matt Nice  MRN:     [id number]     [place]:      [de-identified]   Room:   [address] [place] [address] Day:  8  Admit Date:  4/21/2022  1:28 AM    PCP:   DONG Esparza CNP  Code Status:  Full Code    Subjective:     C/C:   Chief Complaint   Patient presents with    Abdominal Pain     perf bowel     Interval History Status: not changed. Pt seen and evaluated this morning. She has no new complaints and her condition is relatively unchanged. Appetite remains poor. She is denying fever, chills, abdominal pain. Brief History:     56-year-old female transferred from outside hospital for bowel perforation with multiple intra-abdominal abscesses. Patient initially was treated on vancomycin and Zosyn with elevated lactic acid and was given sepsis bolus of IV fluids. Patient was seen by multiple consultants including trauma surgery, general surgery and infectious disease and urology. Patient was recommended placement of multiple pigtail catheters for perihepatic and perinephric abscesses.   Patient also had a successful placement of a pelvic abscess drain, patient the following day was seen by urology recommending replacement of right-sided stent and cystoscopy and right retrograde pyelogram.      4/22: cystoscopy with right stent placement    4/29: CT showing interval increase of central pelvic abscess, recurrence of abscess that was previous target of perirectal drain. IR planning for IR guided drain placement    Review of Systems:     Constitutional:  negative for chills, fevers, sweats  Respiratory:  negative for cough, dyspnea on exertion, shortness of breath, wheezing  Cardiovascular:  negative for chest pain, chest pressure/discomfort, lower extremity edema, palpitations  Gastrointestinal:  negative for abdominal pain, constipation, diarrhea, vomiting. + nausea. Neurological:  negative for dizziness, headache    Medications: Allergies:     Allergies   Allergen Reactions    Estrogens Other (See Comments)     Hx of DVT       Current Meds:   Scheduled Meds:    lisinopril  5 mg Oral Daily    [Held by provider] enoxaparin  1 mg/kg SubCUTAneous BID    famotidine  20 mg Oral BID    [Held by provider] rivaroxaban  20 mg Oral Daily    lidocaine 1 % injection  5 mL IntraDERmal Once    sodium chloride flush  5-40 mL IntraVENous 2 times per day    oxybutynin  15 mg Oral Daily    potassium chloride  20 mEq Oral QPM    sodium chloride flush  5-40 mL IntraVENous 2 times per day    vancomycin (VANCOCIN) intermittent dosing (placeholder)   Other RX Placeholder    vancomycin  1,250 mg IntraVENous Q24H    sodium chloride flush  10 mL IntraCATHeter BID     Continuous Infusions:    sodium chloride      sodium chloride       PRN Meds: ibuprofen, sodium chloride flush, sodium chloride, morphine **OR** morphine, sodium chloride flush, sodium chloride, potassium chloride **OR** potassium alternative oral replacement **OR** potassium chloride, magnesium sulfate, ondansetron **OR** ondansetron, polyethylene glycol, acetaminophen **OR** acetaminophen, benzocaine-menthol    Data:     Past Medical History:   has a past medical history of Carcinoma ([facility] [address] Cellulitis, DVT (deep venous thrombosis) ([facility] [address] Kidney stone, Lymphedema, Morbid obesity ([facility] [address] Psoas abscess, right ([address] Pyelonephritis, and Wears glasses. Social History:   reports that she has been smoking cigarettes. She has a 21.00 pack-year smoking history. She has never used smokeless tobacco. She reports that she does not drink alcohol and does not use drugs. Family History:   Family History   Adopted: Yes   Problem Relation Age of Onset    Cancer Mother         The patient reports her biologic mother did have bladder cancer       Vitals:  BP (!) 152/74   Pulse 74   Temp 97.5 °F (36.4 °C) (Temporal)   Resp 22   Ht 5' 1\" (1.549 m)   Wt 262 lb 5.6 oz (119 kg)   LMP  (LMP Unknown)   SpO2 95%   BMI 49.57 kg/m²   Temp (24hrs), Av.9 °F (36.6 °C), Min:97.3 °F (36.3 °C), Max:98.6 °F (37 °C)    No results for input(s): POCGLU in the last 72 hours. I/O (24Hr):     Intake/Output Summary (Last 24 hours) at 2022 1203  Last data filed at 2022 1140  Gross per 24 hour   Intake 600 ml   Output 3040 ml   Net -2440 ml       Labs:  Hematology:  Recent Labs     22  0456 22  0543 22  0554 22  0800   WBC 9.2 9.1 10.8  --    RBC 2.84* 2.81* 3.01*  --    HGB 8.0* 8.0* 8.4*  --    HCT 26.0* 25.6* 27.9*  --    MCV 91.5 91.1 92.7  --    MCH 28.2 28.5 27.9  --    MCHC 30.8 31.3 30.1  --    RDW 16.3* 16.4* 16.8*  --    * 461* 480*  --    MPV 9.3 9.6 9.2  --    INR  --   --   --  1.0     Chemistry:  Recent Labs     226 22  0543    138   K 3.6* 3.6*    101   CO2 30 31   GLUCOSE 114* 98   BUN 10 9   CREATININE 0.85 0.74   MG 1.9  --    ANIONGAP 9 6*   LABGLOM >60 >60   GFRAA >60 >60   CALCIUM 7.6* 7.5*   PHOS 2.7  --      Recent Labs     22   LABALBU 2.2*     ABG:  Lab Results   Component Value Date    PHART 7.344 07/19/2018    MPM8GIC 35.5 07/19/2018    PO2ART 72.5 07/19/2018    JRV7IZR 25.0 07/19/2018    NBEA NOT REPORTED 07/19/2018    PBEA 1.7 07/19/2018    U6KFQUJU 95.5 07/19/2018    FIO2 21 07/19/2018     Lab Results   Component Value Date/Time    SPECIAL RIGHT FOREARM 20ML 04/21/2022 02:16 AM     Lab Results   Component Value Date/Time    CULTURE (A) 04/21/2022 01:42 PM     VIRIDANS STREPTOCOCCUS GROUP LIGHT GROWTH Susceptibilities have not been performed. Notify the laboratory within 7 days for further workup if clinically indicated. CULTURE ENTEROCOCCUS FAECIUM MODERATE GROWTH (A) 04/21/2022 01:42 PM    CULTURE MIXED ANAEROBIC NAGI 04/21/2022 01:42 PM       Radiology:  CT ABDOMEN PELVIS W IV CONTRAST Additional Contrast? Oral    Result Date: 4/21/2022  1. Multiple intra-abdominal and pelvic fluid collections which have the appearance of abscess formation. In the subcapsular area in the liver extending into the subhepatic area a large abscess noted measuring 10 x 13.7 cm. A second more posterior collection measures 8.3 x 3.2 cm. Two dominant pelvic collections are noted, one measuring 10 x 6 cm and the second posteriorly 7 x 6.3 cm. The anterior collection is larger compared to the previous evaluation. These likely abscesses contain air in them. 2. A small amount of free air is noted scattered in the abdomen and pelvis. Psoas retroperitoneal abscess extends ton the posterior soft tissues. 3. Right renal atrophy. A subcapsular air containing collection measures 5 x 3.9 cm also likely an abscess. 4. Partly loculated right pleural effusion and right lower lobe atelectatic changes. Findings discussed with Dr Ike Quiroga 04/21/2022 00:10 a.m. RECOMMENDATIONS: Percutaneous drainage of multiple abscesses.      CT ABDOMEN PELVIS W IV CONTRAST Additional Contrast? 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 New bladder prolapse with residual dependent stones in the bladder. Intraluminal gas in the bladder may be due to infection or recent instrumentation. Increased size of the previous right retroperitoneal abscess tracking along the psoas muscle (6.7 x 2.6 x 10.2 cm) which previously contained a percutaneous drainage catheter. There are abscesses tracking along the prior course of the drainage catheter through the right quadratus lumborum muscle and along the right posterior paraspinal musculature. Multifocal rim enhancing mixed gas and fluid collections worrisome for abscesses with the dominant collections in the perihepatic region spanning up to 15 cm and within the pelvic cul-de-sac spanning up to 7 cm. Free air along the mesentery in addition to the aforementioned fluid collections. As there are some thickened loops of small bowel in the lower abdomen and pelvis, a concomitant bowel perforation is not able be excluded on this exam, with differential considerations including ischemic bowel. 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 Loculated right pleural effusion characterized to advantage on today's chest CT. Critical results were called by Dr. Kathy Jansen to Dr. Gala Whitehead on 4/20/2022 at 22:21 EST. XR CHEST PORTABLE    Result Date: 4/20/2022  Mass like infiltrate in the right upper lobe suggesting pneumonia or neoplasm. Underlying pulmonary vascular congestion RECOMMENDATION: Follow-up CT would be helpful. CT CHEST PULMONARY EMBOLISM W CONTRAST    Result Date: 4/20/2022  Moderately large right pleural effusion with areas of loculation accounting for the pseudotumor seen on chest x-ray. . Mild bibasal atelectasis more notably on the right. ..  Coronary artery/atherosclerotic disease No obvious evidence of pulmonary embolism. Mild subcutaneous emphysema along the right posterior chest wall. Perihepatic fluid. Ectopic air seen in the abdomen. Please refer to abdominal CT report RECOMMENDATIONS: No additional routine follow-up for incidental abdominal lesions. CT ABSCESS DRAINAGE    Result Date: 4/21/2022  Successful CT guided placement of right Lisbeth nephric and deep pelvic abscess drainage catheters. IR ABSCESS DRAINAGE PERC    Result Date: 4/21/2022  Successful ultrasound-guided placement 12 Yoruba drain in perihepatic abscess. Sample sent for culture and sensitivity. 550 mL of green foul-smelling purulent material was aspirated. Physical Examination:        General appearance:  alert, cooperative and no distress  Mental Status:  oriented to person, place and time and normal affect  Lungs:  clear to auscultation bilaterally, normal effort  Heart:  regular rate and rhythm, no murmur  Abdomen:  soft, nontender, nondistended, normal bowel sounds, no masses, hepatomegaly, splenomegaly, multiple abdominal drainage tubes  Extremities:  Bilateral lymphedema, venous stasis changes.  No redness, tenderness in the calves  Skin: Chronic skin changes bilateral lower extremities    Assessment:        Hospital Problems           Last Modified POA    * (Principal) Intra-abdominal abscess ([address] 4/21/2022 Yes    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    Obesity, Class III, BMI 40-49.9 (morbid obesity) ([address] 4/21/2022 Yes    Hypocalcemia 4/21/2022 Yes    Hypokalemia 4/21/2022 Yes    Vertebral osteomyelitis (HCC) T12-L1 4/21/2022 Yes    Pleural effusion on right 4/21/2022 Yes    CRP elevated 4/23/2022 Yes    Long term current use of anticoagulant therapy 4/21/2022 Yes    Lymphedema of both lower extremities 4/21/2022 Yes    Tobacco abuse 4/21/2022 Yes    History of recurrent deep vein thrombosis (DVT) 3/34/8423 Yes    Complicated UTI (urinary tract infection) 4/21/2022 Yes    Renal calculus 4/21/2022 Yes Normocytic anemia 4/21/2022 Yes    Renal abscess, right 4/21/2022 Yes    Psoas muscle abscess ([address] 4/21/2022 Yes    Ureteral calculus 4/21/2022 Yes          Plan:        Multiple intra-abdominal abscess - 2 right sided LAURIE drains remain. Infectious disease recommending vancomycin for antibiotics. PICC placed. Cultures positive for Enterococcus. Discussed with surgery team regarding intervention for enlarging central pelvic abscess. Repeat CT shows slight increase in central pelvic abscess and re accumulation of abscess where perirectal drain was previously placed. After discussion with ID, surgery and IR, pt will undergo IR guided drain placement today of central pelvic abscess. Will hold therapeutic lovenox today in preparation for potential surgical intervention. Initiate prophylactic dosing. History of VTE -patient normally on Xarelto for recurrent DVT. Will place on therapeutic lovenox in the interim. Monitor for bleeding. Sinus bradycardia: secondary to metoprolol. Stop metoprolol.    Essential hypertension: start lisinopril 5 mg daily  Lymphedema- stop lasix  Morbid obesity  Anemia of chronic disease  Lovenox DVT prophylaxis    Ceci Ontiveros PA-C  4/29/2022  12:03 PM

## 2022-04-29 NOTE — PROGRESS NOTES
PROGRESS NOTE    PATIENT NAME: [address] [place] [address] RECORD NO. [id number]  DATE: 4/29/2022  SURGEON:   PRIMARY CARE PHYSICIAN: DONG Martinez CNP    HD: # 8    ASSESSMENT    Patient Active Problem List   Diagnosis    Acute thromboembolism of deep veins of lower extremity ([facility] [address]    Long term current use of anticoagulant therapy    Bilateral cellulitis of lower leg    Acute shoulder pain due to trauma    Lymphedema of both lower extremities    Tobacco abuse    History of recurrent deep vein thrombosis (DVT)    Gross hematuria    Frequency of urination    Nocturia    Mixed incontinence    Constipation    Cellulitis of lower leg    Post-phlebitic syndrome    Elephantiasis nostra verrucosa    Cellulitis of left lower extremity    Complicated UTI (urinary tract infection)    Renal calculus    Cellulitis    Normocytic anemia    Iron deficiency anemia    Renal abscess, right    Bacteremia due to Klebsiella pneumoniae    Psoas muscle abscess (HCC)    Septicemia due to Klebsiella pneumoniae (HCC)    Ureteral calculus    Intra-abdominal abscess (HCC)    Obesity, Class III, BMI 40-49.9 (morbid obesity) (HCC)    Hypocalcemia    Hypokalemia    Vertebral osteomyelitis (HCC) T12-L1    Pleural effusion on right    CRP elevated       MEDICAL DECISION MAKING AND PLAN  1. NPO until discussion with IR for possible perc drainage given slight increase in abscess size. Clinical pt with little abd pain, no leukocytosis and having bowel function. Will discuss alternative options with team if IR unable to place drain/aspirate collection  2. Cont care and recs per primary   3. ID for Abx       SEGUN    Devante Ibeth is ok this am. Pt admits to some nausea that started after drinking all the contrast for the CT A/P this am. She did throw some of the contrast up, otherwise prior to the contrast she denied any nausea or emesis.  No acute issues overnight until after drinking the contrast. Denies any f/c, sob or chest pain. +flatus and BM recently in last 12hrs. OBJECTIVE  VITALS: Temp: Temp: 97.5 °F (36.4 °C)Temp  Av.9 °F (36.6 °C)  Min: 97.3 °F (36.3 °C)  Max: 98.6 °F (37 °C) BP Systolic (10IJZ), WXH:689 , Min:142 , OIA:787   Diastolic (05ATZ), SIT:69, Min:63, Max:74   Pulse Pulse  Av.9  Min: 49  Max: 83 Resp Resp  Av.8  Min: 20  Max: 25 Pulse ox SpO2  Av.8 %  Min: 91 %  Max: 98 %  GENERAL: alert, no distress  NEURO: positive findings: none  HEENT: Eye: positive findings: none  LUNGS: equal chest rise and fall  HEART: normal rate and regular rhythm  ABDOMEN: soft, non-tender, mildly-distended and no guarding or peritoneal signs present  EXTERMITY: no cyanosis, clubbing or edema    I/O last 3 completed shifts: In: [address]: 4310 [Urine:4450; Drains:70]    Drain/tube output: In: 600 [Other:600]  Out: 2590 [Urine:2550; Drains:40]    LAB:  CBC:   Recent Labs     22  0543 22  0554   WBC 9.2 9.1 10.8   HGB 8.0* 8.0* 8.4*   HCT 26.0* 25.6* 27.9*   MCV 91.5 91.1 92.7   * 461* 480*     BMP:   Recent Labs     22  0543    138   K 3.6* 3.6*    101   CO2 30 31   BUN 10 9   CREATININE 0.85 0.74   GLUCOSE 114* 98     COAGS: No results for input(s): APTT, PROT, INR in the last 72 hours. RADIOLOGY:  EXAMINATION:   CT OF THE ABDOMEN AND PELVIS WITH CONTRAST 2022 3:05 am       TECHNIQUE:   CT of the abdomen and pelvis was performed with the administration of   intravenous contrast. Multiplanar reformatted images are provided for review.    Dose modulation, iterative reconstruction, and/or weight based adjustment of   the mA/kV was utilized to reduce the radiation dose to as low as reasonably   achievable.       COMPARISON:   2022 and 2022       HISTORY:   ORDERING SYSTEM PROVIDED HISTORY: pelvic abscess   TECHNOLOGIST PROVIDED HISTORY:   pelvic abscess           FINDINGS:   Lower Chest: Small right pleural effusion with atelectasis in the inferior   right lower lobe is redemonstrated.  The appearance in the lower lungs is not   significantly changed.       Organs: There is no new mass or fluid collection within the liver parenchyma. Gallbladder appears stable and may have some small stones in the dependent   portion.  Spleen is not enlarged.  There is no pancreatic calcification or   ductal dilatation.  The adrenal glands are unremarkable.  Calculus in the   interpolar left kidney but no left-sided hydronephrosis or hydroureter.  Left   renal size is maintained.  Right kidney remains atrophic.  Few small   calcifications in the right kidney but without hydronephrosis at this time. Right ureteral stent is present with the coil at the renal pelvis and   distally in the urinary bladder.       GI/Bowel: The stomach is distended with gas and oral contrast.  Mild   dilatation of multiple loops of proximal small bowel and several nondilated   but distended loops in the pelvis.  Some interposed collapsed segments and   mostly collapsed distal small bowel.       Pigtail drain along the inferolateral edge of the liver is redemonstrated   without a significant remaining fluid collection. Pigtail catheter along the   lateral edge of the right kidney with only a thin crescent of fluid along the   renal margin and fat stranding does remain in the perirenal and pararenal   space.  Stranding along the right psoas muscle without a drainable collection   at this time.  Loculated fluid collection with small amount a gas in the   lumen within the central lower abdomen to upper pelvis is redemonstrated.    Size measures up to 11.5 x 6 cm in the greatest axial plane and 7.5 cm in the   craniocaudal plane.  This collection has slightly increased from the recent   studies.  The pigtail catheter adjacent to the rectum on the previous study   has been removed. Shailesh Sterling has been reaccumulation of a 4.5 x 3 x 3 cm fluid   collection at the site. Karolina Knuckles may be a thin tract communicating between the   perirectal collection and the central upper pelvic collection.       Pelvis: Urinary bladder is collapsed.  A low lying Brooks catheter is seen. Previous hysterectomy.       Peritoneum/Retroperitoneum: No abdominal aortic aneurysm but does have   moderate atherosclerotic calcification.       Bones/Soft Tissues: Generalized edema in the subcutaneous fat in the flanks   and buttocks.  The bones appear stable with degenerative changes in the spine   and pelvis.           Impression   1.  Abscess in the central upper pelvis is again noted and has slightly   increased in size.  The previous perirectal pigtail catheter has been removed   with re-accumulation of a 4.5 x 3 x 3 cm abscess. Karolina Knuckles may be a thin tract   communicating between these 2 collections.       2.  Pigtail catheters remain adjacent to the inferior edge of the liver and   lateral edge of the right kidney without a significant abscess remaining at   these sites. Karolina Knuckles is residual inflammatory stranding around the right   kidney and psoas muscle at the previous abscesses.       3.  Right ureteral stent is present without hydronephrosis or gas in the   collecting system.  Stone remains at the left kidney without hydronephrosis.       4.  There are dilated proximal small bowel loops and some nondilated   gas-filled the bowel loops in the pelvis.  Features may relate to ileus or   partial obstruction, had similar features on the recent exam.       5.  Small amount of right pleural effusion with atelectasis along the   adjacent lower lobe is unchanged.          Aylin Stevens DO  4/29/22, 7:41 AM

## 2022-04-29 NOTE — PROGRESS NOTES
Occupational [employer] [employer]  Occupational Therapy Not Seen Note    DATE: 2022    NAME: Hair Martinez  MRN: [id number]   : 1955      Patient not seen this date for Occupational Therapy due to:    Pt declined OT session due to not feeling well vomiting this morning due to CT scan medications. Will continue as able.     Electronically signed by NEO Perez on 2022 at 11:02 AM

## 2022-04-30 LAB
ANION GAP SERPL CALCULATED.3IONS-SCNC: 7 MMOL/L (ref 9–17)
BUN BLDV-MCNC: 8 MG/DL (ref 8–23)
CALCIUM SERPL-MCNC: 7.7 MG/DL (ref 8.6–10.4)
CHLORIDE BLD-SCNC: 102 MMOL/L (ref 98–107)
CO2: 31 MMOL/L (ref 20–31)
CREAT SERPL-MCNC: 0.75 MG/DL (ref 0.5–0.9)
GFR AFRICAN AMERICAN: >60 ML/MIN
GFR NON-AFRICAN AMERICAN: >60 ML/MIN
GFR SERPL CREATININE-BSD FRML MDRD: ABNORMAL ML/MIN/{1.73_M2}
GLUCOSE BLD-MCNC: 100 MG/DL (ref 70–99)
HCT VFR BLD CALC: 24.8 % (ref 36.3–47.1)
HCT VFR BLD CALC: 27.5 % (ref 36.3–47.1)
HEMOGLOBIN: 7.5 G/DL (ref 11.9–15.1)
HEMOGLOBIN: 8.1 G/DL (ref 11.9–15.1)
MCH RBC QN AUTO: 28.5 PG (ref 25.2–33.5)
MCHC RBC AUTO-ENTMCNC: 30.2 G/DL (ref 28.4–34.8)
MCV RBC AUTO: 94.3 FL (ref 82.6–102.9)
NRBC AUTOMATED: 0 PER 100 WBC
PDW BLD-RTO: 16.9 % (ref 11.8–14.4)
PLATELET # BLD: 394 K/UL (ref 138–453)
PMV BLD AUTO: 9.1 FL (ref 8.1–13.5)
POTASSIUM SERPL-SCNC: 3.6 MMOL/L (ref 3.7–5.3)
RBC # BLD: 2.63 M/UL (ref 3.95–5.11)
SODIUM BLD-SCNC: 140 MMOL/L (ref 135–144)
VANCOMYCIN RANDOM: 29.3 UG/ML
VITAMIN D 25-HYDROXY: 30.5 NG/ML
WBC # BLD: 8.8 K/UL (ref 3.5–11.3)

## 2022-04-30 PROCEDURE — 6370000000 HC RX 637 (ALT 250 FOR IP): Performed by: INTERNAL MEDICINE

## 2022-04-30 PROCEDURE — 2700000000 HC OXYGEN THERAPY PER DAY

## 2022-04-30 PROCEDURE — 6370000000 HC RX 637 (ALT 250 FOR IP): Performed by: NURSE PRACTITIONER

## 2022-04-30 PROCEDURE — 6370000000 HC RX 637 (ALT 250 FOR IP): Performed by: STUDENT IN AN ORGANIZED HEALTH CARE EDUCATION/TRAINING PROGRAM

## 2022-04-30 PROCEDURE — 2580000003 HC RX 258: Performed by: STUDENT IN AN ORGANIZED HEALTH CARE EDUCATION/TRAINING PROGRAM

## 2022-04-30 PROCEDURE — 85018 HEMOGLOBIN: CPT

## 2022-04-30 PROCEDURE — 2060000000 HC ICU INTERMEDIATE R&B

## 2022-04-30 PROCEDURE — 94761 N-INVAS EAR/PLS OXIMETRY MLT: CPT

## 2022-04-30 PROCEDURE — 85027 COMPLETE CBC AUTOMATED: CPT

## 2022-04-30 PROCEDURE — 6360000002 HC RX W HCPCS: Performed by: STUDENT IN AN ORGANIZED HEALTH CARE EDUCATION/TRAINING PROGRAM

## 2022-04-30 PROCEDURE — 36415 COLL VENOUS BLD VENIPUNCTURE: CPT

## 2022-04-30 PROCEDURE — 94150 VITAL CAPACITY TEST: CPT

## 2022-04-30 PROCEDURE — 80048 BASIC METABOLIC PNL TOTAL CA: CPT

## 2022-04-30 PROCEDURE — 82306 VITAMIN D 25 HYDROXY: CPT

## 2022-04-30 PROCEDURE — 85014 HEMATOCRIT: CPT

## 2022-04-30 PROCEDURE — 99233 SBSQ HOSP IP/OBS HIGH 50: CPT | Performed by: STUDENT IN AN ORGANIZED HEALTH CARE EDUCATION/TRAINING PROGRAM

## 2022-04-30 PROCEDURE — 2580000003 HC RX 258: Performed by: INTERNAL MEDICINE

## 2022-04-30 PROCEDURE — 80202 ASSAY OF VANCOMYCIN: CPT

## 2022-04-30 RX ORDER — LISINOPRIL 10 MG/1
10 TABLET ORAL DAILY
Status: DISCONTINUED | OUTPATIENT
Start: 2022-04-30 | End: 2022-05-01

## 2022-04-30 RX ADMIN — RIVAROXABAN 20 MG: 20 TABLET, FILM COATED ORAL at 17:11

## 2022-04-30 RX ADMIN — IBUPROFEN 600 MG: 600 TABLET, FILM COATED ORAL at 17:11

## 2022-04-30 RX ADMIN — POTASSIUM CHLORIDE 20 MEQ: 1500 TABLET, EXTENDED RELEASE ORAL at 17:10

## 2022-04-30 RX ADMIN — LISINOPRIL 10 MG: 10 TABLET ORAL at 09:37

## 2022-04-30 RX ADMIN — OXYBUTYNIN CHLORIDE 15 MG: 10 TABLET, EXTENDED RELEASE ORAL at 09:37

## 2022-04-30 RX ADMIN — SODIUM CHLORIDE, PRESERVATIVE FREE 10 ML: 5 INJECTION INTRAVENOUS at 20:49

## 2022-04-30 RX ADMIN — FAMOTIDINE 20 MG: 20 TABLET, FILM COATED ORAL at 20:20

## 2022-04-30 RX ADMIN — SODIUM CHLORIDE, PRESERVATIVE FREE 10 ML: 5 INJECTION INTRAVENOUS at 09:38

## 2022-04-30 RX ADMIN — FAMOTIDINE 20 MG: 20 TABLET, FILM COATED ORAL at 09:38

## 2022-04-30 RX ADMIN — VANCOMYCIN HYDROCHLORIDE 1250 MG: 10 INJECTION, POWDER, LYOPHILIZED, FOR SOLUTION INTRAVENOUS at 22:37

## 2022-04-30 ASSESSMENT — ENCOUNTER SYMPTOMS
VOMITING: 1
ABDOMINAL DISTENTION: 0
COUGH: 0
EYE DISCHARGE: 0
BACK PAIN: 1
EYE PAIN: 0
ABDOMINAL PAIN: 1
SHORTNESS OF BREATH: 0
COLOR CHANGE: 0
NAUSEA: 1

## 2022-04-30 ASSESSMENT — PAIN SCALES - GENERAL: PAINLEVEL_OUTOF10: 6

## 2022-04-30 NOTE — PROGRESS NOTES
PROGRESS NOTE    PATIENT NAME: [address] [place] [address] RECORD NO. [id number]  DATE: 2022  SURGEON:   PRIMARY CARE PHYSICIAN: Nani Bello, DONG - CNP    HD: # 9    ASSESSMENT    Patient Active Problem List   Diagnosis    Acute thromboembolism of deep veins of lower extremity ([facility] [address]    Long term current use of anticoagulant therapy    Bilateral cellulitis of lower leg    Acute shoulder pain due to trauma    Lymphedema of both lower extremities    Tobacco abuse    History of recurrent deep vein thrombosis (DVT)    Gross hematuria    Frequency of urination    Nocturia    Mixed incontinence    Constipation    Cellulitis of lower leg    Post-phlebitic syndrome    Elephantiasis nostra verrucosa    Cellulitis of left lower extremity    Complicated UTI (urinary tract infection)    Renal calculus    Cellulitis    Normocytic anemia    Iron deficiency anemia    Renal abscess, right    Bacteremia due to Klebsiella pneumoniae    Psoas muscle abscess (HCC)    Septicemia due to Klebsiella pneumoniae (HCC)    Ureteral calculus    Intra-abdominal abscess (HCC)    Obesity, Class III, BMI 40-49.9 (morbid obesity) (HCC)    Hypocalcemia    Hypokalemia    Vertebral osteomyelitis (HCC) T12-L1    Pleural effusion on right    CRP elevated     S/p  IR drainage of pelvic abscess     MEDICAL DECISION MAKING AND PLAN  1. Ok for diet from gen surg stance  2. Cont care and recs per primary   3. ID for Abx  4. Ricardo drain out puts. Ok for pt to f/u in gen surg clinic/urology clinic for removal drains as outpt. Contact general surgery as needed. SUBJECTIVE    Lucinamesha Lagunas is ok this am. +flatus and BM. Tolerated some diet yesterday.  Denies any f/c, n/v    Mid R lateral abd LAURIE: 25ml      OBJECTIVE  VITALS: Temp: Temp: 98.2 °F (36.8 °C)Temp  Av °F (36.7 °C)  Min: 97.2 °F (36.2 °C)  Max: 98.6 °F (37 °C) BP Systolic (24VVF), VVQ:706 , Min:117 , VBK:233   Diastolic (17FUO), YSZ:77, Min:65, Max:122   Pulse Pulse  Av.8  Min: 60  Max: 82 Resp Resp  Av.8  Min: 19  Max: 26 Pulse ox SpO2  Av.5 %  Min: 89 %  Max: 100 %  GENERAL: alert, no distress  NEURO: positive findings: none  HEENT: Eye: positive findings: none  LUNGS: equal chest rise and fall  HEART: normal rate and regular rhythm  ABDOMEN: soft, non-tender, non-distended and no guarding or peritoneal signs present, LAURIE drains in tact, R lower lateral bulb with white/opaque fluid noted   EXTERMITY: no cyanosis, clubbing or edema    I/O last 3 completed shifts: In: 36 [P.O.:150; I.V.:10; Other:600]  Out: 3910 [Urine:3800; Drains:110]    Drain/tube output: In: 160 [P.O.:150; I.V.:10]  Out: 1320 [Urine:1250; Drains:70]    LAB:  CBC:   Recent Labs     22  0543 22  0554 22  0535   WBC 9.1 10.8 8.8   HGB 8.0* 8.4* 7.5*   HCT 25.6* 27.9* 24.8*   MCV 91.1 92.7 94.3   * 480* 394     BMP:   Recent Labs     22  0543 22  0535    140   K 3.6* 3.6*    102   CO2 31 31   BUN 9 8   CREATININE 0.74 0.75   GLUCOSE 98 100*     COAGS:   Recent Labs     22  0800   INR 1.0       RADIOLOGY:    Niru Anders DO  22, 8:20 AM             Trauma Attending Attestation      I have reviewed the above [facility] note(s) and confirmed the key elements of the medical history and physical exam. I have seen and examined the pt. I have discussed the findings, established the care plan and recommendations with Resident, [facility]CS RN, bedside nurse.         Mary Butcher DO  2022  3:08 PM

## 2022-04-30 NOTE — PROGRESS NOTES
[facility]  Progress Note    Pato Newman  MRN: [id number]  Date: 4/30/2022  LOS: 9     Reason for F/U :   Abdominal abscess, vertebral osteomyelitis, pyelonephritis/pyelitis    Impression :   1. Multiple perihepatic abscesses, retroperitoneal abscess on the right psoas, and abscess in pelvic cul-de-sac  2. 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 Free air in the abdomen,  4. Osteomyelitis at T12-L1 due to direct extension from abscess over psoas muscle  5. bandemia  6. History of complicated UTI in January 2022, positive for Klebsiella pneumoniae  7. History of septicemia in January 2022, positive for Klebsiella pneumoniae  8. History of right psoas abscess drained in January 2022  9. History of right ureteral and renal stones with ureteral stent placement in March 2022 with stent exchanged in April 2020    Recommendations:   abd fluid 4/21/22  Only strep and enterococcus fecium R amp but S vanco   On another fluid cx 4/21 she had E fecium S amp and vanco  Await cx from 4/29 drainage     Plan:. · CT AP w pelvic abscess drained by IR   · Continue vanco 1250 mg q 24 hr x 4 weeks. · Monitor BUN, creatinine 3 x per week while on vancomycin  · Upon completion of vancomycin start zyvox 2 weeks   · Repeat CT AP to check on resolution of the abscesses after completing Zyvox  · PICC line placed on 4/26. · Office f/up in 2 weeks with Dr My Wood for infection. Please call [phone] for appointment    Infection Control Recommendations:   Universal precautions    Discharge Planning:   Estimated Length of IV antimicrobials:  To be determined  Patient will need Midline Catheter Insertion/ PICC line Insertion: No  Patient will need: Home IV , [facility],  SNF,  LTAC: Undetermined  Patient willneed outpatient wound care: No    Medical Decision making / Summary of Stay:   Devante Cristina is a 74y.o.-year-old female presenting as a transfer from an outside facility due to conern for bowel perforation and multiple intra-abdominal abscesses. Demetrio Booth was previously hospitalized in January 2022 with Klebsiella pneumoniae sepsis related to a perinephric abscess. She did have right-sided severely obstructive pyelonephritis for which she underwent stent placement as well as a right percutaneous nephrostomy tube placement. This infection was complicated by perinephric/psoas abscess which was aspirated and this drain was placed. Patient was seen by my partner Dr. Ayaan Hope and was discharged on intravenous antimicrobial therapy with Rocephin 2 g daily through February 2, 2022  The patient on 3/1/2022 underwent a cystoscopy with right-sided ureteroscopy with holmium laser lithotripsy and right-sided ureteral stent placement  The patient underwent a second urological procedure on 4/5/2022 with a cystoscopy, right-sided ureteroscopy, right holmium laser lithotripsy and right ureteral stent exchange and the patient was found to have a copious amount of calculi which were crushed up and further ablated.     The patient reports that ever since she had the second urological procedure she has had generalized malaise/fatigue and more recently progressing abdominal pain over the last several days. Initial lactic acid was 4.4 and treated with fluid bolus.  Initial potassium was 2.8, now 3.6 after replacement.     A CT scan of the abdomen and pelvis 4/20/2022 showed gas within both the right renal parenchyma and proximal right collecting system and increased size of the previous right retroperitoneal abscess tracking along the psoas muscle and there is abscess tracking along the posterior course of the drainage catheter through the right quadratus lumbar muscle and along the right posterior paraspinal musculature  There are multifocal rim-enhancing mixed Person fluid collections worrisome for abscess within the dominant collections of the perihepatic region spanning up to 15 cm within the pelvic cul-de-sac spanning up to 7 cm. There is free air along the mesentery in addition to the a forementioned fluid collections. Discitis/osteomyelitis at T12-L1 with direct extension from the adjacent right psoas inflammatory change. Loculated right pleural effusion     The patient has been admitted and started on broad-spectrum antimicrobial therapy and ultrasound-guided placement of 12 Japanese drain in the perihepatic abscess with 550 mL of green following purulent material aspirated, and a CT-guided placement of right perinephric and deep pelvic abscess drainage catheters with 5 mL of purulent fluid removed from each collection sent for diagnostic tests. 22 - Urology performed cystoscopy with rt sided stent placement, and right retrograde pyelogram.    22 - CT abd/pelvis shows near resolution of the 3 abscess accessed for drainage. Perihepatic abscess was 13.7 x 10.3 cm no 3.0 x 2.4cm. Enlargement of the 10.8 x 7.5 cm presumed abscess in right central pelvis, not able to access for percutaneous drainage. 22 - PICC placed      Current evaluation:2022    BP (!) 153/66   Pulse 64   Temp 98.2 °F (36.8 °C) (Oral)   Resp 19   Ht 5' 1\" (1.549 m)   Wt 262 lb 5.6 oz (119 kg)   LMP  (LMP Unknown)   SpO2 96%   BMI 49.57 kg/m²     Temperature Range: Temp: 98.2 °F (36.8 °C) Temp  Av °F (36.7 °C)  Min: 97.2 °F (36.2 °C)  Max: 98.6 °F (37 °C)    Afebrile  VS stable, HTN      Continues on oxygen at 1.5-->0.5  LPM  RR 22  02 sat 96    Has 2 right sided drains  IR placed a pelvic collection drain on  - 300 cc pus out - cx pending    Wants to be DC d    CT abd/pelvis  22- shows previous perirectal abscess reincrease in size since pigtail removed, now 4.5 x 3 x 3 cm .  Central upper pelvis abscess increase in size. Right ureteral stent remains in place no hydronephrosis. Lou Dubon remains in left kidney    Labs: 4/30/2022    BUN 8  Cr 0.75    WBC 8.8  Hb 8.1  Plat 394    Abscess culture- MANY NEUTROPHILS Abnormal MODERATE GRAM POSITIVE COCCI IN PAIRS Abnormal FEW GRAM POSITIVE RODS Abnormal Culture ENTEROCOCCUS FAECIUM LIGHT GROWTH Abnormal VIRIDANS STREPTOCOCCUS GROUP LIGHT GROWTH     Review of Systems   Constitutional: Negative for chills and fever. HENT: Negative for congestion. Eyes: Negative for pain and discharge. Respiratory: Negative for cough and shortness of breath. Cardiovascular: Negative for chest pain and palpitations. Gastrointestinal: Positive for abdominal pain (heart burn), nausea and vomiting. Negative for abdominal distention. Endocrine: Negative for cold intolerance and polydipsia. Genitourinary: Negative for dysuria and flank pain. Musculoskeletal: Positive for arthralgias and back pain. Skin: Negative for color change. Allergic/Immunologic: Negative for environmental allergies. Neurological: Positive for weakness. Negative for dizziness and headaches. Hematological: Negative for adenopathy. Psychiatric/Behavioral: Negative for agitation. Physical Examination :     Physical Exam  Constitutional:       General: She is not in acute distress. Appearance: Normal appearance. She is obese. She is not ill-appearing, toxic-appearing or diaphoretic. HENT:      Head: Normocephalic and atraumatic. Nose: Nose normal.      Mouth/Throat:      Mouth: Mucous membranes are moist.   Eyes:      General: No scleral icterus. Conjunctiva/sclera: Conjunctivae normal.   Cardiovascular:      Rate and Rhythm: Normal rate and regular rhythm. Pulses: Normal pulses. Heart sounds: No murmur heard. No friction rub. Pulmonary:      Effort: Pulmonary effort is normal. No respiratory distress. Breath sounds: No wheezing. Rales: mild. Abdominal:      General: Bowel sounds are normal.      Palpations: Abdomen is soft. Tenderness: There is abdominal tenderness. Comments: 2 right-sided LAURIE drains   Genitourinary:     Comments: Brooks catheter in place    Musculoskeletal:         General: No swelling or tenderness. Normal range of motion. Cervical back: Neck supple. No rigidity or tenderness. Right lower leg: Edema present. Left lower leg: Edema present. Skin:     General: Skin is warm and dry. Coloration: Skin is not jaundiced or pale. Findings: No bruising or erythema. Neurological:      Mental Status: She is alert and oriented to person, place, and time. Psychiatric:         Behavior: Behavior normal.         Laboratory data:   I have independently reviewed the followinglabs:  CBC with Differential:   Recent Labs     04/29/22  0554 04/30/22  0535   WBC 10.8 8.8   HGB 8.4* 7.5*   HCT 27.9* 24.8*   * 394     BMP:   Recent Labs     04/28/22  0543 04/30/22  0535    140   K 3.6* 3.6*    102   CO2 31 31   BUN 9 8   CREATININE 0.74 0.75     Hepatic Function Panel:   No results for input(s): PROT, LABALBU, BILIDIR, IBILI, BILITOT, ALKPHOS, ALT, AST in the last 72 hours.       Lab Results   Component Value Date    PROCAL 0.07 02/11/2020     Lab Results   Component Value Date    CRP 52.0 02/11/2020     Lab Results   Component Value Date    SEDRATE 91 (H) 02/11/2020         No results found for: [facility]  Lab Results   Component Value Date    FERRITIN 173 04/26/2022    FERRITIN 42 01/23/2021     No results found for: LDH  No results found for: FIBRINOGEN    Results in Past 30 Days  Result Component Current Result Ref Range Previous Result Ref Range   SARS-CoV-2, Rapid Not Detected (4/20/2022) Not Detected Not in Time Range      Lab Results   Component Value Date    COVID19 Not Detected 04/20/2022    COVID19 Not Detected 01/12/2022    COVID19 Not Detected 01/03/2022       No results for input(s): [address] in the last 72 hours. Imaging Studies:   US guided drainage  Impression:        Successful ultrasound-guided placement 12 Chinese drain in perihepatic   abscess.  Sample sent for culture and sensitivity.  550 mL of green   foul-smelling purulent material was aspirated. Cultures:     Culture, Blood 2 [[id number]] Collected: 04/21/22 0215   Order Status: Completed Specimen: Blood Updated: 04/22/22 0230    Specimen Description . BLOOD    Special Requests LEFT AC 10ML    Culture NO GROWTH 1 DAY   Culture, Blood 1 [[id number]] Collected: 04/21/22 0216   Order Status: Completed Specimen: Blood Updated: 04/22/22 0229    Specimen Description . BLOOD    Special Requests RIGHT FOREARM 20ML    Culture NO GROWTH 1 DAY   Culture, Urine [[id number]] Collected: 04/21/22 0345   Order Status: Completed Specimen: Urine, clean catch Updated: 04/21/22 2150    Specimen Description . CLEAN CATCH URINE    Culture Candida albicans/dubliniensis 50 to 100,000 CFU/ML   Culture, Anaerobic and Aerobic [[id number]] (Abnormal) Collected: 04/21/22 1342   Order Status: Completed Specimen: Abscess Updated: 04/21/22 1549    Specimen Description . ABSCESS . ASPIRATE    Direct Exam MODERATE NEUTROPHILS Abnormal      MODERATE GRAM POSITIVE COCCI IN CLUSTERS Abnormal     Culture PENDING   Culture, Anaerobic and Aerobic [[id number]] (Abnormal) Collected: 04/21/22 1340   Order Status: Completed Specimen: Abscess Updated: 04/21/22 1548    Specimen Description . ABSCESS . ASPIRATE    Direct Exam FEW NEUTROPHILS Abnormal      MODERATE GRAM POSITIVE COCCI IN CLUSTERS Abnormal     Culture PENDING   Culture, Anaerobic and Aerobic [[id number]] (Abnormal) Collected: 04/21/22 1337   Order Status: Completed Specimen: Abscess Updated: 04/21/22 1529    Specimen Description . ABSCESS . ASPIRATE    Direct Exam MANY NEUTROPHILS Abnormal      MODERATE GRAM POSITIVE COCCI IN PAIRS Abnormal      FEW GRAM POSITIVE RODS Abnormal     Culture PENDING Medications:      lisinopril  10 mg Oral Daily    famotidine  20 mg Oral BID    rivaroxaban  20 mg Oral Daily    lidocaine 1 % injection  5 mL IntraDERmal Once    sodium chloride flush  5-40 mL IntraVENous 2 times per day    oxybutynin  15 mg Oral Daily    potassium chloride  20 mEq Oral QPM    sodium chloride flush  5-40 mL IntraVENous 2 times per day    vancomycin (VANCOCIN) intermittent dosing (placeholder)   Other RX Placeholder    vancomycin  1,250 mg IntraVENous Q24H    sodium chloride flush  10 mL IntraCATHeter BID         Chayito Oakes MD,

## 2022-04-30 NOTE — PROGRESS NOTES
[facility]  Office: [address], DO, Javon Lombardo, DO, Amor Pradhan, DO, Poly Fariasjoselyn Blood, DO, Torin Blum MD, Parish Mejía MD, Valente Harrington MD, Maxx Carrasco MD, Fredo Lainez MD, Otilia Valencia MD, Oliver Tipton MD, Jonathan Padilla, DO, Lucy Humphreys, DO, Arnav Arriaga MD,  Shanna Sanabria, DO, Cris Lane MD, Solomon Garcia MD, Oumar Ladd MD, Mami Berumen, DO, Bethany Souza MD, Liz Kahn MD, Dahlia Cortez [facility], [facility] Galeas, CNP, Noah Barthel, CNP, Marc Ortega CNP, Noemi Winston, CNP, Rosita Mcgarry, CNP, Mary Rodriguez PA-C, Sung Jeronimo, [facility], Isaac Barakat, CNP, Rehana Ramos, CNP, Peg Hagen, CNP, Darrian Mcknight, CNS, Riddhi Sol, [facility], Lucille Tipton CNP, Clary Mina CNP, Dorinda Schumacher, [facility] [place] [facility]    Progress Note    4/30/2022    4:46 PM    Name:   Daniel Burnham  MRN:     [id number]     Haleigh Powell:      [de-identified]   Room:   44/6814-98   Day:  9  Admit Date:  4/21/2022  1:28 AM    PCP:   DONG Dow CNP  Code Status:  Full Code    Subjective:     C/C:   Chief Complaint   Patient presents with    Abdominal Pain     perf bowel     Interval History Status: improved. Patient seen and examined in the morning  Patient states that she has some chills and some nausea  Did throw up yesterday after taking the dye for the imaging  No vomiting this morning  No fever, blood pressure elevated to 156/72  Potassium 3.6, glucose 100, white count 8.8  Hemoglobin 8.1. Brief History:     78-year-old female transfer from [facility] for bowel perforation concern with multiple intra-abdominal abscess. Patient was initially treated with vancomycin and Zosyn and received fluids for sepsis. Patient was seen by trauma surgery, general surgery infectious disease.   Patient multiple abscesses in the abdomen and had placement of pigtail catheter for perihepatic and perinephric abscesses. Patient also had a successful placement of pelvic abscess drain. Patient had cystoscopy and right sided stent replacement on 4/28. Patient continued on antibiotics, repeat imaging showed concern for increasing central pelvic abscess, case was discussed with surgery and they recommended IR guided drain placement. Review of Systems:     Constitutional:  negative for fevers, sweats, positive for chills  Respiratory:  negative for cough, dyspnea on exertion, shortness of breath, wheezing  Cardiovascular:  negative for chest pain, chest pressure/discomfort, lower extremity edema, palpitations  Gastrointestinal: Positive for nausea and abdominal discomfort, negative for abdominal pain, constipation, diarrhea  Neurological:  negative for dizziness, headache    Medications: Allergies:     Allergies   Allergen Reactions    Estrogens Other (See Comments)     Hx of DVT       Current Meds:   Scheduled Meds:    lisinopril  10 mg Oral Daily    famotidine  20 mg Oral BID    rivaroxaban  20 mg Oral Daily    lidocaine 1 % injection  5 mL IntraDERmal Once    sodium chloride flush  5-40 mL IntraVENous 2 times per day    oxybutynin  15 mg Oral Daily    potassium chloride  20 mEq Oral QPM    sodium chloride flush  5-40 mL IntraVENous 2 times per day    vancomycin (VANCOCIN) intermittent dosing (placeholder)   Other RX Placeholder    vancomycin  1,250 mg IntraVENous Q24H    sodium chloride flush  10 mL IntraCATHeter BID     Continuous Infusions:    sodium chloride      sodium chloride       PRN Meds: ibuprofen, sodium chloride flush, sodium chloride, morphine **OR** morphine, sodium chloride flush, sodium chloride, potassium chloride **OR** potassium alternative oral replacement **OR** potassium chloride, magnesium sulfate, ondansetron **OR** ondansetron, polyethylene glycol, acetaminophen **OR** acetaminophen, benzocaine-menthol    Data:     Past Medical History:   has a past medical history of Carcinoma ([address] Cellulitis, DVT (deep venous thrombosis) ([address] Kidney stone, Lymphedema, Morbid obesity ([address] Psoas abscess, right ([address] Pyelonephritis, and Wears glasses. Social History:   reports that she has been smoking cigarettes. She has a 21.00 pack-year smoking history. She has never used smokeless tobacco. She reports that she does not drink alcohol and does not use drugs. Family History:   Family History   Adopted: Yes   Problem Relation Age of Onset    Cancer Mother         The patient reports her biologic mother did have bladder cancer       Vitals:  BP (!) 156/72   Pulse 72   Temp 97.8 °F (36.6 °C) (Oral)   Resp 26   Ht 5' 1\" (1.549 m)   Wt 262 lb 5.6 oz (119 kg)   LMP  (LMP Unknown)   SpO2 96%   BMI 49.57 kg/m²   Temp (24hrs), Av.4 °F (36.9 °C), Min:97.8 °F (36.6 °C), Max:98.7 °F (37.1 °C)    No results for input(s): POCGLU in the last 72 hours. I/O (24Hr): Intake/Output Summary (Last 24 hours) at 2022 1646  Last data filed at 2022 0645  Gross per 24 hour   Intake --   Output 420 ml   Net -420 ml       Labs:  Hematology:  Recent Labs     22  0543 22  0543 22  0554 22  0800 22  0535 22  1131   WBC 9.1  --  10.8  --  8.8  --    RBC 2.81*  --  3.01*  --  2.63*  --    HGB 8.0*   < > 8.4*  --  7.5* 8.1*   HCT 25.6*   < > 27.9*  --  24.8* 27.5*   MCV 91.1  --  92.7  --  94.3  --    MCH 28.5  --  27.9  --  28.5  --    MCHC 31.3  --  30.1  --  30.2  --    RDW 16.4*  --  16.8*  --  16.9*  --    *  --  480*  --  394  --    MPV 9.6  --  9.2  --  9.1  --    INR  --   --   --  1.0  --   --     < > = values in this interval not displayed.      Chemistry:  Recent Labs     22  0543 22  0535    140   K 3.6* 3.6*    102   CO2 31 31   GLUCOSE 98 100*   BUN 9 8   CREATININE 0.74 0.75   ANIONGAP 6* 7*   LABGLOM >60 >60   GFRAA >60 >60   CALCIUM 7.5* 7.7*   No results for input(s): PROT, LABALBU, LABA1C, B4BTKPQ, N2QJCYD, FT4, TSH, AST, ALT, LDH, GGT, ALKPHOS, LABGGT, BILITOT, BILIDIR, AMMONIA, AMYLASE, LIPASE, LACTATE, CHOL, HDL, LDLCHOLESTEROL, CHOLHDLRATIO, TRIG, VLDL, IMS71HO, PHENYTOIN, PHENYF, URICACID, POCGLU in the last 72 hours. ABG:  Lab Results   Component Value Date    PHART 7.466 07/19/2018    WOM1QHG 35.5 07/19/2018    PO2ART 72.5 07/19/2018    PAP1LEM 25.0 07/19/2018    NBEA NOT REPORTED 07/19/2018    PBEA 1.7 07/19/2018    V5OHYZWA 95.5 07/19/2018    FIO2 21 07/19/2018     Lab Results   Component Value Date/Time    SPECIAL RIGHT FOREARM 20ML 04/21/2022 02:16 AM     Lab Results   Component Value Date/Time    CULTURE NO GROWTH 20 HOURS 04/29/2022 01:52 PM       Radiology:  CT ABDOMEN PELVIS WO CONTRAST Additional Contrast? None    Result Date: 4/25/2022  Interval placement of three percutaneous drains with marked improvement to near resolution of the fluid collection/abscess where the drains terminate. The residual 3.0 x 2.4 cm perihepatic collection/abscess was 13.7 x 10.3 cm previously. The right subcapsular and deep pelvic collections are resolved. Interval enlargement of the 10.8 x 7.5 cm presumed abscess in the right central pelvis. This is not accessible for percutaneous catheter drainage given its deep location in the mesentery and being surrounded by hollow viscera. Interval development of mildly dilated loop of small bowel in the left lower quadrant with beak-like narrowing into the right lower quadrant where several of the small bowel are matted/decompressed and the large presumed pelvic abscess is present. The possibility of partial small bowel obstruction remains to be excluded. The findings were sent to the Radiology Results [address] at 3:53 pm on 4/25/2022to be communicated to a licensed caregiver. CT ABDOMEN PELVIS W IV CONTRAST Additional Contrast? Oral    Result Date: 4/29/2022  1. Abscess in the central upper pelvis is again noted and has slightly increased in size.   The previous perirectal pigtail catheter has been removed with re-accumulation of a 4.5 x 3 x 3 cm abscess. There may be a thin tract communicating between these 2 collections. 2.  Pigtail catheters remain adjacent to the inferior edge of the liver and lateral edge of the right kidney without a significant abscess remaining at these sites. There is residual inflammatory stranding around the right kidney and psoas muscle at the previous abscesses. 3.  Right ureteral stent is present without hydronephrosis or gas in the collecting system. Brennen Gauthier remains at the left kidney without hydronephrosis. 4.  There are dilated proximal small bowel loops and some nondilated gas-filled the bowel loops in the pelvis. Features may relate to ileus or partial obstruction, had similar features on the recent exam. 5.  Small amount of right pleural effusion with atelectasis along the adjacent lower lobe is unchanged. XR CHEST PORTABLE    Result Date: 4/27/2022  No definite pleural effusion. Mild right perihilar airspace infiltrate improved from the prior study     CT ABSCESS DRAINAGE    Result Date: 4/29/2022  Successful CT guided placement of 10 Greek right lower quadrant abscess drainage catheter. IR ABSCESS DRAINAGE PERC    Result Date: 4/29/2022  Successful CT guided placement of 10 Greek right lower quadrant abscess drainage catheter.        Physical Examination:        General appearance:  alert, cooperative and no distress  Mental Status:  oriented to person, place and time and normal affect  Lungs:  clear to auscultation bilaterally, normal effort  Heart:  regular rate and rhythm, no murmur  Abdomen:  soft, nontender, nondistended, normal bowel sounds, no masses, hepatomegaly, splenomegaly, drains in place, purulent discharge noted in the drain  Extremities: Chronic lymphedema  Skin: Chronic skin changes in the lower extremity  picc in place  Assessment:        Hospital Problems           Last Modified POA    * (Principal) Intra-abdominal abscess ([address] 4/21/2022 Yes    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    Obesity, Class III, BMI 40-49.9 (morbid obesity) ([address] 4/21/2022 Yes    Hypocalcemia 4/21/2022 Yes    Hypokalemia 4/21/2022 Yes    Vertebral osteomyelitis (HCC) T12-L1 4/21/2022 Yes    Pleural effusion on right 4/21/2022 Yes    CRP elevated 4/23/2022 Yes    Long term current use of anticoagulant therapy 4/21/2022 Yes    Lymphedema of both lower extremities 4/21/2022 Yes    Tobacco abuse 4/21/2022 Yes    History of recurrent deep vein thrombosis (DVT) 8/41/0290 Yes    Complicated UTI (urinary tract infection) 4/21/2022 Yes    Renal calculus 4/21/2022 Yes    Normocytic anemia 4/21/2022 Yes    Renal abscess, right 4/21/2022 Yes    Psoas muscle abscess ([address] 4/21/2022 Yes    Ureteral calculus 4/21/2022 Yes          Plan:        Intra-abdominal abscess with perihepatic abscess, retroperitoneal abscess- previous 2 right-sided LAURIE drains, third drain placed by IR 4/29 for enlarging central pelvic abscess. ID following, continue vancomycin fro 4 weeks, continued with zyvox, cultures positive for Enterococcus, strep viridans. Surgery following.   Complicated UTI with emphysematous pyelonephritis with subcapsular abscess -previous history of Klebsiella sepsis, was on Rocephin outpatient, underwent multiple urological procedures, recent stent replacement with cystoscopy by urology on 4/22  Osteomyelitis T12-L1 direct extension from abscess source was-continue vancomycin  History of VTE- on xarelto  Sinus bradycardia-resumed after stopping metoprolol  Hypertension-increase lisinopril to 10 mg  Lymphedema- stop daily Lasix, use intermittently as needed  Morbid obesity-contributory factor for illness, recommend lifestyle modifications for weight loss  Anemia of chronic disease- repeat hemoglobin stable  Replace electrolytes as needed  Continue diet    Melvern Essex, MD  4/30/2022  4:46 PM

## 2022-04-30 NOTE — PLAN OF CARE
Problem: Discharge Planning  Goal: Discharge to home or other facility with appropriate resources  Outcome: Progressing     Problem: Skin/Tissue Integrity  Goal: Absence of new skin breakdown  Description: 1. Monitor for areas of redness and/or skin breakdown  2. Assess vascular access sites hourly  3. Every 4-6 hours minimum:  Change oxygen saturation probe site  4. Every 4-6 hours:  If on nasal continuous positive airway pressure, respiratory therapy assess nares and determine need for appliance change or resting period.   Outcome: Progressing     Problem: Safety - Adult  Goal: Free from fall injury  Outcome: Progressing     Problem: ABCDS Injury Assessment  Goal: Absence of physical injury  Outcome: Progressing     Problem: Pain  Goal: Verbalizes/displays adequate comfort level or baseline comfort level  Outcome: Progressing Alert and oriented to person, place and time

## 2022-04-30 NOTE — PLAN OF CARE
Problem: Discharge Planning  Goal: Discharge to home or other facility with appropriate resources  4/30/2022 1706 by Vitaliy Calderon RN  Outcome: Progressing     Problem: Skin/Tissue Integrity  Goal: Absence of new skin breakdown  4/30/2022 1706 by Vitaliy Calderon RN  Outcome: Progressing     Problem: Safety - Adult  Goal: Free from fall injury  4/30/2022 1706 by Vitaliy Calderon RN  Outcome: Progressing

## 2022-04-30 NOTE — PROGRESS NOTES
[address] [facility] Pharmacokinetic Monitoring Service - Vancomycin    Consulting Provider: Dr Angel Paredes    Indication: Abdominal Abscesse   Target Concentration: Goal trough of 10-15 mg/L and AUC/MARY <500 mg*hr/L  Day of Therapy: 11  Additional Antimicrobials:     Pertinent Laboratory Values: Wt Readings from Last 1 Encounters:   04/26/22 262 lb 5.6 oz (119 kg)     Temp Readings from Last 1 Encounters:   04/30/22 98.7 °F (37.1 °C) (Oral)     Estimated Creatinine Clearance: 86 mL/min (based on SCr of 0.75 mg/dL). Recent Labs     04/28/22  0543 04/28/22  0543 04/29/22  0554 04/30/22  0535   CREATININE 0.74  --   --  0.75   WBC 9.1   < > 10.8 8.8    < > = values in this interval not displayed. Procalcitonin:     Pertinent Cultures:  Culture Date Source Results   4/29 abdomen Many Neutrophils  Rare GPC   MRSA Nasal Swab: N/A. Non-respiratory infection.     Recent vancomycin administrations                     vancomycin (VANCOCIN) 1250 mg in sodium chloride 0.9% 250 mL IVPB (mg) 1,250 mg New Bag 04/29/22 2259     1,250 mg New Bag 04/28/22 2301     1,250 mg New Bag 04/27/22 2255                    Assessment:  Date/Time Current Dose Concentration Timing of Concentration (h) AUC   4/30 1000 mg  29.3 6 h post dose  475    Note: Serum concentrations collected for AUC dosing may appear elevated if collected in close proximity to the dose administered, this is not necessarily an indication of toxicity    Plan:  Current dosing regimen is therapeutic  Continue current dose  Repeat vancomycin concentration PRN   Pharmacy will continue to monitor patient and adjust therapy as indicated    Thank you for the consult,  Corinne Contreras, [facility]  4/30/2022 12:52 PM

## 2022-05-01 LAB
ANION GAP SERPL CALCULATED.3IONS-SCNC: 8 MMOL/L (ref 9–17)
BUN BLDV-MCNC: 9 MG/DL (ref 8–23)
CALCIUM SERPL-MCNC: 7.7 MG/DL (ref 8.6–10.4)
CHLORIDE BLD-SCNC: 103 MMOL/L (ref 98–107)
CO2: 30 MMOL/L (ref 20–31)
CREAT SERPL-MCNC: 0.83 MG/DL (ref 0.5–0.9)
GFR AFRICAN AMERICAN: >60 ML/MIN
GFR NON-AFRICAN AMERICAN: >60 ML/MIN
GFR SERPL CREATININE-BSD FRML MDRD: ABNORMAL ML/MIN/{1.73_M2}
GLUCOSE BLD-MCNC: 88 MG/DL (ref 70–99)
HCT VFR BLD CALC: 25.8 % (ref 36.3–47.1)
HEMOGLOBIN: 7.7 G/DL (ref 11.9–15.1)
MAGNESIUM: 2.2 MG/DL (ref 1.6–2.6)
MCH RBC QN AUTO: 28.1 PG (ref 25.2–33.5)
MCHC RBC AUTO-ENTMCNC: 29.8 G/DL (ref 28.4–34.8)
MCV RBC AUTO: 94.2 FL (ref 82.6–102.9)
NRBC AUTOMATED: 0 PER 100 WBC
PDW BLD-RTO: 17.2 % (ref 11.8–14.4)
PLATELET # BLD: 404 K/UL (ref 138–453)
PMV BLD AUTO: 9.3 FL (ref 8.1–13.5)
POTASSIUM SERPL-SCNC: 3.7 MMOL/L (ref 3.7–5.3)
RBC # BLD: 2.74 M/UL (ref 3.95–5.11)
SODIUM BLD-SCNC: 141 MMOL/L (ref 135–144)
WBC # BLD: 7.2 K/UL (ref 3.5–11.3)

## 2022-05-01 PROCEDURE — 6370000000 HC RX 637 (ALT 250 FOR IP): Performed by: INTERNAL MEDICINE

## 2022-05-01 PROCEDURE — 6370000000 HC RX 637 (ALT 250 FOR IP): Performed by: STUDENT IN AN ORGANIZED HEALTH CARE EDUCATION/TRAINING PROGRAM

## 2022-05-01 PROCEDURE — 94150 VITAL CAPACITY TEST: CPT

## 2022-05-01 PROCEDURE — 2060000000 HC ICU INTERMEDIATE R&B

## 2022-05-01 PROCEDURE — 83735 ASSAY OF MAGNESIUM: CPT

## 2022-05-01 PROCEDURE — 99232 SBSQ HOSP IP/OBS MODERATE 35: CPT | Performed by: INTERNAL MEDICINE

## 2022-05-01 PROCEDURE — 2580000003 HC RX 258: Performed by: STUDENT IN AN ORGANIZED HEALTH CARE EDUCATION/TRAINING PROGRAM

## 2022-05-01 PROCEDURE — 2500000003 HC RX 250 WO HCPCS: Performed by: STUDENT IN AN ORGANIZED HEALTH CARE EDUCATION/TRAINING PROGRAM

## 2022-05-01 PROCEDURE — 97110 THERAPEUTIC EXERCISES: CPT

## 2022-05-01 PROCEDURE — 36415 COLL VENOUS BLD VENIPUNCTURE: CPT

## 2022-05-01 PROCEDURE — 99232 SBSQ HOSP IP/OBS MODERATE 35: CPT | Performed by: STUDENT IN AN ORGANIZED HEALTH CARE EDUCATION/TRAINING PROGRAM

## 2022-05-01 PROCEDURE — 6360000002 HC RX W HCPCS: Performed by: STUDENT IN AN ORGANIZED HEALTH CARE EDUCATION/TRAINING PROGRAM

## 2022-05-01 PROCEDURE — 2580000003 HC RX 258: Performed by: INTERNAL MEDICINE

## 2022-05-01 PROCEDURE — 85027 COMPLETE CBC AUTOMATED: CPT

## 2022-05-01 PROCEDURE — 6370000000 HC RX 637 (ALT 250 FOR IP): Performed by: NURSE PRACTITIONER

## 2022-05-01 PROCEDURE — 2700000000 HC OXYGEN THERAPY PER DAY

## 2022-05-01 PROCEDURE — 97535 SELF CARE MNGMENT TRAINING: CPT

## 2022-05-01 PROCEDURE — 97116 GAIT TRAINING THERAPY: CPT

## 2022-05-01 PROCEDURE — 80048 BASIC METABOLIC PNL TOTAL CA: CPT

## 2022-05-01 PROCEDURE — 94761 N-INVAS EAR/PLS OXIMETRY MLT: CPT

## 2022-05-01 RX ORDER — POTASSIUM CHLORIDE 20 MEQ/1
40 TABLET, EXTENDED RELEASE ORAL ONCE
Status: COMPLETED | OUTPATIENT
Start: 2022-05-01 | End: 2022-05-01

## 2022-05-01 RX ORDER — CALCIUM GLUCONATE 20 MG/ML
1000 INJECTION, SOLUTION INTRAVENOUS ONCE
Status: COMPLETED | OUTPATIENT
Start: 2022-05-01 | End: 2022-05-01

## 2022-05-01 RX ORDER — LISINOPRIL 20 MG/1
20 TABLET ORAL DAILY
Status: DISCONTINUED | OUTPATIENT
Start: 2022-05-02 | End: 2022-05-03 | Stop reason: HOSPADM

## 2022-05-01 RX ADMIN — FAMOTIDINE 20 MG: 20 TABLET, FILM COATED ORAL at 20:15

## 2022-05-01 RX ADMIN — POTASSIUM CHLORIDE 40 MEQ: 1500 TABLET, EXTENDED RELEASE ORAL at 14:43

## 2022-05-01 RX ADMIN — POTASSIUM CHLORIDE 20 MEQ: 1500 TABLET, EXTENDED RELEASE ORAL at 17:22

## 2022-05-01 RX ADMIN — SODIUM CHLORIDE, PRESERVATIVE FREE 10 ML: 5 INJECTION INTRAVENOUS at 20:16

## 2022-05-01 RX ADMIN — LISINOPRIL 10 MG: 10 TABLET ORAL at 08:41

## 2022-05-01 RX ADMIN — SODIUM CHLORIDE, PRESERVATIVE FREE 10 ML: 5 INJECTION INTRAVENOUS at 08:42

## 2022-05-01 RX ADMIN — OXYBUTYNIN CHLORIDE 15 MG: 10 TABLET, EXTENDED RELEASE ORAL at 08:41

## 2022-05-01 RX ADMIN — VANCOMYCIN HYDROCHLORIDE 1250 MG: 10 INJECTION, POWDER, LYOPHILIZED, FOR SOLUTION INTRAVENOUS at 22:43

## 2022-05-01 RX ADMIN — RIVAROXABAN 20 MG: 20 TABLET, FILM COATED ORAL at 17:22

## 2022-05-01 RX ADMIN — CALCIUM GLUCONATE 1000 MG: 20 INJECTION, SOLUTION INTRAVENOUS at 14:25

## 2022-05-01 RX ADMIN — SODIUM CHLORIDE, PRESERVATIVE FREE 10 ML: 5 INJECTION INTRAVENOUS at 20:15

## 2022-05-01 RX ADMIN — IBUPROFEN 600 MG: 600 TABLET, FILM COATED ORAL at 09:20

## 2022-05-01 RX ADMIN — FAMOTIDINE 20 MG: 20 TABLET, FILM COATED ORAL at 08:41

## 2022-05-01 ASSESSMENT — ENCOUNTER SYMPTOMS
NAUSEA: 1
EYE DISCHARGE: 0
VOMITING: 1
ABDOMINAL DISTENTION: 0
SHORTNESS OF BREATH: 0
COUGH: 0
BACK PAIN: 1
ABDOMINAL PAIN: 1
EYE PAIN: 0
COLOR CHANGE: 0

## 2022-05-01 ASSESSMENT — PAIN SCALES - GENERAL: PAINLEVEL_OUTOF10: 6

## 2022-05-01 NOTE — PROGRESS NOTES
Occupational Therapy  Facility/Department: [facility] 4B STEPDOWN  Occupational Therapy Daily Treatment Note    Name: Sai Mabry  : 1955  MRN: [id number]  Date of Service: 2022    Discharge Recommendations: Pt. Would benefit from further Skilled OT services to enhance functional outcomes. Patient would benefit from continued therapy after discharge  OT Equipment Recommendations  Equipment Needed: Yes (Extended shower chair.)       Patient Diagnosis(es): The encounter diagnosis was Intra-abdominal abscess ([address] Past Medical History:  has a past medical history of Carcinoma ([address] Cellulitis, DVT (deep venous thrombosis) ([address] Kidney stone, Lymphedema, Morbid obesity ([address] Psoas abscess, right ([address] Pyelonephritis, and Wears glasses. Past Surgical History:  has a past surgical history that includes Tubal ligation (); Carpal tunnel release (); [place] tooth extraction; Tubal ligation; candy and bso (cervix removed); Cystoscopy (N/A, 2022); IR GUIDED NEPHROSTOMY CATH PLACEMENT RIGHT (2022); Insert Midline Catheter (2022); CT ABSCESS DRAINAGE (01/10/2022); Cystoscopy (Right); Cystoscopy (Right, 2022); Cystoscopy (Right, 2022); Cystoscopy (Right, 2022); Cystoscopy (Right, 2022); Cystoscopy (Right, 2022); CT ABSCESS DRAINAGE (2022); Cystoscopy (Right, 2022); Cystoscopy (Right, 2022);   picc powerpic single (2022); and CT ABSCESS DRAINAGE (2022). Assessment   Performance deficits / Impairments: Decreased functional mobility ; Decreased endurance;Decreased ADL status; Decreased balance;Decreased high-level IADLs  Prognosis: Good  Decision Making: Medium Complexity  REQUIRES OT FOLLOW-UP: Yes  Activity Tolerance  Activity Tolerance: Patient Tolerated treatment well;Patient limited by fatigue        Plan   Plan  Times per Week: 3-4x  Current Treatment Recommendations: Strengthening,Balance training,Functional mobility training,Endurance training,Gait training,Safety education & training,Patient/Caregiver education & training,Self-Care / ADL     Restrictions  Restrictions/Precautions  Restrictions/Precautions: Fall Risk,General Precautions  Required Braces or Orthoses?: No  Position Activity Restriction  Other position/activity restrictions: up with A, 2 drains    Subjective   General  Patient assessed for rehabilitation services?: Yes  Response to previous treatment: Patient with no complaints from previous session  Family / Caregiver Present: No  Diagnosis: Perforated bowel, perinephric & perihepatic abscesses, 4/22 cysto and stant placement  Subjective  Subjective: RN ok'd patient for OT treatment this afternoon. Pt. C/o 6/10 pain in abdominal region, Writer provided position change, emotional support and increased activity. Objective              Safety Devices  Type of Devices: Call light within reach; All fall risk precautions in place;Nurse notified; Left in chair;Gait belt; Chair alarm in place  Restraints  Restraints Initially in Place: No  Bed Mobility Training  Bed Mobility Training: Yes  Supine to Sit: Moderate assistance;Assist X1 (A needed with trunk progression, increased time and effort.)  Scooting: Minimum assistance (Min A + cues for weight shifting.)  Balance  Sitting: Intact (SBA sitting EOB and unsupported in recliner chair, static/dynamic- ~15 minutes)  Standing: With support (CGA, RW, static/dynamic, ~5 minutes , with 3 sitting rest breaks d/t fatigue/weakness.)  Standing - Static: Fair;Good  Standing - Dynamic: Good;Fair (CGA, pt. relying on B UE support of RW.)  Transfer Training  Transfer Training: Yes  Sit to Stand: Assist X1;Minimum assistance;Contact-guard assistance; Additional time; Adaptive equipment  Stand to Sit: Assist X1;Minimum assistance;Contact-guard assistance; Additional time; Adaptive equipment  Stand Pivot Transfers: Contact-guard assistance;Minimum assistance;Assist X1;Additional time;Adaptive equipment (Min A -CGA overall, + RW, min cues for tech and B hand placement, pt. demo G carryover.)  Gait  Overall Level of Assistance: Contact-guard assistance (~6 steps towards chair, slow moving)  Distance (ft): 6 Feet  Assistive Device: Walker, rolling  Stairs - Level of Assistance: Contact-guard assistance        ADL  LE Dressing: Maximum assistance  LE Dressing Skilled Clinical Factors: Sitting EOB, Max A to adjust B socks d/t decreased ROM and pain. Toileting: Maximum assistance  Toileting Skilled Clinical Factors: Pt. with soiled brief, Max A to change brief and hyg d/t B UE support needed to maintain balance while standing. Additional Comments: Needing many sitting rest breaks d/t fatigue and weakness, during toileting.                  Cognition  Overall Cognitive Status: WFL                  Education Given To: Patient  Education Provided: ADL Adaptive Strategies;Transfer Training;Energy Conservation;Plan of Care  Education Provided Comments: Bed mobility and body mechanics to improve bed mobility with fair+/G return demonstration  Education Method: Verbal;Demonstration  Education Outcome: Verbalized understanding;Demonstrated understanding                                                                   AM-PAC Score        -[facility] Inpatient Daily Activity Raw Score: 16 (05/01/22 [facility])  AM-PAC Inpatient ADL T-Scale Score : 35.96 (05/01/22 [facility])  ADL Inpatient CMS 0-100% Score: 53.32 (05/01/22 [facility])  ADL Inpatient CMS G-Code Modifier : CK (05/01/22 1353)    Goals  Short Term Goals  Time Frame for Short term goals: Pt will by discharge  Short Term Goal 1: demo good safety awareness during func mob around room using LRD PRN and SUP  Short Term Goal 2: demo ADL UB bathing/dressing activity at SUP with setup and increased time  Short Term Goal 3: demo ADL LB bathing/dressing activity at min A, using sock-aid/reacher PRN, with setup and increased time  Short Term Goal 4: demo SBA for all bed mobility using bed rails PRN  Short Term Goal 5: demo activity tolerance for 35 min+       Therapy Time   Individual Concurrent Group Co-treatment   Time In  1250         Time Out  1328         Minutes  [address], [place]/L

## 2022-05-01 NOTE — PLAN OF CARE
Problem: Discharge Planning  Goal: Discharge to home or other facility with appropriate resources  4/30/2022 2319 by Scotty Mcclain RN  Outcome: Progressing  4/30/2022 1706 by Juleen Dubin, RN  Outcome: Progressing     Problem: Skin/Tissue Integrity  Goal: Absence of new skin breakdown  Description: 1. Monitor for areas of redness and/or skin breakdown  2. Assess vascular access sites hourly  3. Every 4-6 hours minimum:  Change oxygen saturation probe site  4. Every 4-6 hours:  If on nasal continuous positive airway pressure, respiratory therapy assess nares and determine need for appliance change or resting period.   4/30/2022 2319 by Scotty Mcclain RN  Outcome: Progressing  4/30/2022 1706 by Juleen Dubin, RN  Outcome: Progressing     Problem: Safety - Adult  Goal: Free from fall injury  4/30/2022 2319 by Scotty Mcclain RN  Outcome: Progressing  4/30/2022 1706 by Juleen Dubin, RN  Outcome: Progressing     Problem: ABCDS Injury Assessment  Goal: Absence of physical injury  Outcome: Progressing     Problem: Pain  Goal: Verbalizes/displays adequate comfort level or baseline comfort level  Outcome: Progressing

## 2022-05-01 NOTE — PROGRESS NOTES
[facility]  Office: [address], DO, Anand Wilson, DO, Hussain Camejo, DO, Chelalisa Mix Blood, DO, Sally Spain MD, Amber Rizzo MD, Jalen Don MD, Cindi Mckeon MD, Kita Cobian MD, Ale Menard MD, Jose Miguel Quintana MD, Indy Saul, DO, Media Sensor, DO, Estrada Morales MD,  Rehan Salazar, DO, Jayce Aguirre MD, Eugene Richard MD, Indu Sanders MD, Ivana Sheehan DO, Deanne Paige MD, Noemi Olson MD, [facility] [place], [facility], [facility], CNP, Cynthia Roth, CNP, Joann Lerma, CNP, Alysha Griffin, CNP, Demetrio Garcia, CNP, MELVIN ChC, Corrina Duval, [facility], Nathaniel Marte, CNP, Dianelys Caldera, CNP, Everett Nance, CNP, My English, CNS, Lois Santos, [facility], Keesha Ansari, CNP, Yan Lancaster, CNP, Jamel Tolbert, CNP         [address]    Progress Note    5/1/2022    3:29 PM    Name:   Trevon Brock  MRN:     [id number]     [place]:      [de-identified]   Room:   [address] [place] [address] Day:  10  Admit Date:  4/21/2022  1:28 AM    PCP:   DONG Adams CNP  Code Status:  Full Code    Subjective:     C/C:   Chief Complaint   Patient presents with    Abdominal Pain     perf bowel     Interval History Status: improved. Patient seen and examined in the morning  Patient states that she feels better  Noted to have low calcium and potassium<4  Had one episode of non sustained vtach  No chest pain or sob    Brief History:     26-year-old female transfer from [facility] for bowel perforation concern with multiple intra-abdominal abscess. Patient was initially treated with vancomycin and Zosyn and received fluids for sepsis. Patient was seen by trauma surgery, general surgery infectious disease. Patient multiple abscesses in the abdomen and had placement of pigtail catheter for perihepatic and perinephric abscesses. Patient also had a successful placement of pelvic abscess drain.   Patient had cystoscopy and right sided stent replacement on 4/28. Patient continued on antibiotics, repeat imaging showed concern for increasing central pelvic abscess, case was discussed with surgery and they recommended IR guided drain placement. Review of Systems:     Constitutional:  negative for fevers, sweats, positive for chills  Respiratory:  negative for cough, dyspnea on exertion, shortness of breath, wheezing  Cardiovascular:  negative for chest pain, chest pressure/discomfort, lower extremity edema, palpitations  Gastrointestinal: Positive for abdominal discomfort, nausea resolved,negative for abdominal pain, constipation, diarrhea  Neurological:  negative for dizziness, headache    Medications: Allergies:     Allergies   Allergen Reactions    Estrogens Other (See Comments)     Hx of DVT       Current Meds:   Scheduled Meds:    lisinopril  10 mg Oral Daily    famotidine  20 mg Oral BID    rivaroxaban  20 mg Oral Daily    lidocaine 1 % injection  5 mL IntraDERmal Once    sodium chloride flush  5-40 mL IntraVENous 2 times per day    oxybutynin  15 mg Oral Daily    potassium chloride  20 mEq Oral QPM    sodium chloride flush  5-40 mL IntraVENous 2 times per day    vancomycin (VANCOCIN) intermittent dosing (placeholder)   Other RX Placeholder    vancomycin  1,250 mg IntraVENous Q24H    sodium chloride flush  10 mL IntraCATHeter BID     Continuous Infusions:    sodium chloride      sodium chloride       PRN Meds: ibuprofen, sodium chloride flush, sodium chloride, morphine **OR** morphine, sodium chloride flush, sodium chloride, potassium chloride **OR** potassium alternative oral replacement **OR** potassium chloride, magnesium sulfate, ondansetron **OR** ondansetron, polyethylene glycol, acetaminophen **OR** acetaminophen, benzocaine-menthol    Data:     Past Medical History:   has a past medical history of Carcinoma ([facility] [address] Cellulitis, [facility] Kidney stone, Lymphedema, Morbid obesity ([address] Psoas abscess, right ([address] Pyelonephritis, and Wears glasses. Social History:   reports that she has been smoking cigarettes. She has a 21.00 pack-year smoking history. She has never used smokeless tobacco. She reports that she does not drink alcohol and does not use drugs. Family History:   Family History   Adopted: Yes   Problem Relation Age of Onset    Cancer Mother         The patient reports her biologic mother did have bladder cancer       Vitals:  BP (!) 153/46   Pulse 57   Temp 97.8 °F (36.6 °C) (Oral)   Resp 27   Ht 5' 1\" (1.549 m)   Wt 262 lb 5.6 oz (119 kg)   LMP  (LMP Unknown)   SpO2 96%   BMI 49.57 kg/m²   Temp (24hrs), Av.9 °F (36.6 °C), Min:97.7 °F (36.5 °C), Max:98.4 °F (36.9 °C)    No results for input(s): POCGLU in the last 72 hours. I/O (24Hr): Intake/Output Summary (Last 24 hours) at 2022 1529  Last data filed at 2022 0531  Gross per 24 hour   Intake 260 ml   Output 939 ml   Net -679 ml       Labs:  Hematology:  Recent Labs     22  0554 22  0554 22  0800 22  0535 22  1131 22  0544   WBC 10.8  --   --  8.8  --  7.2   RBC 3.01*  --   --  2.63*  --  2.74*   HGB 8.4*   < >  --  7.5* 8.1* 7.7*   HCT 27.9*   < >  --  24.8* 27.5* 25.8*   MCV 92.7  --   --  94.3  --  94.2   MCH 27.9  --   --  28.5  --  28.1   MCHC 30.1  --   --  30.2  --  29.8   RDW 16.8*  --   --  16.9*  --  17.2*   *  --   --  394  --  404   MPV 9.2  --   --  9.1  --  9.3   INR  --   --  1.0  --   --   --     < > = values in this interval not displayed.      Chemistry:  Recent Labs     22  0535 22  0833    141   K 3.6* 3.7    103   CO2 31 30   GLUCOSE 100* 88   BUN 8 9   CREATININE 0.75 0.83   MG  --  2.2   ANIONGAP 7* 8*   LABGLOM >60 >60   GFRAA >60 >60   CALCIUM 7.7* 7.7*   No results for input(s): PROT, LABALBU, LABA1C, Z4IXJUU, P8TLZZE, FT4, TSH, AST, ALT, LDH, GGT, ALKPHOS, LABGGT, BILITOT, BILIDIR, AMMONIA, AMYLASE, LIPASE, LACTATE, CHOL, HDL, LDLCHOLESTEROL, CHOLHDLRATIO, TRIG, VLDL, ZDE18MM, PHENYTOIN, PHENYF, URICACID, POCGLU in the last 72 hours. ABG:  Lab Results   Component Value Date    PHART 7.466 07/19/2018    ODA8EPX 35.5 07/19/2018    PO2ART 72.5 07/19/2018    XCJ7WRZ 25.0 07/19/2018    NBEA NOT REPORTED 07/19/2018    PBEA 1.7 07/19/2018    E3LVUJZM 95.5 07/19/2018    FIO2 21 07/19/2018     Lab Results   Component Value Date/Time    SPECIAL RIGHT FOREARM 20ML 04/21/2022 02:16 AM     Lab Results   Component Value Date/Time    CULTURE NORMAL SKIN NAGI 04/29/2022 01:52 PM       Radiology:  CT ABDOMEN PELVIS WO CONTRAST Additional Contrast? None    Result Date: 4/25/2022  Interval placement of three percutaneous drains with marked improvement to near resolution of the fluid collection/abscess where the drains terminate. The residual 3.0 x 2.4 cm perihepatic collection/abscess was 13.7 x 10.3 cm previously. The right subcapsular and deep pelvic collections are resolved. Interval enlargement of the 10.8 x 7.5 cm presumed abscess in the right central pelvis. This is not accessible for percutaneous catheter drainage given its deep location in the mesentery and being surrounded by hollow viscera. Interval development of mildly dilated loop of small bowel in the left lower quadrant with beak-like narrowing into the right lower quadrant where several of the small bowel are matted/decompressed and the large presumed pelvic abscess is present. The possibility of partial small bowel obstruction remains to be excluded. The findings were sent to the Radiology Results [address] at 3:53 pm on 4/25/2022to be communicated to a licensed caregiver. CT ABDOMEN PELVIS W IV CONTRAST Additional Contrast? Oral    Result Date: 4/29/2022  1. Abscess in the central upper pelvis is again noted and has slightly increased in size.   The previous perirectal pigtail catheter has been removed with re-accumulation of a 4.5 x 3 x 3 cm abscess. There may be a thin tract communicating between these 2 collections. 2.  Pigtail catheters remain adjacent to the inferior edge of the liver and lateral edge of the right kidney without a significant abscess remaining at these sites. There is residual inflammatory stranding around the right kidney and psoas muscle at the previous abscesses. 3.  Right ureteral stent is present without hydronephrosis or gas in the collecting system. Rogers Drier remains at the left kidney without hydronephrosis. 4.  There are dilated proximal small bowel loops and some nondilated gas-filled the bowel loops in the pelvis. Features may relate to ileus or partial obstruction, had similar features on the recent exam. 5.  Small amount of right pleural effusion with atelectasis along the adjacent lower lobe is unchanged. XR CHEST PORTABLE    Result Date: 4/27/2022  No definite pleural effusion. Mild right perihilar airspace infiltrate improved from the prior study     CT ABSCESS DRAINAGE    Result Date: 4/29/2022  Successful CT guided placement of 10 Cymro right lower quadrant abscess drainage catheter. IR ABSCESS DRAINAGE PERC    Result Date: 4/29/2022  Successful CT guided placement of 10 Cymro right lower quadrant abscess drainage catheter.        Physical Examination:        General appearance:  alert, cooperative and no distress  Mental Status:  oriented to person, place and time and normal affect  Lungs:  clear to auscultation bilaterally, normal effort  Heart:  regular rate and rhythm, no murmur  Abdomen:  soft, nontender, nondistended, normal bowel sounds, no masses, hepatomegaly, splenomegaly, drains in place, purulent discharge noted in the drain  Extremities: Chronic lymphedema  Skin: Chronic skin changes in the lower extremity  picc in place  Assessment:        Hospital Problems           Last Modified POA    * (Principal) Intra-abdominal abscess ([address] 4/21/2022 Yes    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    Obesity, Class III, BMI 40-49.9 (morbid obesity) ([address] 4/21/2022 Yes    Hypocalcemia 4/21/2022 Yes    Hypokalemia 4/21/2022 Yes    Vertebral osteomyelitis ([address] T12-L1 4/21/2022 Yes    Pleural effusion on right 4/21/2022 Yes    CRP elevated 4/23/2022 Yes    Long term current use of anticoagulant therapy 4/21/2022 Yes    Lymphedema of both lower extremities 4/21/2022 Yes    Tobacco abuse 4/21/2022 Yes    History of recurrent deep vein thrombosis (DVT) 4/94/7190 Yes    Complicated UTI (urinary tract infection) 4/21/2022 Yes    Renal calculus 4/21/2022 Yes    Normocytic anemia 4/21/2022 Yes    Renal abscess, right 4/21/2022 Yes    Psoas muscle abscess ([address] 4/21/2022 Yes    Ureteral calculus 4/21/2022 Yes          Plan:        Intra-abdominal abscess with perihepatic abscess, retroperitoneal abscess- previous 2 right-sided LAURIE drains, third drain placed by IR 4/29 for enlarging central pelvic abscess. ID following, continue vancomycin fro 4 weeks, continued with zyvox for 2 weeks after the vancomycin, cultures positive for Enterococcus, strep viridans. Surgery following.   Complicated UTI with emphysematous pyelonephritis with subcapsular abscess -previous history of Klebsiella sepsis, was on Rocephin outpatient, underwent multiple urological procedures, recent stent replacement with cystoscopy by urology on 4/22  Osteomyelitis T12-L1 direct extension from abscess-continue vancomycin  History of VTE- on xarelto  Sinus bradycardia-resolved after stopping metoprolol  Hypertension - increase lisinopril to 20mg   Lymphedema- stop daily Lasix, use intermittently as needed, use ace wraps/ compression stockings  Morbid obesity-contributory factor for illness, recommend lifestyle modifications for weight loss  Anemia of chronic disease-hemoglobin stable  Replace electrolytes as needed  Continue diet  Non sustained vtach- replace electrolytes, obtain ekg, continue telemetry  Keep K>4, Mg>2  Rt bundle branch is chronic    Discharge pending placement    Akin Riddle MD  5/1/2022  3:29 PM

## 2022-05-01 NOTE — PLAN OF CARE
Problem: Discharge Planning  Goal: Discharge to home or other facility with appropriate resources  Outcome: Progressing     Problem: Skin/Tissue Integrity  Goal: Absence of new skin breakdown  Outcome: Progressing     Problem: Safety - Adult  Goal: Free from fall injury  Outcome: Progressing

## 2022-05-01 NOTE — PROGRESS NOTES
Physical Therapy  Facility/Department: [address] STEPDOWN  Daily Treatment Note    Name: Pato Newman  : 1955  MRN: [id number]  Date of Service: 2022    Discharge Recommendations:  Patient would benefit from continued therapy after discharge   PT Equipment Recommendations  Equipment Needed: No      Patient Diagnosis(es): The encounter diagnosis was Intra-abdominal abscess ([address] Assessment   Body Structures, Functions, Activity Limitations Requiring Skilled Therapeutic Intervention: Decreased functional mobility ; Decreased endurance;Decreased high-level IADLs;Decreased strength;Decreased balance;Decreased safe awareness  Assessment: Pt ambulated 4 ft. from bedside recliner to EOB with CGA using a RW along with verbal cues for sequencing. Pt sat EOB for 10 mins with CGA with no LOB noted. Pt is a high fall risk and is unsafe to return to prior living conditions. Pt would benefit from continued PT following discharge to address functional deficits. Therapy Prognosis: Good  Decision Making: Medium Complexity  Barriers to Learning: none  Requires PT Follow-Up: Yes  Activity Tolerance  Activity Tolerance: Patient limited by endurance; Patient limited by fatigue     Plan   Plan  Plan: 5-7 times per week  Current Treatment Recommendations: Strengthening,IADL training,Neuromuscular re-education,Home exercise program,Safety education & training,Endurance training,Functional mobility training,Transfer training,Patient/Caregiver education & training,Gait training,Stair training,ADL/Self-care training,Therapeutic activities  Safety Devices  Type of Devices: Call light within reach,All fall risk precautions in place,Nurse notified,Left in chair,Gait belt,Chair alarm in place  Restraints  Restraints Initially in Place: No     Restrictions  Restrictions/Precautions  Restrictions/Precautions: Fall Risk,General Precautions  Required Braces or Orthoses?: No  Position Activity Restriction  Other position/activity restrictions: up with A, 2 drains     Subjective   General  Chart Reviewed: Yes  Response To Previous Treatment: Patient with no complaints from previous session. Family / Caregiver Present: No  Follows Commands: Within Functional Limits  Subjective  Subjective: RN and pt agreeable to PT. pt agreeable, requires encouragement to participate. Cognition   Orientation  Overall Orientation Status: Within Functional Limits  Cognition  Overall Cognitive Status: WFL     Objective   Bed Mobility Training  Bed Mobility Training: Yes  Supine to Sit: Moderate assistance;Assist X1 (A needed with trunk progression, increased time and effort.)  Scooting: Minimum assistance (Min A + cues for weight shifting.)  Balance  Sitting: Intact (SBA sitting EOB and unsupported in reclienr chair, static/dynamic- ~15 minutes)  Standing: With support (CGA, RW, static/dynamic, ~5 minutes , with 3 sitting rest breaks d/t fatigue/weakness.)  Standing - Static: Fair;Good  Standing - Dynamic: Good;Fair (CGA, pt. relying on B UE support of RW.)  Transfer Training  Transfer Training: Yes  Sit to Stand: Assist X1;Minimum assistance;Contact-guard assistance; Additional time; Adaptive equipment  Stand to Sit: Assist X1;Minimum assistance;Contact-guard assistance; Additional time; Adaptive equipment  Stand Pivot Transfers: Contact-guard assistance;Minimum assistance;Assist X1;Additional time; Adaptive equipment (Min A -CGA overall, + RW, min cues for tech and B hand placement, pt. demo G carryover.)  Gait  Overall Level of Assistance: Contact-guard assistance (~6 steps towards chair, slow moving)  Distance (ft): 6 Feet  Assistive Device: Walker, rolling  Stairs - Level of Assistance: Contact-guard assistance  Bed mobility  Supine to Sit: Unable to assess  Sit to Supine: Unable to assess  Scooting: Contact guard assistance  Transfers  Sit to Stand: Contact guard assistance  Stand to sit: Contact guard assistance  Ambulation  Surface: level tile  Device: Rolling Walker  Other Apparatus: O2  Assistance: Contact guard assistance  Gait Deviations: Slow Cheryl;Decreased step length;Decreased step height  Distance: 4 ft  Comments: Pt grossly unsteady, cues to progress task with good return.   More Ambulation?: No  Stairs/Curb  Stairs?: No     Balance  Posture: Good  Sitting - Static: Good  Sitting - Dynamic: Good;-  Standing - Static: Fair  Standing - Dynamic: Fair  Comments: Assessed with RW           AM-PAC Score  AM-PAC Inpatient Mobility Raw Score : 15 (05/01/22 1529)  AM-PAC Inpatient T-Scale Score : 39.45 (05/01/22 1529)  Mobility Inpatient CMS 0-100% Score: 57.7 (05/01/22 1529)  Mobility Inpatient CMS G-Code Modifier : CK (05/01/22 1529)          Goals  Short Term Goals  Time Frame for Short term goals: 14 visits  Short term goal 1: Complete transfers with RW and mod I  Short term goal 2: Complete 200 ft of gait with RW and mod I  Short term goal 3: Complete 6 steps with LHR and mod I  Short term goal 4: Participate in 30 minutes of therapy to promote endurance       Therapy Time   Individual Concurrent Group Co-treatment   Time In 1416         Time Out 1439         Minutes 23         Timed Code Treatment Minutes: Kwesi 17, PTA

## 2022-05-01 NOTE — PROGRESS NOTES
[facility]  Progress Note    Deborah Rowell  MRN: [id number]  Date: 5/1/2022  LOS: 8     Reason for F/U :   Abdominal abscess, vertebral osteomyelitis, pyelonephritis/pyelitis    Impression :   1. Multiple perihepatic abscesses, retroperitoneal abscess on the right psoas, and abscess in pelvic cul-de-sac  2. 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 Free air in the abdomen,  4. Osteomyelitis at T12-L1 due to direct extension from abscess over psoas muscle  5. bandemia  6. History of complicated UTI in January 2022, positive for Klebsiella pneumoniae  7. History of septicemia in January 2022, positive for Klebsiella pneumoniae  8. History of right psoas abscess drained in January 2022  9. History of right ureteral and renal stones with ureteral stent placement in March 2022 with stent exchanged in April 2020    Recommendations:   abd fluid 4/21/22  Only strep and enterococcus fecium R amp but S vanco   On another fluid cx 4/21 she had E fecium S amp and vanco  Await cx from 4/29 drainage     Plan:. · CT AP w pelvic abscess drained by IR   · Continue vanco 1250 mg q 24 hr x 4 weeks. · Monitor BUN, creatinine 3 x per week while on vancomycin  · Upon completion of vancomycin start zyvox 2 weeks   · Repeat CT AP to check on resolution of the abscesses after completing Zyvox  · PICC line placed on 4/26. · Office f/up in 2 weeks with Dr Efrain Winston for infection. Please call [phone] for appointment    Infection Control Recommendations:   Universal precautions    Discharge Planning:   Estimated Length of IV antimicrobials:  To be determined  Patient will need Midline Catheter Insertion/ PICC line Insertion: No  Patient will need: Home IV , [facility],  [facility],  LTAC: Undetermined  Patient willneed outpatient wound care: No    Medical Decision making / Summary of Stay:   Rosa Marrufo is a 74y.o.-year-old female presenting as a transfer from an outside facility due to conern for bowel perforation and multiple intra-abdominal abscesses. Darrius Luke was previously hospitalized in January 2022 with Klebsiella pneumoniae sepsis related to a perinephric abscess. She did have right-sided severely obstructive pyelonephritis for which she underwent stent placement as well as a right percutaneous nephrostomy tube placement. This infection was complicated by perinephric/psoas abscess which was aspirated and this drain was placed. Patient was seen by my partner Dr. Sana Mcmahon and was discharged on intravenous antimicrobial therapy with Rocephin 2 g daily through February 2, 2022  The patient on 3/1/2022 underwent a cystoscopy with right-sided ureteroscopy with holmium laser lithotripsy and right-sided ureteral stent placement  The patient underwent a second urological procedure on 4/5/2022 with a cystoscopy, right-sided ureteroscopy, right holmium laser lithotripsy and right ureteral stent exchange and the patient was found to have a copious amount of calculi which were crushed up and further ablated.     The patient reports that ever since she had the second urological procedure she has had generalized malaise/fatigue and more recently progressing abdominal pain over the last several days. Initial lactic acid was 4.4 and treated with fluid bolus.  Initial potassium was 2.8, now 3.6 after replacement.     A CT scan of the abdomen and pelvis 4/20/2022 showed gas within both the right renal parenchyma and proximal right collecting system and increased size of the previous right retroperitoneal abscess tracking along the psoas muscle and there is abscess tracking along the posterior course of the drainage catheter through the right quadratus lumbar muscle and along the right posterior paraspinal musculature  There are multifocal rim-enhancing mixed [place] fluid collections worrisome for abscess within the dominant collections of the perihepatic region spanning up to 15 cm within the pelvic cul-de-sac spanning up to 7 cm. There is free air along the mesentery in addition to the a forementioned fluid collections. Discitis/osteomyelitis at T12-L1 with direct extension from the adjacent right psoas inflammatory change. Loculated right pleural effusion     The patient has been admitted and started on broad-spectrum antimicrobial therapy and ultrasound-guided placement of 12 Polish drain in the perihepatic abscess with 550 mL of green following purulent material aspirated, and a CT-guided placement of right perinephric and deep pelvic abscess drainage catheters with 5 mL of purulent fluid removed from each collection sent for diagnostic tests. 22 - Urology performed cystoscopy with rt sided stent placement, and right retrograde pyelogram.    22 - CT abd/pelvis shows near resolution of the 3 abscess accessed for drainage. Perihepatic abscess was 13.7 x 10.3 cm no 3.0 x 2.4cm. Enlargement of the 10.8 x 7.5 cm presumed abscess in right central pelvis, not able to access for percutaneous drainage. 22 - PICC placed      Current evaluation:2022    BP (!) 153/46   Pulse 57   Temp 97.8 °F (36.6 °C) (Oral)   Resp 27   Ht 5' 1\" (1.549 m)   Wt 262 lb 5.6 oz (119 kg)   LMP  (LMP Unknown)   SpO2 96%   BMI 49.57 kg/m²     Temperature Range: Temp: 97.8 °F (36.6 °C) Temp  Av.9 °F (36.6 °C)  Min: 97.7 °F (36.5 °C)  Max: 98.4 °F (36.9 °C)    Afebrile  VS stable, HTN    Continues on nasal oxygen at 1.5-->0.5  LPM  RR 22-->20  02 sat 96-->.95    Has 2 right sided drains  IR placed a pelvic collection drain on  - 300 cc pus out - cx pending    CT abd/pelvis  22- shows previous perirectal abscess reincrease in size since pigtail removed, now 4.5 x 3 x 3 cm .  Central upper pelvis abscess increase in size. Right ureteral stent remains in place no hydronephrosis. Dena Lopez remains in left kidney    Labs: 5/1/2022    BUN 8-->9  Cr 0.75-->0.83    WBC 8.8-->7.2  Hb 8.1-->7.7  Plat 394-->404    Abscess culture- MANY NEUTROPHILS Abnormal MODERATE GRAM POSITIVE COCCI IN PAIRS Abnormal FEW GRAM POSITIVE RODS Abnormal Culture ENTEROCOCCUS FAECIUM LIGHT GROWTH Abnormal VIRIDANS STREPTOCOCCUS GROUP LIGHT GROWTH     Review of Systems   Constitutional: Negative for chills and fever. HENT: Negative for congestion. Eyes: Negative for pain and discharge. Respiratory: Negative for cough and shortness of breath. Cardiovascular: Negative for chest pain and palpitations. Gastrointestinal: Positive for abdominal pain (heart burn), nausea and vomiting. Negative for abdominal distention. Endocrine: Negative for cold intolerance and polydipsia. Genitourinary: Negative for dysuria and flank pain. Musculoskeletal: Positive for arthralgias and back pain. Skin: Negative for color change. Allergic/Immunologic: Negative for environmental allergies. Neurological: Positive for weakness. Negative for dizziness and headaches. Hematological: Negative for adenopathy. Psychiatric/Behavioral: Negative for agitation. Physical Examination :     Physical Exam  Constitutional:       General: She is not in acute distress. Appearance: Normal appearance. She is obese. She is not ill-appearing, toxic-appearing or diaphoretic. HENT:      Head: Normocephalic and atraumatic. Nose: Nose normal.      Mouth/Throat:      Mouth: Mucous membranes are moist.   Eyes:      General: No scleral icterus. Conjunctiva/sclera: Conjunctivae normal.   Cardiovascular:      Rate and Rhythm: Normal rate and regular rhythm. Pulses: Normal pulses. Heart sounds: No murmur heard. No friction rub. Pulmonary:      Effort: Pulmonary effort is normal. No respiratory distress. Breath sounds:  No wheezing. Rales: mild. Abdominal:      General: Bowel sounds are normal.      Palpations: Abdomen is soft. Tenderness: There is abdominal tenderness. Comments: 2 right-sided LAURIE drains   Genitourinary:     Comments: Brooks catheter in place    Musculoskeletal:         General: No swelling or tenderness. Normal range of motion. Cervical back: Neck supple. No rigidity or tenderness. Right lower leg: Edema present. Left lower leg: Edema present. Skin:     General: Skin is warm and dry. Coloration: Skin is not jaundiced or pale. Findings: No bruising or erythema. Neurological:      Mental Status: She is alert and oriented to person, place, and time. Psychiatric:         Behavior: Behavior normal.         Laboratory data:   I have independently reviewed the followinglabs:  CBC with Differential:   Recent Labs     04/30/22  0535 04/30/22  0535 04/30/22  1131 05/01/22  0544   WBC 8.8  --   --  7.2   HGB 7.5*   < > 8.1* 7.7*   HCT 24.8*   < > 27.5* 25.8*     --   --  404    < > = values in this interval not displayed. BMP:   Recent Labs     04/30/22  0535 05/01/22  0833    141   K 3.6* 3.7    103   CO2 31 30   BUN 8 9   CREATININE 0.75 0.83   MG  --  2.2     Hepatic Function Panel:   No results for input(s): PROT, LABALBU, BILIDIR, IBILI, BILITOT, ALKPHOS, ALT, AST in the last 72 hours.       Lab Results   Component Value Date    PROCAL 0.07 02/11/2020     Lab Results   Component Value Date    CRP 52.0 02/11/2020     Lab Results   Component Value Date    SEDRATE 91 (H) 02/11/2020         No results found for: [facility] [place]  Lab Results   Component Value Date    FERRITIN 173 04/26/2022    FERRITIN 42 01/23/2021     No results found for: LDH  No results found for: FIBRINOGEN    Results in Past 30 Days  Result Component Current Result Ref Range Previous Result Ref Range   SARS-CoV-2, Rapid Not Detected (4/20/2022) Not Detected Not in Time Range      Lab Results Component Value Date    COVID19 Not Detected 04/20/2022    COVID19 Not Detected 01/12/2022    COVID19 Not Detected 01/03/2022       No results for input(s): JOYCE in the last 72 hours. Imaging Studies:   US guided drainage  Impression:        Successful ultrasound-guided placement 12 Bengali drain in perihepatic   abscess.  Sample sent for culture and sensitivity.  550 mL of green   foul-smelling purulent material was aspirated. Cultures:     Culture, Blood 2 [[id number]] Collected: 04/21/22 0215   Order Status: Completed Specimen: Blood Updated: 04/22/22 0230    Specimen Description . BLOOD    Special Requests LEFT AC 10ML    Culture NO GROWTH 1 DAY   Culture, Blood 1 [[id number]] Collected: 04/21/22 0216   Order Status: Completed Specimen: Blood Updated: 04/22/22 0229    Specimen Description . BLOOD    Special Requests RIGHT FOREARM 20ML    Culture NO GROWTH 1 DAY   Culture, Urine [[id number]] Collected: 04/21/22 0345   Order Status: Completed Specimen: Urine, clean catch Updated: 04/21/22 2150    Specimen Description . CLEAN CATCH URINE    Culture Candida albicans/dubliniensis 50 to 100,000 CFU/ML   Culture, Anaerobic and Aerobic [[id number]] (Abnormal) Collected: 04/21/22 1342   Order Status: Completed Specimen: Abscess Updated: 04/21/22 1549    Specimen Description . ABSCESS . ASPIRATE    Direct Exam MODERATE NEUTROPHILS Abnormal      MODERATE GRAM POSITIVE COCCI IN CLUSTERS Abnormal     Culture PENDING   Culture, Anaerobic and Aerobic [[id number]] (Abnormal) Collected: 04/21/22 1340   Order Status: Completed Specimen: Abscess Updated: 04/21/22 1548    Specimen Description . ABSCESS . ASPIRATE    Direct Exam FEW NEUTROPHILS Abnormal      MODERATE GRAM POSITIVE COCCI IN CLUSTERS Abnormal     Culture PENDING   Culture, Anaerobic and Aerobic [[id number]] (Abnormal) Collected: 04/21/22 1337   Order Status: Completed Specimen: Abscess Updated: 04/21/22 1529    Specimen Description . ABSCESS . ASPIRATE Direct Exam MANY NEUTROPHILS Abnormal      MODERATE GRAM POSITIVE COCCI IN PAIRS Abnormal      FEW GRAM POSITIVE RODS Abnormal     Culture PENDING       Medications:      calcium gluconate-NaCl  1,000 mg IntraVENous Once    lisinopril  10 mg Oral Daily    famotidine  20 mg Oral BID    rivaroxaban  20 mg Oral Daily    lidocaine 1 % injection  5 mL IntraDERmal Once    sodium chloride flush  5-40 mL IntraVENous 2 times per day    oxybutynin  15 mg Oral Daily    potassium chloride  20 mEq Oral QPM    sodium chloride flush  5-40 mL IntraVENous 2 times per day    vancomycin (VANCOCIN) intermittent dosing (placeholder)   Other RX Placeholder    vancomycin  1,250 mg IntraVENous Q24H    sodium chloride flush  10 mL IntraCATHeter BID         Carlos Eduardo Monreal MD,

## 2022-05-02 LAB
ANION GAP SERPL CALCULATED.3IONS-SCNC: 8 MMOL/L (ref 9–17)
BUN BLDV-MCNC: 9 MG/DL (ref 8–23)
CALCIUM IONIZED: 1.03 MMOL/L (ref 1.13–1.33)
CALCIUM SERPL-MCNC: 7.9 MG/DL (ref 8.6–10.4)
CHLORIDE BLD-SCNC: 104 MMOL/L (ref 98–107)
CO2: 30 MMOL/L (ref 20–31)
CREAT SERPL-MCNC: 0.83 MG/DL (ref 0.5–0.9)
CULTURE: ABNORMAL
CULTURE: ABNORMAL
DIRECT EXAM: ABNORMAL
DIRECT EXAM: ABNORMAL
GFR AFRICAN AMERICAN: >60 ML/MIN
GFR NON-AFRICAN AMERICAN: >60 ML/MIN
GFR SERPL CREATININE-BSD FRML MDRD: ABNORMAL ML/MIN/{1.73_M2}
GLUCOSE BLD-MCNC: 104 MG/DL (ref 70–99)
GLUCOSE BLD-MCNC: 129 MG/DL (ref 65–105)
HCT VFR BLD CALC: 27.1 % (ref 36.3–47.1)
HEMOGLOBIN: 8.1 G/DL (ref 11.9–15.1)
MCH RBC QN AUTO: 28.3 PG (ref 25.2–33.5)
MCHC RBC AUTO-ENTMCNC: 29.9 G/DL (ref 28.4–34.8)
MCV RBC AUTO: 94.8 FL (ref 82.6–102.9)
NRBC AUTOMATED: 0 PER 100 WBC
PDW BLD-RTO: 17.3 % (ref 11.8–14.4)
PLATELET # BLD: 434 K/UL (ref 138–453)
PMV BLD AUTO: 9.4 FL (ref 8.1–13.5)
POTASSIUM SERPL-SCNC: 4 MMOL/L (ref 3.7–5.3)
RBC # BLD: 2.86 M/UL (ref 3.95–5.11)
SODIUM BLD-SCNC: 142 MMOL/L (ref 135–144)
SPECIMEN DESCRIPTION: ABNORMAL
WBC # BLD: 8.6 K/UL (ref 3.5–11.3)

## 2022-05-02 PROCEDURE — 80048 BASIC METABOLIC PNL TOTAL CA: CPT

## 2022-05-02 PROCEDURE — 6370000000 HC RX 637 (ALT 250 FOR IP): Performed by: STUDENT IN AN ORGANIZED HEALTH CARE EDUCATION/TRAINING PROGRAM

## 2022-05-02 PROCEDURE — 2580000003 HC RX 258: Performed by: STUDENT IN AN ORGANIZED HEALTH CARE EDUCATION/TRAINING PROGRAM

## 2022-05-02 PROCEDURE — 99239 HOSP IP/OBS DSCHRG MGMT >30: CPT | Performed by: STUDENT IN AN ORGANIZED HEALTH CARE EDUCATION/TRAINING PROGRAM

## 2022-05-02 PROCEDURE — 82947 ASSAY GLUCOSE BLOOD QUANT: CPT

## 2022-05-02 PROCEDURE — 82330 ASSAY OF CALCIUM: CPT

## 2022-05-02 PROCEDURE — 2580000003 HC RX 258: Performed by: INTERNAL MEDICINE

## 2022-05-02 PROCEDURE — 2500000003 HC RX 250 WO HCPCS: Performed by: STUDENT IN AN ORGANIZED HEALTH CARE EDUCATION/TRAINING PROGRAM

## 2022-05-02 PROCEDURE — 6370000000 HC RX 637 (ALT 250 FOR IP): Performed by: INTERNAL MEDICINE

## 2022-05-02 PROCEDURE — 85027 COMPLETE CBC AUTOMATED: CPT

## 2022-05-02 PROCEDURE — 99232 SBSQ HOSP IP/OBS MODERATE 35: CPT | Performed by: INTERNAL MEDICINE

## 2022-05-02 PROCEDURE — 6360000002 HC RX W HCPCS: Performed by: STUDENT IN AN ORGANIZED HEALTH CARE EDUCATION/TRAINING PROGRAM

## 2022-05-02 PROCEDURE — 36415 COLL VENOUS BLD VENIPUNCTURE: CPT

## 2022-05-02 PROCEDURE — 2060000000 HC ICU INTERMEDIATE R&B

## 2022-05-02 RX ORDER — AMLODIPINE BESYLATE 10 MG/1
10 TABLET ORAL DAILY
Qty: 30 TABLET | Refills: 3 | Status: SHIPPED | OUTPATIENT
Start: 2022-05-02

## 2022-05-02 RX ORDER — CALCIUM GLUCONATE 20 MG/ML
1000 INJECTION, SOLUTION INTRAVENOUS ONCE
Status: COMPLETED | OUTPATIENT
Start: 2022-05-02 | End: 2022-05-02

## 2022-05-02 RX ORDER — METRONIDAZOLE 500 MG/1
500 TABLET ORAL EVERY 8 HOURS SCHEDULED
Status: DISCONTINUED | OUTPATIENT
Start: 2022-05-02 | End: 2022-05-03 | Stop reason: HOSPADM

## 2022-05-02 RX ORDER — AMLODIPINE BESYLATE 10 MG/1
10 TABLET ORAL DAILY
Status: DISCONTINUED | OUTPATIENT
Start: 2022-05-02 | End: 2022-05-03 | Stop reason: HOSPADM

## 2022-05-02 RX ORDER — DICYCLOMINE HYDROCHLORIDE 10 MG/1
10 CAPSULE ORAL 3 TIMES DAILY PRN
Status: DISCONTINUED | OUTPATIENT
Start: 2022-05-02 | End: 2022-05-03 | Stop reason: HOSPADM

## 2022-05-02 RX ORDER — LISINOPRIL 20 MG/1
20 TABLET ORAL DAILY
Qty: 30 TABLET | Refills: 3 | Status: SHIPPED | OUTPATIENT
Start: 2022-05-03 | End: 2022-08-12

## 2022-05-02 RX ADMIN — SODIUM CHLORIDE, PRESERVATIVE FREE 10 ML: 5 INJECTION INTRAVENOUS at 08:54

## 2022-05-02 RX ADMIN — FAMOTIDINE 20 MG: 20 TABLET, FILM COATED ORAL at 21:15

## 2022-05-02 RX ADMIN — SODIUM CHLORIDE, PRESERVATIVE FREE 10 ML: 5 INJECTION INTRAVENOUS at 21:16

## 2022-05-02 RX ADMIN — VANCOMYCIN HYDROCHLORIDE 1250 MG: 10 INJECTION, POWDER, LYOPHILIZED, FOR SOLUTION INTRAVENOUS at 23:56

## 2022-05-02 RX ADMIN — SODIUM CHLORIDE, PRESERVATIVE FREE 10 ML: 5 INJECTION INTRAVENOUS at 21:20

## 2022-05-02 RX ADMIN — LISINOPRIL 20 MG: 20 TABLET ORAL at 08:54

## 2022-05-02 RX ADMIN — POTASSIUM CHLORIDE 20 MEQ: 1500 TABLET, EXTENDED RELEASE ORAL at 18:24

## 2022-05-02 RX ADMIN — RIVAROXABAN 20 MG: 20 TABLET, FILM COATED ORAL at 18:23

## 2022-05-02 RX ADMIN — AMLODIPINE BESYLATE 10 MG: 10 TABLET ORAL at 18:23

## 2022-05-02 RX ADMIN — METRONIDAZOLE 500 MG: 500 TABLET, FILM COATED ORAL at 23:46

## 2022-05-02 RX ADMIN — OXYBUTYNIN CHLORIDE 15 MG: 10 TABLET, EXTENDED RELEASE ORAL at 08:54

## 2022-05-02 RX ADMIN — FAMOTIDINE 20 MG: 20 TABLET, FILM COATED ORAL at 08:53

## 2022-05-02 RX ADMIN — SODIUM CHLORIDE, PRESERVATIVE FREE 10 ML: 5 INJECTION INTRAVENOUS at 08:55

## 2022-05-02 RX ADMIN — CALCIUM GLUCONATE 1000 MG: 20 INJECTION, SOLUTION INTRAVENOUS at 09:01

## 2022-05-02 ASSESSMENT — ENCOUNTER SYMPTOMS
EYE PAIN: 0
ABDOMINAL DISTENTION: 0
ABDOMINAL PAIN: 1
VOMITING: 1
COUGH: 0
NAUSEA: 1
SHORTNESS OF BREATH: 0
PHOTOPHOBIA: 0
EYE REDNESS: 0
BACK PAIN: 1
EYE DISCHARGE: 0
COLOR CHANGE: 0

## 2022-05-02 NOTE — PLAN OF CARE
Problem: Discharge Planning  Goal: Discharge to home or other facility with appropriate resources  5/1/2022 2330 by Donna Calvillo RN  Outcome: Progressing  5/1/2022 1404 by Casandra Pardo RN  Outcome: Progressing     Problem: Skin/Tissue Integrity  Goal: Absence of new skin breakdown  Description: 1. Monitor for areas of redness and/or skin breakdown  2. Assess vascular access sites hourly  3. Every 4-6 hours minimum:  Change oxygen saturation probe site  4. Every 4-6 hours:  If on nasal continuous positive airway pressure, respiratory therapy assess nares and determine need for appliance change or resting period.   5/1/2022 2330 by Donna Calvillo RN  Outcome: Progressing  5/1/2022 1404 by Casandra Pardo RN  Outcome: Progressing     Problem: Safety - Adult  Goal: Free from fall injury  5/1/2022 2330 by Donna Calvillo RN  Outcome: Progressing  5/1/2022 1404 by Casandra Pardo RN  Outcome: Progressing     Problem: ABCDS Injury Assessment  Goal: Absence of physical injury  Outcome: Progressing     Problem: Pain  Goal: Verbalizes/displays adequate comfort level or baseline comfort level  Outcome: Progressing

## 2022-05-02 NOTE — PROGRESS NOTES
[facility]  Office: [address], DO, Levell Ebbs, DO, Geoffrey List, DO, Adelaida Osuna Blood, DO, Kings Farley MD, Mirta Adams MD, Kelly Law MD, Claudia Simpson MD, Rula Orozco MD, Dori Ling MD, Dre Douglass MD, Timi Hawley, DO, Jez Infante, DO, Ashly Davis MD,  Milan Melendez, DO, Concha Silva MD, Raenette Felty, MD, Ny Gallardo MD, Bronwyn Lerma DO, Jed Gavin MD, Beulah Hedrick MD, Skye Verde, RODRIGUEZ, [facility], [facility], Jadon Waite, CNP, Mark Barroso, CNP, Yaya Walker, CNP, Abiola Muñoz, CNP, Bruce Neal, PA-C, Laila Marshall, DNP, Gloria Asters, CNP, Trav Spray, CNP, Diamond Garcia, CNP, Elier Croft, CNS, Denice Ruano, DNP, Tova Aguilar, CNP, Fred Limb, CNP, Nikolas Cosme, CNP         [facility]   [address] [facility]    Progress Note    5/2/2022    3:12 PM    Name:   Jenni Baxter  MRN:     [id number]     [place]:      [de-identified]   Room:   [address] [place], [place] [address] Day:  11  Admit Date:  4/21/2022  1:28 AM    PCP:   DONG Monson CNP  Code Status:  Full Code    Subjective:     C/C:   Chief Complaint   Patient presents with    Abdominal Pain     perf bowel     Interval History Status: improved. Patient seen and examined in the morning  Abdominal discomfort is much better  Minimal discharge in the LAURIE drains  No fever or chills  No episodes of arrhythmias overnight  Heart rate stable    Brief History:     58-year-old female transfer from [facility] facility for bowel perforation concern with multiple intra-abdominal abscess. Patient was initially treated with vancomycin and Zosyn and received fluids for sepsis. Patient was seen by trauma surgery, general surgery infectious disease. Patient multiple abscesses in the abdomen and had placement of pigtail catheter for perihepatic and perinephric abscesses. Patient also had a successful placement of pelvic abscess drain.   Patient had cystoscopy and right sided stent replacement on 4/28. Patient continued on antibiotics, repeat imaging showed concern for increasing central pelvic abscess, case was discussed with surgery and they recommended IR guided drain placement. Review of Systems:     Constitutional:  negative for fevers, sweats, positive for chills  Respiratory:  negative for cough, dyspnea on exertion, shortness of breath, wheezing  Cardiovascular:  negative for chest pain, chest pressure/discomfort, lower extremity edema, palpitations  Gastrointestinal: Positive for abdominal discomfort, nausea resolved,negative for abdominal pain, constipation, diarrhea  Neurological:  negative for dizziness, headache    Medications: Allergies:     Allergies   Allergen Reactions    Estrogens Other (See Comments)     Hx of DVT       Current Meds:   Scheduled Meds:    amLODIPine  10 mg Oral Daily    lisinopril  20 mg Oral Daily    famotidine  20 mg Oral BID    rivaroxaban  20 mg Oral Daily    lidocaine 1 % injection  5 mL IntraDERmal Once    sodium chloride flush  5-40 mL IntraVENous 2 times per day    oxybutynin  15 mg Oral Daily    potassium chloride  20 mEq Oral QPM    sodium chloride flush  5-40 mL IntraVENous 2 times per day    vancomycin (VANCOCIN) intermittent dosing (placeholder)   Other RX Placeholder    vancomycin  1,250 mg IntraVENous Q24H    sodium chloride flush  10 mL IntraCATHeter BID     Continuous Infusions:    sodium chloride      sodium chloride       PRN Meds: ibuprofen, sodium chloride flush, sodium chloride, morphine **OR** morphine, sodium chloride flush, sodium chloride, potassium chloride **OR** potassium alternative oral replacement **OR** potassium chloride, magnesium sulfate, ondansetron **OR** ondansetron, polyethylene glycol, acetaminophen **OR** acetaminophen, benzocaine-menthol    Data:     Past Medical History:   has a past medical history of Carcinoma ([address] Cellulitis, DVT (deep venous thrombosis) [facility]), Kidney stone, Lymphedema, Morbid obesity ([address] Psoas abscess, right ([facility] [address] Pyelonephritis, and Wears glasses. Social History:   reports that she has been smoking cigarettes. She has a 21.00 pack-year smoking history. She has never used smokeless tobacco. She reports that she does not drink alcohol and does not use drugs. Family History:   Family History   Adopted: Yes   Problem Relation Age of Onset    Cancer Mother         The patient reports her biologic mother did have bladder cancer       Vitals:  BP (!) 161/77   Pulse 66   Temp 97.8 °F (36.6 °C) (Oral)   Resp 22   Ht 5' 1\" (1.549 m)   Wt 262 lb 5.6 oz (119 kg)   LMP  (LMP Unknown)   SpO2 91%   BMI 49.57 kg/m²   Temp (24hrs), Av °F (36.7 °C), Min:97.5 °F (36.4 °C), Max:98.4 °F (36.9 °C)    No results for input(s): POCGLU in the last 72 hours. I/O (24Hr): Intake/Output Summary (Last 24 hours) at 2022 1512  Last data filed at 2022 0537  Gross per 24 hour   Intake 260 ml   Output 389 ml   Net -129 ml       Labs:  Hematology:  Recent Labs     22  0535 22  0535 22  1131 22  0544 22  0504   WBC 8.8  --   --  7.2 8.6   RBC 2.63*  --   --  2.74* 2.86*   HGB 7.5*   < > 8.1* 7.7* 8.1*   HCT 24.8*   < > 27.5* 25.8* 27.1*   MCV 94.3  --   --  94.2 94.8   MCH 28.5  --   --  28.1 28.3   MCHC 30.2  --   --  29.8 29.9   RDW 16.9*  --   --  17.2* 17.3*     --   --  404 434   MPV 9.1  --   --  9.3 9.4    < > = values in this interval not displayed.      Chemistry:  Recent Labs     22  0535 22  0833 22  0504 22  0903    141  --  142   K 3.6* 3.7  --  4.0    103  --  104   CO2 31 30  --  30   GLUCOSE 100* 88  --  104*   BUN 8 9  --  9   CREATININE 0.75 0.83  --  0.83   MG  --  2.2  --   --    ANIONGAP 7* 8*  --  8*   LABGLOM >60 >60  --  >60   GFRAA >60 >60  --  >60   CALCIUM 7.7* 7.7*  --  7.9*   CAION  --   --  1.03*  --    No results for input(s): PROT, LABALBU, LABA1C, G2VSOWI, X8AGNXN, FT4, TSH, AST, ALT, LDH, GGT, ALKPHOS, LABGGT, BILITOT, BILIDIR, AMMONIA, AMYLASE, LIPASE, LACTATE, CHOL, HDL, LDLCHOLESTEROL, CHOLHDLRATIO, TRIG, VLDL, MVO97IG, PHENYTOIN, PHENYF, URICACID, POCGLU in the last 72 hours. ABG:  Lab Results   Component Value Date    PHART 7.466 07/19/2018    KYP1XBE 35.5 07/19/2018    PO2ART 72.5 07/19/2018    GCC0HYZ 25.0 07/19/2018    NBEA NOT REPORTED 07/19/2018    PBEA 1.7 07/19/2018    W6YYZRHX 95.5 07/19/2018    FIO2 21 07/19/2018     Lab Results   Component Value Date/Time    SPECIAL RIGHT FOREARM 20ML 04/21/2022 02:16 AM     Lab Results   Component Value Date/Time    CULTURE NORMAL SKIN NAGI 04/29/2022 01:52 PM       Radiology:  CT ABDOMEN PELVIS WO CONTRAST Additional Contrast? None    Result Date: 4/25/2022  Interval placement of three percutaneous drains with marked improvement to near resolution of the fluid collection/abscess where the drains terminate. The residual 3.0 x 2.4 cm perihepatic collection/abscess was 13.7 x 10.3 cm previously. The right subcapsular and deep pelvic collections are resolved. Interval enlargement of the 10.8 x 7.5 cm presumed abscess in the right central pelvis. This is not accessible for percutaneous catheter drainage given its deep location in the mesentery and being surrounded by hollow viscera. Interval development of mildly dilated loop of small bowel in the left lower quadrant with beak-like narrowing into the right lower quadrant where several of the small bowel are matted/decompressed and the large presumed pelvic abscess is present. The possibility of partial small bowel obstruction remains to be excluded. The findings were sent to the Radiology Results [address] at 3:53 pm on 4/25/2022to be communicated to a licensed caregiver. CT ABDOMEN PELVIS W IV CONTRAST Additional Contrast? Oral    Result Date: 4/29/2022  1.   Abscess in the central upper pelvis is again noted and has slightly increased in size. The previous perirectal pigtail catheter has been removed with re-accumulation of a 4.5 x 3 x 3 cm abscess. There may be a thin tract communicating between these 2 collections. 2.  Pigtail catheters remain adjacent to the inferior edge of the liver and lateral edge of the right kidney without a significant abscess remaining at these sites. There is residual inflammatory stranding around the right kidney and psoas muscle at the previous abscesses. 3.  Right ureteral stent is present without hydronephrosis or gas in the collecting system. David Loose remains at the left kidney without hydronephrosis. 4.  There are dilated proximal small bowel loops and some nondilated gas-filled the bowel loops in the pelvis. Features may relate to ileus or partial obstruction, had similar features on the recent exam. 5.  Small amount of right pleural effusion with atelectasis along the adjacent lower lobe is unchanged. XR CHEST PORTABLE    Result Date: 4/27/2022  No definite pleural effusion. Mild right perihilar airspace infiltrate improved from the prior study     CT ABSCESS DRAINAGE    Result Date: 4/29/2022  Successful CT guided placement of 10 Belgian right lower quadrant abscess drainage catheter. IR ABSCESS DRAINAGE PERC    Result Date: 4/29/2022  Successful CT guided placement of 10 Belgian right lower quadrant abscess drainage catheter.        Physical Examination:        General appearance:  alert, cooperative and no distress  Mental Status:  oriented to person, place and time and normal affect  Lungs:  clear to auscultation bilaterally, normal effort  Heart:  regular rate and rhythm, no murmur  Abdomen:  soft, nontender, nondistended, normal bowel sounds, no masses, hepatomegaly, splenomegaly, drains in place, purulent discharge noted in the drain  Extremities: Chronic lymphedema  Skin: Chronic skin changes in the lower extremity  picc in place  Assessment:        Hospital Problems           Last Modified POA * (Principal) Intra-abdominal abscess ([address] 4/21/2022 Yes    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    Obesity, Class III, BMI 40-49.9 (morbid obesity) ([address] 4/21/2022 Yes    Hypocalcemia 4/21/2022 Yes    Hypokalemia 4/21/2022 Yes    Vertebral osteomyelitis (HCC) T12-L1 4/21/2022 Yes    Pleural effusion on right 4/21/2022 Yes    CRP elevated 4/23/2022 Yes    Long term current use of anticoagulant therapy 4/21/2022 Yes    Lymphedema of both lower extremities 4/21/2022 Yes    Tobacco abuse 4/21/2022 Yes    History of recurrent deep vein thrombosis (DVT) 9/33/9427 Yes    Complicated UTI (urinary tract infection) 4/21/2022 Yes    Renal calculus 4/21/2022 Yes    Normocytic anemia 4/21/2022 Yes    Renal abscess, right 4/21/2022 Yes    Psoas muscle abscess ([address] 4/21/2022 Yes    Ureteral calculus 4/21/2022 Yes          Plan:        Intra-abdominal abscess with perihepatic abscess, retroperitoneal abscess- previous 2 right-sided LAURIE drains, third drain placed by IR 4/29 for enlarging central pelvic abscess. ID following, continue vancomycin fro 4 weeks, continued with zyvox for 2 weeks after the vancomycin, cultures positive for Enterococcus, strep viridans. Surgery following.   Complicated UTI with emphysematous pyelonephritis with subcapsular abscess -previous history of Klebsiella sepsis, was on Rocephin outpatient, underwent multiple urological procedures, recent stent replacement with cystoscopy by urology on 4/22  Osteomyelitis T12-L1 direct extension from abscess-continue vancomycin  History of VTE- on xarelto  Sinus bradycardia-resolved after stopping metoprolol  Hypertension - continue lisinopril 20 mg, add Norvasc 10 mg  Lymphedema-use ace wraps/ compression stockings  Morbid obesity-contributory factor for illness, recommend lifestyle modifications for weight loss  Anemia of chronic disease-hemoglobin stable  Replace electrolytes as needed  Continue diet  Non sustained vtach-resolved after electrolyte replacement   rt bundle branch is chronic    Discharge order placed  Stable for discharge, pending placed    Dagoberto Fay MD  5/2/2022  3:12 PM

## 2022-05-02 NOTE — CARE COORDINATION
Transitional Plannin: Spoke with [facility] at [address] . Updated her that patient will be discharging in the next day or so. She stated that she will pull notes from the weekend and start precert.

## 2022-05-02 NOTE — PROGRESS NOTES
Occupational [employer] [employer]  Occupational Therapy Not Seen Note    DATE: 2022    NAME: Melody Loving  MRN: [id number]   : 1955      Patient not seen this date for Occupational Therapy due to: Other: Pt c/o cramping Iower abdomen in area of drains and did not sleep well last night.     Next Scheduled Treatment: 5/3    Electronically signed by NEO Jones on 2022 at 3:01 PM

## 2022-05-02 NOTE — DISCHARGE INSTR - COC
Continuity of Care Form    Patient Name: Sai Mabry   :  1955  MRN:  [id number]    Admit date:  2022  Discharge date:  ***    Code Status Order: Full Code   Advance Directives:      Admitting Physician:  Yenni Bailey MD  PCP: DONG Velazquez CNP    Discharging Nurse: [facility] Unit/Room#: 9722/2767-87  Discharging Unit Phone Number: ***    Emergency Contact:   Extended Emergency Contact Information  Primary Emergency Contact: Cm Kayregis  Address: [address] Phone: [phone]  Work Phone: [phone]  Mobile Phone: [phone]  Relation: Spouse  Hearing or visual needs: None  Other needs: None  Preferred language: English   needed? No  Secondary Emergency Contact: Darylene Sear Phone: [phone]  Relation: Child   needed?  No    Past Surgical History:  Past Surgical History:   Procedure Laterality Date    CARPAL TUNNEL RELEASE  1990s    CT ABSCESS DRAIN SUBCUTANEOUS  01/10/2022    CT ABSCESS DRAIN SUBCUTANEOUS 1/10/2022 [facility] CT SCAN    CT ABSCESS DRAIN SUBCUTANEOUS  2022    CT ABSCESS DRAIN SUBCUTANEOUS 2022 [facility] CT SCAN    CT ABSCESS DRAIN SUBCUTANEOUS  2022    CT ABSCESS DRAIN SUBCUTANEOUS 2022 [facility] CT SCAN    CYSTOSCOPY N/A 2022    CYSTOSCOPY URETERAL STENT INSERTION performed by Alyson Talley MD at [address] Right     HLL with stent    CYSTOSCOPY Right 2022    CYSTOSCOPY URETEROSCOPY LASER-WTIH HLL performed by Alyson Talley MD at [address] Right 2022    CYSTOSCOPY URETERAL STENT INSERTION-EXCHANGE performed by Alyson Talley MD at [address] Right 2022    Dr Thalia Freitas with stent insertion    CYSTOSCOPY Right 2022    CYSTOSCOPY URETEROSCOPY LASER-HLL performed by Alyson Talley MD at [address] Right 2022    CYSTOSCOPY URETERAL STENT INSERTION-EXCHANGE performed by Adwoa Clemente MD at [address] Right 04/22/2022    CYSTOSCOPY URETERAL STENT INSERTION (Right )    CYSTOSCOPY Right 4/22/2022    CYSTOSCOPY URETERAL STENT INSERTION performed by Iron Sorensen MD at [facility] [address] SINGLE  4/26/2022         INSERT MIDLINE CATHETER  01/07/2022         IR NEPHROSTOMY PERCUTANEOUS RIGHT  01/05/2022    IR NEPHROSTOMY PERCUTANEOUS RIGHT 1/5/2022 Aliya Stafford MD [facility] SPECIAL PROCEDURES    ANNABELLA AND BSO      05/2019    TUBAL LIGATION  1993    TUBAL LIGATION      WISDOM TOOTH EXTRACTION         Immunization History: There is no immunization history on file for this patient.     Active Problems:  Patient Active Problem List   Diagnosis Code    Acute thromboembolism of deep veins of lower extremity ([facility]) I82.409    Long term current use of anticoagulant therapy Z79.01    Bilateral cellulitis of lower leg L03.116, L03.115    Acute shoulder pain due to trauma M25.519, G89.11    Lymphedema of both lower extremities I89.0    Tobacco abuse Z72.0    History of recurrent deep vein thrombosis (DVT) Z86.718    Gross hematuria R31.0    Frequency of urination R35.0    Nocturia R35.1    Mixed incontinence N39.46    Constipation K59.00    Cellulitis of lower leg L03.119    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    Cellulitis of left lower extremity U87.791    Complicated UTI (urinary tract infection) N39.0    Renal calculus N20.0    Cellulitis L03.90    Normocytic anemia D64.9    Iron deficiency anemia D50.9    Renal abscess, right N15.1    Bacteremia due to Klebsiella pneumoniae R78.81, B96.1    Psoas muscle abscess ([facility]) K68.12    Septicemia due to Klebsiella pneumoniae ([facility]) A41.4    Ureteral calculus N20.1    Intra-abdominal abscess ([facility]) K65.1    Obesity, Class III, BMI 40-49.9 (morbid obesity) ([facility]) E66.01    Hypocalcemia E83.51    Hypokalemia E87.6    Vertebral osteomyelitis ([facility]) T12-L1 M46.20 Pleural effusion on right J90    CRP elevated R79.82       Isolation/Infection:   Isolation            No Isolation          Patient Infection Status       Infection Onset Added Last Indicated Last Indicated By Review Planned Expiration Resolved Resolved By    None active    Resolved    COVID-19 (Rule Out) 04/20/22 04/20/22 04/20/22 COVID-19, Rapid (Ordered)   04/20/22 Rule-Out Test Resulted    COVID-19 (Rule Out) 01/02/22 01/02/22 01/02/22 COVID-19, Rapid (Ordered)   01/02/22 Rule-Out Test Resulted            Nurse Assessment:  Last Vital Signs: BP (!) 152/85   Pulse 67   Temp 98.4 °F (36.9 °C) (Oral)   Resp 26   Ht 5' 1\" (1.549 m)   Wt 262 lb 5.6 oz (119 kg)   LMP  (LMP Unknown)   SpO2 95%   BMI 49.57 kg/m²     Last documented pain score (0-10 scale): Pain Level: 6  Last Weight:   Wt Readings from Last 1 Encounters:   04/26/22 262 lb 5.6 oz (119 kg)     Mental Status:  oriented, alert, coherent, and able to concentrate and follow conversation    IV Access:  - PICC - site  {Anatomy; iv placement site:[id number]}, insertion date: *** 4/26/22    Nursing Mobility/ADLs:  Walking   Assisted  Transfer  Assisted  Bathing  Assisted  Dressing  Dependent  Toileting  Dependent  Feeding  Independent  Med Admin  Assisted  Med Delivery   whole    Wound Care Documentation and Therapy:        Elimination:  Continence: Bowel: No  Bladder: No  Urinary Catheter: None   Colostomy/Ileostomy/Ileal Conduit: No       Date of Last BM: 4/29/22        Intake/Output Summary (Last 24 hours) at 5/2/2022 0208  Last data filed at 5/1/2022 1600  Gross per 24 hour   Intake 260 ml   Output 513 ml   Net -253 ml     I/O last 3 completed shifts: In: 520 [P.O.:500; I.V.:20]  Out: 1068 [Urine:950; Drains:118]    Safety Concerns:      At Risk for Falls    Impairments/Disabilities:      Vision    Nutrition Therapy:  Current Nutrition Therapy:   - Oral Diet:  General    Routes of Feeding: Oral  Liquids: No Restrictions  Daily Fluid Restriction: no  Last Modified Barium Swallow with Video (Video Swallowing Test): not done    Treatments at the Time of Hospital Discharge:   Respiratory Treatments: ***  Oxygen Therapy:  is on oxygen at 0.5 L/min per nasal cannula. Ventilator:    - No ventilator support    Rehab Therapies: Physical Therapy and Occupational Therapy  Weight Bearing Status/Restrictions: No weight bearing restrictions  Other Medical Equipment (for information only, NOT a DME order):  wheelchair and walker  Other Treatments:       Patient's personal belongings (please select all that are sent with patient):  Dylan Zhou RN SIGNATURE:  Electronically signed by Baron Anaya RN on 5/2/22 at 2:12 AM EDT    CASE MANAGEMENT/SOCIAL WORK SECTION    Inpatient Status Date: ***    Readmission Risk Assessment Score:  Readmission Risk              Risk of Unplanned Readmission:  18           Discharging to Facility/ [address] [facility]             [address] [address]       Phone: [phone]       Fax: [phone]          Dialysis Facility (if applicable)   Name:  Address:  Dialysis Schedule:  Phone:  Fax:    / signature: Electronically signed by Karthik Hammer RN on 5/3/22 at 11:30 AM EDT    PHYSICIAN SECTION    Prognosis: Fair    Condition at Discharge: Stable    Rehab Potential (if transferring to Rehab): Fair    Recommended Labs or Other Treatments After Discharge: follow up general surgery for removal of flor drains    Physician Certification: I certify the above information and transfer of Hair Martinez  is necessary for the continuing treatment of the diagnosis listed and that she requires [place] for less 30 days.      Update Admission H&P: Changes in H&P as follows - discharge summary to follow    PHYSICIAN SIGNATURE:  Electronically signed by Rosalina Gambino MD on 5/2/22 at 3:11 PM EDT

## 2022-05-02 NOTE — PROGRESS NOTES
Physical Therapy        Physical Therapy Cancel Note      DATE: 2022    NAME: Cheryl Jasso  MRN: [id number]   : 1955      Patient not seen this date for Physical Therapy due to: Other: Pt declining to participate in therapy this afternoon d/t c/o lower abdominal cramping in area of drains, and did not get any sleep last night.       Electronically signed by Olimpia Byrne PTA on 2022 at 3:12 PM

## 2022-05-03 ENCOUNTER — APPOINTMENT (OUTPATIENT)
Dept: CT IMAGING | Age: 67
DRG: 853 | End: 2022-05-03
Payer: MEDICARE

## 2022-05-03 VITALS
DIASTOLIC BLOOD PRESSURE: 66 MMHG | HEART RATE: 70 BPM | SYSTOLIC BLOOD PRESSURE: 139 MMHG | TEMPERATURE: 98 F | RESPIRATION RATE: 18 BRPM | BODY MASS INDEX: 48.99 KG/M2 | OXYGEN SATURATION: 94 % | WEIGHT: 259.48 LBS | HEIGHT: 61 IN

## 2022-05-03 PROBLEM — A41.9 SEPSIS (HCC): Status: ACTIVE | Noted: 2022-05-03

## 2022-05-03 LAB
SARS-COV-2, RAPID: NOT DETECTED
SPECIMEN DESCRIPTION: NORMAL
VANCOMYCIN RANDOM: 27 UG/ML

## 2022-05-03 PROCEDURE — 2580000003 HC RX 258: Performed by: STUDENT IN AN ORGANIZED HEALTH CARE EDUCATION/TRAINING PROGRAM

## 2022-05-03 PROCEDURE — 74176 CT ABD & PELVIS W/O CONTRAST: CPT

## 2022-05-03 PROCEDURE — 6370000000 HC RX 637 (ALT 250 FOR IP): Performed by: NURSE PRACTITIONER

## 2022-05-03 PROCEDURE — 6370000000 HC RX 637 (ALT 250 FOR IP): Performed by: INTERNAL MEDICINE

## 2022-05-03 PROCEDURE — 99239 HOSP IP/OBS DSCHRG MGMT >30: CPT | Performed by: INTERNAL MEDICINE

## 2022-05-03 PROCEDURE — 6370000000 HC RX 637 (ALT 250 FOR IP): Performed by: STUDENT IN AN ORGANIZED HEALTH CARE EDUCATION/TRAINING PROGRAM

## 2022-05-03 PROCEDURE — 36415 COLL VENOUS BLD VENIPUNCTURE: CPT

## 2022-05-03 PROCEDURE — 87635 SARS-COV-2 COVID-19 AMP PRB: CPT

## 2022-05-03 PROCEDURE — 80202 ASSAY OF VANCOMYCIN: CPT

## 2022-05-03 PROCEDURE — 99232 SBSQ HOSP IP/OBS MODERATE 35: CPT | Performed by: INTERNAL MEDICINE

## 2022-05-03 PROCEDURE — 97110 THERAPEUTIC EXERCISES: CPT

## 2022-05-03 RX ADMIN — POTASSIUM CHLORIDE 20 MEQ: 1500 TABLET, EXTENDED RELEASE ORAL at 16:32

## 2022-05-03 RX ADMIN — SODIUM CHLORIDE, PRESERVATIVE FREE 10 ML: 5 INJECTION INTRAVENOUS at 08:21

## 2022-05-03 RX ADMIN — LISINOPRIL 20 MG: 20 TABLET ORAL at 08:18

## 2022-05-03 RX ADMIN — AMLODIPINE BESYLATE 10 MG: 10 TABLET ORAL at 08:18

## 2022-05-03 RX ADMIN — IBUPROFEN 600 MG: 600 TABLET, FILM COATED ORAL at 09:55

## 2022-05-03 RX ADMIN — FAMOTIDINE 20 MG: 20 TABLET, FILM COATED ORAL at 08:18

## 2022-05-03 RX ADMIN — METRONIDAZOLE 500 MG: 500 TABLET, FILM COATED ORAL at 07:32

## 2022-05-03 RX ADMIN — OXYBUTYNIN CHLORIDE 15 MG: 10 TABLET, EXTENDED RELEASE ORAL at 08:18

## 2022-05-03 RX ADMIN — RIVAROXABAN 20 MG: 20 TABLET, FILM COATED ORAL at 16:32

## 2022-05-03 RX ADMIN — SODIUM CHLORIDE, PRESERVATIVE FREE 10 ML: 5 INJECTION INTRAVENOUS at 08:19

## 2022-05-03 RX ADMIN — METRONIDAZOLE 500 MG: 500 TABLET, FILM COATED ORAL at 16:32

## 2022-05-03 ASSESSMENT — ENCOUNTER SYMPTOMS
NAUSEA: 0
EYE REDNESS: 0
EYE PAIN: 0
VOMITING: 0
ABDOMINAL DISTENTION: 0
SHORTNESS OF BREATH: 0
NAUSEA: 1
BACK PAIN: 1
COUGH: 0
COLOR CHANGE: 0
VOMITING: 1
EYE DISCHARGE: 0
ABDOMINAL PAIN: 1
PHOTOPHOBIA: 0

## 2022-05-03 ASSESSMENT — PAIN - FUNCTIONAL ASSESSMENT: PAIN_FUNCTIONAL_ASSESSMENT: PREVENTS OR INTERFERES SOME ACTIVE ACTIVITIES AND ADLS

## 2022-05-03 ASSESSMENT — PAIN SCALES - GENERAL: PAINLEVEL_OUTOF10: 8

## 2022-05-03 ASSESSMENT — PAIN DESCRIPTION - DESCRIPTORS: DESCRIPTORS: ACHING

## 2022-05-03 ASSESSMENT — PAIN DESCRIPTION - LOCATION: LOCATION: HEAD

## 2022-05-03 NOTE — PROGRESS NOTES
[facility]  Progress Note    Gabriella Whatley  MRN: [id number]  Date: 5/2/2022  LOS: 6     Reason for F/U :   Abdominal abscess, vertebral osteomyelitis, pyelonephritis/pyelitis    Impression :   1. Multiple perihepatic abscesses, retroperitoneal abscess on the right psoas, and abscess in pelvic cul-de-sac  2. 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 Free air in the abdomen,  4. Osteomyelitis at T12-L1 due to direct extension from abscess over psoas muscle  5. bandemia  6. History of complicated UTI in January 2022, positive for Klebsiella pneumoniae  7. History of septicemia in January 2022, positive for Klebsiella pneumoniae  8. History of right psoas abscess drained in January 2022  9. History of right ureteral and renal stones with ureteral stent placement in March 2022 with stent exchanged in April 2020    Recommendations:   abd fluid 4/21/22  Only strep and enterococcus fecium R amp but S vanco   On another fluid cx 4/21 she had E fecium S amp and vanco  cx from 4/29 drainage w bacteroides with lactamase positive     Plan:.  · Keep vanco and watch creat tiw - x 4 weeks and follow w zyvox 2 weeks then repeat CT AP to check on resolution of the abscesses  · 5/2 Flagyl x 4 weeks po till 5/30/22  · PICC line placed on 4/26. · FU in office    Infection Control Recommendations:   Universal precautions    Discharge Planning:   Estimated Length of IV antimicrobials:  To be determined  Patient will need Midline Catheter Insertion/ PICC line Insertion: No  Patient will need: Home IV , [place],  SNF,  LTAC: Undetermined  Patient willneed outpatient wound care: No    Medical Decision making / Summary of Stay:   Gabriella Whatley is a 74y.o.-year-old female presenting as a transfer from an outside facility due to conern for bowel perforation and multiple intra-abdominal abscesses. Mohini Cruz was previously hospitalized in January 2022 with Klebsiella pneumoniae sepsis related to a perinephric abscess. She did have right-sided severely obstructive pyelonephritis for which she underwent stent placement as well as a right percutaneous nephrostomy tube placement. This infection was complicated by perinephric/psoas abscess which was aspirated and this drain was placed. Patient was seen by my partner Dr. Halina Watson and was discharged on intravenous antimicrobial therapy with Rocephin 2 g daily through February 2, 2022  The patient on 3/1/2022 underwent a cystoscopy with right-sided ureteroscopy with holmium laser lithotripsy and right-sided ureteral stent placement  The patient underwent a second urological procedure on 4/5/2022 with a cystoscopy, right-sided ureteroscopy, right holmium laser lithotripsy and right ureteral stent exchange and the patient was found to have a copious amount of calculi which were crushed up and further ablated.     The patient reports that ever since she had the second urological procedure she has had generalized malaise/fatigue and more recently progressing abdominal pain over the last several days. Initial lactic acid was 4.4 and treated with fluid bolus.  Initial potassium was 2.8, now 3.6 after replacement.     A CT scan of the abdomen and pelvis 4/20/2022 showed gas within both the right renal parenchyma and proximal right collecting system and increased size of the previous right retroperitoneal abscess tracking along the psoas muscle and there is abscess tracking along the posterior course of the drainage catheter through the right quadratus lumbar muscle and along the right posterior paraspinal musculature  There are multifocal rim-enhancing mixed Greg fluid collections worrisome for abscess within the dominant collections of the perihepatic region spanning up to 15 cm within the pelvic cul-de-sac spanning up to 7 cm. There is free air along the mesentery in addition to the a forementioned fluid collections. Discitis/osteomyelitis at T12-L1 with direct extension from the adjacent right psoas inflammatory change. Loculated right pleural effusion     The patient has been admitted and started on broad-spectrum antimicrobial therapy and ultrasound-guided placement of 12 Algerian drain in the perihepatic abscess with 550 mL of green following purulent material aspirated, and a CT-guided placement of right perinephric and deep pelvic abscess drainage catheters with 5 mL of purulent fluid removed from each collection sent for diagnostic tests. 22 - Urology performed cystoscopy with rt sided stent placement, and right retrograde pyelogram.    22 - CT abd/pelvis shows near resolution of the 3 abscess accessed for drainage. Perihepatic abscess was 13.7 x 10.3 cm no 3.0 x 2.4cm. Enlargement of the 10.8 x 7.5 cm presumed abscess in right central pelvis, not able to access for percutaneous drainage. 22 - PICC placed      Current evaluation:2022    BP (!) 151/66   Pulse 76   Temp 98.3 °F (36.8 °C) (Oral)   Resp 27   Ht 5' 1\" (1.549 m)   Wt 262 lb 5.6 oz (119 kg)   LMP  (LMP Unknown)   SpO2 97%   BMI 49.57 kg/m²     Temperature Range: Temp: 98.3 °F (36.8 °C) Temp  Av °F (36.7 °C)  Min: 97 °F (36.1 °C)  Max: 98.4 °F (36.9 °C)    Abdomen soft and she feels much better.   Pelvic drain that was placed is giving quite a bit of fluid, now growing Bacteroides beta-lactamase positive  Continues on the Vanco, tolerating with a normal creatinine  Right-sided LAURIE drains are now showing less purulence and mostly serous fluid      IRplsced a pelvic collection drain  - 300 cc pus out - cx Bacteroides potassium is positive    CT abd/pelvis repeated this AM - shows previous perirectal abscess reincrease in size since pigtail removed, now 4.5 x 3 x 3 cm . Central upper pelvis abscess increase in size. Right ureteral stent remains in place no hydronephrosis. Geri Manner remains in left kidney    Gen surgery to discuss next steps with IR.    - Abscess culture- MANY NEUTROPHILS Abnormal MODERATE GRAM POSITIVE COCCI IN PAIRS Abnormal FEW GRAM POSITIVE RODS Abnormal Culture ENTEROCOCCUS FAECIUM LIGHT GROWTH Abnormal VIRIDANS STREPTOCOCCUS GROUP LIGHT GROWTH Susceptibilities have not been performed. Notify the laboratory within 7 days for further workup if clinically indicated. Abnormal       Review of Systems   Constitutional: Negative for chills and fever. HENT: Negative for congestion. Eyes: Negative for photophobia, pain, discharge and redness. Respiratory: Negative for cough and shortness of breath. Cardiovascular: Negative for chest pain and palpitations. Gastrointestinal: Positive for abdominal pain (heart burn), nausea and vomiting. Negative for abdominal distention. Endocrine: Negative for cold intolerance and polydipsia. Genitourinary: Negative for dysuria and flank pain. Musculoskeletal: Positive for arthralgias and back pain. Skin: Negative for color change. Allergic/Immunologic: Negative for environmental allergies. Neurological: Positive for weakness. Negative for dizziness, seizures, numbness and headaches. Hematological: Negative for adenopathy. Psychiatric/Behavioral: Negative for agitation. Physical Examination :     Physical Exam  Constitutional:       General: She is not in acute distress. Appearance: Normal appearance. She is obese. She is not ill-appearing, toxic-appearing or diaphoretic. HENT:      Head: Normocephalic and atraumatic. Nose: Nose normal.      Mouth/Throat:      Mouth: Mucous membranes are moist.   Eyes:      General: No scleral icterus. Conjunctiva/sclera: Conjunctivae normal.   Cardiovascular:      Rate and Rhythm: Normal rate and regular rhythm.       Pulses: Normal pulses. Heart sounds: No murmur heard. No friction rub. Pulmonary:      Effort: Pulmonary effort is normal. No respiratory distress. Breath sounds: No stridor. No wheezing or rhonchi. Rales: mild. Abdominal:      General: Bowel sounds are normal.      Palpations: Abdomen is soft. Tenderness: There is abdominal tenderness. Comments: 2 right-sided LAURIE drains   Genitourinary:     Comments: Brooks catheter in place    Musculoskeletal:         General: No swelling or tenderness. Normal range of motion. Cervical back: Neck supple. No rigidity or tenderness. Right lower leg: Edema present. Left lower leg: Edema present. Skin:     General: Skin is warm and dry. Coloration: Skin is not jaundiced or pale. Findings: No bruising, erythema, lesion or rash. Neurological:      Mental Status: She is alert and oriented to person, place, and time. Psychiatric:         Behavior: Behavior normal.         Laboratory data:   I have independently reviewed the followinglabs:  CBC with Differential:   Recent Labs     05/01/22  0544 05/02/22  0504   WBC 7.2 8.6   HGB 7.7* 8.1*   HCT 25.8* 27.1*    434     BMP:   Recent Labs     05/01/22  0833 05/02/22  0903    142   K 3.7 4.0    104   CO2 30 30   BUN 9 9   CREATININE 0.83 0.83   MG 2.2  --      Hepatic Function Panel:   No results for input(s): PROT, LABALBU, BILIDIR, IBILI, BILITOT, ALKPHOS, ALT, AST in the last 72 hours.       Lab Results   Component Value Date    PROCAL 0.07 02/11/2020     Lab Results   Component Value Date    CRP 52.0 02/11/2020     Lab Results   Component Value Date    SEDRATE 91 (H) 02/11/2020         No results found for: [facility]  Lab Results   Component Value Date    FERRITIN 173 04/26/2022    FERRITIN 42 01/23/2021     No results found for: LDH  No results found for: FIBRINOGEN    Results in Past 30 Days  Result Component Current Result Ref Range Previous Result Ref Range   SARS-CoV-2, Rapid Not Detected (4/20/2022) Not Detected Not in Time Range      Lab Results   Component Value Date    COVID19 Not Detected 04/20/2022    COVID19 Not Detected 01/12/2022    COVID19 Not Detected 01/03/2022       No results for input(s): JOYCE in the last 72 hours. Imaging Studies:   US guided drainage  Impression:        Successful ultrasound-guided placement 12 Turkish drain in perihepatic   abscess.  Sample sent for culture and sensitivity.  550 mL of green   foul-smelling purulent material was aspirated. Cultures:     Culture, Blood 2 [[id number]] Collected: 04/21/22 0215   Order Status: Completed Specimen: Blood Updated: 04/22/22 0230    Specimen Description . BLOOD    Special Requests LEFT AC 10ML    Culture NO GROWTH 1 DAY   Culture, Blood 1 [[id number]] Collected: 04/21/22 0216   Order Status: Completed Specimen: Blood Updated: 04/22/22 0229    Specimen Description . BLOOD    Special Requests RIGHT FOREARM 20ML    Culture NO GROWTH 1 DAY   Culture, Urine [[id number]] Collected: 04/21/22 0345   Order Status: Completed Specimen: Urine, clean catch Updated: 04/21/22 2150    Specimen Description . CLEAN CATCH URINE    Culture Candida albicans/dubliniensis 50 to 100,000 CFU/ML   Culture, Anaerobic and Aerobic [[id number]] (Abnormal) Collected: 04/21/22 1342   Order Status: Completed Specimen: Abscess Updated: 04/21/22 1549    Specimen Description . ABSCESS . ASPIRATE    Direct Exam MODERATE NEUTROPHILS Abnormal      MODERATE GRAM POSITIVE COCCI IN CLUSTERS Abnormal     Culture PENDING   Culture, Anaerobic and Aerobic [[id number]] (Abnormal) Collected: 04/21/22 1340   Order Status: Completed Specimen: Abscess Updated: 04/21/22 1548    Specimen Description . ABSCESS . ASPIRATE    Direct Exam FEW NEUTROPHILS Abnormal      MODERATE GRAM POSITIVE COCCI IN CLUSTERS Abnormal     Culture PENDING   Culture, Anaerobic and Aerobic [[id number]] (Abnormal) Collected: 04/21/22 1337   Order Status: Completed Specimen: Abscess Updated: 04/21/22 1529    Specimen Description . ABSCESS . ASPIRATE    Direct Exam MANY NEUTROPHILS Abnormal      MODERATE GRAM POSITIVE COCCI IN PAIRS Abnormal      FEW GRAM POSITIVE RODS Abnormal     Culture PENDING       Medications:      amLODIPine  10 mg Oral Daily    metroNIDAZOLE  500 mg Oral 3 times per day    lisinopril  20 mg Oral Daily    famotidine  20 mg Oral BID    rivaroxaban  20 mg Oral Daily    lidocaine 1 % injection  5 mL IntraDERmal Once    sodium chloride flush  5-40 mL IntraVENous 2 times per day    oxybutynin  15 mg Oral Daily    potassium chloride  20 mEq Oral QPM    sodium chloride flush  5-40 mL IntraVENous 2 times per day    vancomycin (VANCOCIN) intermittent dosing (placeholder)   Other RX Placeholder    vancomycin  1,250 mg IntraVENous Q24H    sodium chloride flush  10 mL IntraCATHeter BID         Jimena Gutierrez MD,

## 2022-05-03 NOTE — PROGRESS NOTES
Physical Therapy  Facility/Department: [address] STEPDOWN  Physical Therapy Daily Treatment Note    Name: Cheryl Delgado  : 1955  MRN: [id number]  Date of Service: 5/3/2022    Discharge Recommendations:  Patient would benefit from continued therapy after discharge   PT Equipment Recommendations  Equipment Needed: No      Patient Diagnosis(es): The encounter diagnosis was Intra-abdominal abscess ([address] Past Medical History:  has a past medical history of Carcinoma ([address] Cellulitis, DVT (deep venous thrombosis) ([address] Kidney stone, Lymphedema, Morbid obesity ([address] Psoas abscess, right ([address] Pyelonephritis, and Wears glasses. Past Surgical History:  has a past surgical history that includes Tubal ligation (); Carpal tunnel release (); [place] tooth extraction; Tubal ligation; candy and bso (cervix removed); Cystoscopy (N/A, 2022); IR GUIDED NEPHROSTOMY CATH PLACEMENT RIGHT (2022); Insert Midline Catheter (2022); CT ABSCESS DRAINAGE (01/10/2022); Cystoscopy (Right); Cystoscopy (Right, 2022); Cystoscopy (Right, 2022); Cystoscopy (Right, 2022); Cystoscopy (Right, 2022); Cystoscopy (Right, 2022); CT ABSCESS DRAINAGE (2022); Cystoscopy (Right, 2022); Cystoscopy (Right, 2022);   picc powerpic single (2022); and CT ABSCESS DRAINAGE (2022). Assessment   Body Structures, Functions, Activity Limitations Requiring Skilled Therapeutic Intervention: Decreased functional mobility ; Decreased endurance;Decreased high-level IADLs;Decreased strength;Decreased balance;Decreased safe awareness  Assessment: Pt performed supine LE AROM exercises this date, pt declined to perform any further mobility. Pt limited by increased pain and decreased endurance/motivation. Would benefit from continued PT to address all mobility deficits and improve overall functional independence.   Therapy Prognosis: Good  Requires PT Follow-Up: Yes  Activity Tolerance  Activity Tolerance: Patient limited by endurance; Patient limited by fatigue;Patient limited by pain     Plan   Plan  Plan:  (5-6x/week)  Current Treatment Recommendations: Strengthening,IADL training,Neuromuscular re-education,Home exercise program,Safety education & training,Endurance training,Functional mobility training,Transfer training,Patient/Caregiver education & training,Gait training,Stair training,ADL/Self-care training,Therapeutic activities  Safety Devices  Type of Devices: Call light within reach,All fall risk precautions in place,Nurse notified,Gait belt,Left in bed,Bed alarm in place,Patient at risk for falls  Restraints  Restraints Initially in Place: No     Restrictions  Restrictions/Precautions  Restrictions/Precautions: Fall Risk,General Precautions  Required Braces or Orthoses?: No  Position Activity Restriction  Other position/activity restrictions: up with A, 2 LAURIE drains     Subjective   General  Chart Reviewed: Yes  Patient assessed for rehabilitation services?: Yes  Response To Previous Treatment: Patient with no complaints from previous session. Family / Caregiver Present: No  Follows Commands: Within Functional Limits  General Comment  Comments: Pt remained supine in bed throughout session. Subjective  Subjective: Pt and RN agreeable to PT this morning. Pt supine in bed upon arrival with c/o 10/10 headache, declining to get up, but agreeable to LE exericses in bed. Pt cooperative throughout session although required encouragement to participate. Cognition   Orientation  Overall Orientation Status: Within Functional Limits  Cognition  Overall Cognitive Status: WFL     Objective   Bed mobility  Supine to Sit: Unable to assess  Sit to Supine: Unable to assess  Scooting: Unable to assess  Comment: Pt declined to get OOB this date despite max encouragement from pt, Pt states she has a \"10/10\" headache and will get up later. Pt agreeable to LE exercises in bed.   Transfers  Sit to Stand: Unable to assess  Stand to sit: Unable to assess  Ambulation  More Ambulation?: No  Stairs/Curb  Stairs?: No    Exercise Treatment:   Supine Exercises: Ankle Pumps, Gluteal sets, Heel Slides, Hip ABD/ADD, Hip IR/ER, Quad Sets, SLR. Reps: 2 sets of 10  Comments: Pt required frequent rest breaks for endurance recovery. Pt performed LE AROM exercises while supine in bed.     AM-PAC Score  AM-PAC Inpatient Mobility Raw Score : 15 (05/03/22 1026)  AM-PAC Inpatient T-Scale Score : 39.45 (05/03/22 1026)  Mobility Inpatient CMS 0-100% Score: 57.7 (05/03/22 1026)  Mobility Inpatient CMS G-Code Modifier : CK (05/03/22 1026)          Goals  Short Term Goals  Time Frame for Short term goals: 14 visits  Short term goal 1: Complete transfers with RW and mod I  Short term goal 2: Complete 200 ft of gait with RW and mod I  Short term goal 3: Complete 6 steps with LHR and mod I  Short term goal 4: Participate in 30 minutes of therapy to promote endurance       Therapy Time   Individual Concurrent Group Co-treatment   Time In 0946         Time Out 1001         Minutes 15         Timed Code Treatment Minutes: Joe 46, PTA

## 2022-05-03 NOTE — PROGRESS NOTES
[address] [facility] Pharmacokinetic Monitoring Service - Vancomycin    Consulting Provider: Dr Jerome Ramos   Indication: Abdominal Abscess   Target Concentration: Goal trough of 10-15 mg/L and AUC/MARY <500 mg*hr/L  Day of Therapy: 14  Additional Antimicrobials:     Pertinent Laboratory Values: Wt Readings from Last 1 Encounters:   05/03/22 259 lb 7.7 oz (117.7 kg)     Temp Readings from Last 1 Encounters:   05/03/22 98.4 °F (36.9 °C) (Oral)     Estimated Creatinine Clearance: 78 mL/min (based on SCr of 0.83 mg/dL). Recent Labs     05/01/22  0544 05/01/22  0833 05/02/22  0504 05/02/22  0903   CREATININE  --  0.83  --  0.83   WBC 7.2  --  8.6  --      Procalcitonin:       MRSA Nasal Swab: N/A. Non-respiratory infection.     Recent vancomycin administrations                     vancomycin (VANCOCIN) 1250 mg in sodium chloride 0.9% 250 mL IVPB (mg) 1,250 mg New Bag 05/02/22 2356     1,250 mg New Bag 05/01/22 2243     1,250 mg New Bag 04/30/22 2237                    Assessment:  Date/Time Current Dose Concentration Timing of Concentration (h) AUC   5/3 125 mg q 24 hrs  27 8 hrs post dose  604   Note: Serum concentrations collected for AUC dosing may appear elevated if collected in close proximity to the dose administered, this is not necessarily an indication of toxicity    Plan:  Current dosing regimen is supra-therapeutic  \"Decrease dose to 1000 mg every 24 hours   anticipated  trough 11.4 at steady state  Repeat vancomycin concentration will be ordered at steady state    Pharmacy will continue to monitor patient and adjust therapy as indicated    Thank you for the consult,  Kayley Padilla, [facility]  5/3/2022 10:47 AM

## 2022-05-03 NOTE — PROGRESS NOTES
Comprehensive Nutrition Assessment    Type and Reason for Visit:  Reassess    Nutrition Recommendations/Plan:   1. Continue diet, Regular  2. Reassess need for ONS next RD assessment  3. Monitor PO intake and plan of care     Malnutrition Assessment:  Malnutrition Status: At risk for malnutrition (Comment) (04/28/22 4149)    Context:  Chronic Illness     Findings of the 6 clinical characteristics of malnutrition:  Energy Intake:  Mild decrease in energy intake (Comment)  Weight Loss:  No significant weight loss     Body Fat Loss:  Unable to assess     Muscle Mass Loss:  Unable to assess    Fluid Accumulation:  Severe (Moderate) Extremities   Strength:  Not Performed    Nutrition Assessment:    Pt reports appetite is improving; eating 50% of meals; 5-6 unopened Ensure ONS at bedside. Pt reports she is being transferred to [facility] and does not need Ensure ONS added back to her trays. LBM 5/1 per chart. Nutrition Related Findings:    labs/meds reviewed. +3 BLE edema noted. Wound Type: None (drains per chart)       Current Nutrition Intake & Therapies:    Average Meal Intake: 26-50%  Average Supplements Intake: None Ordered  ADULT DIET; Regular    Anthropometric Measures:  Height: 5' 1\" (154.9 cm)  Ideal Body Weight (IBW): 105 lbs (48 kg)       Current Body Weight: 259 lb 7.7 oz (117.7 kg), 249.9 % IBW.  Weight Source: Bed Scale  Current BMI (kg/m2): 49.1  Usual Body Weight: 260 lb (117.9 kg) (Per pt)  % Weight Change (Calculated): 0.9  Weight Adjustment For: No Adjustment                 BMI Categories: Obese Class 3 (BMI 40.0 or greater)    Estimated Daily Nutrient Needs:  Energy Requirements Based On: Formula  Weight Used for Energy Requirements: Current  Energy (kcal/day): 2100 to 2300 kcal/day (1.3-1.4 factor)  Weight Used for Protein Requirements: Ideal  Protein (g/day): 60 to 70 g/day (1.2-1.5 g/kg)  Method Used for Fluid Requirements: Other (Comment)  Fluid (ml/day): per MD    Nutrition Diagnosis: · Inadequate oral intake related to altered GI function as evidenced by intake 26-50%      Nutrition Interventions:   Food and/or Nutrient Delivery: Continue Current Diet  Nutrition Education/Counseling: No recommendation at this time  Coordination of Nutrition Care: Continue to monitor while inpatient       Goals:  Goals: PO intake 75% or greater       Nutrition Monitoring and Evaluation:   Behavioral-Environmental Outcomes: None Identified  Food/Nutrient Intake Outcomes: Food and Nutrient Intake  Physical Signs/Symptoms Outcomes: Biochemical Data,GI Status,Fluid Status or Edema,Weight    Discharge Planning:    No discharge needs at this time     Marissa Turner, [address]: [phone]

## 2022-05-03 NOTE — CARE COORDINATION
Transitional Plann[place]: Received a call from Doroteo Salter at [facility] that they received precert for patient. Dr Vail is requesting another Ct scan to possibly pull drain. Transport requested for 6 PM. Rapid Covid test placed per Darryl's request. Hens completed. [address] with [facility]. Transport ETA     1239: 2ND IMM Given. 1509Candace Suzanne at [facility] and updated her that transport will be picking up patient at [phone].  Covid test and AVS faxed to darryl at facility      Discharge [facility] Case Management Department  Written by: Yaritza Preciado RN    Patient Name: Afshin Alicea  Attending Provider: Farrukh Triana DO  Admit Date: 2022  1:28 AM  MRN: [id number]  Account: [de-identified]                     : 1955  Discharge Date: 22      Disposition: SNF    Yaritza Preciado RN

## 2022-05-03 NOTE — PROGRESS NOTES
[facility]  Office: [address], DO, Evelyn Iniguez, DO, Sheldon Lazcano, DO, Jakob Madera, DO, Joanie Chavez MD, Jung Mejia MD, Alex Enrique MD, Abby Degroot MD, Maribell Higginbotham MD, Tova Monroe MD, Ashanti Carson MD, Nevin Arenas, DO, Ann Agudelo, DO, Magdalene Bridges MD,  Nathanial Apgar, DO, Jose Daniel Mckeon MD, Yenni Bailey MD, Bert Roque MD, Michele Bermudez DO, Von Rojo MD, Tempie Mcburney, MD, Anahi Cisse [facility], [facility], CNP, Natasha Armstrong, CNP, Mike Krueger, CNP, Kae Dubin, CNP, Maryanne Urban, CNP, Anatoly Lieberman PA-C, Kendrick Marinelli, [facility], Jitendra Guzman, CNP, Jelena Saini, CNP, Mine Johnson, CNP, Kaylin Estrada, CNS, Kasandra Tejada, [facility], Mary South, CNP, Adwoa Young, CNP, Sonja Queen, [address]    Progress Note    5/3/2022    5:39 PM    Name:   Sai Mabry  MRN:     [id number]     [place]:      [de-identified]   Room:   [address] [place] [address] Day:  12  Admit Date:  2022  1:28 AM    PCP:   DONG Velazquez CNP  Code Status:  Full Code    Subjective:     C/C:   Chief Complaint   Patient presents with    Abdominal Pain     perf bowel     Interval History Status:   Much improved  Pain controlled  Doing okay with diet  Awaiting discharge    Data Base Updates: Follow-up CT abdomen:  Impression:  1. Interval near complete resolution of the abscess seen in the central   upper pelvis status post percutaneous drain placement. Detailed assessment   and differentiation from subjacent bowel loops is somewhat limited by the   lack of IV contrast.     2.  Persistent, unchanged anterior perirectal fluid collection/abscess,   measuring 4.3 x 5.2 x 4.1 cm.     3.  Subhepatic drainage catheter remains in place with no significant   residual perihepatic abscess. 4.  Percutaneous right perinephric drainage tube and right ureteral stent   remain in place with no obvious changes visualized. 5. Additional unchanged incidental findings include small right pleural   effusion and bibasilar atelectasis, cholelithiasis, bilateral   nephrolithiasis, a of couple mildly dilated loops of small bowel in the left   abdomen, cystocele, atherosclerosis, and endplate sclerosis at P21-F0. Brief History:     As documented in the medical record:  Leslee Zaman is a 74 y.o.  female who presents with Abdominal Pain (perf bowel)     Patient was admitted thru ER with:  \"Yolie Kay is a 74 y. o. female who presents as transfer from outside facility with concern for bowel perforation multiple intra-abdominal abscesses.  Progressively worsening abdominal pain over the last several days prompted patient evaluation.  Treated with Vanco Zosyn, initially lactic elevated at 4.4 treated with fluid bolus.  Hypokalemic with potassium replaced.  Patient with a significant urologic history with perinephric abscess, large kidney stones requiring stenting and surgical intervention, most recently 3/1/2022.  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 \"      The patient has had a rather complicated urologic history  She states that she has been sick since the beginning of the year     Patient was admitted in January with:  Complicated UTI  She was found to have a 1.6 cm obstructing stone on the right with severe hydronephrosis and a right psoas abscess     Patient had been found to have Klebsiella bacteremia/sepsis:     Component 1/2/22 1646 Resulting Agency   Specimen Description . BLOOD  [address] [facility]   Special Requests R BICEP  9[address] [facility]   Culture POSITIVE BLOOD CULTURE, RN [address]   Culture DIRECT Carla Severs STAIN FROM BOTTLE: [facility]   Culture Detected: Klebsiella pneumoniae Detected: Methodology- Polymerase Chain Reaction ([facility]   Culture (NOTE) Direct Gram Stain from bottle result called to and read back by: Patricio Munroe RN [facility] [id number]. DKM and Polymerase Chain Reaction (PCR) results called to and read back by: COREY Ceja at 1229 on 1.3.2022 [facility]            Susceptibility        Klebsiella pneumoniae (4)     Antibiotic Interpretation Microscan Method Status     amikacin   NOT REPORTED BACTERIAL SUSCEPTIBILITY PANEL MARY Final     ampicillin Resistant >=32 BACTERIAL SUSCEPTIBILITY PANEL MARY Final     ampicillin-sulbactam   NOT REPORTED BACTERIAL SUSCEPTIBILITY PANEL MARY Final     aztreonam Sensitive <=1 BACTERIAL SUSCEPTIBILITY PANEL MARY Final     ceFAZolin Sensitive <=4 BACTERIAL SUSCEPTIBILITY PANEL MARY Final     cefepime   NOT REPORTED BACTERIAL SUSCEPTIBILITY PANEL MARY Final     cefTRIAXone Sensitive <=1 BACTERIAL SUSCEPTIBILITY PANEL MARY Final     ciprofloxacin Sensitive <=0.25 BACTERIAL SUSCEPTIBILITY PANEL MARY Final     ertapenem   NOT REPORTED BACTERIAL SUSCEPTIBILITY PANEL MARY Final     Confirmatory Extended Spectrum Beta-Lactamase Negative NEGATIVE BACTERIAL SUSCEPTIBILITY PANEL MARY Final     gentamicin Sensitive <=1 BACTERIAL SUSCEPTIBILITY PANEL MARY Final     meropenem   NOT REPORTED BACTERIAL SUSCEPTIBILITY PANEL MARY Final     nitrofurantoin   NOT REPORTED BACTERIAL SUSCEPTIBILITY PANEL MARY Final     tigecycline   NOT REPORTED BACTERIAL SUSCEPTIBILITY PANEL MARY Final     tobramycin Sensitive <=1 BACTERIAL SUSCEPTIBILITY PANEL MARY Final     trimethoprim-sulfamethoxazole Sensitive <=20 BACTERIAL SUSCEPTIBILITY PANEL MARY Final     piperacillin-tazobactam Sensitive <=4 BACTERIAL SUSCEPTIBILITY PANEL MARY Final           Specimen Collected: 01/02/22 16:46 Last Resulted: 01/04/22 22:21               On 1/5 the patient underwent:  Successful percutaneous nephrostomy tube placement.  120 mL of purulent   material was aspirated.  A defined separate retroperitoneal abscess was not   seen and expect findings on CT scan were just extension from the infection in   the kidney.  Patient be followed up with contrast CT scan when the drainage   subsides to exclude the presence of any remaining retroperitoneal abscess.      On 1/10 she had: An IR-guided pigtail catheter was placed in the right psoas abscess on 1/10. Blood cultures x 2 grew Klebsiella pneumoniae. Infectious disease was consulted and recommended starting ceftriaxone and continuing this through 2/2/2022.      On 3/1 the patient was admitted for:  PREOPERATIVE DIAGNOSES:  1.  Right ureteral calculus. 2.  Right renal calculus.   She underwent:  Cystoscopy, right ureteroscopy, right Holmium  laser lithotripsy, and right ureteral stent placement.     On 4/5 the patient was admitted for:  1.  Right renal calculus. 2.  Right ureteral calculus. She underwent:  PROCEDURES PERFORMED:  Cystoscopy, right ureteroscopy, right holmium  laser lithotripsy, right ureteral stent exchange, staged.     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   New bladder prolapse with residual dependent stones in the bladder.  Intraluminal gas in the bladder may be due to infection or recent instrumentation.   Increased size of the previous right retroperitoneal abscess tracking along the psoas muscle (6.7 x 2.6 x 10.2 cm) which previously contained a percutaneous drainage catheter.  There are abscesses tracking along the prior course of the drainage catheter through the right quadratus lumborum muscle and along the right posterior paraspinal musculature.   Multifocal rim enhancing mixed gas and fluid collections worrisome for abscesses with the dominant collections in the perihepatic region spanning up to 15 cm and within the pelvic cul-de-sac spanning up to 7 cm.   Free air along the mesentery in addition to the aforementioned fluid collections. As there are some thickened loops of small bowel in the lower abdomen and pelvis, a concomitant bowel perforation is not able be excluded on this exam, with differential considerations including ischemic bowel.   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   Loculated right pleural effusion characterized to advantage on today's chest CT.      WBC14.1 High k/uL RBC3.00 Low m/uL Hemoglobin8. 4 Low g/dL Uijnqbuuic79.1 Low % MCV93.7fL MCH28.0pg MCHC29.9g/dL RDW15.9 High % Nmdrcuctt838 High       Nahpqpm847 High mg/dL      IUU82rn/dL   CREATININE0.85mg/dL      Calcium7. 7 Low mg/dL   Lfgmxq633ijtq/L   Potassium3. 6 Low      The intitial assessment and plan included:  \"Admit   Urology reevaluation  General surgical consultation  Rule out perforated colon  CT with contrast pending  Antibiotics per Infectious Disease service  Blood cultures in progress  Urine cultures in progress  Pain management  Correct electrolyte abnormalities  Risk factor management / weight loss   Smoking cessation / education  Blood products as needed  History of DVT: Xarelto on hold  Blood Pressure - Monitor and control  Pain management\"    Drains were placed:  4/29  A total of 330 mL of purulent fluid was removed and sent for diagnostic tests.       Impression:       Successful CT guided placement of 10 French right lower quadrant abscess   drainage catheter. 4/21        Successful ultrasound-guided placement 12 French drain in perihepatic   abscess.  Sample sent for culture and sensitivity.  550 mL of green   foul-smelling purulent material was aspirated.              Past Medical History:   has a past medical history of Carcinoma ([address] Cellulitis, DVT (deep venous thrombosis) ([address] Kidney stone, Lymphedema, Morbid obesity ([address] Psoas abscess, right ([address] Pyelonephritis, and Wears glasses. Social History:   reports that she has been smoking cigarettes. She has a 21.00 pack-year smoking history. She has never used smokeless tobacco. She reports that she does not drink alcohol and does not use drugs. Family History:   Family History   Adopted: Yes   Problem Relation Age of Onset    Cancer Mother         The patient reports her biologic mother did have bladder cancer       Review of Systems:     Review of Systems   Constitutional: Positive for activity change (Improved) and appetite change (Increased). Negative for chills, diaphoresis and fever. Respiratory: Negative for cough and shortness of breath. Cardiovascular: Positive for leg swelling. Negative for chest pain and palpitations. Gastrointestinal: Positive for abdominal pain (Controlled). Negative for nausea and vomiting. Genitourinary: Negative for flank pain and hematuria. Musculoskeletal: Positive for back pain (Stable). Physical Examination:        Vitals  /66   Pulse 70   Temp 98 °F (36.7 °C) (Oral)   Resp 18   Ht 5' 1\" (1.549 m)   Wt 259 lb 7.7 oz (117.7 kg)   LMP  (LMP Unknown)   SpO2 94%   BMI 49.03 kg/m²   Temp (24hrs), Av.3 °F (36.8 °C), Min:98 °F (36.7 °C), Max:98.5 °F (36.9 °C)    Recent Labs     22  1551   POCGLU 129*       Physical Exam  Vitals reviewed. Constitutional:       General: She is not in acute distress. Appearance: She is not diaphoretic. HENT:      Head: Normocephalic. Nose: Nose normal.   Eyes:      General: No scleral icterus. Conjunctiva/sclera: Conjunctivae normal.   Neck:      Trachea: No tracheal deviation. Cardiovascular:      Rate and Rhythm: Normal rate and regular rhythm.    Pulmonary:      Effort: Pulmonary effort is normal. No respiratory distress. Breath sounds: Normal breath sounds. No wheezing or rales. Chest:      Chest wall: No tenderness. Abdominal:      General: There is no distension. Palpations: Abdomen is soft. Tenderness: There is no abdominal tenderness. There is no guarding. Comments: No further drain output noted   Musculoskeletal:         General: No tenderness. Cervical back: Neck supple. Right lower leg: Edema present. Left lower leg: Edema present. Comments: Both legs wrapped    Skin:     General: Skin is warm and dry. Medications: Allergies: Allergies   Allergen Reactions    Estrogens Other (See Comments)     Hx of DVT       Current Meds:   Scheduled Meds:    vancomycin  1,000 mg IntraVENous Q24H    amLODIPine  10 mg Oral Daily    metroNIDAZOLE  500 mg Oral 3 times per day    lisinopril  20 mg Oral Daily    famotidine  20 mg Oral BID    rivaroxaban  20 mg Oral Daily    lidocaine 1 % injection  5 mL IntraDERmal Once    sodium chloride flush  5-40 mL IntraVENous 2 times per day    oxybutynin  15 mg Oral Daily    potassium chloride  20 mEq Oral QPM    sodium chloride flush  5-40 mL IntraVENous 2 times per day    vancomycin (VANCOCIN) intermittent dosing (placeholder)   Other RX Placeholder    sodium chloride flush  10 mL IntraCATHeter BID     Continuous Infusions:    sodium chloride      sodium chloride       PRN Meds: dicyclomine, ibuprofen, sodium chloride flush, sodium chloride, sodium chloride flush, sodium chloride, potassium chloride **OR** potassium alternative oral replacement **OR** potassium chloride, magnesium sulfate, ondansetron **OR** ondansetron, polyethylene glycol, acetaminophen **OR** acetaminophen, benzocaine-menthol    Data:     I/O (24Hr):     Intake/Output Summary (Last 24 hours) at 5/3/2022 1739  Last data filed at 5/3/2022 1724  Gross per 24 hour   Intake --   Output 4160 ml   Net -4160 ml       Labs:  Hematology:  Recent Labs     05/01/22  0544 05/02/22  0504   WBC 7.2 8.6   RBC 2.74* 2.86*   HGB 7.7* 8.1*   HCT 25.8* 27.1*   MCV 94.2 94.8   MCH 28.1 28.3   MCHC 29.8 29.9   RDW 17.2* 17.3*    434   MPV 9.3 9.4     Chemistry:  Recent Labs     05/01/22  0833 05/02/22  0504 05/02/22  0903     --  142   K 3.7  --  4.0     --  104   CO2 30  --  30   GLUCOSE 88  --  104*   BUN 9  --  9   CREATININE 0.83  --  0.83   MG 2.2  --   --    ANIONGAP 8*  --  8*   LABGLOM >60  --  >60   GFRAA >60  --  >60   CALCIUM 7.7*  --  7.9*   CAION  --  1.03*  --      Recent Labs     05/02/22  1551   POCGLU 129*     ABG:  Lab Results   Component Value Date    PHART 7.466 07/19/2018    OSN5KWW 35.5 07/19/2018    PO2ART 72.5 07/19/2018    FVK4YXR 25.0 07/19/2018    NBEA NOT REPORTED 07/19/2018    PBEA 1.7 07/19/2018    U5BRVTFJ 95.5 07/19/2018    FIO2 21 07/19/2018     Lab Results   Component Value Date/Time    SPECIAL RIGHT FOREARM 20ML 04/21/2022 02:16 AM     Lab Results   Component Value Date/Time    CULTURE NORMAL SKIN NAGI 04/29/2022 01:52 PM    CULTURE (A) 04/29/2022 01:52 PM     BACTEROIDES VULGATUS MODERATE GROWTH BETA LACTAMASE POSITIVE       Radiology:  CT ABDOMEN PELVIS WO CONTRAST Additional Contrast? None    Result Date: 5/3/2022  1. Interval near complete resolution of the abscess seen in the central upper pelvis status post percutaneous drain placement. Detailed assessment and differentiation from subjacent bowel loops is somewhat limited by the lack of IV contrast. 2.  Persistent, unchanged anterior perirectal fluid collection/abscess, measuring 4.3 x 5.2 x 4.1 cm. 3.  Subhepatic drainage catheter remains in place with no significant residual perihepatic abscess. 4.  Percutaneous right perinephric drainage tube and right ureteral stent remain in place with no obvious changes visualized.  5.  Additional unchanged incidental findings include small right pleural effusion and bibasilar atelectasis, cholelithiasis, bilateral nephrolithiasis, a of couple mildly dilated loops of small bowel in the left abdomen, cystocele, atherosclerosis, and endplate sclerosis at A18-Y5. CT ABDOMEN PELVIS W IV CONTRAST Additional Contrast? Oral    Result Date: 4/29/2022  1. Abscess in the central upper pelvis is again noted and has slightly increased in size. The previous perirectal pigtail catheter has been removed with re-accumulation of a 4.5 x 3 x 3 cm abscess. There may be a thin tract communicating between these 2 collections. 2.  Pigtail catheters remain adjacent to the inferior edge of the liver and lateral edge of the right kidney without a significant abscess remaining at these sites. There is residual inflammatory stranding around the right kidney and psoas muscle at the previous abscesses. 3.  Right ureteral stent is present without hydronephrosis or gas in the collecting system. David Loose remains at the left kidney without hydronephrosis. 4.  There are dilated proximal small bowel loops and some nondilated gas-filled the bowel loops in the pelvis. Features may relate to ileus or partial obstruction, had similar features on the recent exam. 5.  Small amount of right pleural effusion with atelectasis along the adjacent lower lobe is unchanged. XR CHEST PORTABLE    Result Date: 4/27/2022  No definite pleural effusion. Mild right perihilar airspace infiltrate improved from the prior study     CT ABSCESS DRAINAGE    Result Date: 4/29/2022  Successful CT guided placement of 10 Romanian right lower quadrant abscess drainage catheter. IR ABSCESS DRAINAGE PERC    Result Date: 4/29/2022  Successful CT guided placement of 10 Romanian right lower quadrant abscess drainage catheter.        Assessment:        Primary Problem  Intra-abdominal abscess [facility])    Active Hospital Problems    Diagnosis Date Noted    CRP elevated [R79.82]      Priority: Medium    Intra-abdominal abscess ([address] [K65.1] 04/21/2022     Priority: Medium    Obesity, Class III, BMI 40-49.9 (morbid obesity) ([facility] [E66.01] 04/21/2022     Priority: Medium    Hypocalcemia [E83.51] 04/21/2022     Priority: Medium    Hypokalemia [E87.6] 04/21/2022     Priority: Medium    Vertebral osteomyelitis ([facility] T12-L1 [M46.20] 04/21/2022     Priority: Medium    Pleural effusion on right [J90] 04/21/2022     Priority: Medium    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] 02/11/2020     Priority: Medium     Class: Chronic    Ureteral calculus [N20.1] 02/28/2022    Psoas muscle abscess ([facility] [K68.12] 01/08/2022    Renal abscess, right [N15.1] 01/02/2022    Normocytic anemia [D64.9] 55/58/5273    Complicated UTI (urinary tract infection) [N39.0] 12/28/2020    Renal calculus [N20.0] 12/28/2020    History of recurrent deep vein thrombosis (DVT) [Z86.718] 04/26/2018    Tobacco abuse [Z72.0] 11/15/2016    Lymphedema of both lower extremities [I89.0] 11/08/2016    Long term current use of anticoagulant therapy [Z79.01] 09/05/2015         Plan:        Antibiotics per Infectious Disease service  Pain management  Correct electrolyte abnormalities   Urology eval & f/u as scheduled  (Right ureteral stent)  Cardiology eval & f/u as scheduled Dr Sterling Reina for symptomatic bradycardia, NSVT   Blood Pressure - Monitor and control  Transfuse as needed  Anemia w/u on outpatient basis is suggested    DVT prophylaxis: Xarelto  Discharge planning in progress  Will discharge when arrangements complete and ok with other services.   Follow-up with PCP in one week, DONG Monson CNP  Notify PCP of discharge     IP CONSULT TO HOSPITALIST  IP CONSULT TO UROLOGY  IP CONSULT TO [address] [place] [address]  IP CONSULT TO INFECTIOUS DISEASES  IP CONSULT TO IV TEAM  IP CONSULT TO IV TEAM  IP CONSULT TO DO Chase  5/3/2022  5:38 PM

## 2022-05-03 NOTE — PLAN OF CARE
Problem: Discharge Planning  Goal: Discharge to home or other facility with appropriate resources  5/3/2022 1648 by Kirstin Riddle RN  Outcome: Adequate for Discharge  5/3/2022 6925 by Oswald Poole RN  Outcome: Progressing     Problem: Skin/Tissue Integrity  Goal: Absence of new skin breakdown  Description: 1. Monitor for areas of redness and/or skin breakdown  2. Assess vascular access sites hourly  3. Every 4-6 hours minimum:  Change oxygen saturation probe site  4. Every 4-6 hours:  If on nasal continuous positive airway pressure, respiratory therapy assess nares and determine need for appliance change or resting period. 5/3/2022 1648 by Kirstin Riddle RN  Outcome: Adequate for Discharge  5/3/2022 1431 by Oswald Poole RN  Outcome: Progressing     Problem: Safety - Adult  Goal: Free from fall injury  5/3/2022 1648 by Kirstin Riddle RN  Outcome: Adequate for Discharge  5/3/2022 0332 by Oswald Poole RN  Outcome: Progressing     Problem: ABCDS Injury Assessment  Goal: Absence of physical injury  5/3/2022 1648 by Kirstin Riddle RN  Outcome: Adequate for Discharge  5/3/2022 0332 by Oswald Poole RN  Outcome: Progressing     Problem: Pain  Goal: Verbalizes/displays adequate comfort level or baseline comfort level  5/3/2022 1648 by Kirstin Riddle RN  Outcome: Adequate for Discharge  5/3/2022 0332 by Oswald Poole RN  Outcome: Progressing    Patient is adequate for discharge at this time. Report has been called to the receiving facility.

## 2022-05-04 ENCOUNTER — TELEPHONE (OUTPATIENT)
Dept: UROLOGY | Age: 67
End: 2022-05-04

## 2022-05-04 PROCEDURE — 99306 1ST NF CARE HIGH MDM 50: CPT | Performed by: FAMILY MEDICINE

## 2022-05-04 NOTE — DISCHARGE SUMMARY
[facility]  Office: [address], DO, Manisha Morfin, DO, Christa Herbert, DO, Osmar Reed Blood, DO, Maddie Hall MD, Luis Boswell MD, Wallace Schroeder MD, Mainor Dubon MD, Jolynn Sutton MD, Fatmata Beard MD, Alyssa Driver MD, Davon Thapa, DO, Aylin Atwood, DO, Dylan Laureano MD,  Domingo Gusman DO, Khadra Cancino MD, Dagoberto Fay MD, Juice Clay MD, Chaka Marin DO, Malika Lozada MD, Jarrett Herrera MD, Star Arevalo, [facility], [facility], CNP, Kierra Najera, CNP, Yemi Harris, CNP, Hollis Nixon, CNP, Livier Parks, CNP, YUNIOR Epstein-C, Candace Mccrary, [facility], Wendy Dolan, CNP, Lauren Ledezma, CNP, Tl Hedrick, CNP, Seth Nix, CNS, Galdino Fontenot, [facility], Juan Mendez, CNP, Manuel Jarrett, CNP, Teri Walsh, CNP           [address]    Discharge Summary    Patient ID: Cheryl Delgado  :  1955   MRN: [id number]     ACCOUNT:  [de-identified]   Patient's PCP: DONG Matthews CNP  Admit Date: 2022   Discharge Date: 5/3/2022    Discharge Physician: Funmilayo Cramer DO     The patient was seen and examined on day of discharge and this discharge summary is in conjunction with any daily progress note from day of discharge.     Active Discharge Diagnoses:     Primary Problem  Intra-abdominal abscess [facility])      [facility]    Diagnosis Date Noted    Sepsis ([facility] [address] [A41.9] 2022     Priority: Medium    CRP elevated [R79.82]      Priority: Medium    Intra-abdominal abscess ([facility] [address] [K65.1] 2022     Priority: Medium    Obesity, Class III, BMI 40-49.9 (morbid obesity) ([facility] [address] [E66.01] 2022     Priority: Medium    Hypocalcemia [E83.51] 2022     Priority: Medium    Hypokalemia [E87.6] 04/21/2022     Priority: Medium    Vertebral osteomyelitis ([facility] [address] T12-L1 [M46.20] 04/21/2022     Priority: Medium    Pleural effusion on right [J90] 04/21/2022     Priority: Medium    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] 02/11/2020     Priority: Medium     Class: Chronic    Ureteral calculus [N20.1] 02/28/2022    Psoas muscle abscess ([address] [K68.12] 01/08/2022    Renal abscess, right [N15.1] 01/02/2022    Normocytic anemia [D64.9] 48/24/1676    Complicated UTI (urinary tract infection) [N39.0] 12/28/2020    Renal calculus [N20.0] 12/28/2020    History of recurrent deep vein thrombosis (DVT) [Z86.718] 04/26/2018    Tobacco abuse [Z72.0] 11/15/2016    Lymphedema of both lower extremities [I89.0] 11/08/2016    Long term current use of anticoagulant therapy [Z79.01] 09/05/2015         Hospital Course:     Brief History  As documented in the medical record:  Eliane Santamaria a 74 y. o.  female who presents with Abdominal Pain (perf bowel)     Patient was admitted thru ER with:  \"Yolie Kay is a 74 y. o. female who presents as transfer from outside facility with concern for bowel perforation multiple intra-abdominal abscesses.  Progressively worsening abdominal pain over the last several days prompted patient evaluation.  Treated with Vanco Zosyn, initially lactic elevated at 4.4 treated with fluid bolus.  Hypokalemic with potassium replaced.  Patient with a significant urologic history with perinephric abscess, large kidney stones requiring stenting and surgical intervention, most recently 3/1/2022.  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 \"      The patient has had a rather complicated urologic history  She states that she has been sick since the beginning of the year     Patient was admitted in January with:  Complicated UTI  She was found to have a 1.6 cm obstructing stone on the right with severe hydronephrosis and a right psoas abscess     Patient had been found to have Klebsiella bacteremia/sepsis:     Component 1/2/22 1646 Resulting Agency   Specimen Description . BLOOD  [facility]   Special Requests R BICEP  [facility]   Culture POSITIVE BLOOD CULTURE,  [address]   Culture DIRECT Agnieszka Dills STAIN FROM BOTTLE: [facility]   Culture Detected: Klebsiella pneumoniae Detected: Methodology- Polymerase Chain Reaction ([address]   Culture (NOTE) Direct Gram Stain from bottle result called to and read back by: Eulalia Olmedo RN [facility] [id number].  [facility] and Polymerase Chain Reaction (PCR) results called to and read back by: COREY Allen at 9247 on 1.3.2022 [facility]            Susceptibility        Klebsiella pneumoniae (4)     Antibiotic Interpretation Microscan Method Status     amikacin   NOT REPORTED BACTERIAL SUSCEPTIBILITY PANEL MARY Final     ampicillin Resistant >=32 BACTERIAL SUSCEPTIBILITY PANEL MARY Final     ampicillin-sulbactam   NOT REPORTED BACTERIAL SUSCEPTIBILITY PANEL MARY Final     aztreonam Sensitive <=1 BACTERIAL SUSCEPTIBILITY PANEL MARY Final     ceFAZolin Sensitive <=4 BACTERIAL SUSCEPTIBILITY PANEL MARY Final     cefepime   NOT REPORTED BACTERIAL SUSCEPTIBILITY PANEL MARY Final     cefTRIAXone Sensitive <=1 BACTERIAL SUSCEPTIBILITY PANEL MARY Final     ciprofloxacin Sensitive <=0.25 BACTERIAL SUSCEPTIBILITY PANEL MARY Final     ertapenem   NOT REPORTED BACTERIAL SUSCEPTIBILITY PANEL MARY Final     Confirmatory Extended Spectrum Beta-Lactamase Negative NEGATIVE BACTERIAL SUSCEPTIBILITY PANEL MARY Final     gentamicin Sensitive <=1 BACTERIAL SUSCEPTIBILITY PANEL MARY Final     meropenem   NOT REPORTED BACTERIAL SUSCEPTIBILITY PANEL MARY Final     nitrofurantoin   NOT REPORTED BACTERIAL SUSCEPTIBILITY PANEL MARY Final     tigecycline   NOT REPORTED BACTERIAL SUSCEPTIBILITY PANEL MARY Final     tobramycin Sensitive <=1 BACTERIAL SUSCEPTIBILITY PANEL MARY Final     trimethoprim-sulfamethoxazole Sensitive <=20 BACTERIAL SUSCEPTIBILITY PANEL MARY Final     piperacillin-tazobactam Sensitive <=4 BACTERIAL SUSCEPTIBILITY PANEL MARY Final           Specimen Collected: 01/02/22 16:46 Last Resulted: 01/04/22 22:21               On 1/5 the patient underwent:  Successful percutaneous nephrostomy tube placement.  120 mL of purulent   material was aspirated.  A defined separate retroperitoneal abscess was not   seen and expect findings on CT scan were just extension from the infection in   the kidney.  Patient be followed up with contrast CT scan when the drainage   subsides to exclude the presence of any remaining retroperitoneal abscess.      On 1/10 she had: An IR-guided pigtail catheter was placed in the right psoas abscess on 1/10. Blood cultures x 2 grew Klebsiella pneumoniae. Infectious disease was consulted and recommended starting ceftriaxone and continuing this through 2/2/2022.      On 3/1 the patient was admitted for:  PREOPERATIVE DIAGNOSES:  1.  Right ureteral calculus. 2.  Right renal calculus.   She underwent:  Cystoscopy, right ureteroscopy, right Holmium  laser lithotripsy, and right ureteral stent placement.     On 4/5 the patient was admitted for:  1.  Right renal calculus. 2.  Right ureteral calculus.   She underwent:  PROCEDURES PERFORMED:  Cystoscopy, right ureteroscopy, right holmium  laser lithotripsy, right ureteral stent exchange, staged.     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   New bladder prolapse with residual dependent stones in the bladder. Intraluminal gas in the bladder may be due to infection or recent instrumentation.   Increased size of the previous right retroperitoneal abscess tracking along the psoas muscle (6.7 x 2.6 x 10.2 cm) which previously contained a percutaneous drainage catheter.  There are abscesses tracking along the prior course of the drainage catheter through the right quadratus lumborum muscle and along the right posterior paraspinal musculature.   Multifocal rim enhancing mixed gas and fluid collections worrisome for abscesses with the dominant collections in the perihepatic region spanning up to 15 cm and within the pelvic cul-de-sac spanning up to 7 cm.   Free air along the mesentery in addition to the aforementioned fluid collections. As there are some thickened loops of small bowel in the lower abdomen and pelvis, a concomitant bowel perforation is not able be excluded on this exam, with differential considerations including ischemic bowel.   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   Loculated right pleural effusion characterized to advantage on today's chest CT.      WBC14.1 High k/uL RBC3.00 Low m/uL Hemoglobin8. 4 Low g/dL Kmrwfnlpgx88.1 Low % MCV93.7fL MCH28.0pg MCHC29.9g/dL RDW15.9 High % Ilrwstkgs140 High       Bzqmbcu225 High mg/dL      UIJ93wy/dL   CREATININE0.85mg/dL      Calcium7. 7 Low mg/dL   Cdyxuq751gvxw/L   Potassium3. 6 Low       The intitial assessment and plan included:  \"Admit   Urology reevaluation  General surgical consultation  Rule out perforated colon  CT with contrast pending  Antibiotics per Infectious Disease service  Blood cultures in progress  Urine cultures in progress  Pain management  Correct electrolyte abnormalities  Risk factor management / weight loss   Smoking cessation / education  Blood products as needed  History of DVT: Xarelto on hold  Blood Pressure - Monitor and control  Pain management\"     Drains were placed:  4/29       A total of 330 mL of purulent fluid was removed and sent for diagnostic tests.        Impression:       Successful CT guided placement of 10 French right lower quadrant abscess   drainage catheter.       4/21               Successful ultrasound-guided placement 12 French drain in perihepatic   abscess.  Sample sent for culture and sensitivity.  550 mL of green   foul-smelling purulent material was aspirated. The patient responded to therapy  Their status was stabilized   DC planning was initiated  The patient was instructed to follow up with their PCP, Carmie Phalen, APRN - CNP in one week      Discharge plan: Antibiotics per Infectious Disease service  Pain management  Correct electrolyte abnormalities   Urology eval & f/u as scheduled  (Right ureteral stent)  Cardiology eval & f/u as scheduled Dr Breana Lynn for symptomatic bradycardia, NSVT   Blood Pressure - Monitor and control  Transfuse as needed  Anemia w/u on outpatient basis is suggested    DVT prophylaxis: Xarelto  Discharge planning in progress  Will discharge when arrangements complete and ok with other services. Follow-up with PCP in one week, Carmie Phalen, APRN - CNP  Notify PCP of discharge    Rec done  BLANCA done  Discharge planning 35 minutes plus    No discharge procedures on file.     Significant Diagnostic Studies:     Labs / Micro:  Labs:  Hematology:  Recent Labs     05/01/22  0544 05/02/22  0504   WBC 7.2 8.6   RBC 2.74* 2.86*   HGB 7.7* 8.1*   HCT 25.8* 27.1*   MCV 94.2 94.8   MCH 28.1 28.3   MCHC 29.8 29.9   RDW 17.2* 17.3*    434   MPV 9.3 9.4     Chemistry:  Recent Labs     05/01/22  0833 05/02/22  0504 05/02/22  0903     --  142   K 3.7  --  4.0     --  104   CO2 30  --  30   GLUCOSE 88  --  104*   BUN 9  --  9   CREATININE 0.83  --  0.83   MG 2.2  --   --    ANIONGAP 8*  --  8*   LABGLOM >60  --  >60   GFRAA >60  --  >60   CALCIUM 7.7*  --  7.9*   CAION  --  1.03*  --      Recent Labs     05/02/22  1551   POCGLU 129*         Radiology:    CT ABDOMEN PELVIS WO CONTRAST Additional Contrast? None    Result Date: 5/3/2022  EXAMINATION: CT OF THE ABDOMEN AND PELVIS WITHOUT CONTRAST 5/3/2022 11:15 am TECHNIQUE: CT of the abdomen and pelvis was performed without the administration of intravenous contrast. Multiplanar reformatted images are provided for review. Dose modulation, iterative reconstruction, and/or weight based adjustment of the mA/kV was utilized to reduce the radiation dose to as low as reasonably achievable. COMPARISON: CT abdomen and pelvis dated 4/29/2022 HISTORY: ORDERING SYSTEM PROVIDED HISTORY: look form resolution of the abd pelvic collections - the drainage is smaller and much clearer TECHNOLOGIST PROVIDED HISTORY: Look form resiolution of the abd pelvic collections - the drainage is smaller and much clearer no iv contrast Reason for Exam: look form resiolution of the abd pelvic collections - the drainage is smaller and much clearer, no. FINDINGS: Lower Chest: There is a small right pleural effusion, not significantly changed. There is mild bibasilar atelectasis, which is unchanged. Organs: Limited unenhanced liver is within normal limits. Subhepatic percutaneous drainage catheter remains in place with no significant residual perihepatic abscess. Tiny gallstones are noted in the gallbladder. Spleen, pancreas, and adrenal glands are unremarkable. Moderate right renal atrophy is unchanged. Percutaneous pigtail drainage catheter remains in place along the lateral margin of the upper right kidney, unchanged in position from the previous CT dated 4/29/2022. Right ureteral stent remains in place as well. There are a few small calculi in the right kidney, which are unchanged. Calculus in the left kidney is unchanged as well. No evidence of hydronephrosis or ureteral calculus. GI/Bowel: There is scattered retained oral contrast in the large bowel. Detailed assessment of the bowel is limited by a lack of contrast.  There is scattered colonic diverticulosis. No focal pericolonic inflammation. A couple of mildly dilated loops of small bowel in the left abdomen are unchanged. No evidence of free air or ascites. The appendix is normal. Pelvis: Cystocele is noted. Urinary bladder is otherwise unremarkable. Uterus is surgically absent. There is an unchanged anterior perirectal fluid collection, measuring 4.3 x 5.2 x 4.1 cm. There is no gas in this collection. There is a new suprapubic drainage catheter above the urinary bladder, and the previously seen abscess in the central upper pelvis appears near completely resolved, although difficult to differentiate from subjacent bowel loops without contrast. Peritoneum/Retroperitoneum: There is moderate atherosclerosis. No abdominal aortic aneurysm. No evidence of retroperitoneal or mesenteric lymphadenopathy. Bones/Soft Tissues: Endplate is unchanged at T12-L1. Multilevel vacuum disc phenomenon and degenerative change noted. No acute osseous abnormality. 1.  Interval near complete resolution of the abscess seen in the central upper pelvis status post percutaneous drain placement. Detailed assessment and differentiation from subjacent bowel loops is somewhat limited by the lack of IV contrast. 2.  Persistent, unchanged anterior perirectal fluid collection/abscess, measuring 4.3 x 5.2 x 4.1 cm. 3.  Subhepatic drainage catheter remains in place with no significant residual perihepatic abscess. 4.  Percutaneous right perinephric drainage tube and right ureteral stent remain in place with no obvious changes visualized.  5.  Additional unchanged incidental findings include small right pleural effusion and bibasilar atelectasis, cholelithiasis, bilateral nephrolithiasis, a of couple mildly dilated loops of small bowel in the left abdomen, cystocele, atherosclerosis, and endplate sclerosis at O84-P4. CT ABDOMEN PELVIS WO CONTRAST Additional Contrast? None    Result Date: 4/25/2022  EXAMINATION: CT OF THE ABDOMEN AND PELVIS WITHOUT CONTRAST 4/25/2022 1:09 pm TECHNIQUE: CT of the abdomen and pelvis was performed without the administration of intravenous contrast. Multiplanar reformatted images are provided for review. Dose modulation, iterative reconstruction, and/or weight based adjustment of the mA/kV was utilized to reduce the radiation dose to as low as reasonably achievable. COMPARISON: April 21, 2022 HISTORY: ORDERING SYSTEM PROVIDED HISTORY: Intraabdominal abscess, re-eval drain. TECHNOLOGIST PROVIDED HISTORY: Intraabdominal abscess, re-eval drain. Reason for Exam: INTRAABDOMINAL MASS, RE-EVAL DRAIN FINDINGS: Lower Chest: Trace right pleural fluid and right lower lobe probably compressive atelectasis stable. Organs: Exam is limited by the absence of intravenous contrast. There has been interval placement of a right perihepatic drain with marked reduction to nearly resolved fluid collection/abscess within the abscess cavity which currently measures 3.0 x 2.4 cm (series 2, image 82) compared to 13.7 x 10.3 cm. No focal liver lesion. The gallbladder is surgically absent. No biliary ductal dilatation. The spleen is normal in size without focal lesion. The pancreas and the adrenal glands are unremarkable. Interval placement of a right perinephric drain with interval resolution of the right subcapsular/perinephric fluid collection/abscess. No residual fluid within the abscess cavity. In addition, interval placement of a right ureteral stent which is in satisfactory position. No right collecting system dilatation. There is a 8 mm left upper pole renal calculus. No significant left collecting system dilatation. No concerning renal lesion. GI/Bowel:  There has been interval placement of a left transgluteal pelvic drain with interval resolution of deep pelvic fluid collection. However, the 10.8 x 7.5 cm air-fluid collection in the right central pelvis is slightly bigger compared to 10.4 x 5.9 cm. This is surrounded by hollow viscera and is located within the mesentery. Interval development of short segment mildly dilated loop of small bowel in the left lower quadrant which extends into a beak-like narrowing into the right lower quadrant where several of the small bowel are matted/decompressed. The possibility of partial small bowel obstruction remains to be excluded. Pelvis: A Brooks catheter is present in a decompressed bladder. Remote hysterectomy. Peritoneum/Retroperitoneum: No bulky lymphadenopathy. No ascites. Bones/Soft Tissues: No acute osseous abnormality. Interval placement of three percutaneous drains with marked improvement to near resolution of the fluid collection/abscess where the drains terminate. The residual 3.0 x 2.4 cm perihepatic collection/abscess was 13.7 x 10.3 cm previously. The right subcapsular and deep pelvic collections are resolved. Interval enlargement of the 10.8 x 7.5 cm presumed abscess in the right central pelvis. This is not accessible for percutaneous catheter drainage given its deep location in the mesentery and being surrounded by hollow viscera. Interval development of mildly dilated loop of small bowel in the left lower quadrant with beak-like narrowing into the right lower quadrant where several of the small bowel are matted/decompressed and the large presumed pelvic abscess is present. The possibility of partial small bowel obstruction remains to be excluded. The findings were sent to the Radiology Results [address] at 3:53 pm on 4/25/2022to be communicated to a licensed caregiver.      CT ABDOMEN PELVIS W IV CONTRAST Additional Contrast? Oral    Result Date: 4/29/2022  EXAMINATION: CT OF THE ABDOMEN AND PELVIS WITH CONTRAST 4/29/2022 3:05 am TECHNIQUE: CT of the abdomen and pelvis was performed with the administration of intravenous contrast. Multiplanar reformatted images are provided for review. Dose modulation, iterative reconstruction, and/or weight based adjustment of the mA/kV was utilized to reduce the radiation dose to as low as reasonably achievable. COMPARISON: 04/25/2022 and 04/21/2022 HISTORY: ORDERING SYSTEM PROVIDED HISTORY: pelvic abscess TECHNOLOGIST PROVIDED HISTORY: pelvic abscess FINDINGS: Lower Chest: Small right pleural effusion with atelectasis in the inferior right lower lobe is redemonstrated. The appearance in the lower lungs is not significantly changed. Organs: There is no new mass or fluid collection within the liver parenchyma. Gallbladder appears stable and may have some small stones in the dependent portion. Spleen is not enlarged. There is no pancreatic calcification or ductal dilatation. The adrenal glands are unremarkable. Calculus in the interpolar left kidney but no left-sided hydronephrosis or hydroureter. Left renal size is maintained. Right kidney remains atrophic. Few small calcifications in the right kidney but without hydronephrosis at this time. Right ureteral stent is present with the coil at the renal pelvis and distally in the urinary bladder. GI/Bowel: The stomach is distended with gas and oral contrast.  Mild dilatation of multiple loops of proximal small bowel and several nondilated but distended loops in the pelvis. Some interposed collapsed segments and mostly collapsed distal small bowel. Pigtail drain along the inferolateral edge of the liver is redemonstrated without a significant remaining fluid collection. Pigtail catheter along the lateral edge of the right kidney with only a thin crescent of fluid along the renal margin and fat stranding does remain in the perirenal and pararenal space. Stranding along the right psoas muscle without a drainable collection at this time.   Loculated fluid collection with small amount a gas in the lumen within the central lower abdomen to upper pelvis is redemonstrated. Size measures up to 11.5 x 6 cm in the greatest axial plane and 7.5 cm in the craniocaudal plane. This collection has slightly increased from the recent studies. The pigtail catheter adjacent to the rectum on the previous study has been removed. There has been reaccumulation of a 4.5 x 3 x 3 cm fluid collection at the site. There may be a thin tract communicating between the perirectal collection and the central upper pelvic collection. Pelvis: Urinary bladder is collapsed. A low lying Brooks catheter is seen. Previous hysterectomy. Peritoneum/Retroperitoneum: No abdominal aortic aneurysm but does have moderate atherosclerotic calcification. Bones/Soft Tissues: Generalized edema in the subcutaneous fat in the flanks and buttocks. The bones appear stable with degenerative changes in the spine and pelvis. 1.  Abscess in the central upper pelvis is again noted and has slightly increased in size. The previous perirectal pigtail catheter has been removed with re-accumulation of a 4.5 x 3 x 3 cm abscess. There may be a thin tract communicating between these 2 collections. 2.  Pigtail catheters remain adjacent to the inferior edge of the liver and lateral edge of the right kidney without a significant abscess remaining at these sites. There is residual inflammatory stranding around the right kidney and psoas muscle at the previous abscesses. 3.  Right ureteral stent is present without hydronephrosis or gas in the collecting system. Doloris Just remains at the left kidney without hydronephrosis. 4.  There are dilated proximal small bowel loops and some nondilated gas-filled the bowel loops in the pelvis. Features may relate to ileus or partial obstruction, had similar features on the recent exam. 5.  Small amount of right pleural effusion with atelectasis along the adjacent lower lobe is unchanged.      CT ABDOMEN PELVIS W IV CONTRAST Additional Contrast? Oral    Result Date: 4/21/2022  EXAMINATION: CT OF THE ABDOMEN AND PELVIS WITH CONTRAST 4/21/2022 1:41 am TECHNIQUE: CT of the abdomen and pelvis was performed with the administration of intravenous contrast. Multiplanar reformatted images are provided for review. Dose modulation, iterative reconstruction, and/or weight based adjustment of the mA/kV was utilized to reduce the radiation dose to as low as reasonably achievable. COMPARISON: April 20, 2022 HISTORY: ORDERING SYSTEM PROVIDED HISTORY: possible bowel perf TECHNOLOGIST PROVIDED HISTORY: possible bowel perf Decision Support Exception - unselect if not a suspected or confirmed emergency medical condition->Emergency Medical Condition (MA) Reason for Exam: possible bowel perf FINDINGS: Lower Chest: A small right pleural effusion and right lower lobe atelectatic changes. Cardiomegaly. Organs: The liver continues to demonstrate subcapsular fluid collections which extend into the subhepatic area. There is air in these collections and there is suspicion for likely abscess. The largest axial diameter is approximately 13.7 x 10 cm. The second more posterior collection measures 8.3 x 3.2 cm. These were previously seen and remain unchanged. Gallbladder demonstrates stone, but no evidence of thickening. Pancreas and spleen, adrenals, left kidney, aorta and IVC appear stable. Aorta is calcified but not aneurysmal.  The right kidney appears atrophied. There is air in the collecting system as well as a subcapsular fluid collection which measures 5 x 3.9 cm. GI/Bowel: No evidence of bowel obstruction. Bowel loops are not significantly dilated. Pelvis: There is evidence of a pelvic collection with some air in it which measures approximately 10 x 6 cm. This is larger compared to the previous evaluation. An additional collection can be seen in the dependent portion of the pelvis anterior to the rectum which measures 7 x 6.3 cm.   This was seen in the previous evaluation as well. This is likely another abscess. Peritoneum/Retroperitoneum: No evidence of lymphadenopathy. As described, multiple abscesses in the abdomen and pelvis. Right psoas abscess which measures 4.1 x 3.7 cm extends into the soft tissues posteriorly. Bones/Soft Tissues: There is a right psoas abscess which extends into the posterior soft tissues. Severe multilevel degenerative disc disease. No evidence of focal destructive changes. 1. Multiple intra-abdominal and pelvic fluid collections which have the appearance of abscess formation. In the subcapsular area in the liver extending into the subhepatic area a large abscess noted measuring 10 x 13.7 cm. A second more posterior collection measures 8.3 x 3.2 cm. Two dominant pelvic collections are noted, one measuring 10 x 6 cm and the second posteriorly 7 x 6.3 cm. The anterior collection is larger compared to the previous evaluation. These likely abscesses contain air in them. 2. A small amount of free air is noted scattered in the abdomen and pelvis. Psoas retroperitoneal abscess extends ton the posterior soft tissues. 3. Right renal atrophy. A subcapsular air containing collection measures 5 x 3.9 cm also likely an abscess. 4. Partly loculated right pleural effusion and right lower lobe atelectatic changes. Findings discussed with Dr Jose Thorne 04/21/2022 00:10 a.m. RECOMMENDATIONS: Percutaneous drainage of multiple abscesses. CT ABDOMEN PELVIS W IV CONTRAST Additional Contrast? None    Result Date: 4/20/2022  EXAMINATION: CT OF THE ABDOMEN AND PELVIS WITH CONTRAST 4/20/2022 6:32 pm TECHNIQUE: CT of the abdomen and pelvis was performed with the administration of intravenous contrast. Multiplanar reformatted images are provided for review. Dose modulation, iterative reconstruction, and/or weight based adjustment of the mA/kV was utilized to reduce the radiation dose to as low as reasonably achievable.  COMPARISON: Concurrent CT of the chest, CT abdomen pelvis 01/26/2022 and 01/08/2022 HISTORY: ORDERING SYSTEM PROVIDED HISTORY: abdominal pain with nausea and vomiting TECHNOLOGIST PROVIDED HISTORY: abdominal pain with nausea and vomiting Decision Support Exception - unselect if not a suspected or confirmed emergency medical condition->Emergency Medical Condition (MA) FINDINGS: Lower Chest: Partially visualized loculated right pleural effusion. Findings characterized to advantage and reported separately under same-day chest CT. Organs: Liver continues to enhance normally despite new multiple perihepatic collections which will be described below. Cholelithiasis without definite gallbladder wall thickening. Spleen is normal in size and attenuation. Pancreas is atrophic but unchanged. Adrenal glands are normal caliber. Markedly abnormal appearance of the right kidney and right collecting system with hypoenhancement of the renal parenchyma and gas both within a subcapsular gas and fluid collection that spans roughly 4.4 cm transverse by 3.2 cm AP and throughout the right renal pelvis and calices. The ureteral stent and percutaneous nephrostomy as well as percutaneous pigtail drainage catheters are no longer present. There are a few small residual stones throughout the collecting system on the right. 6 mm stone within the left upper pole calices with other smaller nonobstructing nephroliths. The left kidney continues to enhance appropriately without hydronephrosis, but there is perinephric stranding which is new from prior. No gas in or about the left kidney or left collecting system. GI/Bowel: Mild gaseous distension of the stomach. Mild gas distended dilated loops of small bowel in the left upper quadrant measuring up to 3.9 cm with overall waxing and waning caliber.   These mildly distended loops do not have bowel wall thickening, but bowel loops in the pelvis appears thickened, though assessment is limited by the absence of enteric contrast.  The colon is normal caliber with a moderate volume of stool in the cecum and proximal ascending colon. There is colonic diverticulosis predominating in the sigmoid. Pelvis: There is a moderate volume of intraluminal gas in the bladder with mild bladder wall thickening and perivesicular stranding. The inferior bladder appears to be prolapsed, new from prior, and contains a few small calcifications. Hysterectomy. Neither ovary is able to be definitively identified. Peritoneum/Retroperitoneum: There are multifocal new mixed gas and fluid collections throughout the peritoneum and retroperitoneum which are highly suspicious for abscesses. Dominant collections are in the perihepatic region spanning approximately 13.5 cm transverse by 15 cm craniocaudal by 11 cm AP. The next largest collection is in the pelvic cul-de-sac spanning 7.1 cm transverse by 5.4 cm AP by 5.3 cm craniocaudal.  There is a collection tracking along the right psoas muscle which spans roughly 6.7 cm transverse by 2.6 cm AP by 10.2 cm craniocaudal.  There are mixed gas and fluid collections traversing the right posterior abdominal wall through the quadratus lumborum muscle and coursing along the superficial margin of the right posterior paraspinal musculature along the course of the previous pigtail drainage catheter. Extensive free fluid along the mesentery with small foci of gas. Aorta is atherosclerotic but patent and normal caliber. Bones/Soft Tissues: As previously mention there are new mixed gas and fluid collections traversing the right quadratus lumborum muscle and extending along the superficial margin of the right posterior paraspinal musculature. Soft tissue gas tracking along the right posterior paraspinal musculature extending cranially out of the imaged field of view. Dependent body wall edema which is new from prior.   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 New bladder prolapse with residual dependent stones in the bladder. Intraluminal gas in the bladder may be due to infection or recent instrumentation. Increased size of the previous right retroperitoneal abscess tracking along the psoas muscle (6.7 x 2.6 x 10.2 cm) which previously contained a percutaneous drainage catheter. There are abscesses tracking along the prior course of the drainage catheter through the right quadratus lumborum muscle and along the right posterior paraspinal musculature. Multifocal rim enhancing mixed gas and fluid collections worrisome for abscesses with the dominant collections in the perihepatic region spanning up to 15 cm and within the pelvic cul-de-sac spanning up to 7 cm. Free air along the mesentery in addition to the aforementioned fluid collections. As there are some thickened loops of small bowel in the lower abdomen and pelvis, a concomitant bowel perforation is not able be excluded on this exam, with differential considerations including ischemic bowel. 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 Loculated right pleural effusion characterized to advantage on today's chest CT. Critical results were called by Dr. Erika You to Dr. Sonal Jenkins on 4/20/2022 at 22:21 EST. XR CHEST PORTABLE    Result Date: 4/27/2022  EXAMINATION: ONE XRAY VIEW OF THE CHEST 4/27/2022 11:34 am COMPARISON: Chest x-ray dated 20 April 2022.  HISTORY: ORDERING SYSTEM PROVIDED HISTORY: assess pleural effusion TECHNOLOGIST PROVIDED HISTORY: assess pleural effusion Reason for Exam: uprt port chest FINDINGS: Right-sided PICC line with the tip at the SVC/atrial junction. No definite pleural effusion. Mild right perihilar airspace infiltrate improved from the prior study. No pneumothorax. Stable cardiomediastinal silhouette     No definite pleural effusion. Mild right perihilar airspace infiltrate improved from the prior study     XR CHEST PORTABLE    Result Date: 4/20/2022  EXAMINATION: ONE XRAY VIEW OF THE CHEST 4/20/2022 6:06 pm COMPARISON: 01/06/2022 HISTORY: ORDERING SYSTEM PROVIDED HISTORY: shortness of breath TECHNOLOGIST PROVIDED HISTORY: shortness of breath FINDINGS: There is suboptimal inspiration. The cardiac size is normal. Mass like infiltrate in the right upper lobe. No pleural effusions. .  Pulmonary vascularity appears mildly congested. There is mild ectasia of the thoracic aorta. .No acute bony abnormalities. The hilar structures are normal.     Mass like infiltrate in the right upper lobe suggesting pneumonia or neoplasm. Underlying pulmonary vascular congestion RECOMMENDATION: Follow-up CT would be helpful. CT CHEST PULMONARY EMBOLISM W CONTRAST    Result Date: 4/20/2022  EXAMINATION: CTA OF THE CHEST 4/20/2022 6:31 pm TECHNIQUE: CTA of the chest was performed after the administration of intravenous contrast.  Multiplanar reformatted images are provided for review. MIP images are provided for review. Dose modulation, iterative reconstruction, and/or weight based adjustment of the mA/kV was utilized to reduce the radiation dose to as low as reasonably achievable. COMPARISON: None. HISTORY: ORDERING SYSTEM PROVIDED HISTORY: patient with shortness of breath with tachypnea and tachycardia. TECHNOLOGIST PROVIDED HISTORY: patient with shortness of breath with tachypnea and tachycardia. FINDINGS: Pulmonary Arteries: Pulmonary arteries are less than adequately opacified for evaluation.  No obvious evidence of intraluminal filling defect to suggest pulmonary embolism. Main pulmonary artery is normal in caliber. Mediastinum: There are a few small nonspecific lymph nodes seen in the middle mediastinum. No suspicious lymphadenopathy. Lungs/pleura: Moderately large right pleural effusion with areas of loculation accounting for the pseudotumor seen on chest x-ray. .  Mild bibasal atelectasis more notably on the right. .. No suspicious pulmonary masses are noted. Cardiomediastinal Structures: Coronary arterial calcifications are seen. Intimal calcifications associated with the thoracic aorta. No evidence of thoracic aortic aneurysm or dissection . Cardiac chambers are normal Upper Abdomen: Small hiatal hernia. Perihepatic fluid. Ectopic air seen in the abdomen. Soft Tissues/Bones: There are hypertrophic degenerative changes in the thoracic spine. No acute osseous abnormalities. Mild subcutaneous emphysema along the right posterior chest wall. Moderately large right pleural effusion with areas of loculation accounting for the pseudotumor seen on chest x-ray. . Mild bibasal atelectasis more notably on the right. .. Coronary artery/atherosclerotic disease No obvious evidence of pulmonary embolism. Mild subcutaneous emphysema along the right posterior chest wall. Perihepatic fluid. Ectopic air seen in the abdomen. Please refer to abdominal CT report RECOMMENDATIONS: No additional routine follow-up for incidental abdominal lesions. CT ABSCESS DRAINAGE    Result Date: 4/29/2022  PROCEDURE: CT and ultrasound GUIDEDABDOMINALABSCESS DRAINAGE CATHETER PLACEMENT MODERATE CONSCIOUS SEDATION 4/29/2022 HISTORY: ORDERING SYSTEM PROVIDED HISTORY: abscess enlarging, amenable to drain? or aspiration? TECHNOLOGIST PROVIDED HISTORY: abscess enlarging, amenable to drain? or aspiration? SEDATION: 0.5 mg versed and 0 fentanyl were titrated intravenously for moderate sedation monitored under my direction. Total intra service time of sedation was 45 minutes.   The patient's vital signs were monitored throughout the procedure and recorded in the patient's medical record by the nurse. TECHNIQUE: Informed consent was obtained after a detailed explanation of the procedure including risks, benefits, and alternatives. Universal protocol was followed. Sterile gowns, masks, hats, and gloves utilized for maximal sterile barrier. A suitable skin site was prepped and draped in sterile fashion following CT localization. An 21 gauge needle was advanced under ultrasound and CT guidance into the fluid collection. A 6 Brazilian transitional sheath was advanced over the wire which allowed insertion of a 0.035 guidewire. The 035 guidewire was used to place a 10 Brazilian abscess drainage catheter after the fashion tract was dilated. Approximately 330 mL yellow/green purulent fluid the catheter was sutured to the skin and the patient tolerated the procedure well. The catheter was attached to LAURIE suction drainage. Dose modulation, iterative reconstruction, and/or weight based adjustment of the mA/kV was utilized to reduce the radiation dose to as low as reasonably achievable. FINDINGS: A total of 330 mL of purulent fluid was removed and sent for diagnostic tests. Successful CT guided placement of 10 Brazilian right lower quadrant abscess drainage catheter. CT ABSCESS DRAINAGE    Result Date: 4/21/2022  PROCEDURE: CT GUIDEDABDOMINALABSCESS DRAINAGE CATHETER PLACEMENT MODERATE CONSCIOUS SEDATION 4/21/2022 HISTORY: ORDERING SYSTEM PROVIDED HISTORY: pelvic abscess drain TECHNOLOGIST PROVIDED HISTORY: pelvic abscess drain SEDATION: None none TECHNIQUE: Informed consent was obtained after a detailed explanation of the procedure including risks, benefits, and alternatives. Universal protocol was followed. A suitable skin site was prepped and draped in sterile fashion following CT localization of Lisbeth nephric fluid collection on the right and also deep pelvic abscess. .  An a Cheleh needle was advanced under CT guidance into each fluid collection and a 0.035 guidewire was used to place a 8 Telugu abscess drainage catheter after the fashion tract was dilated. The catheter was sutured to the skin and the patient tolerated the procedure well. Each catheter was attached to LAURIE suction drainage. Dose modulation, iterative reconstruction, and/or weight based adjustment of the mA/kV was utilized to reduce the radiation dose to as low as reasonably achievable. FINDINGS: A total of 5 mL of purulent fluid was removed from each collection and sent for diagnostic tests. Successful CT guided placement of right Lisbeth nephric and deep pelvic abscess drainage catheters. FLUORO FOR SURGICAL PROCEDURES    Result Date: 4/22/2022  Radiology exam is complete. No Radiologist dictation. Please follow up with ordering provider. FLUORO FOR SURGICAL PROCEDURES    Result Date: 4/5/2022  Radiology exam is complete. No Radiologist dictation. Please follow up with ordering provider. IR ABSCESS DRAINAGE PERC    Result Date: 4/29/2022  PROCEDURE: CT and ultrasound GUIDEDABDOMINALABSCESS DRAINAGE CATHETER PLACEMENT MODERATE CONSCIOUS SEDATION 4/29/2022 HISTORY: ORDERING SYSTEM PROVIDED HISTORY: abscess enlarging, amenable to drain? or aspiration? TECHNOLOGIST PROVIDED HISTORY: abscess enlarging, amenable to drain? or aspiration? SEDATION: 0.5 mg versed and 0 fentanyl were titrated intravenously for moderate sedation monitored under my direction. Total intra service time of sedation was 45 minutes. The patient's vital signs were monitored throughout the procedure and recorded in the patient's medical record by the nurse. TECHNIQUE: Informed consent was obtained after a detailed explanation of the procedure including risks, benefits, and alternatives. Universal protocol was followed. Sterile gowns, masks, hats, and gloves utilized for maximal sterile barrier.   A suitable skin site was prepped and draped in sterile fashion following CT localization. An 21 gauge needle was advanced under ultrasound and CT guidance into the fluid collection. A 6 Hong Konger transitional sheath was advanced over the wire which allowed insertion of a 0.035 guidewire. The 035 guidewire was used to place a 10 Hong Konger abscess drainage catheter after the fashion tract was dilated. Approximately 330 mL yellow/green purulent fluid the catheter was sutured to the skin and the patient tolerated the procedure well. The catheter was attached to LAURIE suction drainage. Dose modulation, iterative reconstruction, and/or weight based adjustment of the mA/kV was utilized to reduce the radiation dose to as low as reasonably achievable. FINDINGS: A total of 330 mL of purulent fluid was removed and sent for diagnostic tests. Successful CT guided placement of 10 Hong Konger right lower quadrant abscess drainage catheter. IR ABSCESS DRAINAGE PERC    Result Date: 4/21/2022  PROCEDURE: IR ABSCESS DRAIN PERC MODERATE CONSCIOUS SEDATION 4/21/2022 HISTORY: ORDERING SYSTEM PROVIDED HISTORY: multiple abscesses TECHNOLOGIST PROVIDED HISTORY: multiple abscesses CONTRAST: None SEDATION: None FLUOROSCOPY DOSE AND TYPE OR TIME AND EXPOSURES: None DESCRIPTION OF PROCEDURE: Informed consent was obtained after a detailed explanation of the procedure including risks, benefits, and alternatives. Universal protocol was observed. After informed consent patient is placed supine on the table. Right upper quadrant prepped and draped sterile fashion. Ultrasound used to localize large perihepatic fluid collection. 1% lidocaine was infiltrated for local anesthesia. A stab incision was made. Under ultrasound guidance a Yueh needle was advanced into the fluid collection. Purulent material was aspirated. A 0.35 guidewire was then advanced over which a dilator and 12 Hong Konger pigtail drain was inserted. The drain was then secured to skin using 2 0 Ethilon. 550 mL of purulent material was then aspirated.   The drain was then placed to LAURIE bulb suction. Dressing was applied. Patient was returned to holding stable condition. FINDINGS: Large perihepatic fluid collection with internal echoes is identified. Drain confirmed in fluid collection. Successful ultrasound-guided placement 12 Gambian drain in perihepatic abscess. Sample sent for culture and sensitivity. 550 mL of green foul-smelling purulent material was aspirated. Consultations:    Consults:     Final Specialist Recommendations/Findings:   IP CONSULT TO HOSPITALIST  IP CONSULT TO UROLOGY  IP CONSULT TO PHARMACY  IP CONSULT TO INFECTIOUS DISEASES  IP CONSULT TO IV TEAM  IP CONSULT TO IV TEAM  IP CONSULT TO CARDIOLOGY        Discharged Condition:    Stable     Disposition: skilled nursing facility     Physician Follow Up:   Leo Magana MD  [address] [place] [address], [address] [address]  [phone]    Go on 5/18/2022  1p for Infectious Disease    [address] [address]  [phone]    Schedule an appointment as soon as possible for a visit in 1 week  for stent removal. and management of drain.     Connie Marquez MD  [address] [facility], [address]  [phone]    Schedule an appointment as soon as possible for a visit      [facility]  [address]  [address] [facility] [address] [facility] [facility] [address]  [address] [place] [address], [address] [place] [address]  [address]  [phone]    Schedule an appointment as soon as possible for a visit in 1 week         Activity:  activity as tolerated    Diet:  Low Na     Discharge Medications:      Medication List      START taking these medications    amLODIPine 10 MG tablet  Commonly known as: NORVASC  Take 1 tablet by mouth daily     lisinopril 20 MG tablet  Commonly known as: PRINIVIL;ZESTRIL  Take 1 tablet by mouth daily     tamsulosin 0.4 MG capsule  Commonly known as: FLOMAX  Take 1 capsule by mouth daily     vancomycin  infusion  Commonly known as: VANCOCIN  Infuse 1,000 mg intravenously every 24 hours for 24 days Stop after 5/20/22 - will give 2 weeks of zyvox po after that  Watch creat 3 x per week and vanco trough 3 x per week  Pharmacy to dose vanco for a trough 14-17 for osteo of the spine        CONTINUE taking these medications    Claritin 10 MG capsule  Generic drug: loratadine     ipratropium-albuterol 0.5-2.5 (3) MG/3ML Soln nebulizer solution  Commonly known as: DUONEB     Osteo Bi-Flex Adv Double St Tabs     oxybutynin 15 MG extended release tablet  Commonly known as: Ditropan XL  Take 1 tablet by mouth daily     polyethylene glycol 17 g packet  Commonly known as: GLYCOLAX     rivaroxaban 20 MG Tabs tablet  Commonly known as: Xarelto  Take 1 tablet by mouth daily (with breakfast)     therapeutic multivitamin-minerals tablet     vitamin C 250 MG tablet     vitamin D 25 MCG (1000 UT) Tabs tablet  Commonly known as: CHOLECALCIFEROL     vitamin E 400 UNIT capsule        STOP taking these medications    ketorolac 10 MG tablet  Commonly known as: TORADOL     metoprolol tartrate 25 MG tablet  Commonly known as: LOPRESSOR     potassium chloride 20 MEQ Tbcr extended release tablet  Commonly known as: KLOR-CON M           Where to Get Your Medications      These medications were sent to [facility] [id number] [address] [address]    Phone: [phone]   · tamsulosin 0.4 MG capsule     These medications were sent to [facility] [address] [facility], [address] [facility]  [address] [facility] [address]    Phone: [phone]   · amLODIPine 10 MG tablet  · lisinopril 20 MG tablet     You can get these medications from any pharmacy    Bring a paper prescription for each of these medications  · vancomycin  infusion         Time Spent on discharge is  35 mins in patient examination, evaluation, counseling, medication reconciliation, discharge plan and follow up. Electronically signed by   Tara Morales DO  5/3/2022  9:41 PM      Thank you Dr. Madhu Daugherty APRN - RODRIGUEZ for the opportunity to be involved in this patient's care.

## 2022-05-04 NOTE — TELEPHONE ENCOUNTER
I spoke to Syl Norwood at [address] [place] [address] and scheduled patient for 5/9/2022. (I wrote on schedule follow up hospital and possible cysto stent removal.)  She hadn't spoke to patient yet but Syl Norwood said on her list to call it said follow up with Dr. Veronica Wiseman in [facility] in one week for stent removal and management of drain. I told her I wasn't sure what the drain management meant. I said we would take care of the stent and the nephrostomy tube.

## 2022-05-04 NOTE — TELEPHONE ENCOUNTER
Stephanie Jaramillo from [facility] called back and patient said she is only going to follow up with us, not Dr. Mohan Newman. Mayela mentioned the drains again and I told her that was not us. She is going to call the trauma surgeon and schedule a follow up. That was on the list as well.

## 2022-05-04 NOTE — TELEPHONE ENCOUNTER
Mayela from [facility] called. Patient is a resident there now, transferred from [facility]. V's. She said she needed thanh schedule her ASAP. She also has appt with Dr. Warren Teague in [facility]"" for stent removal and something with drain. Eddie Morris was going to check with patient because I told her she would not see both of us. This plan below is from the urology resident at [facility]. V's from 4/21/22. It says to see us. If she is coming here, when do I schedule and for what? Plan:   Continue lombardo catheter for maximal  decompression   Urine culture growing 50 to 100,000 CFU albulcans,   on diflucan IV  On Zosyn and Vancomycin, appreciate ID recommendations  Blood cultures show no growth in 1 day   Pending IR aspirate cultures with GP cocci. Pain control and antiemetics as needed  Patient has pyelitis,   Okay to advance diet from urology perspective  Maintain Lombardo catheter and drains for now. We may be able to void trial before discharge, I would leave perinephric drain in until output is negligible. Will need follow-up with Dr. Isabel Tavarez MD her urologist for stent removal and management of drain if that is required upon discharge.   Appreciate medical management per primary

## 2022-05-04 NOTE — TELEPHONE ENCOUNTER
See her Monday, she was just discharged from [facility]. Reji's yesterday, looks like she has a retained stent and a nephrostomy tube.

## 2022-05-04 NOTE — PROGRESS NOTES
[facility]  Progress Note    Gabriella Whatley  MRN: [id number]  Date: 5/3/2022  LOS: 15     Reason for F/U :   Abdominal abscess, vertebral osteomyelitis, pyelonephritis/pyelitis    Impression :   1. Multiple perihepatic abscesses, retroperitoneal abscess on the right psoas, and abscess in pelvic cul-de-sac  2. 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 Free air in the abdomen,  4. Osteomyelitis at T12-L1 due to direct extension from abscess over psoas muscle  5. bandemia  6. History of complicated UTI in January 2022, positive for Klebsiella pneumoniae  7. History of septicemia in January 2022, positive for Klebsiella pneumoniae  8. History of right psoas abscess drained in January 2022  9. History of right ureteral and renal stones with ureteral stent placement in March 2022 with stent exchanged in April 2020    Recommendations:   abd fluid 4/21/22  Only strep and enterococcus fecium R amp but S vanco   On another fluid cx 4/21 she had E fecium S amp and vanco  cx from 4/29 drainage w bacteroides with lactamase positive     Plan:.  · Keep vanco and watch creat tiw - x 4 weeks and follow w zyvox 2 weeks then repeat CT AP to check on resolution of the abscesses  · 5/2 Flagyl x 4 weeks po till 5/30/22  · PICC line placed on 4/26. · FU in office    Infection Control Recommendations:   Universal precautions    Discharge Planning:   Estimated Length of IV antimicrobials:  To be determined  Patient will need Midline Catheter Insertion/ PICC line Insertion: No  Patient will need: Home IV , [place],  SNF,  LTAC: Undetermined  Patient willneed outpatient wound care: No    Medical Decision making / Summary of Stay:   Gabriella Whatley is a 74y.o.-year-old female presenting as a transfer from an outside facility due to conern for bowel perforation and multiple intra-abdominal abscesses. Era Lindsay was previously hospitalized in January 2022 with Klebsiella pneumoniae sepsis related to a perinephric abscess. She did have right-sided severely obstructive pyelonephritis for which she underwent stent placement as well as a right percutaneous nephrostomy tube placement. This infection was complicated by perinephric/psoas abscess which was aspirated and this drain was placed. Patient was seen by my partner Dr. Justino Payne and was discharged on intravenous antimicrobial therapy with Rocephin 2 g daily through February 2, 2022  The patient on 3/1/2022 underwent a cystoscopy with right-sided ureteroscopy with holmium laser lithotripsy and right-sided ureteral stent placement  The patient underwent a second urological procedure on 4/5/2022 with a cystoscopy, right-sided ureteroscopy, right holmium laser lithotripsy and right ureteral stent exchange and the patient was found to have a copious amount of calculi which were crushed up and further ablated.     The patient reports that ever since she had the second urological procedure she has had generalized malaise/fatigue and more recently progressing abdominal pain over the last several days. Initial lactic acid was 4.4 and treated with fluid bolus.  Initial potassium was 2.8, now 3.6 after replacement.     A CT scan of the abdomen and pelvis 4/20/2022 showed gas within both the right renal parenchyma and proximal right collecting system and increased size of the previous right retroperitoneal abscess tracking along the psoas muscle and there is abscess tracking along the posterior course of the drainage catheter through the right quadratus lumbar muscle and along the right posterior paraspinal musculature  There are multifocal rim-enhancing mixed Greg fluid collections worrisome for abscess within the dominant collections of the perihepatic region spanning up to 15 cm within the pelvic cul-de-sac spanning up to 7 cm. There is free air along the mesentery in addition to the a forementioned fluid collections. Discitis/osteomyelitis at T12-L1 with direct extension from the adjacent right psoas inflammatory change. Loculated right pleural effusion     The patient has been admitted and started on broad-spectrum antimicrobial therapy and ultrasound-guided placement of 12 Austrian drain in the perihepatic abscess with 550 mL of green following purulent material aspirated, and a CT-guided placement of right perinephric and deep pelvic abscess drainage catheters with 5 mL of purulent fluid removed from each collection sent for diagnostic tests. 22 - Urology performed cystoscopy with rt sided stent placement, and right retrograde pyelogram.    22 - CT abd/pelvis shows near resolution of the 3 abscess accessed for drainage. Perihepatic abscess was 13.7 x 10.3 cm no 3.0 x 2.4cm. Enlargement of the 10.8 x 7.5 cm presumed abscess in right central pelvis, not able to access for percutaneous drainage. 22 - PICC placed      Current evaluation:5/3/2022    /66   Pulse 70   Temp 98 °F (36.7 °C) (Oral)   Resp 18   Ht 5' 1\" (1.549 m)   Wt 259 lb 7.7 oz (117.7 kg)   LMP  (LMP Unknown)   SpO2 94%   BMI 49.03 kg/m²     Temperature Range: Temp: 98 °F (36.7 °C) Temp  Av.3 °F (36.8 °C)  Min: 98 °F (36.7 °C)  Max: 98.5 °F (36.9 °C)    Abdomen soft and she feels much better. Pelvic drain that was placed is giving quite a bit of fluid, now growing Bacteroides beta-lactamase positive    Now drains are all giving very little and fluid clear.   abd soft non tender  Repeat CT AP shpos resolution of all the collection but the last drained, nathalia rectal, which is still at 4 cm.    abd soft   Hoping to leave today, pend precert  Creat ok  vanco  level dropped to 1 g daily    Right-sided LAURIE drains are now showing less purulence and mostly serous fluid      IRplsced a pelvic collection drain 4/29 - 300 cc pus out - cx Bacteroides potassium is positive    CT abd/pelvis repeated this AM - shows previous perirectal abscess reincrease in size since pigtail removed, now 4.5 x 3 x 3 cm . Central upper pelvis abscess increase in size. Right ureteral stent remains in place no hydronephrosis. Rivera Welsh remains in left kidney    Gen surgery to discuss next steps with IR.    - Abscess culture- MANY NEUTROPHILS Abnormal MODERATE GRAM POSITIVE COCCI IN PAIRS Abnormal FEW GRAM POSITIVE RODS Abnormal Culture ENTEROCOCCUS FAECIUM LIGHT GROWTH Abnormal VIRIDANS STREPTOCOCCUS GROUP LIGHT GROWTH Susceptibilities have not been performed. Notify the laboratory within 7 days for further workup if clinically indicated. Abnormal       Review of Systems   Constitutional: Negative for chills, diaphoresis and fever. HENT: Negative for congestion. Eyes: Negative for photophobia, pain, discharge and redness. Respiratory: Negative for cough and shortness of breath. Cardiovascular: Negative for chest pain and palpitations. Gastrointestinal: Positive for abdominal pain (heart burn), nausea and vomiting. Negative for abdominal distention. Endocrine: Negative for cold intolerance, polydipsia and polyphagia. Genitourinary: Negative for dysuria and flank pain. Musculoskeletal: Positive for arthralgias and back pain. Skin: Negative for color change. Allergic/Immunologic: Negative for environmental allergies. Neurological: Positive for weakness. Negative for dizziness, seizures, numbness and headaches. Hematological: Negative for adenopathy. Psychiatric/Behavioral: Negative for agitation. Physical Examination :     Physical Exam  Constitutional:       General: She is not in acute distress. Appearance: Normal appearance. She is obese. She is not ill-appearing, toxic-appearing or diaphoretic. HENT:      Head: Normocephalic and atraumatic.       Nose: Nose normal.      Mouth/Throat:      Mouth: Mucous membranes are moist.   Eyes:      General: No scleral icterus. Conjunctiva/sclera: Conjunctivae normal.   Cardiovascular:      Rate and Rhythm: Normal rate and regular rhythm. Pulses: Normal pulses. Heart sounds: No murmur heard. No friction rub. Pulmonary:      Effort: Pulmonary effort is normal. No respiratory distress. Breath sounds: No stridor. No wheezing or rhonchi. Rales: mild. Abdominal:      General: Bowel sounds are normal.      Palpations: Abdomen is soft. Tenderness: There is abdominal tenderness. Comments: 2 right-sided LAURIE drains   Genitourinary:     Comments: Brooks catheter in place    Musculoskeletal:         General: No swelling, tenderness, deformity or signs of injury. Normal range of motion. Cervical back: Neck supple. No rigidity or tenderness. Right lower leg: Edema present. Left lower leg: Edema present. Skin:     General: Skin is warm and dry. Coloration: Skin is not jaundiced or pale. Findings: No bruising, erythema, lesion or rash. Neurological:      Mental Status: She is alert and oriented to person, place, and time. Psychiatric:         Behavior: Behavior normal.         Laboratory data:   I have independently reviewed the followinglabs:  CBC with Differential:   Recent Labs     05/01/22  0544 05/02/22  0504   WBC 7.2 8.6   HGB 7.7* 8.1*   HCT 25.8* 27.1*    434     BMP:   Recent Labs     05/01/22  0833 05/02/22  0903    142   K 3.7 4.0    104   CO2 30 30   BUN 9 9   CREATININE 0.83 0.83   MG 2.2  --      Hepatic Function Panel:   No results for input(s): PROT, LABALBU, BILIDIR, IBILI, BILITOT, ALKPHOS, ALT, AST in the last 72 hours.       Lab Results   Component Value Date    PROCAL 0.07 02/11/2020     Lab Results   Component Value Date    CRP 52.0 02/11/2020     Lab Results   Component Value Date    SEDRATE 91 (H) 02/11/2020         No results found for: DDIMER  Lab Results   Component Value Date    FERRITIN 173 04/26/2022    FERRITIN 42 01/23/2021     No results found for: LDH  No results found for: FIBRINOGEN    Results in Past 30 Days  Result Component Current Result Ref Range Previous Result Ref Range   SARS-CoV-2, Rapid Not Detected (5/3/2022) Not Detected Not Detected (4/20/2022) Not Detected     Lab Results   Component Value Date    COVID19 Not Detected 05/03/2022    COVID19 Not Detected 04/20/2022    COVID19 Not Detected 01/12/2022       No results for input(s): VANCOTROUGH in the last 72 hours. Imaging Studies:   US guided drainage  Impression:        Successful ultrasound-guided placement 12 Norwegian drain in perihepatic   abscess.  Sample sent for culture and sensitivity.  550 mL of green   foul-smelling purulent material was aspirated. Cultures:     Culture, Blood 2 [[id number]] Collected: 04/21/22 0215   Order Status: Completed Specimen: Blood Updated: 04/22/22 0230    Specimen Description . BLOOD    Special Requests LEFT AC 10ML    Culture NO GROWTH 1 DAY   Culture, Blood 1 [[id number]] Collected: 04/21/22 0216   Order Status: Completed Specimen: Blood Updated: 04/22/22 0229    Specimen Description . BLOOD    Special Requests RIGHT FOREARM 20ML    Culture NO GROWTH 1 DAY   Culture, Urine [[id number]] Collected: 04/21/22 0345   Order Status: Completed Specimen: Urine, clean catch Updated: 04/21/22 2150    Specimen Description . CLEAN CATCH URINE    Culture Candida albicans/dubliniensis 50 to 100,000 CFU/ML   Culture, Anaerobic and Aerobic [[id number]] (Abnormal) Collected: 04/21/22 1342   Order Status: Completed Specimen: Abscess Updated: 04/21/22 1549    Specimen Description . ABSCESS . ASPIRATE    Direct Exam MODERATE NEUTROPHILS Abnormal      MODERATE GRAM POSITIVE COCCI IN CLUSTERS Abnormal     Culture PENDING   Culture, Anaerobic and Aerobic [[id number]] (Abnormal) Collected: 04/21/22 1340   Order Status: Completed Specimen: Abscess Updated: 04/21/22 1548    Specimen Description . ABSCESS . ASPIRATE    Direct Exam FEW NEUTROPHILS Abnormal      MODERATE GRAM POSITIVE COCCI IN CLUSTERS Abnormal     Culture PENDING   Culture, Anaerobic and Aerobic [[id number]] (Abnormal) Collected: 04/21/22 1337   Order Status: Completed Specimen: Abscess Updated: 04/21/22 1529    Specimen Description . ABSCESS . ASPIRATE    Direct Exam MANY NEUTROPHILS Abnormal      MODERATE GRAM POSITIVE COCCI IN PAIRS Abnormal      FEW GRAM POSITIVE RODS Abnormal     Culture PENDING       Medications:           Chico Brito MD,

## 2022-05-05 ENCOUNTER — OUTSIDE SERVICES (OUTPATIENT)
Dept: PRIMARY CARE CLINIC | Age: 67
End: 2022-05-05
Payer: MEDICARE

## 2022-05-05 DIAGNOSIS — Z86.718 HISTORY OF RECURRENT DEEP VEIN THROMBOSIS (DVT): ICD-10-CM

## 2022-05-05 DIAGNOSIS — I89.0 LYMPHEDEMA OF BOTH LOWER EXTREMITIES: ICD-10-CM

## 2022-05-05 DIAGNOSIS — E66.01 MORBID OBESITY (HCC): ICD-10-CM

## 2022-05-05 DIAGNOSIS — K65.1 INTRA-ABDOMINAL ABSCESS (HCC): Primary | ICD-10-CM

## 2022-05-05 RX ORDER — SODIUM CHLORIDE 0.9 % (FLUSH) 0.9 %
5-40 SYRINGE (ML) INJECTION NIGHTLY
COMMUNITY

## 2022-05-05 NOTE — PROGRESS NOTES
Patient:  Cornell Paul, 1955  I saw this patient on 5/4/2022, at [facility]. She was treated for intraabdominal abscess at [facility] and here to continue antibiotics. Has some pain. has drain. Reason for Visit:      ICD-10-CM    1. Intra-abdominal abscess ([facility] [address]  K65.1    2. Lymphedema of both lower extremities  I89.0    3. Morbid obesity ([address]  E66.01    4. History of recurrent deep vein thrombosis (DVT)  Z86.718        Changes since admission    has minimal pain,has bilat leg swelling  1. Fall:  none  2. Behavioral Change: none  3. Pain Control: adequate  4. Mobility: decreased  5.  Pressure Sore:  present  Current Outpatient Medications   Medication Sig Dispense Refill    sodium chloride flush 0.9 % injection Infuse 5-40 mLs intravenously at bedtime PICC line maintenence      vancomycin (VANCOCIN) infusion Infuse 1,000 mg intravenously every 24 hours for 24 days Stop after 5/20/22 - will give 2 weeks of zyvox po after that  Watch creat 3 x per week and vanco trough 3 x per week  Pharmacy to dose vanco for a trough 14-17 for osteo of the spine 24 g 0    lisinopril (PRINIVIL;ZESTRIL) 20 MG tablet Take 1 tablet by mouth daily 30 tablet 3    amLODIPine (NORVASC) 10 MG tablet Take 1 tablet by mouth daily 30 tablet 3    tamsulosin (FLOMAX) 0.4 MG capsule Take 1 capsule by mouth daily 30 capsule 0    oxybutynin (DITROPAN XL) 15 MG extended release tablet Take 1 tablet by mouth daily 30 tablet 3    Misc Natural Products (OSTEO BI-FLEX ADV DOUBLE ST) TABS Take by mouth every morning      polyethylene glycol (GLYCOLAX) 17 g packet Take 17 g by mouth daily as needed for Constipation       ipratropium-albuterol (DUONEB) 0.5-2.5 (3) MG/3ML SOLN nebulizer solution Inhale 1 vial into the lungs every 4 hours       loratadine (CLARITIN) 10 MG capsule Take 10 mg by mouth daily       Ascorbic Acid (VITAMIN C) 250 MG tablet Take 250 mg by mouth daily      vitamin E 400 UNIT capsule Take 400 Units by mouth daily      vitamin D (CHOLECALCIFEROL) 25 MCG (1000 UT) TABS tablet Take 1,000 Units by mouth daily      rivaroxaban (XARELTO) 20 MG TABS tablet Take 1 tablet by mouth daily (with breakfast) 30 tablet 5    Multiple Vitamins-Minerals (THERAPEUTIC MULTIVITAMIN-MINERALS) tablet Take 1 tablet by mouth daily       No current facility-administered medications for this visit.       Allergies:  Estrogens    Past Medical History:    Past Medical History:   Diagnosis Date    Carcinoma ([facility] [address]     Squamous Cell    Cellulitis     DVT (deep venous thrombosis) ([facility] [address] 2006    LLE    Kidney stone     Lymphedema     Morbid obesity ([facility] [address]     Psoas abscess, right ([facility] [address]     Pyelonephritis     Wears glasses        Past Surgical History:    Past Surgical History:   Procedure Laterality Date    CARPAL TUNNEL RELEASE  1990s    CT ABSCESS DRAIN SUBCUTANEOUS  01/10/2022    CT ABSCESS DRAIN SUBCUTANEOUS 1/10/2022 [facility] CT SCAN    CT ABSCESS DRAIN SUBCUTANEOUS  04/21/2022    CT ABSCESS DRAIN SUBCUTANEOUS 4/21/2022 [facility] CT SCAN    CT ABSCESS DRAIN SUBCUTANEOUS  4/29/2022    CT ABSCESS DRAIN SUBCUTANEOUS 4/29/2022 [facility] CT SCAN    CYSTOSCOPY N/A 01/04/2022    CYSTOSCOPY URETERAL STENT INSERTION performed by Matt Briggs MD at [address] with stent    CYSTOSCOPY Right 03/01/2022    CYSTOSCOPY URETEROSCOPY LASER-WTIH HLL performed by Matt Briggs MD at [address] Right 03/01/2022    CYSTOSCOPY URETERAL STENT INSERTION-EXCHANGE performed by Matt Briggs MD at [facility] [address] Right 04/05/2022    Dr Bryan Rudd with stent insertion    CYSTOSCOPY Right 04/05/2022    CYSTOSCOPY URETEROSCOPY LASER-HLL performed by Matt Briggs MD at [address] Right 04/05/2022    CYSTOSCOPY URETERAL STENT INSERTION-EXCHANGE performed by Matt Briggs MD at [address] Right 04/22/2022    CYSTOSCOPY URETERAL STENT INSERTION (Right )    CYSTOSCOPY Right 4/22/2022    CYSTOSCOPY URETERAL STENT INSERTION performed by Shahla Dupont MD at [address]  4/26/2022         INSERT MIDLINE CATHETER  01/07/2022         IR NEPHROSTOMY PERCUTANEOUS RIGHT  01/05/2022    IR NEPHROSTOMY PERCUTANEOUS RIGHT 1/5/2022 Roena Curling, MD ST [facility]    ANNABELLA AND BSO      05/2019    TUBAL LIGATION  1993    TUBAL LIGATION      WISDOM TOOTH EXTRACTION         Social History:   Social History     Tobacco Use    Smoking status: Current Every Day Smoker     Packs/day: 0.50     Years: 42.00     Pack years: 21.00     Types: Cigarettes    Smokeless tobacco: Never Used   Substance Use Topics    Alcohol use: No     Alcohol/week: 0.0 standard drinks       Family History:   Family History   Adopted: Yes   Problem Relation Age of Onset    Cancer Mother         The patient reports her biologic mother did have bladder cancer           Review of Systems:  Constitutional: negative for fevers or chills  Eyes: negative for visual disturbance   ENT: negative for sore throat or nasal congestion,neg for dysphagia  Respiratory: neg for shortness of breath , cough  Cardiovascular: neg for chest pain , palpitations,pnd,positive for leg edema  Gastrointestinal: positive for abd pain, nausea, vomiting, diarrhea or constipation,no ana paula,no blood in stool  Genitourinary: negative for dysuria, urgency,hematuria or frequency  Integument/breast: negative for skin rash or lesions  Neurological: negative for unilateral weakness, numbness or tingling. Muscular Skeletal: no joint pain   Psych -mood stable    Objective:    Vitals:   BP-140/666,Pulse-74,Respi-18,Temp-97.6  -----------------------------------------------------------------  Exam:  GEN:   A & O x3, no apparent distress,obese  EYES: No gross abnormalities.   ENT:ENT exam normal, no neck nodes or sinus tenderness  NECK: normal, supple, no lymphadenopathy,  no carotid bruits  PULM: clear to auscultation bilaterally- no wheezes, rales or rhonchi, normal air movement, no respiratory distress  COR: regular rate & rhythm, no murmurs and no gallops  ABD:  soft, non-tender,has scars,has drain rt flank  : no cva tenderness  EXT:has bilat non pitting edema,no calf tenderness, and warm to touch. NEURO: Motor and sensory grossly intact  PSYCH: mood stable  SKIN:  Has pressure areas    -----------------------------------------------------------------  Diagnostic Data:   · All diagnostic data was reviewed    Assessment:        ICD-10-CM    1. [facility]abdominal abscess ([facility]  K65.1    2. Lymphedema of both lower extremities  I89.0    3. Morbid obesity ([facility] [address]  E66.01    4. History of recurrent deep vein thrombosis (DVT)  Z86.718      Patient Active Problem List   Diagnosis Code    Acute thromboembolism of deep veins of lower extremity (HCC) I82.409    Long term current use of anticoagulant therapy Z79.01    Bilateral cellulitis of lower leg L03.116, L03.115    Acute shoulder pain due to trauma M25.519, G89.11    Lymphedema of both lower extremities I89.0    Tobacco abuse Z72.0    History of recurrent deep vein thrombosis (DVT) Z86.718    Gross hematuria R31.0    Frequency of urination R35.0    Nocturia R35.1    Mixed incontinence N39.46    Constipation K59.00    Cellulitis of lower leg L03.119    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 275    Complicated UTI (urinary tract infection) N39.0    Renal calculus N20.0    Cellulitis L03.90    Normocytic anemia D64.9    Iron deficiency anemia D50.9    Renal abscess, right N15.1    Bacteremia due to Klebsiella pneumoniae R78.81, B96.1    Psoas muscle abscess ([facility]) K68.12    Septicemia due to Klebsiella pneumoniae ([facility]) A41.4    Ureteral calculus N20.1    Intra-abdominal abscess ([facility]) K65.1    Obesity, Class III, BMI 40-49.9 (morbid obesity) ([facility]) E66.01    Hypocalcemia E83.51    Hypokalemia E87.6    Vertebral osteomyelitis (HCC) T12-L1 M46.20    Pleural effusion on right J90    CRP elevated R79.82    Sepsis ([facility]) A41.9         Plan:      Iv vanco,pharmacy to dose  Care of srain  Pain control  Pt and ot  Continue current medications  Prevent pressure sore  Prevent falls    Electronically signed by Fabián Sanz MD on 5/6/2022 at 1:19 PM

## 2022-05-06 ENCOUNTER — HOSPITAL ENCOUNTER (OUTPATIENT)
Age: 67
Setting detail: SPECIMEN
Discharge: HOME | End: 2022-05-06
Payer: MEDICARE

## 2022-05-06 PROCEDURE — 80202 ASSAY OF VANCOMYCIN: CPT

## 2022-05-07 LAB — VANCOMYCIN TROUGH: 14.9 UG/ML (ref 10–20)

## 2022-05-09 ENCOUNTER — OFFICE VISIT (OUTPATIENT)
Dept: UROLOGY | Age: 67
End: 2022-05-09
Payer: MEDICARE

## 2022-05-09 VITALS — DIASTOLIC BLOOD PRESSURE: 51 MMHG | HEART RATE: 88 BPM | SYSTOLIC BLOOD PRESSURE: 121 MMHG

## 2022-05-09 DIAGNOSIS — N15.1 RENAL ABSCESS, RIGHT: ICD-10-CM

## 2022-05-09 DIAGNOSIS — N20.0 RENAL CALCULUS: Primary | ICD-10-CM

## 2022-05-09 PROCEDURE — 99215 OFFICE O/P EST HI 40 MIN: CPT | Performed by: UROLOGY

## 2022-05-09 RX ORDER — ACETAMINOPHEN 325 MG/1
650 TABLET ORAL EVERY 6 HOURS PRN
COMMUNITY

## 2022-05-09 ASSESSMENT — ENCOUNTER SYMPTOMS
BACK PAIN: 0
ABDOMINAL PAIN: 0
SHORTNESS OF BREATH: 0
WHEEZING: 0
EYE REDNESS: 0
NAUSEA: 0
COLOR CHANGE: 0
VOMITING: 0
COUGH: 0
EYE PAIN: 0

## 2022-05-09 NOTE — PROGRESS NOTES
HPI:        Patient is a 74 y.o. female in no acute distress. She is alert and oriented to person, place, and time. History  Pt presented to the ED with right flank pain. Pt had Ct scan that showed a right sided renal abscess. This also showed right hydronephrosis and an obstructing 1.6 cm right ureteral calculus. Pt was subsequently scheduled for transfer but has had to remain in ED for bed/staff issues.  locally was consulted this AM. There are plans for right sided PCNT at tertiary center. Pt is on empiric zosyn and merepenum. PT has has fever and leukocytosis. Pt has never seen Urology but did know she has a large stone. She has tried to make  appointments but has had transportation issues. Pain is 10 of 10 in the right flank currently.      1/4/2022 right ureteral stent placement     1/5/2022 right nephrostomy placement     3/2/2022 - Right HLL stent placement     4/5/2022 - Right HLL stent exchange (second part of staged procedure)    Currently  Patient is here today for follow-up. Patient did have a stent inserted at the outside institution. Patient did develop an abscess in her right kidney after the previous procedure. Patient did have a percutaneous drain placed. Patient does have some small residual stones in her right kidney. Patient is currently receiving IV antibiotics. She is a resident at [address] [place] [address] at this point. Patient still has drains in place. There is only scant drainage from these. Recent CT scan was reviewed. It does appear that most of the fluid collections have resolved. She reports no pain today.     Past Medical History:   Diagnosis Date    Carcinoma ([address]     Squamous Cell    Cellulitis     DVT (deep venous thrombosis) ([address] 2006    LLE    Kidney stone     Lymphedema     Morbid obesity ([address]     Psoas abscess, right ([address]     Pyelonephritis     Wears glasses      Past Surgical History:   Procedure Laterality Date    CARPAL TUNNEL RELEASE  1990s    CT ABSCESS DRAIN SUBCUTANEOUS  01/10/2022    CT ABSCESS DRAIN SUBCUTANEOUS 1/10/2022 [facility] CT SCAN    CT ABSCESS DRAIN SUBCUTANEOUS  04/21/2022    CT ABSCESS DRAIN SUBCUTANEOUS 4/21/2022 [facility] CT SCAN    CT ABSCESS DRAIN SUBCUTANEOUS  4/29/2022    CT ABSCESS DRAIN SUBCUTANEOUS 4/29/2022 [facility] CT SCAN    CYSTOSCOPY N/A 01/04/2022    CYSTOSCOPY URETERAL STENT INSERTION performed by Sherry Shahid MD at [address] with stent    CYSTOSCOPY Right 03/01/2022    CYSTOSCOPY URETEROSCOPY LASER-WTIH HLL performed by Sherry Shahid MD at [place] [address] Right 03/01/2022    CYSTOSCOPY URETERAL STENT INSERTION-EXCHANGE performed by Sherry Shahid MD at [place] [address] Right 04/05/2022    Dr Edgar Murillo with stent insertion    CYSTOSCOPY Right 04/05/2022    CYSTOSCOPY URETEROSCOPY LASER-HLL performed by Sherry Shahid MD at [place] [address] Right 04/05/2022    [address] performed by Sherry Shahid MD at [place] [address] Right 04/22/2022    CYSTOSCOPY URETERAL STENT INSERTION (Right )    CYSTOSCOPY Right 4/22/2022    CYSTOSCOPY URETERAL STENT INSERTION performed by Saurav Deutsch MD at [address] [place] [address]  4/26/2022         INSERT MIDLINE CATHETER  01/07/2022         IR NEPHROSTOMY PERCUTANEOUS RIGHT  01/05/2022    IR NEPHROSTOMY PERCUTANEOUS RIGHT 1/5/2022 Amelie Wise MD [facility]    ANNABELLA AND BSO      05/2019    TUBAL LIGATION  1993    TUBAL LIGATION      WISDOM TOOTH EXTRACTION       Outpatient Encounter Medications as of 5/9/2022   Medication Sig Dispense Refill    sodium chloride flush 0.9 % injection Infuse 5-40 mLs intravenously at bedtime PICC line maintenence      vancomycin (VANCOCIN) infusion Infuse 1,000 mg intravenously every 24 hours for 24 days Stop after 5/20/22 - will give 2 weeks of zyvox po after that  Watch creat 3 x per week and vanco trough 3 x per week  Pharmacy to dose vanco for a trough 14-17 for osteo of the spine 24 g 0    lisinopril (PRINIVIL;ZESTRIL) 20 MG tablet Take 1 tablet by mouth daily 30 tablet 3    amLODIPine (NORVASC) 10 MG tablet Take 1 tablet by mouth daily 30 tablet 3    tamsulosin (FLOMAX) 0.4 MG capsule Take 1 capsule by mouth daily 30 capsule 0    oxybutynin (DITROPAN XL) 15 MG extended release tablet Take 1 tablet by mouth daily 30 tablet 3    Misc Natural Products (OSTEO BI-FLEX ADV DOUBLE ST) TABS Take by mouth every morning      polyethylene glycol (GLYCOLAX) 17 g packet Take 17 g by mouth daily as needed for Constipation       ipratropium-albuterol (DUONEB) 0.5-2.5 (3) MG/3ML SOLN nebulizer solution Inhale 1 vial into the lungs every 4 hours       loratadine (CLARITIN) 10 MG capsule Take 10 mg by mouth daily       Ascorbic Acid (VITAMIN C) 250 MG tablet Take 250 mg by mouth daily      vitamin E 400 UNIT capsule Take 400 Units by mouth daily      vitamin D (CHOLECALCIFEROL) 25 MCG (1000 UT) TABS tablet Take 1,000 Units by mouth daily      rivaroxaban (XARELTO) 20 MG TABS tablet Take 1 tablet by mouth daily (with breakfast) 30 tablet 5    Multiple Vitamins-Minerals (THERAPEUTIC MULTIVITAMIN-MINERALS) tablet Take 1 tablet by mouth daily       No facility-administered encounter medications on file as of 5/9/2022.       Current Outpatient Medications on File Prior to Visit   Medication Sig Dispense Refill    sodium chloride flush 0.9 % injection Infuse 5-40 mLs intravenously at bedtime PICC line maintenence      vancomycin (VANCOCIN) infusion Infuse 1,000 mg intravenously every 24 hours for 24 days Stop after 5/20/22 - will give 2 weeks of zyvox po after that  Watch creat 3 x per week and vanco trough 3 x per week  Pharmacy to dose vanco for a trough 14-17 for osteo of the spine 24 g 0    lisinopril (PRINIVIL;ZESTRIL) 20 MG tablet Take 1 tablet by mouth daily 30 tablet 3    amLODIPine (NORVASC) 10 MG tablet Take 1 tablet by mouth daily 30 tablet 3    tamsulosin (FLOMAX) 0.4 MG capsule Take 1 capsule by mouth daily 30 capsule 0    oxybutynin (DITROPAN XL) 15 MG extended release tablet Take 1 tablet by mouth daily 30 tablet 3    Misc Natural Products (OSTEO BI-FLEX ADV DOUBLE ST) TABS Take by mouth every morning      polyethylene glycol (GLYCOLAX) 17 g packet Take 17 g by mouth daily as needed for Constipation       ipratropium-albuterol (DUONEB) 0.5-2.5 (3) MG/3ML SOLN nebulizer solution Inhale 1 vial into the lungs every 4 hours       loratadine (CLARITIN) 10 MG capsule Take 10 mg by mouth daily       Ascorbic Acid (VITAMIN C) 250 MG tablet Take 250 mg by mouth daily      vitamin E 400 UNIT capsule Take 400 Units by mouth daily      vitamin D (CHOLECALCIFEROL) 25 MCG (1000 UT) TABS tablet Take 1,000 Units by mouth daily      rivaroxaban (XARELTO) 20 MG TABS tablet Take 1 tablet by mouth daily (with breakfast) 30 tablet 5    Multiple Vitamins-Minerals (THERAPEUTIC MULTIVITAMIN-MINERALS) tablet Take 1 tablet by mouth daily       No current facility-administered medications on file prior to visit. Estrogens  Family History   Adopted: Yes   Problem Relation Age of Onset   Kalee Her Cancer Mother         The patient reports her biologic mother did have bladder cancer     Social History     Tobacco Use   Smoking Status Current Every Day Smoker    Packs/day: 0.50    Years: 42.00    Pack years: 21.00    Types: Cigarettes   Smokeless Tobacco Never Used       Social History     Substance and Sexual Activity   Alcohol Use No    Alcohol/week: 0.0 standard drinks       Review of Systems   Constitutional: Negative for appetite change, chills and fever. Eyes: Negative for pain, redness and visual disturbance. Respiratory: Negative for cough, shortness of breath and wheezing. Cardiovascular: Negative for chest pain and leg swelling. Gastrointestinal: Negative for abdominal pain, nausea and vomiting. Genitourinary: Negative for difficulty urinating, dysuria, flank pain, frequency, hematuria, pelvic pain, vaginal bleeding and vaginal discharge. Musculoskeletal: Negative for back pain, joint swelling and myalgias. Skin: Negative for color change, rash and wound. Neurological: Negative for dizziness, tremors and numbness. Hematological: Negative for adenopathy. Does not bruise/bleed easily. LMP  (LMP Unknown)       PHYSICAL EXAM:  Constitutional: Patient resting comfortably, in no acute distress. Neuro: Alert and oriented to person place and time. Cranial nerves grossly intact. Psych: Mood and affect normal.  Skin: Warm, dry  HEENT: normocephalic, atraumatic  Lymphatics: No palpable lymphadenopathy  Lungs: Respiratory effort normal, unlabored  Cardiovascular:  Normal peripheral pulses  Abdomen: Soft, non-tender, non-distended with no organomegaly or palpable masses. : No CVA tenderness. Bladder non-tender and not distended. Pelvic: Deferred    Lab Results   Component Value Date    BUN 9 05/02/2022     Lab Results   Component Value Date    CREATININE 0.83 05/02/2022       ASSESSMENT:  This is a 74 y.o. female with the following diagnoses:   Diagnosis Orders   1. Renal calculus     2. Renal abscess, right           PLAN:  For now we will maintain the right ureteral stent until she is finished with antibiotics. I would like to have her remove her percutaneous drains. After those are removed then we will move forward with definitive stone therapy on the right. She can cancel with outside urology.

## 2022-05-09 NOTE — PATIENT INSTRUCTIONS
SURVEY:    You may be receiving a survey from [employer] regarding your visit today. Please complete the survey to enable us to provide the highest quality of care to you and your family. If you cannot score us a very good on any question, please call the office to discuss how we could have made your experience a very good one. Thank you.

## 2022-05-10 ENCOUNTER — HOSPITAL ENCOUNTER (OUTPATIENT)
Age: 67
Setting detail: SPECIMEN
Discharge: HOME | End: 2022-05-10
Payer: MEDICARE

## 2022-05-10 ENCOUNTER — TELEPHONE (OUTPATIENT)
Dept: INFECTIOUS DISEASES | Age: 67
End: 2022-05-10

## 2022-05-10 PROCEDURE — 80202 ASSAY OF VANCOMYCIN: CPT

## 2022-05-10 NOTE — TELEPHONE ENCOUNTER
Patient has an appointment with you on Wednesday 5-18.  She lives in [place] [address] and will be here in town for another appt on 5-17 at 1:10pm  Transport wants to know if you can see her on this day in the AM.

## 2022-05-11 LAB
VANCOMYCIN TROUGH DATE LAST DOSE: NORMAL
VANCOMYCIN TROUGH DOSE AMOUNT: NORMAL
VANCOMYCIN TROUGH TIME LAST DOSE: NORMAL
VANCOMYCIN TROUGH: 13.5 UG/ML (ref 10–20)

## 2022-05-16 ENCOUNTER — OUTSIDE SERVICES (OUTPATIENT)
Dept: PRIMARY CARE CLINIC | Age: 67
End: 2022-05-16
Payer: MEDICARE

## 2022-05-16 ENCOUNTER — HOSPITAL ENCOUNTER (OUTPATIENT)
Age: 67
Setting detail: SPECIMEN
Discharge: HOME | End: 2022-05-16
Payer: MEDICARE

## 2022-05-16 DIAGNOSIS — I89.0 LYMPHEDEMA OF BOTH LOWER EXTREMITIES: ICD-10-CM

## 2022-05-16 DIAGNOSIS — K65.1 INTRA-ABDOMINAL ABSCESS (HCC): Primary | ICD-10-CM

## 2022-05-16 DIAGNOSIS — Z86.718 HISTORY OF RECURRENT DEEP VEIN THROMBOSIS (DVT): ICD-10-CM

## 2022-05-16 DIAGNOSIS — E66.01 MORBID OBESITY (HCC): ICD-10-CM

## 2022-05-16 LAB — VANCOMYCIN TROUGH: 16.7 UG/ML (ref 10–20)

## 2022-05-16 PROCEDURE — 80202 ASSAY OF VANCOMYCIN: CPT

## 2022-05-16 PROCEDURE — 99308 SBSQ NF CARE LOW MDM 20: CPT | Performed by: FAMILY MEDICINE

## 2022-05-16 NOTE — PROGRESS NOTES
10 mg by mouth daily       Ascorbic Acid (VITAMIN C) 250 MG tablet Take 250 mg by mouth daily      vitamin E 400 UNIT capsule Take 400 Units by mouth daily      vitamin D (CHOLECALCIFEROL) 25 MCG (1000 UT) TABS tablet Take 1,000 Units by mouth daily      rivaroxaban (XARELTO) 20 MG TABS tablet Take 1 tablet by mouth daily (with breakfast) 30 tablet 5    Multiple Vitamins-Minerals (THERAPEUTIC MULTIVITAMIN-MINERALS) tablet Take 1 tablet by mouth daily       No current facility-administered medications for this visit.       Allergies:  Estrogens    Past Medical History:    Past Medical History:   Diagnosis Date    Car[facility] ([facility]     Squamous Cell    Cellulitis     DVT (deep venous thrombosis) ([facility] 2006    LLE    Kidney stone     Lymphedema     Morbid obesity ([facility]     Psoas abscess, right ([facility]     Pyelonephritis     Wears glasses        Past Surgical History:    Past Surgical History:   Procedure Laterality Date    CARPAL TUNNEL RELEASE  1990s    CT ABSCESS DRAIN SUBCUTANEOUS  01/10/2022    CT ABSCESS DRAIN SUBCUTANEOUS 1/10/2022 [facility] CT SCAN    CT ABSCESS DRAIN SUBCUTANEOUS  04/21/2022    CT ABSCESS DRAIN SUBCUTANEOUS 4/21/2022 [facility] CT SCAN    CT ABSCESS DRAIN SUBCUTANEOUS  4/29/2022    CT ABSCESS DRAIN SUBCUTANEOUS 4/29/2022 [facility] CT SCAN    CYSTOSCOPY N/A 01/04/2022    CYSTOSCOPY URETERAL STENT INSERTION performed by Tanner Boyce MD at [address] with stent    CYSTOSCOPY Right 03/01/2022    CYSTOSCOPY URETEROSCOPY LASER-WTIH HLL performed by Tanner Boyce MD at [facility] Right 03/01/2022    CYSTOSCOPY URETERAL STENT INSERTION-EXCHANGE performed by Tanner Boyce MD at [facility] 04/05/2022    Dr Jitendra Siegel with stent insertion    CYSTOSCOPY Right 04/05/2022    CYSTOSCOPY URETEROSCOPY LASER-HLL performed by Tanner Boyce MD at [facility] Right 04/05/2022    [address] performed by Vilma Jenkins MD at [facility] Right 04/22/2022    CYSTOSCOPY URETERAL STENT INSERTION (Right )    CYSTOSCOPY Right 4/22/2022    CYSTOSCOPY URETERAL STENT INSERTION performed by Lima Troncoso MD at [address] [facility] [address] [facility] SINGLE  4/26/2022         INSERT MIDLINE CATHETER  01/07/2022         IR NEPHROSTOMY PERCUTANEOUS RIGHT  01/05/2022    IR NEPHROSTOMY PERCUTANEOUS RIGHT 1/5/2022 Selena Murphy MD [facility]    ANNABELLA AND BSO      05/2019    TUBAL LIGATION  1993    TUBAL LIGATION      WISDOM TOOTH EXTRACTION         Social History:   Social History     Tobacco Use    Smoking status: Current Every Day Smoker     Packs/day: 0.50     Years: 42.00     Pack years: 21.00     Types: Cigarettes    Smokeless tobacco: Never Used   Substance Use Topics    Alcohol use: No     Alcohol/week: 0.0 standard drinks       Family History:   Family History   Adopted: Yes   Problem Relation Age of Onset    Cancer Mother         The patient reports her biologic mother did have bladder cancer           Review of Systems:  Constitutional: negative for fevers or chills  Eyes: negative for visual disturbance   ENT: negative for sore throat or nasal congestion,neg for dysphagia  Respiratory: neg for shortness of breath , cough  Cardiovascular: neg for chest pain , palpitations,pnd,positive for leg edema  Gastrointestinal: positive for abd pain, nausea, vomiting, diarrhea or constipation,no ana paula,no blood in stool  Genitourinary: negative for dysuria, urgency,hematuria or frequency  Integument/breast: negative for skin rash or lesions  Neurological: negative for unilateral weakness, numbness or tingling. Muscular Skeletal: no joint pain   Psych -mood stable    Objective:    Vitals:   BP-112/54,Pulse-79,Respi-18,Temp-97.6  -----------------------------------------------------------------  Exam:  GEN:   A & O x3, no apparent distress,obese  EYES: No gross abnormalities.   ENT:ENT exam normal, no neck nodes or sinus tenderness  NECK: normal, supple, no lymphadenopathy,  no carotid bruits  PULM: clear to auscultation bilaterally- no wheezes, rales or rhonchi, normal air movement, no respiratory distress  COR: regular rate & rhythm, no murmurs and no gallops  ABD:  soft, non-tender,has scars,has drain rt flank- no significant drainage  : no cva tenderness  EXT:has bilat non pitting edema,no calf tenderness, and warm to touch. NEURO: Motor and sensory grossly intact  PSYCH: mood stable  SKIN:  No pressure ulcer    -----------------------------------------------------------------  Diagnostic Data:   · All diagnostic data was reviewed    Assessment:        ICD-10-CM    1. Intra-abdominal abscess ([facility]  K65.1    2. Lymphedema of both lower extremities  I89.0    3. Morbid obesity ([facility]  E66.01    4. History of recurrent deep vein thrombosis (DVT)  Z86.718      Patient Active Problem List   Diagnosis Code    Acute thromboembolism of deep veins of lower extremity ([facility]) I82.409    Long term current use of anticoagulant therapy Z79.01    Bilateral cellulitis of lower leg L03.116, L03.115    Acute shoulder pain due to trauma M25.519, G89.11    Lymphedema of both lower extremities I89.0    Tobacco abuse Z72.0    History of recurrent deep vein thrombosis (DVT) Z86.718    Gross hematuria R31.0    Frequency of urination R35.0    Nocturia R35.1    Mixed incontinence N39.46    Constipation K59.00    Cellulitis of lower leg L03.119    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 335    Complicated UTI (urinary tract infection) N39.0    Renal calculus N20.0    Cellulitis L03.90    Normocytic anemia D64.9    Iron deficiency anemia D50.9    Renal abscess, right N15.1    Bacteremia due to Klebsiella pneumoniae R78.81, B96.1    Psoas muscle abscess ([facility]) K68.12    Septicemia due to Klebsiella pneumoniae ([facility] A41.4    Ureteral calculus N20.1    Intra-abdominal abscess ([facility]) K65.1    Obesity, Class III, BMI 40-49.9 (morbid obesity) ([facility]) E66.01    Hypocalcemia E83.51    Hypokalemia E87.6    Vertebral osteomyelitis ([facility]) T12-L1 M46.20    Pleural effusion on right J90    CRP elevated R79.82    Sepsis ([facility]) A41.9         Plan:      Iv vanco,pharmacy to dose  Care of drain  Pain control  Pt and ot  Continue current medications  Prevent pressure sore  Prevent falls    Electronically signed by Jose Miranda MD on 5/17/2022 at 7:32 AM

## 2022-05-17 ENCOUNTER — OFFICE VISIT (OUTPATIENT)
Dept: INFECTIOUS DISEASES | Age: 67
End: 2022-05-17
Payer: MEDICARE

## 2022-05-17 ENCOUNTER — OUTSIDE SERVICES (OUTPATIENT)
Dept: PRIMARY CARE CLINIC | Age: 67
End: 2022-05-17
Payer: MEDICARE

## 2022-05-17 ENCOUNTER — OFFICE VISIT (OUTPATIENT)
Dept: SURGERY | Age: 67
End: 2022-05-17
Payer: MEDICARE

## 2022-05-17 VITALS
HEART RATE: 78 BPM | DIASTOLIC BLOOD PRESSURE: 71 MMHG | WEIGHT: 243 LBS | HEIGHT: 61 IN | SYSTOLIC BLOOD PRESSURE: 134 MMHG | BODY MASS INDEX: 45.88 KG/M2 | TEMPERATURE: 97.3 F

## 2022-05-17 VITALS
DIASTOLIC BLOOD PRESSURE: 71 MMHG | SYSTOLIC BLOOD PRESSURE: 136 MMHG | BODY MASS INDEX: 45.88 KG/M2 | HEART RATE: 83 BPM | HEIGHT: 61 IN | WEIGHT: 243 LBS

## 2022-05-17 DIAGNOSIS — K65.1 INTRA-ABDOMINAL ABSCESS (HCC): Primary | ICD-10-CM

## 2022-05-17 DIAGNOSIS — M46.26 OSTEOMYELITIS OF LUMBAR SPINE (HCC): ICD-10-CM

## 2022-05-17 DIAGNOSIS — Z48.03 CHANGE OR REMOVAL OF DRAINS: ICD-10-CM

## 2022-05-17 DIAGNOSIS — I89.0 LYMPHEDEMA OF BOTH LOWER EXTREMITIES: ICD-10-CM

## 2022-05-17 DIAGNOSIS — N73.9 PELVIC ABSCESS IN FEMALE: ICD-10-CM

## 2022-05-17 DIAGNOSIS — N12 EMPHYSEMATOUS PYELONEPHRITIS OF RIGHT KIDNEY: Primary | ICD-10-CM

## 2022-05-17 DIAGNOSIS — E66.01 MORBID OBESITY (HCC): ICD-10-CM

## 2022-05-17 DIAGNOSIS — L02.91 ABSCESS: Primary | ICD-10-CM

## 2022-05-17 DIAGNOSIS — K68.12 PSOAS ABSCESS, RIGHT (HCC): ICD-10-CM

## 2022-05-17 DIAGNOSIS — Z86.718 HISTORY OF RECURRENT DEEP VEIN THROMBOSIS (DVT): ICD-10-CM

## 2022-05-17 PROCEDURE — 99214 OFFICE O/P EST MOD 30 MIN: CPT | Performed by: INTERNAL MEDICINE

## 2022-05-17 PROCEDURE — 99203 OFFICE O/P NEW LOW 30 MIN: CPT | Performed by: NURSE PRACTITIONER

## 2022-05-17 ASSESSMENT — ENCOUNTER SYMPTOMS
COLOR CHANGE: 0
EYE DISCHARGE: 0
NAUSEA: 0
ABDOMINAL PAIN: 0
ABDOMINAL DISTENTION: 0
VOMITING: 0
APNEA: 0
CONSTIPATION: 0
DIARRHEA: 0

## 2022-05-17 NOTE — PROGRESS NOTES
Patient:  Kalli Saul, 1955  I saw this patient on 5/18/2022, at [facility]. She has completed her IV ANTIBIOTICS and going home in 2 days    Reason for Visit:      ICD-10-CM    1. Intra-abdominal abscess ([facility]  K65.1    2. Lymphedema of both lower extremities  I89.0    3. Morbid obesity ([facility]  E66.01    4. History of recurrent deep vein thrombosis (DVT)  Z86.718        Changes since last visit:   has minimal pain,has bilat leg swelling  1. Fall:  none  2. Behavioral Change: none  3. Pain Control: adequate  4. Mobility: decreased  5.  Pressure Sore:  none  Current Outpatient Medications   Medication Sig Dispense Refill    acetaminophen (TYLENOL) 325 MG tablet Take 650 mg by mouth every 6 hours as needed for Pain      sodium chloride flush 0.9 % injection Infuse 5-40 mLs intravenously at bedtime PICC line maintenence      vancomycin (VANCOCIN) infusion Infuse 1,000 mg intravenously every 24 hours for 24 days Stop after 5/20/22 - will give 2 weeks of zyvox po after that  Watch creat 3 x per week and vanco trough 3 x per week  Pharmacy to dose vanco for a trough 14-17 for osteo of the spine 24 g 0    lisinopril (PRINIVIL;ZESTRIL) 20 MG tablet Take 1 tablet by mouth daily 30 tablet 3    amLODIPine (NORVASC) 10 MG tablet Take 1 tablet by mouth daily 30 tablet 3    tamsulosin (FLOMAX) 0.4 MG capsule Take 1 capsule by mouth daily 30 capsule 0    oxybutynin (DITROPAN XL) 15 MG extended release tablet Take 1 tablet by mouth daily 30 tablet 3    Misc Natural Products (OSTEO BI-FLEX ADV DOUBLE ST) TABS Take by mouth every morning      polyethylene glycol (GLYCOLAX) 17 g packet Take 17 g by mouth daily as needed for Constipation       ipratropium-albuterol (DUONEB) 0.5-2.5 (3) MG/3ML SOLN nebulizer solution Inhale 1 vial into the lungs every 4 hours       loratadine (CLARITIN) 10 MG capsule Take 10 mg by mouth daily       Ascorbic Acid (VITAMIN C) 250 MG tablet Take 250 mg by mouth daily      vitamin E 400 UNIT capsule Take 400 Units by mouth daily      vitamin D (CHOLECALCIFEROL) 25 MCG (1000 UT) TABS tablet Take 1,000 Units by mouth daily      rivaroxaban (XARELTO) 20 MG TABS tablet Take 1 tablet by mouth daily (with breakfast) 30 tablet 5    Multiple Vitamins-Minerals (THERAPEUTIC MULTIVITAMIN-MINERALS) tablet Take 1 tablet by mouth daily       No current facility-administered medications for this visit. Allergies:  Patient has no known allergies.     Past Medical History:    Past Medical History:   Diagnosis Date    Carcinoma ([facility]     Squamous Cell    Cellulitis     DVT (deep venous thrombosis) ([facility] 2006    LLE    Kidney stone     Lymphedema     Morbid obesity ([facility]     Psoas abscess, right ([facility]     Pyelonephritis     Wears glasses        Past Surgical History:    Past Surgical History:   Procedure Laterality Date    CARPAL TUNNEL RELEASE  1990s    CT ABSCESS DRAIN SUBCUTANEOUS  01/10/2022    CT ABSCESS DRAIN SUBCUTANEOUS 1/10/2022 [facility] CT SCAN    CT ABSCESS DRAIN SUBCUTANEOUS  04/21/2022    CT ABSCESS DRAIN SUBCUTANEOUS 4/21/2022 [facility] CT SCAN    CT ABSCESS DRAIN SUBCUTANEOUS  4/29/2022    CT ABSCESS DRAIN SUBCUTANEOUS 4/29/2022 [facility] CT SCAN    CYSTOSCOPY N/A 01/04/2022    CYSTOSCOPY URETERAL STENT INSERTION performed by Freddy Taylor MD [address] with stent    CYSTOSCOPY Right 03/01/2022    CYSTOSCOPY URETEROSCOPY LASER-WTIH HLL performed by Freddy Taylor MD at [address] [place] [address] Right 03/01/2022    CYSTOSCOPY URETERAL STENT INSERTION-EXCHANGE performed by Freddy Taylor MD at [address] [place] [address] Right 04/05/2022    Dr Lupe Hernandez with stent insertion    CYSTOSCOPY Right 04/05/2022    CYSTOSCOPY URETEROSCOPY LASER-HLL performed by Freddy Taylor MD at [address] [place] [address] Right 04/05/2022    CYSTOSCOPY URETERAL STENT Jamar Eduardo performed by Freddy Taylor MD at [address] [place] [address] Right 04/22/2022 CYSTOSCOPY URETERAL STENT INSERTION (Right )    CYSTOSCOPY Right 2022    CYSTOSCOPY URETERAL STENT INSERTION performed by Codi Curry MD at [address] SINGLE  2022         INSERT MIDLINE CATHETER  2022         IR NEPHROSTOMY PERCUTANEOUS RIGHT  2022    IR NEPHROSTOMY PERCUTANEOUS RIGHT 2022 Eb Munoz MD [facility]    ANNABELLA AND BSO      2019    TUBAL LIGATION  1993    TUBAL LIGATION      WISDOM TOOTH EXTRACTION         Social History:   Social History     Tobacco Use    Smoking status: Former Smoker     Packs/day: 0.50     Years: 42.00     Pack years: 21.00     Types: Cigarettes     Quit date: 2022     Years since quittin.0    Smokeless tobacco: Never Used   Substance Use Topics    Alcohol use: No     Alcohol/week: 0.0 standard drinks       Family History:   Family History   Adopted: Yes   Problem Relation Age of Onset    Cancer Mother         The patient reports her biologic mother did have bladder cancer           Review of Systems:  Constitutional: negative for fevers or chills  Eyes: negative for visual disturbance   ENT: negative for sore throat or nasal congestion,neg for dysphagia  Respiratory: neg for shortness of breath , cough  Cardiovascular: neg for chest pain , palpitations,pnd,positive for leg edema  Gastrointestinal: positive for abd pain, nausea, vomiting, diarrhea or constipation,no ana paula,no blood in stool  Genitourinary: negative for dysuria, urgency,hematuria or frequency  Integument/breast: negative for skin rash or lesions  Neurological: negative for unilateral weakness, numbness or tingling. Muscular Skeletal: no joint pain   Psych -mood stable    Objective:    Vitals:   BP-118/64,Pulse-80,Respi-18,Temp-97.0  -----------------------------------------------------------------  Exam:  GEN:   A & O x3, no apparent distress,obese  EYES: No gross abnormalities.   ENT:ENT exam normal, no neck nodes or sinus tenderness  NECK: normal, supple, no lymphadenopathy,  no carotid bruits  PULM: clear to auscultation bilaterally- no wheezes, rales or rhonchi, normal air movement, no respiratory distress  COR: regular rate & rhythm, no murmurs and no gallops  ABD:  soft, non-tender,has scars,  : no cva tenderness  EXT:has bilat non pitting edema,no calf tenderness, and warm to touch. NEURO: Motor and sensory grossly intact  PSYCH: mood stable  SKIN:  No pressure ulcer    -----------------------------------------------------------------  Diagnostic Data:   · All diagnostic data was reviewed    Assessment:        ICD-10-CM    1. Intra-abdominal abscess ([facility]  K65.1    2. Lymphedema of both lower extremities  I89.0    3. Morbid obesity ([facility]  E66.01    4. History of recurrent deep vein thrombosis (DVT)  Z86.718      Patient Active Problem List   Diagnosis Code    Acute thromboembolism of deep veins of lower extremity ([facility]) I82.409    Long term current use of anticoagulant therapy Z79.01    Bilateral cellulitis of lower leg L03.116, L03.115    Acute shoulder pain due to trauma M25.519, G89.11    Lymphedema of both lower extremities I89.0    Tobacco abuse Z72.0    History of recurrent deep vein thrombosis (DVT) Z86.718    Gross hematuria R31.0    Frequency of urination R35.0    Nocturia R35.1    Mixed incontinence N39.46    Constipation K59.00    Cellulitis of lower leg L03.119    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 726    Complicated UTI (urinary tract infection) N39.0    Renal calculus N20.0    Cellulitis L03.90    Normocytic anemia D64.9    Iron deficiency anemia D50.9    Renal abscess, right N15.1    Bacteremia due to Klebsiella pneumoniae R78.81, B96.1    Psoas muscle abscess ([facility]) K68.12    Septicemia due to Klebsiella pneumoniae ([facility]) A41.4    Ureteral calculus N20.1    Intra-abdominal abscess ([facility]) K65.1    Obesity, Class III, BMI 40-49.9 (morbid obesity) ([facility]) E66.01    Hypocalcemia E83.51    Hypokalemia E87.6    Vertebral osteomyelitis ([facility]) T12-L1 M46.20    Pleural effusion on right J90    CRP elevated R79.82    Sepsis ([facility]) A41.9         Plan:     Pain control  Pt and ot  Continue current medications  Prevent pressure sore  Prevent falls  D/c home with current medications  F/u lg pcp in 1 wk  Electronically signed by Lori Evans MD on 5/19/2022 at 2:29 PM

## 2022-05-17 NOTE — PROGRESS NOTES
[facility] New Patient Visit      CHIEF COMPLAINT:    Chief Complaint   Patient presents with    New Patient    Abscess       HISTORY OF PRESENT ILLNESS:      The patient is a 74 y.o. female who is being seen for consultation and evaluation of multiple abscesses that required IR drainage 4/21. She has (3) LAURIE drains that she reports have not been emptied in one week. Eating and moving bowels. No abdominal pain or fever/chills. Currently at [facility]. Seen by ID today and orders have been placed.     Past Medical History:    Past Medical History:   Diagnosis Date    Carcinoma ([address]     Squamous Cell    Cellulitis     DVT (deep venous thrombosis) ([address] 2006    LLE    Kidney stone     Lymphedema     Morbid obesity ([address]     Psoas abscess, right ([address]     Pyelonephritis     Wears glasses        Past SurgicalHistory:    Past Surgical History:   Procedure Laterality Date    CARPAL TUNNEL RELEASE  1990s    CT ABSCESS DRAIN SUBCUTANEOUS  01/10/2022    CT ABSCESS DRAIN SUBCUTANEOUS 1/10/2022 [facility] CT SCAN    CT ABSCESS DRAIN SUBCUTANEOUS  04/21/2022    CT ABSCESS DRAIN SUBCUTANEOUS 4/21/2022 [facility] CT SCAN    CT ABSCESS DRAIN SUBCUTANEOUS  4/29/2022    CT ABSCESS DRAIN SUBCUTANEOUS 4/29/2022 [facility] CT SCAN    CYSTOSCOPY N/A 01/04/2022    CYSTOSCOPY URETERAL STENT INSERTION performed by Sherry Shahid MD at [address] with stent    CYSTOSCOPY Right 03/01/2022    CYSTOSCOPY URETEROSCOPY LASER-WTIH HLL performed by Sherry Shahid MD at [address] Right 03/01/2022    CYSTOSCOPY URETERAL STENT INSERTION-EXCHANGE performed by Sherry Shahid MD at [address] Right 04/05/2022    Dr Edgar Murillo with stent insertion    CYSTOSCOPY Right 04/05/2022    CYSTOSCOPY URETEROSCOPY LASER-HLL performed by Sherry Shahid MD at [address] Right 04/05/2022    CYSTOSCOPY URETERAL STENT [address] performed by Sherry Shahid MD at [address] [facility] Take 400 Units by mouth daily      vitamin D (CHOLECALCIFEROL) 25 MCG (1000 UT) TABS tablet Take 1,000 Units by mouth daily      rivaroxaban (XARELTO) 20 MG TABS tablet Take 1 tablet by mouth daily (with breakfast) 30 tablet 5    Multiple Vitamins-Minerals (THERAPEUTIC MULTIVITAMIN-MINERALS) tablet Take 1 tablet by mouth daily       No current facility-administered medications for this visit. Allergies:    Patient has no known allergies. Social History:   Social History     Socioeconomic History    Marital status:      Spouse name: Not on file    Number of children: Not on file    Years of education: Not on file    Highest education level: Not on file   Occupational History    Not on file   Tobacco Use    Smoking status: Former Smoker     Packs/day: 0.50     Years: 42.00     Pack years: 21.00     Types: Cigarettes     Quit date: 2022     Years since quittin.0    Smokeless tobacco: Never Used   Vaping Use    Vaping Use: Never used   Substance and Sexual Activity    Alcohol use: No     Alcohol/week: 0.0 standard drinks    Drug use: No    Sexual activity: Not Currently   Other Topics Concern    Not on file   Social History Narrative    Not on file     Social Determinants of Health     Financial Resource Strain:     Difficulty of Paying Living Expenses: Not on file   Food Insecurity:     Worried About Running Out of Food in the Last Year: Not on file    Nabeel of Food in the Last Year: Not on file   Transportation Needs:     Lack of Transportation (Medical): Not on file    Lack of Transportation (Non-Medical):  Not on file   Physical Activity:     Days of Exercise per Week: Not on file    Minutes of Exercise per Session: Not on file   Stress:     Feeling of Stress : Not on file   Social Connections:     Frequency of Communication with Friends and Family: Not on file    Frequency of Social Gatherings with Friends and Family: Not on file    Attends Restoration Services: Not on file    Active Member of Clubs or Organizations: Not on file    Attends Club or Organization Meetings: Not on file    Marital Status: Not on file   Intimate Partner Violence:     Fear of Current or Ex-Partner: Not on file    Emotionally Abused: Not on file    Physically Abused: Not on file    Sexually Abused: Not on file   Housing Stability:     Unable to Pay for Housing in the Last Year: Not on file    Number of [place] in the Last Year: Not on file    Unstable Housing in the Last Year: Not on file       Family History:  Family History   Adopted: Yes   Problem Relation Age of Onset    Cancer Mother         The patient reports her biologic mother did have bladder cancer       Review of Systems   Constitutional: Negative for chills and fever. Gastrointestinal: Negative for abdominal pain, constipation, diarrhea, nausea and vomiting. PHYSICAL EXAM:  /71   Pulse 83   Ht 5' 1\" (1.549 m)   Wt 243 lb (110.2 kg)   LMP  (LMP Unknown)   BMI 45.91 kg/m²   CONSTITUTIONAL: awake, alert, cooperative, no apparent distress  Physical Exam  Vitals and nursing note reviewed. Constitutional:       General: She is not in acute distress. Appearance: She is well-developed. She is obese. Comments: Morbid obesity   HENT:      Head: Normocephalic and atraumatic. Cardiovascular:      Rate and Rhythm: Normal rate. Pulmonary:      Effort: Pulmonary effort is normal. No respiratory distress. Abdominal:      Palpations: Abdomen is soft. Tenderness: There is no abdominal tenderness. There is no guarding or rebound. Comments: (3) LAURIE drains (1 posterior) with no drainage    Musculoskeletal:      Cervical back: Normal range of motion and neck supple. Skin:     General: Skin is warm and dry. Capillary Refill: Capillary refill takes less than 2 seconds. Neurological:      Mental Status: She is alert and oriented to person, place, and time.    Psychiatric:         Mood and

## 2022-05-17 NOTE — PROGRESS NOTES
[facility] - Initial Consult Note  Today's Date: 5/17/2022    Impression :   post STV 4/21/22  1. Multiple perihepatic abscesses, retroperitoneal abscess on the right psoas, and abscess in pelvic cul-de-sac  2. R emphysematous pyelonephritis - ( stent placed 4/21/22) w sub capsular abscess 4.4 cm  · S/p IR drainage of the perinephric and pelvic abscess 4/21  · R ureteral stent 4/22/2022  · Post IR drainage of the liver abscess with 550 mL of green foul  purulent fluid aspirated with drain placement 4/21/2022  · 5/3/22 CT resolution R collections - only larger pelvic collection  · Post IR drain 4/29 pelvic collection -300 cc pus bacteroides BL +  3. Free air in the abdomen,  4. Osteomyelitis at T12-L1 due to direct extension from abscess over psoas muscle  5. bandemia  6. History of complicated UTI in January 2022, positive for Klebsiella pneumoniae  7. History of septicemia in January 2022, positive for Klebsiella pneumoniae  8. History of right psoas abscess drained in January 2022  9. History of right ureteral and renal stones with ureteral stent placement in March 2022 with stent exchanged in April 2020    Cultures of the abscesses:  abd fluid 4/21/22 -  Only strep and enterococcus fecium R amp but S vanco   On another fluid cx 4/21 she had E fecium S amp and vanco  cx from 4/29/22 drainage w bacteroides with lactamase positive  Recommendations   Treating abd abscesses, spinal OM and diskitis and R emphysematous pyelonephritis   · IR and CT AP to pull or reposition drains  · Keep vanco till 5/21 then pull line AND switch to zyvox po 600  Mg 2 x per day, x 2 weeks  · Keep flagyl po till resolution of the pelvic collection on CT  · Get CBC diff weekly and creat 2 x per week  · Get a stat CBC diff creat today and send it to me  · See me in 3 weeks   Diagnosis Orders   1. Emphysematous pyelonephritis of right kidney  CT ABDOMEN PELVIS W IV CONTRAST Additional Contrast? None   2. Osteomyelitis of lumbar spine (HCC)     3. Pelvic abscess in female  IR ABSCESS DRAINAGE PERC    CT ABDOMEN PELVIS W IV CONTRAST Additional Contrast? None   4. Psoas abscess, right ([address]  IR ABSCESS DRAINAGE PERC    CT ABDOMEN PELVIS W IV CONTRAST Additional Contrast? None       Return in about 3 weeks (around 6/7/2022). History of Present Illness:   Len Calderon is a 74y.o.-year-old  female who presents with   Chief Complaint   Patient presents with    Frequent Infections     STV hosp f/u   post STV 4/21/22  3. Multiple perihepatic abscesses, retroperitoneal abscess on the right psoas, and abscess in pelvic cul-de-sac  4. R emphysematous pyelonephritis - ( stent placed 4/21/22) w sub capsular abscess 4.4 cm  · S/p IR drainage of the perinephric and pelvic abscess 4/21  · R ureteral stent 4/22/2022  · Post IR drainage of the liver abscess with 550 mL of green foul  purulent fluid aspirated with drain placement 4/21/2022  · 5/3/22 CT resolution R collections - only larger pelvic collection  · Post IR drain 4/29 pelvic collection -300 cc pus bacteroides BL +  10. Free air in the abdomen,  11. Osteomyelitis at T12-L1 due to direct extension from abscess over psoas muscle  12. bandemia  13. History of complicated UTI in January 2022, positive for Klebsiella pneumoniae  14. History of septicemia in January 2022, positive for Klebsiella pneumoniae  15. History of right psoas abscess drained in January 2022  16.  History of right ureteral and renal stones with ureteral stent placement in March 2022 with stent exchanged in April 2020    Cultures:  abd fluid 4/21/22  Only strep and enterococcus fecium R amp but S vanco   On another fluid cx 4/21 she had E fecium S amp and vanco  cx from 4/29 drainage w bacteroides with lactamase positive     Plan:.  · Keep vanco and watch creat tiw - x 4 weeks and follow w zyvox 2 weeks then repeat CT AP to check on resolution of the abscesses  · 5/2 Flagyl x 4 weeks po till 5/30/22  · PICC line placed on 4/26. · FU in office    Visit 5/17/22  3 drains and all w very small serous fluid  Not draining x 1 weeka and not been flushed  abd non tender and she feels much better  LBP resolved and abd pain gone  Walks w walker at [facility] - left Knee MAYNOR w pain    labs in [facility], awaiting them      Plan:  IR and CT AP to pull or reposition drains  Keep vanco till 5/21 then switch to zyvox 2 weeks  Keep flagyl po till resolution of the pelvic collection on CT  CBC diff 2 x per week  See me in 3 weeks  Get CBC diff weekly and creat 2 x per week  Will ask for CBC diff creat today and send it to me      I have personally reviewed the past medical history, past surgical history, medications, social history, and family history, and I haveupdated the database accordingly.   Past Medical History:     Past Medical History:   Diagnosis Date    Carcinoma ([address]     Squamous Cell    Cellulitis     DVT (deep venous thrombosis) ([address] 2006    LLE    Kidney stone     Lymphedema     Morbid obesity ([address]     Psoas abscess, right ([address]     Pyelonephritis     Wears glasses        Past Surgical  History:     Past Surgical History:   Procedure Laterality Date    CARPAL TUNNEL RELEASE  1990s    CT ABSCESS DRAIN SUBCUTANEOUS  01/10/2022    CT ABSCESS DRAIN SUBCUTANEOUS 1/10/2022 [facility] CT SCAN    CT ABSCESS DRAIN SUBCUTANEOUS  04/21/2022    CT ABSCESS DRAIN SUBCUTANEOUS 4/21/2022 [facility] CT SCAN    CT ABSCESS DRAIN SUBCUTANEOUS  4/29/2022    CT ABSCESS DRAIN SUBCUTANEOUS 4/29/2022 [facility] CT SCAN    CYSTOSCOPY N/A 01/04/2022    CYSTOSCOPY URETERAL STENT INSERTION performed by Benedicto Garcia MD at [address] [employer] with stent    CYSTOSCOPY Right 03/01/2022    CYSTOSCOPY URETEROSCOPY LASER-WTIH HLL performed by Benedicto Garcia MD at  [facility] [address] Right 03/01/2022    CYSTOSCOPY URETERAL STENT INSERTION-EXCHANGE performed by Benedicto Garcia MD at  [facility] [address] Right 04/05/2022     Take 17 g by mouth daily as needed for Constipation , Disp: , Rfl:     ipratropium-albuterol (DUONEB) 0.5-2.5 (3) MG/3ML SOLN nebulizer solution, Inhale 1 vial into the lungs every 4 hours , Disp: , Rfl:     loratadine (CLARITIN) 10 MG capsule, Take 10 mg by mouth daily , Disp: , Rfl:     Ascorbic Acid (VITAMIN C) 250 MG tablet, Take 250 mg by mouth daily, Disp: , Rfl:     vitamin E 400 UNIT capsule, Take 400 Units by mouth daily, Disp: , Rfl:     vitamin D (CHOLECALCIFEROL) 25 MCG (1000 UT) TABS tablet, Take 1,000 Units by mouth daily, Disp: , Rfl:     rivaroxaban (XARELTO) 20 MG TABS tablet, Take 1 tablet by mouth daily (with breakfast), Disp: 30 tablet, Rfl: 5    Multiple Vitamins-Minerals (THERAPEUTIC MULTIVITAMIN-MINERALS) tablet, Take 1 tablet by mouth daily, Disp: , Rfl:       Social History:     Social History     Socioeconomic History    Marital status:      Spouse name: Not on file    Number of children: Not on file    Years of education: Not on file    Highest education level: Not on file   Occupational History    Not on file   Tobacco Use    Smoking status: Former Smoker     Packs/day: 0.50     Years: 42.00     Pack years: 21.00     Types: Cigarettes     Quit date: 2022     Years since quittin.0    Smokeless tobacco: Never Used   Vaping Use    Vaping Use: Never used   Substance and Sexual Activity    Alcohol use: No     Alcohol/week: 0.0 standard drinks    Drug use: No    Sexual activity: Not Currently   Other Topics Concern    Not on file   Social History Narrative    Not on file     Social Determinants of Health     Financial Resource Strain:     Difficulty of Paying Living Expenses: Not on file   Food Insecurity:     Worried About Running Out of Food in the Last Year: Not on file    Nabeel of Food in the Last Year: Not on file   Transportation Needs:     Lack of Transportation (Medical): Not on file    Lack of Transportation (Non-Medical):  Not on file Physical Activity:     Days of Exercise per Week: Not on file    Minutes of Exercise per Session: Not on file   Stress:     Feeling of Stress : Not on file   Social Connections:     Frequency of Communication with Friends and Family: Not on file    Frequency of Social Gatherings with Friends and Family: Not on file    Attends Bahai Services: Not on file    Active Member of [address] [place] [address] What's Trending or Organizations: Not on file    Attends Club or Organization Meetings: Not on file    Marital Status: Not on file   Intimate Partner Violence:     Fear of Current or Ex-Partner: Not on file    Emotionally Abused: Not on file    Physically Abused: Not on file    Sexually Abused: Not on file   Housing Stability:     Unable to Pay for Housing in the Last Year: Not on file    Number of [place] in the Last Year: Not on file    Unstable Housing in the Last Year: Not on file       Family History:     Family History   Adopted: Yes   Problem Relation Age of Onset    Cancer Mother         The patient reports her biologic mother did have bladder cancer        Allergies:   Estrogens     Review of Systems:   Review of Systems   Constitutional: Positive for activity change. HENT: Negative for congestion. Eyes: Negative for discharge. Respiratory: Negative for apnea. Cardiovascular: Negative for chest pain. Gastrointestinal: Negative for abdominal distention. Endocrine: Negative for cold intolerance. Genitourinary: Negative for dysuria. Musculoskeletal: Negative for arthralgias. Skin: Negative for color change. Allergic/Immunologic: Negative for immunocompromised state. Neurological: Negative for dizziness. Hematological: Negative for adenopathy. Psychiatric/Behavioral: Negative for agitation. Physical Examination :   Blood pressure 134/71, pulse 78, temperature 97.3 °F (36.3 °C), height 5' 1\" (1.549 m), weight 243 lb (110.2 kg), not currently breastfeeding.     Physical Exam  Constitutional: Appearance: Normal appearance. She is obese. She is not ill-appearing. HENT:      Head: Normocephalic and atraumatic. Nose: Nose normal.      Mouth/Throat:      Mouth: Mucous membranes are moist.   Eyes:      General: No scleral icterus. Pupils: Pupils are equal, round, and reactive to light. Cardiovascular:      Rate and Rhythm: Normal rate and regular rhythm. Heart sounds: Normal heart sounds. No murmur heard. Pulmonary:      Effort: No respiratory distress. Breath sounds: Normal breath sounds. Abdominal:      General: There is no distension. Palpations: Abdomen is soft. Genitourinary:     Comments: No lombardo  Musculoskeletal:         General: No swelling or tenderness. Cervical back: Neck supple. No rigidity. Skin:     General: Skin is dry. Coloration: Skin is not jaundiced. Neurological:      General: No focal deficit present. Mental Status: She is alert and oriented to person, place, and time.    Psychiatric:         Mood and Affect: Mood normal.           Medical Decision Making:   I have independently reviewed/ordered the following labs:    CBCwith Differential:   Lab Results   Component Value Date    WBC 8.6 05/02/2022    WBC 7.2 05/01/2022    HGB 8.1 05/02/2022    HGB 7.7 05/01/2022    HCT 27.1 05/02/2022    HCT 25.8 05/01/2022     05/02/2022     05/01/2022    LYMPHOPCT 7 04/22/2022    LYMPHOPCT 8 04/21/2022    MONOPCT 10 04/22/2022    MONOPCT 7 04/21/2022     BMP:  Lab Results   Component Value Date     05/02/2022     05/01/2022    K 4.0 05/02/2022    K 3.7 05/01/2022     05/02/2022     05/01/2022    CO2 30 05/02/2022    CO2 30 05/01/2022    BUN 9 05/02/2022    BUN 9 05/01/2022    CREATININE 0.83 05/02/2022    CREATININE 0.83 05/01/2022    MG 2.2 05/01/2022    MG 1.9 04/27/2022     Hepatic Function Panel:   Lab Results   Component Value Date    PROT 5.8 04/22/2022    PROT 7.1 04/20/2022    LABALBU 2.2 04/27/2022    LABALBU 2.0 04/26/2022    BILIDIR <0.08 04/22/2022    IBILI Can not be calculated 04/22/2022    BILITOT 0.16 04/22/2022    BILITOT 0.36 04/20/2022    ALKPHOS 99 04/22/2022    ALKPHOS 117 04/20/2022    ALT 12 04/22/2022    ALT 18 04/20/2022    AST 14 04/22/2022    AST 17 04/20/2022     No results found for: RPR  No results found for: HIV  No results found for: [facility]  Lab Results   Component Value Date    MUCUS NOT REPORTED 01/02/2022    RBC 2.86 05/02/2022    TRICHOMONAS NOT REPORTED 01/02/2022    WBC 8.6 05/02/2022    YEAST NOT REPORTED 01/02/2022    TURBIDITY Cloudy 04/21/2022     Lab Results   Component Value Date    CREATININE 0.83 05/02/2022    GLUCOSE 104 05/02/2022   you for allowing us to participate in the care of this patient. Please call with questions. Luther Spencer MD  - Office: [phone]    Please note that this chart was generated using voice recognition Dragon dictation software. Although every effort was made to ensure the accuracy of this automated transcription, some errors in transcription mayhave occurred.

## 2022-05-17 NOTE — PATIENT INSTRUCTIONS
Pricila Lee to call and schedule CT and abscess drainage. Provided phone number to schedule abscess drainage and order for both. PER Dr. Sana Mcmahon, CT to be scheduled within the next week.     Please send any recent labs with PT to next visit--Radha

## 2022-05-18 ENCOUNTER — HOSPITAL ENCOUNTER (OUTPATIENT)
Age: 67
Setting detail: SPECIMEN
Discharge: HOME | End: 2022-05-18

## 2022-05-18 ENCOUNTER — TELEPHONE (OUTPATIENT)
Dept: INFECTIOUS DISEASES | Age: 67
End: 2022-05-18

## 2022-05-18 DIAGNOSIS — N73.9 PELVIC ABSCESS IN FEMALE: ICD-10-CM

## 2022-05-18 DIAGNOSIS — N12 EMPHYSEMATOUS PYELONEPHRITIS OF RIGHT KIDNEY: Primary | ICD-10-CM

## 2022-05-18 DIAGNOSIS — K68.12 PSOAS ABSCESS, RIGHT (HCC): ICD-10-CM

## 2022-05-18 LAB
ABSOLUTE EOS #: 0.39 K/UL (ref 0–0.44)
ABSOLUTE IMMATURE GRANULOCYTE: <0.03 K/UL (ref 0–0.3)
ABSOLUTE LYMPH #: 1.3 K/UL (ref 1.1–3.7)
ABSOLUTE MONO #: 0.35 K/UL (ref 0.1–1.2)
BASOPHILS # BLD: 1 % (ref 0–2)
BASOPHILS ABSOLUTE: 0.06 K/UL (ref 0–0.2)
EOSINOPHILS RELATIVE PERCENT: 8 % (ref 1–4)
HCT VFR BLD CALC: 33.4 % (ref 36.3–47.1)
HEMOGLOBIN: 10 G/DL (ref 11.9–15.1)
IMMATURE GRANULOCYTES: 0 %
LYMPHOCYTES # BLD: 25 % (ref 24–43)
MCH RBC QN AUTO: 29.1 PG (ref 25.2–33.5)
MCHC RBC AUTO-ENTMCNC: 29.9 G/DL (ref 28.4–34.8)
MCV RBC AUTO: 97.1 FL (ref 82.6–102.9)
MONOCYTES # BLD: 7 % (ref 3–12)
NRBC AUTOMATED: 0 PER 100 WBC
PDW BLD-RTO: 19.5 % (ref 11.8–14.4)
PLATELET # BLD: 311 K/UL (ref 138–453)
PMV BLD AUTO: 8.9 FL (ref 8.1–13.5)
RBC # BLD: 3.44 M/UL (ref 3.95–5.11)
SEG NEUTROPHILS: 59 % (ref 36–65)
SEGMENTED NEUTROPHILS ABSOLUTE COUNT: 3.07 K/UL (ref 1.5–8.1)
WBC # BLD: 5.2 K/UL (ref 3.5–11.3)

## 2022-05-18 PROCEDURE — P9603 ONE-WAY ALLOW PRORATED MILES: HCPCS

## 2022-05-18 PROCEDURE — 36415 COLL VENOUS BLD VENIPUNCTURE: CPT

## 2022-05-18 PROCEDURE — 99315 NF DSCHRG MGMT 30 MIN/LESS: CPT | Performed by: FAMILY MEDICINE

## 2022-05-18 PROCEDURE — 85025 COMPLETE CBC W/AUTO DIFF WBC: CPT

## 2022-05-18 NOTE — TELEPHONE ENCOUNTER
[address] [place] [address] called regarding Doris Raymundo : 1955. When she left her appt with you yesterday she had an appt with Goldie Mendoza CNP with burn/hand clinic. All of her drains were pulled, therefor the CT you ordered to check placement of drains won't be getting scheduled per her nurse Belen Astorga at [address] [place] [address]. Your recommendations from yesterdays visit state to keep flagyl till resolution of the pelvic collection on CT, she is not and has never been on flagyl per her nurse. You also stated to keep vanco till  and then pull line, pt is being discharged from [address] [place] [address] on , therefor her final dose of vanco is scheduled for  during the night shift. Can I have some clarification to let her nurse know, or can you reach out to nurse Belen Astorga at [phone]. Thanks Belen Astorga      Yes. I still  Want the ct of the abdomen. And IR visit the same day to see if they need to drain any remaining collection  She needs flagyl 500 mg po three x per day till ct is out. I need  I need to see her in two weeks. 2022 10:47 AM  That department should not have pulled the drains without asking me      Spoke to Jane, CT was changed to STAT and informed [address] [place] [address] of the change, verbal order to drink 1 liter of fluid before and 1 liter of fluid after CT. Verbal order given to nurse at [address] [place] [address] for STAT creatnin. Called Nurse Belen Astorga to let her know CT still needs done and verbal order for flagyl.  Will ask Jane about vancon when she is in the office this afternoon

## 2022-05-19 ENCOUNTER — HOSPITAL ENCOUNTER (OUTPATIENT)
Age: 67
Setting detail: SPECIMEN
Discharge: HOME | End: 2022-05-19
Payer: MEDICARE

## 2022-05-19 LAB
CREAT SERPL-MCNC: 0.82 MG/DL (ref 0.5–0.9)
GFR AFRICAN AMERICAN: >60 ML/MIN
GFR NON-AFRICAN AMERICAN: >60 ML/MIN
GFR SERPL CREATININE-BSD FRML MDRD: NORMAL ML/MIN/{1.73_M2}
GFR SERPL CREATININE-BSD FRML MDRD: NORMAL ML/MIN/{1.73_M2}

## 2022-05-19 PROCEDURE — 36415 COLL VENOUS BLD VENIPUNCTURE: CPT

## 2022-05-19 PROCEDURE — P9603 ONE-WAY ALLOW PRORATED MILES: HCPCS

## 2022-05-19 PROCEDURE — 82565 ASSAY OF CREATININE: CPT

## 2022-05-20 ENCOUNTER — HOSPITAL ENCOUNTER (OUTPATIENT)
Dept: CT IMAGING | Age: 67
Discharge: HOME | End: 2022-05-22
Payer: MEDICARE

## 2022-05-20 DIAGNOSIS — K68.12 PSOAS ABSCESS, RIGHT (HCC): ICD-10-CM

## 2022-05-20 DIAGNOSIS — N73.9 PELVIC ABSCESS IN FEMALE: ICD-10-CM

## 2022-05-20 DIAGNOSIS — N12 EMPHYSEMATOUS PYELONEPHRITIS OF RIGHT KIDNEY: ICD-10-CM

## 2022-05-20 PROCEDURE — 74177 CT ABD & PELVIS W/CONTRAST: CPT

## 2022-05-20 PROCEDURE — 6360000004 HC RX CONTRAST MEDICATION: Performed by: INTERNAL MEDICINE

## 2022-05-20 RX ADMIN — IOPAMIDOL 75 ML: 755 INJECTION, SOLUTION INTRAVENOUS at 10:11

## 2022-05-23 DIAGNOSIS — M46.46 DISCITIS OF LUMBAR REGION: Primary | ICD-10-CM

## 2022-05-23 RX ORDER — LINEZOLID 600 MG/1
600 TABLET, FILM COATED ORAL 2 TIMES DAILY
Qty: 28 TABLET | Refills: 0 | Status: SHIPPED | OUTPATIENT
Start: 2022-05-23 | End: 2022-06-06

## 2022-05-24 ENCOUNTER — TELEPHONE (OUTPATIENT)
Dept: INFECTIOUS DISEASES | Age: 67
End: 2022-05-24

## 2022-05-24 ENCOUNTER — TELEPHONE (OUTPATIENT)
Dept: UROLOGY | Age: 67
End: 2022-05-24

## 2022-05-24 NOTE — TELEPHONE ENCOUNTER
Received a PA notice for linezolid (Jose Anguiano). This was processed and the Approval is below. Approved  PA Detail   Prior authorization approved Case ID: [id number]      Payer:  [employer] and [facility] - Medicare     Approval Details    Authorized from February 22, 2022 to June 23, 2022      Electronic appeal:  Not supported   View History       Notes     Time User Attachment    Attachment received from payer. 5/24/2022 12:09 PM [employer] Prescription Prior Authorization Response Document       Medication Being Authorized     linezolid (ZYVOX) 600 MG tablet    Take 1 tablet by mouth 2 times daily for 14 days    Dispense: 28 tablet Refills: 0     Start: 5/23/2022 End: 6/6/2022     Class: Normal Diagnoses: Discitis of lumbar region     This order has been released to its destination.    To be filled at: [facility] [id number] [facility] [address]

## 2022-05-24 NOTE — TELEPHONE ENCOUNTER
Please let her know that we do not need the KUB. However we do need to see her in follow-up. It appears as if she is currently on antibiotics. Please make her an appointment in approximately 2 weeks with Dr. Armand Grajeda to be reevaluated as she does have a stent in place.

## 2022-05-24 NOTE — TELEPHONE ENCOUNTER
Informed patient of the response.   She said she doesn't think she has a stent, it was on a string and pulled when she came to office before she went to [facility]. V's.

## 2022-05-24 NOTE — TELEPHONE ENCOUNTER
Patient cancelled her appointment for tomorrow. Does she need a KUB, she just had a CT? She did not want to reschedule. She said she is fine and wants to stay home and recuperate.

## 2022-05-25 ENCOUNTER — HOSPITAL ENCOUNTER (OUTPATIENT)
Age: 67
Setting detail: SPECIMEN
Discharge: HOME | End: 2022-05-25

## 2022-07-15 NOTE — TELEPHONE ENCOUNTER
Patient scheduled in [facility] clinic per mother request on 7/19.    Please fax order for clearance from primary care provider AND cardiologist for upcoming surgery: right holmium laser lithotripsy, possible removal of right perc tube

## 2022-08-08 ENCOUNTER — HOSPITAL ENCOUNTER (INPATIENT)
Age: 67
LOS: 8 days | Discharge: HOME HEALTH | DRG: 853 | End: 2022-08-16
Attending: EMERGENCY MEDICINE | Admitting: FAMILY MEDICINE
Payer: MEDICARE

## 2022-08-08 ENCOUNTER — APPOINTMENT (OUTPATIENT)
Dept: GENERAL RADIOLOGY | Age: 67
DRG: 853 | End: 2022-08-08
Payer: MEDICARE

## 2022-08-08 ENCOUNTER — APPOINTMENT (OUTPATIENT)
Dept: CT IMAGING | Age: 67
DRG: 853 | End: 2022-08-08
Payer: MEDICARE

## 2022-08-08 DIAGNOSIS — A40.9 SEPSIS DUE TO STREPTOCOCCUS SPECIES WITHOUT ACUTE ORGAN DYSFUNCTION (HCC): ICD-10-CM

## 2022-08-08 DIAGNOSIS — A41.9 SEPTICEMIA (HCC): ICD-10-CM

## 2022-08-08 DIAGNOSIS — N21.1 CALCULUS IN URETHRA: Primary | ICD-10-CM

## 2022-08-08 PROBLEM — N39.9 UROLOGIC DISORDERS: Status: ACTIVE | Noted: 2022-08-08

## 2022-08-08 LAB
ABSOLUTE EOS #: 0 K/UL (ref 0–0.44)
ABSOLUTE IMMATURE GRANULOCYTE: 0 K/UL (ref 0–0.3)
ABSOLUTE LYMPH #: 0.48 K/UL (ref 1.1–3.7)
ABSOLUTE MONO #: 0.12 K/UL (ref 0.1–1.2)
ALBUMIN SERPL-MCNC: 3.7 G/DL (ref 3.5–5.2)
ALBUMIN/GLOBULIN RATIO: 0.9 (ref 1–2.5)
ALP BLD-CCNC: 65 U/L (ref 35–104)
ALT SERPL-CCNC: 8 U/L (ref 5–33)
ANION GAP SERPL CALCULATED.3IONS-SCNC: 14 MMOL/L (ref 9–17)
AST SERPL-CCNC: 16 U/L
BASOPHILS # BLD: 0 % (ref 0–2)
BASOPHILS ABSOLUTE: 0 K/UL (ref 0–0.2)
BILIRUB SERPL-MCNC: 0.28 MG/DL (ref 0.3–1.2)
BILIRUBIN URINE: NEGATIVE
BUN BLDV-MCNC: 21 MG/DL (ref 8–23)
BUN/CREAT BLD: 15 (ref 9–20)
CALCIUM SERPL-MCNC: 8.9 MG/DL (ref 8.6–10.4)
CHLORIDE BLD-SCNC: 106 MMOL/L (ref 98–107)
CO2: 23 MMOL/L (ref 20–31)
COLOR: ABNORMAL
CREAT SERPL-MCNC: 1.39 MG/DL (ref 0.5–0.9)
EKG ATRIAL RATE: 144 BPM
EKG Q-T INTERVAL: 348 MS
EKG QRS DURATION: 120 MS
EKG QTC CALCULATION (BAZETT): 529 MS
EKG R AXIS: -48 DEGREES
EKG T AXIS: 36 DEGREES
EKG VENTRICULAR RATE: 139 BPM
EOSINOPHILS RELATIVE PERCENT: 0 % (ref 1–4)
EPITHELIAL CELLS UA: ABNORMAL /HPF (ref 0–25)
GFR AFRICAN AMERICAN: 46 ML/MIN
GFR NON-AFRICAN AMERICAN: 38 ML/MIN
GFR SERPL CREATININE-BSD FRML MDRD: ABNORMAL ML/MIN/{1.73_M2}
GFR SERPL CREATININE-BSD FRML MDRD: ABNORMAL ML/MIN/{1.73_M2}
GLUCOSE BLD-MCNC: 169 MG/DL (ref 70–99)
GLUCOSE URINE: NEGATIVE
HCT VFR BLD CALC: 37 % (ref 36.3–47.1)
HEMOGLOBIN: 11 G/DL (ref 11.9–15.1)
IMMATURE GRANULOCYTES: 0 %
KETONES, URINE: NEGATIVE
LACTIC ACID, SEPSIS: 2.4 MMOL/L (ref 0.5–1.9)
LACTIC ACID, SEPSIS: 3.3 MMOL/L (ref 0.5–1.9)
LEUKOCYTE ESTERASE, URINE: ABNORMAL
LYMPHOCYTES # BLD: 16 % (ref 24–43)
MCH RBC QN AUTO: 29.4 PG (ref 25.2–33.5)
MCHC RBC AUTO-ENTMCNC: 29.7 G/DL (ref 28.4–34.8)
MCV RBC AUTO: 98.9 FL (ref 82.6–102.9)
MONOCYTES # BLD: 4 % (ref 3–12)
MORPHOLOGY: ABNORMAL
NITRITE, URINE: NEGATIVE
NRBC AUTOMATED: 0 PER 100 WBC
PDW BLD-RTO: 13.8 % (ref 11.8–14.4)
PH UA: 6.5 (ref 5–9)
PLATELET # BLD: 193 K/UL (ref 138–453)
PMV BLD AUTO: 8.9 FL (ref 8.1–13.5)
POTASSIUM SERPL-SCNC: 3.4 MMOL/L (ref 3.7–5.3)
PROTEIN UA: ABNORMAL
RBC # BLD: 3.74 M/UL (ref 3.95–5.11)
RBC UA: ABNORMAL /HPF (ref 0–2)
SARS-COV-2, RAPID: NOT DETECTED
SEG NEUTROPHILS: 80 % (ref 36–65)
SEGMENTED NEUTROPHILS ABSOLUTE COUNT: 2.4 K/UL (ref 1.5–8.1)
SODIUM BLD-SCNC: 143 MMOL/L (ref 135–144)
SPECIFIC GRAVITY UA: 1.02 (ref 1.01–1.02)
SPECIMEN DESCRIPTION: NORMAL
TOTAL PROTEIN: 7.6 G/DL (ref 6.4–8.3)
TROPONIN, HIGH SENSITIVITY: 26 NG/L (ref 0–14)
TURBIDITY: ABNORMAL
URINE HGB: ABNORMAL
UROBILINOGEN, URINE: NORMAL
WBC # BLD: 3 K/UL (ref 3.5–11.3)
WBC UA: ABNORMAL /HPF (ref 0–5)

## 2022-08-08 PROCEDURE — 2580000003 HC RX 258: Performed by: EMERGENCY MEDICINE

## 2022-08-08 PROCEDURE — 84484 ASSAY OF TROPONIN QUANT: CPT

## 2022-08-08 PROCEDURE — 74176 CT ABD & PELVIS W/O CONTRAST: CPT

## 2022-08-08 PROCEDURE — 1200000000 HC SEMI PRIVATE

## 2022-08-08 PROCEDURE — 6360000002 HC RX W HCPCS: Performed by: EMERGENCY MEDICINE

## 2022-08-08 PROCEDURE — 81001 URINALYSIS AUTO W/SCOPE: CPT

## 2022-08-08 PROCEDURE — 71045 X-RAY EXAM CHEST 1 VIEW: CPT

## 2022-08-08 PROCEDURE — 96361 HYDRATE IV INFUSION ADD-ON: CPT

## 2022-08-08 PROCEDURE — 87086 URINE CULTURE/COLONY COUNT: CPT

## 2022-08-08 PROCEDURE — 85025 COMPLETE CBC W/AUTO DIFF WBC: CPT

## 2022-08-08 PROCEDURE — 87186 SC STD MICRODIL/AGAR DIL: CPT

## 2022-08-08 PROCEDURE — 93005 ELECTROCARDIOGRAM TRACING: CPT | Performed by: EMERGENCY MEDICINE

## 2022-08-08 PROCEDURE — 96365 THER/PROPH/DIAG IV INF INIT: CPT

## 2022-08-08 PROCEDURE — 6370000000 HC RX 637 (ALT 250 FOR IP): Performed by: EMERGENCY MEDICINE

## 2022-08-08 PROCEDURE — 99285 EMERGENCY DEPT VISIT HI MDM: CPT

## 2022-08-08 PROCEDURE — 80053 COMPREHEN METABOLIC PANEL: CPT

## 2022-08-08 PROCEDURE — 36415 COLL VENOUS BLD VENIPUNCTURE: CPT

## 2022-08-08 PROCEDURE — 87205 SMEAR GRAM STAIN: CPT

## 2022-08-08 PROCEDURE — 87077 CULTURE AEROBIC IDENTIFY: CPT

## 2022-08-08 PROCEDURE — 96375 TX/PRO/DX INJ NEW DRUG ADDON: CPT

## 2022-08-08 PROCEDURE — 87154 CUL TYP ID BLD PTHGN 6+ TRGT: CPT

## 2022-08-08 PROCEDURE — 87040 BLOOD CULTURE FOR BACTERIA: CPT

## 2022-08-08 PROCEDURE — 93010 ELECTROCARDIOGRAM REPORT: CPT | Performed by: INTERNAL MEDICINE

## 2022-08-08 PROCEDURE — 87635 SARS-COV-2 COVID-19 AMP PRB: CPT

## 2022-08-08 PROCEDURE — 83605 ASSAY OF LACTIC ACID: CPT

## 2022-08-08 RX ORDER — ONDANSETRON 2 MG/ML
4 INJECTION INTRAMUSCULAR; INTRAVENOUS ONCE
Status: COMPLETED | OUTPATIENT
Start: 2022-08-08 | End: 2022-08-08

## 2022-08-08 RX ORDER — DOXYCYCLINE HYCLATE 100 MG/1
100 CAPSULE ORAL ONCE
Status: COMPLETED | OUTPATIENT
Start: 2022-08-08 | End: 2022-08-08

## 2022-08-08 RX ORDER — 0.9 % SODIUM CHLORIDE 0.9 %
1000 INTRAVENOUS SOLUTION INTRAVENOUS ONCE
Status: COMPLETED | OUTPATIENT
Start: 2022-08-08 | End: 2022-08-08

## 2022-08-08 RX ORDER — ACETAMINOPHEN 325 MG/1
650 TABLET ORAL ONCE
Status: COMPLETED | OUTPATIENT
Start: 2022-08-08 | End: 2022-08-08

## 2022-08-08 RX ADMIN — ONDANSETRON 4 MG: 2 INJECTION INTRAMUSCULAR; INTRAVENOUS at 18:03

## 2022-08-08 RX ADMIN — DOXYCYCLINE HYCLATE 100 MG: 100 CAPSULE ORAL at 19:18

## 2022-08-08 RX ADMIN — SODIUM CHLORIDE 1000 ML: 9 INJECTION, SOLUTION INTRAVENOUS at 18:03

## 2022-08-08 RX ADMIN — CEFTRIAXONE 1000 MG: 1 INJECTION, POWDER, FOR SOLUTION INTRAMUSCULAR; INTRAVENOUS at 19:18

## 2022-08-08 RX ADMIN — ACETAMINOPHEN 650 MG: 325 TABLET ORAL at 18:02

## 2022-08-08 ASSESSMENT — PAIN SCALES - GENERAL: PAINLEVEL_OUTOF10: 6

## 2022-08-08 ASSESSMENT — PAIN - FUNCTIONAL ASSESSMENT: PAIN_FUNCTIONAL_ASSESSMENT: 0-10

## 2022-08-08 ASSESSMENT — PAIN DESCRIPTION - LOCATION: LOCATION: FLANK

## 2022-08-09 ENCOUNTER — ANESTHESIA (OUTPATIENT)
Dept: OPERATING ROOM | Age: 67
DRG: 853 | End: 2022-08-09
Payer: MEDICARE

## 2022-08-09 ENCOUNTER — APPOINTMENT (OUTPATIENT)
Dept: CT IMAGING | Age: 67
DRG: 853 | End: 2022-08-09
Payer: MEDICARE

## 2022-08-09 ENCOUNTER — APPOINTMENT (OUTPATIENT)
Dept: GENERAL RADIOLOGY | Age: 67
DRG: 853 | End: 2022-08-09
Payer: MEDICARE

## 2022-08-09 ENCOUNTER — ANESTHESIA EVENT (OUTPATIENT)
Dept: OPERATING ROOM | Age: 67
DRG: 853 | End: 2022-08-09
Payer: MEDICARE

## 2022-08-09 PROBLEM — R94.31 ABNORMAL ECG: Status: ACTIVE | Noted: 2022-08-09

## 2022-08-09 PROBLEM — R09.02 HYPOXIA: Status: ACTIVE | Noted: 2022-08-09

## 2022-08-09 PROBLEM — A41.9 SEPSIS DUE TO URINARY TRACT INFECTION (HCC): Status: ACTIVE | Noted: 2022-08-09

## 2022-08-09 PROBLEM — N19 RENAL FAILURE SYNDROME: Status: ACTIVE | Noted: 2022-08-09

## 2022-08-09 PROBLEM — N39.0 SEPSIS DUE TO URINARY TRACT INFECTION (HCC): Status: ACTIVE | Noted: 2022-08-09

## 2022-08-09 PROBLEM — R78.81 BACTEREMIA DUE TO GRAM-NEGATIVE BACTERIA: Status: ACTIVE | Noted: 2022-08-09

## 2022-08-09 PROBLEM — I48.91 ATRIAL FIBRILLATION WITH RVR (HCC): Status: ACTIVE | Noted: 2022-08-09

## 2022-08-09 LAB
ALBUMIN SERPL-MCNC: 3 G/DL (ref 3.5–5.2)
ALBUMIN/GLOBULIN RATIO: 0.9 (ref 1–2.5)
ALP BLD-CCNC: 62 U/L (ref 35–104)
ALT SERPL-CCNC: 7 U/L (ref 5–33)
ANION GAP SERPL CALCULATED.3IONS-SCNC: 12 MMOL/L (ref 9–17)
AST SERPL-CCNC: 17 U/L
BILIRUB SERPL-MCNC: 0.23 MG/DL (ref 0.3–1.2)
BUN BLDV-MCNC: 25 MG/DL (ref 8–23)
BUN/CREAT BLD: 12 (ref 9–20)
CALCIUM SERPL-MCNC: 7.8 MG/DL (ref 8.6–10.4)
CHLORIDE BLD-SCNC: 107 MMOL/L (ref 98–107)
CO2: 22 MMOL/L (ref 20–31)
CREAT SERPL-MCNC: 2.15 MG/DL (ref 0.5–0.9)
EKG ATRIAL RATE: 107 BPM
EKG P AXIS: 46 DEGREES
EKG P-R INTERVAL: 164 MS
EKG Q-T INTERVAL: 402 MS
EKG QRS DURATION: 132 MS
EKG QTC CALCULATION (BAZETT): 536 MS
EKG R AXIS: -29 DEGREES
EKG T AXIS: 9 DEGREES
EKG VENTRICULAR RATE: 107 BPM
GFR AFRICAN AMERICAN: 28 ML/MIN
GFR NON-AFRICAN AMERICAN: 23 ML/MIN
GFR SERPL CREATININE-BSD FRML MDRD: ABNORMAL ML/MIN/{1.73_M2}
GFR SERPL CREATININE-BSD FRML MDRD: ABNORMAL ML/MIN/{1.73_M2}
GLUCOSE BLD-MCNC: 132 MG/DL (ref 70–99)
MAGNESIUM: 1.5 MG/DL (ref 1.6–2.6)
POTASSIUM SERPL-SCNC: 3.3 MMOL/L (ref 3.7–5.3)
SODIUM BLD-SCNC: 141 MMOL/L (ref 135–144)
TOTAL PROTEIN: 6.2 G/DL (ref 6.4–8.3)

## 2022-08-09 PROCEDURE — 6370000000 HC RX 637 (ALT 250 FOR IP): Performed by: NURSE PRACTITIONER

## 2022-08-09 PROCEDURE — 6360000002 HC RX W HCPCS: Performed by: UROLOGY

## 2022-08-09 PROCEDURE — 6370000000 HC RX 637 (ALT 250 FOR IP): Performed by: UROLOGY

## 2022-08-09 PROCEDURE — 0T778DZ DILATION OF LEFT URETER WITH INTRALUMINAL DEVICE, VIA NATURAL OR ARTIFICIAL OPENING ENDOSCOPIC: ICD-10-PCS | Performed by: FAMILY MEDICINE

## 2022-08-09 PROCEDURE — 2700000000 HC OXYGEN THERAPY PER DAY

## 2022-08-09 PROCEDURE — 3600000003 HC SURGERY LEVEL 3 BASE: Performed by: UROLOGY

## 2022-08-09 PROCEDURE — 2580000003 HC RX 258: Performed by: NURSE ANESTHETIST, CERTIFIED REGISTERED

## 2022-08-09 PROCEDURE — C2617 STENT, NON-COR, TEM W/O DEL: HCPCS | Performed by: UROLOGY

## 2022-08-09 PROCEDURE — 6360000002 HC RX W HCPCS: Performed by: NURSE ANESTHETIST, CERTIFIED REGISTERED

## 2022-08-09 PROCEDURE — 3700000001 HC ADD 15 MINUTES (ANESTHESIA): Performed by: UROLOGY

## 2022-08-09 PROCEDURE — 7100000000 HC PACU RECOVERY - FIRST 15 MIN: Performed by: UROLOGY

## 2022-08-09 PROCEDURE — 94761 N-INVAS EAR/PLS OXIMETRY MLT: CPT

## 2022-08-09 PROCEDURE — 3600000013 HC SURGERY LEVEL 3 ADDTL 15MIN: Performed by: UROLOGY

## 2022-08-09 PROCEDURE — 93010 ELECTROCARDIOGRAM REPORT: CPT | Performed by: INTERNAL MEDICINE

## 2022-08-09 PROCEDURE — 6360000002 HC RX W HCPCS: Performed by: NURSE PRACTITIONER

## 2022-08-09 PROCEDURE — 1200000000 HC SEMI PRIVATE

## 2022-08-09 PROCEDURE — 94664 DEMO&/EVAL PT USE INHALER: CPT

## 2022-08-09 PROCEDURE — 3209999900 FLUORO FOR SURGICAL PROCEDURES

## 2022-08-09 PROCEDURE — 71250 CT THORAX DX C-: CPT

## 2022-08-09 PROCEDURE — 2500000003 HC RX 250 WO HCPCS: Performed by: NURSE ANESTHETIST, CERTIFIED REGISTERED

## 2022-08-09 PROCEDURE — 2709999900 HC NON-CHARGEABLE SUPPLY: Performed by: UROLOGY

## 2022-08-09 PROCEDURE — 6370000000 HC RX 637 (ALT 250 FOR IP): Performed by: FAMILY MEDICINE

## 2022-08-09 PROCEDURE — 2580000003 HC RX 258: Performed by: UROLOGY

## 2022-08-09 PROCEDURE — 83735 ASSAY OF MAGNESIUM: CPT

## 2022-08-09 PROCEDURE — 94669 MECHANICAL CHEST WALL OSCILL: CPT

## 2022-08-09 PROCEDURE — 7100000001 HC PACU RECOVERY - ADDTL 15 MIN: Performed by: UROLOGY

## 2022-08-09 PROCEDURE — 99223 1ST HOSP IP/OBS HIGH 75: CPT | Performed by: INTERNAL MEDICINE

## 2022-08-09 PROCEDURE — 80053 COMPREHEN METABOLIC PANEL: CPT

## 2022-08-09 PROCEDURE — 93005 ELECTROCARDIOGRAM TRACING: CPT | Performed by: NURSE PRACTITIONER

## 2022-08-09 PROCEDURE — 94640 AIRWAY INHALATION TREATMENT: CPT

## 2022-08-09 PROCEDURE — C1769 GUIDE WIRE: HCPCS | Performed by: UROLOGY

## 2022-08-09 PROCEDURE — 3700000000 HC ANESTHESIA ATTENDED CARE: Performed by: UROLOGY

## 2022-08-09 PROCEDURE — 2580000003 HC RX 258: Performed by: FAMILY MEDICINE

## 2022-08-09 DEVICE — URETERAL STENT WITH SIDE HOLES 6FX24CM
Type: IMPLANTABLE DEVICE | Site: URETHRA | Status: FUNCTIONAL
Brand: TRIA™ FIRM

## 2022-08-09 RX ORDER — POTASSIUM CHLORIDE 7.45 MG/ML
10 INJECTION INTRAVENOUS
Status: COMPLETED | OUTPATIENT
Start: 2022-08-09 | End: 2022-08-09

## 2022-08-09 RX ORDER — CALCIUM GLUCONATE 10 MG/ML
1000 INJECTION, SOLUTION INTRAVENOUS ONCE
Status: DISCONTINUED | OUTPATIENT
Start: 2022-08-09 | End: 2022-08-10

## 2022-08-09 RX ORDER — VITAMIN E 268 MG
400 CAPSULE ORAL DAILY
Status: DISCONTINUED | OUTPATIENT
Start: 2022-08-09 | End: 2022-08-10

## 2022-08-09 RX ORDER — KETOROLAC TROMETHAMINE 30 MG/ML
INJECTION, SOLUTION INTRAMUSCULAR; INTRAVENOUS PRN
Status: DISCONTINUED | OUTPATIENT
Start: 2022-08-09 | End: 2022-08-09 | Stop reason: SDUPTHER

## 2022-08-09 RX ORDER — ONDANSETRON 2 MG/ML
INJECTION INTRAMUSCULAR; INTRAVENOUS PRN
Status: DISCONTINUED | OUTPATIENT
Start: 2022-08-09 | End: 2022-08-09 | Stop reason: SDUPTHER

## 2022-08-09 RX ORDER — SODIUM CHLORIDE 9 MG/ML
INJECTION, SOLUTION INTRAVENOUS PRN
Status: DISCONTINUED | OUTPATIENT
Start: 2022-08-09 | End: 2022-08-16 | Stop reason: HOSPADM

## 2022-08-09 RX ORDER — SODIUM CHLORIDE 9 MG/ML
INJECTION, SOLUTION INTRAVENOUS CONTINUOUS
Status: DISCONTINUED | OUTPATIENT
Start: 2022-08-09 | End: 2022-08-09

## 2022-08-09 RX ORDER — FENTANYL CITRATE 50 UG/ML
INJECTION, SOLUTION INTRAMUSCULAR; INTRAVENOUS PRN
Status: DISCONTINUED | OUTPATIENT
Start: 2022-08-09 | End: 2022-08-09 | Stop reason: SDUPTHER

## 2022-08-09 RX ORDER — CALCIUM GLUCONATE 10 MG/ML
1000 INJECTION, SOLUTION INTRAVENOUS ONCE
Status: DISCONTINUED | OUTPATIENT
Start: 2022-08-09 | End: 2022-08-09

## 2022-08-09 RX ORDER — IPRATROPIUM BROMIDE AND ALBUTEROL SULFATE 2.5; .5 MG/3ML; MG/3ML
1 SOLUTION RESPIRATORY (INHALATION) EVERY 4 HOURS
Status: DISCONTINUED | OUTPATIENT
Start: 2022-08-09 | End: 2022-08-09

## 2022-08-09 RX ORDER — OXYBUTYNIN CHLORIDE 5 MG/1
15 TABLET, EXTENDED RELEASE ORAL DAILY
Status: DISCONTINUED | OUTPATIENT
Start: 2022-08-09 | End: 2022-08-16 | Stop reason: HOSPADM

## 2022-08-09 RX ORDER — IPRATROPIUM BROMIDE AND ALBUTEROL SULFATE 2.5; .5 MG/3ML; MG/3ML
1 SOLUTION RESPIRATORY (INHALATION) 4 TIMES DAILY
Status: DISCONTINUED | OUTPATIENT
Start: 2022-08-09 | End: 2022-08-10

## 2022-08-09 RX ORDER — LIDOCAINE HYDROCHLORIDE 20 MG/ML
INJECTION, SOLUTION EPIDURAL; INFILTRATION; INTRACAUDAL; PERINEURAL PRN
Status: DISCONTINUED | OUTPATIENT
Start: 2022-08-09 | End: 2022-08-09 | Stop reason: SDUPTHER

## 2022-08-09 RX ORDER — CALCIUM CHLORIDE 100 MG/ML
INJECTION INTRAVENOUS; INTRAVENTRICULAR PRN
Status: DISCONTINUED | OUTPATIENT
Start: 2022-08-09 | End: 2022-08-09 | Stop reason: SDUPTHER

## 2022-08-09 RX ORDER — M-VIT,TX,IRON,MINS/CALC/FOLIC 27MG-0.4MG
1 TABLET ORAL DAILY
Status: DISCONTINUED | OUTPATIENT
Start: 2022-08-09 | End: 2022-08-16 | Stop reason: HOSPADM

## 2022-08-09 RX ORDER — SODIUM CHLORIDE 0.9 % (FLUSH) 0.9 %
5-40 SYRINGE (ML) INJECTION PRN
Status: DISCONTINUED | OUTPATIENT
Start: 2022-08-09 | End: 2022-08-09

## 2022-08-09 RX ORDER — ENOXAPARIN SODIUM 100 MG/ML
30 INJECTION SUBCUTANEOUS 2 TIMES DAILY
Status: DISCONTINUED | OUTPATIENT
Start: 2022-08-09 | End: 2022-08-09

## 2022-08-09 RX ORDER — PROPOFOL 10 MG/ML
INJECTION, EMULSION INTRAVENOUS PRN
Status: DISCONTINUED | OUTPATIENT
Start: 2022-08-09 | End: 2022-08-09 | Stop reason: SDUPTHER

## 2022-08-09 RX ORDER — SODIUM CHLORIDE 0.9 % (FLUSH) 0.9 %
5-40 SYRINGE (ML) INJECTION EVERY 12 HOURS SCHEDULED
Status: DISCONTINUED | OUTPATIENT
Start: 2022-08-09 | End: 2022-08-16 | Stop reason: HOSPADM

## 2022-08-09 RX ORDER — ASCORBIC ACID 500 MG
250 TABLET ORAL DAILY
Status: DISCONTINUED | OUTPATIENT
Start: 2022-08-09 | End: 2022-08-16 | Stop reason: HOSPADM

## 2022-08-09 RX ORDER — CALCIUM GLUCONATE 94 MG/ML
INJECTION, SOLUTION INTRAVENOUS PRN
Status: DISCONTINUED | OUTPATIENT
Start: 2022-08-09 | End: 2022-08-09 | Stop reason: SDUPTHER

## 2022-08-09 RX ORDER — ALBUTEROL SULFATE 2.5 MG/3ML
2.5 SOLUTION RESPIRATORY (INHALATION) EVERY 4 HOURS PRN
Status: DISCONTINUED | OUTPATIENT
Start: 2022-08-09 | End: 2022-08-16 | Stop reason: HOSPADM

## 2022-08-09 RX ORDER — LIDOCAINE HYDROCHLORIDE 20 MG/ML
JELLY TOPICAL PRN
Status: DISCONTINUED | OUTPATIENT
Start: 2022-08-09 | End: 2022-08-09 | Stop reason: ALTCHOICE

## 2022-08-09 RX ORDER — SODIUM CHLORIDE 0.9 % (FLUSH) 0.9 %
5-40 SYRINGE (ML) INJECTION EVERY 12 HOURS SCHEDULED
Status: DISCONTINUED | OUTPATIENT
Start: 2022-08-09 | End: 2022-08-09

## 2022-08-09 RX ORDER — SODIUM CHLORIDE 0.9 % (FLUSH) 0.9 %
5-40 SYRINGE (ML) INJECTION PRN
Status: DISCONTINUED | OUTPATIENT
Start: 2022-08-09 | End: 2022-08-16 | Stop reason: HOSPADM

## 2022-08-09 RX ORDER — SODIUM CHLORIDE AND POTASSIUM CHLORIDE .9; .15 G/100ML; G/100ML
SOLUTION INTRAVENOUS CONTINUOUS
Status: DISCONTINUED | OUTPATIENT
Start: 2022-08-09 | End: 2022-08-10

## 2022-08-09 RX ORDER — SODIUM CHLORIDE, SODIUM LACTATE, POTASSIUM CHLORIDE, CALCIUM CHLORIDE 600; 310; 30; 20 MG/100ML; MG/100ML; MG/100ML; MG/100ML
INJECTION, SOLUTION INTRAVENOUS CONTINUOUS PRN
Status: DISCONTINUED | OUTPATIENT
Start: 2022-08-09 | End: 2022-08-09 | Stop reason: SDUPTHER

## 2022-08-09 RX ORDER — VITAMIN B COMPLEX
1000 TABLET ORAL DAILY
Status: DISCONTINUED | OUTPATIENT
Start: 2022-08-09 | End: 2022-08-16 | Stop reason: HOSPADM

## 2022-08-09 RX ORDER — SODIUM CHLORIDE 9 MG/ML
INJECTION, SOLUTION INTRAVENOUS PRN
Status: DISCONTINUED | OUTPATIENT
Start: 2022-08-09 | End: 2022-08-09

## 2022-08-09 RX ORDER — ACETAMINOPHEN 650 MG/1
650 SUPPOSITORY RECTAL EVERY 6 HOURS PRN
Status: DISCONTINUED | OUTPATIENT
Start: 2022-08-09 | End: 2022-08-16 | Stop reason: HOSPADM

## 2022-08-09 RX ORDER — CETIRIZINE HYDROCHLORIDE 10 MG/1
5 TABLET ORAL DAILY
Status: DISCONTINUED | OUTPATIENT
Start: 2022-08-09 | End: 2022-08-16 | Stop reason: HOSPADM

## 2022-08-09 RX ORDER — ACETAMINOPHEN 325 MG/1
650 TABLET ORAL EVERY 6 HOURS PRN
Status: DISCONTINUED | OUTPATIENT
Start: 2022-08-09 | End: 2022-08-16 | Stop reason: HOSPADM

## 2022-08-09 RX ORDER — CALCIUM CHLORIDE 100 MG/ML
INJECTION INTRAVENOUS; INTRAVENTRICULAR
Status: COMPLETED
Start: 2022-08-09 | End: 2022-08-09

## 2022-08-09 RX ADMIN — POTASSIUM CHLORIDE 10 MEQ: 7.46 INJECTION, SOLUTION INTRAVENOUS at 10:08

## 2022-08-09 RX ADMIN — PHENYLEPHRINE HYDROCHLORIDE 50 MCG: 10 INJECTION INTRAVENOUS at 10:12

## 2022-08-09 RX ADMIN — IPRATROPIUM BROMIDE AND ALBUTEROL SULFATE 3 ML: 2.5; .5 SOLUTION RESPIRATORY (INHALATION) at 15:50

## 2022-08-09 RX ADMIN — IPRATROPIUM BROMIDE AND ALBUTEROL SULFATE 3 ML: 2.5; .5 SOLUTION RESPIRATORY (INHALATION) at 21:17

## 2022-08-09 RX ADMIN — KETOROLAC TROMETHAMINE 30 MG: 30 INJECTION, SOLUTION INTRAMUSCULAR at 10:27

## 2022-08-09 RX ADMIN — SODIUM CHLORIDE: 9 INJECTION, SOLUTION INTRAVENOUS at 01:11

## 2022-08-09 RX ADMIN — OXYBUTYNIN CHLORIDE 15 MG: 5 TABLET, EXTENDED RELEASE ORAL at 16:24

## 2022-08-09 RX ADMIN — POTASSIUM CHLORIDE AND SODIUM CHLORIDE: 900; 150 INJECTION, SOLUTION INTRAVENOUS at 09:07

## 2022-08-09 RX ADMIN — CALCIUM CHLORIDE 1 G: 100 INJECTION INTRAVENOUS; INTRAVENTRICULAR at 10:01

## 2022-08-09 RX ADMIN — SODIUM CHLORIDE, POTASSIUM CHLORIDE, SODIUM LACTATE AND CALCIUM CHLORIDE: 600; 310; 30; 20 INJECTION, SOLUTION INTRAVENOUS at 10:00

## 2022-08-09 RX ADMIN — OXYCODONE HYDROCHLORIDE AND ACETAMINOPHEN 250 MG: 500 TABLET ORAL at 16:24

## 2022-08-09 RX ADMIN — PHENYLEPHRINE HYDROCHLORIDE 300 MCG: 10 INJECTION INTRAVENOUS at 10:22

## 2022-08-09 RX ADMIN — CEFTRIAXONE 1000 MG: 1 INJECTION, POWDER, FOR SOLUTION INTRAMUSCULAR; INTRAVENOUS at 20:14

## 2022-08-09 RX ADMIN — MULTIPLE VITAMINS W/ MINERALS TAB 1 TABLET: TAB at 16:24

## 2022-08-09 RX ADMIN — PROPOFOL 120 MG: 10 INJECTION, EMULSION INTRAVENOUS at 10:05

## 2022-08-09 RX ADMIN — LIDOCAINE HYDROCHLORIDE 100 MG: 20 INJECTION, SOLUTION EPIDURAL; INFILTRATION; INTRACAUDAL; PERINEURAL at 10:05

## 2022-08-09 RX ADMIN — ACETAMINOPHEN 650 MG: 325 TABLET ORAL at 22:38

## 2022-08-09 RX ADMIN — FENTANYL CITRATE 50 MCG: 50 INJECTION INTRAMUSCULAR; INTRAVENOUS at 10:05

## 2022-08-09 RX ADMIN — POTASSIUM CHLORIDE 10 MEQ: 7.46 INJECTION, SOLUTION INTRAVENOUS at 09:10

## 2022-08-09 RX ADMIN — CALCIUM GLUCONATE 1 G: 94 INJECTION, SOLUTION INTRAVENOUS at 11:20

## 2022-08-09 RX ADMIN — PHENYLEPHRINE HYDROCHLORIDE 200 MCG: 10 INJECTION INTRAVENOUS at 10:19

## 2022-08-09 RX ADMIN — ACETAMINOPHEN 650 MG: 325 TABLET ORAL at 14:04

## 2022-08-09 RX ADMIN — Medication 1000 UNITS: at 16:24

## 2022-08-09 RX ADMIN — PHENYLEPHRINE HYDROCHLORIDE 150 MCG: 10 INJECTION INTRAVENOUS at 10:16

## 2022-08-09 RX ADMIN — FENTANYL CITRATE 50 MCG: 50 INJECTION INTRAMUSCULAR; INTRAVENOUS at 10:27

## 2022-08-09 RX ADMIN — SODIUM CHLORIDE, PRESERVATIVE FREE 10 ML: 5 INJECTION INTRAVENOUS at 20:15

## 2022-08-09 RX ADMIN — PHENYLEPHRINE HYDROCHLORIDE 300 MCG: 10 INJECTION INTRAVENOUS at 10:27

## 2022-08-09 RX ADMIN — CETIRIZINE HYDROCHLORIDE 5 MG: 10 TABLET, FILM COATED ORAL at 16:24

## 2022-08-09 RX ADMIN — ONDANSETRON 4 MG: 2 INJECTION INTRAMUSCULAR; INTRAVENOUS at 10:27

## 2022-08-09 RX ADMIN — CEFAZOLIN 2000 MG: 10 INJECTION, POWDER, FOR SOLUTION INTRAVENOUS at 09:58

## 2022-08-09 RX ADMIN — PHENYLEPHRINE HYDROCHLORIDE 200 MCG: 10 INJECTION INTRAVENOUS at 10:18

## 2022-08-09 ASSESSMENT — ENCOUNTER SYMPTOMS
COUGH: 1
ABDOMINAL PAIN: 0
SHORTNESS OF BREATH: 1
NAUSEA: 1
VOMITING: 1

## 2022-08-09 ASSESSMENT — PAIN DESCRIPTION - LOCATION
LOCATION: EAR
LOCATION: HEAD

## 2022-08-09 ASSESSMENT — PAIN DESCRIPTION - ORIENTATION: ORIENTATION: RIGHT

## 2022-08-09 ASSESSMENT — LIFESTYLE VARIABLES: SMOKING_STATUS: 0

## 2022-08-09 ASSESSMENT — PAIN DESCRIPTION - PAIN TYPE: TYPE: ACUTE PAIN

## 2022-08-09 ASSESSMENT — PAIN DESCRIPTION - DESCRIPTORS
DESCRIPTORS: ACHING
DESCRIPTORS: ACHING;DULL

## 2022-08-09 ASSESSMENT — PAIN SCALES - GENERAL
PAINLEVEL_OUTOF10: 10
PAINLEVEL_OUTOF10: 8

## 2022-08-09 ASSESSMENT — PAIN DESCRIPTION - FREQUENCY: FREQUENCY: CONTINUOUS

## 2022-08-09 NOTE — ED NOTES
Attempted to call and give report to Jennifer, [phone], no answer.      HERNANDEZ, [address] [facility]  08/08/22 [phone]

## 2022-08-09 NOTE — PROGRESS NOTES
Education initiated  Coordination of Nutrition Care: Continue to monitor while inpatient  Plan of Care discussed with: patient    Goals:     Goals: Meet at least 75% of estimated needs       Nutrition Monitoring and Evaluation:   Behavioral-Environmental Outcomes: None Identified  Food/Nutrient Intake Outcomes: Food and Nutrient Intake  Physical Signs/Symptoms Outcomes: Biochemical Data, Weight, Fluid Status or Edema    Discharge Planning:    No discharge needs at this time     Violeta Aguilar, [address], [address]: [facility]

## 2022-08-09 NOTE — ED PROVIDER NOTES
[address] [facility]      Pt Name: Bogdan Allison  MRN: [id number]  [place] 1955  Date of evaluation: 8/8/2022  Provider: Manasa Webber MD    CHIEF COMPLAINT       Chief Complaint   Patient presents with    Shortness of Breath    Flank Pain         HISTORY OF PRESENT ILLNESS   (Location/Symptom, Timing/Onset, Context/Setting, Quality, Duration, Modifying Factors, Severity)  Note limiting factors. Bogdan Allison is a 74 y.o. female who presents to the emergency department concern for flank pain and shortness of breath. Patient states she had a kidney stone back in May and has stent put in. Denies any acute focal neurodeficits. Denies any chest pain. HPI    Nursing Notes were reviewed. REVIEW OF SYSTEMS    (2-9 systems for level 4, 10 or more for level 5)     Review of Systems   All other systems reviewed and are negative. Except as noted above the remainder of the review of systems was reviewed and negative.        PAST MEDICAL HISTORY     Past Medical History:   Diagnosis Date    Carcinoma ([address]     Squamous Cell    Cellulitis     DVT (deep venous thrombosis) ([address] 2006    LLE    Kidney stone     Lymphedema     Morbid obesity ([address]     Psoas abscess, right ([address]     Pyelonephritis     Wears glasses          SURGICAL HISTORY       Past Surgical History:   Procedure Laterality Date    CARPAL TUNNEL RELEASE  1990s    CT ABSCESS DRAIN SUBCUTANEOUS  01/10/2022    CT ABSCESS DRAIN SUBCUTANEOUS 1/10/2022 [facility] CT SCAN    CT ABSCESS DRAIN SUBCUTANEOUS  04/21/2022    CT ABSCESS DRAIN SUBCUTANEOUS 4/21/2022 [facility] CT SCAN    CT ABSCESS DRAIN SUBCUTANEOUS  4/29/2022    CT ABSCESS DRAIN SUBCUTANEOUS 4/29/2022 [facility] CT SCAN    CYSTOSCOPY N/A 01/04/2022    CYSTOSCOPY URETERAL STENT INSERTION performed by Romana Salk, MD at [address] [place] [address] Right     HLL with stent    CYSTOSCOPY Right 03/01/2022    CYSTOSCOPY URETEROSCOPY [facility] HLL performed by Romana Salk, MD at [facility]    CYSTOSCOPY Right 03/01/2022    CYSTOSCOPY URETERAL STENT INSERTION-EXCHANGE performed by Pierre Radford MD at [facility] Right 04/05/2022    Dr Bharath Johnson with stent insertion    CYSTOSCOPY Right 04/05/2022    CYSTOSCOPY URETEROSCOPY LASER-HLL performed by Pierre Radford MD at [facility] Right 04/05/2022    [address] performed by Pierre Radford MD at [facility] Right 04/22/2022    CYSTOSCOPY URETERAL STENT INSERTION (Right )    CYSTOSCOPY Right 4/22/2022    CYSTOSCOPY URETERAL STENT INSERTION performed by Breanna Garcias MD at [address] [place] [address] Drive SINGLE  4/26/2022         INSERT MIDLINE CATHETER  01/07/2022         IR NEPHROSTOMY PERCUTANEOUS RIGHT  01/05/2022    IR NEPHROSTOMY PERCUTANEOUS RIGHT 1/5/2022 Eduardo Cabezas MD [facility] SPECIAL PROCEDURES    TOTAL ABDOMINAL HYSTERECTOMY W/ BILATERAL SALPINGOOPHORECTOMY      05/2019    TUBAL LIGATION  1993    TUBAL LIGATION      WISDOM TOOTH EXTRACTION           CURRENT MEDICATIONS       Current Discharge Medication List        CONTINUE these medications which have NOT CHANGED    Details   acetaminophen (TYLENOL) 325 MG tablet Take 650 mg by mouth every 6 hours as needed for Pain      sodium chloride flush 0.9 % injection Infuse 5-40 mLs intravenously at bedtime PICC line maintenence      lisinopril (PRINIVIL;ZESTRIL) 20 MG tablet Take 1 tablet by mouth daily  Qty: 30 tablet, Refills: 3      amLODIPine (NORVASC) 10 MG tablet Take 1 tablet by mouth daily  Qty: 30 tablet, Refills: 3      tamsulosin (FLOMAX) 0.4 MG capsule Take 1 capsule by mouth daily  Qty: 30 capsule, Refills: 0      oxybutynin (DITROPAN XL) 15 MG extended release tablet Take 1 tablet by mouth daily  Qty: 30 tablet, Refills: 3      Misc Natural Products (OSTEO BI-FLEX ADV DOUBLE ST) TABS Take by mouth every morning      polyethylene glycol (GLYCOLAX) 17 g packet Take 17 g by mouth daily as needed for Constipation       ipratropium-albuterol (DUONEB) 0.5-2.5 (3) MG/3ML SOLN nebulizer solution Inhale 1 vial into the lungs every 4 hours       loratadine (CLARITIN) 10 MG capsule Take 10 mg by mouth daily       Ascorbic Acid (VITAMIN C) 250 MG tablet Take 250 mg by mouth daily      vitamin E 400 UNIT capsule Take 400 Units by mouth daily      vitamin D (CHOLECALCIFEROL) 25 MCG (1000 UT) TABS tablet Take 1,000 Units by mouth daily      rivaroxaban (XARELTO) 20 MG TABS tablet Take 1 tablet by mouth daily (with breakfast)  Qty: 30 tablet, Refills: 5    Associated Diagnoses: Long term current use of anticoagulant therapy      Multiple Vitamins-Minerals (THERAPEUTIC MULTIVITAMIN-MINERALS) tablet Take 1 tablet by mouth daily             ALLERGIES     Patient has no known allergies.     FAMILY HISTORY       Family History   Adopted: Yes   Problem Relation Age of Onset    Cancer Mother         The patient reports her biologic mother did have bladder cancer          SOCIAL HISTORY       Social History     Socioeconomic History    Marital status:    Tobacco Use    Smoking status: Former     Packs/day: 0.50     Years: 42.00     Pack years: 21.00     Types: Cigarettes     Quit date: 2022     Years since quittin.3    Smokeless tobacco: Never   Vaping Use    Vaping Use: Never used   Substance and Sexual Activity    Alcohol use: No     Alcohol/week: 0.0 standard drinks    Drug use: No    Sexual activity: Not Currently       SCREENINGS         [place] Coma Scale  Eye Opening: Spontaneous  Best Verbal Response: Oriented  Best Motor Response: Obeys commands  [place] Coma Scale Score: 15                     CIWA Assessment  BP: 137/79  Heart Rate: (!) 115                 PHYSICAL EXAM    (up to 7 for level 4, 8 or more for level 5)     ED Triage Vitals   BP Temp Temp Source Heart Rate Resp SpO2 Height Weight   22 1735 22 1735 22 1735 22 1735 22 1735 22 1735 22 0028 22 0028 (!) 170/95 (!) 102.4 °F (39.1 °C) Tympanic (!) 144 24 93 % 5' 1\" (1.549 m) 257 lb 12.8 oz (116.9 kg)       Physical Exam  Constitutional:       General: She is not in acute distress. Appearance: She is well-developed. She is not diaphoretic. HENT:      Head: Normocephalic and atraumatic. Eyes:      General:         Right eye: No discharge. Left eye: No discharge. Neck:      Trachea: No tracheal deviation. Cardiovascular:      Rate and Rhythm: Regular rhythm. Tachycardia present. Heart sounds: No murmur heard. No friction rub. Pulmonary:      Effort: Pulmonary effort is normal. No respiratory distress. Breath sounds: No stridor. No wheezing or rales. Chest:      Chest wall: No tenderness. Abdominal:      General: There is no distension. Palpations: Abdomen is soft. There is no mass. Tenderness: There is no abdominal tenderness. There is no guarding or rebound. Musculoskeletal:         General: No tenderness or deformity. Normal range of motion. Cervical back: Normal range of motion. Skin:     General: Skin is warm. Findings: No erythema or rash. Neurological:      Mental Status: She is alert and oriented to person, place, and time. Psychiatric:         Behavior: Behavior normal.       DIAGNOSTIC RESULTS     EKG: All EKG's are interpreted by the Emergency Department Physician who either signs or Co-signs this chart in the absence of a cardiologist.        RADIOLOGY:   Non-plain film images such as CT, Ultrasound and MRI are read by the radiologist. Plain radiographic images are visualized and preliminarily interpreted by the emergency physician with the below findings:        Interpretation per the Radiologist below, if available at the time of this note:    CT ABDOMEN PELVIS WO CONTRAST Additional Contrast? None   Final Result   1. Multiple left ureteral stones with a large obstructing stone at the   ureteropelvic junction.   There is left hydronephrosis. 2. Right ureteral stent in expected position. Nonobstructing right   nephrolithiasis. 3. Diverticulosis without diverticulitis. 4. Cholelithiasis without pericholecystic fluid to suggest cholecystitis. 5. Small bilateral pleural effusions with adjacent consolidation, likely   atelectasis. XR CHEST PORTABLE   Final Result   Findings most consistent with pulmonary edema. Manifestation of pneumonia,   possibly an atypical or viral pneumonia not excluded. Correlate clinically.                ED BEDSIDE ULTRASOUND:   Performed by ED Physician - none    LABS:  Labs Reviewed   LACTATE, SEPSIS - Abnormal; Notable for the following components:       Result Value    Lactic Acid, Sepsis 3.3 (*)     All other components within normal limits   LACTATE, SEPSIS - Abnormal; Notable for the following components:    Lactic Acid, Sepsis 2.4 (*)     All other components within normal limits   URINALYSIS WITH MICROSCOPIC - Abnormal; Notable for the following components:    Color, UA Red (*)     Turbidity UA Cloudy (*)     Urine Hgb 3+ (*)     Protein, UA 2+ (*)     Leukocyte Esterase, Urine LARGE (*)     All other components within normal limits   COMPREHENSIVE METABOLIC PANEL - Abnormal; Notable for the following components:    Glucose 169 (*)     Creatinine 1.39 (*)     Potassium 3.4 (*)     Total Bilirubin 0.28 (*)     Albumin/Globulin Ratio 0.9 (*)     GFR Non- 38 (*)     GFR  46 (*)     All other components within normal limits   CBC WITH AUTO DIFFERENTIAL - Abnormal; Notable for the following components:    WBC 3.0 (*)     RBC 3.74 (*)     Hemoglobin 11.0 (*)     Seg Neutrophils 80 (*)     Lymphocytes 16 (*)     Eosinophils % 0 (*)     Absolute Lymph # 0.48 (*)     All other components within normal limits   TROPONIN - Abnormal; Notable for the following components:    Troponin, High Sensitivity 26 (*)     All other components within normal limits   COVID-19, RAPID CULTURE, URINE   CULTURE, BLOOD 1   CULTURE, BLOOD 1   COMPREHENSIVE METABOLIC PANEL W/ REFLEX TO MG FOR LOW K       All other labs were within normal range or not returned as of this dictation. EMERGENCY DEPARTMENT COURSE and DIFFERENTIAL DIAGNOSIS/MDM:   Vitals:    Vitals:    08/08/22 2000 08/08/22 2028 08/08/22 2213 08/09/22 0028   BP: (!) 161/72 (!) 153/72 138/70 137/79   Pulse: (!) 114 (!) 118 (!) 114 (!) 115   Resp: 27 (!) 33 29 20   Temp:    98.9 °F (37.2 °C)   TempSrc:    Temporal   SpO2: 91% 91% 92% 94%   Weight:    257 lb 12.8 oz (116.9 kg)   Height:    5' 1\" (1.549 m)         MDM  Number of Diagnoses or Management Options  Calculus in urethra  Septicemia ([facility]  Diagnosis management comments: Patient presented with concern for shortness of breath and flank pain. Patient's labs and imaging are consistent with a urinary tract infection associated with a ureteral stone. On-call urologist Dr. Blaise Ferris was spoken to and patient was admitted for intervention in the morning. Antibiotics were initiated in the emergency department. Patient was hemodynamically stable, mild tachycardia but not hypotensive and otherwise acutely clinically stable. Amount and/or Complexity of Data Reviewed  Decide to obtain previous medical records or to obtain history from someone other than the patient: yes          [facility]   Total Critical Care time was minutes, excluding separately reportable procedures. There was a high probability of clinically significant/life threatening deterioration in the patient's condition which required my urgent intervention. CONSULTS:  IP CONSULT TO CASE MANAGEMENT  IP CONSULT TO UROLOGY    PROCEDURES:  Unless otherwise noted below, none     Procedures        FINAL IMPRESSION      1. Calculus in urethra    2.  Septicemia [facility])          DISPOSITION/PLAN   DISPOSITION Admitted 08/08/2022 11:11:54 PM      PATIENT REFERRED TO:  No follow-up provider specified. DISCHARGE MEDICATIONS:  Current Discharge Medication List        Controlled Substances Monitoring:     No flowsheet data found.     (Please note that portions of this note were completed with a voice recognition program.  Efforts were made to edit the dictations but occasionally words are mis-transcribed.)    Zahra Allison MD (electronically signed)  Attending Emergency Physician          Zahra Allison MD  08/09/22 [id number] home

## 2022-08-09 NOTE — ANESTHESIA PRE-OP
Spoke with Annmarie Avalos this morning for follow up on patient to come down today with Dr Ellie Christopher for surgery. Electrolyte replacement will be ordered, Troponins stable and cardiology consult placed for new onset AFib, now in SR per EKG this morning.

## 2022-08-09 NOTE — FLOWSHEET NOTE
Returned from [employer] per stretcher. To bed with 4 assist using a slider. Vitals checked and assessment completed. Denies pain . Brooks cath intact draining magalys urine. Call light within reach.  Continue to monitor

## 2022-08-09 NOTE — PROGRESS NOTES
Writer tried to place leg strap to patient's leg for lombardo catheter. Catheter tube was laying in between patient's legs removed with balloon deflated. Dr. Tess Benedict paged at this time.

## 2022-08-09 NOTE — PROGRESS NOTES
RESPIRATORY ASSESSMENT PROTOCOL                                                                                              Patient Name: Zaki Savage Room#: 5333/6663-86 : 1955     Admitting diagnosis: Calculus in urethra [N21.1]  Septicemia ([facility] [A41.9]  Urologic disorders [N39.9]  Sepsis ([facility] [A41.9]       Medical History:   Past Medical History:   Diagnosis Date    Atrial fibrillation with RVR ([facility] 2022    Carcinoma ([facility]     Squamous Cell    Cellulitis     DVT (deep venous thrombosis) ([facility]     LLE    Kidney stone     Lymphedema     Morbid obesity ([facility]     Psoas abscess, right ([facility]     Pyelonephritis     Wears glasses        PATIENT ASSESSMENT    LABORATORY DATA  Hematology:   Lab Results   Component Value Date/Time    WBC 3.0 2022 05:45 PM    RBC 3.74 2022 05:45 PM    HGB 11.0 2022 05:45 PM    HCT 37.0 2022 05:45 PM     2022 05:45 PM     Chemistry:    Lab Results   Component Value Date/Time    PHART 7.466 2018 02:51 PM    JMC1TKR 35.5 2018 02:51 PM    PO2ART 72.5 2018 02:51 PM    G8FGCLGS 95.5 2018 02:51 PM    TJH3HYI 25.0 2018 02:51 PM    PBEA 1.7 2018 02:51 PM       VITALS  Heart Rate: 96   Resp: 18  BP: 109/68  SpO2: 96 % O2 Device: None (Room air)  Temp: 97 °F (36.1 °C)    SKIN COLOR  [x] Normal  [] Pale  [] Dusky  [] Cyanotic    RESPIRATORY PATTERN  [x] Normal  [] Dyspnea  [] Cheyne-Ro  [] Kussmaul  [] Biots    AMBULATORY  [] Yes  [] No  [x] With Assistance      Patient Acuity 0 1 2 3 4 Score   Level of Concious (LOC) [x]  Alert & Oriented or Pt normal LOC []  Confused;follows directions []  Confused & uncooper-ative []  Obtunded []  Comatose 0   Respiratory Rate  (RR) []  Reg. rate & pattern. 12 - 20 bpm  []  Increased RR.  Greater than 20 bpm   []  SOB w/ exertion or RR greater than 24 bpm [x]  Access- ory muscle use at rest. Abn.  resp. []  SOB at rest.   3   Bilateral Breath Sounds (BBS) []  Clear Albuterol PRN q2 hrs. Breath-Actuated Neb if BBS Acuity = 4, and pt. can use MP. Notify physician if condition deteriorates. HHN AEROSOL THERAPY with  [physician-ordered bronchodilator(s)]  QID and Albuterol PRN q4 hrs. Breath-Actuated Neb if BBS Acuity = 4, and pt. can use MP. Notify physician if condition deteriorates. MDI THERAPY with  2 actuations of [physician-ordered bronchodilator(s)] via spacer TID Albuterol and PRNq4 hrs. If unable to utilize MDI: HHN [physician-ordered bronchodilator(s)] TID and Albuterol PRN q4 hrs. Notify physician if condition deteriorates. MDI THERAPY with  [physician-ordered bronchodilator(s)] via spacer TID PRN. If unable to utilize MDI: HHN [physician-ordered bronchodilator(s)] TID PRN. Notify physician if condition deteriorates. If Acuity Level is 2, 3, or 4 in any of the following:    [] COUGH     [] SURGICAL HISTORY (SURG HX)  [x] CHEST XRAY (CXR)    Goal: Improvement in sputum mobilization in patients with ineffective airway clearance. Reverse atelectasis. [x] Bronchopulmonary Hygiene Protocol    Total Acuity:   16-32  []  Secondary Assessment in 24 hrs Total Acuity:  9-15  [x]  Secondary Assessment in 24 hrs Total Acuity:  4-8  []  Secondary Assessment in 48 hrs Total Acuity:  0-3  []  Secondary Assessment in 72 hrs   METANEB QID with [physician-ordered bronchodilator(s)] if CXR Acuity = 4; otherwise:  PD&P, PEP, or Vest QID & PRN  NT Sxn PRN for ineffective cough  METANEB QID with [physician-ordered bronchodilator(s)] if CXR Acuity = 4; otherwise:  PD&P, PEP, or Vest TID & PRN  NT Sxn PRN for ineffective cough  Instruct patient to self-perform IS q1hr WA   Directed Cough self-performed q1hr WA     If Acuity Level is 2 or above in the following:    [] PULMONARY HISTORY (PULM HX)    Goal: Assist patient in quitting smoking to slow or stop the progression of lung disease.     [] Smoking Cessation Protocol    SMOKING CESSATION EDUCATION provided according to policy UN_962: (suma with an X)  ____Yes    ____ No     ____ NA    Smoking Cessation Booklet given:  ____Yes  ____No ____Patient Burton Degree

## 2022-08-09 NOTE — BRIEF OP NOTE
Brief Postoperative Note      Patient: Brain Whatley  YOB: 1955  MRN: [id number]    Date of Procedure: 8/9/2022    Pre-Op Diagnosis: LEFT URETERAL STONE    Post-Op Diagnosis: Same       Procedure(s):  CYSTOSCOPY URETERAL STENT INSERTION    Surgeon(s):  Alycia Lanes, MD    Assistant:  * No surgical staff found *    Anesthesia: General    Estimated Blood Loss (mL): Minimal    Complications: None    Specimens:   * No specimens in log *    Implants:  Implant Name Type Inv.  Item Serial No.  Lot No. LRB No. Used Action   STENT URET 6 [id number] CM FIRM [employer]OF[employer] TRIA - [id number]  STENT URET 6 FRX24 CM FIRM MONOFILAMENT TRIA  [employer] [facility]- [id number] Left 1 Implanted         Drains:   [REMOVED] Urinary Catheter Other (comment) (Removed)   Site Assessment Edema;Prolapse 08/08/22 1924   Urine Color Cherry;Bloody;Pink 08/08/22 1924   Urine Appearance Cloudy 08/08/22 1924       Findings: left ureteral obstruction/ right ureteral stent    Electronically signed by Alycia Lanes, MD on 8/9/2022 at 10:57 AM

## 2022-08-09 NOTE — ANESTHESIA PRE PROCEDURE
Department of Anesthesiology  Preprocedure Note       Name:  Cinda Orozco   Age:  74 y.o.  :  1955                                          MRN:  [id number]         Date:  2022      Surgeon: Ashley Kay):  Salome Noonan MD    Procedure: Procedure(s):  CYSTOSCOPY URETERAL STENT INSERTION    Medications prior to admission:   Prior to Admission medications    Medication Sig Start Date End Date Taking?  Authorizing Provider   acetaminophen (TYLENOL) 325 MG tablet Take 650 mg by mouth every 6 hours as needed for Pain    Historical Provider, MD   sodium chloride flush 0.9 % injection Infuse 5-40 mLs intravenously at bedtime PICC line maintenence    Historical Provider, MD   lisinopril (PRINIVIL;ZESTRIL) 20 MG tablet Take 1 tablet by mouth daily  Patient not taking: Reported on 2022 5/3/22   Nori Dalton MD   amLODIPine (NORVASC) 10 MG tablet Take 1 tablet by mouth daily  Patient not taking: Reported on 2022   Nori Dalton MD   tamsulosin (FLOMAX) 0.4 MG capsule Take 1 capsule by mouth daily  Patient not taking: Reported on 2022   Sally Meeks MD   oxybutynin (DITROPAN XL) 15 MG extended release tablet Take 1 tablet by mouth daily 3/29/22   Aries Overton APRN - CNP   Misc Natural Products (OSTEO BI-FLEX ADV DOUBLE ST) TABS Take by mouth every morning    Historical Provider, MD   polyethylene glycol (GLYCOLAX) 17 g packet Take 17 g by mouth daily as needed for Constipation     Historical Provider, MD   ipratropium-albuterol (DUONEB) 0.5-2.5 (3) MG/3ML SOLN nebulizer solution Inhale 1 vial into the lungs every 4 hours     Historical Provider, MD   loratadine (CLARITIN) 10 MG capsule Take 10 mg by mouth daily     Historical Provider, MD   Ascorbic Acid (VITAMIN C) 250 MG tablet Take 250 mg by mouth daily    Historical Provider, MD   vitamin E 400 UNIT capsule Take 400 Units by mouth daily    Historical Provider, MD   vitamin D (CHOLECALCIFEROL) 25 MCG (1000 UT) TABS of lower extremity ([address] I82.409    Long term current use of anticoagulant therapy Z79.01    Bilateral cellulitis of lower leg L03.116, L03.115    Acute shoulder pain due to trauma M25.519, G89.11    Lymphedema of both lower extremities I89.0    Tobacco abuse Z72.0    History of recurrent deep vein thrombosis (DVT) Z86.718    Gross hematuria R31.0    Frequency of urination R35.0    Nocturia R35.1    Mixed incontinence N39.46    Constipation K59.00    Cellulitis of lower leg L03.119    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 991    Complicated UTI (urinary tract infection) N39.0    Renal calculus N20.0    Cellulitis L03.90    Normocytic anemia D64.9    Iron deficiency anemia D50.9    Renal abscess, right N15.1    Bacteremia due to Klebsiella pneumoniae R78.81, B96.1    Psoas muscle abscess ([facility]) K68.12    Septicemia due to Klebsiella pneumoniae ([facility]) A41.4    Ureteral calculus N20.1    Intra-abdominal abscess ([facility]) K65.1    Obesity, Class III, BMI 40-49.9 (morbid obesity) ([facility]) E66.01    Hypocalcemia E83.51    Hypokalemia E87.6    Vertebral osteomyelitis ([facility]) T12-L1 M46.20    Pleural effusion on right J90    CRP elevated R79.82    Sepsis ([facility]) A41.9    Urologic disorders N39.9    Bacteremia due to Gram-negative bacteria R78.81    Hypoxia R09.02    Atrial fibrillation with RVR ([facility]) I48.91       Past Medical History:        Diagnosis Date    Atrial fibrillation with RVR ([address] 8/9/2022    Carcinoma ([address]     Squamous Cell    Cellulitis     DVT (deep venous thrombosis) ([address] 2006    LLE    Kidney stone     Lymphedema     Morbid obesity ([address]     Psoas abscess, right ([address]     Pyelonephritis     Wears glasses        Past Surgical History:        Procedure Laterality Date    CARPAL TUNNEL RELEASE  1990s    CT ABSCESS DRAIN SUBCUTANEOUS  01/10/2022    CT ABSCESS DRAIN SUBCUTANEOUS 1/10/2022 [facility] SCAN    CT ABSCESS DRAIN SUBCUTANEOUS  2022    CT ABSCESS DRAIN SUBCUTANEOUS 2022 [facility] CT SCAN    CT ABSCESS DRAIN SUBCUTANEOUS  2022    CT ABSCESS DRAIN SUBCUTANEOUS 2022 [facility] CT SCAN    CYSTOSCOPY N/A 2022    CYSTOSCOPY URETERAL STENT INSERTION performed by Stephanie Ruvalcaba MD at [address] with stent    CYSTOSCOPY Right 2022    CYSTOSCOPY URETEROSCOPY LASER-WTIH HLL performed by Stephanie Ruvalcaba MD at [address] Right 2022    CYSTOSCOPY URETERAL STENT Delbert Osier performed by Stephanie Ruvalcaba MD at [address] Right 2022    Dr Maria Teresa Finn with stent insertion    CYSTOSCOPY Right 2022    CYSTOSCOPY URETEROSCOPY LASER-HLL performed by Stephanie Ruvalcaba MD at [address] Right 2022    [address] performed by Stephanie Ruvalcaba MD at [address] Right 2022    CYSTOSCOPY URETERAL STENT INSERTION (Right )    CYSTOSCOPY Right 2022    CYSTOSCOPY URETERAL STENT INSERTION performed by Saurabh Claudio MD at [address] SINGLE  2022         INSERT MIDLINE CATHETER  2022         IR NEPHROSTOMY PERCUTANEOUS RIGHT  2022    IR NEPHROSTOMY PERCUTANEOUS RIGHT 2022 Indigo Hannon MD [facility]    ANNABELLA AND BSO (CERVIX REMOVED)      2019    TUBAL LIGATION  1993    TUBAL LIGATION      WISDOM TOOTH EXTRACTION         Social History:    Social History     Tobacco Use    Smoking status: Former     Packs/day: 0.50     Years: 42.00     Pack years: 21.00     Types: Cigarettes     Quit date: 2022     Years since quittin.3    Smokeless tobacco: Never   Substance Use Topics    Alcohol use: No     Alcohol/week: 0.0 standard drinks                                Counseling given: Not Answered      Vital Signs (Current): There were no vitals filed for this visit. BP Readings from Last 3 Encounters:   08/09/22 105/60   05/17/22 136/71   05/17/22 134/71       NPO Status:                                                                                 BMI:   Wt Readings from Last 3 Encounters:   08/09/22 256 lb 9.6 oz (116.4 kg)   05/17/22 243 lb (110.2 kg)   05/17/22 243 lb (110.2 kg)     There is no height or weight on file to calculate BMI.    CBC:   Lab Results   Component Value Date/Time    WBC 3.0 08/08/2022 05:45 PM    RBC 3.74 08/08/2022 05:45 PM    HGB 11.0 08/08/2022 05:45 PM    HCT 37.0 08/08/2022 05:45 PM    MCV 98.9 08/08/2022 05:45 PM    RDW 13.8 08/08/2022 05:45 PM     08/08/2022 05:45 PM       CMP:   Lab Results   Component Value Date/Time     08/09/2022 04:10 AM    K 3.3 08/09/2022 04:10 AM     08/09/2022 04:10 AM    CO2 22 08/09/2022 04:10 AM    BUN 25 08/09/2022 04:10 AM    CREATININE 2.15 08/09/2022 04:10 AM    GFRAA 28 08/09/2022 04:10 AM    LABGLOM 23 08/09/2022 04:10 AM    GLUCOSE 132 08/09/2022 04:10 AM    PROT 6.2 08/09/2022 04:10 AM    CALCIUM 7.8 08/09/2022 04:10 AM    BILITOT 0.23 08/09/2022 04:10 AM    ALKPHOS 62 08/09/2022 04:10 AM    AST 17 08/09/2022 04:10 AM    ALT 7 08/09/2022 04:10 AM       POC Tests: No results for input(s): POCGLU, POCNA, POCK, POCCL, POCBUN, POCHEMO, POCHCT in the last 72 hours.     Coags:   Lab Results   Component Value Date/Time    PROTIME 10.4 04/29/2022 08:00 AM    INR 1.0 04/29/2022 08:00 AM    APTT 28.2 04/21/2022 02:14 AM       HCG (If Applicable): No results found for: PREGTESTUR, PREGSERUM, HCG, HCGQUANT     ABGs:   Lab Results   Component Value Date/Time    PHART 7.466 07/19/2018 02:51 PM    PO2ART 72.5 07/19/2018 02:51 PM    OQE8ELQ 35.5 07/19/2018 02:51 PM    IMM6RWJ 25.0 07/19/2018 02:51 PM    P2TQFOAQ 95.5 07/19/2018 02:51 PM        Type & Screen (If Applicable):  No results found for: LABABO, LABRH    Drug/Infectious Status (If Applicable):  No results found for: HIV, Reported on 8/8/2022 5/2/22   Stephanie Donnelly MD   tamsulosin [facility]) 0.4 MG capsule Take 1 capsule by mouth daily  Patient not taking: Reported on 8/8/2022 4/27/22   Laura Roberts MD   oxybutynin (DITROPAN XL) 15 MG extended release tablet Take 1 tablet by mouth daily 3/29/22   Lidia Gonzalez, APRN - CNP   Misc Natural Products (OSTEO BI-FLEX ADV DOUBLE ST) TABS Take by mouth every morning    Historical Provider, MD   polyethylene glycol (GLYCOLAX) 17 g packet Take 17 g by mouth daily as needed for Constipation     Historical Provider, MD   ipratropium-albuterol (DUONEB) 0.5-2.5 (3) MG/3ML SOLN nebulizer solution Inhale 1 vial into the lungs every 4 hours     Historical Provider, MD   loratadine (CLARITIN) 10 MG capsule Take 10 mg by mouth daily     Historical Provider, MD   Ascorbic Acid (VITAMIN C) 250 MG tablet Take 250 mg by mouth daily    Historical Provider, MD   vitamin E 400 UNIT capsule Take 400 Units by mouth daily    Historical Provider, MD   vitamin D (CHOLECALCIFEROL) 25 MCG (1000 UT) TABS tablet Take 1,000 Units by mouth daily    Historical Provider, MD   rivaroxaban (XARELTO) 20 MG TABS tablet Take 1 tablet by mouth daily (with breakfast) 7/17/18   Josep Adams MD   Multiple Vitamins-Minerals (THERAPEUTIC MULTIVITAMIN-MINERALS) tablet Take 1 tablet by mouth daily    Historical Provider, MD       Current medications:    No current facility-administered medications for this visit. No current outpatient medications on file.      Facility-Administered Medications Ordered in Other Visits   Medication Dose Route Frequency Provider Last Rate Last Admin    [MAR Hold] sodium chloride flush 0.9 % injection 5-40 mL  5-40 mL IntraVENous 2 times per day [address], MD       [facility] Hold] sodium chloride flush 0.9 % injection 5-40 mL  5-40 mL IntraVENous  [address], MD        [facility] Hold] 0.9 % sodium chloride infusion   IntraVENous  [address], MD        [facility] Hold] enoxaparin Sodium (LOVENOX) injection 30 mg  30 mg SubCUTAneous BID Shantal Maier MD        [MAR Hold] acetaminophen (TYLENOL) tablet 650 mg  650 mg Oral Q6H PRN Shantal Maier MD        Or   Corrina Morales [facility] Hold] acetaminophen (TYLENOL) suppository 650 mg  650 mg Rectal Q6H PRN Shantal Maier MD        [facility] Hold] cefTRIAXone (ROCEPHIN) 1,000 mg in dextrose 5 % 50 mL IVPB mini-bag  1,000 mg IntraVENous Q24H Shantal Maier MD        [facility] Hold] 0.9% NaCl with KCl 20 mEq 1,000 mL infusion   IntraVENous Continuous DONG Holguin  mL/hr at 08/09/22 0907 New Bag at 08/09/22 0907    [MAR Hold] potassium chloride 10 mEq/100 mL IVPB (Peripheral Line)  10 mEq IntraVENous Q1H DONG Holguin  mL/hr at 08/09/22 0910 10 mEq at 08/09/22 0910    ceFAZolin (ANCEF) 2000 mg in dextrose 5 % 100 mL IVPB  2,000 mg IntraVENous Once Daksha Aquino MD           Allergies:    No Known Allergies    Problem List:    Patient Active Problem List   Diagnosis Code    Acute thromboembolism of deep veins of lower extremity ([facility] I82.409    Long term current use of anticoagulant therapy Z79.01    Bilateral cellulitis of lower leg L03.116, L03.115    Acute shoulder pain due to trauma M25.519, G89.11    Lymphedema of both lower extremities I89.0    Tobacco abuse Z72.0    History of recurrent deep vein thrombosis (DVT) Z86.718    Gross hematuria R31.0    Frequency of urination R35.0    Nocturia R35.1    Mixed incontinence N39.46    Constipation K59.00    Cellulitis of lower leg L03.119    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 986    Complicated UTI (urinary tract infection) N39.0    Renal calculus N20.0    Cellulitis L03.90    Normocytic anemia D64.9    Iron deficiency anemia D50.9    Renal abscess, right N15.1    Bacteremia due to Klebsiella pneumoniae R78.81, B96.1    Psoas muscle abscess ([facility]) K68.12    OR    CYSTOSCOPY Right 2022    CYSTOSCOPY URETERAL STENT INSERTION (Right )    CYSTOSCOPY Right 2022    CYSTOSCOPY URETERAL STENT INSERTION performed by Mei Campos MD at [address]  2022         INSERT MIDLINE CATHETER  2022         IR NEPHROSTOMY PERCUTANEOUS RIGHT  2022    IR NEPHROSTOMY PERCUTANEOUS RIGHT 2022 Clyde Steward MD [facility]    ANNABELLA AND BSO (CERVIX REMOVED)      2019    TUBAL LIGATION  1993    TUBAL LIGATION      WISDOM TOOTH EXTRACTION         Social History:    Social History     Tobacco Use    Smoking status: Former     Packs/day: 0.50     Years: 42.00     Pack years: 21.00     Types: Cigarettes     Quit date: 2022     Years since quittin.3    Smokeless tobacco: Never   Substance Use Topics    Alcohol use: No     Alcohol/week: 0.0 standard drinks                                Counseling given: Not Answered      Vital Signs (Current): There were no vitals filed for this visit.                                            BP Readings from Last 3 Encounters:   22 105/60   22 136/71   22 134/71       NPO Status:                                                                                 BMI:   Wt Readings from Last 3 Encounters:   22 256 lb 9.6 oz (116.4 kg)   22 243 lb (110.2 kg)   22 243 lb (110.2 kg)     There is no height or weight on file to calculate BMI.    CBC:   Lab Results   Component Value Date/Time    WBC 3.0 2022 05:45 PM    RBC 3.74 2022 05:45 PM    HGB 11.0 2022 05:45 PM    HCT 37.0 2022 05:45 PM    MCV 98.9 2022 05:45 PM    RDW 13.8 2022 05:45 PM     2022 05:45 PM       CMP:   Lab Results   Component Value Date/Time     2022 04:10 AM    K 3.3 2022 04:10 AM     2022 04:10 AM    CO2 22 2022 04:10 AM    BUN 25 2022 04:10 AM    CREATININE 2.15 2022 04:10 AM    GFRAA 28 08/09/2022 04:10 AM    LABGLOM 23 08/09/2022 04:10 AM    GLUCOSE 132 08/09/2022 04:10 AM    PROT 6.2 08/09/2022 04:10 AM    CALCIUM 7.8 08/09/2022 04:10 AM    BILITOT 0.23 08/09/2022 04:10 AM    ALKPHOS 62 08/09/2022 04:10 AM    AST 17 08/09/2022 04:10 AM    ALT 7 08/09/2022 04:10 AM       POC Tests: No results for input(s): POCGLU, POCNA, POCK, POCCL, POCBUN, POCHEMO, POCHCT in the last 72 hours.     Coags:   Lab Results   Component Value Date/Time    PROTIME 10.4 04/29/2022 08:00 AM    INR 1.0 04/29/2022 08:00 AM    APTT 28.2 04/21/2022 02:14 AM       HCG (If Applicable): No results found for: PREGTESTUR, PREGSERUM, HCG, HCGQUANT     ABGs:   Lab Results   Component Value Date/Time    PHART 7.466 07/19/2018 02:51 PM    PO2ART 72.5 07/19/2018 02:51 PM    GYU7BUJ 35.5 07/19/2018 02:51 PM    BEH9LHH 25.0 07/19/2018 02:51 PM    C4QMTPMA 95.5 07/19/2018 02:51 PM        Type & Screen (If Applicable):  No results found for: LABABO, LABRH    Drug/Infectious Status (If Applicable):  No results found for: HIV, HEPCAB    COVID-19 Screening (If Applicable):   Lab Results   Component Value Date/Time    COVID19 Not Detected 08/08/2022 05:45 PM           Anesthesia Evaluation  Patient summary reviewed and Nursing notes reviewed no history of anesthetic complications:   Airway: Mallampati: III  TM distance: >3 FB   Neck ROM: full  Mouth opening: > = 3 FB Dental:          Pulmonary:normal exam  breath sounds clear to auscultation  (+) current smoker                           Cardiovascular:  Exercise tolerance: poor (<4 METS),   (+) hypertension:,       ECG reviewed  Rhythm: regular  Rate: normal  Echocardiogram reviewed         Beta Blocker:  Dose within 24 Hrs      ROS comment: EKG: Sinus rhythm with Premature atrial complexes  Right bundle branch block  Abnormal ECG  When compared with ECG of 22-MAURILIO-2021 12:37,  Premature atrial complexes are now Present  Confirmed by Lori Anna MD, Opal Humphries ([id number]) on 1/3/2022 10:48:18 AM     Neuro/Psych:   (+) headaches: migraine headaches,             GI/Hepatic/Renal:   (+) renal disease: kidney stones, morbid obesity          Endo/Other:    (+) blood dyscrasia (Last xaralto dose was yesterday.): anemia and anticoagulation therapy:., .                  ROS comment: Cellulitis BLE Abdominal:   (+) obese,           Vascular:   + DVT (LLE in 2006, currently resolved.), . Other Findings:             Anesthesia Plan      general     ASA 3       Induction: intravenous. MIPS: Postoperative opioids intended and Prophylactic antiemetics administered. Anesthetic plan and risks discussed with patient. Plan discussed with CRNA. DONG Larios - SAULO   8/9/2022               Anesthesia Plan      general     ASA 4       Induction: intravenous. MIPS: Postoperative opioids intended and Prophylactic antiemetics administered. Anesthetic plan and risks discussed with patient. Plan discussed with CRNA.                     DONG Larios CRNA   8/9/2022

## 2022-08-09 NOTE — ED NOTES
Contacted Dr. Hilton [place] per Dr. Royal Pita request via answering service.       Ludwig Elaine  08/08/22 7019

## 2022-08-09 NOTE — CONSULTS
on file   Transportation Needs: Not on file   Physical Activity: Not on file   Stress: Not on file   Social Connections: Not on file   Intimate Partner Violence: Not on file   Housing Stability: Not on file       Family History:    Family History   Adopted: Yes   Problem Relation Age of Onset    Cancer Mother         The patient reports her biologic mother did have bladder cancer     Previous Urologic Family history: none  REVIEW OF SYSTEMS:  Constitutional: negative  Eyes: negative  Respiratory: negative  Cardiovascular: negative  Gastrointestinal: negative  Genitourinary: see HPI  Musculoskeletal: negative  Skin: negative   Neurological: negative  Hematological/Lymphatic: negative  Psychological: negative    Physical Exam:      This a 74 y.o. female   Patient Vitals for the past 24 hrs:   BP Temp Temp src Pulse Resp SpO2 Height Weight   08/09/22 0717 131/73 98.6 °F (37 °C) Temporal (!) 102 20 94 % -- --   08/09/22 0546 -- 98.7 °F (37.1 °C) Temporal -- -- -- -- --   08/09/22 0450 -- -- -- -- -- -- -- 256 lb 9.6 oz (116.4 kg)   08/09/22 0448 -- 100.1 °F (37.8 °C) Temporal -- -- -- -- --   08/09/22 0130 -- 98.1 °F (36.7 °C) Temporal 98 -- -- -- --   08/09/22 0028 137/79 98.9 °F (37.2 °C) Temporal (!) 115 20 94 % 5' 1\" (1.549 m) 257 lb 12.8 oz (116.9 kg)   08/08/22 2213 138/70 -- -- (!) 114 29 92 % -- --   08/08/22 2028 (!) 153/72 -- -- (!) 118 (!) 33 91 % -- --   08/08/22 2000 (!) 161/72 -- -- (!) 114 27 91 % -- --   08/08/22 1928 (!) 164/78 -- -- (!) 110 (!) 35 92 % -- --   08/08/22 1915 (!) 158/83 -- -- (!) 115 (!) 32 92 % -- --   08/08/22 1830 (!) 180/81 (!) 101.2 °F (38.4 °C) Tympanic (!) 114 (!) 32 90 % -- --   08/08/22 1815 (!) 182/82 -- -- (!) 117 28 90 % -- --   08/08/22 1800 (!) 174/61 -- -- (!) 119 23 92 % -- --   08/08/22 1735 (!) 170/95 (!) 102.4 °F (39.1 °C) Tympanic (!) 144 24 93 % -- --     Constitutional: Patient in no acute distress. Neuro: Alert and oriented to person, place and time.   Psych: mood and affect normal  HEENT negative  Lungs: Respiratory effort is normal  Cardiovascular: Normal peripheral pulses  Abdomen: Soft, non-tender, non-distended with no CVA, flank pain or hepatosplenomegaly. No hernias. Kidneys normal.  Lymphatics: No palpable lymphadenopathy. Bladder non-tender and not distended.   Pelvic exam: deferred  Rectal exam not indicated    LABS:   Recent Labs     08/08/22  1745   WBC 3.0*   HGB 11.0*   HCT 37.0   MCV 98.9        Recent Labs     08/08/22  1745 08/09/22  0410    141   K 3.4* 3.3*    107   CO2 23 22   BUN 21 25*   CREATININE 1.39* 2.15*       Additional Lab/culture results:    Urinalysis:   Recent Labs     08/08/22  1914   COLORU Red*   PHUR 6.5   WBCUA GREATER THAN 100   RBCUA GREATER THAN 100   SPECGRAV 1.020   LEUKOCYTESUR LARGE*   UROBILINOGEN Normal   BILIRUBINUR NEGATIVE        -----------------------------------------------------------------  Imaging Results:      Assessment and Plan   Impression:    Patient Active Problem List   Diagnosis    Acute thromboembolism of deep veins of lower extremity ([address]    Long term current use of anticoagulant therapy    Bilateral cellulitis of lower leg    Acute shoulder pain due to trauma    Lymphedema of both lower extremities    Tobacco abuse    History of recurrent deep vein thrombosis (DVT)    Gross hematuria    Frequency of urination    Nocturia    Mixed incontinence    Constipation    Cellulitis of lower leg    Post-phlebitic syndrome    Elephantiasis nostra verrucosa    Cellulitis of left lower extremity    Complicated UTI (urinary tract infection)    Renal calculus    Cellulitis    Normocytic anemia    Iron deficiency anemia    Renal abscess, right    Bacteremia due to Klebsiella pneumoniae    Psoas muscle abscess (HCC)    Septicemia due to Klebsiella pneumoniae (HCC)    Ureteral calculus    Intra-abdominal abscess (HCC)    Obesity, Class III, BMI 40-49.9 (morbid obesity) (HCC)    Hypocalcemia    Hypokalemia Vertebral osteomyelitis (HCC) T12-L1    Pleural effusion on right    CRP elevated    Sepsis ([address]    Urologic disorders    Bacteremia due to Gram-negative bacteria    Hypoxia    Atrial fibrillation with RVR ([address]       Plan: emergent left stent. I did attempt to place the patient on sooner but anesthesia team did recommend cardiac clearance and would not anesthetize the patient.      Nesha Lombardo MD  7:52 AM 8/9/2022

## 2022-08-09 NOTE — H&P
History and Physical    Patient:  Humphrey Lefort  MRN: [id number]    Chief Complaint: low back pain    History Obtained From:  patient, electronic medical record    PCP: DONG Alcocer CNP    History of Present Illness: The patient is a 74 y.o. female who presented to the emergency room with complaints of flank pain and shortness of breath. Patient reported history of kidney stones. Last kidney stone was in 5/2022 and at that time had a stent placement completed. Patient complained of cough and fatigue. She also complained of some leg swelling including nausea or vomiting. During patient's evaluation she was febrile at 102.4, tachycardic at 144, tachypneic at 24. She was hypertensive. Patient was found to be in atrial fibrillation with RVR however has no history documented of that. She is on Xarelto long-term due to history of DVTs. Her chest x-ray was concerning for pulmonary edema versus pneumonia. Lactic acid was elevated at 3.3 and repeat was 2.4. Patient was found to have a UTI as well. Patient was started on IV Rocephin and doxycycline while in the ER. Urology was consulted regarding renal stone.   Patient was given 1 L of IV fluids while in ER    Past Medical History:        Diagnosis Date    Atrial fibrillation with RVR ([address] 8/9/2022    Carcinoma ([address]     Squamous Cell    Cellulitis     DVT (deep venous thrombosis) ([address] 2006    LLE    Kidney stone     Lymphedema     Morbid obesity ([address]     Psoas abscess, right ([address]     Pyelonephritis     Wears glasses        Past Surgical History:        Procedure Laterality Date    CARPAL TUNNEL RELEASE  1990s    CT ABSCESS DRAIN SUBCUTANEOUS  01/10/2022    CT ABSCESS DRAIN SUBCUTANEOUS 1/10/2022 [facility] CT SCAN    CT ABSCESS DRAIN SUBCUTANEOUS  04/21/2022    CT ABSCESS DRAIN SUBCUTANEOUS 4/21/2022 [facility] CT SCAN    CT ABSCESS DRAIN SUBCUTANEOUS  4/29/2022    CT ABSCESS DRAIN SUBCUTANEOUS 4/29/2022 [facility] CT SCAN    CYSTOSCOPY N/A 01/04/2022    CYSTOSCOPY URETERAL STENT INSERTION performed by Winifred Rodriguez MD at [address] with stent    CYSTOSCOPY Right 03/01/2022    CYSTOSCOPY URETEROSCOPY LASER-WTIH HLL performed by Winifred Rodriguez MD at [address] Right 03/01/2022    CYSTOSCOPY URETERAL STENT [place] performed by Winifred Rodriguez MD at [address] Right 04/05/2022    Dr Kierra Oakes with stent insertion    CYSTOSCOPY Right 04/05/2022    CYSTOSCOPY URETEROSCOPY LASER-HLL performed by Winfired Rodriguez MD at [address] Right 04/05/2022    CYSTOSCOPY URETERAL STENT [place] performed by Winifred Rodriguez MD at [address] Right 04/22/2022    CYSTOSCOPY URETERAL STENT INSERTION (Right )    CYSTOSCOPY Right 4/22/2022    CYSTOSCOPY URETERAL STENT INSERTION performed by Amanda Hdz MD at [address] Left 8/9/2022    CYSTOSCOPY URETERAL STENT INSERTION performed by Winifred Rodriguez MD at [address] [facility]  PICC [facility] SINGLE  4/26/2022         INSERT MIDLINE CATHETER  01/07/2022         IR NEPHROSTOMY PERCUTANEOUS RIGHT  01/05/2022    IR NEPHROSTOMY PERCUTANEOUS RIGHT 1/5/2022 Lisa Vega MD [facility] PROC[facility]    ANNABELLA AND BSO (CERVIX REMOVED)      05/2019    TUBAL LIGATION  1993    TUBAL LIGATION      WISDOM TOOTH EXTRACTION         Medications Prior to Admission:    Prior to Admission medications    Medication Sig Start Date End Date Taking?  Authorizing Provider   acetaminophen (TYLENOL) 325 MG tablet Take 650 mg by mouth every 6 hours as needed for Pain    Historical Provider, MD   sodium chloride flush 0.9 % injection Infuse 5-40 mLs intravenously at bedtime PICC line maintenence    Historical Provider, MD   lisinopril (PRINIVIL;ZESTRIL) 20 MG tablet Take 1 tablet by mouth daily  Patient not taking: Reported on 8/8/2022 5/3/22   Tiffany Maxwell MD   amLODIPine (NORVASC) 10 MG tablet Take 1 tablet by mouth daily  Patient not taking: Reported on Authorizing Provider   acetaminophen (TYLENOL) 325 MG tablet Take 650 mg by mouth every 6 hours as needed for Pain    Historical Provider, MD   sodium chloride flush 0.9 % injection Infuse 5-40 mLs intravenously at bedtime PICC line maintenence    Historical Provider, MD   lisinopril (PRINIVIL;ZESTRIL) 20 MG tablet Take 1 tablet by mouth daily  Patient not taking: Reported on 8/8/2022 5/3/22   Yamileth Higuera MD   amLODIPine (NORVASC) 10 MG tablet Take 1 tablet by mouth daily  Patient not taking: Reported on 8/8/2022 5/2/22   Yamileth Higuera MD   tamsulosin (FLOMAX) 0.4 MG capsule Take 1 capsule by mouth daily  Patient not taking: Reported on 8/8/2022 4/27/22   Geovanna Alvarado MD   oxybutynin (DITROPAN XL) 15 MG extended release tablet Take 1 tablet by mouth daily 3/29/22   Juliet Thao, APRN - CNP   Misc Natural Products (OSTEO BI-FLEX ADV DOUBLE ST) TABS Take by mouth every morning    Historical Provider, MD   polyethylene glycol (GLYCOLAX) 17 g packet Take 17 g by mouth daily as needed for Constipation     Historical Provider, MD   ipratropium-albuterol (DUONEB) 0.5-2.5 (3) MG/3ML SOLN nebulizer solution Inhale 1 vial into the lungs every 4 hours     Historical Provider, MD   loratadine (CLARITIN) 10 MG capsule Take 10 mg by mouth daily     Historical Provider, MD   Ascorbic Acid (VITAMIN C) 250 MG tablet Take 250 mg by mouth daily    Historical Provider, MD   vitamin E 400 UNIT capsule Take 400 Units by mouth daily    Historical Provider, MD   vitamin D (CHOLECALCIFEROL) 25 MCG (1000 UT) TABS tablet Take 1,000 Units by mouth daily    Historical Provider, MD   rivaroxaban (XARELTO) 20 MG TABS tablet Take 1 tablet by mouth daily (with breakfast) 7/17/18   Ketan Nunez MD   Multiple Vitamins-Minerals (THERAPEUTIC MULTIVITAMIN-MINERALS) tablet Take 1 tablet by mouth daily    Historical Provider, MD       Review of Systems:  Constitutional:negative  for fevers, and negative for chills.   Eyes: negative for visual 08/09/2022    CREATININE 2.15 (H) 08/09/2022     08/09/2022    K 3.3 (L) 08/09/2022    CALCIUM 7.8 (L) 08/09/2022     08/09/2022    CO2 22 08/09/2022    PROT 6.2 (L) 08/09/2022    LABALBU 3.0 (L) 08/09/2022    BILITOT 0.23 (L) 08/09/2022    ALKPHOS 62 08/09/2022    ALT 7 08/09/2022    AST 17 08/09/2022       UA:   Lab Results   Component Value Date    COLORU Red (A) 08/08/2022    SPECGRAV 1.020 08/08/2022    WBCUA GREATER THAN 100 08/08/2022    RBCUA GREATER THAN 100 08/08/2022    EPITHUA 0 TO 2 08/08/2022    LEUKOCYTESUR LARGE (A) 08/08/2022    GLUCOSEU NEGATIVE 08/08/2022    KETUA NEGATIVE 08/08/2022    PROTEINU 2+ (A) 08/08/2022    HGBUR 3+ (A) 08/08/2022    CASTUA  04/21/2022     0 TO 2 HYALINE Reference range defined for non-centrifuged specimen. CRYSTUA NOT REPORTED 01/02/2022    BACTERIA FEW (A) 04/21/2022    YEAST NOT REPORTED 01/02/2022       Lactic Acid:   Lab Results   Component Value Date    LACTA 4.4 (H) 04/20/2022       D-Dimer:  No results found for: DDIMER    PT/INR:  Lab Results   Component Value Date/Time    PROTIME 10.4 04/29/2022 08:00 AM    INR 1.0 04/29/2022 08:00 AM       High Sensitivity Troponin:  Recent Labs     08/08/22  1745   TROPHS 26*       ABGs:   Lab Results   Component Value Date/Time    PHART 7.466 07/19/2018 02:51 PM    HJL2LAJ 35.5 07/19/2018 02:51 PM    PO2ART 72.5 07/19/2018 02:51 PM    GKW6DDT 25.0 07/19/2018 02:51 PM    M6NIVGEF 95.5 07/19/2018 02:51 PM    FIO2 21 07/19/2018 02:51 PM           FLUORO FOR SURGICAL PROCEDURES   Final Result      CT ABDOMEN PELVIS WO CONTRAST Additional Contrast? None   Final Result   1. Multiple left ureteral stones with a large obstructing stone at the   ureteropelvic junction. There is left hydronephrosis. 2. Right ureteral stent in expected position. Nonobstructing right   nephrolithiasis. 3. Diverticulosis without diverticulitis. 4. Cholelithiasis without pericholecystic fluid to suggest cholecystitis.    5. Small bilateral pleural effusions with adjacent consolidation, likely   atelectasis. XR CHEST PORTABLE   Final Result   Findings most consistent with pulmonary edema. Manifestation of pneumonia,   possibly an atypical or viral pneumonia not excluded. Correlate clinically. EKG reviewed    Assessment:    Principal Problem:    Sepsis due to urinary tract infection (HCC)  Active Problems:    Obesity, Class III, BMI 40-49.9 (morbid obesity) ([address]    Sepsis ([address]    Urologic disorders    Bacteremia due to Gram-negative bacteria    Hypoxia    Atrial fibrillation with RVR (HCC)    Lymphedema of both lower extremities    Ureteral calculi  Resolved Problems:    * No resolved hospital problems.  *      Patient Active Problem List    Diagnosis Date Noted    Bacteremia due to Gram-negative bacteria 08/09/2022    Hypoxia 08/09/2022    Atrial fibrillation with RVR ([address] 08/09/2022    Sepsis due to urinary tract infection ([address] 08/09/2022    Urologic disorders 08/08/2022    Sepsis ([address] 05/03/2022    CRP elevated     Intra-abdominal abscess (HCC) 04/21/2022    Obesity, Class III, BMI 40-49.9 (morbid obesity) ([address] 04/21/2022    Hypocalcemia 04/21/2022    Hypokalemia 04/21/2022    Vertebral osteomyelitis ([address] T12-L1 04/21/2022    Pleural effusion on right 04/21/2022    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    Ureteral calculi 02/28/2022    Psoas muscle abscess ([address] 01/08/2022    Septicemia due to Klebsiella pneumoniae ([address] 01/08/2022    Bacteremia due to Klebsiella pneumoniae 01/04/2022    Renal abscess, right 01/02/2022    Iron deficiency anemia 01/24/2021    Normocytic anemia 01/23/2021    Cellulitis 62/32/5399    Complicated UTI (urinary tract infection) 12/28/2020    Renal calculus 12/28/2020    Cellulitis of left lower extremity     Cellulitis of lower leg 02/10/2020    Gross hematuria 10/30/2018    Frequency of urination 10/30/2018    Nocturia 10/30/2018    Mixed incontinence 10/30/2018    Constipation 10/30/2018    History of recurrent deep vein thrombosis (DVT) 04/26/2018    Tobacco abuse 11/15/2016    Bilateral cellulitis of lower leg 11/08/2016    Acute shoulder pain due to trauma 11/08/2016    Lymphedema of both lower extremities 11/08/2016    Acute thromboembolism of deep veins of lower extremity ([facility] 09/05/2015    Long term current use of anticoagulant therapy 09/05/2015       Plan: This patient requires inpatient admission because of sepsis due to UTI  Factors affecting the medical complexity of this patient include renal stone, atrial fibrillation with RVR, hypoxia  Estimated length of stay is 4 days  Sepsis due to UTI  Appreciate urology  Monitor urine culture  IV fluids  IV Rocephin c  Cystoscopy with stent placement today  Bacteremia due to gram-negative bacteria  Trend sensitivity  IV fluids  Continue IV Rocephin  Atrial fibrillation with RVR  Appreciate urology  Echocardiogram  Patient currently on Xarelto long-term for history of DVTs  Hypokalemia  At IV potassium to maintenance IV fluids  DVT prophylaxis:  Lovenox but will change back to Xarelto postop  Peptic ulcer prophylaxis: Pepcid  High risk medications: none  Social Service and Case Management consults for DC planning  Dietician consult initiated    CORE MEASURES  DVT prophylaxis:  Lovenox but will change back to Xarelto postop  Decubitus ulcer present on admission: No  CODE STATUS: FULL CODE  Nutrition Status: fair   Physical therapy: Yes   Old Charts reviewed: Yes  EKG Reviewed:  Yes  Advance Directive Addressed: Yes    Kristyn Arenas, DONG - CNP, APRN, NP-C  8/9/2022, 3:19 PM

## 2022-08-09 NOTE — ANESTHESIA POSTPROCEDURE EVALUATION
Department of Anesthesiology  Postprocedure Note    Patient: Chantal Hale  MRN: [id number]  YOB: 1955  Date of evaluation: 8/9/2022      Procedure Summary     Date: 08/09/22 Room / Location: [facility]    Anesthesia Start: 1000 Anesthesia Stop: 8327    Procedure: [address] (Left: Urethra) Diagnosis:       Ureteral stone      (LEFT URETERAL STONE)    Surgeons: Oli Charels MD Responsible Provider: DONG Ferris CRNA    Anesthesia Type: general ASA Status: 4          Anesthesia Type: No value filed.     Fly Phase I: Fly Score: 10    Fly Phase II:        Anesthesia Post Evaluation    Patient location during evaluation: PACU  Patient participation: complete - patient participated  Level of consciousness: awake and alert  Pain score: 0  Airway patency: patent  Nausea & Vomiting: no vomiting and no nausea  Complications: no  Cardiovascular status: blood pressure returned to baseline  Respiratory status: acceptable  Hydration status: stable

## 2022-08-09 NOTE — ED NOTES
[facility] [address]  08/08/22 0141

## 2022-08-09 NOTE — CONSULTS
Along with    Patient: Brain Whatley  : 1955  Date of Admission: 2022  Primary Care Physician: Dana Salcedo  Today's Date: 2022    REASON FOR CONSULTATION: Shortness of Breath and Flank Pain      HPI:  Ms. Kitty Shah is a 74 y.o. female who was admitted to the hospital because of shortness of breath and kidney stone. She also found to have sepsis secondary to urinary tract infection. In the ER she was found to be in  atrial fibrillation with RVR leading to this consultation. She denied any prior history of atrial fibrillation. She has been on long-term anticoagulation because of history of DVT. She has history of lymphedema and uses pneumatic compression devices at home. Ms. Kitty Shah denies any known history of heart problems in the past including heart attacks. Ms. Kitty Shah was admitted for shortness of breath and left flank pain. She has a history of nephrolithiasis and underwent ureteric  stent placement back in May 2022. She presented again today with left flank pain and shortness of breath. She is currently treated with ceftriaxone. Blood culture is positive for Klebsiella pneumoniae. Neurology is on board. She is markedly short of breath on mild exertion. She normally uses a walker to ambulate. She denies any chest pain, pressure or tightness. No cough but was noted to have a temperature of 102 on admission. She is currently on intravenous fluid at 125 mL/h with a Brooks catheter in place and she is making very little urine. She reported having history of blood in urine prior to her current admission. She is feeling dizzy and lightheaded and had some nausea on admission. No vomiting. No sweating or diaphoresis. She normally sees Dr. Assunta Cheadle and had a preoperative clearance by him prior to her kidney stone procedure. She is not sure if she has any work-up done in his office.   We will call Dr. Minor Oz office tomorrow morning and get his progress notes and the cardiac work-up that was done before. EKG is reviewed, she has an atrial fibrillation with rapid ventricular response on admission. Her baseline rhythm is sinus with right bundle branch block. Currently maintaining sinus rhythm on telemetry. Labs reviewed, creatinine increased from 1.39 yesterday to 2.15 today. Her serum potassium is 3.3 mg/dL. Troponin level is a stable. Chest x-ray reviewed, findings are suggestive of pulmonary vascular congestion. Cannot exclude pneumonia particularly atypical pneumonia. I will get a CT chest without contrast for further evaluation. Past Medical History:   Diagnosis Date    Atrial fibrillation with RVR ([address] 8/9/2022    Carcinoma ([address]     Squamous Cell    Cellulitis     DVT (deep venous thrombosis) ([address] 2006    LLE    Kidney stone     Lymphedema     Morbid obesity ([address]     Psoas abscess, right ([address]     Pyelonephritis     Wears glasses        CURRENT ALLERGIES: Patient has no known allergies. REVIEW OF SYSTEMS: 14 systems were reviewed. Pertinent positives and negatives as above, all else negative.      Past Surgical History:   Procedure Laterality Date    CARPAL TUNNEL RELEASE  1990s    CT ABSCESS DRAIN SUBCUTANEOUS  01/10/2022    CT ABSCESS DRAIN SUBCUTANEOUS 1/10/2022 [facility] CT SCAN    CT ABSCESS DRAIN SUBCUTANEOUS  04/21/2022    CT ABSCESS DRAIN SUBCUTANEOUS 4/21/2022 [facility] CT SCAN    CT ABSCESS DRAIN SUBCUTANEOUS  4/29/2022    CT ABSCESS DRAIN SUBCUTANEOUS 4/29/2022 [facility] CT SCAN    CYSTOSCOPY N/A 01/04/2022    CYSTOSCOPY URETERAL STENT INSERTION performed by Winifred Rodriguez MD at [facility] Right     L with stent    CYSTOSCOPY Right 03/01/2022    CYSTOSCOPY URETEROSCOPY [facility]ER[facility]TIH [facility]LL [facility] 03/01/2022    CYSTOSCOPY URETERAL STENT INSERTION-EXCHANGE performed by Winifred Rodriguez MD at [facility] Right 04/05/2022    Dr Kierra Oakes with stent insertion    CYSTOSCOPY (Temporal)   Resp (S) 30   Ht 5' 1\" (1.549 m)   Wt 256 lb 9.6 oz (116.4 kg)   LMP  (LMP Unknown)   SpO2 96%   BMI 48.48 kg/m²  Body mass index is 48.48 kg/m². Constitutional: She is oriented to person, place, and time. She appears well-developed and well-nourished. In no acute distress. HEENT: Normocephalic and atraumatic. No JVD present. Carotid bruit is not present. No mass and no thyromegaly present. No lymphadenopathy present. Cardiovascular: Tachycardic rate, regular rhythm, normal heart sounds. Exam reveals no gallop and no friction rubs. No murmur was heard. .   Pulmonary/Chest: Poor air entry bilaterally with diffuse wheezes. Bilateral scattered crackles noted. Abdominal: Soft, non-tender. Bowel sounds and aorta are normal. She exhibits no organomegaly, mass or bruit. Extremities: Lymphedema noted bilaterally. No cyanosis or clubbing. 2+ radial and carotid pulses. Distal extremity pulses: 2+ bilaterally. Neurological: She is alert and oriented to person, place, and time. No evidence of gross cranial nerve deficit. Coordination appeared normal.   Skin: Skin is warm and dry. There is no rash or diaphoresis. Psychiatric: She has a normal mood and affect.  Her speech is normal and behavior is normal.      MOST RECENT LABS ON RECORD:   Lab Results   Component Value Date    WBC 3.0 (L) 08/08/2022    HGB 11.0 (L) 08/08/2022    HCT 37.0 08/08/2022     08/08/2022    ALT 7 08/09/2022    AST 17 08/09/2022     08/09/2022    K 3.3 (L) 08/09/2022     08/09/2022    CREATININE 2.15 (H) 08/09/2022    BUN 25 (H) 08/09/2022    CO2 22 08/09/2022    TSH 3.93 04/26/2022    INR 1.0 04/29/2022    LABA1C 6.2 (H) 01/06/2022         ASSESSMENT:  Patient Active Problem List    Diagnosis Date Noted    Bacteremia due to Gram-negative bacteria 08/09/2022    Hypoxia 08/09/2022    Atrial fibrillation with RVR ([address] 08/09/2022    Sepsis due to urinary tract infection ([facility] 08/09/2022    Urologic disorders 08/08/2022    Sepsis ([facility] 05/03/2022    CRP elevated     Intra-abdominal abscess ([address] 04/21/2022    Obesity, Class III, BMI 40-49.9 (morbid obesity) ([address] 04/21/2022    Hypocalcemia 04/21/2022    Hypokalemia 04/21/2022    Vertebral osteomyelitis ([address] T12-L1 04/21/2022    Pleural effusion on right 04/21/2022    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    Ureteral calculi 02/28/2022    Psoas muscle abscess ([address] 01/08/2022    Septicemia due to Klebsiella pneumoniae ([facility] 01/08/2022    Bacteremia due to Klebsiella pneumoniae 01/04/2022    Renal abscess, right 01/02/2022    Iron deficiency anemia 01/24/2021    Normocytic anemia 01/23/2021    Cellulitis 79/16/6213    Complicated UTI (urinary tract infection) 12/28/2020    Renal calculus 12/28/2020    Cellulitis of left lower extremity     Cellulitis of lower leg 02/10/2020    Gross hematuria 10/30/2018    Frequency of urination 10/30/2018    Nocturia 10/30/2018    Mixed incontinence 10/30/2018    Constipation 10/30/2018    History of recurrent deep vein thrombosis (DVT) 04/26/2018    Tobacco abuse 11/15/2016    Bilateral cellulitis of lower leg 11/08/2016    Acute shoulder pain due to trauma 11/08/2016    Lymphedema of both lower extremities 11/08/2016    Acute thromboembolism of deep veins of lower extremity ([facility] 09/05/2015    Long term current use of anticoagulant therapy 09/05/2015       PLAN:      Patient with multiple medical problems as outlined above. History of DVT on chronic anticoagulation. History of nephrolithiasis. Admitted with left flank pain and found to have sepsis secondary to urinary tract infection. Blood culture is positive for Klebsiella pneumoniae. She has acute on chronic renal failure. Brooks catheter in place, urology on board. Currently on ceftriaxone. I reviewed the blood work Chest X-Ray along with her clinical examination, picture is suggestive of acute on chronic heart failure.     CT of the abdomen showed multiple left ureteral stones with a large obstructing stone and the ureteropelvic junction. There is left hydronephrosis. Fortunately she is back in normal sinus rhythm. Her  ECG showing sinus rhythm with right bundle branch block. No acute ischemic changes. No history of coronary artery disease. Decrease intravenous fluid hydration to 70 mL/h. Continue antibiotic therapy as per primary team and neurology. Follow-up BNP and troponin level. Chest CT without contrast to rule out atypical pneumonia giving abnormal chest x-ray, significant wheezing and shortness of breath. Echo to be done tomorrow morning. Will follow-up. Sincerely,  Taras Goldmann, MD, F.A.C.C. [facility] Specialist    [address] [facility], [address] [place] [address]  Phone: [phone], Fax: [phone]     I believe that the risk of significant morbidity and mortality related to the patient's current medical conditions are: intermediate-high. The documentation recorded by the scribe, accurately and completely reflects the services I personally performed and the decisions made by me. Taras Goldmann MD, F.A.C.C.  August 9, 2022

## 2022-08-09 NOTE — ED NOTES
Report called to [facility], RN. Pt transferred to [facility].      [facility]  08/09/22 0001

## 2022-08-09 NOTE — ED PROVIDER NOTES
formerly [facility] AT THE [facility] MED SURG  EMERGENCY DEPARTMENT ENCOUNTER      Pt Name: Joon Adkins  MRN: [id number]  [place] 1955  Date of evaluation: 8/8/2022  Provider: Kati Dela Cruz MD    CHIEF COMPLAINT       Chief Complaint   Patient presents with    Shortness of Breath    Flank Pain         HISTORY OF PRESENT ILLNESS      Joon Adkins is a 74 y.o. female who presents to the emergency department for evaluation of shortness of breath. States it has been ongoing for the past day, worse over past few hours. Notes some coughing. No chest pain. No reported fever. Also notes left flank pain, present for \"a while,\" similar to prior episodes of kidney stones. No other  complaints. REVIEW OF SYSTEMS       Review of Systems   Constitutional:  Positive for fatigue. Negative for fever. HENT: Negative. Respiratory:  Positive for cough and shortness of breath. Cardiovascular:  Positive for leg swelling (worse than typical). Negative for chest pain. Gastrointestinal:  Positive for nausea and vomiting. Negative for abdominal pain. Genitourinary:  Positive for flank pain (left). Negative for difficulty urinating and dysuria. All other systems reviewed and are negative.       PAST MEDICAL HISTORY     Past Medical History:   Diagnosis Date    Carcinoma ([address]     Squamous Cell    Cellulitis     DVT (deep venous thrombosis) ([address] 2006    LLE    Kidney stone     Lymphedema     Morbid obesity ([address]     Psoas abscess, right (HCC)     Pyelonephritis     Wears glasses          SURGICAL HISTORY       Past Surgical History:   Procedure Laterality Date    CARPAL TUNNEL RELEASE  1990s    CT ABSCESS DRAIN SUBCUTANEOUS  01/10/2022    CT ABSCESS DRAIN SUBCUTANEOUS 1/10/2022 [facility] CT SCAN    CT ABSCESS DRAIN SUBCUTANEOUS  04/21/2022    CT ABSCESS DRAIN SUBCUTANEOUS 4/21/2022 [facility] CT SCAN    CT ABSCESS DRAIN SUBCUTANEOUS  4/29/2022    CT ABSCESS DRAIN SUBCUTANEOUS 4/29/2022 [facility] CT SCAN    CYSTOSCOPY N/A 01/04/2022    CYSTOSCOPY mouth daily  Qty: 30 tablet, Refills: 3      Misc Natural Products (OSTEO BI-FLEX ADV DOUBLE ST) TABS Take by mouth every morning      polyethylene glycol (GLYCOLAX) 17 g packet Take 17 g by mouth daily as needed for Constipation       ipratropium-albuterol (DUONEB) 0.5-2.5 (3) MG/3ML SOLN nebulizer solution Inhale 1 vial into the lungs every 4 hours       loratadine (CLARITIN) 10 MG capsule Take 10 mg by mouth daily       Ascorbic Acid (VITAMIN C) 250 MG tablet Take 250 mg by mouth daily      vitamin E 400 UNIT capsule Take 400 Units by mouth daily      vitamin D (CHOLECALCIFEROL) 25 MCG (1000 UT) TABS tablet Take 1,000 Units by mouth daily      rivaroxaban (XARELTO) 20 MG TABS tablet Take 1 tablet by mouth daily (with breakfast)  Qty: 30 tablet, Refills: 5    Associated Diagnoses: Long term current use of anticoagulant therapy      Multiple Vitamins-Minerals (THERAPEUTIC MULTIVITAMIN-MINERALS) tablet Take 1 tablet by mouth daily             ALLERGIES       Patient has no known allergies. FAMILY HISTORY       Family History   Adopted: Yes   Problem Relation Age of Onset    Cancer Mother         The patient reports her biologic mother did have bladder cancer          SOCIAL HISTORY       Social History     Tobacco Use    Smoking status: Former     Packs/day: 0.50     Years: 42.00     Pack years: 21.00     Types: Cigarettes     Quit date: 2022     Years since quittin.3    Smokeless tobacco: Never   Vaping Use    Vaping Use: Never used   Substance Use Topics    Alcohol use: No     Alcohol/week: 0.0 standard drinks    Drug use: No         PHYSICAL EXAM       ED Triage Vitals   BP Temp Temp Source Heart Rate Resp SpO2 Height Weight   22 1735 22 1735 22 1735 22 1735 22 1735 22 1735 22 0028 22 0028   (!) 170/95 (!) 102.4 °F (39.1 °C) Tympanic (!) 144 24 93 % 5' 1\" (1.549 m) 257 lb 12.8 oz (116.9 kg)       Physical Exam  Vitals reviewed.    Constitutional: General: She is not in acute distress. Appearance: She is not ill-appearing or diaphoretic. HENT:      Head: Normocephalic and atraumatic. Mouth/Throat:      Mouth: Mucous membranes are moist.      Pharynx: Oropharynx is clear. No pharyngeal swelling or oropharyngeal exudate. Neck:      Vascular: No JVD. Cardiovascular:      Rate and Rhythm: Regular rhythm. Tachycardia present. Heart sounds: No murmur heard. Pulmonary:      Effort: Pulmonary effort is normal. No accessory muscle usage or respiratory distress. Breath sounds: Normal breath sounds. No stridor. No decreased breath sounds, wheezing, rhonchi or rales. Abdominal:      Palpations: Abdomen is soft. There is no mass. Tenderness: There is no abdominal tenderness. There is no guarding or rebound. Comments: Mild left CVAT. No right CVAT. Musculoskeletal:      Cervical back: Neck supple. Comments: Symmetric BLE edema without TTP. Skin:     General: Skin is warm and dry. Coloration: Skin is not cyanotic or pale. Neurological:      Mental Status: She is alert. DIAGNOSTIC RESULTS     EKG: Sinus tachycardia, 140 BPM. No STEMI. RBBB. RADIOLOGY:     Interpretation per the Radiologist below, if available at the time of this note:    XR CHEST PORTABLE   Final Result   Findings most consistent with pulmonary edema. Manifestation of pneumonia,   possibly an atypical or viral pneumonia not excluded. Correlate clinically.                LABS:  Labs Reviewed   LACTATE, SEPSIS - Abnormal; Notable for the following components:       Result Value    Lactic Acid, Sepsis 3.3 (*)     All other components within normal limits   LACTATE, SEPSIS - Abnormal; Notable for the following components:    Lactic Acid, Sepsis 2.4 (*)     All other components within normal limits   URINALYSIS WITH MICROSCOPIC - Abnormal; Notable for the following components:    Color, UA Red (*)     Turbidity UA Cloudy (*)     Urine Hgb 3+ (*) Protein, UA 2+ (*)     Leukocyte Esterase, Urine LARGE (*)     All other components within normal limits   COMPREHENSIVE METABOLIC PANEL - Abnormal; Notable for the following components:    Glucose 169 (*)     Creatinine 1.39 (*)     Potassium 3.4 (*)     Total Bilirubin 0.28 (*)     Albumin/Globulin Ratio 0.9 (*)     GFR Non- 38 (*)     GFR  46 (*)     All other components within normal limits   CBC WITH AUTO DIFFERENTIAL - Abnormal; Notable for the following components:    WBC 3.0 (*)     RBC 3.74 (*)     Hemoglobin 11.0 (*)     Seg Neutrophils 80 (*)     Lymphocytes 16 (*)     Eosinophils % 0 (*)     Absolute Lymph # 0.48 (*)     All other components within normal limits   TROPONIN - Abnormal; Notable for the following components:    Troponin, High Sensitivity 26 (*)     All other components within normal limits   COMPREHENSIVE METABOLIC PANEL W/ REFLEX TO MG FOR LOW K - Abnormal; Notable for the following components:    Glucose 132 (*)     BUN 25 (*)     Creatinine 2.15 (*)     Calcium 7.8 (*)     Potassium 3.3 (*)     Total Bilirubin 0.23 (*)     Total Protein 6.2 (*)     Albumin 3.0 (*)     Albumin/Globulin Ratio 0.9 (*)     GFR Non- 23 (*)     GFR  28 (*)     All other components within normal limits   COVID-19, RAPID   CULTURE, URINE   CULTURE, BLOOD 1   CULTURE, BLOOD 1   MAGNESIUM       All other labs were within normal range or not returned as of this dictation. EMERGENCY DEPARTMENT COURSE and DIFFERENTIAL DIAGNOSIS/MDM:     Patient tachycardic, hypoxic and febrile on arrival.  CXR with findings felt by radiologist to be most consistent with pulmonary edema, though with findings as above clinical suspicion is for PNA. Started Rocephin and doxycycline. However, patient has left flank pain with history of renal calculi. Concern she may have ureteric stone with possible UTI.  As of transition of care to Dr. Lenore Fox, UA and CT imaging not resulted. Plan is for admission following these studies. Patient status improved while in ED. CRITICAL CARE TIME     Total Critical Care time was 30 minutes, excluding separately reportable procedures. There was a high probability of clinically significant/life threatening deterioration in the patient's condition which required my urgent intervention.           FINAL IMPRESSION      Suspected pneumonia  Left flank pain    DISPOSITION/PLAN     DISPOSITION - Pending    (Please note that portions of this note were completed with a voice recognition program.  Efforts were made to edit the dictations but occasionally words are mis-transcribed.)    Meghna Jenkins MD (electronically signed)  Attending Emergency Physician            Meghna Jenkins MD  08/09/22 1194

## 2022-08-09 NOTE — PROGRESS NOTES
Writer bladder scanned patient due to patient not urinating since arrival to room 317. Patient's bladder scan showed 12ml.

## 2022-08-09 NOTE — PROGRESS NOTES
Patient arrived to writer from ER and received report from COREY Gar. Patient is alert and oriented at this time. Patient denies any pain. Patient is afebrile at this time. Vitals and assessment as charted. Patient states she does have some incontinence but can report incontinence. Bed alarm on, bed locked, call light within reach and able to use appropriately at this time. Will continue to monitor this shift.

## 2022-08-09 NOTE — FLOWSHEET NOTE
Vitals checked, assessment and EKG completed . Denies pain this morning. NPO for surgery this morning. DR. Chris Farley in to see patient. Call light within reach.  Continue to monitor

## 2022-08-10 PROBLEM — N18.9 ACUTE KIDNEY INJURY SUPERIMPOSED ON CKD (HCC): Status: ACTIVE | Noted: 2022-08-10

## 2022-08-10 PROBLEM — N17.9 ACUTE KIDNEY INJURY SUPERIMPOSED ON CKD (HCC): Status: ACTIVE | Noted: 2022-08-10

## 2022-08-10 LAB
ABSOLUTE EOS #: 0 K/UL (ref 0–0.44)
ABSOLUTE IMMATURE GRANULOCYTE: 0 K/UL (ref 0–0.3)
ABSOLUTE LYMPH #: 1 K/UL (ref 1.1–3.7)
ABSOLUTE MONO #: 0.4 K/UL (ref 0.1–1.2)
ANION GAP SERPL CALCULATED.3IONS-SCNC: 13 MMOL/L (ref 9–17)
BASOPHILS # BLD: 0 % (ref 0–2)
BASOPHILS ABSOLUTE: 0 K/UL (ref 0–0.2)
BUN BLDV-MCNC: 34 MG/DL (ref 8–23)
BUN/CREAT BLD: 13 (ref 9–20)
CALCIUM SERPL-MCNC: 8.1 MG/DL (ref 8.6–10.4)
CHLORIDE BLD-SCNC: 111 MMOL/L (ref 98–107)
CHLORIDE, UR: 28 MMOL/L
CO2: 18 MMOL/L (ref 20–31)
CREAT SERPL-MCNC: 2.66 MG/DL (ref 0.5–0.9)
CULTURE: ABNORMAL
EOSINOPHILS RELATIVE PERCENT: 0 % (ref 1–4)
GFR AFRICAN AMERICAN: 22 ML/MIN
GFR NON-AFRICAN AMERICAN: 18 ML/MIN
GFR SERPL CREATININE-BSD FRML MDRD: ABNORMAL ML/MIN/{1.73_M2}
GFR SERPL CREATININE-BSD FRML MDRD: ABNORMAL ML/MIN/{1.73_M2}
GLUCOSE BLD-MCNC: 96 MG/DL (ref 70–99)
HCT VFR BLD CALC: 30.9 % (ref 36.3–47.1)
HEMOGLOBIN: 8.9 G/DL (ref 11.9–15.1)
IMMATURE GRANULOCYTES: 0 %
LYMPHOCYTES # BLD: 5 % (ref 24–43)
Lab: ABNORMAL
Lab: ABNORMAL
MCH RBC QN AUTO: 29.1 PG (ref 25.2–33.5)
MCHC RBC AUTO-ENTMCNC: 28.8 G/DL (ref 28.4–34.8)
MCV RBC AUTO: 101 FL (ref 82.6–102.9)
MONOCYTES # BLD: 2 % (ref 3–12)
MORPHOLOGY: ABNORMAL
MORPHOLOGY: ABNORMAL
NRBC AUTOMATED: 0 PER 100 WBC
PDW BLD-RTO: 14.4 % (ref 11.8–14.4)
PLATELET # BLD: ABNORMAL K/UL (ref 138–453)
PLATELET, FLUORESCENCE: 100 K/UL (ref 138–453)
PLATELET, IMMATURE FRACTION: 3.6 % (ref 1.1–10.3)
POTASSIUM SERPL-SCNC: 4.9 MMOL/L (ref 3.7–5.3)
POTASSIUM, UR: 46.9 MMOL/L
RBC # BLD: 3.06 M/UL (ref 3.95–5.11)
SEG NEUTROPHILS: 93 % (ref 36–65)
SEGMENTED NEUTROPHILS ABSOLUTE COUNT: 18.6 K/UL (ref 1.5–8.1)
SODIUM BLD-SCNC: 142 MMOL/L (ref 135–144)
SODIUM,UR: 37 MMOL/L
SPECIMEN DESCRIPTION: ABNORMAL
WBC # BLD: 20 K/UL (ref 3.5–11.3)

## 2022-08-10 PROCEDURE — 85025 COMPLETE CBC W/AUTO DIFF WBC: CPT

## 2022-08-10 PROCEDURE — 97530 THERAPEUTIC ACTIVITIES: CPT

## 2022-08-10 PROCEDURE — 2580000003 HC RX 258: Performed by: INTERNAL MEDICINE

## 2022-08-10 PROCEDURE — 94761 N-INVAS EAR/PLS OXIMETRY MLT: CPT

## 2022-08-10 PROCEDURE — 94640 AIRWAY INHALATION TREATMENT: CPT

## 2022-08-10 PROCEDURE — 99232 SBSQ HOSP IP/OBS MODERATE 35: CPT | Performed by: INTERNAL MEDICINE

## 2022-08-10 PROCEDURE — 97166 OT EVAL MOD COMPLEX 45 MIN: CPT

## 2022-08-10 PROCEDURE — 6370000000 HC RX 637 (ALT 250 FOR IP): Performed by: UROLOGY

## 2022-08-10 PROCEDURE — 36415 COLL VENOUS BLD VENIPUNCTURE: CPT

## 2022-08-10 PROCEDURE — 84133 ASSAY OF URINE POTASSIUM: CPT

## 2022-08-10 PROCEDURE — 1200000000 HC SEMI PRIVATE

## 2022-08-10 PROCEDURE — 99223 1ST HOSP IP/OBS HIGH 75: CPT | Performed by: INTERNAL MEDICINE

## 2022-08-10 PROCEDURE — 97162 PT EVAL MOD COMPLEX 30 MIN: CPT

## 2022-08-10 PROCEDURE — 6360000002 HC RX W HCPCS: Performed by: NURSE PRACTITIONER

## 2022-08-10 PROCEDURE — 6370000000 HC RX 637 (ALT 250 FOR IP): Performed by: FAMILY MEDICINE

## 2022-08-10 PROCEDURE — 85055 RETICULATED PLATELET ASSAY: CPT

## 2022-08-10 PROCEDURE — 2580000003 HC RX 258: Performed by: UROLOGY

## 2022-08-10 PROCEDURE — 6360000002 HC RX W HCPCS: Performed by: UROLOGY

## 2022-08-10 PROCEDURE — 51798 US URINE CAPACITY MEASURE: CPT

## 2022-08-10 PROCEDURE — 2700000000 HC OXYGEN THERAPY PER DAY

## 2022-08-10 PROCEDURE — 80048 BASIC METABOLIC PNL TOTAL CA: CPT

## 2022-08-10 PROCEDURE — 6370000000 HC RX 637 (ALT 250 FOR IP): Performed by: NURSE PRACTITIONER

## 2022-08-10 PROCEDURE — 94664 DEMO&/EVAL PT USE INHALER: CPT

## 2022-08-10 PROCEDURE — 84300 ASSAY OF URINE SODIUM: CPT

## 2022-08-10 PROCEDURE — 82436 ASSAY OF URINE CHLORIDE: CPT

## 2022-08-10 PROCEDURE — 94669 MECHANICAL CHEST WALL OSCILL: CPT

## 2022-08-10 RX ORDER — IPRATROPIUM BROMIDE AND ALBUTEROL SULFATE 2.5; .5 MG/3ML; MG/3ML
1 SOLUTION RESPIRATORY (INHALATION) 3 TIMES DAILY
Status: DISCONTINUED | OUTPATIENT
Start: 2022-08-10 | End: 2022-08-12

## 2022-08-10 RX ORDER — SUMATRIPTAN 50 MG/1
50 TABLET, FILM COATED ORAL ONCE
Status: COMPLETED | OUTPATIENT
Start: 2022-08-10 | End: 2022-08-10

## 2022-08-10 RX ORDER — VITAMIN E 268 MG
400 CAPSULE ORAL DAILY
Status: DISCONTINUED | OUTPATIENT
Start: 2022-08-10 | End: 2022-08-16 | Stop reason: HOSPADM

## 2022-08-10 RX ORDER — SODIUM CHLORIDE 9 MG/ML
INJECTION, SOLUTION INTRAVENOUS CONTINUOUS
Status: DISCONTINUED | OUTPATIENT
Start: 2022-08-10 | End: 2022-08-11

## 2022-08-10 RX ADMIN — MULTIPLE VITAMINS W/ MINERALS TAB 1 TABLET: TAB at 09:21

## 2022-08-10 RX ADMIN — IPRATROPIUM BROMIDE AND ALBUTEROL SULFATE 3 ML: 2.5; .5 SOLUTION RESPIRATORY (INHALATION) at 09:27

## 2022-08-10 RX ADMIN — CETIRIZINE HYDROCHLORIDE 5 MG: 10 TABLET, FILM COATED ORAL at 09:21

## 2022-08-10 RX ADMIN — Medication 400 UNITS: at 09:21

## 2022-08-10 RX ADMIN — SODIUM CHLORIDE AND POTASSIUM CHLORIDE: 9; 1.49 INJECTION, SOLUTION INTRAVENOUS at 01:14

## 2022-08-10 RX ADMIN — Medication 1000 UNITS: at 09:21

## 2022-08-10 RX ADMIN — SODIUM CHLORIDE: 9 INJECTION, SOLUTION INTRAVENOUS at 16:08

## 2022-08-10 RX ADMIN — IPRATROPIUM BROMIDE AND ALBUTEROL SULFATE 3 ML: 2.5; .5 SOLUTION RESPIRATORY (INHALATION) at 15:17

## 2022-08-10 RX ADMIN — SODIUM CHLORIDE, PRESERVATIVE FREE 10 ML: 5 INJECTION INTRAVENOUS at 09:21

## 2022-08-10 RX ADMIN — ACETAMINOPHEN 650 MG: 325 TABLET ORAL at 07:06

## 2022-08-10 RX ADMIN — OXYCODONE HYDROCHLORIDE AND ACETAMINOPHEN 250 MG: 500 TABLET ORAL at 09:20

## 2022-08-10 RX ADMIN — SUMATRIPTAN SUCCINATE 50 MG: 50 TABLET ORAL at 14:01

## 2022-08-10 RX ADMIN — CEFTRIAXONE 1000 MG: 1 INJECTION, POWDER, FOR SOLUTION INTRAMUSCULAR; INTRAVENOUS at 19:07

## 2022-08-10 RX ADMIN — OXYBUTYNIN CHLORIDE 15 MG: 5 TABLET, EXTENDED RELEASE ORAL at 09:21

## 2022-08-10 RX ADMIN — IPRATROPIUM BROMIDE AND ALBUTEROL SULFATE 3 ML: 2.5; .5 SOLUTION RESPIRATORY (INHALATION) at 05:46

## 2022-08-10 RX ADMIN — IPRATROPIUM BROMIDE AND ALBUTEROL SULFATE 3 ML: 2.5; .5 SOLUTION RESPIRATORY (INHALATION) at 20:00

## 2022-08-10 RX ADMIN — RIVAROXABAN 15 MG: 15 TABLET, FILM COATED ORAL at 17:21

## 2022-08-10 RX ADMIN — ACETAMINOPHEN 650 MG: 325 TABLET ORAL at 17:21

## 2022-08-10 RX ADMIN — ACETAMINOPHEN 650 MG: 325 TABLET ORAL at 23:51

## 2022-08-10 ASSESSMENT — PAIN DESCRIPTION - DESCRIPTORS
DESCRIPTORS: ACHING

## 2022-08-10 ASSESSMENT — PAIN DESCRIPTION - LOCATION
LOCATION: HEAD

## 2022-08-10 ASSESSMENT — PAIN DESCRIPTION - FREQUENCY
FREQUENCY: INTERMITTENT
FREQUENCY: INTERMITTENT

## 2022-08-10 ASSESSMENT — ENCOUNTER SYMPTOMS
DIARRHEA: 0
NAUSEA: 0
EYES NEGATIVE: 1
ABDOMINAL PAIN: 0
VOMITING: 0
BACK PAIN: 0
SHORTNESS OF BREATH: 1
COUGH: 0

## 2022-08-10 ASSESSMENT — PAIN SCALES - GENERAL
PAINLEVEL_OUTOF10: 10
PAINLEVEL_OUTOF10: 9
PAINLEVEL_OUTOF10: 10
PAINLEVEL_OUTOF10: 8

## 2022-08-10 ASSESSMENT — PAIN DESCRIPTION - PAIN TYPE
TYPE: ACUTE PAIN
TYPE: ACUTE PAIN

## 2022-08-10 NOTE — PROGRESS NOTES
Progress Note    SUBJECTIVE:    Patient seen for f/u of Sepsis due to urinary tract infection ([address] She is sitting up in chair alert and in no distress. Afebrile. ROS:   Constitutional: negative  for fevers, and negative for chills. Respiratory: negative for shortness of breath, negative for cough, and negative for wheezing  Cardiovascular: negative for chest pain, and negative for palpitations  Gastrointestinal: negative for abdominal pain, negative for nausea,negative for vomiting, negative for diarrhea, and negative for constipation     All other systems were reviewed with the patient and are negative unless otherwise stated in HPI      OBJECTIVE:      Vitals:   Vitals:    08/10/22 0929   BP:    Pulse:    Resp:    Temp:    SpO2: 96%     Weight: 260 lb (117.9 kg)   Height: 5' 1\" (154.9 cm)     Weight  Wt Readings from Last 3 Encounters:   08/10/22 260 lb (117.9 kg)   05/17/22 243 lb (110.2 kg)   05/17/22 243 lb (110.2 kg)     Body mass index is 49.13 kg/m². 24HR INTAKE/OUTPUT:      Intake/Output Summary (Last 24 hours) at 8/10/2022 1124  Last data filed at 8/10/2022 0906  Gross per 24 hour   Intake 2694.5 ml   Output 525 ml   Net 2169.5 ml     -----------------------------------------------------------------  Exam:    GEN:    Awake, alert and oriented x3. EYES:  EOMI, pupils equal   NECK: Supple. No lymphadenopathy. No carotid bruit  CVS:    irregularly irregular, 2/6 systolic murmur  PULM:  diminished but clear without wheezing, rales or rhonchi, no acute respiratory distress  ABD:    Bowels sounds normal.  Abdomen is soft. No distention. no tenderness to palpation. EXT:    lymph  edema bilaterally . No calf tenderness. NEURO: Moves all extremities. Motor and sensory are grossly intact  SKIN:  No rashes.   No skin lesions.    -----------------------------------------------------------------    Diagnostic Data:      Complete Blood Count:   Recent Labs     08/08/22  1745 08/10/22  0930   WBC Result   1. Multiple left ureteral stones with a large obstructing stone at the   ureteropelvic junction. There is left hydronephrosis. 2. Right ureteral stent in expected position. Nonobstructing right   nephrolithiasis. 3. Diverticulosis without diverticulitis. 4. Cholelithiasis without pericholecystic fluid to suggest cholecystitis. 5. Small bilateral pleural effusions with adjacent consolidation, likely   atelectasis. XR CHEST PORTABLE   Final Result   Findings most consistent with pulmonary edema. Manifestation of pneumonia,   possibly an atypical or viral pneumonia not excluded. Correlate clinically.                ASSESSMENT / PLAN:  Sepsis due to urinary tract infection ([address]  Continue current therapy   Appreciate urology  Monitor urine culture-Kleb PN  Stop IV fluids  IV Rocephin   Midline  Cystoscopy with stent placement 8/9  Bacteremia due to [address] PN  Trend sensitivity  Stop IV fluids  Continue IV Rocephin  Midline  Atrial fibrillation with RVR  Appreciate urology  Echocardiogram  Patient currently on Xarelto long-term for history of DVTs  Hypokalemia  Resolved    BARBARA on CKD  Appreciate Nephrology  Likely due to Sepsis  Nutrition status:   morbid obesity  Dietician consult initiated  Hospital Prophylaxis:   DVT: Xarelto   Stress Ulcer:  none    High risk medications: none   Disposition:    Discharge plan is [facility], APRN - CNP , DONG, NP-C  Hospitalist Medicine        8/10/2022, 11:24 AM

## 2022-08-10 NOTE — PROGRESS NOTES
Writer called Access RN at this time to initiate midline catheter placement. Will await return call with LILO

## 2022-08-10 NOTE — CONSULTS
[facility]          [facility]        [address]        [facility], [address]        [phone]           Inpatient Initial consult note         8/10/2022 12:34 PM    Patient Name:   Juan Newton  YOB: 1955  Primary Care Physician:  DONG Lazaro CNP     History Obtained From:  patient       TELEHEALTH EVALUATION -- Audio/Visual (During YLPXM-59 public health emergency)     Telehealth service was provided with the patient at her room [facility] and myself the physician in my hugo office and RN DAVID who has initiated the visit. Pursuant to the emergency declaration under the [facility] waiver authority and the [employer] Act, this Virtual  Visit was conducted, with patient's consent, to reduce the patient's risk of exposure to COVID-19 and provide continuity of care for an established patient. Services were provided through a video synchronous discussion virtually to substitute for in-person clinic visit. Consultation requested by : Shanell Vaca MD    requested for  : Evaluation of  worsening renal function     History of presentingillness   Juan Newton is a 74 y.o.   female with Past Medical History as stated below presented with a chief complaint of Shortness of Breath and Flank Pain   on 8/8/2022 . Patient presented with chief complaints of pain in the left flank, very severe no radiation, started on Monday and progressively gotten worse symptoms very severe associated with some mild difficulty breathing cough and fatigue she also did have some leg swelling nausea and vomiting. No specific modifying factors. In the ED patient was found to have a fever of 102.4, tachycardic tachypneic. She was also found to be in atrial fibrillation with RVR.   She also had slight elevated lactic acid and chest x-ray was concerning for pulmonary edema. Patient presented with a creatinine of 1.36 which has progressively gotten worse and so nephrology has been consulted for further evaluation and management    Patient has undergone a cystoscopy with ureteral stent placement on the left side on 8/9/2022.   She was also started on IV antibiotics for possible UTI     Past History      Past Medical History:   Diagnosis Date    Acute kidney injury superimposed on CKD ([address] 8/10/2022    Atrial fibrillation with RVR ([address] 8/9/2022    Carcinoma ([address]     Squamous Cell    Cellulitis     DVT (deep venous thrombosis) ([address] 2006    LLE    Kidney stone     Lymphedema     Morbid obesity ([address]     Psoas abscess, right ([address]     Pyelonephritis     Wears glasses      Past Surgical History:   Procedure Laterality Date    CARPAL TUNNEL RELEASE  1990s    CT ABSCESS DRAIN SUBCUTANEOUS  01/10/2022    CT ABSCESS DRAIN SUBCUTANEOUS 1/10/2022 [facility] CT SCAN    CT ABSCESS DRAIN SUBCUTANEOUS  04/21/2022    CT ABSCESS DRAIN SUBCUTANEOUS 4/21/2022 [facility] CT SCAN    CT ABSCESS DRAIN SUBCUTANEOUS  4/29/2022    CT ABSCESS DRAIN SUBCUTANEOUS 4/29/2022 [facility] CT SCAN    CYSTOSCOPY N/A 01/04/2022    CYSTOSCOPY URETERAL STENT INSERTION performed by Shaji Mobley MD at [facility]. [place] Right     HLL with stent    CYSTOSCOPY Right 03/01/2022    CYSTOSCOPY URETEROSCOPY LASER-WTIH HLL performed by Shaji Mobley MD at [facility]. [place] Right 03/01/2022    CYSTOSCOPY URETERAL STENT Aashish Cable performed by Shaji Mobley MD at [facility]. [place] Right 04/05/2022    Dr Roldan Rabago with stent insertion    CYSTOSCOPY Right 04/05/2022    CYSTOSCOPY URETEROSCOPY LASER-HLL performed by Shaji Mobley MD at [facility]. [place] Right 04/05/2022    CYSTOSCOPY URETERAL STENT Aashish Cable performed by Shaji Mobley MD at [facility]. [place] Right 04/22/2022    CYSTOSCOPY URETERAL STENT INSERTION (Right )    CYSTOSCOPY Right 4/22/2022 Historical Provider, MD   sodium chloride flush 0.9 % injection Infuse 5-40 mLs intravenously at bedtime PICC line maintenence    Historical Provider, MD   lisinopril (PRINIVIL;ZESTRIL) 20 MG tablet Take 1 tablet by mouth daily  Patient not taking: Reported on 8/8/2022 5/3/22   Terrence Woods MD   amLODIPine (NORVASC) 10 MG tablet Take 1 tablet by mouth daily  Patient not taking: Reported on 8/8/2022 5/2/22   Terrence Woods MD   tamsulosin [facility]) 0.4 MG capsule Take 1 capsule by mouth daily  Patient not taking: Reported on 8/8/2022 4/27/22   Torsten Reyes MD   oxybutynin (DITROPAN XL) 15 MG extended release tablet Take 1 tablet by mouth daily 3/29/22   Joel Vital APRN - CNP   [facility] Natural Products (OSTEO BI-FLEX ADV DOUBLE ST) TABS Take by mouth every morning    Historical Provider, MD   polyethylene glycol (GLYCOLAX) 17 g packet Take 17 g by mouth daily as needed for Constipation     Historical Provider, MD   ipratropium-albuterol (DUONEB) 0.5-2.5 (3) MG/3ML SOLN nebulizer solution Inhale 1 vial into the lungs every 4 hours     Historical Provider, MD   loratadine (CLARITIN) 10 MG capsule Take 10 mg by mouth daily     Historical Provider, MD   Ascorbic Acid (VITAMIN C) 250 MG tablet Take 250 mg by mouth daily    Historical Provider, MD   vitamin E 400 UNIT capsule Take 400 Units by mouth daily    Historical Provider, MD   vitamin D (CHOLECALCIFEROL) 25 MCG (1000 UT) TABS tablet Take 1,000 Units by mouth daily    Historical Provider, MD   rivaroxaban (XARELTO) 20 MG TABS tablet Take 1 tablet by mouth daily (with breakfast) 7/17/18   Darrion Nice MD   Multiple Vitamins-Minerals (THERAPEUTIC MULTIVITAMIN-MINERALS) tablet Take 1 tablet by mouth daily    Historical Provider, MD     Allergies: Patient has no known allergies.   IP meds : Scheduled Meds:   rivaroxaban  15 mg Oral Q24H    vitamin E  400 Units Oral Daily    cefTRIAXone (ROCEPHIN) IV  1,000 mg IntraVENous Q24H    sodium chloride flush  5-40 mL IntraVENous 2 times per day    vitamin C  250 mg Oral Daily    cetirizine  5 mg Oral Daily    therapeutic multivitamin-minerals  1 tablet Oral Daily    oxybutynin  15 mg Oral Daily    Vitamin D  1,000 Units Oral Daily    ipratropium-albuterol  1 vial Inhalation 4x daily     Continuous Infusions:   sodium chloride       Review of Systems Physical Exam   Review of Systems   Constitutional:  Positive for fatigue and fever. Negative for chills. HENT: Negative. Eyes: Negative. Respiratory:  Positive for shortness of breath. Negative for cough. Cardiovascular:  Positive for leg swelling. Negative for chest pain. Gastrointestinal:  Negative for abdominal pain, diarrhea, nausea and vomiting. Genitourinary:  Positive for flank pain. Negative for dysuria, frequency and hematuria. Musculoskeletal:  Negative for back pain and neck pain. Skin:  Negative for rash and wound. Neurological:  Negative for dizziness, light-headedness and headaches. Psychiatric/Behavioral:  Negative for agitation and confusion. Physical Exam  Vitals reviewed. Constitutional:       Appearance: Normal appearance. She is obese. HENT:      Head: Normocephalic and atraumatic. Right Ear: External ear normal.      Left Ear: External ear normal.      Nose: Nose normal.      Mouth/Throat:      Mouth: Mucous membranes are moist.   Eyes:      General:         Right eye: No discharge. Left eye: No discharge. Conjunctiva/sclera: Conjunctivae normal.   Cardiovascular:      Heart sounds: Normal heart sounds. Comments: S1s2 heard per RN  Pulmonary:      Effort: Pulmonary effort is normal. No respiratory distress. Breath sounds: Normal breath sounds. No wheezing. Abdominal:      General: There is distension. Palpations: Abdomen is soft. Tenderness: There is no abdominal tenderness. Musculoskeletal:         General: Swelling present. Cervical back: Normal range of motion and neck supple. Right lower leg: Edema present. Left lower leg: Edema present. Comments: Chronic skin changes   Skin:     General: Skin is warm and dry. Findings: No rash. Neurological:      General: No focal deficit present. Mental Status: She is alert and oriented to person, place, and time. Psychiatric:         Mood and Affect: Mood normal.         Behavior: Behavior normal.         Vitals:    08/10/22 0929   BP:    Pulse:    Resp:    Temp:    SpO2: 96%     Labs, Radiology and Tests       Recent Labs     08/08/22  1745 08/10/22  0930   WBC 3.0* 20.0*   RBC 3.74* 3.06*   HGB 11.0* 8.9*   HCT 37.0 30.9*   MCV 98.9 101.0   MCH 29.4 29.1   MCHC 29.7 28.8   RDW 13.8 14.4    See Reflexed IPF Result     Recent Labs     08/08/22  1745 08/09/22  0410 08/10/22  0610    141 142   K 3.4* 3.3* 4.9    107 111*   CO2 23 22 18*   BUN 21 25* 34*   CREATININE 1.39* 2.15* 2.66*   CALCIUM 8.9 7.8* 8.1*   PROT 7.6 6.2*  --    LABALBU 3.7 3.0*  --    BILITOT 0.28* 0.23*  --    ALKPHOS 65 62  --    AST 16 17  --    ALT 8 7  --        Radiology : CT scan   1. Multiple left ureteral stones with a large obstructing stone at the   ureteropelvic junction. There is left hydronephrosis. 2. Right ureteral stent in expected position. Nonobstructing right   nephrolithiasis. 3. Diverticulosis without diverticulitis. 4. Cholelithiasis without pericholecystic fluid to suggest cholecystitis. 5. Small bilateral pleural effusions with adjacent consolidation, likely   atelectasis.      Other : old lab data have been reviewed and noted that the patients baseline creatinine is around 0.8 in 5/22    Echo 2/22 - EF 62%    Assessment    Renal -acute kidney injury, most likely multifactorial etiology but primarily due to obstructive etiology combined with borderline blood pressures  Currently creatinine is rising and getting worse mostly development some component of ATN  Some mild lower extremity edema noted but not in overt congestive heart failure  Continue IV fluids. Await urine electrolytes  Her home medications include ACE inhibitor but she says she has not been taking that    Electrolytes -hypokalemia status post replacement  Mild metabolic acidosis secondary to acute kidney injury  Left-sided hydronephrosis status post stent placement 8/9/2022  Possible UTI on antibiotics await culture and sensitivities  Meds reviewed and discussed with patient and RN      **This report has been created using voice recognition software. It maycontain minor  errors which are inherent in voice recognition technology. **    Shayan Archibald MD,M.D  [facility].

## 2022-08-10 NOTE — RT PROTOCOL NOTE
RESPIRATORY ASSESSMENT PROTOCOL                                                                                              Patient Name: French Carrizales Room#: 3853/[id number] : 1955     Admitting diagnosis: Calculus in urethra [N21.1]  Septicemia ([facility] [A41.9]  Urologic disorders [N39.9]  Sepsis ([facility] [A41.9]       Medical History:   Past Medical History:   Diagnosis Date    Acute kidney injury superimposed on CKD ([facility] 8/10/2022    Atrial fibrillation with RVR ([facility] 2022    Carcinoma ([facility]     Squamous Cell    Cellulitis     DVT (deep venous thrombosis) ([facility]     LLE    Kidney stone     Lymphedema     Morbid obesity ([facility]     Psoas abscess, right ([facility]     Pyelonephritis     Wears glasses        PATIENT ASSESSMENT    LABORATORY DATA  Hematology:   Lab Results   Component Value Date/Time    WBC 20.0 08/10/2022 09:30 AM    RBC 3.06 08/10/2022 09:30 AM    HGB 8.9 08/10/2022 09:30 AM    HCT 30.9 08/10/2022 09:30 AM    PLT See Reflexed IPF Result 08/10/2022 09:30 AM     Chemistry:    Lab Results   Component Value Date/Time    PHART 7.466 2018 02:51 PM    EWB3OMN 35.5 2018 02:51 PM    PO2ART 72.5 2018 02:51 PM    E3BLJYMU 95.5 2018 02:51 PM    XBT6ALA 25.0 2018 02:51 PM    PBEA 1.7 2018 02:51 PM       VITALS  Heart Rate: 94   Resp: 20  BP: (!) 100/57  SpO2: 96 % O2 Device: Nasal cannula  Temp: 97.7 °F (36.5 °C)    SKIN COLOR  [x] Normal  [] Pale  [] Dusky  [] Cyanotic    RESPIRATORY PATTERN  [x] Normal  [] Dyspnea  [] Cheyne-Ro  [] Kussmaul  [] Biots    AMBULATORY  [x] Yes  [] No  [] With Assistance      Patient Acuity 0 1 2 3 4 Score   Level of Concious (LOC) [x]  Alert & Oriented or Pt normal LOC []  Confused;follows directions []  Confused & uncooper-ative []  Obtunded []  Comatose 0   Respiratory Rate  (RR) [x]  Reg. rate & pattern. 12 - 20 bpm  []  Increased RR.  Greater than 20 bpm   []  SOB w/ exertion or RR greater than 24 bpm []  Access- ory muscle use at rest. Abn.  resp. []  SOB at rest.   0   Bilateral Breath Sounds (BBS) []  Clear [x]  Diminish-ed bases  []  Diminish-ed t/o, or rales   []  Sporadic, scattered wheezes or rhonchi []  Persistentwheezes and, or absent BBS 1   Cough [x]  Strong, effective, & non-prod. []  Effective & prod. Less than 25 ml (2 TBSP) over past 24 hrs []  Ineffective & non-prod to less than 25 ML over past 24 hrs []  Ineffective and, or greater than 25 ml sputum prod. past 24 hrs. []  Nonspon- taneous; Requires suctioning 0   Pulmonary History  (PULM HX) []  No smoking and no chronic pulmonaryhistory [x]  Former smoker. Quit over 12 mos. ago []  Current smoker or quit w/ in 12 mos []  Pulm. History and, or 20 pk/yr smoking hx []  Admitted w/ acute pulm. dx and, or has been admitted w/ pulm. dx 2 or more times over past 12 mos 1   Surgical History this Admit  (SURG HX) []  No surgery [x]  General surgery []  Lower abdominal []  Thoracic or upper abdominal   []  Thoracic w/ pulm. disease 1   Chest X-Ray (CXR)/CT Scan []  Clear or not applicable []  Not available []  Atelect- asis or pleural effusions [x]  Localized infiltrate or pulm. edema []  Con-solidated Infiltrates, bilateral, or in more than 1 lobe 3   Slow or Forced VC, FEV1 OR PEFR (PULM FXN)  [x]  80% or greater, or not indicated []  Pt. unable to perform []  FEV1 or PEFR or VC 51-79%.   []  FEV1 or PEFR or VC  30-49%   []  FEV1 or PEFR or VC less than 30%   0   TOTAL ACUITY: 5       CARE PLAN    If Acuity Level is 2, 3, or 4 in any of the following:    [] BILATERAL BREATH SOUNDS (BBS)     [x] PULMONARY HISTORY (PULM HX)  [] PULMONARY FUNCTION (PULM FX)    Goal: Improve respiratory functions in patients with airway disease and decrease WOB    [x] AEROSOL PROTOCOL    Total Acuity:   16-32  []  Secondary Assessment in 24 hrs Total Acuity:  9-15  []  Secondary Assessment in 24 hrs Total Acuity:  4-8  [x]  Secondary Assessment in 48 hrs Total Acuity:  0-3  []  Secondary Assessment in 72 hrs   HHN AEROSOL THERAPY with  [physician-ordered bronchodilator(s)] q 4 & Albuterol PRN q2 hrs. Breath-Actuated Neb if BBS Acuity = 4, and pt. can use MP. Notify physician if condition deteriorates. HHN AEROSOL THERAPY with  [physician-ordered bronchodilator(s)]  QID and Albuterol PRN q4 hrs. Breath-Actuated Neb if BBS Acuity = 4, and pt. can use MP. Notify physician if condition deteriorates. MDI THERAPY with  2 actuations of [physician-ordered bronchodilator(s)] via spacer TID Albuterol and PRNq4 hrs. If unable to utilize MDI: HHN [physician-ordered bronchodilator(s)] TID and Albuterol PRN q4 hrs. Notify physician if condition deteriorates. MDI THERAPY with  [physician-ordered bronchodilator(s)] via spacer TID PRN. If unable to utilize MDI: HHN [physician-ordered bronchodilator(s)] TID PRN. Notify physician if condition deteriorates. If Acuity Level is 2, 3, or 4 in any of the following:    [] COUGH     [] SURGICAL HISTORY (SURG HX)  [] CHEST XRAY (CXR)    Goal: Improvement in sputum mobilization in patients with ineffective airway clearance. Reverse atelectasis. [] Bronchopulmonary Hygiene Protocol    Total Acuity:   16-32  []  Secondary Assessment in 24 hrs Total Acuity:  9-15  []  Secondary Assessment in 24 hrs Total Acuity:  4-8  []  Secondary Assessment in 48 hrs Total Acuity:  0-3  []  Secondary Assessment in 72 hrs   METANEB QID with [physician-ordered bronchodilator(s)] if CXR Acuity = 4; otherwise:  PD&P, PEP, or Vest QID & PRN  NT Sxn PRN for ineffective cough  METANEB QID with [physician-ordered bronchodilator(s)] if CXR Acuity = 4; otherwise:  PD&P, PEP, or Vest TID & PRN  NT Sxn PRN for ineffective cough  Instruct patient to self-perform IS q1hr WA   Directed Cough self-performed q1hr WA     If Acuity Level is 2 or above in the following:    [] PULMONARY HISTORY (PULM HX)    Goal: Assist patient in quitting smoking to slow or stop the progression of lung disease.     [] Smoking Cessation Protocol    SMOKING CESSATION EDUCATION provided according to policy VT_611: (suma with an X)  ____Yes    ____ No     ____ NA    Smoking Cessation Booklet given:  ____Yes  ____No ____Patient Kimani Antony

## 2022-08-10 NOTE — PROGRESS NOTES
Patient was assisted up to bedside commode at this time to urinate. Patient voided 200 mL of magalys, cloudy urine and urine was collected for urine sample per order. Bladder scan was completed for post-void residual per order from Dr. Onesimo Tan, bladder scan showed 88 mL urine in the bladder. IV's to left arm were removed at this time as well per patients request since she has a midline to right arm and dressings were applied to sites. IV fluids started at this time as well. Patient resting in bed when writer left room, denies any other needs. Will continue to monitor.

## 2022-08-10 NOTE — PROGRESS NOTES
Patient reassessment  and vitals completed at this time as charted. Patient repositioned. Patients lombardo continues to be intact and draining. Patient states no further needs, call light in reach, will continue to monitor.

## 2022-08-10 NOTE — PROGRESS NOTES
Physical Therapy  Facility/Department: [facility] MED SURG  Physical Therapy Initial Assessment    Name: Yeison Walter  : 1955  MRN: [id number]  Date of Service: 8/10/2022    Discharge Recommendations:  Subacute/Skilled Nursing Facility   PT Equipment Recommendations  Equipment Needed: No      Patient Diagnosis(es): The primary encounter diagnosis was Calculus in urethra. A diagnosis of Septicemia ([address] was also pertinent to this visit. Past Medical History:  has a past medical history of Atrial fibrillation with RVR ([address] Carcinoma ([address] Cellulitis, DVT (deep venous thrombosis) ([address] Kidney stone, Lymphedema, Morbid obesity ([address] Psoas abscess, right ([address] Pyelonephritis, and Wears glasses. Past Surgical History:  has a past surgical history that includes Tubal ligation (); Carpal tunnel release (); Axis tooth extraction; Tubal ligation; Total abdominal hysterectomy w/ bilateral salpingoophorectomy; Cystoscopy (N/A, 2022); IR GUIDED NEPHROSTOMY CATH PLACEMENT RIGHT (2022); Insert Midline Catheter (2022); CT ABSCESS DRAINAGE (01/10/2022); Cystoscopy (Right); Cystoscopy (Right, 2022); Cystoscopy (Right, 2022); Cystoscopy (Right, 2022); Cystoscopy (Right, 2022); Cystoscopy (Right, 2022); CT ABSCESS DRAINAGE (2022); Cystoscopy (Right, 2022); Cystoscopy (Right, 2022);   picc powerpic single (2022); CT ABSCESS DRAINAGE (2022); and Cystoscopy (Left, 2022). Assessment   Body Structures, Functions, Activity Limitations Requiring Skilled Therapeutic Intervention: Decreased functional mobility ; Decreased strength;Decreased ADL status; Decreased high-level IADLs;Decreased endurance  Assessment: DX SEPSIS,UTI,KIDNEY STONES. MOD ASSIST TRANSFERS,MOD ASSIST BED MOBILITY. POOR STANDING BALANCE. Treatment Diagnosis: GENERALIZED WEAKNESS.   Therapy Prognosis: Good  Decision Making: Medium Complexity  Requires PT Follow-Up: Yes  Activity Tolerance  Activity Tolerance: Patient tolerated treatment well     Plan   Plan  Plan: 2 times a day 7 days a week  Current Treatment Recommendations: Strengthening, Functional mobility training, Transfer training, Gait training, Safety education & training, Patient/Caregiver education & training, Neuromuscular re-education  Safety Devices  Type of Devices: Call light within reach, Left in chair, Nurse notified     Restrictions  Restrictions/Precautions  Restrictions/Precautions:  (UP WITH ASSIST.)  Required Braces or Orthoses?: No     Subjective   General  Chart Reviewed: Yes  Patient assessed for rehabilitation services?: Yes  Additional Pertinent Hx: SEPSIS UTI,KIDNEY STONES. Family / Caregiver Present: No  Diagnosis: UTI,SEPSIS,KIDNEY STONES. Follows Commands: Within Functional Limits         Social/Functional History  Social/Functional History  Lives With: Family  Type of Home: House  Home Layout: One level  Home Access: Stairs to enter with rails  Vision/Hearing  Vision  Vision: Within Functional Limits  Hearing  Hearing: Within functional limits    Cognition   Orientation  Overall Orientation Status: Within Normal Limits  Cognition  Overall Cognitive Status: WNL     Objective   Heart Rate: 94  Heart Rate Source: Apical;Monitor  BP: (!) 100/57  BP Location: Right lower arm  BP Method: Automatic  Patient Position: Semi fowlers  MAP (Calculated): 71.33  Resp: 20  SpO2: 97 %  O2 Device: Nasal cannula     Observation/Palpation  Posture: Poor        AROM RLE (degrees)  RLE AROM: WFL  AROM LLE (degrees)  LLE AROM : WFL  AROM RUE (degrees)  RUE AROM : WFL  AROM LUE (degrees)  LUE AROM : WFL  Strength RLE  Strength RLE: Exception  Comment: 3/5  Strength LLE  Strength LLE: Exception  Comment: 3/5  Strength RUE  Strength RUE: Exception  Comment: 3/5  Strength LUE  Strength LUE: Exception  Comment: 3/5        Bed Mobility Training  Bed Mobility Training: Yes  Overall Level of Assistance:  Moderate assistance  Interventions: Demonstration  Rolling: Moderate assistance  Supine to Sit: Moderate assistance  Sit to Supine: Moderate assistance  Scooting: Moderate assistance  Balance  Sitting: Intact  Standing: Impaired  Standing - Static: Poor  Standing - Dynamic: Poor  Transfer Training  Transfer Training: Yes  Overall Level of Assistance: Moderate assistance  Interventions: Demonstration  Sit to Stand: Moderate assistance  Stand to Sit: Moderate assistance  Stand Pivot Transfers: Moderate assistance  Bed to Chair: Moderate assistance  Gait Training  Gait Training: Yes  Right Side Weight Bearing: As tolerated  Left Side Weight Bearing: As tolerated  Gait  Overall Level of Assistance: Moderate assistance  Interventions: Demonstration  Base of Support: Widened  Distance (ft): 5 Feet  Assistive Device: Walker, rolling                 Exercise Treatment: THER EX SITTING. OutComes Score                                                  AM-PAC Score             Tinneti Score       Goals  Short Term Goals  Time Frame for Short term goals: 3 DAYS  Short term goal 1: CGA TRANSFERS  Short term goal 2: CGA GAIT FWW. Additional Goals?: No  Long Term Goals  Time Frame for Long term goals : 4 WEEKS  Long term goal 1: IND GAIT FWW,IND TRANSFERS. Patient Goals   Patient goals : RETURN HOME WITH ASSIST.        Education         Therapy Time   Individual Concurrent Group Co-treatment   Time In 0700         Time Out 0730         Minutes 30         Timed Code Treatment Minutes: Akbar Huffman, PT

## 2022-08-10 NOTE — PROGRESS NOTES
Writer at bedside at this time assisting therapy to move patient from chair to bed. Upon standing the patient up, patients lombardo catheter is noted to be out of the patient and the balloon is not inflated. Writer called Dr. Radha Dyson office, per Dr. Kendra López, lombardo catheter can be left out. Will update patient.

## 2022-08-10 NOTE — PROGRESS NOTES
Reassessment and vitals obtained at this time as charted. Vitals WNL, patient is complaining of 10 out of 10 pain in her head - see MAR. SpO2 97% on 1 L via nasal cannula so patient was placed on room air at this time. Patient is alert and oriented x4, assessment otherwise obtained as charted. Access RN is also at bedside at this time to insert PICC line. Will continue to monitor.

## 2022-08-10 NOTE — PROGRESS NOTES
Discussed discharge plans with the patient. Patient is a 74year old female here with sepsis due to UTI. She is alert , oriented , and pleasant during our conversation     Patient is  and lives at home with her  & daughter. She uses a walker for ambulation. The  and daughter do all of the cooking and the cleaning. She is independent with her ADL's. The daughter manages the medications. Patient does drive. She has no outside services at this time. Her PCP is DONG Dupont CNP. She has medical insurance that helps with medication costs. The discharge plan is to go to SNF for IV medications and therapy. She chose [facility] home. Referral made and paper work fax. She does not have advance directives. LSW to monitor and assist with any needs or concerns as they arise.     KATHRYN Oneill

## 2022-08-10 NOTE — PROGRESS NOTES
[facility]  Inpatient/Observation/Outpatient Rehabilitation    Date: 8/10/2022  Patient Name: Pernell Nissen       [x] Inpatient Acute/Observation       []  Outpatient  : 1955         [x] Pt refused/declined therapy at this time due to: Pt refused stating, \"I'm not feeling well. I have a terrible headache. \"                Driss Raw, PTA Date: 8/10/2022

## 2022-08-10 NOTE — PROGRESS NOTES
Progress Note  Boaz Reagan MD    OBJECTIVE:    Patient seen for f/u of Sepsis due to urinary tract infection ([address] She had stent,blood culture grew klebseila     ROS:   Constitutional: negative  for fevers, and negative for chills. Respiratory: negative for shortness of breath, negative for cough, and negative for wheezing  Cardiovascular: negative for chest pain, and negative for palpitations  Gastrointestinal: negative for abdominal pain, negative for nausea,negative for vomiting, negative for diarrhea, and negative for constipation     All other systems were reviewed with the patient and are negative unless otherwise stated in HPI    OBJECTIVE:  Vitals:   Temp: 97.9 °F (36.6 °C)  BP: (!) 112/59  Resp: 20  Heart Rate: 86  SpO2: 95 %    24HR INTAKE/OUTPUT:    Intake/Output Summary (Last 24 hours) at 8/10/2022 1735  Last data filed at 8/10/2022 1540  Gross per 24 hour   Intake 2834.5 ml   Output 725 ml   Net 2109.5 ml     -----------------------------------------------------------------  Exam:  GEN:    Awake, alert and oriented x3. EYES:  EOMI, pupils equal   NECK: Supple. No lymphadenopathy. No carotid bruit  CVS:    regular rate and rhythm, 2/6 systolic murmur  PULM:  CTA, no wheezes, rales or rhonchi, no acute respiratory distress  ABD:    Bowels sounds normal.  Abdomen is soft. No distention. no tenderness to palpation. EXT:   3+ edema bilaterally . No calf tenderness. NEURO: Moves all extremities. Motor and sensory are grossly intact  SKIN:  No rashes.   No skin lesions.    -----------------------------------------------------------------  Diagnostic Data:    All available data reviewed  Lab Results   Component Value Date    WBC 20.0 (H) 08/10/2022    HGB 8.9 (L) 08/10/2022    .0 08/10/2022    PLT See Reflexed IPF Result 08/10/2022      Lab Results   Component Value Date    GLUCOSE 96 08/10/2022    BUN 34 (H) 08/10/2022    CREATININE 2.66 (H) 08/10/2022     08/10/2022    K 4.9 08/10/2022    CALCIUM 8.1 (L) 08/10/2022     (H) 08/10/2022    CO2 18 (L) 08/10/2022       PROBLEM LIST:  Principal Problem:    Sepsis due to urinary tract infection ([address]  Active Problems:    Obesity, Class III, BMI 40-49.9 (morbid obesity) ([address]    Sepsis ([address]    Urologic disorders    Bacteremia due to Gram-negative bacteria    Hypoxia    Atrial fibrillation with rapid ventricular response (HCC)    Renal failure syndrome    Abnormal ECG    Acute kidney injury superimposed on CKD (HCC)    Chronic anticoagulation    Lymphedema of both lower extremities    Ureteral calculi  Resolved Problems:    * No resolved hospital problems. *      ASSESSMENT / PLAN:  Sepsis due to urinary tract infection ([address]  Principal Problem:    Sepsis due to urinary tract infection ([address]- continue iv antibiotics  Active Problems:    Obesity, Class III, BMI 40-49.9 (morbid obesity) ([address]    Sepsis ([address]    Urologic disorders    Bacteremia due to Gram-negative bacteria    Hypoxia    Atrial fibrillation with rapid ventricular response (HCC)    Renal failure syndrome    Abnormal ECG    Acute kidney injury superimposed on CKD (HCC)    Chronic anticoagulation    Lymphedema of both lower extremities    Ureteral calculi  Resolved Problems:    * No resolved hospital problems. *        Nutrition status:  Well developed, well nourished with no malnutrition  DVT prophylaxis: Eliquis   High risk medications: none   Disposition:  Discharge plan is F    Dali Figueroa MD , M.D.  8/10/2022  5:35 PM

## 2022-08-10 NOTE — PROGRESS NOTES
[facility]          Department of Pharmacy   Pharmacy Renal Adjustment Note    Pernell Nissen is a 74 y.o. female. Pharmacist assessment of renally cleared medications. Recent Labs     08/08/22  1745 08/09/22  0410 08/10/22  0610   CREATININE 1.39* 2.15* 2.66*     Estimated Creatinine Clearance: 24 mL/min (A) (based on SCr of 2.66 mg/dL (H)). Height:   Ht Readings from Last 1 Encounters:   08/09/22 5' 1\" (1.549 m)     Weight:  Wt Readings from Last 1 Encounters:   08/10/22 260 lb (117.9 kg)       The following medication(s) have been adjusted based upon renal function:             Xarelto adjusted to 15 mg every 24 hours for Crcl 15-50 ml/min. Atrial fibrillation, nonvalvular (to prevent stroke and systemic embolism):    CrCl >50 mL/minute: No dosage adjustment necessary.     CrCl 15 to 50 mL/minute: 15 mg once daily with the evening meal       Adela Lutz [facility],8/10/2022,8:46 AM

## 2022-08-10 NOTE — DISCHARGE INSTR - COC
Continuity of Care Form    Patient Name: Bogdan Allison   :  1955  MRN:  [id number]    Admit date:  2022  Discharge date:  2022    Code Status Order: Full Code   Advance Directives:     Admitting Physician:  Hannah Newton MD  PCP: DONG Carrion CNP    Discharging Nurse: Xavi W Sj  Unit/Room#: 0317/0317-01  Discharging Unit Phone Number: [phone]    Emergency Contact:   Extended Emergency Contact Information  Primary Emergency Contact: Jaime Kay  Address: [address] Phone: [phone]  Work Phone: [phone]  Mobile Phone: [phone]  Relation: Spouse  Hearing or visual needs: None  Other needs: None  Preferred language: English   needed? No  Secondary Emergency Contact: [facility]  Mobile Phone: [phone]  Relation: Child   needed?  No    Past Surgical History:  Past Surgical History:   Procedure Laterality Date    CARPAL TUNNEL RELEASE  1990s    CT ABSCESS DRAIN SUBCUTANEOUS  01/10/2022    CT ABSCESS DRAIN SUBCUTANEOUS 1/10/2022 [facility] CT SCAN    CT ABSCESS DRAIN SUBCUTANEOUS  2022    CT ABSCESS DRAIN SUBCUTANEOUS 2022 [facility] CT SCAN    CT ABSCESS DRAIN SUBCUTANEOUS  2022    CT ABSCESS DRAIN SUBCUTANEOUS 2022 [facility] CT SCAN    CYSTOSCOPY N/A 2022    CYSTOSCOPY URETERAL STENT INSERTION performed by Romana Salk, MD at [address] Right     HLL with stent    CYSTOSCOPY Right 2022    CYSTOSCOPY URETEROSCOPY LASER-WTIH HLL performed by Romana Salk, MD at [address] Right 2022    CYSTOSCOPY URETERAL STENT INSERTION-EXCHANGE performed by Romana Salk, MD at [address] Right 2022    Dr Marquise Quiroz with stent insertion    CYSTOSCOPY Right 2022    CYSTOSCOPY URETEROSCOPY LASER-HLL performed by Romana Salk, MD at [address] Right 2022    CYSTOSCOPY URETERAL STENT Armando Quintana performed by Bronwyn Kahn MD at [address] [place] [address] Right 04/22/2022    CYSTOSCOPY URETERAL STENT INSERTION (Right )    CYSTOSCOPY Right 4/22/2022    CYSTOSCOPY URETERAL STENT INSERTION performed by Nj Mejia MD at [address] Left 8/9/2022    CYSTOSCOPY URETERAL STENT INSERTION performed by Bronwyn Kahn MD at [address] [facility]  PICC [address] [facility] SINGLE  4/26/2022         INSERT MIDLINE CATHETER  01/07/2022         IR NEPHROSTOMY PERCUTANEOUS RIGHT  01/05/2022    IR NEPHROSTOMY PERCUTANEOUS RIGHT 1/5/2022 Baltazar Morales MD [facility] PROC[facility]    ANNABELLA AND BSO (CERVIX REMOVED)      05/2019    TUBAL LIGATION  1993    TUBAL LIGATION      WISDOM TOOTH EXTRACTION         Immunization History:   Immunization History   Administered Date(s) Administered    COVID-19, MODERNA BLUE border, Primary or Immunocompromised, (age 12y+), IM, 100 mcg/0.5mL 04/28/2021, 05/26/2021, 02/01/2022       Active Problems:  Patient Active Problem List   Diagnosis Code    Acute thromboembolism of deep veins of lower extremity (HCC) I82.409    Chronic anticoagulation Z79.01    Bilateral cellulitis of lower leg L03.116, L03.115    Acute shoulder pain due to trauma M25.519, G89.11    Lymphedema of both lower extremities I89.0    Tobacco abuse Z72.0    History of recurrent deep vein thrombosis (DVT) Z86.718    Gross hematuria R31.0    Frequency of urination R35.0    Nocturia R35.1    Mixed incontinence N39.46    Constipation K59.00    Cellulitis of lower leg L03.119    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    Cellulitis of left lower extremity A58.267    Complicated UTI (urinary tract infection) N39.0    Renal calculus N20.0    Cellulitis L03.90    Normocytic anemia D64.9    Iron deficiency anemia D50.9    Renal abscess, right N15.1    Bacteremia due to Klebsiella pneumoniae R78.81, B96.1    Psoas muscle abscess ([address] K68.12    Septicemia due to Klebsiella pneumoniae ([facility]) A41.4    Ureteral calculi N20.1    Intra-abdominal abscess ([facility]) K65.1    Obesity, Class III, BMI 40-49.9 (morbid obesity) ([facility]) E66.01    Hypocalcemia E83.51    Hypokalemia E87.6    Vertebral osteomyelitis ([facility]) T12-L1 M46.20    Pleural effusion on right J90    CRP elevated R79.82    Sepsis ([facility]) A41.9    Urologic disorders N39.9    Bacteremia due to Gram-negative bacteria R78.81    Hypoxia R09.02    Atrial fibrillation with rapid ventricular response ([facility]) I48.91    Sepsis due to urinary tract infection ([facility]) A41.9, N39.0    Renal failure syndrome N19    Abnormal ECG R94.31    Acute kidney injury superimposed on CKD ([facility]) N17.9, N18.9    Calculus in urethra N21.1       Isolation/Infection:   Isolation            Contact          Patient Infection Status       Infection Onset Added Last Indicated Last Indicated By Review Planned Expiration Resolved Resolved By    ESBL (Extended Spectrum Beta Lactamase) 08/08/22 08/10/22 08/08/22 Culture, Urine        Resolved    COVID-19 (Rule Out) 08/08/22 08/08/22 08/08/22 COVID-19, Rapid (Ordered)   08/08/22 Rule-Out Test Resulted    COVID-19 (Rule Out) 04/20/22 04/20/22 04/20/22 COVID-19, Rapid (Ordered)   04/20/22 Rule-Out Test Resulted    COVID-19 (Rule Out) 01/02/22 01/02/22 01/02/22 COVID-19, Rapid (Ordered)   01/02/22 Rule-Out Test Resulted            Nurse Assessment:  Last Vital Signs: /74   Pulse 81   Temp 96.8 °F (36 °C) (Temporal)   Resp 20   Ht 5' 1\" (1.549 m)   Wt 262 lb (118.8 kg)   LMP  (LMP Unknown)   SpO2 95%   BMI 49.50 kg/m²     Last documented pain score (0-10 scale): Pain Level: 10  Last Weight:   Wt Readings from Last 1 Encounters:   08/12/22 262 lb (118.8 kg)     Mental Status:  oriented and alert    IV Access:  - PICC - site  R Upper Arm, insertion date: 8/10/2022 - midline    Nursing Mobility/ADLs:  Walking   Assisted  Transfer  Assisted  Bathing  Assisted  Dressing  Assisted  Toileting  Assisted  Feeding Schedule:  Phone:  Fax:    / signature: Electronically signed by KATHRYN Leger on 8/10/22 at 2:54 PM EDT    PHYSICIAN SECTION    Prognosis: Fair    Condition at Discharge: Stable    Rehab Potential (if transferring to Rehab): Fair    Recommended Labs or Other Treatments After Discharge: cbc w/ diff, bmp ua in 1 week    Physician Certification: I certify the above information and transfer of Bruna Jaramillo  is necessary for the continuing treatment of the diagnosis listed and that she requires [facility] for less 30 days.      Update Admission H&P: No change in H&P    PHYSICIAN SIGNATURE:  Electronically signed by DONG Rosenthal CNP on 8/12/22 at 9:15 AM EDT

## 2022-08-10 NOTE — PROGRESS NOTES
Verena Watt am scribing for and in the presence of Jake Garsia MD, F.A.C.C..      Patient: Zaki Savage  : 1955  Date of Admission: 2022  Primary Care Physician: Pooja Flores  Today's Date: 8/10/2022    REASON FOR CONSULTATION: Shortness of Breath and Flank Pain      HPI:  Ms. Neldon Goldmann is a 74 y.o. female who was admitted to the hospital because of shortness of breath and kidney stone. She also found to have sepsis secondary to urinary tract infection. In the ER she was found to be in  atrial fibrillation with RVR leading to this consultation. She denied any prior history of atrial fibrillation. She has been on long-term anticoagulation because of history of DVT. She has history of lymphedema and uses pneumatic compression devices at home. Ms. Neldon Goldmann denies any known history of heart problems in the past including heart attacks. Ms. Neldon Goldmann was admitted for shortness of breath and left flank pain. She has a history of nephrolithiasis and underwent ureteric  stent placement back in May 2022. She presented again  with left flank pain and shortness of breath. She is currently treated with ceftriaxone. Blood culture is positive for Klebsiella pneumoniae. Nephrology and urology are on board. Her breathing looks much better today. Unfortunately her kidney function is getting worse. Her functional capacity extremely limited because of significant lymphedema and obesity. Her hemoglobin dropped from 11 g/dL to 8.9 g/dL but I think this is partially caused by hydration. She lost her Brooks catheter this morning. She is making concentrated urine still. Telemetry reviewed, no recurrence of atrial fibrillation. CT of the chest was done yesterday, mild cardiomegaly. No pulmonary vascular congestion. Minimal bilateral pleural effusion with adjacent atelectasis.     Past Medical History:   Diagnosis Date    Acute kidney injury superimposed on CKD ([address] 8/10/2022    Atrial fibrillation with RVR ([address] 8/9/2022    Carcinoma ([address]     Squamous Cell    Cellulitis     DVT (deep venous thrombosis) ([address] 2006    LLE    Kidney stone     Lymphedema     Morbid obesity ([address]     Psoas abscess, right ([address]     Pyelonephritis     Wears glasses        CURRENT ALLERGIES: Patient has no known allergies. REVIEW OF SYSTEMS: 14 systems were reviewed. Pertinent positives and negatives as above, all else negative.      Past Surgical History:   Procedure Laterality Date    CARPAL TUNNEL RELEASE  1990s    CT ABSCESS DRAIN SUBCUTANEOUS  01/10/2022    CT ABSCESS DRAIN SUBCUTANEOUS 1/10/2022 [facility] CT SCAN    CT ABSCESS DRAIN SUBCUTANEOUS  04/21/2022    CT ABSCESS DRAIN SUBCUTANEOUS 4/21/2022 [facility] CT SCAN    CT ABSCESS DRAIN SUBCUTANEOUS  4/29/2022    CT ABSCESS DRAIN SUBCUTANEOUS 4/29/2022 [facility] CT SCAN    CYSTOSCOPY N/A 01/04/2022    CYSTOSCOPY URETERAL STENT INSERTION performed by Karla Chavarria MD at [place] Right     HLL with stent    CYSTOSCOPY Right 03/01/2022    CYSTOSCOPY URETEROSCOPY LASER-WTIH HLL performed by Karla Chavarria MD at [place] Right 03/01/2022    CYSTOSCOPY URETERAL STENT INSERTION-EXCHANGE performed by Karla Chavarria MD at [place] Right 04/05/2022    Dr Turner Apt with stent insertion    CYSTOSCOPY Right 04/05/2022    CYSTOSCOPY URETEROSCOPY LASER-HLL performed by Karla Chavarria MD at [place] Right 04/05/2022    CYSTOSCOPY URETERAL STENT Olga Newer performed by Karla Chavarria MD at [place] Right 04/22/2022    CYSTOSCOPY URETERAL STENT INSERTION (Right )    CYSTOSCOPY Right 4/22/2022    CYSTOSCOPY URETERAL STENT INSERTION performed by Bowen Choi MD at [facility] Left 8/9/2022    CYSTOSCOPY URETERAL STENT INSERTION performed by Karla Chavarria MD at [address] [facility].  PICC [address] [facility] [address] SINGLE  4/26/2022         INSERT MIDLINE CATHETER  01/07/2022         IR NEPHROSTOMY PERCUTANEOUS RIGHT 2022    IR NEPHROSTOMY PERCUTANEOUS RIGHT 2022 Mike Hollis MD [facility]    ANNABELLA AND BSO (CERVIX REMOVED)      2019    TUBAL LIGATION  1993    TUBAL LIGATION      WISDOM TOOTH EXTRACTION      Social History:  Social History     Tobacco Use    Smoking status: Former     Packs/day: 0.50     Years: 42.00     Pack years: 21.00     Types: Cigarettes     Quit date: 2022     Years since quittin.3    Smokeless tobacco: Never   Vaping Use    Vaping Use: Never used   Substance Use Topics    Alcohol use: No     Alcohol/week: 0.0 standard drinks    Drug use: No        CURRENT MEDICATIONS   rivaroxaban  15 mg Oral Q24H    vitamin E  400 Units Oral Daily    ipratropium-albuterol  1 vial Inhalation TID    cefTRIAXone (ROCEPHIN) IV  1,000 mg IntraVENous Q24H    sodium chloride flush  5-40 mL IntraVENous 2 times per day    vitamin C  250 mg Oral Daily    cetirizine  5 mg Oral Daily    therapeutic multivitamin-minerals  1 tablet Oral Daily    oxybutynin  15 mg Oral Daily    Vitamin D  1,000 Units Oral Daily           FAMILY HISTORY: family history includes Cancer in her mother. She was adopted. PHYSICAL EXAM:   BP (!) 112/59   Pulse 86   Temp 97.9 °F (36.6 °C) (Temporal)   Resp 20   Ht 5' 1\" (1.549 m)   Wt 260 lb (117.9 kg)   LMP  (LMP Unknown)   SpO2 95%   BMI 49.13 kg/m²  Body mass index is 49.13 kg/m². Constitutional: She is oriented to person, place, and time. She appears well-developed and well-nourished. In no acute distress. HEENT: Normocephalic and atraumatic. No JVD present. Carotid bruit is not present. No mass and no thyromegaly present. No lymphadenopathy present. Cardiovascular: Normal rate, regular rhythm, normal heart sounds. Exam reveals no gallop and no friction rubs. No murmur was heard. .   Pulmonary/Chest: Poor air entry bilaterally with diffuse wheezes. Bilateral scattered crackles noted. Abdominal: Soft, non-tender.  Bowel sounds and aorta are normal. She exhibits no organomegaly, mass or bruit. Extremities: Massive Lymphedema noted bilaterally. No cyanosis or clubbing. 2+ radial and carotid pulses. Distal extremity pulses: 2+ bilaterally. Neurological: She is alert and oriented to person, place, and time. No evidence of gross cranial nerve deficit. Coordination appeared normal.   Skin: Skin is warm and dry. There is no rash or diaphoresis. Psychiatric: She has a normal mood and affect.  Her speech is normal and behavior is normal.      MOST RECENT LABS ON RECORD:   Lab Results   Component Value Date    WBC 20.0 (H) 08/10/2022    HGB 8.9 (L) 08/10/2022    HCT 30.9 (L) 08/10/2022    PLT See Reflexed IPF Result 08/10/2022    ALT 7 08/09/2022    AST 17 08/09/2022     08/10/2022    K 4.9 08/10/2022     (H) 08/10/2022    CREATININE 2.66 (H) 08/10/2022    BUN 34 (H) 08/10/2022    CO2 18 (L) 08/10/2022    TSH 3.93 04/26/2022    INR 1.0 04/29/2022    LABA1C 6.2 (H) 01/06/2022         ASSESSMENT:  Patient Active Problem List    Diagnosis Date Noted    Acute kidney injury superimposed on CKD ([address] 08/10/2022    Bacteremia due to Gram-negative bacteria 08/09/2022    Hypoxia 08/09/2022    Atrial fibrillation with rapid ventricular response ([address] 08/09/2022    Sepsis due to urinary tract infection ([address] 08/09/2022    Renal failure syndrome 08/09/2022    Abnormal ECG 08/09/2022    Urologic disorders 08/08/2022    Sepsis ([address] 05/03/2022    CRP elevated     Intra-abdominal abscess (HCC) 04/21/2022    Obesity, Class III, BMI 40-49.9 (morbid obesity) ([address] 04/21/2022    Hypocalcemia 04/21/2022    Hypokalemia 04/21/2022    Vertebral osteomyelitis (HCC) T12-L1 04/21/2022    Pleural effusion on right 04/21/2022    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    Ureteral calculi 02/28/2022    Psoas muscle abscess ([address] [address] [address] 01/08/2022    Septicemia due to Klebsiella pneumoniae ([facility] 01/08/2022    Bacteremia due to Klebsiella pneumoniae 01/04/2022    Renal abscess, right 01/02/2022    Iron deficiency anemia 01/24/2021    Normocytic anemia 01/23/2021    Cellulitis 80/87/8858    Complicated UTI (urinary tract infection) 12/28/2020    Renal calculus 12/28/2020    Cellulitis of left lower extremity     Cellulitis of lower leg 02/10/2020    Gross hematuria 10/30/2018    Frequency of urination 10/30/2018    Nocturia 10/30/2018    Mixed incontinence 10/30/2018    Constipation 10/30/2018    History of recurrent deep vein thrombosis (DVT) 04/26/2018    Tobacco abuse 11/15/2016    Bilateral cellulitis of lower leg 11/08/2016    Acute shoulder pain due to trauma 11/08/2016    Lymphedema of both lower extremities 11/08/2016    Acute thromboembolism of deep veins of lower extremity ([facility] 09/05/2015    Chronic anticoagulation 09/05/2015       PLAN:      Patient with multiple medical problems as outlined above. History of DVT on chronic anticoagulation. History of nephrolithiasis. Admitted with left flank pain and found to have sepsis secondary to urinary tract infection. Blood culture is positive for Klebsiella pneumoniae. She has acute on chronic renal failure. Urology and nephrology on board. Chest CT reviewed, no evidence of pulmonary vascular congestion. I will get a repeat troponin, BNP and an echo tomorrow morning. Telemetry reviewed, no evidence of atrial fibrillation recurrence. Unfortunately she is making very little urine that is dark but no blood. Okay to hold Xarelto if needed for her urological procedure. Will follow-up. Sincerely,  Maryann Klein MD, F.A.C.C. [facility] Cardiology Specialist     [address]  Phone: [phone], Fax: [phone]     I believe that the risk of significant morbidity and mortality related to the patient's current medical conditions are: intermediate-high.        The documentation recorded by the scribe, accurately and completely reflects the services I personally performed and the decisions made by me. Demi Reid MD, F.A.C.C.  August 10, 2022

## 2022-08-10 NOTE — PROGRESS NOTES
Shift assessment and vitals obtained at this time as charted. Blood pressure slightly low, vitals otherwise WNL. SpO2 97% on 1 L via nasal cannula. Patient is complaining of 8 out of 10 pain in her head, PRN tylenol given. Patient is alert and oriented x4, assessment otherwise obtained as charted. Audible and expiratory wheezes noted. Brooks catheter remains in place and is draining magalys, cloudy urine. Patient is resting in the bed, denies any other needs at this time. Will continue to monitor.

## 2022-08-10 NOTE — PROGRESS NOTES
Morning labs were drawn off of left AC IV at this time per request from lab and were sent to the lab.

## 2022-08-10 NOTE — PROGRESS NOTES
Occupational Therapy  Facility/Department: [facility] AT THE [facility] MED SURG  Occupational Therapy Initial Assessment    Name: Humphrey Lefort  : 1955  MRN: [id number]  Date of Service: 8/10/2022    Discharge Recommendations:  Continue to assess pending progress, Subacute/Skilled Nursing Facility, Patient would benefit from continued therapy after discharge          Patient Diagnosis(es): The primary encounter diagnosis was Calculus in urethra. A diagnosis of Septicemia ([address] was also pertinent to this visit. Past Medical History:  has a past medical history of Atrial fibrillation with RVR ([address] Carcinoma ([address] Cellulitis, DVT (deep venous thrombosis) ([address] Kidney stone, Lymphedema, Morbid obesity ([address] Psoas abscess, right ([address] Pyelonephritis, and Wears glasses. Past Surgical History:  has a past surgical history that includes Tubal ligation (); Carpal tunnel release (); [place] tooth extraction; Tubal ligation; Total abdominal hysterectomy w/ bilateral salpingoophorectomy; Cystoscopy (N/A, 2022); IR GUIDED NEPHROSTOMY CATH PLACEMENT RIGHT (2022); Insert Midline Catheter (2022); CT ABSCESS DRAINAGE (01/10/2022); Cystoscopy (Right); Cystoscopy (Right, 2022); Cystoscopy (Right, 2022); Cystoscopy (Right, 2022); Cystoscopy (Right, 2022); Cystoscopy (Right, 2022); CT ABSCESS DRAINAGE (2022); Cystoscopy (Right, 2022); Cystoscopy (Right, 2022);   picc powerpic single (2022); CT ABSCESS DRAINAGE (2022); and Cystoscopy (Left, 2022). Treatment Diagnosis: Generalized weakness      Assessment   Performance deficits / Impairments: Decreased functional mobility ; Decreased endurance;Decreased ADL status; Decreased balance;Decreased strength;Decreased high-level IADLs;Decreased safe awareness  Assessment: Pt is a 75 yo female admitted with sepsis due to urinary tract infection.  Pt presents with decreased strength, endurance, and balance, effecting her ability to safely perform ADL tasks. Pt would benefit from skilled OT services to address these deficits and return to PLOF. Treatment Diagnosis: Generalized weakness  Prognosis: Good  Decision Making: Medium Complexity  REQUIRES OT FOLLOW-UP: Yes  Activity Tolerance  Activity Tolerance: Patient limited by fatigue;Patient Tolerated treatment well        Plan   Plan  Times per Week: 7  Times per Day: Daily  Current Treatment Recommendations: Strengthening, Balance training, Safety education & training, Self-Care / ADL, Patient/Caregiver education & training, Endurance training, Functional mobility training, Neuromuscular re-education     Restrictions  Restrictions/Precautions  Restrictions/Precautions:  (UP WITH ASSIST.)  Required Braces or Orthoses?: No    Subjective   General  Chart Reviewed: Yes  Patient assessed for rehabilitation services?: Yes  Referring Practitioner: DONG Cadet CNP  Diagnosis: Sepsis due to urinary tract infection  Subjective  Subjective: Pt sitting up in chair upon OT arrival, agreeable to therapy evaluation. General Comment  Comments: Pt reports 8/10 pain near catheter insertion.      Social/Functional History  Social/Functional History  Lives With: Family (spouse and daughter)  Type of Home: House  Home Layout: Two level, Performs ADL's on one level  Home Access: Stairs to enter with rails  Entrance Stairs - Number of Steps: 5-6  Entrance Stairs - Rails: Left  Bathroom Shower/Tub: Walk-in shower (pt reports that she sponge bathes d/t shower being located on the second story)  Home Equipment: Rollator, Walker, standard (pt uses rollator within the house and a standard walker when leaving the house)  Has the patient had two or more falls in the past year or any fall with injury in the past year?: No  ADL Assistance: Independent  Homemaking Assistance: Needs assistance  Homemaking Responsibilities: No  Ambulation Assistance: Independent  Transfer Assistance:

## 2022-08-10 NOTE — PROGRESS NOTES
Urology Progress Note  CC: Left Flank pain    Subjective: AFVSS    Patient Vitals for the past 24 hrs:   BP Temp Temp src Pulse Resp SpO2 Height Weight   08/10/22 0651 (!) 100/57 97.7 °F (36.5 °C) Temporal 94 20 97 % -- --   08/10/22 0546 -- -- -- -- 16 97 % -- --   08/10/22 0500 -- -- -- -- -- -- -- 260 lb (117.9 kg)   08/10/22 0442 112/65 97 °F (36.1 °C) Temporal 87 22 97 % -- --   08/09/22 2344 114/66 97.6 °F (36.4 °C) Temporal 100 23 96 % -- --   08/09/22 2238 -- -- -- -- -- 96 % -- --   08/09/22 2117 -- -- -- -- 24 96 % -- --   08/09/22 2015 110/69 -- -- -- -- -- -- --   08/09/22 1844 (!) 117/51 97.2 °F (36.2 °C) Temporal 96 25 96 % -- --   08/09/22 1554 -- -- -- -- 30 -- -- --   08/09/22 1405 109/68 -- -- 96 -- 96 % -- --   08/09/22 1320 -- -- -- -- -- -- 5' 1\" (1.549 m) --   08/09/22 1315 100/63 -- -- 98 18 96 % -- --   08/09/22 1245 109/69 -- -- 100 18 96 % -- --   08/09/22 1215 106/63 -- -- 100 18 -- -- --   08/09/22 1145 114/60 97 °F (36.1 °C) Temporal 96 18 93 % -- --   08/09/22 1130 (!) 98/50 98.1 °F (36.7 °C) Temporal 94 18 99 % -- --   08/09/22 1120 (!) 91/47 -- -- 94 20 99 % -- --   08/09/22 1115 (!) 96/51 -- -- 96 22 93 % -- --   08/09/22 1100 (!) 108/55 -- -- 96 20 100 % -- --   08/09/22 1045 (!) 101/58 -- -- 92 16 99 % -- --   08/09/22 1040 (!) 100/57 -- -- 94 16 98 % -- --   08/09/22 1035 (!) 114/52 -- -- 92 16 98 % -- --   08/09/22 1032 (!) 110/53 98.8 °F (37.1 °C) Temporal 94 16 98 % -- --   08/09/22 0942 105/60 98.1 °F (36.7 °C) Temporal (!) 106 20 95 % -- --       Intake/Output Summary (Last 24 hours) at 8/10/2022 0800  Last data filed at 8/10/2022 0700  Gross per 24 hour   Intake 3094.5 ml   Output 450 ml   Net 2644.5 ml       Recent Labs     08/08/22  1745   WBC 3.0*   HGB 11.0*   HCT 37.0   MCV 98.9        Recent Labs     08/08/22  1745 08/09/22  0410 08/10/22  0610    141 142   K 3.4* 3.3* 4.9    107 111*   CO2 23 22 18*   BUN 21 25* 34*   CREATININE 1.39* 2.15* 2.66* Recent Labs     08/08/22 1914   COLORU Red*   PHUR 6.5   WBCUA GREATER THAN 100   RBCUA GREATER THAN 100   SPECGRAV 1.020   LEUKOCYTESUR LARGE*   UROBILINOGEN Normal   BILIRUBINUR NEGATIVE       Additional Lab/culture results:    Physical Exam:   Resting/ NAD  CTA  RRR  Soft nt/nd  Urine clear  No lower extremity swelling or pain      Interval Imaging Findings:    Impression:    Patient Active Problem List   Diagnosis    Acute thromboembolism of deep veins of lower extremity (HCC)    Chronic anticoagulation    Bilateral cellulitis of lower leg    Acute shoulder pain due to trauma    Lymphedema of both lower extremities    Tobacco abuse    History of recurrent deep vein thrombosis (DVT)    Gross hematuria    Frequency of urination    Nocturia    Mixed incontinence    Constipation    Cellulitis of lower leg    Post-phlebitic syndrome    Elephantiasis nostra verrucosa    Cellulitis of left lower extremity    Complicated UTI (urinary tract infection)    Renal calculus    Cellulitis    Normocytic anemia    Iron deficiency anemia    Renal abscess, right    Bacteremia due to Klebsiella pneumoniae    Psoas muscle abscess (HCC)    Septicemia due to Klebsiella pneumoniae (HCC)    Ureteral calculi    Intra-abdominal abscess (HCC)    Obesity, Class III, BMI 40-49.9 (morbid obesity) (HCC)    Hypocalcemia    Hypokalemia    Vertebral osteomyelitis (HCC) T12-L1    Pleural effusion on right    CRP elevated    Sepsis ([address]    Urologic disorders    Bacteremia due to Gram-negative bacteria    Hypoxia    Atrial fibrillation with rapid ventricular response (HCC)    Sepsis due to urinary tract infection (HCC)    Renal failure syndrome    Abnormal ECG       Plan:   Patient does have positive blood cultures. Continue to monitor with supportive care  Await urine culture  Definitive stone therapy in the near future  Research why she has a stent on the right side.     Matt Marroquin MD  8:00 AM 8/10/2022

## 2022-08-11 ENCOUNTER — APPOINTMENT (OUTPATIENT)
Dept: GENERAL RADIOLOGY | Age: 67
DRG: 853 | End: 2022-08-11
Payer: MEDICARE

## 2022-08-11 ENCOUNTER — APPOINTMENT (OUTPATIENT)
Dept: NON INVASIVE DIAGNOSTICS | Age: 67
DRG: 853 | End: 2022-08-11
Payer: MEDICARE

## 2022-08-11 PROBLEM — N21.1 CALCULUS IN URETHRA: Status: ACTIVE | Noted: 2022-08-11

## 2022-08-11 LAB
ABSOLUTE EOS #: 0 K/UL (ref 0–0.44)
ABSOLUTE IMMATURE GRANULOCYTE: 0 K/UL (ref 0–0.3)
ABSOLUTE LYMPH #: 1.39 K/UL (ref 1.1–3.7)
ABSOLUTE MONO #: 0.35 K/UL (ref 0.1–1.2)
ALBUMIN SERPL-MCNC: 2.5 G/DL (ref 3.5–5.2)
ALBUMIN/GLOBULIN RATIO: 0.8 (ref 1–2.5)
ALP BLD-CCNC: 68 U/L (ref 35–104)
ALT SERPL-CCNC: <5 U/L (ref 5–33)
ANION GAP SERPL CALCULATED.3IONS-SCNC: 11 MMOL/L (ref 9–17)
AST SERPL-CCNC: 12 U/L
BASOPHILS # BLD: 0 % (ref 0–2)
BASOPHILS ABSOLUTE: 0 K/UL (ref 0–0.2)
BILIRUB SERPL-MCNC: <0.1 MG/DL (ref 0.3–1.2)
BUN BLDV-MCNC: 39 MG/DL (ref 8–23)
BUN/CREAT BLD: 16 (ref 9–20)
CALCIUM SERPL-MCNC: 8 MG/DL (ref 8.6–10.4)
CHLORIDE BLD-SCNC: 111 MMOL/L (ref 98–107)
CO2: 20 MMOL/L (ref 20–31)
CREAT SERPL-MCNC: 2.44 MG/DL (ref 0.5–0.9)
EOSINOPHILS RELATIVE PERCENT: 0 % (ref 1–4)
GFR AFRICAN AMERICAN: 24 ML/MIN
GFR NON-AFRICAN AMERICAN: 20 ML/MIN
GFR SERPL CREATININE-BSD FRML MDRD: ABNORMAL ML/MIN/{1.73_M2}
GFR SERPL CREATININE-BSD FRML MDRD: ABNORMAL ML/MIN/{1.73_M2}
GLUCOSE BLD-MCNC: 97 MG/DL (ref 70–99)
HCT VFR BLD CALC: 29.4 % (ref 36.3–47.1)
HEMOGLOBIN: 8.8 G/DL (ref 11.9–15.1)
IMMATURE GRANULOCYTES: 0 %
LV EF: 38 %
LVEF MODALITY: NORMAL
LYMPHOCYTES # BLD: 8 % (ref 24–43)
MCH RBC QN AUTO: 29.7 PG (ref 25.2–33.5)
MCHC RBC AUTO-ENTMCNC: 29.9 G/DL (ref 28.4–34.8)
MCV RBC AUTO: 99.3 FL (ref 82.6–102.9)
MONOCYTES # BLD: 2 % (ref 3–12)
MORPHOLOGY: ABNORMAL
NRBC AUTOMATED: 0 PER 100 WBC
PDW BLD-RTO: 14.4 % (ref 11.8–14.4)
PLATELET # BLD: ABNORMAL K/UL (ref 138–453)
PLATELET, FLUORESCENCE: 92 K/UL (ref 138–453)
PLATELET, IMMATURE FRACTION: 5.5 % (ref 1.1–10.3)
POTASSIUM SERPL-SCNC: 4.7 MMOL/L (ref 3.7–5.3)
PRO-BNP: ABNORMAL PG/ML
RBC # BLD: 2.96 M/UL (ref 3.95–5.11)
SEG NEUTROPHILS: 90 % (ref 36–65)
SEGMENTED NEUTROPHILS ABSOLUTE COUNT: 15.66 K/UL (ref 1.5–8.1)
SODIUM BLD-SCNC: 142 MMOL/L (ref 135–144)
TOTAL PROTEIN: 5.8 G/DL (ref 6.4–8.3)
TROPONIN, HIGH SENSITIVITY: 36 NG/L (ref 0–14)
WBC # BLD: 17.4 K/UL (ref 3.5–11.3)

## 2022-08-11 PROCEDURE — 99232 SBSQ HOSP IP/OBS MODERATE 35: CPT | Performed by: INTERNAL MEDICINE

## 2022-08-11 PROCEDURE — 97530 THERAPEUTIC ACTIVITIES: CPT

## 2022-08-11 PROCEDURE — 6370000000 HC RX 637 (ALT 250 FOR IP): Performed by: NURSE PRACTITIONER

## 2022-08-11 PROCEDURE — 6370000000 HC RX 637 (ALT 250 FOR IP): Performed by: FAMILY MEDICINE

## 2022-08-11 PROCEDURE — 85055 RETICULATED PLATELET ASSAY: CPT

## 2022-08-11 PROCEDURE — 99232 SBSQ HOSP IP/OBS MODERATE 35: CPT | Performed by: PHYSICIAN ASSISTANT

## 2022-08-11 PROCEDURE — 6370000000 HC RX 637 (ALT 250 FOR IP): Performed by: PHYSICIAN ASSISTANT

## 2022-08-11 PROCEDURE — 6370000000 HC RX 637 (ALT 250 FOR IP): Performed by: UROLOGY

## 2022-08-11 PROCEDURE — 71045 X-RAY EXAM CHEST 1 VIEW: CPT

## 2022-08-11 PROCEDURE — 97116 GAIT TRAINING THERAPY: CPT

## 2022-08-11 PROCEDURE — 80053 COMPREHEN METABOLIC PANEL: CPT

## 2022-08-11 PROCEDURE — 85025 COMPLETE CBC W/AUTO DIFF WBC: CPT

## 2022-08-11 PROCEDURE — 2580000003 HC RX 258: Performed by: UROLOGY

## 2022-08-11 PROCEDURE — 83880 ASSAY OF NATRIURETIC PEPTIDE: CPT

## 2022-08-11 PROCEDURE — 94761 N-INVAS EAR/PLS OXIMETRY MLT: CPT

## 2022-08-11 PROCEDURE — 6360000002 HC RX W HCPCS: Performed by: UROLOGY

## 2022-08-11 PROCEDURE — 2700000000 HC OXYGEN THERAPY PER DAY

## 2022-08-11 PROCEDURE — 94669 MECHANICAL CHEST WALL OSCILL: CPT

## 2022-08-11 PROCEDURE — 6360000002 HC RX W HCPCS: Performed by: NURSE PRACTITIONER

## 2022-08-11 PROCEDURE — 84484 ASSAY OF TROPONIN QUANT: CPT

## 2022-08-11 PROCEDURE — 97110 THERAPEUTIC EXERCISES: CPT

## 2022-08-11 PROCEDURE — 94640 AIRWAY INHALATION TREATMENT: CPT

## 2022-08-11 PROCEDURE — 93306 TTE W/DOPPLER COMPLETE: CPT

## 2022-08-11 PROCEDURE — 1200000000 HC SEMI PRIVATE

## 2022-08-11 RX ORDER — FUROSEMIDE 10 MG/ML
40 INJECTION INTRAMUSCULAR; INTRAVENOUS ONCE
Status: COMPLETED | OUTPATIENT
Start: 2022-08-11 | End: 2022-08-11

## 2022-08-11 RX ORDER — FUROSEMIDE 10 MG/ML
40 INJECTION INTRAMUSCULAR; INTRAVENOUS DAILY
Status: DISCONTINUED | OUTPATIENT
Start: 2022-08-12 | End: 2022-08-14

## 2022-08-11 RX ORDER — SUMATRIPTAN 50 MG/1
50 TABLET, FILM COATED ORAL ONCE
Status: COMPLETED | OUTPATIENT
Start: 2022-08-11 | End: 2022-08-11

## 2022-08-11 RX ORDER — AMLODIPINE BESYLATE 10 MG/1
10 TABLET ORAL DAILY
Status: DISCONTINUED | OUTPATIENT
Start: 2022-08-11 | End: 2022-08-11

## 2022-08-11 RX ORDER — LISINOPRIL 20 MG/1
20 TABLET ORAL DAILY
Status: DISCONTINUED | OUTPATIENT
Start: 2022-08-11 | End: 2022-08-11

## 2022-08-11 RX ORDER — METOPROLOL SUCCINATE 25 MG/1
25 TABLET, EXTENDED RELEASE ORAL DAILY
Status: DISCONTINUED | OUTPATIENT
Start: 2022-08-11 | End: 2022-08-12

## 2022-08-11 RX ADMIN — IPRATROPIUM BROMIDE AND ALBUTEROL SULFATE 3 ML: 2.5; .5 SOLUTION RESPIRATORY (INHALATION) at 09:41

## 2022-08-11 RX ADMIN — MULTIPLE VITAMINS W/ MINERALS TAB 1 TABLET: TAB at 08:34

## 2022-08-11 RX ADMIN — IPRATROPIUM BROMIDE AND ALBUTEROL SULFATE 3 ML: 2.5; .5 SOLUTION RESPIRATORY (INHALATION) at 15:22

## 2022-08-11 RX ADMIN — RIVAROXABAN 15 MG: 15 TABLET, FILM COATED ORAL at 17:03

## 2022-08-11 RX ADMIN — CETIRIZINE HYDROCHLORIDE 5 MG: 10 TABLET, FILM COATED ORAL at 08:34

## 2022-08-11 RX ADMIN — Medication 400 UNITS: at 08:34

## 2022-08-11 RX ADMIN — CEFTRIAXONE 1000 MG: 1 INJECTION, POWDER, FOR SOLUTION INTRAMUSCULAR; INTRAVENOUS at 20:07

## 2022-08-11 RX ADMIN — FUROSEMIDE 40 MG: 10 INJECTION, SOLUTION INTRAMUSCULAR; INTRAVENOUS at 11:48

## 2022-08-11 RX ADMIN — OXYCODONE HYDROCHLORIDE AND ACETAMINOPHEN 250 MG: 500 TABLET ORAL at 08:34

## 2022-08-11 RX ADMIN — Medication 1000 UNITS: at 08:34

## 2022-08-11 RX ADMIN — IPRATROPIUM BROMIDE AND ALBUTEROL SULFATE 3 ML: 2.5; .5 SOLUTION RESPIRATORY (INHALATION) at 20:09

## 2022-08-11 RX ADMIN — OXYBUTYNIN CHLORIDE 15 MG: 5 TABLET, EXTENDED RELEASE ORAL at 08:34

## 2022-08-11 RX ADMIN — SODIUM CHLORIDE, PRESERVATIVE FREE 10 ML: 5 INJECTION INTRAVENOUS at 20:06

## 2022-08-11 RX ADMIN — ACETAMINOPHEN 650 MG: 325 TABLET ORAL at 08:34

## 2022-08-11 RX ADMIN — AMLODIPINE BESYLATE 10 MG: 10 TABLET ORAL at 11:49

## 2022-08-11 RX ADMIN — SUMATRIPTAN SUCCINATE 50 MG: 50 TABLET ORAL at 09:08

## 2022-08-11 RX ADMIN — METOPROLOL SUCCINATE 25 MG: 25 TABLET, EXTENDED RELEASE ORAL at 17:03

## 2022-08-11 RX ADMIN — LISINOPRIL 20 MG: 20 TABLET ORAL at 11:49

## 2022-08-11 ASSESSMENT — PAIN DESCRIPTION - DESCRIPTORS
DESCRIPTORS: ACHING
DESCRIPTORS: ACHING

## 2022-08-11 ASSESSMENT — PAIN DESCRIPTION - LOCATION
LOCATION: HEAD
LOCATION: HEAD

## 2022-08-11 ASSESSMENT — PAIN SCALES - GENERAL
PAINLEVEL_OUTOF10: 0
PAINLEVEL_OUTOF10: 0
PAINLEVEL_OUTOF10: 10
PAINLEVEL_OUTOF10: 10

## 2022-08-11 NOTE — PROGRESS NOTES
Occupational Therapy  Facility/Department: [facility] AT THE [facility] MED SURG  Daily Treatment Note  NAME: Montrell Hutton  : 1955  MRN: [id number]    Date of Service: 2022    Discharge Recommendations:  Continue to assess pending progress, Subacute/Skilled Nursing Facility, Patient would benefit from continued therapy after discharge         Patient Diagnosis(es): The primary encounter diagnosis was Calculus in urethra. A diagnosis of Septicemia ([facility] [address] was also pertinent to this visit. Assessment    Activity Tolerance: Patient limited by fatigue;Patient limited by endurance  Discharge Recommendations: Continue to assess pending progress;Subacute/Skilled Nursing Facility; Patient would benefit from continued therapy after discharge      Plan   Plan  Times per Week: 7  Times per Day: Daily  Current Treatment Recommendations: Strengthening;Balance training; Safety education & training;Self-Care / ADL; Patient/Caregiver education & training; Endurance training;Functional mobility training;Neuromuscular re-education     Restrictions       Subjective   Subjective  Subjective: Patient reported \"I am so worn out and SOB. \" Patient seated in bedside chair upon entry to room. Agreeable to therapy. Pain: Denies pain  Orientation  Overall Orientation Status: Within Functional Limits  Pain: still has headache from yesterday and SOB  Cognition  Overall Cognitive Status: WFL        Objective    Vitals     Bed Mobility Training  Bed Mobility Training: Yes  Overall Level of Assistance: Maximum assistance  Rolling: Maximum assistance  Supine to Sit: Moderate assistance;Maximum assistance  Sit to Supine: Maximum assistance  Scooting: Maximum assistance  Transfer Training  Transfer Training: Yes  Overall Level of Assistance: Maximum assistance  Interventions: Verbal cues; Tactile cues  Sit to Stand:  Moderate assistance  Stand to Sit: Maximum assistance  Stand Pivot Transfers: Maximum assistance        OT Exercises  Exercise Treatment: RICHARD therex with 1# free weight, x15 reps in all planes to improve overall UB strength. frequent rest breaks between each exercise. Safety Devices  Type of Devices: Call light within reach; Left in bed (Nurse and radiology present in room upon leaving)     Patient Education  Education Given To: Patient  Education Provided: Role of Therapy;Plan of Care;Home Exercise Program  Education Method: Verbal  Barriers to Learning: None  Education Outcome: Verbalized understanding    Goals  Short Term Goals  Time Frame for Short term goals: 20 visits  Short Term Goal 1: Pt. will tolerate 10-15 mins of BUE ther ex/act while maintaining SpO2 WFL to increase ovrall strength/activity tolerance for functional tasks. Short Term Goal 2: Pt. will complete grooming/bathing/dressing tasks with SBA with use of EC techs PRN to increase ease/(I) with self care routine. Short Term Goal 3: Pt. will demo G safety awareness during functional ADL transfers/mobility with reduced risk for falls. Short Term Goal 4: Patient to be educated on d/c folder, AE/DME and EC/WS techniques to ensure safe and indep return home.        Therapy Time   Individual Concurrent Group Co-treatment   Time In 7720         Time Out 1150         Minutes 26         Timed Code Treatment Minutes: [address], [place]

## 2022-08-11 NOTE — PROGRESS NOTES
thrombosis) ([address] 2006    LLE    Kidney stone     Lymphedema     Morbid obesity ([address]     Psoas abscess, right ([address]     Pyelonephritis     Wears glasses        CURRENT ALLERGIES: Patient has no known allergies. REVIEW OF SYSTEMS: 14 systems were reviewed. Pertinent positives and negatives as above, all else negative.      Past Surgical History:   Procedure Laterality Date    CARPAL TUNNEL RELEASE  1990s    CT ABSCESS DRAIN SUBCUTANEOUS  01/10/2022    CT ABSCESS DRAIN SUBCUTANEOUS 1/10/2022 [facility] CT SCAN    CT ABSCESS DRAIN SUBCUTANEOUS  04/21/2022    CT ABSCESS DRAIN SUBCUTANEOUS 4/21/2022 [facility] CT SCAN    CT ABSCESS DRAIN SUBCUTANEOUS  4/29/2022    CT ABSCESS DRAIN SUBCUTANEOUS 4/29/2022 [facility] CT SCAN    CYSTOSCOPY N/A 01/04/2022    CYSTOSCOPY URETERAL STENT INSERTION performed by Matt Marroquin MD at [address] [place] [address] Right     HLL with stent    CYSTOSCOPY Right 03/01/2022    CYSTOSCOPY URETEROSCOPY LASER-WTIH HLL performed by Matt Marroquin MD at [address] [place] [address] Right 03/01/2022    CYSTOSCOPY URETERAL STENT INSERTION-EXCHANGE performed by Matt Marroquin MD at [address] [place] [address] Right 04/05/2022    Dr Delano Lackey with stent insertion    CYSTOSCOPY Right 04/05/2022    CYSTOSCOPY URETEROSCOPY LASER-HLL performed by Matt Marroquin MD at [address] [place] [address] Right 04/05/2022    CYSTOSCOPY URETERAL STENT Billy Anisha performed by Matt Marroquin MD at [address] [place] [address] Right 04/22/2022    CYSTOSCOPY URETERAL STENT INSERTION (Right )    CYSTOSCOPY Right 4/22/2022    CYSTOSCOPY URETERAL STENT INSERTION performed by Frankey Poster, MD at [address] [place] [address] Left 8/9/2022    CYSTOSCOPY URETERAL STENT INSERTION performed by Matt Marroquin MD at [address] [facility].  PICC [facility] SINGLE  4/26/2022         INSERT MIDLINE CATHETER  01/07/2022         IR NEPHROSTOMY PERCUTANEOUS RIGHT  01/05/2022    IR NEPHROSTOMY PERCUTANEOUS RIGHT 1/5/2022 Abdiel Singh MD [facility] SPECIAL PROCEDURES noted bilaterally. No cyanosis or clubbing. 2+ radial and carotid pulses. Distal extremity pulses: 2+ bilaterally. Neurological: She is alert and oriented to person, place, and time. No evidence of gross cranial nerve deficit. Coordination appeared normal.   Skin: Skin is warm and dry. There is no rash or diaphoresis. Psychiatric: She has a normal mood and affect.  Her speech is normal and behavior is normal.      MOST RECENT LABS ON RECORD:   Lab Results   Component Value Date    WBC 17.4 (H) 08/11/2022    HGB 8.8 (L) 08/11/2022    HCT 29.4 (L) 08/11/2022    PLT See Reflexed IPF Result 08/11/2022    ALT <5 (L) 08/11/2022    AST 12 08/11/2022     08/11/2022    K 4.7 08/11/2022     (H) 08/11/2022    CREATININE 2.44 (H) 08/11/2022    BUN 39 (H) 08/11/2022    CO2 20 08/11/2022    TSH 3.93 04/26/2022    INR 1.0 04/29/2022    LABA1C 6.2 (H) 01/06/2022         ASSESSMENT:  Patient Active Problem List    Diagnosis Date Noted    Acute kidney injury superimposed on CKD ([address] 08/10/2022    Bacteremia due to Gram-negative bacteria 08/09/2022    Hypoxia 08/09/2022    Atrial fibrillation with rapid ventricular response ([address] 08/09/2022    Sepsis due to urinary tract infection ([address] 08/09/2022    Renal failure syndrome 08/09/2022    Abnormal ECG 08/09/2022    Urologic disorders 08/08/2022    Sepsis ([address] 05/03/2022    CRP elevated     Intra-abdominal abscess (HCC) 04/21/2022    Obesity, Class III, BMI 40-49.9 (morbid obesity) ([address] 04/21/2022    Hypocalcemia 04/21/2022    Hypokalemia 04/21/2022    Vertebral osteomyelitis (HCC) T12-L1 04/21/2022    Pleural effusion on right 04/21/2022    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    Ureteral calculi 02/28/2022    Psoas muscle abscess ([facility] [address] 01/08/2022    Septicemia due to Klebsiella pneumoniae ([facility] 01/08/2022    Bacteremia due to Klebsiella pneumoniae 01/04/2022    Renal abscess, right 01/02/2022    Iron deficiency anemia 01/24/2021    Normocytic anemia 01/23/2021    Cellulitis 24/84/0079    Complicated UTI (urinary tract infection) 12/28/2020    Renal calculus 12/28/2020    Cellulitis of left lower extremity     Cellulitis of lower leg 02/10/2020    Gross hematuria 10/30/2018    Frequency of urination 10/30/2018    Nocturia 10/30/2018    Mixed incontinence 10/30/2018    Constipation 10/30/2018    History of recurrent deep vein thrombosis (DVT) 04/26/2018    Tobacco abuse 11/15/2016    Bilateral cellulitis of lower leg 11/08/2016    Acute shoulder pain due to trauma 11/08/2016    Lymphedema of both lower extremities 11/08/2016    Acute thromboembolism of deep veins of lower extremity ([address] 09/05/2015    Chronic anticoagulation 09/05/2015       PLAN:  Patient with multiple medical problems as outlined above. History of DVT on chronic anticoagulation. History of nephrolithiasis. Admitted with left flank pain and found to have sepsis secondary to urinary tract infection. Blood culture is positive for Klebsiella pneumoniae. She has acute on chronic renal failure. Urology and nephrology on board. Chest CT reviewed, no evidence of pulmonary vascular congestion. Troponin 36, most likely secondary to acute renal failure and anemia. BNP 17,749 most likely secondary to fluid resuscitation. She received 1 dose of lasix. +4,955 mL, up 9 pounds. Telemetry reviewed, no evidence of atrial fibrillation recurrence. Echo results show EF reduced to 35-40% most likely secondary to sepsis  START Toprol XL 25 mg daily. START IV Lasix 40 mg daily. Continue amlodipine, we will consider ACE/ARB after improvement of the kidney function. Will plan on repeat echo in 2 to 4 weeks, if no improvement will proceed with ischemic work up. Okay to hold Xarelto if needed for her urological procedure, no active bleeding and hemoglobin remaining stable at 8.8. Will follow-up.     Sincerely,  Richard Anne PA-C  [address] [facility] Cardiology Specialist    [address] [address], [address] [facility]  Phone: [phone], Fax: [phone]     I believe that the risk of significant morbidity and mortality related to the patient's current medical conditions are: intermediate-high. The documentation recorded by the scribe, accurately and completely reflects the services I personally performed and the decisions made by me. Alissa Gaffney PA-C August 11, 2022    ATTESTATION:     I have discussed the case, including pertinent history and exam findings with the Aline MOJICA. I have evaluated the  History, physical findings and pictures of the patient and the key elements of the encounter have been performed by me. I have reviewed the laboratory data, other diagnostic studies and discussed them with Aline MOJICA. I have updated the medical record where necessary. I agree with the assessment, plan and orders as documented by Alissa MOJICA. Sincerely,  Batsheva Root MD, F.A.C.C.   [facility] Cardiology Specialist    [address], [address] [facility]  Phone: [phone], Fax: [phone]

## 2022-08-11 NOTE — PROGRESS NOTES
Urology Progress Note  CC: Flank pain    Subjective: AFVSS    Patient Vitals for the past 24 hrs:   BP Temp Temp src Pulse Resp SpO2 Weight   08/11/22 1522 -- -- -- 97 22 99 % --   08/11/22 1245 (!) 156/84 96.9 °F (36.1 °C) Temporal 94 22 96 % --   08/11/22 0941 -- -- -- (!) 103 24 93 % --   08/11/22 0704 (!) 141/83 97.1 °F (36.2 °C) Temporal 87 20 97 % --   08/11/22 0545 -- -- -- -- -- -- 265 lb (120.2 kg)   08/10/22 2345 137/81 97.9 °F (36.6 °C) Temporal 88 20 97 % --   08/10/22 2020 -- -- -- -- -- 98 % --   08/10/22 2000 -- -- -- 95 20 (!) 89 % --   08/10/22 1900 136/78 97.8 °F (36.6 °C) Temporal 95 20 92 % --       Intake/Output Summary (Last 24 hours) at 8/11/2022 1610  Last data filed at 8/11/2022 1252  Gross per 24 hour   Intake 698.72 ml   Output 100 ml   Net 598.72 ml       Recent Labs     08/08/22  1745 08/10/22  0930 08/11/22  0520   WBC 3.0* 20.0* 17.4*   HGB 11.0* 8.9* 8.8*   HCT 37.0 30.9* 29.4*   MCV 98.9 101.0 99.3    See Reflexed IPF Result See Reflexed IPF Result     Recent Labs     08/09/22  0410 08/10/22  0610 08/11/22  0520    142 142   K 3.3* 4.9 4.7    111* 111*   CO2 22 18* 20   BUN 25* 34* 39*   CREATININE 2.15* 2.66* 2.44*       Recent Labs     08/08/22  1914   COLORU Red*   PHUR 6.5   WBCUA GREATER THAN 100   RBCUA GREATER THAN 100   SPECGRAV 1.020   LEUKOCYTESUR LARGE*   UROBILINOGEN Normal   BILIRUBINUR NEGATIVE       Additional Lab/culture results:    Physical Exam:   Resting/ NAD  CTA  RRR  Soft nt/nd  Urine clear  No lower extremity swelling or pain      Interval Imaging Findings:    Impression:    Patient Active Problem List   Diagnosis    Acute thromboembolism of deep veins of lower extremity (HCC)    Chronic anticoagulation    Bilateral cellulitis of lower leg    Acute shoulder pain due to trauma    Lymphedema of both lower extremities    Tobacco abuse    History of recurrent deep vein thrombosis (DVT)    Gross hematuria    Frequency of urination    Nocturia Mixed incontinence    Constipation    Cellulitis of lower leg    Post-phlebitic syndrome    Elephantiasis nostra verrucosa    Cellulitis of left lower extremity    Complicated UTI (urinary tract infection)    Renal calculus    Cellulitis    Normocytic anemia    Iron deficiency anemia    Renal abscess, right    Bacteremia due to Klebsiella pneumoniae    Psoas muscle abscess (HCC)    Septicemia due to Klebsiella pneumoniae (HCC)    Ureteral calculi    Intra-abdominal abscess (HCC)    Obesity, Class III, BMI 40-49.9 (morbid obesity) (HCC)    Hypocalcemia    Hypokalemia    Vertebral osteomyelitis (HCC) T12-L1    Pleural effusion on right    CRP elevated    Sepsis ([address]    Urologic disorders    Bacteremia due to Gram-negative bacteria    Hypoxia    Atrial fibrillation with rapid ventricular response ([address]    Sepsis due to urinary tract infection (HCC)    Renal failure syndrome    Abnormal ECG    Acute kidney injury superimposed on CKD ([address]       Plan:  Continue antibiotics  Monitor lab work  Definitive stone therapy in the near future    Pierre Radford MD  4:10 PM 8/11/2022

## 2022-08-11 NOTE — PROGRESS NOTES
Physical Therapy  Facility/Department: [facility] AT THE [facility] MED SURG  Daily Treatment Note  NAME: Montrell Hutton  : 1955  MRN: [id number]    Date of Service: 2022    Discharge Recommendations:  Subacute/Skilled Nursing Facility        Patient Diagnosis(es): The primary encounter diagnosis was Calculus in urethra. A diagnosis of Septicemia ([address] was also pertinent to this visit. Assessment   Activity Tolerance: Patient limited by fatigue;Patient limited by endurance     Plan    Plan  Plan: 2 times a day 7 days a week  Current Treatment Recommendations: Strengthening; Functional mobility training;Transfer training;Gait training; Safety education & training;Patient/Caregiver education & training;Neuromuscular re-education     Restrictions  Restrictions/Precautions  Restrictions/Precautions:  (UP WITH ASSIST.)  Required Braces or Orthoses?: No     Subjective    Subjective  Subjective: in bed and agreeable to therapy  Pain: still has headache from yesterday and SOB  Orientation  Overall Orientation Status: Within Functional Limits  Cognition  Overall Cognitive Status: WFL     Objective   Vitals     Bed Mobility Training  Bed Mobility Training: Yes  Overall Level of Assistance: Moderate assistance;Maximum assistance  Rolling: Moderate assistance;Maximum assistance  Supine to Sit: Moderate assistance;Maximum assistance  Scooting: Moderate assistance  Transfer Training  Transfer Training: Yes  Overall Level of Assistance: Moderate assistance  Interventions: Verbal cues; Tactile cues  Sit to Stand: Moderate assistance           Safety Devices  Type of Devices: Nurse notified;Call light within reach; Left in chair       Goals  Short Term Goals  Time Frame for Short term goals: 3 DAYS  Short term goal 1: CGA TRANSFERS  Short term goal 2: CGA GAIT FWW. Additional Goals?: No  Long Term Goals  Time Frame for Long term goals : 4 WEEKS  Long term goal 1: IND GAIT FWW,IND TRANSFERS.   Patient Goals   Patient goals : RETURN HOME WITH ASSIST.     Education  Patient Education  Education Given To: Patient  Education Provided: Role of Therapy;Transfer Training    Therapy Time   Individual Concurrent Group Co-treatment   Time In [phone]         Time Out 0950         Minutes [address] [facility] [address] [place], [place]

## 2022-08-11 NOTE — PROGRESS NOTES
Reassessment and vitals obtained at this time as charted. Blood pressure slightly elevated and patient is slightly tachypneic, vitals otherwise WNL. SpO2 96% on 2 L via nasal cannula. Patient denies pain. Patient is alert and oriented x4, assessment otherwise as charted. Wheezing is improved from morning assessment. Patient also changed at this time for incontinence of large amount of urine and was assisted to reposition in the bed. Call light in reach and bed alarm on, will continue to monitor.

## 2022-08-11 NOTE — PROGRESS NOTES
Progress Note  Boaz Reagan MD    OBJECTIVE:    Patient seen for f/u of Sepsis due to urinary tract infection ([address] She had stent,blood culture grew klebseila . Wbc 17.4 k,still on oxygen    ROS:   Constitutional: negative  for fevers, and negative for chills. Respiratory: negative for shortness of breath, negative for cough, and negative for wheezing  Cardiovascular: negative for chest pain, and negative for palpitations  Gastrointestinal: negative for abdominal pain, negative for nausea,negative for vomiting, negative for diarrhea, and negative for constipation     All other systems were reviewed with the patient and are negative unless otherwise stated in HPI    OBJECTIVE:  Vitals:   Temp: 97.1 °F (36.2 °C)  BP: (!) 141/83  Resp: 20  Heart Rate: 87  SpO2: 97 %    24HR INTAKE/OUTPUT:    Intake/Output Summary (Last 24 hours) at 8/11/2022 0738  Last data filed at 8/11/2022 0711  Gross per 24 hour   Intake 848.72 ml   Output 275 ml   Net 573.72 ml     -----------------------------------------------------------------  Exam:  GEN:    Awake, alert and oriented x3. EYES:  EOMI, pupils equal   NECK: Supple. No lymphadenopathy. No carotid bruit  CVS:    regular rate and rhythm, 2/6 systolic murmur  PULM:  CTA, no wheezes, rales or rhonchi, no acute respiratory distress  ABD:    Bowels sounds normal.  Abdomen is soft. No distention. no tenderness to palpation. EXT:   3+ edema bilaterally . No calf tenderness. NEURO: Moves all extremities. Motor and sensory are grossly intact  SKIN:  No rashes.   No skin lesions.    -----------------------------------------------------------------  Diagnostic Data:    All available data reviewed  Lab Results   Component Value Date    WBC 17.4 (H) 08/11/2022    HGB 8.8 (L) 08/11/2022    MCV 99.3 08/11/2022    PLT See Reflexed IPF Result 08/11/2022      Lab Results   Component Value Date    GLUCOSE 97 08/11/2022    BUN 39 (H) 08/11/2022    CREATININE 2.44 (H) 08/11/2022    NA 142 08/11/2022    K 4.7 08/11/2022    CALCIUM 8.0 (L) 08/11/2022     (H) 08/11/2022    CO2 20 08/11/2022       PROBLEM LIST:  Principal Problem:    Sepsis due to urinary tract infection ([address]  Active Problems:    Obesity, Class III, BMI 40-49.9 (morbid obesity) ([address]    Sepsis ([address]    Urologic disorders    Bacteremia due to Gram-negative bacteria    Hypoxia    Atrial fibrillation with rapid ventricular response (HCC)    Renal failure syndrome    Abnormal ECG    Acute kidney injury superimposed on CKD (HCC)    Chronic anticoagulation    Lymphedema of both lower extremities    Ureteral calculi  Resolved Problems:    * No resolved hospital problems. *      ASSESSMENT / PLAN:  Sepsis due to urinary tract infection ([address]  Principal Problem:    Sepsis due to urinary tract infection ([address]- continue iv antibiotics  Active Problems:    Obesity, Class III, BMI 40-49.9 (morbid obesity) ([address]    Sepsis ([address]    Urologic disorders    Bacteremia due to Gram-negative bacteria    Hypoxia    Atrial fibrillation with rapid ventricular response (HCC)    Renal failure syndrome    Abnormal ECG    Acute kidney injury superimposed on CKD (HCC)    Chronic anticoagulation    Lymphedema of both lower extremities    Ureteral calculi  Resolved Problems:    * No resolved hospital problems. *        Nutrition status:  Well developed, well nourished with no malnutrition  DVT prophylaxis: Eliquis   High risk medications: none   Disposition:  Discharge plan is F    Oscar Polk MD , M.D.  8/11/2022  7:38 AM

## 2022-08-11 NOTE — PROGRESS NOTES
Patient is still complaining of a 10 out of 10 headache at this time, order received for one time dose of imitrex. Imitrex given, will continue to monitor.

## 2022-08-11 NOTE — PROGRESS NOTES
Shift assessment and vitals obtained at this time as charted. Blood pressure slightly elevated, vitals otherwise WNL. Patient denies pain. SpO2 97% on 2 L via nasal cannula. Patient is alert and oriented x4, assessment otherwise obtained as charted. Expiratory wheezes noted throughout lungs. Patient is resting in the bed, denies any needs at this time. Will continue to monitor.

## 2022-08-11 NOTE — PROGRESS NOTES
Patient resting comfortably at this time in bed. Patient denies any pain. Patient was changed and repositioned at this time. Patient states she is feeling much better today. Patient satisfied watching television. Patient denies any SOB at this time. Assessment and vitals as charted. Bed alarm on, bed locked, gripper socks on, call light within reach and able to use appropriately. Will continue to monitor this shift.

## 2022-08-11 NOTE — OP NOTE
[address]                                OPERATIVE REPORT    PATIENT NAME: Anuj Torrez                   :        1955  MED REC NO:   [id number]                              ROOM:       3296  ACCOUNT NO:   [de-identified]                           ADMIT DATE: 2022  PROVIDER:     Grant Hinds    DATE OF PROCEDURE:  2022    SURGEON:  Dr. Grant Hinds. ASSISTANT:  None. PREOPERATIVE DIAGNOSIS:  Left ureteral calculus. POSTOPERATIVE DIAGNOSIS:  Left ureteral calculus. PROCEDURE PERFORMED:  Cystoscopy with left ureteral stent placement. ANESTHESIA:  General.    COMPLICATIONS:  None. ESTIMATED BLOOD LOSS:  Minimal.    SPECIMENS:  None. PROSTHESIS:  A 6-Serbian x 24 cm double-J ureteral stent. DISPOSITION:  Stable. FINDINGS:  Left ureteral obstruction. INDICATIONS:  The patient is a 78-year-old female with worsening kidney  function, fever, and known ureteral stones on CT scan. The patient was  emergently taken for left ureteral stent placement. DESCRIPTION OF PROCEDURE:  The patient was taken back to the operating  room after informed consent including all risks, benefits, and  alternatives were obtained. The patient was transferred from the [facility]  onto the operating room table, where she was induced under general  anesthesia, where she was transferred from the [facility] onto the operating  room table. She was placed in dorsal lithotomy. She had a 22-Serbian  sheath with a 30-degree lens passed through the urethra into the  bladder. Once in the bladder, we identified the left ureteral orifice. A 0.035-inch wire was passed up. We then placed a 6-Serbian x 24 cm  double-J ureteral stent over the Glidewire up into the kidney. Glidewire was removed. Proximal curl was confirmed via fluoroscopy and  distal curl was confirmed via visualization.   At this point in time, we  removed the scope, inserted a Brooks catheter. She was then awoken from  general anesthesia, transferred to the [facility], and taken to the PACU in  satisfactory condition by Nursing and Anesthesia Teams. PLAN:  She will be admitted to the floor, where she will undergo  supportive care. We will plan for definitive stone therapy in the near  future.         Alivia Deluna    D: 08/10/2022 17:06:53       T: 08/11/2022 0:21:54     [email or web address]  Job#: [id number]     Doc#: [id number]    CC:

## 2022-08-11 NOTE — PROGRESS NOTES
[facility]   Nephrology progress note  8/11/2022, 12:10 PM      Pt Name:    Chencho Jarquin  MRN:     [id number]     YOB: 1955  Admit Date:    8/8/2022  5:32 PM    TELEHEALTH EVALUATION -- Audio/Visual (During TDCNN-06 public health emergency)     Telehealth service was provided with the patient at her room [address] [facility] and myself the physician in my [facility] office and RN DAVID who has initiated the visit. Pursuant to the emergency declaration under the [facility], [facility] waiver authority and the [employer] and [employer] Act, this Virtual  Visit was conducted, with patient's consent, to reduce the patient's risk of exposure to COVID-19 and provide continuity of care for an established patient. Services were provided through a video synchronous discussion virtually to substitute for in-person clinic visit. Chief Complaint: Nephrology following for BARBARA/CKD . Subjective:  Patient seen and examined  No chest pain apparently feeling more short of breath  Requiring more oxygen and saturating 93%  Her IV fluids were discontinued this morning and she received a dose of Lasix    Objective:  24HR INTAKE/OUTPUT:    Intake/Output Summary (Last 24 hours) at 8/11/2022 1210  Last data filed at 8/11/2022 1117  Gross per 24 hour   Intake 848.72 ml   Output 300 ml   Net 548.72 ml      Admission weight: 257 lb 12.8 oz (116.9 kg)  Wt Readings from Last 3 Encounters:   08/11/22 265 lb (120.2 kg)   05/17/22 243 lb (110.2 kg)   05/17/22 243 lb (110.2 kg)        Vitals :   Vitals:    08/10/22 2345 08/11/22 0545 08/11/22 0704 08/11/22 0941   BP: 137/81  (!) 141/83    Pulse: 88  87 (!) 103   Resp: 20  20 24   Temp: 97.9 °F (36.6 °C)  97.1 °F (36.2 °C)    TempSrc: Temporal  Temporal    SpO2: 97%  97% 93%   Weight:  265 lb (120.2 kg)     Height:           Physical examination  General Appearance:  Well developed.  No prerenal/cardiorenal  Obtain a chest x-ray as well  Lisinopril was started today which I will discontinue due to renal dysfunction     Electrolytes -hypokalemia status post replacement  Mild metabolic acidosis secondary to acute kidney injury  Left-sided hydronephrosis status post stent placement 8/9/2022  Possible UTI on antibiotics await culture and sensitivities  Meds reviewed and discussed with patient and RN    Juli Barros MD  [facility]    This report has been created using voice recognition software.  It may contain minor errors which are inherent in voice recognition technology

## 2022-08-11 NOTE — PROGRESS NOTES
Patient is complaining of a headache at this time rated 10 out of 10. PRN tylenol given. Will continue to monitor.

## 2022-08-11 NOTE — PROGRESS NOTES
Physical Therapy  Facility/Department: [facility] AT THE [facility] MED SURG  Daily Treatment Note  NAME: Joon Adkins  : 1955  MRN: [id number]  Date of Service: 2022  Discharge Recommendations:  Subacute/Skilled Nursing Facility   Patient Diagnosis(es): The primary encounter diagnosis was Calculus in urethra. A diagnosis of Septicemia ([address] was also pertinent to this visit. Assessment   Activity Tolerance: Patient limited by fatigue;Patient limited by endurance   Plan    Plan  Plan: 2 times a day 7 days a week  Current Treatment Recommendations: Strengthening; Functional mobility training;Transfer training;Gait training; Safety education & training;Patient/Caregiver education & training;Neuromuscular re-education   Restrictions  Restrictions/Precautions  Restrictions/Precautions:  (UP WITH ASSIST.)  Required Braces or Orthoses?: No   Subjective    Subjective  Subjective: Pt. in chair upon arrival, pt. needing to transfer from chair to bed for ultra sound. Pain: still has headache from yesterday and SOB  Orientation  Overall Orientation Status: Within Functional Limits  Cognition  Overall Cognitive Status: WFL   Objective   Bed Mobility Training  Bed Mobility Training: Yes  Overall Level of Assistance: Maximum assistance  Interventions: Demonstration  Rolling: Maximum assistance  Supine to Sit: Moderate assistance;Maximum assistance  Sit to Supine: Maximum assistance  Scooting: Maximum assistance  Transfer Training  Transfer Training: Yes  Overall Level of Assistance: Moderate assistance;Assist X2  Interventions: Verbal cues; Tactile cues  Sit to Stand:  Moderate assistance;Assist X2  Stand to Sit: Moderate assistance;Assist X2  Stand Pivot Transfers: Maximum assistance  Gait Training  Gait Training: Yes  Gait  Overall Level of Assistance: Minimum assistance;Assist X2  Interventions: Demonstration  Base of Support: Widened  Speed/Cheryl: Slow  Distance (ft):  (5ftx1)  Assistive Device: Walker, rolling  Safety

## 2022-08-11 NOTE — PROGRESS NOTES
Patient shift assessment and vitals completed at this time as charted. Patient is alert and oriented x4 and is laying in bed awake. Patient states no needs at this time, call light is in reach, will continue to monitor.

## 2022-08-12 PROBLEM — U07.1 COVID-19 VIRUS INFECTION: Status: ACTIVE | Noted: 2022-08-12

## 2022-08-12 PROBLEM — A49.8 KLEBSIELLA INFECTION: Status: ACTIVE | Noted: 2022-08-12

## 2022-08-12 PROBLEM — Z16.12 URINARY TRACT INFECTION DUE TO EXTENDED-SPECTRUM BETA LACTAMASE (ESBL) PRODUCING ESCHERICHIA COLI: Status: ACTIVE | Noted: 2022-08-12

## 2022-08-12 PROBLEM — U07.1 COVID-19: Status: ACTIVE | Noted: 2022-08-12

## 2022-08-12 PROBLEM — B96.29 URINARY TRACT INFECTION DUE TO EXTENDED-SPECTRUM BETA LACTAMASE (ESBL) PRODUCING ESCHERICHIA COLI: Status: ACTIVE | Noted: 2022-08-12

## 2022-08-12 PROBLEM — N13.9 OBSTRUCTIVE UROPATHY: Status: ACTIVE | Noted: 2022-08-12

## 2022-08-12 PROBLEM — N17.9 AKI (ACUTE KIDNEY INJURY) (HCC): Status: ACTIVE | Noted: 2022-08-12

## 2022-08-12 PROBLEM — N39.0 URINARY TRACT INFECTION DUE TO EXTENDED-SPECTRUM BETA LACTAMASE (ESBL) PRODUCING ESCHERICHIA COLI: Status: ACTIVE | Noted: 2022-08-12

## 2022-08-12 PROBLEM — I50.82 BIVENTRICULAR CONGESTIVE HEART FAILURE (HCC): Status: ACTIVE | Noted: 2022-08-12

## 2022-08-12 LAB
ABSOLUTE EOS #: 0.13 K/UL (ref 0–0.44)
ABSOLUTE IMMATURE GRANULOCYTE: 0 K/UL (ref 0–0.3)
ABSOLUTE LYMPH #: 1.5 K/UL (ref 1.1–3.7)
ABSOLUTE MONO #: 0.63 K/UL (ref 0.1–1.2)
ANION GAP SERPL CALCULATED.3IONS-SCNC: 11 MMOL/L (ref 9–17)
BASOPHILS # BLD: 0 % (ref 0–2)
BASOPHILS ABSOLUTE: 0 K/UL (ref 0–0.2)
BUN BLDV-MCNC: 33 MG/DL (ref 8–23)
BUN/CREAT BLD: 18 (ref 9–20)
C-REACTIVE PROTEIN: 101.2 MG/L (ref 0–5)
CALCIUM SERPL-MCNC: 8.3 MG/DL (ref 8.6–10.4)
CHLORIDE BLD-SCNC: 106 MMOL/L (ref 98–107)
CO2: 23 MMOL/L (ref 20–31)
CREAT SERPL-MCNC: 1.88 MG/DL (ref 0.5–0.9)
EOSINOPHILS RELATIVE PERCENT: 1 % (ref 1–4)
GFR AFRICAN AMERICAN: 32 ML/MIN
GFR NON-AFRICAN AMERICAN: 27 ML/MIN
GFR SERPL CREATININE-BSD FRML MDRD: ABNORMAL ML/MIN/{1.73_M2}
GFR SERPL CREATININE-BSD FRML MDRD: ABNORMAL ML/MIN/{1.73_M2}
GLUCOSE BLD-MCNC: 105 MG/DL (ref 70–99)
HCT VFR BLD CALC: 29.1 % (ref 36.3–47.1)
HEMOGLOBIN: 8.8 G/DL (ref 11.9–15.1)
IMMATURE GRANULOCYTES: 0 %
LYMPHOCYTES # BLD: 12 % (ref 24–43)
MCH RBC QN AUTO: 29 PG (ref 25.2–33.5)
MCHC RBC AUTO-ENTMCNC: 30.2 G/DL (ref 28.4–34.8)
MCV RBC AUTO: 96 FL (ref 82.6–102.9)
MONOCYTES # BLD: 5 % (ref 3–12)
MORPHOLOGY: ABNORMAL
NRBC AUTOMATED: 0 PER 100 WBC
PDW BLD-RTO: 14.2 % (ref 11.8–14.4)
PLATELET # BLD: ABNORMAL K/UL (ref 138–453)
PLATELET, FLUORESCENCE: 98 K/UL (ref 138–453)
PLATELET, IMMATURE FRACTION: 3.6 % (ref 1.1–10.3)
POTASSIUM SERPL-SCNC: 4.3 MMOL/L (ref 3.7–5.3)
RBC # BLD: 3.03 M/UL (ref 3.95–5.11)
SARS-COV-2, RAPID: DETECTED
SEG NEUTROPHILS: 82 % (ref 36–65)
SEGMENTED NEUTROPHILS ABSOLUTE COUNT: 10.24 K/UL (ref 1.5–8.1)
SODIUM BLD-SCNC: 140 MMOL/L (ref 135–144)
SPECIMEN DESCRIPTION: ABNORMAL
WBC # BLD: 12.5 K/UL (ref 3.5–11.3)

## 2022-08-12 PROCEDURE — 6370000000 HC RX 637 (ALT 250 FOR IP): Performed by: PHYSICIAN ASSISTANT

## 2022-08-12 PROCEDURE — 36415 COLL VENOUS BLD VENIPUNCTURE: CPT

## 2022-08-12 PROCEDURE — 97530 THERAPEUTIC ACTIVITIES: CPT

## 2022-08-12 PROCEDURE — 99222 1ST HOSP IP/OBS MODERATE 55: CPT | Performed by: INTERNAL MEDICINE

## 2022-08-12 PROCEDURE — 1200000000 HC SEMI PRIVATE

## 2022-08-12 PROCEDURE — 2700000000 HC OXYGEN THERAPY PER DAY

## 2022-08-12 PROCEDURE — 6370000000 HC RX 637 (ALT 250 FOR IP): Performed by: INTERNAL MEDICINE

## 2022-08-12 PROCEDURE — 80048 BASIC METABOLIC PNL TOTAL CA: CPT

## 2022-08-12 PROCEDURE — 6370000000 HC RX 637 (ALT 250 FOR IP): Performed by: FAMILY MEDICINE

## 2022-08-12 PROCEDURE — 97535 SELF CARE MNGMENT TRAINING: CPT

## 2022-08-12 PROCEDURE — 6360000002 HC RX W HCPCS: Performed by: NURSE PRACTITIONER

## 2022-08-12 PROCEDURE — 6370000000 HC RX 637 (ALT 250 FOR IP): Performed by: UROLOGY

## 2022-08-12 PROCEDURE — 87635 SARS-COV-2 COVID-19 AMP PRB: CPT

## 2022-08-12 PROCEDURE — 85025 COMPLETE CBC W/AUTO DIFF WBC: CPT

## 2022-08-12 PROCEDURE — 6360000002 HC RX W HCPCS: Performed by: PHYSICIAN ASSISTANT

## 2022-08-12 PROCEDURE — 2580000003 HC RX 258: Performed by: UROLOGY

## 2022-08-12 PROCEDURE — 85055 RETICULATED PLATELET ASSAY: CPT

## 2022-08-12 PROCEDURE — 99232 SBSQ HOSP IP/OBS MODERATE 35: CPT | Performed by: INTERNAL MEDICINE

## 2022-08-12 PROCEDURE — 86140 C-REACTIVE PROTEIN: CPT

## 2022-08-12 PROCEDURE — 6370000000 HC RX 637 (ALT 250 FOR IP): Performed by: NURSE PRACTITIONER

## 2022-08-12 PROCEDURE — 94761 N-INVAS EAR/PLS OXIMETRY MLT: CPT

## 2022-08-12 PROCEDURE — 94664 DEMO&/EVAL PT USE INHALER: CPT

## 2022-08-12 PROCEDURE — 2580000003 HC RX 258: Performed by: NURSE PRACTITIONER

## 2022-08-12 RX ORDER — POTASSIUM CHLORIDE 20 MEQ/1
20 TABLET, EXTENDED RELEASE ORAL DAILY
Qty: 30 TABLET | Refills: 5 | DISCHARGE
Start: 2022-08-12 | End: 2022-08-16 | Stop reason: SDUPTHER

## 2022-08-12 RX ORDER — BEBTELOVIMAB 87.5 MG/ML
175 INJECTION, SOLUTION INTRAVENOUS ONCE
Status: COMPLETED | OUTPATIENT
Start: 2022-08-12 | End: 2022-08-12

## 2022-08-12 RX ORDER — FUROSEMIDE 40 MG/1
40 TABLET ORAL DAILY
Qty: 60 TABLET | Refills: 3 | DISCHARGE
Start: 2022-08-13 | End: 2022-08-16 | Stop reason: HOSPADM

## 2022-08-12 RX ORDER — IPRATROPIUM BROMIDE AND ALBUTEROL SULFATE 2.5; .5 MG/3ML; MG/3ML
1 SOLUTION RESPIRATORY (INHALATION) EVERY 4 HOURS PRN
Status: DISCONTINUED | OUTPATIENT
Start: 2022-08-12 | End: 2022-08-16 | Stop reason: HOSPADM

## 2022-08-12 RX ORDER — ZINC SULFATE 50(220)MG
100 CAPSULE ORAL DAILY
Status: DISCONTINUED | OUTPATIENT
Start: 2022-08-12 | End: 2022-08-16 | Stop reason: HOSPADM

## 2022-08-12 RX ORDER — ONDANSETRON 2 MG/ML
4 INJECTION INTRAMUSCULAR; INTRAVENOUS EVERY 6 HOURS PRN
Status: DISCONTINUED | OUTPATIENT
Start: 2022-08-12 | End: 2022-08-16 | Stop reason: HOSPADM

## 2022-08-12 RX ORDER — ONDANSETRON 4 MG/1
4 TABLET, ORALLY DISINTEGRATING ORAL EVERY 6 HOURS PRN
Status: DISCONTINUED | OUTPATIENT
Start: 2022-08-12 | End: 2022-08-16 | Stop reason: HOSPADM

## 2022-08-12 RX ORDER — CARVEDILOL 12.5 MG/1
12.5 TABLET ORAL 2 TIMES DAILY WITH MEALS
Status: DISCONTINUED | OUTPATIENT
Start: 2022-08-12 | End: 2022-08-16 | Stop reason: HOSPADM

## 2022-08-12 RX ORDER — METOPROLOL SUCCINATE 25 MG/1
25 TABLET, EXTENDED RELEASE ORAL DAILY
Qty: 30 TABLET | Refills: 3 | DISCHARGE
Start: 2022-08-13 | End: 2022-08-16 | Stop reason: HOSPADM

## 2022-08-12 RX ADMIN — SODIUM CHLORIDE, PRESERVATIVE FREE 10 ML: 5 INJECTION INTRAVENOUS at 20:32

## 2022-08-12 RX ADMIN — Medication 1000 UNITS: at 09:17

## 2022-08-12 RX ADMIN — FUROSEMIDE 40 MG: 10 INJECTION, SOLUTION INTRAMUSCULAR; INTRAVENOUS at 09:17

## 2022-08-12 RX ADMIN — OXYCODONE HYDROCHLORIDE AND ACETAMINOPHEN 250 MG: 500 TABLET ORAL at 09:17

## 2022-08-12 RX ADMIN — MEROPENEM 2000 MG: 1 INJECTION, POWDER, FOR SOLUTION INTRAVENOUS at 15:29

## 2022-08-12 RX ADMIN — ZINC SULFATE 220 MG (50 MG) CAPSULE 100 MG: CAPSULE at 12:20

## 2022-08-12 RX ADMIN — OXYBUTYNIN CHLORIDE 15 MG: 5 TABLET, EXTENDED RELEASE ORAL at 09:16

## 2022-08-12 RX ADMIN — NYSTATIN 500000 UNITS: 100000 SUSPENSION ORAL at 12:20

## 2022-08-12 RX ADMIN — SODIUM CHLORIDE, PRESERVATIVE FREE 10 ML: 5 INJECTION INTRAVENOUS at 09:18

## 2022-08-12 RX ADMIN — ACETAMINOPHEN 650 MG: 325 TABLET ORAL at 22:39

## 2022-08-12 RX ADMIN — CETIRIZINE HYDROCHLORIDE 5 MG: 10 TABLET, FILM COATED ORAL at 09:17

## 2022-08-12 RX ADMIN — RIVAROXABAN 15 MG: 15 TABLET, FILM COATED ORAL at 16:50

## 2022-08-12 RX ADMIN — MULTIPLE VITAMINS W/ MINERALS TAB 1 TABLET: TAB at 09:17

## 2022-08-12 RX ADMIN — METOPROLOL SUCCINATE 25 MG: 25 TABLET, EXTENDED RELEASE ORAL at 09:17

## 2022-08-12 RX ADMIN — Medication 400 UNITS: at 09:16

## 2022-08-12 RX ADMIN — NYSTATIN 500000 UNITS: 100000 SUSPENSION ORAL at 20:32

## 2022-08-12 RX ADMIN — ACETAMINOPHEN 650 MG: 325 TABLET ORAL at 09:19

## 2022-08-12 RX ADMIN — NYSTATIN 500000 UNITS: 100000 SUSPENSION ORAL at 16:50

## 2022-08-12 RX ADMIN — BEBTELOVIMAB 175 MG: 87.5 INJECTION, SOLUTION INTRAVENOUS at 12:36

## 2022-08-12 RX ADMIN — CARVEDILOL 12.5 MG: 12.5 TABLET, FILM COATED ORAL at 20:56

## 2022-08-12 ASSESSMENT — PAIN SCALES - GENERAL
PAINLEVEL_OUTOF10: 0
PAINLEVEL_OUTOF10: 6
PAINLEVEL_OUTOF10: 10
PAINLEVEL_OUTOF10: 10

## 2022-08-12 ASSESSMENT — PAIN DESCRIPTION - DESCRIPTORS
DESCRIPTORS: ACHING
DESCRIPTORS: ACHING;DULL
DESCRIPTORS: ACHING

## 2022-08-12 ASSESSMENT — PAIN DESCRIPTION - FREQUENCY: FREQUENCY: INTERMITTENT

## 2022-08-12 ASSESSMENT — PAIN DESCRIPTION - LOCATION
LOCATION: HEAD

## 2022-08-12 ASSESSMENT — PAIN DESCRIPTION - PAIN TYPE: TYPE: ACUTE PAIN

## 2022-08-12 NOTE — PROGRESS NOTES
Patient resting comfortably at this time. Patient denies any pain at this time and denies any other needs. Vitals and assessment as charted. Patient was repositioned and incontinence care was done. Bed alarm on, bed locked, gripper socks on, call light within reach and able to use appropriately. Patient is tolerated decrease of oxygen well but drops to 89% with exertion. Patient denies any shortness of breath at this time. Will continue to monitor this shift.

## 2022-08-12 NOTE — PROGRESS NOTES
[facility]   Nephrology progress note  8/12/2022, 11:54 AM      Pt Name:    Brain Whatley  MRN:     [id number]     YOB: 1955  Admit Date:    8/8/2022  5:32 PM    TELEHEALTH EVALUATION -- Audio/Visual (During RKACM-23 public health emergency)     Telehealth service was provided with the patient at her room [address] [facility] and myself the physician in [facility] [place] [facility] office and COREY HERNANDEZ who has initiated the visit. Pursuant to the emergency declaration under the [address] [place] [address], [facility] waiver authority and the [employer] and [employer] Act, this Virtual  Visit was conducted, with patient's consent, to reduce the patient's risk of exposure to COVID-19 and provide continuity of care for an established patient. Services were provided through a video synchronous discussion virtually to substitute for in-person clinic visit. Chief Complaint: Nephrology following for BARBARA/CKD .     Subjective:  Patient seen and examined  No chest pain apparently feeling more short of breath  Requiring more oxygen and saturating 95%  She is tested positive for COVID  Clinically she is feeling much better  She is making more urine output    Objective:  24HR INTAKE/OUTPUT:    Intake/Output Summary (Last 24 hours) at 8/12/2022 1154  Last data filed at 8/12/2022 0728  Gross per 24 hour   Intake 1035 ml   Output --   Net 1035 ml        Admission weight: 257 lb 12.8 oz (116.9 kg)  Wt Readings from Last 3 Encounters:   08/12/22 262 lb (118.8 kg)   05/17/22 243 lb (110.2 kg)   05/17/22 243 lb (110.2 kg)        Vitals :   Vitals:    08/11/22 2030 08/12/22 0049 08/12/22 0516 08/12/22 0722   BP:  (!) 142/86  136/74   Pulse:  92  81   Resp:  22  20   Temp:  97.3 °F (36.3 °C)  96.8 °F (36 °C)   TempSrc:  Temporal  Temporal   SpO2: 93% 92%  95%   Weight:   262 lb (118.8 kg)    Height:           Physical examination  General Appearance:  Well developed. No distress  Mouth/Throat:  Oral mucosa moist  Neck: No accessory muscle use  Lungs:  Breath sounds: Wheezing bilaterally  Heart[de-identified]  S1,S2 heard  Abdomen:  Soft, non - tender  Musculoskeletal:  Edema -chronic edema noted bilateral lower extremities    Medications:  Infusion:    sodium chloride       Meds:    nystatin  5 mL Oral 4x Daily    metoprolol succinate  25 mg Oral Daily    furosemide  40 mg IntraVENous Daily    rivaroxaban  15 mg Oral Q24H    vitamin E  400 Units Oral Daily    ipratropium-albuterol  1 vial Inhalation TID    cefTRIAXone (ROCEPHIN) IV  1,000 mg IntraVENous Q24H    sodium chloride flush  5-40 mL IntraVENous 2 times per day    vitamin C  250 mg Oral Daily    cetirizine  5 mg Oral Daily    therapeutic multivitamin-minerals  1 tablet Oral Daily    oxybutynin  15 mg Oral Daily    Vitamin D  1,000 Units Oral Daily       Lab Data :  CBC:   Recent Labs     08/10/22  0930 08/11/22  0520 08/12/22  0505   WBC 20.0* 17.4* 12.5*   HGB 8.9* 8.8* 8.8*   HCT 30.9* 29.4* 29.1*   PLT See Reflexed IPF Result See Reflexed IPF Result See Reflexed IPF Result       CMP:  Recent Labs     08/10/22  0610 08/11/22  0520 08/12/22  0505    142 140   K 4.9 4.7 4.3   * 111* 106   CO2 18* 20 23   BUN 34* 39* 33*   CREATININE 2.66* 2.44* 1.88*   GLUCOSE 96 97 105*   CALCIUM 8.1* 8.0* 8.3*       Hepatic:   Recent Labs     08/11/22  0520   LABALBU 2.5*   AST 12   ALT <5*   BILITOT <0.10*   ALKPHOS 68       Baseline Creatinine: 0.8     Assessment and Plan:  Renal -acute kidney injury, most likely multifactorial etiology but primarily due to obstructive etiology combined with borderline blood pressures  Creatinine has worsened until yesterday. Urine lites show intravascular volume depletion may be prerenal/cardiorenal  Getting diuretics and creatinine is getting better.   Continue Lasix for now  Chest x-ray similar to prior     Electrolytes -hypokalemia status post replacement  Mild metabolic acidosis secondary to acute kidney injury improved  Left-sided hydronephrosis status post stent placement 8/9/2022  Possible UTI on antibiotics   COVID-19 infection  Meds reviewed and discussed with patient and RN    Mitchell Gayle MD  [facility]    This report has been created using voice recognition software.  It may contain minor errors which are inherent in voice recognition technology

## 2022-08-12 NOTE — PROGRESS NOTES
Occupational Therapy  Facility/Department: [facility] MED SURG  Daily Treatment Note  NAME: Ephraim Leigh  : 1955  MRN: [id number]    Date of Service: 2022    Discharge Recommendations:  Continue to assess pending progress, Subacute/Skilled Nursing Facility, Patient would benefit from continued therapy after discharge         Patient Diagnosis(es): The primary encounter diagnosis was Calculus in urethra. A diagnosis of Septicemia ([address] was also pertinent to this visit. Assessment    Activity Tolerance: Patient limited by fatigue;Patient limited by endurance  Discharge Recommendations: Continue to assess pending progress;Subacute/Skilled Nursing Facility; Patient would benefit from continued therapy after discharge          Restrictions  Restrictions/Precautions  Restrictions/Precautions: Fall Risk;General Precautions    Subjective   Subjective  Subjective: Patient has reported \" I just don't get this shortness of breath, and my mouth has been hurting bad. \"  Pain: Denies significant pain, just overall fatigue. Orientation  Overall Orientation Status: Within Functional Limits  Cognition  Overall Cognitive Status: WFL        Objective    Vitals     Bed Mobility Training  Bed Mobility Training: Yes  Overall Level of Assistance: Maximum assistance  Interventions: Demonstration;Verbal cues  Rolling:  Moderate assistance  Supine to Sit: Moderate assistance  Transfer Training  Transfer Training: Yes  Sit to Stand: Contact-guard assistance  Stand to Sit: Contact-guard assistance  Gait Training  Gait Training: Yes  Gait  Overall Level of Assistance: Contact-guard assistance  Base of Support: Widened  Speed/Cheryl: Slow  Distance (ft): 5 Feet (Pt  unsteady with gait and moderately SOB with short distance)     ADL  Feeding: Setup;Stand by assistance  Feeding Skilled Clinical Factors: OT present during feeding, noted wheezing following LB ADLs, patient required increased assistance to manipulate tray and food.  LE Bathing: Maximum assistance  LE Dressing: Maximum assistance  Toileting: Dependent/Total  Additional Comments: Assisted with ADL toileting and mobility this date. Required DEP for pericare and mod-max A for rolling in order to complete LB dressing tasks. Safety Devices  Type of Devices: Call light within reach;Nurse notified; Left in chair     Patient Education  Education Given To: Patient  Education Provided: Energy Conservation; ADL Adaptive Strategies  Education Provided Comments: Educated on energy conservation for pursed lip breathing when noting wheezing. Education Method: Verbal  Barriers to Learning: None  Education Outcome: Verbalized understanding    Goals  Short Term Goals  Time Frame for Short term goals: 20 visits  Short Term Goal 1: Pt. will tolerate 10-15 mins of BUE ther ex/act while maintaining SpO2 WFL to increase ovrall strength/activity tolerance for functional tasks. Short Term Goal 2: Pt. will complete grooming/bathing/dressing tasks with SBA with use of EC techs PRN to increase ease/(I) with self care routine. Short Term Goal 3: Pt. will demo G safety awareness during functional ADL transfers/mobility with reduced risk for falls. Short Term Goal 4: Patient to be educated on d/c folder, AE/DME and EC/WS techniques to ensure safe and indep return home.        Therapy Time   Individual Concurrent Group Co-treatment   Time In 0830         Time Out 0902         Minutes [address] Inf, OTR/L

## 2022-08-12 NOTE — PROGRESS NOTES
Progress Note  Boaz Reagan MD    OBJECTIVE:    Patient seen for f/u of Sepsis due to urinary tract infection ([address] She had stent,blood culture grew klebseila sensitive to rocephin. Wbc 12 k,still on oxygen    ROS:   Constitutional: negative  for fevers, and negative for chills. Respiratory: negative for shortness of breath, negative for cough, and negative for wheezing  Cardiovascular: negative for chest pain, and negative for palpitations  Gastrointestinal: negative for abdominal pain, negative for nausea,negative for vomiting, negative for diarrhea, and negative for constipation     All other systems were reviewed with the patient and are negative unless otherwise stated in HPI    OBJECTIVE:  Vitals:   Temp: 97.3 °F (36.3 °C)  BP: (!) 142/86  Resp: 22  Heart Rate: 92  SpO2: 92 %    24HR INTAKE/OUTPUT:    Intake/Output Summary (Last 24 hours) at 8/12/2022 0707  Last data filed at 8/12/2022 0516  Gross per 24 hour   Intake 1113.72 ml   Output 100 ml   Net 1013.72 ml     -----------------------------------------------------------------  Exam:  GEN:    Awake, alert and oriented x3. EYES:  EOMI, pupils equal   NECK: Supple. No lymphadenopathy. No carotid bruit  CVS:    regular rate and rhythm, 2/6 systolic murmur  PULM:  CTA, no wheezes, rales or rhonchi, no acute respiratory distress  ABD:    Bowels sounds normal.  Abdomen is soft. No distention. no tenderness to palpation. EXT:   3+ edema bilaterally . No calf tenderness. NEURO: Moves all extremities. Motor and sensory are grossly intact  SKIN:  No rashes.   No skin lesions.    -----------------------------------------------------------------  Diagnostic Data:    All available data reviewed  Lab Results   Component Value Date    WBC 12.5 (H) 08/12/2022    HGB 8.8 (L) 08/12/2022    MCV 96.0 08/12/2022    PLT See Reflexed IPF Result 08/12/2022      Lab Results   Component Value Date    GLUCOSE 105 (H) 08/12/2022    BUN 33 (H) 08/12/2022    CREATININE 1.88 (H) 08/12/2022     08/12/2022    K 4.3 08/12/2022    CALCIUM 8.3 (L) 08/12/2022     08/12/2022    CO2 23 08/12/2022       PROBLEM LIST:  Principal Problem:    Sepsis due to urinary tract infection ([address]  Active Problems:    Obesity, Class III, BMI 40-49.9 (morbid obesity) ([address]    Sepsis ([address]    Urologic disorders    Bacteremia due to Gram-negative bacteria    Hypoxia    Atrial fibrillation with rapid ventricular response (HCC)    Renal failure syndrome    Abnormal ECG    Acute kidney injury superimposed on CKD ([address]    Calculus in urethra    Chronic anticoagulation    Lymphedema of both lower extremities    Ureteral calculi  Resolved Problems:    * No resolved hospital problems. *      ASSESSMENT / PLAN:  Sepsis due to urinary tract infection ([address]  Principal Problem:    Sepsis due to urinary tract infection ([address]- continue iv rpcephin  Active Problems:    Obesity, Class III, BMI 40-49.9 (morbid obesity) ([address]    Sepsis ([address]    Urologic disorders    Bacteremia due to Gram-negative bacteria    Hypoxia    Atrial fibrillation with rapid ventricular response (HCC)    Renal failure syndrome    Abnormal ECG    Acute kidney injury superimposed on CKD (HCC)    Chronic anticoagulation    Lymphedema of both lower extremities    Ureteral calculi  Resolved Problems:    * No resolved hospital problems. *        Nutrition status:  Well developed, well nourished with no malnutrition  DVT prophylaxis: xarelto  High risk medications: none   Disposition:  Discharge plan is ECF    Hannah Newton MD , M.D.  8/12/2022  7:07 AM

## 2022-08-12 NOTE — CONSULTS
Palliative Care Inpatient Consult    NAME:  [address] [place] [address] RECORD NUMBER:  [id number]  AGE: 74 y.o. GENDER: female  : 1955  TODAY'S DATE:  2022    Reason for Consult:  goals of care    History of Present Illness     The patient is a 74 y.o. Non- / non  female who presents with Shortness of Breath and Flank Pain      Referred to Palliative Care by  [] Physician   [x] Nursing  [] Family Request   [] Other:      She was admitted to the hospitalist service for Calculus in urethra [N21.1]  Septicemia ([facility] [A41.9]  Urologic disorders [N39.9]  Sepsis ([facility] [A41.9]. Her hospital course has been associated with sepsis.  The patient has a complicated medical history and has been hospitalized since 2022  5:32 PM.    Active Hospital Problems    Diagnosis Date Noted    Calculus in urethra [N21.1] 2022     Priority: Medium    Acute kidney injury superimposed on CKD ([facility] [N17.9, N18.9] 08/10/2022     Priority: Medium    Bacteremia due to Gram-negative bacteria [R78.81] 2022     Priority: Medium    Hypoxia [R09.02] 2022     Priority: Medium    Atrial fibrillation with rapid ventricular response ([address] [I48.91] 2022     Priority: Medium    Sepsis due to urinary tract infection ([address] [A41.9, N39.0] 2022     Priority: Medium    Renal failure syndrome [N19] 2022     Priority: Medium    Abnormal ECG [R94.31] 2022     Priority: Medium    Urologic disorders [N39.9] 2022     Priority: Medium    Sepsis ([facility] [A41.9] 2022     Priority: Medium    Obesity, Class III, BMI 40-49.9 (morbid obesity) ([facility] [E66.01] 2022     Priority: Medium    Ureteral calculi [N20.1] 2022    Lymphedema of both lower extremities [I89.0] 2016    Chronic anticoagulation [Z79.01] 2015       Data         /74   Pulse 81   Temp 96.8 °F (36 °C) (Temporal)   Resp 20   Ht 5' 1\" (1.549 m)   Wt 262 lb (118.8 kg)   LMP  (LMP Unknown)   SpO2 95% BMI 49.50 kg/m²     Wt Readings from Last 3 Encounters:   08/12/22 262 lb (118.8 kg)   05/17/22 243 lb (110.2 kg)   05/17/22 243 lb (110.2 kg)        Code Status: Full Code     ADVANCED CARE PLANNING:  Patient has capacity for medical decisions: yes  Health Care Power of : no  Living Will: no     Personal, Social, and Family History  Marital Status:   Living situation:with family:  spouse and daughter  Psychological Distress: mild  Does patient understand diagnosis/treatment? yes  Does caregiver understand diagnosis/treatment? not asked    Assessment        Symptom management/ pain control   Pain Assessment:  Pain is controlled without any medications.   Anxiety:  none  Dyspnea:  acute dyspnea  Fatigue:  exercise intolerance  Other:    Palliative Performance Scale:     ___100% Full ambulation; normal activity and work; no evidence of disease; able to do own self care; normal intake; fully conscious  ___90% Full ambulation; normal activity and work; some evidence of disease; able to do own self care; normal intake; fully conscious  ___80% Full ambulation; normal activity with effort; some evidence of disease; able to do own self care; normal or reduced intake; fully conscious  ___70% Ambulation reduced; unable to perform normal job/work; significant disease; able to do own self care; normal or reduced intake; fully conscious  __x_60%  Ambulation reduced; cannot do hobbies/housework; significant disease; occasional assist; intake normal or reduced; fully conscious/some confusion  ___50%  Mainly sit/lie; can't do any work; extensive disease; considerable assist; intake normal or reduced; fully conscious/some confusion  ___40%  Mainly in bed; extensive disease; mainly assist; intake normal or reduced; fully conscious/ some confusion   ___30%  Bed bound; extensive disease; total care; intake reduced; fully conscious/some confusion  ___20%  Bed bound; extensive disease; total care; intake minimal; drowsy/coma  ___10%  Bed bound; extensive disease; total care; mouth care only; drowsy/coma  ___0       Death     Readmission Risk Score: 20%    Plan      Palliative Interaction: Siria Riojas is resting in bed for our discussion. She lives at home with her daughter and spouse. States that she has 5 children \"but two of them don't have anything to do with me anymore. After I  their dad. \" Support provided. She has a walker, shower chair and grab bars in the bathroom. She is independent with her ADL's. Her daughter assists with her medications and house hold chores. Siria Riojas has a drivers licence but at times relies on family for transportation. Siria Riojas states that she is angry about her current situation. \"I was just going to start driving again and then this happens. \" Denies issues with anxiety or depression. Reports incontinence issues due to prolapsed bladder. She is currently under the care of urology. Discussed advanced directives. She does not currently have a [address] or living will. States that she is unsure if she wants to complete these and asks that writer return before lunch to discuss further with her. ACP activator note complete. Denies further needs at this time.      Education/support to patient  Providing support for coping/adaptation/distress of patient  Continue with current plan of care  Pain management for comfort  Validating patient/family distress    Principle Problem/Diagnosis:  Sepsis due to urinary tract infection ([facility]    Goals of care evaluation   The patient goals of care are improve or maintain function/quality of life, provide comfort care/support/palliation/relieve suffering, remain at home, preserve independence/autonomy/control, and support for family/caregiver   Goals of care discussed with:    [x] Patient independently    [] Patient and Family    [] Family or Healthcare DPOA independently    [] Unable to discuss with patient, family/DPOA not present    Code Status  Full Code     Palliative Care will continue to follow Ms. Kay's care as needed. Thank you for allowing Palliative Care to participate in the care of Ms. Betito Eastman .      Electronically signed by   Ward Rousseau RN  Palliative Care Team  on 8/12/2022 at 8:36 AM    Palliative care office: [phone]

## 2022-08-12 NOTE — CONSULTS
[facility] - Initial Consult Note COVID 19 Patient Telemedicine  Today's Date and Time: 8/12/2022, 1:24 PM    Impression :     COVID 19 Confirmed Infection  Previously vaccinated individual:  4-28--21 Bryant Batch  5-26-21 Bryant Batch  2-1-22 Moderna  Covid tests:  8-8-22; negative   8-12-22: Positive  High CRP  CHF with high Pro BNP  BARBARA with Lt side hydronephrosis  S/P stent placement 8-8-22  Urine 8-8-22 with Klebsiella and E coli ESBL +  Recommendations:   Antibiotic treatment:  Meropenem 500 mg IV q 12 hr x 5-7 days  Or Ertapenem 500 mg once daily for 5-7 days for E coli ESBL + in urine  Covid Rx:    Remdesivir. Not indicated- BARBARA  Decadron: Contra indicated at this stage  Actemra: Contra indicated at this stage  Monoclonal antibodies: Bebtelovimab infused 8-12-22      Medical Decision Making/Summary/Discussion:8/12/2022     Patient admitted with COVID 19 infection    Infection Control Recommendations   [place] Precautions  Airborne isolation  Droplet Isolation  Isolate until 8-22-22  Contact isolation for E coli ESBL +    Antimicrobial Stewardship Recommendations     Simplification of therapy  Targeted therapy    Coordination of Outpatient Care:   Estimated Length of IV antimicrobials:8-17-22  Patient will need Midline Catheter Insertion: TBD  Patient will need PICC line Insertion: No  Patient will need: Home IV , [place],  SNF,  LTAC:TBD  Patient will need outpatient wound care:No    Chief complaint/reason for consultation:   Concern for COVID infection  UTI with Klebsiella and E coli ESBL +      History of Present Illness:   Brandon Jimenez is a 74y.o.-year-old  female who was initially admitted on 8/8/2022. Patient seen at the request of Dr. eBth Elizabeth. INITIAL HISTORY:    Patient presented through ER with complaints of SOB and flank pain. She was found to be febrile, tachycardic, tachypneic, hypotensive, in atrial fibrillation.   CXR showed vascular congestion. She gave a Hx of renal stones. Catheterized Urine culture grew Klebsiella and E coli ESBL +    Initial SARS Co-V2 was negative on 8-8-22 but second test was positive on 8-12-22       Patient admitted because of concerns with sepsis. Treated for UTI with ceftriaxone. Meropenem started 8-12-22 because of ESBL + E coli    Has received Bebtelovimab for Covid 19. Patient is on early viral multiplication phase. CURRENT EVALUATION : 8/12/2022    Afebrile  VS stable    Patient exhibiting respiratory distress. yes  Respiratory secretions: No    Patient receiving supplemental oxygen. Nasal 1 L/min  RR 20  02 sat 95      Overall Daily Picture:   Improving    Presence of secondary bacterial Infection:  Yes  Additional antibiotics: Meropenem or Ertapenem    Labs, X rays reviewed: 8/12/2022    BUN: 33  Cr: 1.88    WBC: 12.5  Hb: 8.8  Plat: 98    Absolute Neutrophils: 10.2  Absolute Lymphocytes:1.5  Neutrophil/Lymphocyte Ratio: 7 High risk     CRP: 101  Ferritin:  LDH:     Pro Calcitonin:  Pro BNP 17,749    Cultures:  Urine:    Blood:    Sputum :    Wound:      CXR:   8-11-22: Vascular congestion without overt edema, similar compared to the prior exam.  CAT:  8-9-22; Mild cardiomegaly. No definite pulmonary edema. , small effusions    Discussed with RNOLLIE.    8-11-22:       8-9-22:          I have personally reviewed the past medical history, past surgical history, medications, social history, and family history, and I have updated the database accordingly.   Past Medical History:     Past Medical History:   Diagnosis Date    Acute kidney injury superimposed on CKD ([address] 8/10/2022    Atrial fibrillation with RVR ([address] 8/9/2022    Carcinoma ([address]     Squamous Cell    Cellulitis     COVID-19 virus infection 8/12/2022    DVT (deep venous thrombosis) ([address] 2006    LLE    Kidney stone     Lymphedema     Morbid obesity ([address]     Psoas abscess, right ([address]     Pyelonephritis     Wears glasses        Past Surgical History:     Past Surgical History:   Procedure Laterality Date    CARPAL TUNNEL RELEASE  1990s    CT ABSCESS DRAIN SUBCUTANEOUS  01/10/2022    CT ABSCESS DRAIN SUBCUTANEOUS 1/10/2022 [facility] CT SCAN    CT ABSCESS DRAIN SUBCUTANEOUS  04/21/2022    CT ABSCESS DRAIN SUBCUTANEOUS 4/21/2022 [facility] CT SCAN    CT ABSCESS DRAIN SUBCUTANEOUS  4/29/2022    CT ABSCESS DRAIN SUBCUTANEOUS 4/29/2022 [facility] CT SCAN    CYSTOSCOPY N/A 01/04/2022    CYSTOSCOPY URETERAL STENT INSERTION performed by Salome Noonan MD at [address] Right     HLL with stent    CYSTOSCOPY Right 03/01/2022    CYSTOSCOPY URETEROSCOPY LASER-WTIH HLL performed by Salome Noonan MD at [address] Right 03/01/2022    CYSTOSCOPY URETERAL STENT INSERTION-EXCHANGE performed by Salome Noonan MD at [address] Right 04/05/2022    Dr Hussain Kenny with stent insertion    CYSTOSCOPY Right 04/05/2022    CYSTOSCOPY URETEROSCOPY LASER-HLL performed by Salome Noonan MD at [address] Right 04/05/2022    CYSTOSCOPY URETERAL STENT Melinda Smaller performed by Salome Noonan MD at [address] Right 04/22/2022    CYSTOSCOPY URETERAL STENT INSERTION (Right )    CYSTOSCOPY Right 4/22/2022    CYSTOSCOPY URETERAL STENT INSERTION performed by Waleska Knox MD at [address] Left 8/9/2022    CYSTOSCOPY URETERAL STENT INSERTION performed by Salome Noonan MD at [address] [facility].  PICC [facility] SINGLE  4/26/2022         INSERT MIDLINE CATHETER  01/07/2022         IR NEPHROSTOMY PERCUTANEOUS RIGHT  01/05/2022    IR NEPHROSTOMY PERCUTANEOUS RIGHT 1/5/2022 Nathalie Bauer MD [facility] PROCEDURES    ANNABELLA AND BSO (CERVIX REMOVED)      05/2019    TUBAL LIGATION  1993    TUBAL LIGATION      WISDOM TOOTH EXTRACTION         Medications:      nystatin  5 mL Oral 4x Daily    zinc sulfate  100 mg Oral Daily    metoprolol succinate  25 mg Oral Daily    furosemide  40 mg IntraVENous Daily    rivaroxaban  15 mg Oral Q24H    vitamin E  400 Units Oral Daily    ipratropium-albuterol  1 vial Inhalation TID    cefTRIAXone (ROCEPHIN) IV  1,000 mg IntraVENous Q24H    sodium chloride flush  5-40 mL IntraVENous 2 times per day    vitamin C  250 mg Oral Daily    cetirizine  5 mg Oral Daily    therapeutic multivitamin-minerals  1 tablet Oral Daily    oxybutynin  15 mg Oral Daily    Vitamin D  1,000 Units Oral Daily       Social History:     Social History     Socioeconomic History    Marital status:      Spouse name: Not on file    Number of children: Not on file    Years of education: Not on file    Highest education level: Not on file   Occupational History    Not on file   Tobacco Use    Smoking status: Former     Packs/day: 0.50     Years: 42.00     Pack years: 21.00     Types: Cigarettes     Quit date: 2022     Years since quittin.3    Smokeless tobacco: Never   Vaping Use    Vaping Use: Never used   Substance and Sexual Activity    Alcohol use: No     Alcohol/week: 0.0 standard drinks    Drug use: No    Sexual activity: Not Currently   Other Topics Concern    Not on file   Social History Narrative    Not on file     Social Determinants of Health     Financial Resource Strain: Not on file   Food Insecurity: Not on file   Transportation Needs: Not on file   Physical Activity: Not on file   Stress: Not on file   Social Connections: Not on file   Intimate Partner Violence: Not on file   Housing Stability: Not on file       Family History:     Family History   Adopted: Yes   Problem Relation Age of Onset    Cancer Mother         The patient reports her biologic mother did have bladder cancer        Allergies:   Patient has no known allergies. Review of Systems:       Constitutional: No fevers or chills. Weak  Head: No headaches  Eyes: No double vision or blurry vision. No conjunctival inflammation. ENT: No sore throat or runny nose. . No hearing loss, tinnitus or vertigo.   Cardiovascular: No chest pain or palpitations. Shortness of breath. POPE  Lung: Shortness of breath, cough. No sputum production  Abdomen: No nausea, vomiting, diarrhea, or abdominal pain. Rosario Red No cramps. Genitourinary: No increased urinary frequency, or dysuria. No hematuria. No suprapubic or CVA pain  Musculoskeletal: No muscle aches or pains. No joint effusions, swelling or deformities  Hematologic: No bleeding or bruising. Neurologic: No headache, weakness, numbness, or tingling. Integument: No rash, no ulcers. Psychiatric: No depression. Endocrine: No polyuria, no polydipsia, no polyphagia. Physical Examination :   Patient Vitals for the past 8 hrs:   BP Temp Temp src Pulse Resp SpO2   08/12/22 0722 136/74 96.8 °F (36 °C) Temporal 81 20 95 %     General Appearance: Awake, alert, and in no apparent distress  Head:  Normocephalic, no trauma  Eyes: Pupils equal, round, reactive to light; sclera anicteric; conjunctivae pink. No embolic phenomena. ENT: Oropharynx clear, without erythema, exudate, or thrush. No tenderness of sinuses. Mouth/throat: mucosa pink and moist. No lesions. Dentition in good repair. Neck:Supple, without lymphadenopathy. Thyroid normal, No bruits. Pulmonary/Chest: Decreased breath sounds  Cardiovascular: Regular rate and rhythm without murmurs, rubs, or gallops. Abdomen: Soft, non tender. Bowel sounds normal. No organomegaly  All four Extremities: No cyanosis, clubbing, edema, or effusions. Neurologic: No gross sensory or motor deficits. Skin: Warm and dry with good turgor. No signs of peripheral arterial or venous insufficiency. No ulcerations. No open wounds.     Medical Decision Making -Laboratory:   I have independently reviewed/ordered the following labs:    CBC with Differential:   Recent Labs     08/11/22  0520 08/12/22  0505   WBC 17.4* 12.5*   HGB 8.8* 8.8*   HCT 29.4* 29.1*   PLT See Reflexed IPF Result See Reflexed IPF Result   LYMPHOPCT 8* 12*   MONOPCT 2* 5     BMP:   Recent Labs     08/11/22  0520 with adjacent atelectasis. Medical Decision [id number]:     EXAMINATION:   ONE XRAY VIEW OF THE CHEST       8/11/2022 1:13 pm       COMPARISON:   08/08/2022, 04/27/2022       HISTORY:   ORDERING SYSTEM PROVIDED HISTORY: SOB   TECHNOLOGIST PROVIDED HISTORY:   SOB       FINDINGS:   Vascular congestion is noted without overt edema. No consolidation. No   significant effusion is identified. No pneumothorax. Mild cardiac   silhouette enlargement. Advanced arthropathy in the right shoulder. Impression   Vascular congestion without overt edema, similar compared to the prior exam.           Medical Decision Making-Other:     Note:  Labs, medications, radiologic studies were reviewed with personal review of films  Large amounts of data were reviewed  Discussed with nursing Staff, Discharge planner  Infection Control and Prevention measures reviewed  All prior entries were reviewed  Administer medications as ordered  Prognosis: Guarded  Discharge planning reviewed  Follow up as outpatient. Thank you for allowing us to participate in the care of this patient. Please call with questions.     Concepcion Turner MD  Pager: [phone] - Office: [phone]

## 2022-08-12 NOTE — ACP (ADVANCE CARE PLANNING)
Advance Care Planning     Advance Care Planning Activator (Inpatient)  Conversation Note      Date of ACP Conversation: 8/12/2022     Conversation Conducted with: Patient with Decision Making Capacity    ACP Activator: Al Le RN    Health Care Decision Maker:     Current Designated Health Care Decision Maker:     Primary Decision Maker: [address]    Secondary Decision Maker: Rodolfo Gomez Child - [phone]    Today we documented Decision Maker(s) consistent with Legal Next of Kin hierarchy. Care Preferences    Ventilation: \"If you were in your present state of health and suddenly became very ill and were unable to breathe on your own, what would your preference be about the use of a ventilator (breathing machine) if it were available to you? \"      Would the patient desire the use of ventilator (breathing machine)?: yes    \"If your health worsens and it becomes clear that your chance of recovery is unlikely, what would your preference be about the use of a ventilator (breathing machine) if it were available to you? \"     Would the patient desire the use of ventilator (breathing machine)?: No      Resuscitation  \"CPR works best to restart the heart when there is a sudden event, like a heart attack, in someone who is otherwise healthy. Unfortunately, CPR does not typically restart the heart for people who have serious health conditions or who are very sick. \"    \"In the event your heart stopped as a result of an underlying serious health condition, would you want attempts to be made to restart your heart (answer \"yes\" for attempt to resuscitate) or would you prefer a natural death (answer \"no\" for do not attempt to resuscitate)? \" yes       [x] Yes   [] No   Educated Patient / Celina Yousif regarding differences between Advance Directives and portable DNR orders.     Length of ACP Conversation in minutes:  10    Conversation Outcomes:  [x] ACP discussion completed  [] Existing advance directive reviewed with patient; no changes to patient's previously recorded wishes  [] New Advance Directive completed  [] Portable Do Not Rescitate prepared for Provider review and signature  [] POLST/POST/MOLST/MOST prepared for Provider review and signature      Follow-up plan:    [] Schedule follow-up conversation to continue planning  [] Referred individual to Provider for additional questions/concerns   [] Advised patient/agent/surrogate to review completed ACP document and update if needed with changes in condition, patient preferences or care setting    [] This note routed to one or more involved healthcare providers

## 2022-08-12 NOTE — PROGRESS NOTES
Shift assessment and vitals obtained at this time as charted. Vitals WNL, patient is complaining of pain in her head rated 10 out of 10. SpO2 95% on 1 L via nasal cannula. Patient is alert and oriented x4, assessment otherwise obtained as charted. Patient is resting in the bed, denies any other needs at this time. Will continue to monitor.

## 2022-08-12 NOTE — PROGRESS NOTES
Patient is complaining of burning of her lips and tongue when she eats. Eneida Lomas CNP made aware, medication ordered. See orders.

## 2022-08-12 NOTE — PLAN OF CARE
Problem: Pain  Goal: Verbalizes/displays adequate comfort level or baseline comfort level  Outcome: Progressing  Flowsheets  Taken 8/11/2022 2137  Verbalizes/displays adequate comfort level or baseline comfort level:   Encourage patient to monitor pain and request assistance   Assess pain using appropriate pain scale   Administer analgesics based on type and severity of pain and evaluate response  Taken 8/11/2022 1944  Verbalizes/displays adequate comfort level or baseline comfort level: Encourage patient to monitor pain and request assistance  Note: Patient is able to identify if she is in pain and is able to communicate if PRN medication is needed. Problem: Safety - Adult  Goal: Free from fall injury  Outcome: Progressing  Note: Bed alarm on, bed locked, side rails up, gripper socks on, call light within reach and able to use appropriately. Will continue to monitor this shift. Problem: Skin/Tissue Integrity  Goal: Absence of new skin breakdown  Description: 1. Monitor for areas of redness and/or skin breakdown  2. Assess vascular access sites hourly  3. Every 4-6 hours minimum:  Change oxygen saturation probe site  4. Every 4-6 hours:  If on nasal continuous positive airway pressure, respiratory therapy assess nares and determine need for appliance change or resting period. Outcome: Progressing  Note: Patient able to reposition self at this time and use call light appropriately for any assistance. Barrier cream applied with every incontinence change. Will continue to monitor this shift      Problem: Nutrition Deficit:  Goal: Optimize nutritional status  Outcome: Progressing  Flowsheets (Taken 8/11/2022 2137)  Nutrient intake appropriate for improving, restoring, or maintaining nutritional needs:   Assess nutritional status and recommend course of action   Monitor oral intake, labs, and treatment plans  Note: Encouraging foods and liquids as tolerated. Will continue to monitor this shift. Problem: ABCDS Injury Assessment  Goal: Absence of physical injury  Outcome: Progressing  Note: Patient is encouraged to reposition and assessed for any injuries. Will continue to monitor this shift.

## 2022-08-12 NOTE — PROGRESS NOTES
Patient is approved today to go to [address] [place] [address] for skilled care and IV medication. Discharge paper work done and fax. She will go by [facility] ambulance.     KATHRYN Smith

## 2022-08-12 NOTE — PROGRESS NOTES
Patient: Rahat Lyman  : 1955  Date of Admission: 2022  Primary Care Physician: Melisa Isidro  Today's Date: 2022    REASON FOR CONSULTATION: Shortness of Breath and Flank Pain      HPI:  Ms. Beverly Torres is a 74 y.o. female who was admitted to the hospital because of shortness of breath and kidney stone. She also found to have sepsis secondary to urinary tract infection. In the ER she was found to be in  atrial fibrillation with RVR leading to this consultation. She denied any prior history of atrial fibrillation. She has been on long-term anticoagulation because of history of DVT. She has history of lymphedema and uses pneumatic compression devices at home. Ms. Beverly Torres denies any known history of heart problems in the past including heart attacks. Ms. Beverly Torres was admitted for shortness of breath and left flank pain. She has a history of nephrolithiasis and underwent ureteric stent placement back in May 2022. She presented again with left flank pain and shortness of breath. She is currently treated with ceftriaxone. Blood culture is positive for Klebsiella pneumoniae. Nephrology and urology are on board. CT of the chest was done , mild cardiomegaly. No pulmonary vascular congestion. Minimal bilateral pleural effusion with adjacent atelectasis. Echo done  EF reduced to 35-40%, functional mitral and tricuspid regurgitation along with mild pulm hypertension noted. +5640 down 3 pounds, vitals stable Creatinine 1.88, Bun 33, GFR 27 Hgb 8..  Unfortunately she tested positive for COVID-19 virus today. She continues to complain of progressive shortness of breath. Currently under droplet isolation precaution. Kidney function slightly better, creatinine is 1.88 mg/dL. Hemoglobin is stable at 8.8 g/dL. Her blood cultures are positive for Klebsiella pneumoniae. Her blood pressures is uncontrolled. She is on Lasix.   The plan was to discharge her to [facility] but being COVID-positive this is on hold. I also want to start her on heart failure medications. We will start with beta-blocker therapy including carvedilol and continuing Lasix. We will start her on ACE/ARB once her kidney function is back to normal.    Past Medical History:   Diagnosis Date    Acute kidney injury superimposed on CKD ([address] 8/10/2022    Atrial fibrillation with RVR ([facility] 8/9/2022    Carcinoma ([facility]     Squamous Cell    Cellulitis     COVID-19 virus infection 8/12/2022    DVT (deep venous thrombosis) ([facility] 2006    LLE    Kidney stone     Lymphedema     Morbid obesity ([facility]     Psoas abscess, right ([facility]     Pyelonephritis     Wears glasses        CURRENT ALLERGIES: Patient has no known allergies. REVIEW OF SYSTEMS: 14 systems were reviewed. Pertinent positives and negatives as above, all else negative.      Past Surgical History:   Procedure Laterality Date    CARPAL TUNNEL RELEASE  1990s    CT ABSCESS DRAIN SUBCUTANEOUS  01/10/2022    CT ABSCESS DRAIN SUBCUTANEOUS 1/10/2022 [facility] CT SCAN    CT ABSCESS DRAIN SUBCUTANEOUS  04/21/2022    CT ABSCESS DRAIN SUBCUTANEOUS 4/21/2022 [facility] CT SCAN    CT ABSCESS DRAIN SUBCUTANEOUS  4/29/2022    CT ABSCESS DRAIN SUBCUTANEOUS 4/29/2022 [facility] CT SCAN    CYSTOSCOPY N/A 01/04/2022    CYSTOSCOPY URETERAL STENT INSERTION performed by Oli Charles MD at [address] [place] [address] Right     HLL with stent    CYSTOSCOPY Right 03/01/2022    CYSTOSCOPY URETEROSCOPY LASER-WTIH HLL performed by Oli Charles MD at [address] [place] [address] Right 03/01/2022    CYSTOSCOPY URETERAL STENT INSERTION-EXCHANGE performed by Oli Charles MD at [address] [place] [address] Right 04/05/2022    Dr Keshawn Slade with stent insertion    CYSTOSCOPY Right 04/05/2022    CYSTOSCOPY URETEROSCOPY LASER-HLL performed by Oli Charles MD at [address] [place] [address] Right 04/05/2022    CYSTOSCOPY URETERAL STENT [address] performed by Oli Charles MD at [facility]    CYSTOSCOPY Right 2022    CYSTOSCOPY URETERAL STENT INSERTION (Right )    CYSTOSCOPY Right 2022    CYSTOSCOPY URETERAL STENT INSERTION performed by Abrahan Martinez MD at [address] [facility] Left 2022    CYSTOSCOPY URETERAL STENT INSERTION performed by Tigist Choudhury MD at [address] [facility].  PICC [facility] SINGLE  2022         INSERT MIDLINE CATHETER  2022         IR NEPHROSTOMY PERCUTANEOUS RIGHT  2022    IR NEPHROSTOMY PERCUTANEOUS RIGHT 2022 Melinda French MD [facility]    ANNABELLA AND BSO (CERVIX REMOVED)      2019    TUBAL LIGATION  1993    TUBAL LIGATION      WISDOM TOOTH EXTRACTION      Social History:  Social History     Tobacco Use    Smoking status: Former     Packs/day: 0.50     Years: 42.00     Pack years: 21.00     Types: Cigarettes     Quit date: 2022     Years since quittin.3    Smokeless tobacco: Never   Vaping Use    Vaping Use: Never used   Substance Use Topics    Alcohol use: No     Alcohol/week: 0.0 standard drinks    Drug use: No        CURRENT MEDICATIONS   nystatin  5 mL Oral 4x Daily    zinc sulfate  100 mg Oral Daily    meropenem  2,000 mg IntraVENous Once    [START ON 2022] meropenem  2,000 mg IntraVENous Q12H    metoprolol succinate  25 mg Oral Daily    furosemide  40 mg IntraVENous Daily    rivaroxaban  15 mg Oral Q24H    vitamin E  400 Units Oral Daily    sodium chloride flush  5-40 mL IntraVENous 2 times per day    vitamin C  250 mg Oral Daily    cetirizine  5 mg Oral Daily    therapeutic multivitamin-minerals  1 tablet Oral Daily    oxybutynin  15 mg Oral Daily    Vitamin D  1,000 Units Oral Daily           FAMILY HISTORY: family history includes Cancer in her mother. She was adopted. PHYSICAL EXAM:   /74   Pulse 81   Temp 96.8 °F (36 °C) (Temporal)   Resp 20   Ht 5' 1\" (1.549 m)   Wt 262 lb (118.8 kg)   LMP  (LMP Unknown)   SpO2 95%   BMI 49.50 kg/m²  Body mass index is 49.5 kg/m². Constitutional: She is oriented to person, place, and time. She appears well-developed and well-nourished. In no acute distress. HEENT: Normocephalic and atraumatic. No JVD present. Carotid bruit is not present. No mass and no thyromegaly present. No lymphadenopathy present. Cardiovascular: Normal rate, regular rhythm, normal heart sounds. Exam reveals no gallop and no friction rubs. No murmur was heard. .   Pulmonary/Chest: Poor air entry bilaterally with diffuse wheezes. Bilateral scattered crackles noted. Abdominal: Soft, non-tender. Bowel sounds and aorta are normal. She exhibits no organomegaly, mass or bruit. Extremities: Massive Lymphedema noted bilaterally. No cyanosis or clubbing. 2+ radial and carotid pulses. Distal extremity pulses: 2+ bilaterally. Neurological: She is alert and oriented to person, place, and time. No evidence of gross cranial nerve deficit. Coordination appeared normal.   Skin: Skin is warm and dry. There is no rash or diaphoresis. Psychiatric: She has a normal mood and affect.  Her speech is normal and behavior is normal.      MOST RECENT LABS ON RECORD:   Lab Results   Component Value Date    WBC 12.5 (H) 08/12/2022    HGB 8.8 (L) 08/12/2022    HCT 29.1 (L) 08/12/2022    PLT See Reflexed IPF Result 08/12/2022    ALT <5 (L) 08/11/2022    AST 12 08/11/2022     08/12/2022    K 4.3 08/12/2022     08/12/2022    CREATININE 1.88 (H) 08/12/2022    BUN 33 (H) 08/12/2022    CO2 23 08/12/2022    TSH 3.93 04/26/2022    INR 1.0 04/29/2022    LABA1C 6.2 (H) 01/06/2022         ASSESSMENT:  Patient Active Problem List    Diagnosis Date Noted    COVID-19 virus infection 08/12/2022    Biventricular congestive heart failure ([address] 08/12/2022    Obstructive uropathy 08/12/2022    Klebsiella infection 08/12/2022    Urinary tract infection due to extended-spectrum beta lactamase (ESBL) producing Escherichia coli 08/12/2022    BARBARA (acute kidney injury) ([address] 08/12/2022    COVID-19 08/12/2022    Calculus in urethra 08/11/2022    Acute kidney injury superimposed on CKD ([address] 08/10/2022    Bacteremia due to Gram-negative bacteria 08/09/2022    Hypoxia 08/09/2022    Atrial fibrillation with rapid ventricular response ([address] 08/09/2022    Sepsis due to urinary tract infection ([address] 08/09/2022    Renal failure syndrome 08/09/2022    Abnormal ECG 08/09/2022    Urologic disorders 08/08/2022    Sepsis ([address] 05/03/2022    CRP elevated     Intra-abdominal abscess (HCC) 04/21/2022    Obesity, Class III, BMI 40-49.9 (morbid obesity) ([address] 04/21/2022    Hypocalcemia 04/21/2022    Hypokalemia 04/21/2022    Vertebral osteomyelitis ([address] T12-L1 04/21/2022    Pleural effusion on right 04/21/2022    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    Ureteral calculi 02/28/2022    Psoas muscle abscess ([address] 01/08/2022    Septicemia due to Klebsiella pneumoniae ([address] 01/08/2022    Bacteremia due to Klebsiella pneumoniae 01/04/2022    Renal abscess, right 01/02/2022    Iron deficiency anemia 01/24/2021    Normocytic anemia 01/23/2021    Cellulitis 26/61/3695    Complicated UTI (urinary tract infection) 12/28/2020    Renal calculus 12/28/2020    Cellulitis of left lower extremity     Cellulitis of lower leg 02/10/2020    Gross hematuria 10/30/2018    Frequency of urination 10/30/2018    Nocturia 10/30/2018    Mixed incontinence 10/30/2018    Constipation 10/30/2018    History of recurrent deep vein thrombosis (DVT) 04/26/2018    Tobacco abuse 11/15/2016    Bilateral cellulitis of lower leg 11/08/2016    Acute shoulder pain due to trauma 11/08/2016    Lymphedema of both lower extremities 11/08/2016    Acute thromboembolism of deep veins of lower extremity ([address] 09/05/2015    Chronic anticoagulation 09/05/2015       PLAN:  Patient with multiple medical problems as outlined above. History of DVT on chronic anticoagulation. History of nephrolithiasis.   Admitted with left flank pain and found to have sepsis secondary to urinary tract infection. Blood culture is positive for Klebsiella pneumoniae. She has acute on chronic renal failure. Urology and nephrology on board. Chest CT reviewed, no evidence of pulmonary vascular congestion. Telemetry reviewed, no evidence of atrial fibrillation recurrence. Echo results show EF reduced to 35-40% most likely secondary to sepsis    Change Toprol-XL to carvedilol 12.5 mg twice daily for better control of blood pressure. Continue IV Lasix 40 mg daily. Continue amlodipine, we will consider ACE/ARB after improvement of the kidney function. Unfortunately she had tested positive for COVID-19 today and placement is becoming difficult. She will stay over the weekend in the hospital.  Continue management of her bacteremia/septicemia and COVID-19 infection as per primary team and infectious disease. Follow-up repeat kidney function, BNP, troponin and EKG to be done tomorrow morning. The onset cardiomyopathy along with functional mitral and tricuspid regurgitation and moderate pulm hypertension can be secondary to septicemia. This septicemia can be associated with myocardial depressant factor causing nonischemic cardiomyopathy. The plan is to repeat the echo in 2 weeks and if ejection fraction remained low then will work her up for NICM. Sincerely,  Jennifer Russell MD, F.A.C.C. [facility] Cardiology Specialist    [address] [place] [address], [address] [facility]  Phone: [phone], Fax: [phone]     I believe that the risk of significant morbidity and mortality related to the patient's current medical conditions are: intermediate-high. Approximately 35 minutes were spent during prep work, discussion and exam of the patient, and follow up documentation and all of their questions were answered. The documentation recorded by the scribe, accurately and completely reflects the services I personally performed and the decisions made by me.  Patricia Simons Jennifer Cordova MD, F.A.C.C.  August 12, 2022

## 2022-08-12 NOTE — PROGRESS NOTES
Comprehensive Nutrition Assessment    Type and Reason for Visit:  Reassess    Nutrition Recommendations/Plan:   Continue current diet. Start 4 oz ensure enlive TID with meals. Malnutrition Assessment:  Malnutrition Status: At risk for malnutrition (Comment) (08/12/22 3979)    Context:  Acute Illness     Findings of the 6 clinical characteristics of malnutrition:  Energy Intake:  Mild decrease in energy intake (Comment) (3 days)  Weight Loss:  No significant weight loss     Body Fat Loss:  No significant body fat loss     Muscle Mass Loss:  No significant muscle mass loss    Fluid Accumulation:  Moderate to Severe Extremities (+ 3 pitting R/L LE)   Strength:  Not Performed    Nutrition Assessment:    Continued Obesity AEB BMI 49.5. PO has declined to 1-25% of meals. Add 4 oz ensure enlive TID with meals. Renal indices are elevated, corrected calcium 9.5. 2% weight gain since 8/9. Edema has improved. Pt sleeping at attempted visit. d/c expected today. Nutrition Related Findings:    + 3 pitting BLE Wound Type: None       Current Nutrition Intake & Therapies:    Average Meal Intake: 1-25%  Average Supplements Intake: None Ordered  ADULT DIET; Regular; Low Sodium (2 gm); Low Fat (less than or equal to 50 gm/day)    Anthropometric Measures:  Height: 5' 1\" (154.9 cm)  Ideal Body Weight (IBW): 105 lbs (48 kg)    Admission Body Weight: 257 lb 12.8 oz (116.9 kg)  Current Body Weight: 262 lb (118.8 kg), 244.4 % IBW.  Weight Source: Bed Scale  Current BMI (kg/m2): 49.5  Usual Body Weight: 243 lb (110.2 kg) (3 months ago)  % Weight Change (Calculated): 5.6                    BMI Categories: Obese Class 3 (BMI 40.0 or greater)    Nutrition Diagnosis:   Overweight/Obese related to excessive energy intake as evidenced by BMI    Nutrition Interventions:   Food and/or Nutrient Delivery: Continue Current Diet, Start Oral Nutrition Supplement  Nutrition Education/Counseling: Education initiated  Coordination of Nutrition Care: Continue to monitor while inpatient  Plan of Care discussed with: patient    Goals:  Previous Goal Met: No Progress toward Goal(s)  Goals: Meet at least 75% of estimated needs     Recent Labs     08/10/22  0610 08/11/22  0520 08/12/22  0505    142 140   K 4.9 4.7 4.3   * 111* 106   CO2 18* 20 23   BUN 34* 39* 33*   CREATININE 2.66* 2.44* 1.88*   GLUCOSE 96 97 105*   ALT  --  <5*  --    ALKPHOS  --  68  --    GFR                                       Lab Results   Component Value Date/Time    LABALBU 2.5 08/11/2022 05:20 AM         Nutrition Monitoring and Evaluation:   Behavioral-Environmental Outcomes: None Identified  Food/Nutrient Intake Outcomes: Food and Nutrient Intake, Supplement Intake  Physical Signs/Symptoms Outcomes: Biochemical Data, Weight, Fluid Status or Edema    Discharge Planning:    Continue current diet, Continue Oral Nutrition Supplement     Bruna Burnham RD, LD  Contact: [phone]

## 2022-08-12 NOTE — PROGRESS NOTES
Patient tested positive for Covid and no longer can go to [facility] do to being positive.     KATHRYN William

## 2022-08-12 NOTE — PROGRESS NOTES
Physical Therapy  Facility/Department: [facility] AT THE [facility] MED SURG  Daily Treatment Note  NAME: Noe Harris  : 1955  MRN: [id number]    Date of Service: 2022    Discharge Recommendations:  Subacute/Skilled Nursing Facility        Patient Diagnosis(es): The primary encounter diagnosis was Calculus in urethra. A diagnosis of Septicemia ([address] was also pertinent to this visit. Assessment         Plan    Plan  Plan: 2 times a day 7 days a week     Restrictions  Restrictions/Precautions  Restrictions/Precautions: Fall Risk, General Precautions  Required Braces or Orthoses?: No     Subjective    Subjective  Subjective: Pt resting in bed. She was agreeable to PT after some encouragement/education  Orientation  Overall Orientation Status: Within Functional Limits  Cognition  Overall Cognitive Status: WFL     Objective   Vitals     Bed Mobility Training  Bed Mobility Training: Yes  Overall Level of Assistance: Maximum assistance  Interventions: Demonstration  Rolling: Moderate assistance  Supine to Sit: Moderate assistance  Transfer Training  Transfer Training: Yes  Sit to Stand: Contact-guard assistance  Stand to Sit: Contact-guard assistance  Gait Training  Gait Training: Yes  Gait  Overall Level of Assistance: Contact-guard assistance  Base of Support: Widened  Speed/Cheryl: Slow  Distance (ft): 5 Feet (Pt  unsteady with gait and moderately SOB with short distance)     PT Exercises  Exercise Treatment: THER EX SITTING. Safety Devices  Type of Devices: Call light within reach;Nurse notified; Left in chair       Goals  Short Term Goals  Time Frame for Short term goals: 3 DAYS  Short term goal 1: CGA TRANSFERS  Short term goal 2: CGA GAIT FWW. Additional Goals?: No  Long Term Goals  Time Frame for Long term goals : 4 WEEKS  Long term goal 1: IND GAIT FWW,IND TRANSFERS. Patient Goals   Patient goals : RETURN HOME WITH ASSIST.     Education  Patient Education  Education Given To: Patient  Education Provided: Role of Therapy;Transfer Training  Education Provided Comments: Daily exercise/activity importance  Education Method: Verbal  Education Outcome: Verbalized understanding    Therapy Time   Individual Concurrent Group Co-treatment   Time In 1119         Time Out 1133         Minutes Frankie 121, PTA

## 2022-08-12 NOTE — CONSULTS
meets the following criteria as indicated on the order panel to receive the COVID-19 Monoclonal Antibodies:  o Positive PCR, Rapid or Home test for SARS-CoV-2 (sample must be within 7 days of infusion. o Symptomatic for COVID:  Cough, cold or sinus symptoms, fever loss of taste/smell, GI symptoms, diarrhea, nausea/vomiting.  o Onset of symptoms within 7-days  ONE OF THE FOLLOWING MUST BE MET TO QUALIFY  o Immunocompromised as defined by the NIH (NIH Panel Statement on Patient Prioritization of Outpatient COVID Therapy)    o Age 76 years & over (No additional risk factors required)    o Pregnant    o Age 72 years & over with 2 or more additional risk factors of severe disease per the Bebtelovimab EUA. Risk Factors: Bebtelovimab EUA: Must have 2 unless > 75yrs of age or Pregnancy  o Obesity or being overweight (for example, adults with BMI >25 kg/m2, or if 15to 16years of age, have BMI ? 85th percentile for their age and gender based on CDC growth charts. o Chronic kidney disease  o Diabetes  o Immunosuppressive disease or immunosuppressive treatments  o Cardiovascular disease (including congenital heart disease) or hypertension  o Chronic lung diseases (for example, chronic obstructive pulmonary disease, asthma [moderate-to-severe], interstitial lung disease, cystic fibrosis and pulmonary hypertension). o Sickle cell disease  o Neurodevelopmental disorders (for example, cerebral palsy) or other conditions that confer medical complexity (for example, genetic or metabolic syndromes and severe congenital anomalies)  o Having a medical-related technological dependence (for example, tracheostomy, gastrostomy, or positive pressure ventilation [not related to COVID 19]). Assessment/Plan:    Please give bebtelovimab 175 mg IV x one dose. Thanks!         Allan Nichols [facility],8/12/2022,12:21 PM

## 2022-08-12 NOTE — PROGRESS NOTES
Reassessment and vitals obtained at this time as charted. Blood pressure slightly elevated, vitals otherwise WNL. Patient denies pain. SpO2 96% on 1 L via nasal cannula. Patient is alert and oriented x4, assessment otherwise obtained as charted. Patient is resting in the bed, denies any other needs at this time. Will continue to monitor.

## 2022-08-12 NOTE — DISCHARGE SUMMARY
Discharge Summary    Maria Victoria Young  :  1955  MRN:  [id number]    Admit date:  2022      Discharge date: 2022     Admitting Physician:  Christian Wells MD    Discharge Diagnoses:    Principal Problem:    Sepsis due to urinary tract infection ([facility]  Active Problems:    Obesity, Class III, BMI 40-49.9 (morbid obesity) ([facility]    Sepsis ([facility]    Urologic disorders    Bacteremia due to Gram-negative bacteria    Hypoxia    Atrial fibrillation with rapid ventricular response (HCC)    Renal failure syndrome    Abnormal ECG    Acute kidney injury superimposed on CKD ([facility]    Calculus in urethra    Chronic anticoagulation    Lymphedema of both lower extremities    Ureteral calculi  Resolved Problems:    * No resolved hospital problems. *      Hospital Course:   Maria Victoria Young is a 74 y.o. female admitted with septicemia due to UTI and bacteremia. She presented to the emergency room with complaints of flank pain and shortness of breath. Patient reported history of kidney stones. Last kidney stone was in 2022 and at that time had a stent placement completed. Patient complained of cough and fatigue. She also complained of some leg swelling including nausea or vomiting. During patient's evaluation she was febrile at 102.4, tachycardic at 144, tachypneic at 24. She was hypertensive. Patient was found to be in atrial fibrillation with RVR however has no history documented of that. She is on Xarelto long-term due to history of DVTs. Her chest x-ray was concerning for pulmonary edema versus pneumonia. Lactic acid was elevated at 3.3 and repeat was 2.4. Patient was found to have a UTI as well. Patient was started on IV Rocephin and doxycycline while in the ER. Urology was consulted regarding renal stone. Patient was given 1 L of IV fluids while in ER. Patient was admitted and placed on IV fluids as well as IV antibiotics.   Patient was taken to surgery for cystoscopy and stone removal and tolerated hydronephrosis. There is an additional 3 mm stone in the distal left ureter. There is left perinephric stranding. GI/Bowel: The stomach is partially distended. The small bowel is nondilated. The appendix is normal in caliber. The colon is nondilated with scattered, noninflamed diverticula. Pelvis: Bladder is partially distended. No vesicular stone. Prior hysterectomy. Peritoneum/Retroperitoneum: No ascites or pneumoperitoneum. Atherosclerosis in the nondilated abdominal aorta. Shotty mesenteric and retroperitoneal lymph nodes. Bones/Soft Tissues: Multilevel degenerative changes of the lumbar spine. 1. Multiple left ureteral stones with a large obstructing stone at the ureteropelvic junction. There is left hydronephrosis. 2. Right ureteral stent in expected position. Nonobstructing right nephrolithiasis. 3. Diverticulosis without diverticulitis. 4. Cholelithiasis without pericholecystic fluid to suggest cholecystitis. 5. Small bilateral pleural effusions with adjacent consolidation, likely atelectasis. CT CHEST WO CONTRAST    Result Date: 8/9/2022  EXAMINATION: CT OF THE CHEST WITHOUT CONTRAST 8/9/2022 8:42 pm TECHNIQUE: CT of the chest was performed without the administration of intravenous contrast. Multiplanar reformatted images are provided for review. Automated exposure control, iterative reconstruction, and/or weight based adjustment of the mA/kV was utilized to reduce the radiation dose to as low as reasonably achievable. COMPARISON: Chest radiograph from yesterday, CT on 04/20/2022 HISTORY: Acute shortness of breath. Abnormal chest radiograph. FINDINGS: Mediastinum: Mild cardiomegaly. Extensive left coronary calcification. No pericardial effusion. Thoracic aorta is normal caliber with mild atherosclerotic disease. No lymphadenopathy. Thyroid and esophagus are unremarkable. Lungs/pleura: Minimal pleural effusions with adjacent atelectasis. No consolidation. No definite pulmonary edema. Upper Abdomen: Unremarkable. Soft Tissues/Bones: No acute abnormality. Mild cardiomegaly. No definite pulmonary edema. Minimal pleural effusions with adjacent atelectasis. XR CHEST PORTABLE    Result Date: 8/8/2022  EXAMINATION: ONE XRAY VIEW OF THE CHEST 8/8/2022 5:43 pm COMPARISON: 04/27/2022 HISTORY: ORDERING SYSTEM PROVIDED HISTORY: fever/hypoxia TECHNOLOGIST PROVIDED HISTORY: fever/hypoxia FINDINGS: Pulmonary edema with bilateral airspace disease and interstitial edema with Kerley lines. There is trace bilateral pleural fluid. Heart is mildly enlarged and unchanged. No pneumothorax. Right greater than left bilateral shoulder arthropathy with marked subacromial joint space narrowing and superior migration of the right humeral head consistent with chronic rotator cuff tears. Findings most consistent with pulmonary edema. Manifestation of pneumonia, possibly an atypical or viral pneumonia not excluded. Correlate clinically. FLUORO FOR SURGICAL PROCEDURES    Result Date: 8/9/2022  Radiology exam is complete. No Radiologist dictation. Please follow up with ordering provider.        Assessment and Plan:  Patient Active Problem List    Diagnosis Date Noted    Calculus in urethra 08/11/2022    Acute kidney injury superimposed on CKD ([address] 08/10/2022    Bacteremia due to Gram-negative bacteria 08/09/2022    Hypoxia 08/09/2022    Atrial fibrillation with rapid ventricular response ([address] 08/09/2022    Sepsis due to urinary tract infection ([address] 08/09/2022    Renal failure syndrome 08/09/2022    Abnormal ECG 08/09/2022    Urologic disorders 08/08/2022    Sepsis ([address] 05/03/2022    CRP elevated     Intra-abdominal abscess (HCC) 04/21/2022    Obesity, Class III, BMI 40-49.9 (morbid obesity) ([address] 04/21/2022    Hypocalcemia 04/21/2022    Hypokalemia 04/21/2022    Vertebral osteomyelitis (HCC) T12-L1 04/21/2022    Pleural effusion on right 04/21/2022    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    Ureteral calculi 02/28/2022    Psoas muscle abscess ([facility] 01/08/2022    Septicemia due to Klebsiella pneumoniae ([facility] 01/08/2022    Bacteremia due to Klebsiella pneumoniae 01/04/2022    Renal abscess, right 01/02/2022    Iron deficiency anemia 01/24/2021    Normocytic anemia 01/23/2021    Cellulitis 39/14/9184    Complicated UTI (urinary tract infection) 12/28/2020    Renal calculus 12/28/2020    Cellulitis of left lower extremity     Cellulitis of lower leg 02/10/2020    Gross hematuria 10/30/2018    Frequency of urination 10/30/2018    Nocturia 10/30/2018    Mixed incontinence 10/30/2018    Constipation 10/30/2018    History of recurrent deep vein thrombosis (DVT) 04/26/2018    Tobacco abuse 11/15/2016    Bilateral cellulitis of lower leg 11/08/2016    Acute shoulder pain due to trauma 11/08/2016    Lymphedema of both lower extremities 11/08/2016    Acute thromboembolism of deep veins of lower extremity ([facility] 09/05/2015    Chronic anticoagulation 09/05/2015        Discharge Medications:         Medication List        START taking these medications      amLODIPine 10 MG tablet  Commonly known as: NORVASC  Take 1 tablet by mouth daily     cefTRIAXone  infusion  Commonly known as: ROCEPHIN  Infuse 1,000 mg intravenously every 24 hours for 10 days Compound per protocol. Flush midline with NS per protocol  Start taking on: August 13, 2022     furosemide 40 MG tablet  Commonly known as: Lasix  Take 1 tablet by mouth in the morning. Start taking on: August 13, 2022     metoprolol succinate 25 MG extended release tablet  Commonly known as: TOPROL XL  Take 1 tablet by mouth in the morning. Start taking on: August 13, 2022     nystatin [id number] UNIT/ML suspension  Commonly known as: MYCOSTATIN  Take 5 mLs by mouth in the morning and 5 mLs at noon and 5 mLs in the evening and 5 mLs before bedtime.      potassium chloride 20 MEQ extended release tablet  Commonly known as: KLOR-CON M  Take 1 tablet by mouth in the morning. CHANGE how you take these medications      rivaroxaban 15 MG Tabs tablet  Commonly known as: XARELTO  Take 1 tablet by mouth every 24 hours  What changed:   medication strength  how much to take  when to take this            CONTINUE taking these medications      acetaminophen 325 MG tablet  Commonly known as: TYLENOL     ipratropium-albuterol 0.5-2.5 (3) MG/3ML Soln nebulizer solution  Commonly known as: DUONEB     loratadine 10 MG capsule  Commonly known as: CLARITIN     Osteo Bi-Flex Adv Double St Tabs     oxybutynin 15 MG extended release tablet  Commonly known as: Ditropan XL  Take 1 tablet by mouth daily     polyethylene glycol 17 g packet  Commonly known as: GLYCOLAX     sodium chloride flush 0.9 % injection     therapeutic multivitamin-minerals tablet     vitamin C 250 MG tablet     vitamin D 25 MCG (1000 UT) Tabs tablet  Commonly known as: CHOLECALCIFEROL     vitamin E 400 UNIT capsule            STOP taking these medications      lisinopril 20 MG tablet  Commonly known as: PRINIVIL;ZESTRIL     tamsulosin 0.4 MG capsule  Commonly known as: FLOMAX               Where to Get Your Medications        You can get these medications from any pharmacy    Bring a paper prescription for each of these medications  cefTRIAXone  infusion       Information about where to get these medications is not yet available    Ask your nurse or doctor about these medications  furosemide 40 MG tablet  metoprolol succinate 25 MG extended release tablet  nystatin [id number] UNIT/ML suspension  potassium chloride 20 MEQ extended release tablet  rivaroxaban 15 MG Tabs tablet         Patient Instructions:    Activity: activity as tolerated  Diet: cardiac diet  Wound Care: none needed  Other: CBC with differential, BMP in 1 week    Disposition:   [facility] to [address] [facility]    Follow up:  Patient will be followed by DONG Bagley CNP in 1-2 weeks    CORE MEASURES on Discharge (if applicable)  ACE/ARB in CHF: Yes  Statin in MI: NA  ASA in MI: NA  Statin in CVA: NA  Antiplatelet in CVA: NA    Total time spent on discharge services: 40 minutes    Including the following activities:  Evaluation and Management of patient  Discussion with patient and/or surrogate about current care plan  Coordination with Case Management and/or   Coordination of care with Consultants (if applicable)   Coordination of care with Receiving Facility Physician (if applicable)  Completion of DME forms (if applicable)  Preparation of Discharge Summary  Preparation of Medication Reconciliation  Preparation of Discharge Prescriptions    Signed:  DONG Jenkins - CNP, APRN, NP-C  8/12/2022, 10:04 AM

## 2022-08-12 NOTE — PROGRESS NOTES
Physical Therapy  Facility/Department: [facility] AT THE [facility] MED SURG  Daily Treatment Note  NAME: Taty Husain  : 1955  MRN: [id number]    Date of Service: 2022    Discharge Recommendations:  Subacute/Skilled Nursing Facility   PT Equipment Recommendations  Equipment Needed: No    Patient Diagnosis(es): The primary encounter diagnosis was Calculus in urethra. A diagnosis of Septicemia ([address] was also pertinent to this visit. Assessment   Assessment: Pt continues to do well with straight plane movements, but has some difficulty when required to turn. Plan to continue to work on standing tolerance and functional LE strength. Activity Tolerance: Patient limited by fatigue;Patient limited by endurance  Equipment Needed: No     Plan    Plan  Plan: 2 times a day 7 days a week (1x daily on weekends)  Current Treatment Recommendations: Strengthening; Functional mobility training;Transfer training;Gait training; Safety education & training;Patient/Caregiver education & training;Neuromuscular re-education     Restrictions  Restrictions/Precautions  Restrictions/Precautions: Fall Risk, General Precautions  Required Braces or Orthoses?: No     Subjective    Subjective  Subjective: Pt was in chair and had urinated on her pad, needed to change out of wet gown and needed fresh linens. Pain: Pt notes discomfort with breathing and mild SoB. Orientation  Overall Orientation Status: Within Functional Limits  Cognition  Overall Cognitive Status: WFL     Objective   Vitals     Bed Mobility Training  Bed Mobility Training: Yes  Overall Level of Assistance: Maximum assistance  Interventions: Demonstration;Verbal cues  Rolling: Moderate assistance  Supine to Sit: Moderate assistance  Scooting: Maximum assistance  Balance  Sitting: Intact  Standing: Impaired  Standing - Static: Poor; Fair  Standing - Dynamic: Poor  Transfer Training  Transfer Training: Yes  Overall Level of Assistance:  Moderate assistance;Assist X2  Interventions: Verbal cues; Tactile cues  Sit to Stand: Contact-guard assistance  Stand to Sit: Contact-guard assistance  Stand Pivot Transfers: Maximum assistance  Bed to Chair: Moderate assistance  Gait Training  Gait Training: Yes  Right Side Weight Bearing: As tolerated  Left Side Weight Bearing: As tolerated  Gait  Overall Level of Assistance: Contact-guard assistance  Interventions: Demonstration  Base of Support: Widened  Speed/Cheryl: Slow  Distance (ft): 5 Feet  Assistive Device: Walker, rolling  Neuromuscular Education  Neuromuscular Education: No  PT Exercises  Exercise Treatment: Transfers, Bed mobility, Gait with RW x5ft     Safety Devices  Type of Devices: Call light within reach;Nurse notified; Left in bed       Goals  Short Term Goals  Time Frame for Short term goals: 3 DAYS  Short term goal 1: CGA TRANSFERS  Short term goal 2: CGA GAIT FWW. Additional Goals?: No  Long Term Goals  Time Frame for Long term goals : 4 WEEKS  Long term goal 1: IND GAIT FWW,IND TRANSFERS. Patient Goals   Patient goals : RETURN HOME WITH ASSIST.     Education  Patient Education  Education Given To: Patient  Education Provided: Role of Therapy;Transfer Training  Education Provided Comments: Daily exercise/activity importance  Education Method: Verbal  Barriers to Learning: None  Education Outcome: Verbalized understanding    Therapy Time   Individual Concurrent Group Co-treatment   Time In 1405         Time Out 1425         Minutes 20         Timed Code Treatment Minutes: [address], PT

## 2022-08-12 NOTE — RT PROTOCOL NOTE
RESPIRATORY ASSESSMENT PROTOCOL                                                                                              Patient Name: Codi Montes Room#: 9514/[id number] : 1955     Admitting diagnosis: Calculus in urethra [N21.1]  Septicemia ([facility] [A41.9]  Urologic disorders [N39.9]  Sepsis ([facility] [A41.9]       Medical History:   Past Medical History:   Diagnosis Date    Acute kidney injury superimposed on CKD ([facility] 8/10/2022    Atrial fibrillation with RVR ([facility] 2022    Carcinoma ([facility]     Squamous Cell    Cellulitis     COVID-19 virus infection 2022    DVT (deep venous thrombosis) ([facility]     LLE    Kidney stone     Lymphedema     Morbid obesity ([facility]     Psoas abscess, right ([facility]     Pyelonephritis     Wears glasses        PATIENT ASSESSMENT    LABORATORY DATA  Hematology:   Lab Results   Component Value Date/Time    WBC 12.5 2022 05:05 AM    RBC 3.03 2022 05:05 AM    HGB 8.8 2022 05:05 AM    HCT 29.1 2022 05:05 AM    PLT See Reflexed IPF Result 2022 05:05 AM     Chemistry:    Lab Results   Component Value Date/Time    PHART 7.466 2018 02:51 PM    IWX6GGB 35.5 2018 02:51 PM    PO2ART 72.5 2018 02:51 PM    J0KEHOGN 95.5 2018 02:51 PM    CEH5NFB 25.0 2018 02:51 PM    PBEA 1.7 2018 02:51 PM       VITALS  Heart Rate: 81   Resp: 20  BP: 136/74  SpO2: 95 % O2 Device: Nasal cannula  Temp: 96.8 °F (36 °C)    SKIN COLOR  [] Normal  [] Pale  [] Dusky  [] Cyanotic    RESPIRATORY PATTERN  [] Normal  [] Dyspnea  [] Cheyne-Ro  [] Kussmaul  [] Biots    AMBULATORY  [] Yes  [] No  [] With Assistance      Patient Acuity 0 1 2 3 4 Score   Level of Concious (LOC) [x]  Alert & Oriented or Pt normal LOC []  Confused;follows directions []  Confused & uncooper-ative []  Obtunded []  Comatose 0   Respiratory Rate  (RR) [x]  Reg. rate & pattern. 12 - 20 bpm  []  Increased RR.  Greater than 20 bpm   []  SOB w/ exertion or RR greater than 24 bpm []  Access- ory muscle use at rest. Abn.  resp. []  SOB at rest.   0   Bilateral Breath Sounds (BBS) [x]  Clear []  Diminish-ed bases  []  Diminish-ed t/o, or rales   []  Sporadic, scattered wheezes or rhonchi []  Persistentwheezes and, or absent BBS 0   Cough [x]  Strong, effective, & non-prod. []  Effective & prod. Less than 25 ml (2 TBSP) over past 24 hrs []  Ineffective & non-prod to less than 25 ML over past 24 hrs []  Ineffective and, or greater than 25 ml sputum prod. past 24 hrs. []  Nonspon- taneous; Requires suctioning 0   Pulmonary History  (PULM HX) []  No smoking and no chronic pulmonaryhistory [x]  Former smoker. Quit over 12 mos. ago []  Current smoker or quit w/ in 12 mos []  Pulm. History and, or 20 pk/yr smoking hx []  Admitted w/ acute pulm. dx and, or has been admitted w/ pulm. dx 2 or more times over past 12 mos 1   Surgical History this Admit  (SURG HX) [x]  No surgery []  General surgery []  Lower abdominal []  Thoracic or upper abdominal   []  Thoracic w/ pulm. disease 0   Chest X-Ray (CXR)/CT Scan [x]  Clear or not applicable []  Not available []  Atelect- asis or pleural effusions []  Localized infiltrate or pulm. edema []  Con-solidated Infiltrates, bilateral, or in more than 1 lobe 0   Slow or Forced VC, FEV1 OR PEFR (PULM FXN)  [x]  80% or greater, or not indicated []  Pt. unable to perform []  FEV1 or PEFR or VC 51-79%.   []  FEV1 or PEFR or VC  30-49%   []  FEV1 or PEFR or VC less than 30%   0   TOTAL ACUITY: 1       CARE PLAN    If Acuity Level is 2, 3, or 4 in any of the following:    [] BILATERAL BREATH SOUNDS (BBS)     [] PULMONARY HISTORY (PULM HX)  [] PULMONARY FUNCTION (PULM FX)    Goal: Improve respiratory functions in patients with airway disease and decrease WOB    [x] AEROSOL PROTOCOL    Total Acuity:   16-32  []  Secondary Assessment in 24 hrs Total Acuity:  9-15  []  Secondary Assessment in 24 hrs Total Acuity:  4-8  []  Secondary Assessment in 48 hrs Total Acuity:  0-3  [x]  Secondary Assessment in 72 hrs   HHN AEROSOL THERAPY with  [physician-ordered bronchodilator(s)] q 4 & Albuterol PRN q2 hrs. Breath-Actuated Neb if BBS Acuity = 4, and pt. can use MP. Notify physician if condition deteriorates. HHN AEROSOL THERAPY with  [physician-ordered bronchodilator(s)]  QID and Albuterol PRN q4 hrs. Breath-Actuated Neb if BBS Acuity = 4, and pt. can use MP. Notify physician if condition deteriorates. MDI THERAPY with  2 actuations of [physician-ordered bronchodilator(s)] via spacer TID Albuterol and PRNq4 hrs. If unable to utilize MDI: HHN [physician-ordered bronchodilator(s)] TID and Albuterol PRN q4 hrs. Notify physician if condition deteriorates. MDI THERAPY with  [physician-ordered bronchodilator(s)] via spacer TID PRN. If unable to utilize MDI: HHN [physician-ordered bronchodilator(s)] TID PRN. Notify physician if condition deteriorates. If Acuity Level is 2, 3, or 4 in any of the following:    [] COUGH     [] SURGICAL HISTORY (SURG HX)  [] CHEST XRAY (CXR)    Goal: Improvement in sputum mobilization in patients with ineffective airway clearance. Reverse atelectasis.     [] Bronchopulmonary Hygiene Protocol    Total Acuity:   16-32  []  Secondary Assessment in 24 hrs Total Acuity:  9-15  []  Secondary Assessment in 24 hrs Total Acuity:  4-8  []  Secondary Assessment in 48 hrs Total Acuity:  0-3  []  Secondary Assessment in 72 hrs   METANEB QID with [physician-ordered bronchodilator(s)] if CXR Acuity = 4; otherwise:  PD&P, PEP, or Vest QID & PRN  NT Sxn PRN for ineffective cough  METANEB QID with [physician-ordered bronchodilator(s)] if CXR Acuity = 4; otherwise:  PD&P, PEP, or Vest TID & PRN  NT Sxn PRN for ineffective cough  Instruct patient to self-perform IS q1hr WA   Directed Cough self-performed q1hr WA     If Acuity Level is 2 or above in the following:    [] PULMONARY HISTORY (PULM HX)    Goal: Assist patient in quitting smoking to slow or stop the progression of lung disease.     [] Smoking Cessation Protocol    SMOKING CESSATION EDUCATION provided according to policy QL_584: (suma with an X)  ____Yes    ____ No     ____ NA    Smoking Cessation Booklet given:  ____Yes  ____No ____Patient Heidi Howell

## 2022-08-12 NOTE — PROGRESS NOTES
Pharmacy Note - Renal Dosing    Meropenem ordered for treatment of UTI/Sepsis. Per [facility] Renal Dose Adjustment Policy and Extended Infusion Beta-Lactam Policy, meropenem 064 mg iv q 8 h  will be changed to 2000 mg iv q 12 h. Estimated Creatinine Clearance: Estimated Creatinine Clearance: 34 mL/min (A) (based on SCr of 1.88 mg/dL (H)). Dialysis Status, BARBARA, CKD: BARBARA  BMI: Body mass index is 49.5 kg/m². Rationale for Adjustment: Agent is renally eliminated and demonstrates time-dependent effect on bacterial eradication. Extended-infusion dosing strategy aims to enhance microbiologic and clinical efficacy    Pharmacy will continue to monitor renal function and adjust dose as necessary. Please call with any questions.     Ahsan Keenan RPh 8/12/2022 2:10 PM

## 2022-08-13 LAB
ABSOLUTE EOS #: 0.07 K/UL (ref 0–0.44)
ABSOLUTE IMMATURE GRANULOCYTE: 0.03 K/UL (ref 0–0.3)
ABSOLUTE LYMPH #: 1.17 K/UL (ref 1.1–3.7)
ABSOLUTE MONO #: 0.63 K/UL (ref 0.1–1.2)
ANION GAP SERPL CALCULATED.3IONS-SCNC: 10 MMOL/L (ref 9–17)
BASOPHILS # BLD: 1 % (ref 0–2)
BASOPHILS ABSOLUTE: 0.05 K/UL (ref 0–0.2)
BUN BLDV-MCNC: 28 MG/DL (ref 8–23)
BUN/CREAT BLD: 21 (ref 9–20)
C-REACTIVE PROTEIN: 49.7 MG/L (ref 0–5)
CALCIUM SERPL-MCNC: 8.2 MG/DL (ref 8.6–10.4)
CHLORIDE BLD-SCNC: 108 MMOL/L (ref 98–107)
CO2: 25 MMOL/L (ref 20–31)
CREAT SERPL-MCNC: 1.36 MG/DL (ref 0.5–0.9)
EKG ATRIAL RATE: 82 BPM
EKG P AXIS: 33 DEGREES
EKG P-R INTERVAL: 168 MS
EKG Q-T INTERVAL: 418 MS
EKG QRS DURATION: 130 MS
EKG QTC CALCULATION (BAZETT): 488 MS
EKG R AXIS: -47 DEGREES
EKG T AXIS: 2 DEGREES
EKG VENTRICULAR RATE: 82 BPM
EOSINOPHILS RELATIVE PERCENT: 1 % (ref 1–4)
GFR AFRICAN AMERICAN: 47 ML/MIN
GFR NON-AFRICAN AMERICAN: 39 ML/MIN
GFR SERPL CREATININE-BSD FRML MDRD: ABNORMAL ML/MIN/{1.73_M2}
GFR SERPL CREATININE-BSD FRML MDRD: ABNORMAL ML/MIN/{1.73_M2}
GLUCOSE BLD-MCNC: 115 MG/DL (ref 70–99)
HCT VFR BLD CALC: 30.3 % (ref 36.3–47.1)
HEMOGLOBIN: 9.2 G/DL (ref 11.9–15.1)
IMMATURE GRANULOCYTES: 0 %
LYMPHOCYTES # BLD: 17 % (ref 24–43)
MCH RBC QN AUTO: 28.8 PG (ref 25.2–33.5)
MCHC RBC AUTO-ENTMCNC: 30.4 G/DL (ref 28.4–34.8)
MCV RBC AUTO: 95 FL (ref 82.6–102.9)
MONOCYTES # BLD: 9 % (ref 3–12)
NRBC AUTOMATED: 0 PER 100 WBC
PDW BLD-RTO: 14.1 % (ref 11.8–14.4)
PLATELET # BLD: ABNORMAL K/UL (ref 138–453)
PLATELET, FLUORESCENCE: 112 K/UL (ref 138–453)
PLATELET, IMMATURE FRACTION: 2.6 % (ref 1.1–10.3)
POTASSIUM SERPL-SCNC: 4.2 MMOL/L (ref 3.7–5.3)
PRO-BNP: ABNORMAL PG/ML
RBC # BLD: 3.19 M/UL (ref 3.95–5.11)
SEG NEUTROPHILS: 72 % (ref 36–65)
SEGMENTED NEUTROPHILS ABSOLUTE COUNT: 5.04 K/UL (ref 1.5–8.1)
SODIUM BLD-SCNC: 143 MMOL/L (ref 135–144)
TROPONIN, HIGH SENSITIVITY: 27 NG/L (ref 0–14)
WBC # BLD: 7 K/UL (ref 3.5–11.3)

## 2022-08-13 PROCEDURE — 6370000000 HC RX 637 (ALT 250 FOR IP): Performed by: NURSE PRACTITIONER

## 2022-08-13 PROCEDURE — 36415 COLL VENOUS BLD VENIPUNCTURE: CPT

## 2022-08-13 PROCEDURE — 6370000000 HC RX 637 (ALT 250 FOR IP): Performed by: FAMILY MEDICINE

## 2022-08-13 PROCEDURE — 85055 RETICULATED PLATELET ASSAY: CPT

## 2022-08-13 PROCEDURE — 6360000002 HC RX W HCPCS: Performed by: NURSE PRACTITIONER

## 2022-08-13 PROCEDURE — 85025 COMPLETE CBC W/AUTO DIFF WBC: CPT

## 2022-08-13 PROCEDURE — 93005 ELECTROCARDIOGRAM TRACING: CPT | Performed by: INTERNAL MEDICINE

## 2022-08-13 PROCEDURE — 84484 ASSAY OF TROPONIN QUANT: CPT

## 2022-08-13 PROCEDURE — 93010 ELECTROCARDIOGRAM REPORT: CPT | Performed by: INTERNAL MEDICINE

## 2022-08-13 PROCEDURE — 80048 BASIC METABOLIC PNL TOTAL CA: CPT

## 2022-08-13 PROCEDURE — 6370000000 HC RX 637 (ALT 250 FOR IP): Performed by: INTERNAL MEDICINE

## 2022-08-13 PROCEDURE — 6360000002 HC RX W HCPCS: Performed by: PHYSICIAN ASSISTANT

## 2022-08-13 PROCEDURE — 2700000000 HC OXYGEN THERAPY PER DAY

## 2022-08-13 PROCEDURE — 83880 ASSAY OF NATRIURETIC PEPTIDE: CPT

## 2022-08-13 PROCEDURE — 99233 SBSQ HOSP IP/OBS HIGH 50: CPT | Performed by: INTERNAL MEDICINE

## 2022-08-13 PROCEDURE — 94761 N-INVAS EAR/PLS OXIMETRY MLT: CPT

## 2022-08-13 PROCEDURE — 97530 THERAPEUTIC ACTIVITIES: CPT

## 2022-08-13 PROCEDURE — 97535 SELF CARE MNGMENT TRAINING: CPT

## 2022-08-13 PROCEDURE — 2580000003 HC RX 258: Performed by: UROLOGY

## 2022-08-13 PROCEDURE — 2580000003 HC RX 258: Performed by: NURSE PRACTITIONER

## 2022-08-13 PROCEDURE — 86140 C-REACTIVE PROTEIN: CPT

## 2022-08-13 PROCEDURE — 1200000000 HC SEMI PRIVATE

## 2022-08-13 RX ADMIN — NYSTATIN 500000 UNITS: 100000 SUSPENSION ORAL at 20:30

## 2022-08-13 RX ADMIN — ZINC SULFATE 220 MG (50 MG) CAPSULE 100 MG: CAPSULE at 08:50

## 2022-08-13 RX ADMIN — SODIUM CHLORIDE, PRESERVATIVE FREE 10 ML: 5 INJECTION INTRAVENOUS at 08:51

## 2022-08-13 RX ADMIN — Medication 400 UNITS: at 08:51

## 2022-08-13 RX ADMIN — CARVEDILOL 12.5 MG: 12.5 TABLET, FILM COATED ORAL at 08:51

## 2022-08-13 RX ADMIN — Medication 1000 UNITS: at 08:51

## 2022-08-13 RX ADMIN — MEROPENEM 2000 MG: 1 INJECTION, POWDER, FOR SOLUTION INTRAVENOUS at 14:30

## 2022-08-13 RX ADMIN — NYSTATIN 500000 UNITS: 100000 SUSPENSION ORAL at 17:34

## 2022-08-13 RX ADMIN — OXYBUTYNIN CHLORIDE 15 MG: 5 TABLET, EXTENDED RELEASE ORAL at 08:51

## 2022-08-13 RX ADMIN — MEROPENEM 2000 MG: 1 INJECTION, POWDER, FOR SOLUTION INTRAVENOUS at 02:48

## 2022-08-13 RX ADMIN — CARVEDILOL 12.5 MG: 12.5 TABLET, FILM COATED ORAL at 17:28

## 2022-08-13 RX ADMIN — CETIRIZINE HYDROCHLORIDE 5 MG: 10 TABLET, FILM COATED ORAL at 08:50

## 2022-08-13 RX ADMIN — SODIUM CHLORIDE, PRESERVATIVE FREE 10 ML: 5 INJECTION INTRAVENOUS at 20:33

## 2022-08-13 RX ADMIN — MULTIPLE VITAMINS W/ MINERALS TAB 1 TABLET: TAB at 08:51

## 2022-08-13 RX ADMIN — RIVAROXABAN 15 MG: 15 TABLET, FILM COATED ORAL at 17:28

## 2022-08-13 RX ADMIN — OXYCODONE HYDROCHLORIDE AND ACETAMINOPHEN 250 MG: 500 TABLET ORAL at 08:51

## 2022-08-13 RX ADMIN — FUROSEMIDE 40 MG: 10 INJECTION, SOLUTION INTRAMUSCULAR; INTRAVENOUS at 08:51

## 2022-08-13 ASSESSMENT — PAIN SCALES - GENERAL
PAINLEVEL_OUTOF10: 0
PAINLEVEL_OUTOF10: 0

## 2022-08-13 NOTE — PROGRESS NOTES
Progress Note  Boaz Reagan MD    OBJECTIVE:    Patient seen for f/u of Sepsis due to urinary tract infection ([address] She had stent,blood culture grew klebseila sensitive to rocephin, still on oxygen    ROS:   Constitutional: negative  for fevers, and negative for chills. Respiratory: negative for shortness of breath, negative for cough, and negative for wheezing  Cardiovascular: negative for chest pain, and negative for palpitations  Gastrointestinal: negative for abdominal pain, negative for nausea,negative for vomiting, negative for diarrhea, and negative for constipation     All other systems were reviewed with the patient and are negative unless otherwise stated in HPI    OBJECTIVE:  Vitals:   Temp: 97.3 °F (36.3 °C)  BP: (!) 145/78  Resp: 17  Heart Rate: 77  SpO2: 96 %    24HR INTAKE/OUTPUT:    Intake/Output Summary (Last 24 hours) at 8/13/2022 0919  Last data filed at 8/13/2022 0851  Gross per 24 hour   Intake 1060 ml   Output --   Net 1060 ml     -----------------------------------------------------------------  Exam:  GEN:    Awake, alert and oriented x3. EYES:  EOMI, pupils equal   NECK: Supple. No lymphadenopathy. No carotid bruit  CVS:    regular rate and rhythm, 2/6 systolic murmur  PULM:  CTA, no wheezes, rales or rhonchi, no acute respiratory distress  ABD:    Bowels sounds normal.  Abdomen is soft. No distention. no tenderness to palpation. EXT:   3+ edema bilaterally . No calf tenderness. NEURO: Moves all extremities. Motor and sensory are grossly intact  SKIN:  No rashes.   No skin lesions.    -----------------------------------------------------------------  Diagnostic Data:    All available data reviewed  Lab Results   Component Value Date    WBC 7.0 08/13/2022    HGB 9.2 (L) 08/13/2022    MCV 95.0 08/13/2022    PLT See Reflexed IPF Result 08/13/2022      Lab Results   Component Value Date    GLUCOSE 115 (H) 08/13/2022    BUN 28 (H) 08/13/2022    CREATININE 1.36 (H) 08/13/2022  08/13/2022    K 4.2 08/13/2022    CALCIUM 8.2 (L) 08/13/2022     (H) 08/13/2022    CO2 25 08/13/2022       PROBLEM LIST:  Principal Problem:    Sepsis due to urinary tract infection ([address]  Active Problems:    Obesity, Class III, BMI 40-49.9 (morbid obesity) ([address]    Sepsis ([address]    Urologic disorders    Bacteremia due to Gram-negative bacteria    Hypoxia    Atrial fibrillation with rapid ventricular response (HCC)    Renal failure syndrome    Abnormal ECG    Acute kidney injury superimposed on CKD ([address]    Calculus in urethra    COVID-19 virus infection    Biventricular congestive heart failure (HCC)    Obstructive uropathy    Klebsiella infection    Urinary tract infection due to extended-spectrum beta lactamase (ESBL) producing Escherichia coli    BARBARA (acute kidney injury) ([address]    COVID-19    Chronic anticoagulation    Lymphedema of both lower extremities    Ureteral calculi  Resolved Problems:    * No resolved hospital problems. *      ASSESSMENT / PLAN:  Sepsis due to urinary tract infection ([address]  Principal Problem:    Sepsis due to urinary tract infection ([address]- continue iv rpcephin  Active Problems:    Obesity, Class III, BMI 40-49.9 (morbid obesity) ([address]    Sepsis ([address]    Urologic disorders    Bacteremia due to Gram-negative bacteria    Hypoxia    Atrial fibrillation with rapid ventricular response (HCC)    Renal failure syndrome    Abnormal ECG    Acute kidney injury superimposed on CKD (HCC)    Chronic anticoagulation    Lymphedema of both lower extremities    Ureteral calculi  Resolved Problems:    * No resolved hospital problems. *  Covid 19      Nutrition status:  Well developed, well nourished with no malnutrition  DVT prophylaxis: xarelto  High risk medications: none   Disposition:  Discharge plan is TU Kelsey MD , M.D.  8/13/2022  9:19 AM

## 2022-08-13 NOTE — PROGRESS NOTES
Reassessment completed at this time. Vitals taken and documented. IV antibiotics running. Patient denies nausea, pain or needs. Call light and over bed table in reach.

## 2022-08-13 NOTE — PROGRESS NOTES
[facility] [address] Patient Telemedicine  Today's Date and Time: 8/13/2022, 3:32 PM    Impression :     COVID 19 Confirmed Infection  Previously vaccinated individual:  4-28--21 Valentino Flattery  5-26-21 Valentino Flattery  2-1-22 Moderna  Covid tests:  8-8-22; negative   8-12-22: Positive  High CRP  CHF with high Pro BNP  BARBARA with Lt side hydronephrosis  S/P stent placement 8-8-22  Urine 8-8-22 with Klebsiella and E coli ESBL +  Klebsiella septicemia 8-8-22 x 2  Recommendations:   Antibiotic treatment:  Meropenem 1000 mg IV q 12 hr x 5-7 days  Or Ertapenem 1000 mg once daily for 5-7 days for E coli ESBL + in urine and Klebsiella in blood   Covid Rx:    Remdesivir.  Not indicated- BARBARA  Decadron: Contra indicated at this stage  Actemra: Contra indicated at this stage  Monoclonal antibodies: Bebtelovimab infused 8-12-22      Medical Decision Making/Summary/Discussion:8/13/2022     Patient admitted with Klebsiella and E coli ESBL + UTI with associated Klebsiella septicemia  Initial testing for Covid 19 was negative  However, repeat testing showed acute COVID 19 infection despite three prior vaccines  Patient treated with Bebtelovimab with good response Covid wise  Treated with meropenem for Klebsiella and E coli ESBL + UTI with associated Klebsiella septicemia    Infection Control Recommendations   [place] Precautions  Airborne isolation  Droplet Isolation  Isolate until 8-22-22  Contact isolation for E coli ESBL +    Antimicrobial Stewardship Recommendations     Simplification of therapy  Targeted therapy    Coordination of Outpatient Care:   Estimated Length of IV antimicrobials:8-17-22  Patient will need Midline Catheter Insertion: TBD  Patient will need PICC line Insertion: No  Patient will need: Home IV , [place],  SNF,  LTAC:TBD  Patient will need outpatient wound care:No    Chief complaint/reason for consultation:   Concern for COVID infection  UTI with Klebsiella and E coli ESBL +  Klebsiella septicemia      History of Present Illness:   Yeison Walter is a 74y.o.-year-old  female who was initially admitted on 8/8/2022. Patient seen at the request of Dr. Rigoberto Churchill. INITIAL HISTORY:    Patient presented through ER with complaints of SOB and flank pain. She was found to be febrile, tachycardic, tachypneic, hypotensive, in atrial fibrillation. CXR showed vascular congestion. She gave a Hx of renal stones. Catheterized Urine culture grew Klebsiella and E coli ESBL +    Initial SARS Co-V2 was negative on 8-8-22 but second test was positive on 8-12-22       Patient admitted because of concerns with sepsis. Treated for UTI with ceftriaxone. Meropenem started 8-12-22 because of ESBL + E coli    Has received Bebtelovimab for Covid 19. Patient is on early viral multiplication phase. CURRENT EVALUATION : 8/13/2022    Afebrile  VS stable    Patient feels better  No complaints  No new issues per RN      Patient exhibiting respiratory distress. yes  Respiratory secretions: No    Patient receiving supplemental oxygen. Nasal 1 L/min  RR 20-->18  02 sat 95-->97      Overall Daily Picture:   Improving    Presence of secondary bacterial Infection: Yes Klebsiella and E coli ESBL + UTI, Klebsiella septicemia  Additional antibiotics: Meropenem or Ertapenem    Labs, X rays reviewed: 8/13/2022    BUN: 33-->28  Cr: 1.88-->1.36    WBC: 12.5-->7.0  Hb: 8.8-->9.2  Plat: 98-->112    Absolute Neutrophils: 10.2  Absolute Lymphocytes:1.5  Neutrophil/Lymphocyte Ratio: 7 High risk     CRP: 101-->49.7  Ferritin:  LDH:     Pro Calcitonin:  Pro BNP 17,749    Cultures:  Urine:    Blood:    Sputum :    Wound:      CXR:   8-11-22: Vascular congestion without overt edema, similar compared to the prior exam.  CAT:  8-9-22; Mild cardiomegaly. No definite pulmonary edema. , small effusions    Discussed with CORE[facility] IM.    8-11-22:       8-9-22:          I have personally reviewed the past medical history, past surgical history, medications, social history, and family history, and I have updated the database accordingly.   Past Medical History:     Past Medical History:   Diagnosis Date    Acute kidney injury superimposed on CKD ([facility] 8/10/2022    Atrial fibrillation with RVR ([facility] 8/9/2022    Carcinoma ([facility]     Squamous Cell    Cellulitis     COVID-19 virus infection 8/12/2022    DVT (deep venous thrombosis) ([facility] 2006    LLE    Kidney stone     Lymphedema     Morbid obesity ([facility]     Psoas abscess, right ([facility]     Pyelonephritis     Wears glasses        Past Surgical  History:     Past Surgical History:   Procedure Laterality Date    CARPAL TUNNEL RELEASE  1990s    CT ABSCESS DRAIN SUBCUTANEOUS  01/10/2022    CT ABSCESS DRAIN SUBCUTANEOUS 1/10/2022 [facility] CT SCAN    CT ABSCESS DRAIN SUBCUTANEOUS  04/21/2022    CT ABSCESS DRAIN SUBCUTANEOUS 4/21/2022 [facility] CT SCAN    CT ABSCESS DRAIN SUBCUTANEOUS  4/29/2022    CT ABSCESS DRAIN SUBCUTANEOUS 4/29/2022 [facility] CT SCAN    CYSTOSCOPY N/A 01/04/2022    CYSTOSCOPY URETERAL STENT INSERTION performed by Oli Charles MD at [address] Right     HLL with stent    CYSTOSCOPY Right 03/01/2022    CYSTOSCOPY URETEROSCOPY LASER-WTIH HLL performed by Oli Charles MD at [address] Right 03/01/2022    CYSTOSCOPY URETERAL STENT Robin Filter performed by Oli Charles MD at [address] Right 04/05/2022    Dr Keshawn Slade with stent insertion    CYSTOSCOPY Right 04/05/2022    CYSTOSCOPY URETEROSCOPY LASER-HLL performed by Oli Charles MD at [address] Right 04/05/2022    CYSTOSCOPY URETERAL STENT Robin Filter performed by Oli Charles MD at [address] Right 04/22/2022    CYSTOSCOPY URETERAL STENT INSERTION (Right )    CYSTOSCOPY Right 4/22/2022    CYSTOSCOPY URETERAL STENT INSERTION performed by Kymberly Loo MD at [address] Left 8/9/2022    CYSTOSCOPY URETERAL STENT INSERTION file       Family History:     Family History   Adopted: Yes   Problem Relation Age of Onset    Cancer Mother         The patient reports her biologic mother did have bladder cancer        Allergies:   Patient has no known allergies. Review of Systems:       Constitutional: No fevers or chills. Weak  Head: No headaches  Eyes: No double vision or blurry vision. No conjunctival inflammation. ENT: No sore throat or runny nose. . No hearing loss, tinnitus or vertigo. Cardiovascular: No chest pain or palpitations. Shortness of breath. POPE  Lung: Shortness of breath, cough. No sputum production  Abdomen: No nausea, vomiting, diarrhea, or abdominal pain. Jennifer Fanning No cramps. Genitourinary: No increased urinary frequency, or dysuria. No hematuria. No suprapubic or CVA pain  Musculoskeletal: No muscle aches or pains. No joint effusions, swelling or deformities  Hematologic: No bleeding or bruising. Neurologic: No headache, weakness, numbness, or tingling. Integument: No rash, no ulcers. Psychiatric: No depression. Endocrine: No polyuria, no polydipsia, no polyphagia. Physical Examination :   Patient Vitals for the past 8 hrs:   BP Temp Temp src Pulse Resp SpO2   08/13/22 1415 (!) 145/75 97 °F (36.1 °C) Temporal 66 18 97 %   08/13/22 0848 (!) 145/78 97.3 °F (36.3 °C) Temporal 77 17 96 %       General Appearance: Awake, alert, and in no apparent distress  Head:  Normocephalic, no trauma  Eyes: Pupils equal, round, reactive to light; sclera anicteric; conjunctivae pink. No embolic phenomena. ENT: Oropharynx clear, without erythema, exudate, or thrush. No tenderness of sinuses. Mouth/throat: mucosa pink and moist. No lesions. Dentition in good repair. Neck:Supple, without lymphadenopathy. Thyroid normal, No bruits. Pulmonary/Chest: Decreased breath sounds  Cardiovascular: Regular rate and rhythm without murmurs, rubs, or gallops. Abdomen: Soft, non tender.  Bowel sounds normal. No organomegaly  All four Extremities: No cyanosis, clubbing, edema, or effusions. Neurologic: No gross sensory or motor deficits. Skin: Warm and dry with good turgor. No signs of peripheral arterial or venous insufficiency. No ulcerations. No open wounds. Medical Decision Making -Laboratory:   I have independently reviewed/ordered the following labs:    CBC with Differential:   Recent Labs     08/12/22  0505 08/13/22  0645   WBC 12.5* 7.0   HGB 8.8* 9.2*   HCT 29.1* 30.3*   PLT See Reflexed IPF Result See Reflexed IPF Result   LYMPHOPCT 12* 17*   MONOPCT 5 9       BMP:   Recent Labs     08/12/22  0505 08/13/22  0645    143   K 4.3 4.2    108*   CO2 23 25   BUN 33* 28*   CREATININE 1.88* 1.36*       Hepatic Function Panel:   Recent Labs     08/11/22  0520   PROT 5.8*   LABALBU 2.5*   BILITOT <0.10*   ALKPHOS 68   ALT <5*   AST 12       No results for input(s): RPR in the last 72 hours. No results for input(s): HIV in the last 72 hours. No results for input(s): BC in the last 72 hours. Lab Results   Component Value Date/Time    MUCUS NOT REPORTED 01/02/2022 03:30 PM    RBC 3.19 08/13/2022 06:45 AM    TRICHOMONAS NOT REPORTED 01/02/2022 03:30 PM    WBC 7.0 08/13/2022 06:45 AM    YEAST NOT REPORTED 01/02/2022 03:30 PM    TURBIDITY Cloudy 08/08/2022 07:14 PM     Lab Results   Component Value Date/Time    CREATININE 1.36 08/13/2022 06:45 AM    GLUCOSE 115 08/13/2022 06:45 AM       Medical Decision Making-Imaging:     EXAMINATION:   CT OF THE CHEST WITHOUT CONTRAST 8/9/2022 8:42 pm       TECHNIQUE:   CT of the chest was performed without the administration of intravenous   contrast. Multiplanar reformatted images are provided for review. Automated   exposure control, iterative reconstruction, and/or weight based adjustment of   the mA/kV was utilized to reduce the radiation dose to as low as reasonably   achievable. COMPARISON:   Chest radiograph from yesterday, CT on 04/20/2022       HISTORY:   Acute shortness of breath.   Abnormal chest radiograph. FINDINGS:   Mediastinum: Mild cardiomegaly. Extensive left coronary calcification. No   pericardial effusion. Thoracic aorta is normal caliber with mild   atherosclerotic disease. No lymphadenopathy. Thyroid and esophagus are   unremarkable. Lungs/pleura: Minimal pleural effusions with adjacent atelectasis. No   consolidation. No definite pulmonary edema. Upper Abdomen: Unremarkable. Soft Tissues/Bones: No acute abnormality. Impression   Mild cardiomegaly. No definite pulmonary edema. Minimal pleural effusions   with adjacent atelectasis. Medical Decision Uunqya-Nlykneff-[id number]:     EXAMINATION:   ONE XRAY VIEW OF THE CHEST       8/11/2022 1:13 pm       COMPARISON:   08/08/2022, 04/27/2022       HISTORY:   ORDERING SYSTEM PROVIDED HISTORY: SOB   TECHNOLOGIST PROVIDED HISTORY:   SOB       FINDINGS:   Vascular congestion is noted without overt edema. No consolidation. No   significant effusion is identified. No pneumothorax. Mild cardiac   silhouette enlargement. Advanced arthropathy in the right shoulder. Impression   Vascular congestion without overt edema, similar compared to the prior exam.           Medical Decision Making-Other:     Note:  Labs, medications, radiologic studies were reviewed with personal review of films  Large amounts of data were reviewed  Discussed with nursing Staff, Discharge planner  Infection Control and Prevention measures reviewed  All prior entries were reviewed  Administer medications as ordered  Prognosis: Guarded  Discharge planning reviewed  Follow up as outpatient. Thank you for allowing us to participate in the care of this patient. Please call with questions.     Kimberley Paul MD  Pager: [phone] - Office: [phone]

## 2022-08-13 NOTE — PROGRESS NOTES
Writer at bedside to complete evening assessment. Upon entry to room, pt awake and in bed, respirations unlabored and normal while on room air. Vitals obtained and assessment completed, see flow sheet for details. Pt denies needs from writer at this time. Call light in reach. Will continue to monitor.

## 2022-08-13 NOTE — PROGRESS NOTES
Occupational Therapy  Facility/Department: [facility] AT THE [facility] MED SURG  Daily Treatment Note  NAME: Mari Mccoy  : 1955  MRN: [id number]    Date of Service: 2022    Discharge Recommendations:  Continue to assess pending progress, Subacute/Skilled Nursing Facility, Patient would benefit from continued therapy after discharge         Patient Diagnosis(es): The primary encounter diagnosis was Calculus in urethra. A diagnosis of Septicemia ([address] was also pertinent to this visit. Assessment    Activity Tolerance: Patient limited by fatigue;Patient limited by endurance (Simultaneous filing. User may not have seen previous data.)  Discharge Recommendations: Continue to assess pending progress;Subacute/Skilled Nursing Facility; Patient would benefit from continued therapy after discharge      Plan   Plan  Times per Week: 7  Times per Day: Daily  Current Treatment Recommendations: Strengthening;Balance training; Safety education & training;Self-Care / ADL; Patient/Caregiver education & training; Endurance training;Functional mobility training;Neuromuscular re-education     Restrictions  Restrictions/Precautions  Restrictions/Precautions: Fall Risk;General Precautions    Subjective   Subjective  Subjective: Pt lying in bed upon arrival. Pt agreed to participate in therapy session this date. Pt declined self care and ther ex this date. Pain: Pt stated \"I don't feel very good this morning\". Orientation  Overall Orientation Status: Within Functional Limits  Pain: Pt notes discomfort with breathing and mild SOB and fatigue. Cognition  Overall Cognitive Status: WFL        Objective    Vitals     Bed Mobility Training  Bed Mobility Training: Yes  Overall Level of Assistance: Moderate assistance;Assist X2  Interventions: Demonstration;Verbal cues  Rolling: Moderate assistance  Supine to Sit: Moderate assistance;Assist X2  Sit to Supine: Moderate assistance;Assist X2  Scooting:  Moderate assistance;Assist X2  Balance  Sitting: Intact  Standing: Impaired  Standing - Static: Poor; Fair  Standing - Dynamic: Poor; Fair  Transfer Training  Transfer Training: Yes  Overall Level of Assistance: Moderate assistance;Assist X2  Interventions: Verbal cues; Tactile cues  Sit to Stand: Moderate assistance;Assist X2  Stand to Sit: Moderate assistance;Assist X2  Bed to Chair: Moderate assistance;Assist X2              Safety Devices  Type of Devices: Call light within reach;Nurse notified; Left in chair (Simultaneous filing. User may not have seen previous data.)     Patient Education  Education Given To: Patient  Education Provided: Role of Therapy;Transfer Training;Plan of Care  Education Method: Verbal  Barriers to Learning: None  Education Outcome: Verbalized understanding    Goals  Short Term Goals  Time Frame for Short term goals: 20 visits  Short Term Goal 1: Pt. will tolerate 10-15 mins of BUE ther ex/act while maintaining SpO2 WFL to increase ovrall strength/activity tolerance for functional tasks. Short Term Goal 2: Pt. will complete grooming/bathing/dressing tasks with SBA with use of EC techs PRN to increase ease/(I) with self care routine. Short Term Goal 3: Pt. will demo G safety awareness during functional ADL transfers/mobility with reduced risk for falls. Short Term Goal 4: Patient to be educated on d/c folder, AE/DME and EC/WS techniques to ensure safe and indep return home.        Therapy Time   Individual Concurrent Group Co-treatment   Time In 0715         Time Out 0730         Minutes 15                 CAMILA oSliz/MONICA

## 2022-08-13 NOTE — PROGRESS NOTES
Patient in chair resting at this time. Vitals and assessment completed. Patient on 2L but turned down to 1L and tolerating without complication. Patient stated she just feels fatigued. Midline catheter remains in place. Patient denies having needs or concerns. Call light within reach. Will continue to monitor.

## 2022-08-13 NOTE — PROGRESS NOTES
Physical Therapy  Facility[facility]Department: [facility] AT THE [facility] MED SURG  Daily Treatment Note  NAME: Noe Harris  : 1955  MRN: [id number]    Date of Service: 2022    Discharge Recommendations:  Subacute/Skilled Nursing Facility        Patient Diagnosis(es): The primary encounter diagnosis was Calculus in urethra. A diagnosis of Septicemia ([address] was also pertinent to this visit. Assessment   Activity Tolerance: Patient limited by fatigue;Patient limited by endurance (Simultaneous filing. User may not have seen previous data.)     Plan    Plan  Plan: 2 times a day 7 days a week  Current Treatment Recommendations: Strengthening; Functional mobility training;Transfer training;Gait training; Safety education & training;Patient/Caregiver education & training;Neuromuscular re-education     Restrictions  Restrictions/Precautions  Restrictions/Precautions: Fall Risk, General Precautions  Required Braces or Orthoses?: No     Subjective    Subjective  Subjective: Pt was in bed upon arrival and was agreeable to bed mobility and transfer to chair. Pain: Pt notes discomfort with breathing and mild SOB and fatigue. Orientation  Overall Orientation Status: Within Functional Limits  Cognition  Overall Cognitive Status: WFL     Objective   Vitals     Bed Mobility Training  Bed Mobility Training: Yes  Overall Level of Assistance: Moderate assistance;Assist X2  Interventions: Demonstration;Verbal cues  Rolling: Moderate assistance  Supine to Sit: Moderate assistance;Assist X2  Sit to Supine: Moderate assistance;Assist X2  Scooting: Moderate assistance;Assist X2  Balance  Sitting: Intact  Standing: Impaired  Standing - Static: Poor; Fair  Standing - Dynamic: Poor; Fair  Transfer Training  Transfer Training: Yes  Overall Level of Assistance: Moderate assistance;Assist X2  Interventions: Verbal cues; Tactile cues  Sit to Stand:  Moderate assistance;Assist X2  Stand to Sit: Moderate assistance;Assist X2  Bed to Chair: Moderate assistance;Assist X2     PT Exercises  Exercise Treatment: Transfers, Bed mobility, Gait with RW x5ft     Safety Devices  Type of Devices: Call light within reach;Nurse notified; Left in chair (Simultaneous filing. User may not have seen previous data.)       Goals  Short Term Goals  Time Frame for Short term goals: 3 DAYS  Short term goal 1: CGA TRANSFERS  Short term goal 2: CGA GAIT FWW. Additional Goals?: No  Long Term Goals  Time Frame for Long term goals : 4 WEEKS  Long term goal 1: IND GAIT FWW,IND TRANSFERS. Patient Goals   Patient goals : RETURN HOME WITH ASSIST.     Education  Patient Education  Education Given To: Patient  Education Provided: Role of Therapy;Transfer Training  Education Provided Comments: Daily exercise/activity importance  Education Method: Verbal  Barriers to Learning: None  Education Outcome: Verbalized understanding    Therapy Time   Individual Concurrent Group Co-treatment   Time In 0715         Time Out 0730         Minutes 15         Timed Code Treatment Minutes: [email or web address]

## 2022-08-13 NOTE — PROGRESS NOTES
Pt's SPO2 level was 85% on room air. This writer put pt back on 2 L via nasal canula and pt's SPO2 level was retaken at 96%. Pt kept on 2 L via nasal canula at this time.

## 2022-08-13 NOTE — PLAN OF CARE
Problem: Discharge Planning  Goal: Discharge to home or other facility with appropriate resources  Outcome: Progressing  Flowsheets (Taken 8/13/2022 0049)  Discharge to home or other facility with appropriate resources: Identify barriers to discharge with patient and caregiver     Problem: Pain  Goal: Verbalizes/displays adequate comfort level or baseline comfort level  Outcome: Progressing  Flowsheets (Taken 8/13/2022 0049)  Verbalizes/displays adequate comfort level or baseline comfort level:   Encourage patient to monitor pain and request assistance   Assess pain using appropriate pain scale   Administer analgesics based on type and severity of pain and evaluate response   Implement non-pharmacological measures as appropriate and evaluate response   Notify Licensed Independent Practitioner if interventions unsuccessful or patient reports new pain     Problem: Safety - Adult  Goal: Free from fall injury  Outcome: Progressing  Flowsheets (Taken 8/13/2022 0049)  Free From Fall Injury: Instruct family/caregiver on patient safety     Problem: Skin/Tissue Integrity  Goal: Absence of new skin breakdown  Description: 1. Monitor for areas of redness and/or skin breakdown  2. Assess vascular access sites hourly  3. Every 4-6 hours minimum:  Change oxygen saturation probe site  4. Every 4-6 hours:  If on nasal continuous positive airway pressure, respiratory therapy assess nares and determine need for appliance change or resting period. Outcome: Progressing  Note: No new skin issues noted.      Problem: Nutrition Deficit:  Goal: Optimize nutritional status  Outcome: Progressing  Flowsheets (Taken 8/13/2022 0049)  Nutrient intake appropriate for improving, restoring, or maintaining nutritional needs:   Assess nutritional status and recommend course of action   Monitor oral intake, labs, and treatment plans     Problem: ABCDS Injury Assessment  Goal: Absence of physical injury  Outcome: Progressing  Flowsheets (Taken 8/13/2022 0049)  Absence of Physical Injury: Implement safety measures based on patient assessment

## 2022-08-13 NOTE — PROGRESS NOTES
Patient in bed resting at this time. Vitals and assessment completed. Vitals stable. Patient placed on room air and spo2 remained in the mid 90's several minutes after placing on room air. Patient stated she needs to be changed but refused for writer to change due to being a male but was very polite about the situation. Writer promptly notified nurse aide who changed patient brief and provide personal hygiene. Patient denies having needs or concerns. Call light within reach. Will continue to monitor.

## 2022-08-14 PROBLEM — I95.89 OTHER HYPOTENSION: Status: ACTIVE | Noted: 2022-08-14

## 2022-08-14 PROBLEM — E87.20 METABOLIC ACIDOSIS: Status: ACTIVE | Noted: 2022-08-14

## 2022-08-14 LAB
ANION GAP SERPL CALCULATED.3IONS-SCNC: 6 MMOL/L (ref 9–17)
BUN BLDV-MCNC: 29 MG/DL (ref 8–23)
BUN/CREAT BLD: 23 (ref 9–20)
C-REACTIVE PROTEIN: 24.3 MG/L (ref 0–5)
CALCIUM SERPL-MCNC: 8.7 MG/DL (ref 8.6–10.4)
CHLORIDE BLD-SCNC: 108 MMOL/L (ref 98–107)
CO2: 30 MMOL/L (ref 20–31)
CREAT SERPL-MCNC: 1.25 MG/DL (ref 0.5–0.9)
GFR AFRICAN AMERICAN: 52 ML/MIN
GFR NON-AFRICAN AMERICAN: 43 ML/MIN
GFR SERPL CREATININE-BSD FRML MDRD: ABNORMAL ML/MIN/{1.73_M2}
GFR SERPL CREATININE-BSD FRML MDRD: ABNORMAL ML/MIN/{1.73_M2}
GLUCOSE BLD-MCNC: 123 MG/DL (ref 70–99)
POTASSIUM SERPL-SCNC: 4.3 MMOL/L (ref 3.7–5.3)
SODIUM BLD-SCNC: 144 MMOL/L (ref 135–144)

## 2022-08-14 PROCEDURE — 6370000000 HC RX 637 (ALT 250 FOR IP): Performed by: FAMILY MEDICINE

## 2022-08-14 PROCEDURE — 2580000003 HC RX 258: Performed by: UROLOGY

## 2022-08-14 PROCEDURE — 6360000002 HC RX W HCPCS: Performed by: PHYSICIAN ASSISTANT

## 2022-08-14 PROCEDURE — 97110 THERAPEUTIC EXERCISES: CPT

## 2022-08-14 PROCEDURE — 6370000000 HC RX 637 (ALT 250 FOR IP): Performed by: INTERNAL MEDICINE

## 2022-08-14 PROCEDURE — 1200000000 HC SEMI PRIVATE

## 2022-08-14 PROCEDURE — 2700000000 HC OXYGEN THERAPY PER DAY

## 2022-08-14 PROCEDURE — 94761 N-INVAS EAR/PLS OXIMETRY MLT: CPT

## 2022-08-14 PROCEDURE — 99232 SBSQ HOSP IP/OBS MODERATE 35: CPT | Performed by: INTERNAL MEDICINE

## 2022-08-14 PROCEDURE — 80048 BASIC METABOLIC PNL TOTAL CA: CPT

## 2022-08-14 PROCEDURE — 86140 C-REACTIVE PROTEIN: CPT

## 2022-08-14 PROCEDURE — 36415 COLL VENOUS BLD VENIPUNCTURE: CPT

## 2022-08-14 PROCEDURE — 6360000002 HC RX W HCPCS: Performed by: INTERNAL MEDICINE

## 2022-08-14 PROCEDURE — 6370000000 HC RX 637 (ALT 250 FOR IP): Performed by: NURSE PRACTITIONER

## 2022-08-14 PROCEDURE — 6370000000 HC RX 637 (ALT 250 FOR IP): Performed by: UROLOGY

## 2022-08-14 PROCEDURE — 2580000003 HC RX 258: Performed by: INTERNAL MEDICINE

## 2022-08-14 RX ORDER — FUROSEMIDE 40 MG/1
40 TABLET ORAL DAILY
Status: DISCONTINUED | OUTPATIENT
Start: 2022-08-15 | End: 2022-08-16 | Stop reason: HOSPADM

## 2022-08-14 RX ADMIN — OXYBUTYNIN CHLORIDE 15 MG: 5 TABLET, EXTENDED RELEASE ORAL at 08:37

## 2022-08-14 RX ADMIN — CARVEDILOL 12.5 MG: 12.5 TABLET, FILM COATED ORAL at 17:09

## 2022-08-14 RX ADMIN — CARVEDILOL 12.5 MG: 12.5 TABLET, FILM COATED ORAL at 08:36

## 2022-08-14 RX ADMIN — ACETAMINOPHEN 650 MG: 325 TABLET ORAL at 17:10

## 2022-08-14 RX ADMIN — Medication 1000 UNITS: at 08:36

## 2022-08-14 RX ADMIN — ACETAMINOPHEN 650 MG: 325 TABLET ORAL at 00:33

## 2022-08-14 RX ADMIN — ZINC SULFATE 220 MG (50 MG) CAPSULE 100 MG: CAPSULE at 08:37

## 2022-08-14 RX ADMIN — RIVAROXABAN 20 MG: 20 TABLET, FILM COATED ORAL at 17:09

## 2022-08-14 RX ADMIN — MEROPENEM 1000 MG: 1 INJECTION, POWDER, FOR SOLUTION INTRAVENOUS at 13:41

## 2022-08-14 RX ADMIN — MEROPENEM 1000 MG: 1 INJECTION, POWDER, FOR SOLUTION INTRAVENOUS at 02:02

## 2022-08-14 RX ADMIN — NYSTATIN 500000 UNITS: 100000 SUSPENSION ORAL at 17:09

## 2022-08-14 RX ADMIN — CETIRIZINE HYDROCHLORIDE 5 MG: 10 TABLET, FILM COATED ORAL at 08:37

## 2022-08-14 RX ADMIN — OXYCODONE HYDROCHLORIDE AND ACETAMINOPHEN 250 MG: 500 TABLET ORAL at 08:37

## 2022-08-14 RX ADMIN — SODIUM CHLORIDE, PRESERVATIVE FREE 10 ML: 5 INJECTION INTRAVENOUS at 08:43

## 2022-08-14 RX ADMIN — MULTIPLE VITAMINS W/ MINERALS TAB 1 TABLET: TAB at 08:36

## 2022-08-14 RX ADMIN — FUROSEMIDE 40 MG: 10 INJECTION, SOLUTION INTRAMUSCULAR; INTRAVENOUS at 08:37

## 2022-08-14 RX ADMIN — SODIUM CHLORIDE, PRESERVATIVE FREE 10 ML: 5 INJECTION INTRAVENOUS at 20:31

## 2022-08-14 RX ADMIN — NYSTATIN 500000 UNITS: 100000 SUSPENSION ORAL at 13:37

## 2022-08-14 RX ADMIN — Medication 400 UNITS: at 08:37

## 2022-08-14 RX ADMIN — NYSTATIN 500000 UNITS: 100000 SUSPENSION ORAL at 08:37

## 2022-08-14 ASSESSMENT — PAIN DESCRIPTION - DESCRIPTORS: DESCRIPTORS: ACHING

## 2022-08-14 ASSESSMENT — PAIN SCALES - GENERAL
PAINLEVEL_OUTOF10: 0
PAINLEVEL_OUTOF10: 0
PAINLEVEL_OUTOF10: 3

## 2022-08-14 ASSESSMENT — PAIN DESCRIPTION - LOCATION: LOCATION: HEAD

## 2022-08-14 ASSESSMENT — PAIN DESCRIPTION - ORIENTATION: ORIENTATION: ANTERIOR

## 2022-08-14 NOTE — PROGRESS NOTES
[facility]   Nephrology progress note  8/14/2022, 1:21 PM      Pt Name:    Fabian Villalobos  MRN:     [id number]     YOB: 1955  Admit Date:    8/8/2022  5:32 PM    TELEHEALTH EVALUATION -- Audio/Visual (During AQCAS-72 public health emergency)     Telehealth service was provided with the patient at her room [address] [facility] and myself the physician in [facility] and [address] [place] [address] who has initiated the visit. Pursuant to the emergency declaration under the [address] [place] [address], [facility] waiver authority and the [employer] Act, this Virtual  Visit was conducted, with patient's consent, to reduce the patient's risk of exposure to COVID-19 and provide continuity of care for an established patient. Services were provided through a video synchronous discussion virtually to substitute for in-person clinic visit. Chief Complaint: Nephrology following for BARBARA/CKD . Subjective:  Patient seen and examined  No chest pain no significant shortness of breath  Good urine output  She is only on 1 L oxygen also will be weaned    Objective:  24HR INTAKE/OUTPUT:    Intake/Output Summary (Last 24 hours) at 8/14/2022 1321  Last data filed at 8/14/2022 0843  Gross per 24 hour   Intake 100.79 ml   Output --   Net 100.79 ml        Admission weight: 257 lb 12.8 oz (116.9 kg)  Wt Readings from Last 3 Encounters:   08/14/22 262 lb 9.1 oz (119.1 kg)   05/17/22 243 lb (110.2 kg)   05/17/22 243 lb (110.2 kg)        Vitals :   Vitals:    08/13/22 1835 08/14/22 0025 08/14/22 0407 08/14/22 0828   BP:  (!) 153/85  92/66   Pulse:  64  76   Resp:  18  18   Temp:  96.9 °F (36.1 °C)  97.3 °F (36.3 °C)   TempSrc:  Temporal  Temporal   SpO2: 96% 96%  96%   Weight:   262 lb 9.1 oz (119.1 kg)    Height:           Physical examination: limited due to covid 19  General Appearance:  Well developed.  No distress  Mouth/Throat:  Oral mucosa moist  Neck: No accessory muscle use  Lungs:  Breath sounds: clear but diminished per RN  Heart[de-identified]  S1,S2 heard  Abdomen:  Soft, non - tender  Musculoskeletal:  Edema -chronic edema noted bilateral lower extremities    Medications:  Infusion:    sodium chloride       Meds:    rivaroxaban  20 mg Oral Q24H    meropenem  1,000 mg IntraVENous Q12H    nystatin  5 mL Oral 4x Daily    zinc sulfate  100 mg Oral Daily    carvedilol  12.5 mg Oral BID WC    furosemide  40 mg IntraVENous Daily    vitamin E  400 Units Oral Daily    sodium chloride flush  5-40 mL IntraVENous 2 times per day    vitamin C  250 mg Oral Daily    cetirizine  5 mg Oral Daily    therapeutic multivitamin-minerals  1 tablet Oral Daily    oxybutynin  15 mg Oral Daily    Vitamin D  1,000 Units Oral Daily       Lab Data :  CBC:   Recent Labs     08/12/22  0505 08/13/22  0645   WBC 12.5* 7.0   HGB 8.8* 9.2*   HCT 29.1* 30.3*   PLT See Reflexed IPF Result See Reflexed IPF Result       CMP:  Recent Labs     08/12/22  0505 08/13/22  0645 08/14/22  0550    143 144   K 4.3 4.2 4.3    108* 108*   CO2 23 25 30   BUN 33* 28* 29*   CREATININE 1.88* 1.36* 1.25*   GLUCOSE 105* 115* 123*   CALCIUM 8.3* 8.2* 8.7       Hepatic:   No results for input(s): LABALBU, AST, ALT, ALB, BILITOT, ALKPHOS in the last 72 hours. Baseline Creatinine: 0.8     Assessment and Plan:  Renal -acute kidney injury, most likely multifactorial etiology but primarily due to obstructive etiology combined with borderline blood pressures  Initially creatinine has worsened and subsequently started to improve once the diuretics have started  Patient's volume status is looking well in fact better  Switch to p.o.  Lasix starting tomorrow     Electrolytes appear to be within normal limits  Mild metabolic acidosis secondary to acute kidney injury improved  Left-sided hydronephrosis status post stent placement 8/9/2022  Possible UTI status post antibiotics  COVID-19 infection  Meds reviewed and discussed with patient and RN    Benji Rangel MD  [facility]    This report has been created using voice recognition software.  It may contain minor errors which are inherent in voice recognition technology

## 2022-08-14 NOTE — PROGRESS NOTES
Writer at bedside to complete second shift assessment. Assessment and vital signs completed, see flow sheet for details. Pt requested a snack. Snack given. Pt denies any further needs at this time. Call light within reach. Will continue to monitor.

## 2022-08-14 NOTE — PROGRESS NOTES
Progress Note  Boaz Reagan MD    OBJECTIVE:    Patient seen for f/u of Sepsis due to urinary tract infection ([address] She had stent,blood culture grew klebseila sensitive to rocephin, still on oxygen-on 1 lit now    ROS:   Constitutional: negative  for fevers, and negative for chills. Respiratory: negative for shortness of breath, negative for cough, and negative for wheezing  Cardiovascular: negative for chest pain, and negative for palpitations  Gastrointestinal: negative for abdominal pain, negative for nausea,negative for vomiting, negative for diarrhea, and negative for constipation     All other systems were reviewed with the patient and are negative unless otherwise stated in HPI    OBJECTIVE:  Vitals:   Temp: 97.3 °F (36.3 °C)  BP: 92/66  Resp: 18  Heart Rate: 76  SpO2: 96 %    24HR INTAKE/OUTPUT:    Intake/Output Summary (Last 24 hours) at 8/14/2022 1042  Last data filed at 8/14/2022 0843  Gross per 24 hour   Intake 340.79 ml   Output --   Net 340.79 ml     -----------------------------------------------------------------  Exam:  GEN:    Awake, alert and oriented x3. EYES:  EOMI, pupils equal   NECK: Supple. No lymphadenopathy. No carotid bruit  CVS:    regular rate and rhythm, 2/6 systolic murmur  PULM:  CTA, no wheezes, rales or rhonchi, no acute respiratory distress  ABD:    Bowels sounds normal.  Abdomen is soft. No distention. no tenderness to palpation. EXT:   3+ edema bilaterally . No calf tenderness. NEURO: Moves all extremities. Motor and sensory are grossly intact  SKIN:  No rashes.   No skin lesions.    -----------------------------------------------------------------  Diagnostic Data:    All available data reviewed  Lab Results   Component Value Date    WBC 7.0 08/13/2022    HGB 9.2 (L) 08/13/2022    MCV 95.0 08/13/2022    PLT See Reflexed IPF Result 08/13/2022      Lab Results   Component Value Date    GLUCOSE 123 (H) 08/14/2022    BUN 29 (H) 08/14/2022    CREATININE 1.25 (H) 08/14/2022     08/14/2022    K 4.3 08/14/2022    CALCIUM 8.7 08/14/2022     (H) 08/14/2022    CO2 30 08/14/2022       PROBLEM LIST:  Principal Problem:    Sepsis due to urinary tract infection ([address]  Active Problems:    Obesity, Class III, BMI 40-49.9 (morbid obesity) ([address]    Sepsis ([address]    Urologic disorders    Bacteremia due to Gram-negative bacteria    Hypoxia    Atrial fibrillation with rapid ventricular response (HCC)    Renal failure syndrome    Abnormal ECG    Acute kidney injury superimposed on CKD ([address]    Calculus in urethra    COVID-19 virus infection    Biventricular congestive heart failure (HCC)    Obstructive uropathy    Klebsiella infection    Urinary tract infection due to extended-spectrum beta lactamase (ESBL) producing Escherichia coli    BARBARA (acute kidney injury) ([address]    COVID-19    Chronic anticoagulation    Lymphedema of both lower extremities    Ureteral calculi  Resolved Problems:    * No resolved hospital problems. *      ASSESSMENT / PLAN:  Sepsis due to urinary tract infection ([address]  Principal Problem:    Sepsis due to urinary tract infection ([address]- continue iv rpcephin, Day 6  Active Problems:    Obesity, Class III, BMI 40-49.9 (morbid obesity) ([address]    Sepsis ([address]    Urologic disorders    Bacteremia due to Gram-negative bacteria    Hypoxia    Atrial fibrillation with rapid ventricular response (HCC)    Renal failure syndrome    Abnormal ECG    Acute kidney injury superimposed on CKD (HCC)    Chronic anticoagulation    Lymphedema of both lower extremities    Ureteral calculi  Resolved Problems:    * No resolved hospital problems. *  Covid 19-asymptomatic      Nutrition status:  Well developed, well nourished with no malnutrition  DVT prophylaxis: xarelto  High risk medications: none   Disposition:  Discharge plan is ECF    Lynne Gamez MD , M.D.  8/14/2022  10:42 AM

## 2022-08-14 NOTE — PROGRESS NOTES
Physical Therapy  Facility/Department: [facility] AT THE [facility] MED SURG  Daily Treatment Note  NAME: Reji Watts  : 1955  MRN: [id number]    Date of Service: 2022    Discharge Recommendations:  Subacute/Skilled Nursing Facility        Patient Diagnosis(es): The primary encounter diagnosis was Calculus in urethra. A diagnosis of Septicemia ([address] was also pertinent to this visit. Assessment   Activity Tolerance: Patient limited by fatigue;Patient limited by endurance     Plan    Plan  Plan: 2 times a day 7 days a week  Current Treatment Recommendations: Strengthening; Functional mobility training;Transfer training;Gait training; Safety education & training;Patient/Caregiver education & training;Neuromuscular re-education     Restrictions  Restrictions/Precautions  Restrictions/Precautions: Fall Risk, General Precautions, Isolation  Required Braces or Orthoses?: No     Subjective    Subjective  Subjective: Pt reported not feeling as well today and requested bed exercises only. Pt with no complaints of pain. Orientation  Overall Orientation Status: Within Functional Limits  Cognition  Overall Cognitive Status: WFL     Objective   Vitals     Bed Mobility Training  Bed Mobility Training: No (Pt refused.)  Transfer Training  Transfer Training: No (Pt refused.)  Gait Training  Gait Training: No (Pt refused. )     PT Exercises  Exercise Treatment: Completed supine ther ex x 15-20 of: ankle pumps, quad sets, glut sets, hip abd, SLR, heel slides, and SAQ. Pt needed frequent rest breaks throughout treatment d/t fatigue. Safety Devices  Type of Devices: Call light within reach; Left in bed;Nurse notified       Goals  Short Term Goals  Time Frame for Short term goals: 3 DAYS  Short term goal 1: CGA TRANSFERS  Short term goal 2: CGA GAIT FWW. Additional Goals?: No  Long Term Goals  Time Frame for Long term goals : 4 WEEKS  Long term goal 1: IND GAIT FWW,IND TRANSFERS.   Patient Goals   Patient goals : RETURN HOME WITH ASSIST. Education  Patient Education  Education Given To: Patient  Education Provided: Role of Therapy;Plan of Care  Education Provided Comments: Educated pt on importance of completing transfers/amb.   Education Method: Verbal  Barriers to Learning: None  Education Outcome: Verbalized understanding;Continued education needed    Therapy Time   Individual Concurrent Group Co-treatment   Time In 1400         Time Out [place], [place]

## 2022-08-14 NOTE — PROGRESS NOTES
[facility] [address] [facility] Patient Telemedicine  Today's Date and Time: 8/14/2022, 11:31 AM    Impression :     COVID 19 Confirmed Infection  Previously vaccinated individual:  4-28--21 Boothe Ser  5-26-21 [place]he Ser  2-1-22 Moderna  Covid tests:  8-8-22; negative   8-12-22: Positive  High CRP  CHF with high Pro BNP  BARBARA with Lt side hydronephrosis  S/P stent placement 8-8-22  Urine 8-8-22 with Klebsiella and E coli ESBL +  Klebsiella septicemia 8-8-22 x 2  Recommendations:   Antibiotic treatment:  Meropenem 1000 mg IV q 12 hr x 5-7 days  Or Ertapenem 1000 mg once daily for 5-7 days for E coli ESBL + in urine and Klebsiella in blood   Covid Rx:    Remdesivir. Not indicated- BARBARA  Decadron: Contra indicated at this stage  Actemra: Contra indicated at this stage  Monoclonal antibodies: Bebtelovimab infused 8-12-22      Medical Decision Making/Summary/Discussion:8/14/2022     Patient admitted with Klebsiella and E coli ESBL + UTI with associated Klebsiella septicemia  Initial testing for Covid 19 was negative  However, repeat testing showed acute COVID 19 infection despite three prior vaccines  Patient treated with Bebtelovimab with good response Covid wise  Treated with meropenem for Klebsiella and E coli ESBL + UTI with associated Klebsiella septicemia  Patient improving with treatment. Renal function improved as of 8-14-22.     Infection Control Recommendations   [place] Precautions  Airborne isolation  Droplet Isolation  Isolate until 8-22-22  Contact isolation for E coli ESBL +    Antimicrobial Stewardship Recommendations     Simplification of therapy  Targeted therapy    Coordination of Outpatient Care:   Estimated Length of IV antimicrobials:8-17-22  Patient will need Midline Catheter Insertion: TBD  Patient will need PICC line Insertion: No  Patient will need: Home IV , [place],  SNF,  LTAC:TBD  Patient will need outpatient wound care:No    Chief complaint/reason for consultation:   Concern for COVID infection  UTI with Klebsiella and E coli ESBL +  Klebsiella septicemia      History of Present Illness:   Zenobia Avalos is a 74y.o.-year-old  female who was initially admitted on 8/8/2022. Patient seen at the request of Dr. Santos Henson. INITIAL HISTORY:    Patient presented through ER with complaints of SOB and flank pain. She was found to be febrile, tachycardic, tachypneic, hypotensive, in atrial fibrillation. CXR showed vascular congestion. She gave a Hx of renal stones. Catheterized Urine culture grew Klebsiella and E coli ESBL +    Initial SARS Co-V2 was negative on 8-8-22 but second test was positive on 8-12-22       Patient admitted because of concerns with sepsis. Treated for UTI with ceftriaxone. Meropenem started 8-12-22 because of ESBL + E coli    Has received Bebtelovimab for Covid 19. Patient is on early viral multiplication phase. CURRENT EVALUATION : 8/14/2022    Afebrile  VS stable    Patient feels better  No complaints  No new issues per RN    Patient exhibiting respiratory distress. yes  Respiratory secretions: No    Patient receiving supplemental oxygen. Nasal 1 L/min  RR 20-->18  02 sat 95-->97-->96      Overall Daily Picture:   Improving    Presence of secondary bacterial Infection: Yes Klebsiella and E coli ESBL + UTI, Klebsiella septicemia  Additional antibiotics: Meropenem or Ertapenem    Labs, X rays reviewed: 8/14/2022    BUN: 33-->28-->29  Cr: 1.88-->1.36-->1.25    WBC: 12.5-->7.0  Hb: 8.8-->9.2  Plat: 98-->112    Absolute Neutrophils: 10.2  Absolute Lymphocytes:1.5  Neutrophil/Lymphocyte Ratio: 7 High risk     CRP: 101-->49.7-->24.3  Ferritin:  LDH:     Pro Calcitonin:  Pro BNP 17,749    Cultures:  Urine:    Blood:    Sputum :    Wound:      CXR:   8-11-22: Vascular congestion without overt edema, similar compared to the prior exam.  CAT:  8-9-22; Mild cardiomegaly. No definite pulmonary edema. , small effusions    Discussed with OLLIE NICOLE.    8-11-22:       8-9-22:          I have personally reviewed the past medical history, past surgical history, medications, social history, and family history, and I have updated the database accordingly.   Past Medical History:     Past Medical History:   Diagnosis Date    Acute kidney injury superimposed on CKD ([address] 8/10/2022    Atrial fibrillation with RVR ([address] 8/9/2022    Carcinoma ([address]     Squamous Cell    Cellulitis     COVID-19 virus infection 8/12/2022    DVT (deep venous thrombosis) [facility] 2006    LLE    Kidney stone     Lymphedema     Morbid obesity ([address]     Psoas abscess, right ([address]     Pyelonephritis     Wears glasses        Past Surgical  History:     Past Surgical History:   Procedure Laterality Date    CARPAL TUNNEL RELEASE  1990s    CT ABSCESS DRAIN SUBCUTANEOUS  01/10/2022    CT ABSCESS DRAIN SUBCUTANEOUS 1/10/2022 [facility] CT SCAN    CT ABSCESS DRAIN SUBCUTANEOUS  04/21/2022    CT ABSCESS DRAIN SUBCUTANEOUS 4/21/2022 [facility] CT SCAN    CT ABSCESS DRAIN SUBCUTANEOUS  4/29/2022    CT ABSCESS DRAIN SUBCUTANEOUS 4/29/2022 [facility] CT SCAN    CYSTOSCOPY N/A 01/04/2022    CYSTOSCOPY URETERAL STENT INSERTION performed by Carmelita Casillas MD at [place] [address] Right     HLL with stent    CYSTOSCOPY Right 03/01/2022    CYSTOSCOPY URETEROSCOPY LASER-WTIH HLL performed by Carmelita Casillas MD at [place] [address] Right 03/01/2022    CYSTOSCOPY URETERAL STENT Katelyn Wesley performed by Carmelita Casillas MD at [place] [address] Right 04/05/2022    Dr Lebron Reza with stent insertion    CYSTOSCOPY Right 04/05/2022    CYSTOSCOPY URETEROSCOPY LASER-HLL performed by Carmelita Casillas MD at [place] [address] Right 04/05/2022    CYSTOSCOPY URETERAL STENT INSERTION-EXCHANGE performed by Carmelita Casillas MD at [place] [address] Right 04/22/2022    CYSTOSCOPY URETERAL STENT INSERTION (Right )    CYSTOSCOPY Right 4/22/2022    CYSTOSCOPY URETERAL STENT INSERTION performed by Nj Mejia MD at [facility] Left 2022    CYSTOSCOPY URETERAL STENT INSERTION performed by Bronwyn Kahn MD at [address] [place] [address]  2022         INSERT MIDLINE CATHETER  2022         IR NEPHROSTOMY PERCUTANEOUS RIGHT  2022    IR NEPHROSTOMY PERCUTANEOUS RIGHT 2022 Baltazar Morales MD [facility] SPECIAL PROCEDURES    ANNABELLA AND BSO (CERVIX REMOVED)      2019    TUBAL LIGATION  1993    TUBAL LIGATION      WISDOM TOOTH EXTRACTION         Medications:      rivaroxaban  20 mg Oral Q24H    meropenem  1,000 mg IntraVENous Q12H    nystatin  5 mL Oral 4x Daily    zinc sulfate  100 mg Oral Daily    carvedilol  12.5 mg Oral BID WC    furosemide  40 mg IntraVENous Daily    vitamin E  400 Units Oral Daily    sodium chloride flush  5-40 mL IntraVENous 2 times per day    vitamin C  250 mg Oral Daily    cetirizine  5 mg Oral Daily    therapeutic multivitamin-minerals  1 tablet Oral Daily    oxybutynin  15 mg Oral Daily    Vitamin D  1,000 Units Oral Daily       Social History:     Social History     Socioeconomic History    Marital status:      Spouse name: Not on file    Number of children: Not on file    Years of education: Not on file    Highest education level: Not on file   Occupational History    Not on file   Tobacco Use    Smoking status: Former     Packs/day: 0.50     Years: 42.00     Pack years: 21.00     Types: Cigarettes     Quit date: 2022     Years since quittin.3    Smokeless tobacco: Never   Vaping Use    Vaping Use: Never used   Substance and Sexual Activity    Alcohol use: No     Alcohol/week: 0.0 standard drinks    Drug use: No    Sexual activity: Not Currently   Other Topics Concern    Not on file   Social History Narrative    Not on file     Social Determinants of Health     Financial Resource Strain: Not on file   Food Insecurity: Not on file   Transportation Needs: Not on file   Physical Activity: Not on file   Stress: Not on file   Social Connections: Not on file   Intimate Partner Violence: Not on file   Housing Stability: Not on file       Family History:     Family History   Adopted: Yes   Problem Relation Age of Onset    Cancer Mother         The patient reports her biologic mother did have bladder cancer        Allergies:   Patient has no known allergies. Review of Systems:       Constitutional: No fevers or chills. Weak  Head: No headaches  Eyes: No double vision or blurry vision. No conjunctival inflammation. ENT: No sore throat or runny nose. . No hearing loss, tinnitus or vertigo. Cardiovascular: No chest pain or palpitations. Shortness of breath. POPE  Lung: Shortness of breath, cough. No sputum production  Abdomen: No nausea, vomiting, diarrhea, or abdominal pain. [place] No cramps. Genitourinary: No increased urinary frequency, or dysuria. No hematuria. No suprapubic or CVA pain  Musculoskeletal: No muscle aches or pains. No joint effusions, swelling or deformities  Hematologic: No bleeding or bruising. Neurologic: No headache, weakness, numbness, or tingling. Integument: No rash, no ulcers. Psychiatric: No depression. Endocrine: No polyuria, no polydipsia, no polyphagia. Physical Examination :   Patient Vitals for the past 8 hrs:   BP Temp Temp src Pulse Resp SpO2 Weight   08/14/22 0828 92/66 97.3 °F (36.3 °C) Temporal 76 18 96 % --   08/14/22 0407 -- -- -- -- -- -- 262 lb 9.1 oz (119.1 kg)       General Appearance: Awake, alert, and in no apparent distress  Head:  Normocephalic, no trauma  Eyes: Pupils equal, round, reactive to light; sclera anicteric; conjunctivae pink. No embolic phenomena. ENT: Oropharynx clear, without erythema, exudate, or thrush. No tenderness of sinuses. Mouth/throat: mucosa pink and moist. No lesions. Dentition in good repair. Neck:Supple, without lymphadenopathy. Thyroid normal, No bruits.   Pulmonary/Chest: Decreased breath sounds  Cardiovascular: Regular rate and rhythm without murmurs, rubs, or gallops. Abdomen: Soft, non tender. Bowel sounds normal. No organomegaly  All four Extremities: No cyanosis, clubbing, edema, or effusions. Neurologic: No gross sensory or motor deficits. Skin: Warm and dry with good turgor. No signs of peripheral arterial or venous insufficiency. No ulcerations. No open wounds. Medical Decision Making -Laboratory:   I have independently reviewed/ordered the following labs:    CBC with Differential:   Recent Labs     08/12/22  0505 08/13/22  0645   WBC 12.5* 7.0   HGB 8.8* 9.2*   HCT 29.1* 30.3*   PLT See Reflexed IPF Result See Reflexed IPF Result   LYMPHOPCT 12* 17*   MONOPCT 5 9       BMP:   Recent Labs     08/13/22  0645 08/14/22  0550    144   K 4.2 4.3   * 108*   CO2 25 30   BUN 28* 29*   CREATININE 1.36* 1.25*       Hepatic Function Panel:   No results for input(s): PROT, LABALBU, BILIDIR, IBILI, BILITOT, ALKPHOS, ALT, AST in the last 72 hours. No results for input(s): RPR in the last 72 hours. No results for input(s): HIV in the last 72 hours. No results for input(s): BC in the last 72 hours. Lab Results   Component Value Date/Time    MUCUS NOT REPORTED 01/02/2022 03:30 PM    RBC 3.19 08/13/2022 06:45 AM    TRICHOMONAS NOT REPORTED 01/02/2022 03:30 PM    WBC 7.0 08/13/2022 06:45 AM    YEAST NOT REPORTED 01/02/2022 03:30 PM    TURBIDITY Cloudy 08/08/2022 07:14 PM     Lab Results   Component Value Date/Time    CREATININE 1.25 08/14/2022 05:50 AM    GLUCOSE 123 08/14/2022 05:50 AM       Medical Decision Making-Imaging:     EXAMINATION:   CT OF THE CHEST WITHOUT CONTRAST 8/9/2022 8:42 pm       TECHNIQUE:   CT of the chest was performed without the administration of intravenous   contrast. Multiplanar reformatted images are provided for review. Automated   exposure control, iterative reconstruction, and/or weight based adjustment of   the mA/kV was utilized to reduce the radiation dose to as low as reasonably   achievable. COMPARISON:   Chest radiograph from yesterday, CT on 04/20/2022       HISTORY:   Acute shortness of breath. Abnormal chest radiograph. FINDINGS:   Mediastinum: Mild cardiomegaly. Extensive left coronary calcification. No   pericardial effusion. Thoracic aorta is normal caliber with mild   atherosclerotic disease. No lymphadenopathy. Thyroid and esophagus are   unremarkable. Lungs/pleura: Minimal pleural effusions with adjacent atelectasis. No   consolidation. No definite pulmonary edema. Upper Abdomen: Unremarkable. Soft Tissues/Bones: No acute abnormality. Impression   Mild cardiomegaly. No definite pulmonary edema. Minimal pleural effusions   with adjacent atelectasis. Medical Decision [id number]:     EXAMINATION:   ONE XRAY VIEW OF THE CHEST       8/11/2022 1:13 pm       COMPARISON:   08/08/2022, 04/27/2022       HISTORY:   ORDERING SYSTEM PROVIDED HISTORY: SOB   TECHNOLOGIST PROVIDED HISTORY:   SOB       FINDINGS:   Vascular congestion is noted without overt edema. No consolidation. No   significant effusion is identified. No pneumothorax. Mild cardiac   silhouette enlargement. Advanced arthropathy in the right shoulder. Impression   Vascular congestion without overt edema, similar compared to the prior exam.           Medical Decision Making-Other:     Note:  Labs, medications, radiologic studies were reviewed with personal review of films  Large amounts of data were reviewed  Discussed with nursing Staff, Discharge planner  Infection Control and Prevention measures reviewed  All prior entries were reviewed  Administer medications as ordered  Prognosis: Guarded  Discharge planning reviewed  Follow up as outpatient. Thank you for allowing us to participate in the care of this patient. Please call with questions.     Liban Godoy MD  Pager: [phone] - Office: [phone]

## 2022-08-14 NOTE — PROGRESS NOTES
Occupational Therapy  Facility/Department: [facility] AT THE [facility] MED SURG  Daily Treatment Note  NAME: Reji Watts  : 1955  MRN: [id number]    Date of Service: 2022    Discharge Recommendations:  Continue to assess pending progress, Subacute/Skilled Nursing Facility, Patient would benefit from continued therapy after discharge         Patient Diagnosis(es): The primary encounter diagnosis was Calculus in urethra. A diagnosis of Septicemia ([address] was also pertinent to this visit. Assessment    Activity Tolerance: Patient limited by fatigue;Patient limited by endurance  Discharge Recommendations: Continue to assess pending progress;Subacute/Skilled Nursing Facility; Patient would benefit from continued therapy after discharge      Plan   Plan  Times per Week: 7  Times per Day: Daily  Current Treatment Recommendations: Strengthening;Balance training; Safety education & training;Self-Care / ADL; Patient/Caregiver education & training; Endurance training;Functional mobility training;Neuromuscular re-education     Restrictions  Restrictions/Precautions  Restrictions/Precautions: Fall Risk;General Precautions;Isolation    Subjective   Subjective  Subjective: Pt lying in bed upon arrival. Pt agreed to participate in therapy session this date stating \"i will try a little bit in bed\". Pain: Pt stated \"I just feel drained and tired today\". Objective    Vitals              OT Exercises  Exercise Treatment: Pt tolerated BUE ther ex with 1# dumbbell x 7 planes x 15 reps x 1 set to increase UE strength and endurance in order to ease completion of ADL tasks. Pt requred RBs as needed secondary to fatigue. Safety Devices  Type of Devices: Call light within reach; Left in bed     Patient Education  Education Given To: Patient  Education Provided: Role of Therapy;Plan of Care  Education Method: Verbal  Barriers to Learning: None  Education Outcome: Verbalized understanding    Goals  Short Term Goals  Time Frame for Short term goals: 20 visits  Short Term Goal 1: Pt. will tolerate 10-15 mins of BUE ther ex/act while maintaining SpO2 WFL to increase ovrall strength/activity tolerance for functional tasks. Short Term Goal 2: Pt. will complete grooming/bathing/dressing tasks with SBA with use of EC techs PRN to increase ease/(I) with self care routine. Short Term Goal 3: Pt. will demo G safety awareness during functional ADL transfers/mobility with reduced risk for falls. Short Term Goal 4: Patient to be educated on d/c folder, AE/DME and EC/WS techniques to ensure safe and indep return home.        Therapy Time   Individual Concurrent Group Co-treatment   Time In 1369         Time Out 1201         Minutes 23                 CAMILA Soliz/MONICA

## 2022-08-14 NOTE — PLAN OF CARE
Problem: Discharge Planning  Goal: Discharge to home or other facility with appropriate resources  Outcome: Progressing  Flowsheets (Taken 8/13/2022 2318)  Discharge to home or other facility with appropriate resources:   Identify barriers to discharge with patient and caregiver   Identify discharge learning needs (meds, wound care, etc)     Problem: Pain  Goal: Verbalizes/displays adequate comfort level or baseline comfort level  Outcome: Progressing  Flowsheets (Taken 8/13/2022 2318)  Verbalizes/displays adequate comfort level or baseline comfort level:   Encourage patient to monitor pain and request assistance   Administer analgesics based on type and severity of pain and evaluate response   Implement non-pharmacological measures as appropriate and evaluate response   Assess pain using appropriate pain scale     Problem: Safety - Adult  Goal: Free from fall injury  Outcome: Progressing  Flowsheets (Taken 8/13/2022 2318)  Free From Fall Injury: Instruct family/caregiver on patient safety     Problem: Skin/Tissue Integrity  Goal: Absence of new skin breakdown  Description: 1. Monitor for areas of redness and/or skin breakdown  2. Assess vascular access sites hourly  3. Every 4-6 hours minimum:  Change oxygen saturation probe site  4. Every 4-6 hours:  If on nasal continuous positive airway pressure, respiratory therapy assess nares and determine need for appliance change or resting period. Outcome: Progressing  Note: [place] scale monitoring per protocol. Inspect skin for breakdown frequently. Encourage pt to make frequent large adjustments in position or assist patient with turning. Document all areas of breakdown.         Problem: ABCDS Injury Assessment  Goal: Absence of physical injury  Outcome: Progressing  Flowsheets (Taken 8/13/2022 2318)  Absence of Physical Injury: Implement safety measures based on patient assessment

## 2022-08-14 NOTE — PROGRESS NOTES
Patient in bed resting at this time. Vitals and assessment completed. Vitals stable. Patient was on 1L with spo2 in the high 90's Writer placed patient on room air with spo2 remaining around 95% for several minutes. Patient left on room air. Lungs clear/diminished. Patient stated that she was wet but did not want writer to assist in changing and stated she wanted a female. Nurse aide promptly notified and entered the room. Patient asked if she was able to go home instead of [address] [place] [address] if her antibiotic therapy is completed before her discharge. Writer stated that patient needs to continue working with therapy to gain strength. Patient agreed. Patient denies having needs or concerns. Call light within reach. Will continue to monitor.

## 2022-08-14 NOTE — PROGRESS NOTES
Patient in bed resting. Vitals and assessment completed. Vitals stable. Patient on 1L via nasal cannula with spo2 remaining in the mid 90's. Patient denies having needs or concerns. Writer rewrapped ACE wraps on BLE. Skin is dusky on BLE. Lungs clear but diminished. Patient refused to get to the chair this morning but was repositioned in bed. Patient denies having needs or concerns. Call light within reach. Will continue to monitor.

## 2022-08-14 NOTE — PROGRESS NOTES
Pt alert and oriented x4 resting comfortably in bed at this time. Vitals and assessment completed as charted. Pt denies any pain or shortness of breath. Denies any current needs. Call light is within reach. Will continue to monitor.

## 2022-08-14 NOTE — PROGRESS NOTES
[facility]          Department of Pharmacy   Pharmacy Renal Adjustment Note    Yeison Walter is a 74 y.o. female. Pharmacist assessment of renally cleared medications. Recent Labs     08/12/22  0505 08/13/22  0645 08/14/22  0550   CREATININE 1.88* 1.36* 1.25*     Estimated Creatinine Clearance: 52 mL/min (A) (based on SCr of 1.25 mg/dL (H)). Height:   Ht Readings from Last 1 Encounters:   08/09/22 5' 1\" (1.549 m)     Weight:  Wt Readings from Last 1 Encounters:   08/14/22 262 lb 9.1 oz (119.1 kg)       The following medication(s) have been adjusted based upon renal function:             Xarelto adjusted back to 20 mg po daily for Crcl > 50 ml/min.          Karthik Nolasco [facility],8/14/2022,11:18 AM

## 2022-08-15 LAB
ABSOLUTE EOS #: 0 K/UL (ref 0–0.44)
ABSOLUTE IMMATURE GRANULOCYTE: 0.07 K/UL (ref 0–0.3)
ABSOLUTE LYMPH #: 1.58 K/UL (ref 1.1–3.7)
ABSOLUTE MONO #: 0.53 K/UL (ref 0.1–1.2)
ANION GAP SERPL CALCULATED.3IONS-SCNC: 7 MMOL/L (ref 9–17)
BASOPHILS # BLD: 3 % (ref 0–2)
BASOPHILS ABSOLUTE: 0.2 K/UL (ref 0–0.2)
BUN BLDV-MCNC: 22 MG/DL (ref 8–23)
BUN/CREAT BLD: 19 (ref 9–20)
CALCIUM SERPL-MCNC: 8.3 MG/DL (ref 8.6–10.4)
CHLORIDE BLD-SCNC: 106 MMOL/L (ref 98–107)
CO2: 30 MMOL/L (ref 20–31)
CREAT SERPL-MCNC: 1.13 MG/DL (ref 0.5–0.9)
EOSINOPHILS RELATIVE PERCENT: 0 % (ref 1–4)
GFR AFRICAN AMERICAN: 58 ML/MIN
GFR NON-AFRICAN AMERICAN: 48 ML/MIN
GFR SERPL CREATININE-BSD FRML MDRD: ABNORMAL ML/MIN/{1.73_M2}
GFR SERPL CREATININE-BSD FRML MDRD: ABNORMAL ML/MIN/{1.73_M2}
GLUCOSE BLD-MCNC: 111 MG/DL (ref 70–99)
HCT VFR BLD CALC: 31 % (ref 36.3–47.1)
HEMOGLOBIN: 9.3 G/DL (ref 11.9–15.1)
IMMATURE GRANULOCYTES: 1 %
LYMPHOCYTES # BLD: 24 % (ref 24–43)
MCH RBC QN AUTO: 28.5 PG (ref 25.2–33.5)
MCHC RBC AUTO-ENTMCNC: 30 G/DL (ref 28.4–34.8)
MCV RBC AUTO: 95.1 FL (ref 82.6–102.9)
MONOCYTES # BLD: 8 % (ref 3–12)
MORPHOLOGY: ABNORMAL
MORPHOLOGY: ABNORMAL
NRBC AUTOMATED: 0 PER 100 WBC
PDW BLD-RTO: 14 % (ref 11.8–14.4)
PLATELET # BLD: 173 K/UL (ref 138–453)
PMV BLD AUTO: 10 FL (ref 8.1–13.5)
POTASSIUM SERPL-SCNC: 4.1 MMOL/L (ref 3.7–5.3)
RBC # BLD: 3.26 M/UL (ref 3.95–5.11)
SEG NEUTROPHILS: 64 % (ref 36–65)
SEGMENTED NEUTROPHILS ABSOLUTE COUNT: 4.22 K/UL (ref 1.5–8.1)
SODIUM BLD-SCNC: 143 MMOL/L (ref 135–144)
WBC # BLD: 6.6 K/UL (ref 3.5–11.3)

## 2022-08-15 PROCEDURE — 85025 COMPLETE CBC W/AUTO DIFF WBC: CPT

## 2022-08-15 PROCEDURE — 6370000000 HC RX 637 (ALT 250 FOR IP): Performed by: NURSE PRACTITIONER

## 2022-08-15 PROCEDURE — 97116 GAIT TRAINING THERAPY: CPT

## 2022-08-15 PROCEDURE — 6360000002 HC RX W HCPCS: Performed by: INTERNAL MEDICINE

## 2022-08-15 PROCEDURE — 6370000000 HC RX 637 (ALT 250 FOR IP): Performed by: INTERNAL MEDICINE

## 2022-08-15 PROCEDURE — 2580000003 HC RX 258: Performed by: UROLOGY

## 2022-08-15 PROCEDURE — 97110 THERAPEUTIC EXERCISES: CPT

## 2022-08-15 PROCEDURE — 97535 SELF CARE MNGMENT TRAINING: CPT

## 2022-08-15 PROCEDURE — 36415 COLL VENOUS BLD VENIPUNCTURE: CPT

## 2022-08-15 PROCEDURE — 94761 N-INVAS EAR/PLS OXIMETRY MLT: CPT

## 2022-08-15 PROCEDURE — 97530 THERAPEUTIC ACTIVITIES: CPT

## 2022-08-15 PROCEDURE — 1200000000 HC SEMI PRIVATE

## 2022-08-15 PROCEDURE — 99232 SBSQ HOSP IP/OBS MODERATE 35: CPT | Performed by: INTERNAL MEDICINE

## 2022-08-15 PROCEDURE — 6370000000 HC RX 637 (ALT 250 FOR IP): Performed by: FAMILY MEDICINE

## 2022-08-15 PROCEDURE — APPSS30 APP SPLIT SHARED TIME 16-30 MINUTES: Performed by: NURSE PRACTITIONER

## 2022-08-15 PROCEDURE — 80048 BASIC METABOLIC PNL TOTAL CA: CPT

## 2022-08-15 PROCEDURE — 2580000003 HC RX 258: Performed by: INTERNAL MEDICINE

## 2022-08-15 PROCEDURE — 6360000002 HC RX W HCPCS: Performed by: FAMILY MEDICINE

## 2022-08-15 RX ADMIN — Medication 1000 UNITS: at 07:47

## 2022-08-15 RX ADMIN — MEROPENEM 1000 MG: 1 INJECTION, POWDER, FOR SOLUTION INTRAVENOUS at 02:17

## 2022-08-15 RX ADMIN — CARVEDILOL 12.5 MG: 12.5 TABLET, FILM COATED ORAL at 07:47

## 2022-08-15 RX ADMIN — ZINC SULFATE 220 MG (50 MG) CAPSULE 100 MG: CAPSULE at 07:46

## 2022-08-15 RX ADMIN — RIVAROXABAN 20 MG: 20 TABLET, FILM COATED ORAL at 16:39

## 2022-08-15 RX ADMIN — OXYCODONE HYDROCHLORIDE AND ACETAMINOPHEN 250 MG: 500 TABLET ORAL at 07:46

## 2022-08-15 RX ADMIN — CARVEDILOL 12.5 MG: 12.5 TABLET, FILM COATED ORAL at 16:39

## 2022-08-15 RX ADMIN — MULTIPLE VITAMINS W/ MINERALS TAB 1 TABLET: TAB at 07:47

## 2022-08-15 RX ADMIN — NYSTATIN 500000 UNITS: 100000 SUSPENSION ORAL at 13:37

## 2022-08-15 RX ADMIN — ONDANSETRON 4 MG: 2 INJECTION INTRAMUSCULAR; INTRAVENOUS at 09:09

## 2022-08-15 RX ADMIN — SODIUM CHLORIDE, PRESERVATIVE FREE 10 ML: 5 INJECTION INTRAVENOUS at 07:47

## 2022-08-15 RX ADMIN — CETIRIZINE HYDROCHLORIDE 5 MG: 10 TABLET, FILM COATED ORAL at 07:46

## 2022-08-15 RX ADMIN — FUROSEMIDE 40 MG: 40 TABLET ORAL at 07:46

## 2022-08-15 RX ADMIN — NYSTATIN 500000 UNITS: 100000 SUSPENSION ORAL at 16:39

## 2022-08-15 RX ADMIN — SODIUM CHLORIDE, PRESERVATIVE FREE 10 ML: 5 INJECTION INTRAVENOUS at 21:37

## 2022-08-15 RX ADMIN — Medication 400 UNITS: at 07:47

## 2022-08-15 RX ADMIN — MEROPENEM 1000 MG: 1 INJECTION, POWDER, FOR SOLUTION INTRAVENOUS at 13:42

## 2022-08-15 RX ADMIN — OXYBUTYNIN CHLORIDE 15 MG: 5 TABLET, EXTENDED RELEASE ORAL at 07:47

## 2022-08-15 RX ADMIN — NYSTATIN 500000 UNITS: 100000 SUSPENSION ORAL at 07:47

## 2022-08-15 NOTE — PROGRESS NOTES
Physical Therapy  Facility/Department: [facility] AT THE [facility] MED SURG  Daily Treatment Note  NAME: Mike Newman  : 1955  MRN: [id number]  Date of Service: 8/15/2022  Discharge Recommendations:  Subacute/Skilled Nursing Facility   Patient Diagnosis(es): The primary encounter diagnosis was Calculus in urethra. A diagnosis of Septicemia ([address] was also pertinent to this visit. Assessment   Activity Tolerance: Patient limited by fatigue;Patient limited by endurance   Plan    Plan  Plan: 2 times a day 7 days a week  Current Treatment Recommendations: Strengthening; Functional mobility training;Transfer training;Gait training; Safety education & training;Patient/Caregiver education & training;Neuromuscular re-education   Restrictions  Restrictions/Precautions  Restrictions/Precautions: Fall Risk, General Precautions, Isolation  Required Braces or Orthoses?: No   Subjective    Subjective  Subjective: Pt. in bed upon arrival, stated she is feeling a little better than earlier when her stomach hurt. Pt. required Max encouragament to get up to chair and use BSC at this time. Pt. stated she was incontient and asked \" why can't you just change me in bed. \" Explained to pt. benefits to working with therapy and being able to be as INd as possible for safe return home. Pt. upset but willing to try.   Pain: denies  Orientation  Overall Orientation Status: Within Functional Limits   Objective   Bed Mobility Training  Bed Mobility Training: Yes  Overall Level of Assistance: Minimum assistance;Assist X1  Interventions: Demonstration;Verbal cues  Rolling: Minimum assistance  Supine to Sit: Minimum assistance  Scooting: Minimum assistance  Transfer Training  Transfer Training: Yes  Overall Level of Assistance: Minimum assistance;Assist X1  Interventions: Verbal cues (v/c for proper hand placment for safety)  Sit to Stand: Minimum assistance;Assist X1  Stand to Sit: Minimum assistance;Assist X1  Toilet Transfer: Minimum assistance;Assist X1  Gait Training  Gait Training: Yes  Gait  Overall Level of Assistance: Contact-guard assistance  Interventions: Verbal cues  Base of Support: Widened  Speed/Cheryl: Slow;Shuffled  Step Length: Left shortened;Right shortened  Distance (ft):  (5ftx1,10ftx1)  Assistive Device: Walker, rolling  Safety Devices  Type of Devices: Left in chair;Call light within reach;Nurse notified   Goals  Short Term Goals  Time Frame for Short term goals: 3 DAYS  Short term goal 1: CGA TRANSFERS  Short term goal 2: CGA GAIT FWW. Additional Goals?: No  Long Term Goals  Time Frame for Long term goals : 4 WEEKS  Long term goal 1: IND GAIT FWW,IND TRANSFERS. Patient Goals   Patient goals : RETURN HOME WITH ASSIST. Education  Patient Education  Education Given To: Patient  Education Provided: Role of Therapy;Plan of Care;Transfer Training  Education Provided Comments: Educated pt on importance of completing transfers/amb.   Education Method: Verbal  Barriers to Learning: None  Education Outcome: Verbalized understanding;Continued education needed  Therapy Time   Individual Concurrent Group Co-treatment   Time In 1028         Time Out 1052         Minutes Y[place] 23, PTA

## 2022-08-15 NOTE — FLOWSHEET NOTE
Sleeping in chair at bedside. Awake for medications. Offered to get up to [facility]. She states she already wet herself and was going now. Refused to sit on commode. Stood at chairside and wet brief and pad changed. Returned to chair. Call light within reach.  Continue to monitor

## 2022-08-15 NOTE — PROGRESS NOTES
Progress Note    SUBJECTIVE:    Patient seen for f/u of Sepsis due to urinary tract infection ([address] She is resting in bed in no distress. Afebrile. Off oxygen    ROS:   Constitutional: negative  for fevers, and negative for chills. Respiratory: negative for shortness of breath, negative for cough, and negative for wheezing  Cardiovascular: negative for chest pain, and negative for palpitations  Gastrointestinal: negative for abdominal pain, negative for nausea,negative for vomiting, negative for diarrhea, and negative for constipation     All other systems were reviewed with the patient and are negative unless otherwise stated in HPI      OBJECTIVE:      Vitals:   Vitals:    08/15/22 0030   BP: (!) 145/71   Pulse: 66   Resp: 18   Temp: 98.3 °F (36.8 °C)   SpO2: 91%     Weight: 262 lb (118.8 kg)   Height: 5' 1\" (154.9 cm)     Weight  Wt Readings from Last 3 Encounters:   08/15/22 262 lb (118.8 kg)   05/17/22 243 lb (110.2 kg)   05/17/22 243 lb (110.2 kg)     Body mass index is 49.5 kg/m². 24HR INTAKE/OUTPUT:      Intake/Output Summary (Last 24 hours) at 8/15/2022 0720  Last data filed at 8/14/2022 1200  Gross per 24 hour   Intake 550 ml   Output --   Net 550 ml     -----------------------------------------------------------------  Exam:    GEN:    Awake, alert and oriented x3. EYES:  EOMI, pupils equal   NECK: Supple. No lymphadenopathy. No carotid bruit  CVS:    Regular rhythm, 2/6 systolic murmur  PULM:  diminished but clear without wheezing, rales or rhonchi, no acute respiratory distress  ABD:    Bowels sounds normal.  Abdomen is soft. No distention. no tenderness to palpation. EXT:    lymph  edema bilaterally . No calf tenderness. NEURO: Moves all extremities. Motor and sensory are grossly intact  SKIN:  No rashes.   No skin lesions.    -----------------------------------------------------------------    Diagnostic Data:      Complete Blood Count:   Recent Labs     08/13/22  0645 08/15/22  0550 WBC 7.0 6.6   RBC 3.19* 3.26*   HGB 9.2* 9.3*   HCT 30.3* 31.0*   MCV 95.0 95.1   MCH 28.8 28.5   MCHC 30.4 30.0   RDW 14.1 14.0   PLT See Reflexed IPF Result 173   MPV  --  10.0        Last 3 Blood Glucose:   Recent Labs     08/13/22  0645 08/14/22  0550 08/15/22  0550   GLUCOSE 115* 123* 111*        Comprehensive Metabolic Profile:   Recent Labs     08/13/22  0645 08/14/22  0550 08/15/22  0550    144 143   K 4.2 4.3 4.1   * 108* 106   CO2 25 30 30   BUN 28* 29* 22   CREATININE 1.36* 1.25* 1.13*   GLUCOSE 115* 123* 111*   CALCIUM 8.2* 8.7 8.3*        Urinalysis:   Lab Results   Component Value Date/Time    NITRU NEGATIVE 08/08/2022 07:14 PM    COLORU Red 08/08/2022 07:14 PM    PHUR 6.5 08/08/2022 07:14 PM    WBCUA GREATER THAN 100 08/08/2022 07:14 PM    RBCUA GREATER THAN 100 08/08/2022 07:14 PM    MUCUS NOT REPORTED 01/02/2022 03:30 PM    TRICHOMONAS NOT REPORTED 01/02/2022 03:30 PM    YEAST NOT REPORTED 01/02/2022 03:30 PM    BACTERIA FEW 04/21/2022 03:33 AM    SPECGRAV 1.020 08/08/2022 07:14 PM    LEUKOCYTESUR LARGE 08/08/2022 07:14 PM    UROBILINOGEN Normal 08/08/2022 07:14 PM    BILIRUBINUR NEGATIVE 08/08/2022 07:14 PM    GLUCOSEU NEGATIVE 08/08/2022 07:14 PM    KETUA NEGATIVE 08/08/2022 07:14 PM    AMORPHOUS NOT REPORTED 01/02/2022 03:30 PM       HgBA1c:    Lab Results   Component Value Date/Time    LABA1C 6.2 01/06/2022 06:30 AM       Lactic Acid:   Lab Results   Component Value Date/Time    LACTA 4.4 04/20/2022 08:45 PM    LACTA 0.8 01/03/2022 09:52 PM    LACTA 0.9 01/02/2022 04:46 PM      Radiology/Imaging:  XR CHEST PORTABLE   Final Result   Vascular congestion without overt edema, similar compared to the prior exam.         CT CHEST WO CONTRAST   Final Result   Mild cardiomegaly. No definite pulmonary edema. Minimal pleural effusions   with adjacent atelectasis.          FLUORO FOR SURGICAL PROCEDURES   Final Result      CT ABDOMEN PELVIS WO CONTRAST Additional Contrast? None   Final Result   1. Multiple left ureteral stones with a large obstructing stone at the   ureteropelvic junction. There is left hydronephrosis. 2. Right ureteral stent in expected position. Nonobstructing right   nephrolithiasis. 3. Diverticulosis without diverticulitis. 4. Cholelithiasis without pericholecystic fluid to suggest cholecystitis. 5. Small bilateral pleural effusions with adjacent consolidation, likely   atelectasis. XR CHEST PORTABLE   Final Result   Findings most consistent with pulmonary edema. Manifestation of pneumonia,   possibly an atypical or viral pneumonia not excluded. Correlate clinically.                ASSESSMENT / PLAN:  Sepsis due to urinary tract infection ([address]  Continue current therapy   Appreciate urology  Monitor urine culture-Kleb PN and Ecoli  Stop IV fluids  IV Rocephin   Midline  Cystoscopy with stent placement 8/9  Bacteremia due to 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

## 2022-08-15 NOTE — PROGRESS NOTES
Contact-guard assistance  Interventions: Verbal cues  Base of Support: Widened  Speed/Cheryl: Slow;Shuffled  Step Length: Left shortened;Right shortened  Distance (ft):  (20ftx2)  Assistive Device: Walker, rolling  PT Exercises  Exercise Treatment: Reclined and seated exercises B LE x15  Other Specialty Interventions  Other Treatments/Modalities: commode use and transfer  Safety Devices  Type of Devices: Left in chair;Call light within reach;Nurse notified   Goals  Short Term Goals  Time Frame for Short term goals: 3 DAYS  Short term goal 1: CGA TRANSFERS  Short term goal 2: CGA GAIT FWW. Additional Goals?: No  Long Term Goals  Time Frame for Long term goals : 4 WEEKS  Long term goal 1: IND GAIT FWW,IND TRANSFERS. Patient Goals   Patient goals : RETURN HOME WITH ASSIST. Education  Patient Education  Education Given To: Patient  Education Provided: Role of Therapy;Plan of Care;Transfer Training  Education Provided Comments: Educated pt on importance of completing transfers/amb.   Education Method: Verbal  Barriers to Learning: None  Education Outcome: Verbalized understanding;Continued education needed  Therapy Time   Individual Concurrent Group Co-treatment   Time In 1400         Time Out 1438         Minutes 611 Sean GREEN, PTA

## 2022-08-15 NOTE — PLAN OF CARE
Problem: Discharge Planning  Goal: Discharge to home or other facility with appropriate resources  Outcome: Progressing     Problem: Pain  Goal: Verbalizes/displays adequate comfort level or baseline comfort level  Outcome: Progressing     Problem: Safety - Adult  Goal: Free from fall injury  Outcome: Progressing     Problem: Skin/Tissue Integrity  Goal: Absence of new skin breakdown  Outcome: Progressing     Problem: Nutrition Deficit:  Goal: Optimize nutritional status  Outcome: Progressing     Problem: ABCDS Injury Assessment  Goal: Absence of physical injury  Outcome: Progressing     Problem: Chronic Conditions and Co-morbidities  Goal: Patient's chronic conditions and co-morbidity symptoms are monitored and maintained or improved  Outcome: Progressing

## 2022-08-15 NOTE — PROGRESS NOTES
Occupational Therapy  Facility/Department: [facility] AT THE [facility] MED SURG  Daily Treatment Note  NAME: Brandon Jimenez  : 1955  MRN: [id number]    Date of Service: 8/15/2022    Discharge Recommendations:  Continue to assess pending progress, Subacute/Skilled Nursing Facility, Patient would benefit from continued therapy after discharge         Patient Diagnosis(es): The primary encounter diagnosis was Calculus in urethra. A diagnosis of Septicemia ([facility] [address] was also pertinent to this visit. Assessment    Activity Tolerance: Patient limited by fatigue;Patient limited by endurance; Patient tolerated treatment well  Discharge Recommendations: Continue to assess pending progress;Subacute/Skilled Nursing Facility; Patient would benefit from continued therapy after discharge      Plan   Plan  Times per Week: 7  Times per Day: Daily  Current Treatment Recommendations: Strengthening;Balance training; Safety education & training;Self-Care / ADL; Patient/Caregiver education & training; Endurance training;Functional mobility training;Neuromuscular re-education     Restrictions  Restrictions/Precautions  Restrictions/Precautions: Fall Risk;General Precautions;Isolation    Subjective   Subjective  Subjective: Pt sitting up in bedside chair upon arrival. pt agreed to participate in therapy session however declined ADLs. Pain: Pt had no complaints of pain.   Orientation  Overall Orientation Status: Within Functional Limits  Pain: denies           Objective    Vitals     Bed Mobility Training  Bed Mobility Training: No  Overall Level of Assistance: Minimum assistance;Assist X1  Interventions: Demonstration;Verbal cues  Rolling: Minimum assistance  Supine to Sit: Minimum assistance  Scooting: Minimum assistance  Transfer Training  Transfer Training: Yes  Overall Level of Assistance: Minimum assistance;Assist X1  Interventions: Verbal cues (hand placement for transfers)  Sit to Stand: Minimum assistance;Assist X1  Stand to Sit: Minimum assistance;Assist X1  Toilet Transfer: Minimum assistance;Assist X1  Gait Training  Gait Training: Yes  Gait  Overall Level of Assistance: Contact-guard assistance  Interventions: Verbal cues  Base of Support: Widened  Speed/Cheryl: Slow;Shuffled  Step Length: Left shortened;Right shortened  Distance (ft):  (20ftx2)  Assistive Device: Walker, rolling     ADL  Toileting: Maximum assistance (Max A to complete nathalia care and change brief. Pt's brief and bed pad saturated of urine.)  Additional Comments: Pt educated on calling out when needing to use the bathroom to prevent infection and pt verbalized understanding. OT Exercises  Exercise Treatment: Pt tolerated BUE ther ex with 1# dumbbell x 7 planes x 15 reps x 1 set to increase UE strength and endurance in order to ease completion of ADL tasks. Pt requred RBs as needed secondary to fatigue. Safety Devices  Type of Devices: Left in chair;Call light within reach;Nurse notified     Patient Education  Education Given To: Patient  Education Provided: Role of Therapy;Plan of Care;Transfer Training  Education Method: Verbal  Barriers to Learning: None  Education Outcome: Verbalized understanding    Goals  Short Term Goals  Time Frame for Short term goals: 20 visits  Short Term Goal 1: Pt. will tolerate 10-15 mins of BUE ther ex/act while maintaining SpO2 WFL to increase ovrall strength/activity tolerance for functional tasks. Short Term Goal 2: Pt. will complete grooming/bathing/dressing tasks with SBA with use of EC techs PRN to increase ease/(I) with self care routine. Short Term Goal 3: Pt. will demo G safety awareness during functional ADL transfers/mobility with reduced risk for falls. Short Term Goal 4: Patient to be educated on d/c folder, AE/DME and EC/WS techniques to ensure safe and indep return home.        Therapy Time   Individual Concurrent Group Co-treatment   Time In 4150         Time Out 1436         Minutes 31                 Brianne Haywood JENSEN/L

## 2022-08-15 NOTE — FLOWSHEET NOTE
PT in room to work with patient. Patient states she peed her pants and just wants to stay in bed and have her brief changed. PT assisted patient to [facility]. Wet brief changed. Angry with PT. States she doesn't feel good and didn't want to get up. Call light within reach.  Continue to monitor

## 2022-08-15 NOTE — FLOWSHEET NOTE
Awake, resting in bed. Vitals checked and assessment completed. Denies pain this morning. No cough noted. O2 SATs 92 % on room air. Call light within reach.  Continue to monitor

## 2022-08-15 NOTE — PROGRESS NOTES
[facility]   Nephrology progress note  8/15/2022, 12:12 PM      Pt Name:    Joon Adkins  MRN:     [id number]     YOB: 1955  Admit Date:    8/8/2022  5:32 PM    TELEHEALTH EVALUATION -- Audio/Visual (During HOTBE-09 public health emergency)     Telehealth service was provided with the patient at her room [address] [facility] and myself the physician in my [facility] office and COREY Ponce who has initiated the visit. Pursuant to the emergency declaration under the [facility] waiver authority and the [employer] Act, this Virtual  Visit was conducted, with patient's consent, to reduce the patient's risk of exposure to COVID-19 and provide continuity of care for an established patient. Services were provided through a video synchronous discussion virtually to substitute for in-person clinic visit. Chief Complaint: Nephrology following for BARBARA/CKD . Subjective:  Patient seen and examined  No chest pain no significant shortness of breath  Good urine output  She is on room air    Objective:  24HR INTAKE/OUTPUT:    Intake/Output Summary (Last 24 hours) at 8/15/2022 1212  Last data filed at 8/15/2022 0900  Gross per 24 hour   Intake 200 ml   Output --   Net 200 ml        Admission weight: 257 lb 12.8 oz (116.9 kg)  Wt Readings from Last 3 Encounters:   08/15/22 262 lb (118.8 kg)   05/17/22 243 lb (110.2 kg)   05/17/22 243 lb (110.2 kg)        Vitals :   Vitals:    08/14/22 1849 08/15/22 0030 08/15/22 0558 08/15/22 0742   BP: 104/87 (!) 145/71  (!) 165/90   Pulse: 74 66  63   Resp: 16 18  20   Temp: 98 °F (36.7 °C) 98.3 °F (36.8 °C)  97.8 °F (36.6 °C)   TempSrc: Temporal Temporal  Temporal   SpO2: 94% 91%  92%   Weight:   262 lb (118.8 kg)    Height:           Physical examination: limited due to covid 19  General Appearance:  Well developed.  No distress  Mouth/Throat:  Oral mucosa moist  Neck: No accessory muscle use  Lungs:  Breath sounds: clear but diminished per RN  Heart[de-identified]  S1,S2 heard  Abdomen:  Soft, non - tender  Musculoskeletal:  Edema -chronic edema noted bilateral lower extremities    Medications:  Infusion:    sodium chloride       Meds:    rivaroxaban  20 mg Oral Q24H    furosemide  40 mg Oral Daily    meropenem  1,000 mg IntraVENous Q12H    nystatin  5 mL Oral 4x Daily    zinc sulfate  100 mg Oral Daily    carvedilol  12.5 mg Oral BID WC    vitamin E  400 Units Oral Daily    sodium chloride flush  5-40 mL IntraVENous 2 times per day    vitamin C  250 mg Oral Daily    cetirizine  5 mg Oral Daily    therapeutic multivitamin-minerals  1 tablet Oral Daily    oxybutynin  15 mg Oral Daily    Vitamin D  1,000 Units Oral Daily       Lab Data :  CBC:   Recent Labs     08/13/22  0645 08/15/22  0550   WBC 7.0 6.6   HGB 9.2* 9.3*   HCT 30.3* 31.0*   PLT See Reflexed IPF Result 173       CMP:  Recent Labs     08/13/22  0645 08/14/22  0550 08/15/22  0550    144 143   K 4.2 4.3 4.1   * 108* 106   CO2 25 30 30   BUN 28* 29* 22   CREATININE 1.36* 1.25* 1.13*   GLUCOSE 115* 123* 111*   CALCIUM 8.2* 8.7 8.3*       Hepatic:   No results for input(s): LABALBU, AST, ALT, ALB, BILITOT, ALKPHOS in the last 72 hours. Baseline Creatinine: 0.8     Assessment and Plan:  Renal -acute kidney injury, most likely multifactorial etiology but primarily due to obstructive etiology combined with borderline blood pressures  Initially creatinine has worsened and subsequently started to improve once the diuretics have started  Patient's volume status is looking well in fact better  Continue. P.o.  Lasix additional doses of Lasix if needed  Creatinine is down to 1.13     Electrolytes appear to be within normal limits  Mild metabolic acidosis secondary to acute kidney injury improved  Left-sided hydronephrosis status post stent placement 8/9/2022  Possible UTI , on antibiotics  COVID-19 infection  Meds reviewed and discussed with patient and RN    Renal issues almost resolved will sign off please call with any questions or concerns or if situation changes    Juli Barros MD  [facility]    This report has been created using voice recognition software.  It may contain minor errors which are inherent in voice recognition technology

## 2022-08-15 NOTE — PROGRESS NOTES
[facility] [address] [facility] Patient Telemedicine  Today's Date and Time: 8/15/2022, 11:28 AM    Impression :     COVID 19 Confirmed Infection  Previously vaccinated individual:  4-28--21 Luis Flaherty  5-26-21 Luis Flaherty  2-1-22 Moderna  Covid tests:  8-8-22; negative   8-12-22: Positive  High CRP  CHF with high Pro BNP  BARBARA with Lt side hydronephrosis  S/P stent placement 8-8-22  Urine 8-8-22 with Klebsiella and E coli ESBL +  Klebsiella septicemia 8-8-22 x 2  Recommendations:   Antibiotic treatment:  Meropenem 1000 mg IV q 12 hr x 5-7 days. Stop date 8-17-22. Or Ertapenem 1000 mg once daily for 5-7 days for E coli ESBL + in urine and Klebsiella in blood   Covid Rx:    Remdesivir. Not indicated- BARBARA  Decadron: Contra indicated at this stage  Actemra: Contra indicated at this stage  Monoclonal antibodies: Bebtelovimab infused 8-12-22      Medical Decision Making/Summary/Discussion:8/15/2022     Patient admitted with Klebsiella and E coli ESBL + UTI with associated Klebsiella septicemia  Initial testing for Covid 19 was negative  However, repeat testing showed acute COVID 19 infection despite three prior vaccines  Patient treated with Bebtelovimab with good response Covid wise  Treated with meropenem for Klebsiella and E coli ESBL + UTI with associated Klebsiella septicemia  Patient improving with treatment. Renal function improved as of 8-14-22.     Infection Control Recommendations   [place] Precautions  Airborne isolation  Droplet Isolation  Isolate until 8-22-22  Contact isolation for E coli ESBL +    Antimicrobial Stewardship Recommendations     Simplification of therapy  Targeted therapy    Coordination of Outpatient Care:   Estimated Length of IV antimicrobials:8-17-22  Patient will need Midline Catheter Insertion: TBD  Patient will need PICC line Insertion: No  Patient will need: Home IV , [place],  SNF,  LTAC:TBD  Patient will need outpatient wound care:No    Chief complaint/reason for consultation:   Concern for COVID infection  UTI with Klebsiella and E coli ESBL +  Klebsiella septicemia      History of Present Illness:   Ephraim Leigh is a 74y.o.-year-old  female who was initially admitted on 8/8/2022. Patient seen at the request of Dr. Juan Hampton. INITIAL HISTORY:    Patient presented through ER with complaints of SOB and flank pain. She was found to be febrile, tachycardic, tachypneic, hypotensive, in atrial fibrillation. CXR showed vascular congestion. She gave a Hx of renal stones. Catheterized Urine culture grew Klebsiella and E coli ESBL +    Initial SARS Co-V2 was negative on 8-8-22 but second test was positive on 8-12-22       Patient admitted because of concerns with sepsis. Treated for UTI with ceftriaxone. Meropenem started 8-12-22 because of ESBL + E coli    Has received Bebtelovimab for Covid 19. Patient is on early viral multiplication phase. CURRENT EVALUATION : 8/15/2022    Afebrile  VS stable    Patient is stable but is experiencing some intermittent nausea. She is now on room air. Patient exhibiting respiratory distress. No  Respiratory secretions: No    Patient receiving supplemental oxygen. Nasal 1 L/min--> Room air  RR 20  02 sat 92    Overall Daily Picture:   Improving    Presence of secondary bacterial Infection:   Yes Klebsiella and E coli ESBL + UTI, Klebsiella septicemia  Additional antibiotics: Meropenem or Ertapenem    Labs, X rays reviewed: 8/15/2022    BUN: 33-->28-->29-->22  Cr: 1.88-->1.36-->1.25-->1.13    WBC: 12.5-->7.0-->6.6  Hb: 8.8-->9.2-->9.3  Plat: 98-->112-->173    Absolute Neutrophils: 10.2  Absolute Lymphocytes:1.5  Neutrophil/Lymphocyte Ratio: 7 High risk     CRP: 101-->49.7-->24.3  Ferritin:  LDH:     Pro Calcitonin:  Pro BNP 17,749    Cultures:  Urine:    Blood:    Sputum :    Wound:      CXR:   8-11-22: Vascular congestion without overt edema, similar compared to the prior exam.  CAT:  8-9-22; Mild cardiomegaly. No definite pulmonary edema. , small effusions    Discussed with RNOLLIE.    8-11-22:       8-9-22:          I have personally reviewed the past medical history, past surgical history, medications, social history, and family history, and I have updated the database accordingly.   Past Medical History:     Past Medical History:   Diagnosis Date    Acute kidney injury superimposed on CKD ([address] 8/10/2022    Atrial fibrillation with RVR ([address] 8/9/2022    Carcinoma ([address]     Squamous Cell    Cellulitis     COVID-19 virus infection 8/12/2022    DVT (deep venous thrombosis) ([address] 2006    LLE    Kidney stone     Lymphedema     Morbid obesity ([address]     Psoas abscess, right ([address]     Pyelonephritis     Wears glasses        Past Surgical  History:     Past Surgical History:   Procedure Laterality Date    CARPAL TUNNEL RELEASE  1990s    CT ABSCESS DRAIN SUBCUTANEOUS  01/10/2022    CT ABSCESS DRAIN SUBCUTANEOUS 1/10/2022 [facility] CT SCAN    CT ABSCESS DRAIN SUBCUTANEOUS  04/21/2022    CT ABSCESS DRAIN SUBCUTANEOUS 4/21/2022 [facility] CT SCAN    CT ABSCESS DRAIN SUBCUTANEOUS  4/29/2022    CT ABSCESS DRAIN SUBCUTANEOUS 4/29/2022 [facility] CT SCAN    CYSTOSCOPY N/A 01/04/2022    CYSTOSCOPY URETERAL STENT INSERTION performed by Daksha Aquino MD at [address] Right     HLL with stent    CYSTOSCOPY Right 03/01/2022    CYSTOSCOPY URETEROSCOPY LASER-WTIH HLL performed by Daksha Aquino MD at [address] Right 03/01/2022    CYSTOSCOPY URETERAL STENT Ashley Ganja performed by Daksha Aquino MD at [address] Right 04/05/2022    Dr Lucille Santacruz with stent insertion    CYSTOSCOPY Right 04/05/2022    CYSTOSCOPY URETEROSCOPY LASER-HLL performed by Daksha Aquino MD at . [address] Right 04/05/2022    CYSTOSCOPY URETERAL STENT Ashley Ganja performed by Daksha Aquino MD at . [address] Right 04/22/2022    CYSTOSCOPY URETERAL STENT INSERTION (Right )    CYSTOSCOPY Right 2022    CYSTOSCOPY URETERAL STENT INSERTION performed by Waleska Knox MD at [facility] Left 2022    CYSTOSCOPY URETERAL STENT INSERTION performed by Salome Noonan MD at [address] [place] [address].  PICC [facility] SINGLE  2022         INSERT MIDLINE CATHETER  2022         IR NEPHROSTOMY PERCUTANEOUS RIGHT  2022    IR NEPHROSTOMY PERCUTANEOUS RIGHT 2022 Nathalie Bauer MD [facility]    ANNABELLA AND BSO (CERVIX REMOVED)      2019    TUBAL LIGATION      TUBAL LIGATION      WISDOM TOOTH EXTRACTION         Medications:      rivaroxaban  20 mg Oral Q24H    furosemide  40 mg Oral Daily    meropenem  1,000 mg IntraVENous Q12H    nystatin  5 mL Oral 4x Daily    zinc sulfate  100 mg Oral Daily    carvedilol  12.5 mg Oral BID WC    vitamin E  400 Units Oral Daily    sodium chloride flush  5-40 mL IntraVENous 2 times per day    vitamin C  250 mg Oral Daily    cetirizine  5 mg Oral Daily    therapeutic multivitamin-minerals  1 tablet Oral Daily    oxybutynin  15 mg Oral Daily    Vitamin D  1,000 Units Oral Daily       Social History:     Social History     Socioeconomic History    Marital status:      Spouse name: Not on file    Number of children: Not on file    Years of education: Not on file    Highest education level: Not on file   Occupational History    Not on file   Tobacco Use    Smoking status: Former     Packs/day: 0.50     Years: 42.00     Pack years: 21.00     Types: Cigarettes     Quit date: 2022     Years since quittin.3    Smokeless tobacco: Never   Vaping Use    Vaping Use: Never used   Substance and Sexual Activity    Alcohol use: No     Alcohol/week: 0.0 standard drinks    Drug use: No    Sexual activity: Not Currently   Other Topics Concern    Not on file   Social History Narrative    Not on file     Social Determinants of Health     Financial Resource Strain: Not on file   Food Insecurity: Not on file Transportation Needs: Not on file   Physical Activity: Not on file   Stress: Not on file   Social Connections: Not on file   Intimate Partner Violence: Not on file   Housing Stability: Not on file       Family History:     Family History   Adopted: Yes   Problem Relation Age of Onset    Cancer Mother         The patient reports her biologic mother did have bladder cancer        Allergies:   Patient has no known allergies. Review of Systems:       Constitutional: No fevers or chills. Weak  Head: No headaches  Eyes: No double vision or blurry vision. No conjunctival inflammation. ENT: No sore throat or runny nose. . No hearing loss, tinnitus or vertigo. Cardiovascular: No chest pain or palpitations. Shortness of breath. POPE  Lung: Shortness of breath, cough. No sputum production  Abdomen: No nausea, vomiting, diarrhea, or abdominal pain. [employer] No cramps. Genitourinary: No increased urinary frequency, or dysuria. No hematuria. No suprapubic or CVA pain  Musculoskeletal: No muscle aches or pains. No joint effusions, swelling or deformities  Hematologic: No bleeding or bruising. Neurologic: No headache, weakness, numbness, or tingling. Integument: No rash, no ulcers. Psychiatric: No depression. Endocrine: No polyuria, no polydipsia, no polyphagia. Physical Examination :   Patient Vitals for the past 8 hrs:   BP Temp Temp src Pulse Resp SpO2 Weight   08/15/22 0742 (!) 165/90 97.8 °F (36.6 °C) Temporal 63 20 92 % --   08/15/22 0558 -- -- -- -- -- -- 262 lb (118.8 kg)     General Appearance: Awake, alert, and in no apparent distress  Head:  Normocephalic, no trauma  Eyes: Pupils equal, round, reactive to light; sclera anicteric; conjunctivae pink. No embolic phenomena. ENT: Oropharynx clear, without erythema, exudate, or thrush. No tenderness of sinuses. Mouth/throat: mucosa pink and moist. No lesions. Dentition in good repair. Neck:Supple, without lymphadenopathy. Thyroid normal, No bruits.   Pulmonary/Chest: Decreased breath sounds  Cardiovascular: Regular rate and rhythm without murmurs, rubs, or gallops. Abdomen: Soft, non tender. Bowel sounds normal. No organomegaly  All four Extremities: No cyanosis, clubbing, edema, or effusions. Neurologic: No gross sensory or motor deficits. Skin: Warm and dry with good turgor. No signs of peripheral arterial or venous insufficiency. No ulcerations. No open wounds. Medical Decision Making -Laboratory:   I have independently reviewed/ordered the following labs:    CBC with Differential:   Recent Labs     08/13/22  0645 08/15/22  0550   WBC 7.0 6.6   HGB 9.2* 9.3*   HCT 30.3* 31.0*   PLT See Reflexed IPF Result 173   LYMPHOPCT 17* 24   MONOPCT 9 8     BMP:   Recent Labs     08/14/22  0550 08/15/22  0550    143   K 4.3 4.1   * 106   CO2 30 30   BUN 29* 22   CREATININE 1.25* 1.13*     Hepatic Function Panel:   No results for input(s): PROT, LABALBU, BILIDIR, IBILI, BILITOT, ALKPHOS, ALT, AST in the last 72 hours. No results for input(s): RPR in the last 72 hours. No results for input(s): HIV in the last 72 hours. No results for input(s): BC in the last 72 hours. Lab Results   Component Value Date/Time    MUCUS NOT REPORTED 01/02/2022 03:30 PM    RBC 3.26 08/15/2022 05:50 AM    TRICHOMONAS NOT REPORTED 01/02/2022 03:30 PM    WBC 6.6 08/15/2022 05:50 AM    YEAST NOT REPORTED 01/02/2022 03:30 PM    TURBIDITY Cloudy 08/08/2022 07:14 PM     Lab Results   Component Value Date/Time    CREATININE 1.13 08/15/2022 05:50 AM    GLUCOSE 111 08/15/2022 05:50 AM       Medical Decision Making-Imaging:     EXAMINATION:   CT OF THE CHEST WITHOUT CONTRAST 8/9/2022 8:42 pm       TECHNIQUE:   CT of the chest was performed without the administration of intravenous   contrast. Multiplanar reformatted images are provided for review.  Automated   exposure control, iterative reconstruction, and/or weight based adjustment of   the mA/kV was utilized to reduce the radiation dose to as low as reasonably   achievable. COMPARISON:   Chest radiograph from yesterday, CT on 04/20/2022       HISTORY:   Acute shortness of breath. Abnormal chest radiograph. FINDINGS:   Mediastinum: Mild cardiomegaly. Extensive left coronary calcification. No   pericardial effusion. Thoracic aorta is normal caliber with mild   atherosclerotic disease. No lymphadenopathy. Thyroid and esophagus are   unremarkable. Lungs/pleura: Minimal pleural effusions with adjacent atelectasis. No   consolidation. No definite pulmonary edema. Upper Abdomen: Unremarkable. Soft Tissues/Bones: No acute abnormality. Impression   Mild cardiomegaly. No definite pulmonary edema. Minimal pleural effusions   with adjacent atelectasis. Medical Decision Qbwkqs-[place]-Eyshi:     EXAMINATION:   ONE XRAY VIEW OF THE CHEST       8/11/2022 1:13 pm       COMPARISON:   08/08/2022, 04/27/2022       HISTORY:   ORDERING SYSTEM PROVIDED HISTORY: SOB   TECHNOLOGIST PROVIDED HISTORY:   SOB       FINDINGS:   Vascular congestion is noted without overt edema. No consolidation. No   significant effusion is identified. No pneumothorax. Mild cardiac   silhouette enlargement. Advanced arthropathy in the right shoulder. Impression   Vascular congestion without overt edema, similar compared to the prior exam.           Medical Decision Making-Other:     Note:  Labs, medications, radiologic studies were reviewed with personal review of films  Large amounts of data were reviewed  Discussed with nursing Staff, Discharge planner  Infection Control and Prevention measures reviewed  All prior entries were reviewed  Administer medications as ordered  Prognosis: Guarded  Discharge planning reviewed      Thank you for allowing us to participate in the care of this patient. Please call with questions.     DONG Guillaume - CNP    ATTESTATION:    I have discussed the case, including pertinent history and exam findings with the APRN. I have evaluated the  History, physical findings and pictures of the patient and the key elements of the encounter have been performed by me. I have reviewed the laboratory data, other diagnostic studies and discussed them with the APRN. I have updated the medical record where necessary. I agree with the assessment, plan and orders as documented by the APRN.     Elie Rehman MD.    Pager: [phone] - Office: [phone]

## 2022-08-16 ENCOUNTER — APPOINTMENT (OUTPATIENT)
Dept: GENERAL RADIOLOGY | Age: 67
DRG: 853 | End: 2022-08-16
Payer: MEDICARE

## 2022-08-16 VITALS
WEIGHT: 262.1 LBS | OXYGEN SATURATION: 95 % | HEART RATE: 75 BPM | HEIGHT: 61 IN | RESPIRATION RATE: 17 BRPM | TEMPERATURE: 96.8 F | DIASTOLIC BLOOD PRESSURE: 86 MMHG | BODY MASS INDEX: 49.49 KG/M2 | SYSTOLIC BLOOD PRESSURE: 142 MMHG

## 2022-08-16 DIAGNOSIS — A40.9 SEPSIS DUE TO STREPTOCOCCUS SPECIES WITHOUT ACUTE ORGAN DYSFUNCTION (HCC): ICD-10-CM

## 2022-08-16 LAB
ABSOLUTE EOS #: 0.19 K/UL (ref 0–0.44)
ABSOLUTE IMMATURE GRANULOCYTE: 0.03 K/UL (ref 0–0.3)
ABSOLUTE LYMPH #: 1.95 K/UL (ref 1.1–3.7)
ABSOLUTE MONO #: 0.59 K/UL (ref 0.1–1.2)
ANION GAP SERPL CALCULATED.3IONS-SCNC: 6 MMOL/L (ref 9–17)
BASOPHILS # BLD: 0 % (ref 0–2)
BASOPHILS ABSOLUTE: <0.03 K/UL (ref 0–0.2)
BUN BLDV-MCNC: 26 MG/DL (ref 8–23)
BUN/CREAT BLD: 25 (ref 9–20)
CALCIUM SERPL-MCNC: 8.4 MG/DL (ref 8.6–10.4)
CHLORIDE BLD-SCNC: 106 MMOL/L (ref 98–107)
CO2: 31 MMOL/L (ref 20–31)
CREAT SERPL-MCNC: 1.05 MG/DL (ref 0.5–0.9)
EOSINOPHILS RELATIVE PERCENT: 3 % (ref 1–4)
GFR AFRICAN AMERICAN: >60 ML/MIN
GFR NON-AFRICAN AMERICAN: 52 ML/MIN
GFR SERPL CREATININE-BSD FRML MDRD: ABNORMAL ML/MIN/{1.73_M2}
GFR SERPL CREATININE-BSD FRML MDRD: ABNORMAL ML/MIN/{1.73_M2}
GLUCOSE BLD-MCNC: 109 MG/DL (ref 70–99)
HCT VFR BLD CALC: 30.9 % (ref 36.3–47.1)
HEMOGLOBIN: 9.4 G/DL (ref 11.9–15.1)
IMMATURE GRANULOCYTES: 1 %
LYMPHOCYTES # BLD: 30 % (ref 24–43)
MCH RBC QN AUTO: 29.4 PG (ref 25.2–33.5)
MCHC RBC AUTO-ENTMCNC: 30.4 G/DL (ref 28.4–34.8)
MCV RBC AUTO: 96.6 FL (ref 82.6–102.9)
MONOCYTES # BLD: 9 % (ref 3–12)
NRBC AUTOMATED: 0 PER 100 WBC
PDW BLD-RTO: 14.1 % (ref 11.8–14.4)
PLATELET # BLD: 212 K/UL (ref 138–453)
PMV BLD AUTO: 10.1 FL (ref 8.1–13.5)
POTASSIUM SERPL-SCNC: 4.1 MMOL/L (ref 3.7–5.3)
PRO-BNP: 4224 PG/ML
RBC # BLD: 3.2 M/UL (ref 3.95–5.11)
SEG NEUTROPHILS: 57 % (ref 36–65)
SEGMENTED NEUTROPHILS ABSOLUTE COUNT: 3.66 K/UL (ref 1.5–8.1)
SODIUM BLD-SCNC: 143 MMOL/L (ref 135–144)
TROPONIN, HIGH SENSITIVITY: 23 NG/L (ref 0–14)
WBC # BLD: 6.4 K/UL (ref 3.5–11.3)

## 2022-08-16 PROCEDURE — 36415 COLL VENOUS BLD VENIPUNCTURE: CPT

## 2022-08-16 PROCEDURE — 84484 ASSAY OF TROPONIN QUANT: CPT

## 2022-08-16 PROCEDURE — 6370000000 HC RX 637 (ALT 250 FOR IP): Performed by: UROLOGY

## 2022-08-16 PROCEDURE — 71045 X-RAY EXAM CHEST 1 VIEW: CPT

## 2022-08-16 PROCEDURE — 97110 THERAPEUTIC EXERCISES: CPT

## 2022-08-16 PROCEDURE — 6370000000 HC RX 637 (ALT 250 FOR IP): Performed by: INTERNAL MEDICINE

## 2022-08-16 PROCEDURE — 94761 N-INVAS EAR/PLS OXIMETRY MLT: CPT

## 2022-08-16 PROCEDURE — 99232 SBSQ HOSP IP/OBS MODERATE 35: CPT | Performed by: INTERNAL MEDICINE

## 2022-08-16 PROCEDURE — 6370000000 HC RX 637 (ALT 250 FOR IP): Performed by: NURSE PRACTITIONER

## 2022-08-16 PROCEDURE — 6360000002 HC RX W HCPCS: Performed by: INTERNAL MEDICINE

## 2022-08-16 PROCEDURE — 85025 COMPLETE CBC W/AUTO DIFF WBC: CPT

## 2022-08-16 PROCEDURE — 2580000003 HC RX 258: Performed by: INTERNAL MEDICINE

## 2022-08-16 PROCEDURE — 99233 SBSQ HOSP IP/OBS HIGH 50: CPT | Performed by: INTERNAL MEDICINE

## 2022-08-16 PROCEDURE — 80048 BASIC METABOLIC PNL TOTAL CA: CPT

## 2022-08-16 PROCEDURE — 6370000000 HC RX 637 (ALT 250 FOR IP): Performed by: FAMILY MEDICINE

## 2022-08-16 PROCEDURE — 83880 ASSAY OF NATRIURETIC PEPTIDE: CPT

## 2022-08-16 RX ORDER — POTASSIUM CHLORIDE 20 MEQ/1
20 TABLET, EXTENDED RELEASE ORAL DAILY
Qty: 30 TABLET | Refills: 5 | Status: SHIPPED | OUTPATIENT
Start: 2022-08-16

## 2022-08-16 RX ORDER — ACETAMINOPHEN 325 MG/1
650 TABLET ORAL
Status: CANCELLED | OUTPATIENT
Start: 2022-08-17

## 2022-08-16 RX ORDER — FUROSEMIDE 40 MG/1
40 TABLET ORAL DAILY
Qty: 60 TABLET | Refills: 3 | Status: CANCELLED | OUTPATIENT
Start: 2022-08-17

## 2022-08-16 RX ORDER — CARVEDILOL 12.5 MG/1
12.5 TABLET ORAL 2 TIMES DAILY WITH MEALS
Qty: 60 TABLET | Refills: 3 | Status: CANCELLED | OUTPATIENT
Start: 2022-08-16

## 2022-08-16 RX ORDER — SODIUM CHLORIDE 0.9 % (FLUSH) 0.9 %
5-40 SYRINGE (ML) INJECTION PRN
Status: CANCELLED | OUTPATIENT
Start: 2022-08-17

## 2022-08-16 RX ORDER — DIPHENHYDRAMINE HYDROCHLORIDE 50 MG/ML
50 INJECTION INTRAMUSCULAR; INTRAVENOUS
Status: CANCELLED | OUTPATIENT
Start: 2022-08-17

## 2022-08-16 RX ORDER — ONDANSETRON 2 MG/ML
8 INJECTION INTRAMUSCULAR; INTRAVENOUS
Status: CANCELLED | OUTPATIENT
Start: 2022-08-17

## 2022-08-16 RX ORDER — FUROSEMIDE 40 MG/1
40 TABLET ORAL DAILY
Qty: 60 TABLET | Refills: 3 | Status: SHIPPED | OUTPATIENT
Start: 2022-08-17

## 2022-08-16 RX ORDER — POTASSIUM CHLORIDE 20 MEQ/1
20 TABLET, EXTENDED RELEASE ORAL DAILY
Qty: 30 TABLET | Refills: 5 | Status: CANCELLED | OUTPATIENT
Start: 2022-08-16

## 2022-08-16 RX ORDER — SODIUM CHLORIDE 9 MG/ML
5-250 INJECTION, SOLUTION INTRAVENOUS PRN
Status: CANCELLED | OUTPATIENT
Start: 2022-08-17

## 2022-08-16 RX ORDER — SODIUM CHLORIDE 9 MG/ML
INJECTION, SOLUTION INTRAVENOUS CONTINUOUS
Status: CANCELLED | OUTPATIENT
Start: 2022-08-17

## 2022-08-16 RX ORDER — EPINEPHRINE 1 MG/ML
0.3 INJECTION, SOLUTION, CONCENTRATE INTRAVENOUS PRN
Status: CANCELLED | OUTPATIENT
Start: 2022-08-17

## 2022-08-16 RX ORDER — CARVEDILOL 12.5 MG/1
12.5 TABLET ORAL 2 TIMES DAILY WITH MEALS
Qty: 60 TABLET | Refills: 3 | Status: SHIPPED | OUTPATIENT
Start: 2022-08-16

## 2022-08-16 RX ORDER — METOPROLOL SUCCINATE 25 MG/1
25 TABLET, EXTENDED RELEASE ORAL DAILY
Qty: 30 TABLET | Refills: 3 | Status: CANCELLED | OUTPATIENT
Start: 2022-08-16

## 2022-08-16 RX ORDER — ALBUTEROL SULFATE 90 UG/1
4 AEROSOL, METERED RESPIRATORY (INHALATION) PRN
Status: CANCELLED | OUTPATIENT
Start: 2022-08-17

## 2022-08-16 RX ADMIN — ZINC SULFATE 220 MG (50 MG) CAPSULE 100 MG: CAPSULE at 08:32

## 2022-08-16 RX ADMIN — CARVEDILOL 12.5 MG: 12.5 TABLET, FILM COATED ORAL at 08:32

## 2022-08-16 RX ADMIN — MULTIPLE VITAMINS W/ MINERALS TAB 1 TABLET: TAB at 08:32

## 2022-08-16 RX ADMIN — OXYCODONE HYDROCHLORIDE AND ACETAMINOPHEN 250 MG: 500 TABLET ORAL at 08:32

## 2022-08-16 RX ADMIN — FUROSEMIDE 40 MG: 40 TABLET ORAL at 08:32

## 2022-08-16 RX ADMIN — Medication 1000 UNITS: at 08:32

## 2022-08-16 RX ADMIN — MEROPENEM 1000 MG: 1 INJECTION, POWDER, FOR SOLUTION INTRAVENOUS at 15:15

## 2022-08-16 RX ADMIN — MEROPENEM 1000 MG: 1 INJECTION, POWDER, FOR SOLUTION INTRAVENOUS at 02:11

## 2022-08-16 RX ADMIN — CETIRIZINE HYDROCHLORIDE 5 MG: 10 TABLET, FILM COATED ORAL at 08:32

## 2022-08-16 RX ADMIN — Medication 400 UNITS: at 08:32

## 2022-08-16 RX ADMIN — NYSTATIN 500000 UNITS: 100000 SUSPENSION ORAL at 08:35

## 2022-08-16 RX ADMIN — OXYBUTYNIN CHLORIDE 15 MG: 5 TABLET, EXTENDED RELEASE ORAL at 08:32

## 2022-08-16 RX ADMIN — ACETAMINOPHEN 650 MG: 325 TABLET ORAL at 02:08

## 2022-08-16 ASSESSMENT — PAIN SCALES - GENERAL
PAINLEVEL_OUTOF10: 4
PAINLEVEL_OUTOF10: 2

## 2022-08-16 ASSESSMENT — PAIN DESCRIPTION - LOCATION: LOCATION: HEAD

## 2022-08-16 ASSESSMENT — PAIN DESCRIPTION - DESCRIPTORS: DESCRIPTORS: ACHING

## 2022-08-16 NOTE — DISCHARGE SUMMARY
Discharge Summary    Joon Adkins  :  1955  MRN:  [id number]    Admit date:  2022      Discharge date: 2022     Admitting Physician:  Sonny Haines MD    Discharge Diagnoses:    Principal Problem:    Sepsis due to urinary tract infection ([facility]  Active Problems:    Obesity, Class III, BMI 40-49.9 (morbid obesity) ([facility]    Sepsis ([facility]    Urologic disorders    Bacteremia due to Gram-negative bacteria    Hypoxia    Atrial fibrillation with rapid ventricular response (HCC)    Renal failure syndrome    Abnormal ECG    Acute kidney injury superimposed on CKD ([facility]    Calculus in urethra    COVID-19 virus infection    Biventricular congestive heart failure (HCC)    Obstructive uropathy    Klebsiella infection    Urinary tract infection due to extended-spectrum beta lactamase (ESBL) producing Escherichia coli    BARBARA (acute kidney injury) ([facility]    COVID-19    Other hypotension    Metabolic acidosis    Chronic anticoagulation    Lymphedema of both lower extremities    Ureteral calculi  Resolved Problems:    * No resolved hospital problems. *      Hospital Course:   Joon Adkins is a 74 y.o. female admitted with septicemia due to UTI and bacteremia. She presented to the emergency room with complaints of flank pain and shortness of breath. Patient reported history of kidney stones. Last kidney stone was in 2022 and at that time had a stent placement completed. Patient complained of cough and fatigue. She also complained of some leg swelling including nausea or vomiting. During patient's evaluation she was febrile at 102.4, tachycardic at 144, tachypneic at 24. She was hypertensive. Patient was found to be in atrial fibrillation with RVR however has no history documented of that. She is on Xarelto long-term due to history of DVTs. Her chest x-ray was concerning for pulmonary edema versus pneumonia. Lactic acid was elevated at 3.3 and repeat was 2.4.   Patient was found to have a UTI as well.  Patient was started on IV Rocephin and doxycycline while in the ER. Urology was consulted regarding renal stone. Patient was given 1 L of IV fluids while in ER. Patient was admitted and placed on IV fluids as well as IV antibiotics. Patient was taken to surgery for cystoscopy and stone removal and tolerated well. Patient initially had been placed on IV Rocephin as well as doxycycline. PT and OT were consulted. Urine cultures grew out Klebsiella pneumonia as well as her blood cultures. Doxycycline was stopped and midline was placed and was placed on meropenem per infectious disease this was to continue for 5 to 7 days. Upon plan of discharge patient tested positive for COVID-19 and continued hospitalization due to no skilled facilities available to take her due to being COVID-positive. She continued with PT and OT during her hospitalization. Patient was given vitamin C, D and zinc as well as monoclonal antibodies. Patient was asymptomatic COVID. She did not qualify for remdesivir, Actemra or dexamethasone. Patient's metoprolol was stopped she was placed on Coreg for better blood pressure control. Attempted to go to skilled therapy however due to [place] she had to stay for 10 days. Patient was adamant about wanting to go home. Patient will be discharged home today she will do 3 more days of Invanz with 1 dose today following the next 2 days for the final 2 doses. She will be set up by [facility] for transport to and from the hospital for IV infusions. Patient will have repeat blood cultures x2, BMP and urine culture in 1 week and follow-up with her primary care at that time.       Consultants:   Dr. Margarita Menendez, cardiology; [facility], urology; Adithya Bobby, nephrology; Dr. Ainsley Dunn, ID    Procedures:  Cystoscopy with stent placement    Complications: [id number] virus    Discharge Condition: fair    Exam:  GEN:  alert and oriented to person, place and time, well-developed and well-nourished, in no acute distress  EYES: No gross abnormalities. , PERRL, and EOMI  NECK: normal, supple, no lymphadenopathy,  no carotid bruits  PULM: clear to auscultation bilaterally- no wheezes, rales or rhonchi, normal air movement, no respiratory distress  COR: regular rate & rhythm, no gallops, S1 normal, S2 normal, and 2/6 systolic murmur  ABD:  soft, non-tender, non-distended, normal bowel sounds, no masses or organomegaly  EXT:   no cyanosis. 1+ lymphedema bilaterally no calf tenderness    NEURO: follows commands, [place], no deficits  SKIN:  no rashes or significant lesions    Significant Diagnostic Studies:   Lab Results   Component Value Date    WBC 6.4 08/16/2022    HGB 9.4 (L) 08/16/2022     08/16/2022       Lab Results   Component Value Date    BUN 26 (H) 08/16/2022    CREATININE 1.05 (H) 08/16/2022     08/16/2022    K 4.1 08/16/2022    CALCIUM 8.4 (L) 08/16/2022     08/16/2022    CO2 31 08/16/2022    LABGLOM 52 (L) 08/16/2022       Lab Results   Component Value Date    WBCUA GREATER THAN 100 08/08/2022    RBCUA GREATER THAN 100 08/08/2022    EPITHUA 0 TO 2 08/08/2022    LEUKOCYTESUR LARGE (A) 08/08/2022    SPECGRAV 1.020 08/08/2022    GLUCOSEU NEGATIVE 08/08/2022    KETUA NEGATIVE 08/08/2022    PROTEINU 2+ (A) 08/08/2022    HGBUR 3+ (A) 08/08/2022    CASTUA  04/21/2022     0 TO 2 HYALINE Reference range defined for non-centrifuged specimen. CRYSTUA NOT REPORTED 01/02/2022    BACTERIA FEW (A) 04/21/2022    YEAST NOT REPORTED 01/02/2022       CT ABDOMEN PELVIS WO CONTRAST Additional Contrast? None    Result Date: 8/8/2022  EXAMINATION: CT OF THE ABDOMEN AND PELVIS WITHOUT CONTRAST 8/8/2022 6:58 pm TECHNIQUE: CT of the abdomen and pelvis was performed without the administration of intravenous contrast. Multiplanar reformatted images are provided for review.  Automated exposure control, iterative reconstruction, and/or weight based adjustment of the mA/kV was utilized to reduce the radiation dose to as low as reasonably achievable. COMPARISON: 05/20/2022 HISTORY: ORDERING SYSTEM PROVIDED HISTORY: Left flank pain, concern for calculus TECHNOLOGIST PROVIDED HISTORY: Left flank pain, concern for calculus Decision Support Exception - unselect if not a suspected or confirmed emergency medical condition->Emergency Medical Condition (MA) FINDINGS: Lower Chest: Small bilateral pleural effusions. There is consolidation in both lung bases. Coronary artery atherosclerosis. Trace pericardial effusion. Organs: Within the limitations of a noncontrast examination, no acute abnormality within the liver, spleen, pancreas, or adrenal glands. Cholelithiasis without pericholecystic fluid. Multiple right renal stones. There is a right ureteral stent, in expected position. There is a 1.2 cm obstructing stone at the left ureteral pelvic junction with left hydronephrosis. There is an additional 3 mm stone in the distal left ureter. There is left perinephric stranding. GI/Bowel: The stomach is partially distended. The small bowel is nondilated. The appendix is normal in caliber. The colon is nondilated with scattered, noninflamed diverticula. Pelvis: Bladder is partially distended. No vesicular stone. Prior hysterectomy. Peritoneum/Retroperitoneum: No ascites or pneumoperitoneum. Atherosclerosis in the nondilated abdominal aorta. Shotty mesenteric and retroperitoneal lymph nodes. Bones/Soft Tissues: Multilevel degenerative changes of the lumbar spine. 1. Multiple left ureteral stones with a large obstructing stone at the ureteropelvic junction. There is left hydronephrosis. 2. Right ureteral stent in expected position. Nonobstructing right nephrolithiasis. 3. Diverticulosis without diverticulitis. 4. Cholelithiasis without pericholecystic fluid to suggest cholecystitis. 5. Small bilateral pleural effusions with adjacent consolidation, likely atelectasis.      CT CHEST WO CONTRAST    Result Date: 8/9/2022  EXAMINATION: CT OF THE CHEST WITHOUT CONTRAST 8/9/2022 8:42 pm TECHNIQUE: CT of the chest was performed without the administration of intravenous contrast. Multiplanar reformatted images are provided for review. Automated exposure control, iterative reconstruction, and/or weight based adjustment of the mA/kV was utilized to reduce the radiation dose to as low as reasonably achievable. COMPARISON: Chest radiograph from yesterday, CT on 04/20/2022 HISTORY: Acute shortness of breath. Abnormal chest radiograph. FINDINGS: Mediastinum: Mild cardiomegaly. Extensive left coronary calcification. No pericardial effusion. Thoracic aorta is normal caliber with mild atherosclerotic disease. No lymphadenopathy. Thyroid and esophagus are unremarkable. Lungs/pleura: Minimal pleural effusions with adjacent atelectasis. No consolidation. No definite pulmonary edema. Upper Abdomen: Unremarkable. Soft Tissues/Bones: No acute abnormality. Mild cardiomegaly. No definite pulmonary edema. Minimal pleural effusions with adjacent atelectasis. XR CHEST PORTABLE    Result Date: 8/8/2022  EXAMINATION: ONE XRAY VIEW OF THE CHEST 8/8/2022 5:43 pm COMPARISON: 04/27/2022 HISTORY: ORDERING SYSTEM PROVIDED HISTORY: fever/hypoxia TECHNOLOGIST PROVIDED HISTORY: fever/hypoxia FINDINGS: Pulmonary edema with bilateral airspace disease and interstitial edema with Kerley lines. There is trace bilateral pleural fluid. Heart is mildly enlarged and unchanged. No pneumothorax. Right greater than left bilateral shoulder arthropathy with marked subacromial joint space narrowing and superior migration of the right humeral head consistent with chronic rotator cuff tears. Findings most consistent with pulmonary edema. Manifestation of pneumonia, possibly an atypical or viral pneumonia not excluded. Correlate clinically. FLUORO FOR SURGICAL PROCEDURES    Result Date: 8/9/2022  Radiology exam is complete. No Radiologist dictation.  Please follow up with ordering provider.        Assessment and Plan:  Patient Active Problem List    Diagnosis Date Noted    Other hypotension 94/85/4451    Metabolic acidosis 65/81/7625    COVID-19 virus infection 08/12/2022    Biventricular congestive heart failure ([address] 08/12/2022    Obstructive uropathy 08/12/2022    Klebsiella infection 08/12/2022    Urinary tract infection due to extended-spectrum beta lactamase (ESBL) producing Escherichia coli 08/12/2022    BARBARA (acute kidney injury) ([address] 08/12/2022    COVID-19 08/12/2022    Calculus in urethra 08/11/2022    Acute kidney injury superimposed on CKD ([address] 08/10/2022    Bacteremia due to Gram-negative bacteria 08/09/2022    Hypoxia 08/09/2022    Atrial fibrillation with rapid ventricular response ([address] 08/09/2022    Sepsis due to urinary tract infection ([address] 08/09/2022    Renal failure syndrome 08/09/2022    Abnormal ECG 08/09/2022    Urologic disorders 08/08/2022    Sepsis ([address] 05/03/2022    CRP elevated     Intra-abdominal abscess ([address] 04/21/2022    Obesity, Class III, BMI 40-49.9 (morbid obesity) ([address] 04/21/2022    Hypocalcemia 04/21/2022    Hypokalemia 04/21/2022    Vertebral osteomyelitis ([address] T12-L1 04/21/2022    Pleural effusion on right 04/21/2022    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    Ureteral calculi 02/28/2022    Psoas muscle abscess ([address] 01/08/2022    Septicemia due to Klebsiella pneumoniae ([address] 01/08/2022    Bacteremia due to Klebsiella pneumoniae 01/04/2022    Renal abscess, right 01/02/2022    Iron deficiency anemia 01/24/2021    Normocytic anemia 01/23/2021    Cellulitis 19/70/6533    Complicated UTI (urinary tract infection) 12/28/2020    Renal calculus 12/28/2020    Cellulitis of left lower extremity     Cellulitis of lower leg 02/10/2020    Gross hematuria 10/30/2018    Frequency of urination 10/30/2018    Nocturia 10/30/2018    Mixed incontinence 10/30/2018    Constipation 10/30/2018    History of recurrent deep vein thrombosis (DVT) 04/26/2018    Tobacco abuse 11/15/2016    Bilateral cellulitis of lower leg 11/08/2016    Acute shoulder pain due to trauma 11/08/2016    Lymphedema of both lower extremities 11/08/2016    Acute thromboembolism of deep veins of lower extremity ([facility] 09/05/2015    Chronic anticoagulation 09/05/2015        Discharge Medications:         Medication List        START taking these medications      amLODIPine 10 MG tablet  Commonly known as: NORVASC  Take 1 tablet by mouth daily     carvedilol 12.5 MG tablet  Commonly known as: COREG  Take 1 tablet by mouth in the morning and 1 tablet in the evening. Take with meals. ertapenem  infusion  Commonly known as: INVanz  Infuse 1,000 mg intravenously every 24 hours for 3 days Compound per protocol. Flush midline/PICC per protocol with NS     furosemide 40 MG tablet  Commonly known as: LASIX  Take 1 tablet by mouth in the morning. Start taking on: August 17, 2022     nystatin [id number] UNIT/ML suspension  Commonly known as: MYCOSTATIN  Take 5 mLs by mouth in the morning and 5 mLs at noon and 5 mLs in the evening and 5 mLs before bedtime. potassium chloride 20 MEQ extended release tablet  Commonly known as: KLOR-CON M  Take 1 tablet by mouth in the morning.             CHANGE how you take these medications      rivaroxaban 20 MG Tabs tablet  Commonly known as: Xarelto  Take 1 tablet by mouth every 24 hours  What changed: when to take this            CONTINUE taking these medications      acetaminophen 325 MG tablet  Commonly known as: TYLENOL     ipratropium-albuterol 0.5-2.5 (3) MG/3ML Soln nebulizer solution  Commonly known as: DUONEB     loratadine 10 MG capsule  Commonly known as: CLARITIN     Osteo Bi-Flex Adv Double St Tabs     oxybutynin 15 MG extended release tablet  Commonly known as: Ditropan XL  Take 1 tablet by mouth daily     polyethylene glycol 17 g packet  Commonly known as: GLYCOLAX     sodium chloride flush 0.9 % injection therapeutic multivitamin-minerals tablet     vitamin C 250 MG tablet     vitamin D 25 MCG (1000 UT) Tabs tablet  Commonly known as: CHOLECALCIFEROL     vitamin E 400 UNIT capsule            STOP taking these medications      lisinopril 20 MG tablet  Commonly known as: PRINIVIL;ZESTRIL     tamsulosin 0.4 MG capsule  Commonly known as: FLOMAX               Where to Get Your Medications        These medications were sent to [place] [id number] [facility] [address] [place] [address] [phone]  [facility] [address]      Phone: [phone]   carvedilol 12.5 MG tablet  furosemide 40 MG tablet  nystatin [id number] UNIT/ML suspension  potassium chloride 20 MEQ extended release tablet  rivaroxaban 20 MG Tabs tablet       You can get these medications from any pharmacy    Bring a paper prescription for each of these medications  ertapenem  infusion         Patient Instructions:    Activity: activity as tolerated  Diet: cardiac diet  Wound Care: none needed  Other: BMP, blood cultures x2 and urine culture in 1 week    Disposition:   Discharge to home    Follow up:  Patient will be followed by DONG Petty CNP in 1-2 weeks    CORE MEASURES on Discharge (if applicable)  ACE/ARB in CHF: Yes  Statin in MI: NA  ASA in MI: NA  Statin in CVA: NA  Antiplatelet in CVA: NA    Total time spent on discharge services: 40 minutes    Including the following activities:  Evaluation and Management of patient  Discussion with patient and/or surrogate about current care plan  Coordination with Case Management and/or   Coordination of care with Consultants (if applicable)   Coordination of care with Receiving Facility Physician (if applicable)  Completion of DME forms (if applicable)  Preparation of Discharge Summary  Preparation of Medication Reconciliation  Preparation of Discharge Prescriptions    Signed:  DONG Pina CNP, DONG, NP-C  8/16/2022, 11:08 AM

## 2022-08-16 NOTE — PROGRESS NOTES
Patient in bed resting at this time. Vitals and assessment completed. Vitals stable. Patient on room air. Lungs diminished. Patient instructed on importance of getting up to chair but stated she would wait for therapy. Patient denies having needs or concerns. Call light within reach. Will continue to monitor.

## 2022-08-16 NOTE — PLAN OF CARE
Problem: Discharge Planning  Goal: Discharge to home or other facility with appropriate resources  Outcome: Completed     Problem: Pain  Goal: Verbalizes/displays adequate comfort level or baseline comfort level  Outcome: Completed     Problem: Safety - Adult  Goal: Free from fall injury  Outcome: Completed     Problem: Skin/Tissue Integrity  Goal: Absence of new skin breakdown  Description: 1. Monitor for areas of redness and/or skin breakdown  2. Assess vascular access sites hourly  3. Every 4-6 hours minimum:  Change oxygen saturation probe site  4. Every 4-6 hours:  If on nasal continuous positive airway pressure, respiratory therapy assess nares and determine need for appliance change or resting period.   Outcome: Completed     Problem: Nutrition Deficit:  Goal: Optimize nutritional status  Outcome: Completed     Problem: ABCDS Injury Assessment  Goal: Absence of physical injury  Outcome: Completed     Problem: Chronic Conditions and Co-morbidities  Goal: Patient's chronic conditions and co-morbidity symptoms are monitored and maintained or improved  Outcome: Completed

## 2022-08-16 NOTE — PROGRESS NOTES
[facility] [address] [facility] Patient Telemedicine  Today's Date and Time: 8/16/2022, 12:48 PM    Impression :     COVID 19 Confirmed Infection  Previously vaccinated individual:  4-28--21 Philly Carrera  5-26-21 Philly Carrera  2-1-22 Moderna  Covid tests:  8-8-22; negative   8-12-22: Positive  High CRP  CHF with high Pro BNP  BARBARA with Lt side hydronephrosis  S/P stent placement 8-8-22  Urine 8-8-22 with Klebsiella and E coli ESBL +  Klebsiella septicemia 8-8-22 x 2  Recommendations:   Antibiotic treatment:  Meropenem 1000 mg IV q 12 hr x 5-7 days. Stop date 8-17-22. Or Ertapenem 1000 mg once daily for 5-7 days for E coli ESBL + in urine and Klebsiella in blood   Covid Rx:    Remdesivir. Not indicated- BARBARA  Decadron: Contra indicated at this stage  Actemra: Contra indicated at this stage  Monoclonal antibodies: Bebtelovimab infused 8-12-22      Medical Decision Making/Summary/Discussion:8/16/2022     Patient admitted with Klebsiella and E coli ESBL + UTI with associated Klebsiella septicemia  Initial testing for Covid 19 was negative  However, repeat testing showed acute COVID 19 infection despite three prior vaccines  Patient treated with Bebtelovimab with good response Covid wise  Treated with meropenem for Klebsiella and E coli ESBL + UTI with associated Klebsiella septicemia  Patient improving with treatment. Renal function improved as of 8-14-22.     Infection Control Recommendations   [place] Precautions  Airborne isolation  Droplet Isolation  Isolate until 8-22-22  Contact isolation for E coli ESBL +    Antimicrobial Stewardship Recommendations     Simplification of therapy  Targeted therapy    Coordination of Outpatient Care:   Estimated Length of IV antimicrobials:8-17-22  Patient will need Midline Catheter Insertion: TBD  Patient will need PICC line Insertion: No  Patient will need: Home IV , [place],  SNF,  LTAC:TBD  Patient will need outpatient wound edema, similar compared to the prior exam.  CAT:  8-9-22; Mild cardiomegaly. No definite pulmonary edema. , small effusions    Discussed with RNOLLIE.    8-11-22:       8-9-22:          I have personally reviewed the past medical history, past surgical history, medications, social history, and family history, and I have updated the database accordingly.   Past Medical History:     Past Medical History:   Diagnosis Date    Acute kidney injury superimposed on CKD ([address] 8/10/2022    Atrial fibrillation with RVR ([address] 8/9/2022    Carcinoma ([address]     Squamous Cell    Cellulitis     COVID-19 virus infection 8/12/2022    DVT (deep venous thrombosis) ([address] 2006    LLE    Kidney stone     Lymphedema     Morbid obesity ([address]     Psoas abscess, right ([address]     Pyelonephritis     Wears glasses        Past Surgical  History:     Past Surgical History:   Procedure Laterality Date    CARPAL TUNNEL RELEASE  1990s    CT ABSCESS DRAIN SUBCUTANEOUS  01/10/2022    CT ABSCESS DRAIN SUBCUTANEOUS 1/10/2022 [facility] CT SCAN    CT ABSCESS DRAIN SUBCUTANEOUS  04/21/2022    CT ABSCESS DRAIN SUBCUTANEOUS 4/21/2022 [facility] CT SCAN    CT ABSCESS DRAIN SUBCUTANEOUS  4/29/2022    CT ABSCESS DRAIN SUBCUTANEOUS 4/29/2022 [facility] CT SCAN    CYSTOSCOPY N/A 01/04/2022    CYSTOSCOPY URETERAL STENT INSERTION performed by Aisha Aviles MD at [facility] Right     HLL with stent    CYSTOSCOPY Right 03/01/2022    CYSTOSCOPY URETEROSCOPY LASER-WTIH HLL performed by Aisha Aviles MD at [facility] Right 03/01/2022    CYSTOSCOPY URETERAL STENT Waykyle Combs performed by Aisha Aviles MD at [facility] Right 04/05/2022    Dr Mackenzie Keane with stent insertion    CYSTOSCOPY Right 04/05/2022    CYSTOSCOPY URETEROSCOPY LASER-HLL performed by Aisha Aviles MD at [facility] Right 04/05/2022    CYSTOSCOPY URETERAL STENT Shermanna Garret performed by Aisha Aviles MD at [facility] Right 04/22/2022 CYSTOSCOPY URETERAL STENT INSERTION (Right )    CYSTOSCOPY Right 2022    CYSTOSCOPY URETERAL STENT INSERTION performed by Sneha Grace MD at [address] Left 2022    CYSTOSCOPY URETERAL STENT INSERTION performed by Hermes Meraz MD at [address] [facility].  [facility] [address] SINGLE  2022         INSERT MIDLINE CATHETER  2022         IR NEPHROSTOMY PERCUTANEOUS RIGHT  2022    IR NEPHROSTOMY PERCUTANEOUS RIGHT 2022 Heydi Zaldivar MD [facility] PROCEDURES    ANNABELLA AND BSO (CERVIX REMOVED)      2019    TUBAL LIGATION  1993    TUBAL LIGATION      WISDOM TOOTH EXTRACTION         Medications:      rivaroxaban  20 mg Oral Q24H    furosemide  40 mg Oral Daily    meropenem  1,000 mg IntraVENous Q12H    nystatin  5 mL Oral 4x Daily    zinc sulfate  100 mg Oral Daily    carvedilol  12.5 mg Oral BID WC    vitamin E  400 Units Oral Daily    sodium chloride flush  5-40 mL IntraVENous 2 times per day    vitamin C  250 mg Oral Daily    cetirizine  5 mg Oral Daily    therapeutic multivitamin-minerals  1 tablet Oral Daily    oxybutynin  15 mg Oral Daily    Vitamin D  1,000 Units Oral Daily       Social History:     Social History     Socioeconomic History    Marital status:      Spouse name: Not on file    Number of children: Not on file    Years of education: Not on file    Highest education level: Not on file   Occupational History    Not on file   Tobacco Use    Smoking status: Former     Packs/day: 0.50     Years: 42.00     Pack years: 21.00     Types: Cigarettes     Quit date: 2022     Years since quittin.3    Smokeless tobacco: Never   Vaping Use    Vaping Use: Never used   Substance and Sexual Activity    Alcohol use: No     Alcohol/week: 0.0 standard drinks    Drug use: No    Sexual activity: Not Currently   Other Topics Concern    Not on file   Social History Narrative    Not on file     Social Determinants of Health     Financial Resource Strain: Not on file   Food bruits. Pulmonary/Chest: Decreased breath sounds  Cardiovascular: Regular rate and rhythm without murmurs, rubs, or gallops. Abdomen: Soft, non tender. Bowel sounds normal. No organomegaly  All four Extremities: No cyanosis, clubbing, edema, or effusions. Neurologic: No gross sensory or motor deficits. Skin: Warm and dry with good turgor. No signs of peripheral arterial or venous insufficiency. No ulcerations. No open wounds. Medical Decision Making -Laboratory:   I have independently reviewed/ordered the following labs:    CBC with Differential:   Recent Labs     08/15/22  0550 08/16/22  0542   WBC 6.6 6.4   HGB 9.3* 9.4*   HCT 31.0* 30.9*    212   LYMPHOPCT 24 30   MONOPCT 8 9       BMP:   Recent Labs     08/15/22  0550 08/16/22  0542    143   K 4.1 4.1    106   CO2 30 31   BUN 22 26*   CREATININE 1.13* 1.05*       Hepatic Function Panel:   No results for input(s): PROT, LABALBU, BILIDIR, IBILI, BILITOT, ALKPHOS, ALT, AST in the last 72 hours. No results for input(s): RPR in the last 72 hours. No results for input(s): HIV in the last 72 hours. No results for input(s): BC in the last 72 hours. Lab Results   Component Value Date/Time    MUCUS NOT REPORTED 01/02/2022 03:30 PM    RBC 3.20 08/16/2022 05:42 AM    TRICHOMONAS NOT REPORTED 01/02/2022 03:30 PM    WBC 6.4 08/16/2022 05:42 AM    YEAST NOT REPORTED 01/02/2022 03:30 PM    TURBIDITY Cloudy 08/08/2022 07:14 PM     Lab Results   Component Value Date/Time    CREATININE 1.05 08/16/2022 05:42 AM    GLUCOSE 109 08/16/2022 05:42 AM       Medical Decision Making-Imaging:     EXAMINATION:   CT OF THE CHEST WITHOUT CONTRAST 8/9/2022 8:42 pm       TECHNIQUE:   CT of the chest was performed without the administration of intravenous   contrast. Multiplanar reformatted images are provided for review.  Automated   exposure control, iterative reconstruction, and/or weight based adjustment of   the mA/kV was utilized to reduce the radiation dose to as low as reasonably   achievable. COMPARISON:   Chest radiograph from yesterday, CT on 04/20/2022       HISTORY:   Acute shortness of breath. Abnormal chest radiograph. FINDINGS:   Mediastinum: Mild cardiomegaly. Extensive left coronary calcification. No   pericardial effusion. Thoracic aorta is normal caliber with mild   atherosclerotic disease. No lymphadenopathy. Thyroid and esophagus are   unremarkable. Lungs/pleura: Minimal pleural effusions with adjacent atelectasis. No   consolidation. No definite pulmonary edema. Upper Abdomen: Unremarkable. Soft Tissues/Bones: No acute abnormality. Impression   Mild cardiomegaly. No definite pulmonary edema. Minimal pleural effusions   with adjacent atelectasis. Medical Decision Arlxaj-Isszktaa-Jlbmo:     EXAMINATION:   ONE XRAY VIEW OF THE CHEST       8/11/2022 1:13 pm       COMPARISON:   08/08/2022, 04/27/2022       HISTORY:   ORDERING SYSTEM PROVIDED HISTORY: SOB   TECHNOLOGIST PROVIDED HISTORY:   SOB       FINDINGS:   Vascular congestion is noted without overt edema. No consolidation. No   significant effusion is identified. No pneumothorax. Mild cardiac   silhouette enlargement. Advanced arthropathy in the right shoulder. Impression   Vascular congestion without overt edema, similar compared to the prior exam.           Medical Decision Making-Other:     Note:  Labs, medications, radiologic studies were reviewed with personal review of films  Large amounts of data were reviewed  Discussed with nursing Staff, Discharge planner  Infection Control and Prevention measures reviewed  All prior entries were reviewed  Administer medications as ordered  Prognosis: Guarded  Discharge planning reviewed      Thank you for allowing us to participate in the care of this patient. Please call with questions.     Isidro La MD        Pager: [phone] - Office: [phone]

## 2022-08-16 NOTE — PROGRESS NOTES
Pt alert and oriented x4 resting in chair at this time. Vitals and assessment completed as charted. Pt denies any pain or increase in shortness of breath. Pt denies any needs at this time. Call light is within reach. Will continue to monitor.

## 2022-08-16 NOTE — PROGRESS NOTES
is extremely limited because of morbid obesity, bilateral lymphedema and chronic CHF. With regard to her newly diagnosed left ventricular systolic dysfunction, this can be related to sepsis, COVID-19 infection or can be caused by coronary artery disease. Patient is not complaining of any chest pain, pressure or tightness and her breathing is better. Ischemic work-up is on hold until she recovers from COVID-19. Her blood pressure is uncontrolled. I will get a repeat basic metabolic panel today and if her kidney function is back at baseline we will start her on Entresto or angiotensin receptor blocker. Past Medical History:   Diagnosis Date    Acute kidney injury superimposed on CKD ([address] 8/10/2022    Atrial fibrillation with RVR ([address] 8/9/2022    Carcinoma ([address]     Squamous Cell    Cellulitis     COVID-19 virus infection 8/12/2022    DVT (deep venous thrombosis) ([address] 2006    LLE    Kidney stone     Lymphedema     Morbid obesity ([address]     Psoas abscess, right ([address]     Pyelonephritis     Wears glasses        CURRENT ALLERGIES: Patient has no known allergies. REVIEW OF SYSTEMS: 14 systems were reviewed. Pertinent positives and negatives as above, all else negative.      Past Surgical History:   Procedure Laterality Date    CARPAL TUNNEL RELEASE  1990s    CT ABSCESS DRAIN SUBCUTANEOUS  01/10/2022    CT ABSCESS DRAIN SUBCUTANEOUS 1/10/2022 [facility] CT SCAN    CT ABSCESS DRAIN SUBCUTANEOUS  04/21/2022    CT ABSCESS DRAIN SUBCUTANEOUS 4/21/2022 [facility] CT SCAN    CT ABSCESS DRAIN SUBCUTANEOUS  4/29/2022    CT ABSCESS DRAIN SUBCUTANEOUS 4/29/2022 [facility] CT SCAN    CYSTOSCOPY N/A 01/04/2022    CYSTOSCOPY URETERAL STENT INSERTION performed by Nicole Lizarraga MD at [address] Right     HLL with stent    CYSTOSCOPY Right 03/01/2022    CYSTOSCOPY URETEROSCOPY LASER-WTIH HLL performed by Nicole Lizarraga MD at [address] Right 03/01/2022    CYSTOSCOPY URETERAL STENT INSERTION-EXCHANGE performed by Laura Dawson Jigna Bucio MD at [address] Right 2022    Dr Arianna Garnica with stent insertion    CYSTOSCOPY Right 2022    CYSTOSCOPY URETEROSCOPY LASER-HLL performed by Bronwyn Kahn MD at [address] Right 2022    CYSTOSCOPY URETERAL STENT [address] performed by Bronwyn Kahn MD at [address] Right 2022    CYSTOSCOPY URETERAL STENT INSERTION (Right )    CYSTOSCOPY Right 2022    CYSTOSCOPY URETERAL STENT INSERTION performed by Nj Mejia MD at [address] Left 2022    CYSTOSCOPY URETERAL STENT INSERTION performed by Bronwyn Kahn MD at [address] [facility]         INSERT MIDLINE CATHETER  2022         IR NEPHROSTOMY PERCUTANEOUS RIGHT  2022    IR NEPHROSTOMY PERCUTANEOUS RIGHT 2022 Baltazar Morales MD [facility] PROC[facility]    ANNABELLA AND BSO (CERVIX REMOVED)      2019    TUBAL LIGATION  1993    TUBAL LIGATION      WISDOM TOOTH EXTRACTION      Social History:  Social History     Tobacco Use    Smoking status: Former     Packs/day: 0.50     Years: 42.00     Pack years: 21.00     Types: Cigarettes     Quit date: 2022     Years since quittin.3    Smokeless tobacco: Never   Vaping Use    Vaping Use: Never used   Substance Use Topics    Alcohol use: No     Alcohol/week: 0.0 standard drinks    Drug use: No        CURRENT MEDICATIONS   rivaroxaban  20 mg Oral Q24H    furosemide  40 mg Oral Daily    meropenem  1,000 mg IntraVENous Q12H    nystatin  5 mL Oral 4x Daily    zinc sulfate  100 mg Oral Daily    carvedilol  12.5 mg Oral BID WC    vitamin E  400 Units Oral Daily    sodium chloride flush  5-40 mL IntraVENous 2 times per day    vitamin C  250 mg Oral Daily    cetirizine  5 mg Oral Daily    therapeutic multivitamin-minerals  1 tablet Oral Daily    oxybutynin  15 mg Oral Daily    Vitamin D  1,000 Units Oral Daily           FAMILY HISTORY: family history includes Cancer in her Biventricular congestive heart failure ([address] 08/12/2022    Obstructive uropathy 08/12/2022    Klebsiella infection 08/12/2022    Urinary tract infection due to extended-spectrum beta lactamase (ESBL) producing Escherichia coli 08/12/2022    BARBARA (acute kidney injury) ([address] 08/12/2022    COVID-19 08/12/2022    Calculus in urethra 08/11/2022    Acute kidney injury superimposed on CKD ([address] 08/10/2022    Bacteremia due to Gram-negative bacteria 08/09/2022    Hypoxia 08/09/2022    Atrial fibrillation with rapid ventricular response ([address] 08/09/2022    Sepsis due to urinary tract infection ([address] 08/09/2022    Renal failure syndrome 08/09/2022    Abnormal ECG 08/09/2022    Urologic disorders 08/08/2022    Sepsis ([address] 05/03/2022    CRP elevated     Intra-abdominal abscess ([address] 04/21/2022    Obesity, Class III, BMI 40-49.9 (morbid obesity) ([address] 04/21/2022    Hypocalcemia 04/21/2022    Hypokalemia 04/21/2022    Vertebral osteomyelitis ([address] T12-L1 04/21/2022    Pleural effusion on right 04/21/2022    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    Ureteral calculi 02/28/2022    Psoas muscle abscess ([address] 01/08/2022    Septicemia due to Klebsiella pneumoniae [facility] 01/08/2022    Bacteremia due to Klebsiella pneumoniae 01/04/2022    Renal abscess, right 01/02/2022    Iron deficiency anemia 01/24/2021    Normocytic anemia 01/23/2021    Cellulitis 13/84/3929    Complicated UTI (urinary tract infection) 12/28/2020    Renal calculus 12/28/2020    Cellulitis of left lower extremity     Cellulitis of lower leg 02/10/2020    Gross hematuria 10/30/2018    Frequency of urination 10/30/2018    Nocturia 10/30/2018    Mixed incontinence 10/30/2018    Constipation 10/30/2018    History of recurrent deep vein thrombosis (DVT) 04/26/2018    Tobacco abuse 11/15/2016    Bilateral cellulitis of lower leg 11/08/2016    Acute shoulder pain due to trauma 11/08/2016    Lymphedema of both lower extremities 11/08/2016    Acute thromboembolism of deep veins of lower extremity ([facility] 09/05/2015    Chronic anticoagulation 09/05/2015       PLAN:  Patient with multiple medical problems as outlined above. History of DVT on chronic anticoagulation. History of nephrolithiasis. Admitted with left flank pain and found to have sepsis secondary to urinary tract infection. Blood culture is positive for Klebsiella pneumoniae. Currently on meropenem. She has acute on chronic renal failure, this is improving. I am getting a repeat basic metabolic panel to be done today. Telemetry reviewed, no evidence of atrial fibrillation recurrence. Echo results show EF reduced to 35-40% most likely secondary to sepsis    Change carvedilol 12.5 mg twice daily for better control of blood pressure. Continue IV Lasix 40 mg daily. Follow-up repeat basic metabolic panel, next kidney function is back to normal we will consider starting her on ACE/ARB or Entresto daily    Follow-up portable chest x-ray, BNP and troponin. The onset cardiomyopathy along with functional mitral and tricuspid regurgitation and moderate pulm hypertension can be secondary to septicemia. This septicemia can be associated with myocardial depressant factor causing nonischemic cardiomyopathy. The plan is to repeat the echo in 2 weeks and if ejection fraction remained low then will work her up for NICM. For the time being we have to treat medically and we will consider ischemic work-up if ejection fraction remains low. Sincerely,  Vanessa Menard MD, F.A.C.C. [facility] Cardiology Specialist    [address] [facility] [place], [address] [place] [address]  Phone: [phone], Fax: [phone]     I believe that the risk of significant morbidity and mortality related to the patient's current medical conditions are: intermediate-high.  Approximately 35 minutes were spent during prep work, discussion and exam of the patient, and follow up documentation and all of their

## 2022-08-16 NOTE — PROGRESS NOTES
Writer reviewed discharge instructions with patient. Patient aware of [facility] picking her up the next 3 days for outpatient antibiotic therapy. Patient verbalized understanding. Denies questions. Copy of discharge instructions given to patient.

## 2022-08-16 NOTE — PROGRESS NOTES
Progress Note  Boaz Reagan MD    OBJECTIVE:    Patient seen for f/u of Sepsis due to urinary tract infection ([address] She had stent,blood culture grew klebseila sensitive to meropenem,last dose to be on 8/19/2022. Off oxygen    ROS:   Constitutional: negative  for fevers, and negative for chills. Respiratory: negative for shortness of breath, negative for cough, and negative for wheezing  Cardiovascular: negative for chest pain, and negative for palpitations  Gastrointestinal: negative for abdominal pain, negative for nausea,negative for vomiting, negative for diarrhea, and negative for constipation     All other systems were reviewed with the patient and are negative unless otherwise stated in HPI    OBJECTIVE:  Vitals:   Temp: 97.9 °F (36.6 °C)  BP: (!) 137/59  Resp: 18  Heart Rate: 61  SpO2: 92 %    24HR INTAKE/OUTPUT:    Intake/Output Summary (Last 24 hours) at 8/16/2022 0746  Last data filed at 8/15/2022 0900  Gross per 24 hour   Intake 200 ml   Output --   Net 200 ml     -----------------------------------------------------------------  Exam:  GEN:    Awake, alert and oriented x3. EYES:  EOMI, pupils equal   NECK: Supple. No lymphadenopathy. No carotid bruit  CVS:    regular rate and rhythm, 2/6 systolic murmur  PULM:  CTA, no wheezes, rales or rhonchi, no acute respiratory distress  ABD:    Bowels sounds normal.  Abdomen is soft. No distention. no tenderness to palpation. EXT:   3+ edema bilaterally . No calf tenderness. NEURO: Moves all extremities. Motor and sensory are grossly intact  SKIN:  No rashes.   No skin lesions.    -----------------------------------------------------------------  Diagnostic Data:    All available data reviewed  Lab Results   Component Value Date    WBC 6.4 08/16/2022    HGB 9.4 (L) 08/16/2022    MCV 96.6 08/16/2022     08/16/2022      Lab Results   Component Value Date    GLUCOSE 111 (H) 08/15/2022    BUN 22 08/15/2022    CREATININE 1.13 (H) 08/15/2022  08/15/2022    K 4.1 08/15/2022    CALCIUM 8.3 (L) 08/15/2022     08/15/2022    CO2 30 08/15/2022       PROBLEM LIST:  Principal Problem:    Sepsis due to urinary tract infection ([address]  Active Problems:    Obesity, Class III, BMI 40-49.9 (morbid obesity) ([address]    Sepsis ([address]    Urologic disorders    Bacteremia due to Gram-negative bacteria    Hypoxia    Atrial fibrillation with rapid ventricular response (HCC)    Renal failure syndrome    Abnormal ECG    Acute kidney injury superimposed on CKD ([address]    Calculus in urethra    COVID-19 virus infection    Biventricular congestive heart failure (HCC)    Obstructive uropathy    Klebsiella infection    Urinary tract infection due to extended-spectrum beta lactamase (ESBL) producing Escherichia coli    BARBARA (acute kidney injury) ([address]    COVID-19    Other hypotension    Metabolic acidosis    Chronic anticoagulation    Lymphedema of both lower extremities    Ureteral calculi  Resolved Problems:    * No resolved hospital problems. *      ASSESSMENT / PLAN:  Sepsis due to urinary tract infection ([address]  Principal Problem:    Sepsis due to urinary tract infection ([address]- continue iv meropenem,   Active Problems:    Obesity, Class III, BMI 40-49.9 (morbid obesity) ([address]    Sepsis ([address]    Urologic disorders    Bacteremia due to Gram-negative bacteria    Hypoxia    Atrial fibrillation with rapid ventricular response ([address]- in sinus rhythm now    Renal failure syndrome    Abnormal ECG    Acute kidney injury superimposed on CKD (HCC)-better    Chronic anticoagulation    Lymphedema of both lower extremities    Ureteral calculi  Resolved Problems:    * No resolved hospital problems. *  Covid 19-asymptomatic      Nutrition status:  Well developed, well nourished with no malnutrition  DVT prophylaxis: xarelto  High risk medications: none   Disposition:  Discharge plan is ECF    Cornelio Ceballos MD , MJESSE.  8/16/2022  7:46 AM

## 2022-08-16 NOTE — PROGRESS NOTES
[facility]  Inpatient/Observation/Outpatient Rehabilitation    Date: 2022  Patient Name: Pilor Elders       [] Inpatient Acute/Observation       []  Outpatient  : 1955       [] Pt no showed for scheduled appointment    [x] Pt refused/declined therapy at this time due to: Attempted to see pt. X2, pt. Declined at this time stating she was leaving today and didn't feel she needed therapy. [] Pt cancelled due to:  [] No Reason Given   [] Sick/ill   [] Other:    Therapist/Assistant will attempt to see this patient, at our earliest opportunity.        Jacky Javed, PTA Date: 2022

## 2022-08-16 NOTE — PROGRESS NOTES
Physical Therapy  Facility/Department: [facility] AT THE [facility] MED SURG  Daily Treatment Note  NAME: Codi Montes  : 1955  MRN: [id number]    Date of Service: 2022    Discharge Recommendations:  Subacute/Skilled Nursing Facility        Patient Diagnosis(es): The primary encounter diagnosis was Calculus in urethra. A diagnosis of Septicemia ([address] was also pertinent to this visit. Assessment   Activity Tolerance: Patient limited by fatigue;Patient limited by endurance; Patient tolerated treatment well     Plan    Plan  Plan: 2 times a day 7 days a week  Current Treatment Recommendations: Strengthening; Functional mobility training;Transfer training;Gait training; Safety education & training;Patient/Caregiver education & training;Neuromuscular re-education     Restrictions  Restrictions/Precautions  Restrictions/Precautions: Fall Risk, General Precautions, Isolation  Required Braces or Orthoses?: No     Subjective    Subjective  Subjective: Pt in bed and agreeable tp therapy. pt stating she will get in the recliner later  Pain: denies  Orientation  Overall Orientation Status: Within Functional Limits     Objective   Vitals     Bed Mobility Training  Bed Mobility Training: No  Transfer Training  Transfer Training: No  Gait Training  Gait Training: No     PT Exercises  Exercise Treatment: Supine BLE 2x15 reps     Safety Devices  Type of Devices: Call light within reach; Left in bed       Goals  Short Term Goals  Time Frame for Short term goals: 3 DAYS  Short term goal 1: CGA TRANSFERS  Short term goal 2: CGA GAIT FWW. Additional Goals?: No  Long Term Goals  Time Frame for Long term goals : 4 WEEKS  Long term goal 1: IND GAIT FWW,IND TRANSFERS. Patient Goals   Patient goals : RETURN HOME WITH ASSIST.     Education  Patient Education  Education Given To: Patient  Education Provided: Role of Therapy;Plan of Care;Home Exercise Program  Education Provided Comments: Educated pt on importance of completing transfers/amb.   Education Method: Verbal  Barriers to Learning: None  Education Outcome: Verbalized understanding;Continued education needed    Therapy Time   Individual Concurrent Group Co-treatment   Time In 0903         Time Out 0920         Minutes Sebastián Damon PTA

## 2022-08-16 NOTE — PROGRESS NOTES
Patient is set up with SCAT van to come in for the next 3 days for IV invanz in the outpatient @ 117 Replaced by [facility] KATHRYN Miranda

## 2022-08-17 ENCOUNTER — TELEPHONE (OUTPATIENT)
Dept: INFUSION THERAPY | Age: 67
End: 2022-08-17

## 2022-08-17 ENCOUNTER — HOSPITAL ENCOUNTER (OUTPATIENT)
Dept: INFUSION THERAPY | Age: 67
Discharge: HOME | End: 2022-08-17

## 2022-08-17 ENCOUNTER — CARE COORDINATION (OUTPATIENT)
Dept: CASE MANAGEMENT | Age: 67
End: 2022-08-17

## 2022-08-17 NOTE — CARE COORDINATION
[facility] Transitions Initial Follow Up Call    Call within 2 business days of discharge: Yes    Patient: Ephraim Leigh Patient : 1955   MRN: <[id number]>  Reason for Admission: sob/flank pain  Discharge Date: 22 RARS: Readmission Risk Score: 18.3      Last Discharge [facility]       Date Complaint Diagnosis Description Type Department Provider    22 Shortness of Breath; Flank Pain Calculus in urethra . .. ED to Hosp-Admission (Discharged) (ADMITTED) Florentin Sorto MD; Abhi Damon, . .. Spoke with: [address]: [facility] AT THE [facility]    Non-face-to-face services provided:  Scheduled appointment with PCP-sent request to Dominga Horne  Scheduled appointment with Specialist-follow with cardiology on 22  Obtained and reviewed discharge summary and/or continuity of care documents  Reviewed and followed up on pending diagnostic tests and treatments-  Basic Metabolic Panel  Complete by: Aug 23, 2022  Culture, Blood 1  Complete by: Aug 23, 2022  Culture, Blood 2  Complete by: Aug 23, 2022  When drawing multiple sets of blood cultures, draw from different sites  Culture, Urine  Complete by: Aug 23, 2022    Communication with home health agencies or other community services the patient is currently using referral sent to Javier Zamudio for removal of picc line, requested Andrés lagunas contact  Education of patient/family/caregiver/guardian to support self-management-provided  WHAT YOU NEED TO KNOW ABOUT TAKING ANTIBIOTICS  If you have a bacterial infection, your doctor may prescribe an antibiotic. Antibiotics are powerful drugs and  when used correctly, they can save lives. But like all medications, they can have side effects.   FINISH TAKING ALL YOUR ANTIBIOTICS  Feeling better doesn't mean your infection is gone and if you stop taking your antibiotics without your  physician's direction, your infection could still be active in your body, causing you to develop an antibiotic  resistant hour  sooner than if someone gets to the hospital  Assessment and support for treatment adherence and medication management-reviewed discharge instructions and medications, 1111F order, patient doing ok, no c/o f/c, n/v,d, no sob, ha, body aches, has c/o incontinence, this is not a new issue, sent request to Angela Avery to f/u on HFU and [facility] follow up, explained role of CTN, provided contact information, will follow//JU  Transitions of Care Initial Call    Was this an external facility discharge? No Discharge Facility: [address] to be reviewed by the provider   Additional needs identified to be addressed with provider: Yes  home health care-[facility].  medications-patient has question in regards to starting an oral antibiotic since IV antibiotic finished, please address, thank you//JU             Method of communication with provider : chart routing    Advance Care Planning:   Does patient have an Advance Directive: reviewed and current, reviewed and needs to be updated, not on file; education provided, not on file, patient declined education, decision maker updated, and referral to internal ACP facilitator. Care Transition Nurse contacted the patient by telephone to perform post hospital discharge assessment. Verified name and  with patient as identifiers. Provided introduction to self, and explanation of the CTN role. CTN reviewed discharge instructions, medical action plan and red flags with patient who verbalized understanding. Patient given an opportunity to ask questions and does not have any further questions or concerns at this time. Were discharge instructions available to patient? Yes. Reviewed appropriate site of care based on symptoms and resources available to patient including: PCP  Specialist  Urgent care clinics  [facility]  When to call 911. The patient agrees to contact the PCP office for questions related to their healthcare.      Medication reconciliation was performed with patient, who verbalizes understanding of administration of home medications. Advised obtaining a 90-day supply of all daily and as-needed medications. Was patient discharged with a pulse oximeter? no    CTN provided contact information. Plan for follow-up call in 3-5 days based on severity of symptoms and risk factors.   Plan for next call: symptom management-   follow up appointment-   medication management-      Care Transitions 24 Hour Call    Schedule Follow Up Appointment with PCP: Completed  Do you have a copy of your discharge instructions?: Yes  Do you have all of your prescriptions and are they filled?: Yes  Have you been contacted by a [facility] Pharmacist?: No  Have you scheduled your follow up appointment?:  (Comment: sent request to Sachin Tuttle)  Do you feel like you have everything you need to keep you well at home?: Yes  Care Transitions Interventions     Other Therapy Services: Completed      Other Services: Completed   Disease Association: Completed               Follow Up  Future Appointments   Date Time Provider Tamela Ni   8/23/2022  3:00 PM Echo Whitehead [place] CARD [facility]   8/24/2022 11:00 AM Nicole Lizarraga MD [place] UROLOGY [facility]       Alexandru Simms RN

## 2022-08-17 NOTE — DISCHARGE SUMMARY
I received a call from  stating that the patient canceled all scat transports x3 to the hospital for IV infusions of Invanz. I spoke with the patient who stated she could not afford that and nobody at home will help her. She stated she thought it was all set up. Patient states that she has no intentions on coming out at this time. I spoke to [place] who stated that the hospital paid for all services for her to be transported back and forth. At this time I placed a consult for nursing home health nurses to go out and remove the PICC line. I did explain the concerns to the patient of unresolved infection however at this time she is not changing her mind. I will update her primary care Livia vinson to this event.     Kiki Billy, APRN - CNP

## 2022-08-17 NOTE — TELEPHONE ENCOUNTER
Called patient because of no  show today. I informed her about her missing her appointment today. States \"that was canceled\", I said By who,\"She said by me\". I asked her about her line Sandeep Leobardo states\"yes I have it ,I'm waiting on a call,someone is suppose to come out here and take it out\". Then hung up on me.

## 2022-08-17 NOTE — CARE COORDINATION
Request from [address], RN.,  please check patient's date and time of appt. Date 8/31 @ 11:15AM    Also called [facility], they do have order and nurse will be visiting tomorrow. Called patient left msg with above information.     Radha Freitas, [address] [facility] Coordination Transition

## 2022-08-23 ENCOUNTER — CARE COORDINATION (OUTPATIENT)
Dept: CASE MANAGEMENT | Age: 67
End: 2022-08-23

## 2022-08-23 NOTE — CARE COORDINATION
[facility] Transitions Follow Up Call    2022    Patient: Nereyda Duval  Patient : 1955   MRN: [id number]  Reason for Admission: Calculus in urethra; septicemia  Discharge Date: 22 RARS: Readmission Risk Score: 18.3    Attempt #1 to contact patient and spouse for transitions call. Contact information left to  requesting call back at the earliest convenience. Noted pt has Urology HFU tomorrow.          Follow Up  Future Appointments   Date Time Provider Tamela Ni   2022 11:00 AM [facility]       Marnie Buenrostro RN

## 2022-08-25 ENCOUNTER — CARE COORDINATION (OUTPATIENT)
Dept: CASE MANAGEMENT | Age: 67
End: 2022-08-25

## 2022-08-25 NOTE — CARE COORDINATION
[facility] Transitions Follow Up Call    2022    Patient: Tyler Laureano  Patient : 1955   MRN: [id number]  Reason for Admission: sob/flank pain  Discharge Date: 22 RARS: Readmission Risk Score: 18.3    Second unsuccessful attempt to contact patient for a SADAF f/u. Caller left a HIPAA compliant message with contact information for asking for a call back. Will resolve episode. Care Transitions Subsequent and Final Call    Subsequent and Final Calls  Care Transitions Interventions  Other Interventions: Follow Up  No future appointments.     Zayra Mclaughlin, RN

## 2022-08-25 NOTE — CARE COORDINATION
[facility] Transitions Follow Up Call    2022    Patient: Rahat Lyman  Patient : 1955   MRN: [id number]  Reason for Admission: Calculus in urethra,septicemia  Discharge Date: 22 RARS: Readmission Risk Score: 34.4         Duplicate call. Beto Velazquez RN, CTN calling pt. Will defer call to Anthony Tidwell for follow up. Follow Up  No future appointments.     Mickey Rene RN

## 2022-10-10 RX ORDER — OXYBUTYNIN CHLORIDE 15 MG/1
TABLET, EXTENDED RELEASE ORAL
Qty: 90 TABLET | Refills: 0 | OUTPATIENT
Start: 2022-10-10

## 2022-11-17 ENCOUNTER — APPOINTMENT (OUTPATIENT)
Dept: CT IMAGING | Age: 67
End: 2022-11-17
Payer: MEDICARE

## 2022-11-17 ENCOUNTER — HOSPITAL ENCOUNTER (EMERGENCY)
Age: 67
Discharge: ANOTHER HOSPITAL | End: 2022-11-17
Attending: EMERGENCY MEDICINE
Payer: MEDICARE

## 2022-11-17 VITALS
TEMPERATURE: 97.3 F | OXYGEN SATURATION: 99 % | RESPIRATION RATE: 26 BRPM | DIASTOLIC BLOOD PRESSURE: 93 MMHG | WEIGHT: 250 LBS | HEART RATE: 90 BPM | BODY MASS INDEX: 47.24 KG/M2 | SYSTOLIC BLOOD PRESSURE: 174 MMHG

## 2022-11-17 DIAGNOSIS — N17.9 ACUTE KIDNEY INJURY (HCC): Primary | ICD-10-CM

## 2022-11-17 LAB
ABSOLUTE EOS #: 0.04 K/UL (ref 0–0.44)
ABSOLUTE IMMATURE GRANULOCYTE: 0.06 K/UL (ref 0–0.3)
ABSOLUTE LYMPH #: 0.99 K/UL (ref 1.1–3.7)
ABSOLUTE MONO #: 0.55 K/UL (ref 0.1–1.2)
ALBUMIN SERPL-MCNC: 3 G/DL (ref 3.5–5.2)
ALBUMIN/GLOBULIN RATIO: 0.6 (ref 1–2.5)
ALP BLD-CCNC: 91 U/L (ref 35–104)
ALT SERPL-CCNC: 11 U/L (ref 5–33)
ANION GAP SERPL CALCULATED.3IONS-SCNC: 16 MMOL/L (ref 9–17)
ANION GAP SERPL CALCULATED.3IONS-SCNC: 17 MMOL/L (ref 9–17)
AST SERPL-CCNC: 12 U/L
BASOPHILS # BLD: 0 % (ref 0–2)
BASOPHILS ABSOLUTE: <0.03 K/UL (ref 0–0.2)
BILIRUB SERPL-MCNC: <0.1 MG/DL (ref 0.3–1.2)
BILIRUBIN URINE: NEGATIVE
BUN BLDV-MCNC: 102 MG/DL (ref 8–23)
BUN BLDV-MCNC: 97 MG/DL (ref 8–23)
BUN/CREAT BLD: 15 (ref 9–20)
BUN/CREAT BLD: 15 (ref 9–20)
CALCIUM SERPL-MCNC: 8.2 MG/DL (ref 8.6–10.4)
CALCIUM SERPL-MCNC: 8.9 MG/DL (ref 8.6–10.4)
CHLORIDE BLD-SCNC: 106 MMOL/L (ref 98–107)
CHLORIDE BLD-SCNC: 107 MMOL/L (ref 98–107)
CO2: 12 MMOL/L (ref 20–31)
CO2: 13 MMOL/L (ref 20–31)
COLOR: ABNORMAL
CREAT SERPL-MCNC: 6.52 MG/DL (ref 0.5–0.9)
CREAT SERPL-MCNC: 6.84 MG/DL (ref 0.5–0.9)
EKG ATRIAL RATE: 89 BPM
EKG P AXIS: 45 DEGREES
EKG P-R INTERVAL: 182 MS
EKG Q-T INTERVAL: 378 MS
EKG QRS DURATION: 140 MS
EKG QTC CALCULATION (BAZETT): 459 MS
EKG R AXIS: -46 DEGREES
EKG T AXIS: 23 DEGREES
EKG VENTRICULAR RATE: 89 BPM
EOSINOPHILS RELATIVE PERCENT: 1 % (ref 1–4)
EPITHELIAL CELLS UA: NORMAL /HPF (ref 0–25)
GFR SERPL CREATININE-BSD FRML MDRD: 6 ML/MIN/1.73M2
GFR SERPL CREATININE-BSD FRML MDRD: 6 ML/MIN/1.73M2
GLUCOSE BLD-MCNC: 115 MG/DL (ref 70–99)
GLUCOSE BLD-MCNC: 127 MG/DL (ref 70–99)
GLUCOSE URINE: NEGATIVE
HCO3 VENOUS: 12.2 MMOL/L (ref 24–30)
HCO3 VENOUS: 12.3 MMOL/L (ref 24–30)
HCT VFR BLD CALC: 28.5 % (ref 36.3–47.1)
HEMOGLOBIN: 9 G/DL (ref 11.9–15.1)
IMMATURE GRANULOCYTES: 1 %
KETONES, URINE: NEGATIVE
LACTIC ACID: 0.8 MMOL/L (ref 0.5–2.2)
LEUKOCYTE ESTERASE, URINE: ABNORMAL
LIPASE: 48 U/L (ref 13–60)
LYMPHOCYTES # BLD: 12 % (ref 24–43)
MCH RBC QN AUTO: 28.7 PG (ref 25.2–33.5)
MCHC RBC AUTO-ENTMCNC: 31.6 G/DL (ref 28.4–34.8)
MCV RBC AUTO: 90.8 FL (ref 82.6–102.9)
MONOCYTES # BLD: 6 % (ref 3–12)
NEGATIVE BASE EXCESS, VEN: 14.9 MMOL/L (ref 0–2)
NEGATIVE BASE EXCESS, VEN: 15 MMOL/L (ref 0–2)
NITRITE, URINE: NEGATIVE
NRBC AUTOMATED: 0 PER 100 WBC
O2 SAT, VEN: 48.3 % (ref 60–85)
O2 SAT, VEN: 55 % (ref 60–85)
PATIENT TEMP: 37
PATIENT TEMP: 37
PCO2, VEN: 33.2 MM HG (ref 39–55)
PCO2, VEN: 33.5 MM HG (ref 39–55)
PDW BLD-RTO: 15.2 % (ref 11.8–14.4)
PH UA: 8.5 (ref 5–9)
PH VENOUS: 7.18 (ref 7.32–7.42)
PH VENOUS: 7.18 (ref 7.32–7.42)
PLATELET # BLD: 334 K/UL (ref 138–453)
PMV BLD AUTO: 8.5 FL (ref 8.1–13.5)
PO2, VEN: 31.9 MM HG (ref 30–50)
PO2, VEN: 35.3 MM HG (ref 30–50)
POTASSIUM SERPL-SCNC: 5.2 MMOL/L (ref 3.7–5.3)
POTASSIUM SERPL-SCNC: 5.4 MMOL/L (ref 3.7–5.3)
PROTEIN UA: ABNORMAL
RBC # BLD: 3.14 M/UL (ref 3.95–5.11)
RBC UA: NORMAL /HPF (ref 0–2)
SEG NEUTROPHILS: 80 % (ref 36–65)
SEGMENTED NEUTROPHILS ABSOLUTE COUNT: 6.92 K/UL (ref 1.5–8.1)
SODIUM BLD-SCNC: 135 MMOL/L (ref 135–144)
SODIUM BLD-SCNC: 136 MMOL/L (ref 135–144)
SPECIFIC GRAVITY UA: 1.02 (ref 1.01–1.02)
TOTAL PROTEIN: 7.8 G/DL (ref 6.4–8.3)
TURBIDITY: CLEAR
URINE HGB: ABNORMAL
UROBILINOGEN, URINE: NORMAL
WBC # BLD: 8.6 K/UL (ref 3.5–11.3)
WBC UA: NORMAL /HPF (ref 0–5)

## 2022-11-17 PROCEDURE — 96376 TX/PRO/DX INJ SAME DRUG ADON: CPT

## 2022-11-17 PROCEDURE — 96375 TX/PRO/DX INJ NEW DRUG ADDON: CPT

## 2022-11-17 PROCEDURE — 93005 ELECTROCARDIOGRAM TRACING: CPT | Performed by: EMERGENCY MEDICINE

## 2022-11-17 PROCEDURE — 93010 ELECTROCARDIOGRAM REPORT: CPT | Performed by: INTERNAL MEDICINE

## 2022-11-17 PROCEDURE — 36415 COLL VENOUS BLD VENIPUNCTURE: CPT

## 2022-11-17 PROCEDURE — 81001 URINALYSIS AUTO W/SCOPE: CPT

## 2022-11-17 PROCEDURE — 99285 EMERGENCY DEPT VISIT HI MDM: CPT

## 2022-11-17 PROCEDURE — 2580000003 HC RX 258: Performed by: EMERGENCY MEDICINE

## 2022-11-17 PROCEDURE — 85025 COMPLETE CBC W/AUTO DIFF WBC: CPT

## 2022-11-17 PROCEDURE — 87077 CULTURE AEROBIC IDENTIFY: CPT

## 2022-11-17 PROCEDURE — 82805 BLOOD GASES W/O2 SATURATION: CPT

## 2022-11-17 PROCEDURE — 74176 CT ABD & PELVIS W/O CONTRAST: CPT

## 2022-11-17 PROCEDURE — 96366 THER/PROPH/DIAG IV INF ADDON: CPT

## 2022-11-17 PROCEDURE — 6360000002 HC RX W HCPCS: Performed by: EMERGENCY MEDICINE

## 2022-11-17 PROCEDURE — 83605 ASSAY OF LACTIC ACID: CPT

## 2022-11-17 PROCEDURE — 96365 THER/PROPH/DIAG IV INF INIT: CPT

## 2022-11-17 PROCEDURE — 80053 COMPREHEN METABOLIC PANEL: CPT

## 2022-11-17 PROCEDURE — 87186 SC STD MICRODIL/AGAR DIL: CPT

## 2022-11-17 PROCEDURE — 87086 URINE CULTURE/COLONY COUNT: CPT

## 2022-11-17 PROCEDURE — 83690 ASSAY OF LIPASE: CPT

## 2022-11-17 PROCEDURE — 80048 BASIC METABOLIC PNL TOTAL CA: CPT

## 2022-11-17 RX ORDER — SODIUM CHLORIDE 9 MG/ML
INJECTION, SOLUTION INTRAVENOUS CONTINUOUS
Status: DISCONTINUED | OUTPATIENT
Start: 2022-11-17 | End: 2022-11-18 | Stop reason: HOSPADM

## 2022-11-17 RX ORDER — FENTANYL CITRATE 50 UG/ML
25 INJECTION, SOLUTION INTRAMUSCULAR; INTRAVENOUS ONCE
Status: COMPLETED | OUTPATIENT
Start: 2022-11-17 | End: 2022-11-17

## 2022-11-17 RX ORDER — 0.9 % SODIUM CHLORIDE 0.9 %
1000 INTRAVENOUS SOLUTION INTRAVENOUS ONCE
Status: COMPLETED | OUTPATIENT
Start: 2022-11-17 | End: 2022-11-17

## 2022-11-17 RX ADMIN — FENTANYL CITRATE 25 MCG: 50 INJECTION INTRAMUSCULAR; INTRAVENOUS at 18:22

## 2022-11-17 RX ADMIN — SODIUM CHLORIDE: 9 INJECTION, SOLUTION INTRAVENOUS at 18:22

## 2022-11-17 RX ADMIN — FENTANYL CITRATE 25 MCG: 50 INJECTION INTRAMUSCULAR; INTRAVENOUS at 22:04

## 2022-11-17 RX ADMIN — SODIUM CHLORIDE 1000 ML: 9 INJECTION, SOLUTION INTRAVENOUS at 16:00

## 2022-11-17 RX ADMIN — CEFTRIAXONE 1000 MG: 1 INJECTION, POWDER, FOR SOLUTION INTRAMUSCULAR; INTRAVENOUS at 18:05

## 2022-11-17 ASSESSMENT — PAIN DESCRIPTION - LOCATION
LOCATION: OTHER (COMMENT)
LOCATION: ABDOMEN

## 2022-11-17 ASSESSMENT — PAIN DESCRIPTION - ORIENTATION
ORIENTATION: LOWER
ORIENTATION: ANTERIOR

## 2022-11-17 ASSESSMENT — PAIN SCALES - GENERAL
PAINLEVEL_OUTOF10: 10
PAINLEVEL_OUTOF10: 10

## 2022-11-17 ASSESSMENT — LIFESTYLE VARIABLES
HOW OFTEN DO YOU HAVE A DRINK CONTAINING ALCOHOL: NEVER
HOW MANY STANDARD DRINKS CONTAINING ALCOHOL DO YOU HAVE ON A TYPICAL DAY: PATIENT DOES NOT DRINK

## 2022-11-17 ASSESSMENT — PAIN DESCRIPTION - DESCRIPTORS
DESCRIPTORS: THROBBING
DESCRIPTORS: CRAMPING

## 2022-11-17 ASSESSMENT — PAIN - FUNCTIONAL ASSESSMENT: PAIN_FUNCTIONAL_ASSESSMENT: 0-10

## 2022-11-17 NOTE — ED NOTES
[facility] [facility] Access to initiate transfer.       Jaqueline Babinski  11/17/22 5473

## 2022-11-17 NOTE — ED PROVIDER NOTES
[facility] ED  EMERGENCY DEPARTMENT ENCOUNTER      Pt Name: Hay Doty  MRN: [id number]  [place] 1955  Date of evaluation: 11/17/2022  Provider: Jennifer Luna MD    CHIEF COMPLAINT       Chief Complaint   Patient presents with    Abdominal Pain     Lower cramping, x1 week. Hematuria     Ongoing x couple days. Pt. States shes has \"burning, urinary frequency worsening that's been on going for weeks\"         HISTORY OF PRESENT ILLNESS   (Location/Symptom, Timing/Onset, Context/Setting, Quality, Duration, Modifying Factors, Severity)  Note limiting factors. Hay Doty is a 74 y.o. female who presents to the emergency department      75 yo female with lower abdominal cramping nd pain x 1 week. Also c/o burning with urination and frequency and hematuria. Bilateral ureteral stents for stones. No flank pain. Symptoms worsening. Nursing Notes were reviewed. REVIEW OF SYSTEMS    (2-9 systems for level 4, 10 or more for level 5)     Review of Systems   All other systems reviewed and are negative. Except as noted above the remainder of the review of systems was reviewed and negative.        PAST MEDICAL HISTORY     Past Medical History:   Diagnosis Date    Acute kidney injury superimposed on CKD ([address] 8/10/2022    Atrial fibrillation with RVR ([address] 8/9/2022    Carcinoma ([address]     Squamous Cell    Cellulitis     COVID-19 virus infection 8/12/2022    DVT (deep venous thrombosis) ([address] 2006    LLE    Kidney stone     Lymphedema     Morbid obesity ([address]     Psoas abscess, right ([address]     Pyelonephritis     Wears glasses          SURGICAL HISTORY       Past Surgical History:   Procedure Laterality Date    CARPAL TUNNEL RELEASE  1990s    CT ABSCESS DRAIN SUBCUTANEOUS  01/10/2022    CT ABSCESS DRAIN SUBCUTANEOUS 1/10/2022 [facility] CT SCAN    CT ABSCESS DRAIN SUBCUTANEOUS  04/21/2022    CT ABSCESS DRAIN SUBCUTANEOUS 4/21/2022 [facility] CT SCAN    CT ABSCESS DRAIN SUBCUTANEOUS  4/29/2022    CT ABSCESS DRAIN SUBCUTANEOUS 4/29/2022 [facility] CT SCAN    CYSTOSCOPY N/A 01/04/2022    CYSTOSCOPY URETERAL STENT INSERTION [facility]  with stent    CYSTOSCOPY Right 03/01/2022    CYSTOSCOPY URETEROSCOPY LASER-WTIH HLL performed by Klever Sloan MD at [address] Right 03/01/2022    CYSTOSCOPY URETERAL STENT Jadine German performed by Klever Sloan MD at [address] Right 04/05/2022    Dr Brodie Win with stent insertion    CYSTOSCOPY Right 04/05/2022    CYSTOSCOPY URETEROSCOPY LASER-HLL performed by Klever Sloan MD at [address] Right 04/05/2022    CYSTOSCOPY URETERAL STENT Jadine German performed by Klever Sloan MD at [address] Right 04/22/2022    CYSTOSCOPY URETERAL STENT INSERTION (Right )    CYSTOSCOPY Right 4/22/2022    CYSTOSCOPY URETERAL STENT INSERTION performed by Laith Rascon MD at [address] Left 8/9/2022    CYSTOSCOPY URETERAL STENT INSERTION performed by Klever Sloan MD at [address] [facility].  PICC [facility]  4/26/2022         INSERT MIDLINE CATHETER  01/07/2022         IR NEPHROSTOMY PERCUTANEOUS RIGHT  01/05/2022    IR NEPHROSTOMY PERCUTANEOUS RIGHT 1/5/2022 Monalisa West MD [facility] SPECIAL PROCEDURES    ANNABELLA AND BSO (CERVIX REMOVED)      05/2019    TUBAL LIGATION  1993    TUBAL LIGATION      WISDOM TOOTH EXTRACTION           CURRENT MEDICATIONS       Discharge Medication List as of 11/17/2022 10:48 PM        CONTINUE these medications which have NOT CHANGED    Details   carvedilol (COREG) 12.5 MG tablet Take 1 tablet by mouth in the morning and 1 tablet in the evening. Take with meals. , Disp-60 tablet, R-3Normal      furosemide (LASIX) 40 MG tablet Take 1 tablet by mouth in the morning., Disp-60 tablet, R-3Normal      potassium chloride (KLOR-CON M) 20 MEQ extended release tablet Take 1 tablet by mouth in the morning., Disp-30 tablet, R-5Normal nystatin (MYCOSTATIN) 441948 UNIT/ML suspension Take 5 mLs by mouth in the morning and 5 mLs at noon and 5 mLs in the evening and 5 mLs before bedtime. , Oral, 4 TIMES DAILY Starting Tue 8/16/2022, Disp-60 mL, R-1, Normal      rivaroxaban (XARELTO) 20 MG TABS tablet Take 1 tablet by mouth every 24 hours, Disp-30 tablet, R-2Normal      acetaminophen (TYLENOL) 325 MG tablet Take 650 mg by mouth every 6 hours as needed for PainHistorical Med      sodium chloride flush 0.9 % injection Infuse 5-40 mLs intravenously at bedtime PICC line maintenenceHistorical Med      amLODIPine (NORVASC) 10 MG tablet Take 1 tablet by mouth daily, Disp-30 tablet, R-3Normal      oxybutynin (DITROPAN XL) 15 MG extended release tablet Take 1 tablet by mouth daily, Disp-30 tablet, R-3Normal      Misc Natural Products (OSTEO BI-FLEX ADV DOUBLE ST) TABS EVERY MORNING, Historical Med      polyethylene glycol (GLYCOLAX) 17 g packet Take 17 g by mouth daily as needed for Constipation Historical Med      ipratropium-albuterol (DUONEB) 0.5-2.5 (3) MG/3ML SOLN nebulizer solution Inhale 1 vial into the lungs every 4 hours Historical Med      loratadine (CLARITIN) 10 MG capsule Take 10 mg by mouth daily Historical Med      Ascorbic Acid (VITAMIN C) 250 MG tablet Take 250 mg by mouth dailyHistorical Med      vitamin E 400 UNIT capsule Take 400 Units by mouth dailyHistorical Med      vitamin D (CHOLECALCIFEROL) 25 MCG (1000 UT) TABS tablet Take 1,000 Units by mouth dailyHistorical Med      Multiple Vitamins-Minerals (THERAPEUTIC MULTIVITAMIN-MINERALS) tablet Take 1 tablet by mouth dailyHistorical Med             ALLERGIES     Patient has no known allergies.     FAMILY HISTORY       Family History   Adopted: Yes   Problem Relation Age of Onset    Cancer Mother         The patient reports her biologic mother did have bladder cancer          SOCIAL HISTORY       Social History     Socioeconomic History    Marital status:      Spouse name: None Number of children: None    Years of education: None    Highest education level: None   Tobacco Use    Smoking status: Former     Packs/day: 0.50     Years: 42.00     Pack years: 21.00     Types: Cigarettes     Quit date: 2022     Years since quittin.5    Smokeless tobacco: Never   Vaping Use    Vaping Use: Never used   Substance and Sexual Activity    Alcohol use: No     Alcohol/week: 0.0 standard drinks    Drug use: No    Sexual activity: Not Currently       SCREENINGS        Blanco Coma Scale  Eye Opening: Spontaneous  Best Verbal Response: Oriented  Best Motor Response: Obeys commands  Blanco Coma Scale Score: 15               PHYSICAL EXAM    (up to 7 for level 4, 8 or more for level 5)     ED Triage Vitals [22 1510]   BP Temp Temp src Heart Rate Resp SpO2 Height Weight   (!) 141/99 97.3 °F (36.3 °C) -- 99 18 97 % -- 250 lb (113.4 kg)       Physical Exam  Vitals and nursing note reviewed. Constitutional:       Comments: Ill appearing and uncomfortable. HENT:      Head: Normocephalic and atraumatic. Cardiovascular:      Rate and Rhythm: Normal rate and regular rhythm. Pulmonary:      Effort: Pulmonary effort is normal. No respiratory distress. Breath sounds: Normal breath sounds. Abdominal:      General: There is no distension. Tenderness: There is no abdominal tenderness. Skin:     General: Skin is warm and dry. Findings: No rash. Neurological:      General: No focal deficit present. Mental Status: She is alert and oriented to person, place, and time.        DIAGNOSTIC RESULTS     EKG: All EKG's are interpreted by the Emergency Department Physician who either signs or Co-signs this chart in the absence of a cardiologist.        RADIOLOGY:   Non-plain film images such as CT, Ultrasound and MRI are read by the radiologist. Plain radiographic images are visualized and preliminarily interpreted by the emergency physician with the below findings:        Interpretation per the Radiologist below, if available at the time of this note:    CT ABDOMEN PELVIS WO CONTRAST Additional Contrast? None   Final Result   1. Malpositioned left ureteral stent with moderate to severe left   hydroureteronephrosis and concern for superimposed urinary tract infection. Clinical correlation recommended. 2.  Severe right hydronephrosis despite presence of a satisfactorily   positioned ureteral stent. This may reflect urinary tract infection and/or   stent obstruction. 3.  Colonic diverticulosis without diverticulitis. 4.  Additional nonemergent findings, as above. ED BEDSIDE ULTRASOUND:   Performed by ED Physician - none    LABS:  Labs Reviewed   CULTURE, URINE - Abnormal; Notable for the following components:       Result Value    Culture   (*)     Value: ESCHERICHIA COLI 50 to 100,000 CFU/ML This organism is an Extended Spectrum Beta Lactamase (ESBL)  and resistance to therapy with Penicillins, Cephalosporins and Aztreonam is expected. These organisms generally remain susceptible to Carbapenems. Consider ID Consultation. CONTACT PRECAUTIONS INDICATED.       All other components within normal limits   COMPREHENSIVE METABOLIC PANEL - Abnormal; Notable for the following components:    Glucose 127 (*)      (*)     Creatinine 6.84 (*)     Est, Glom Filt Rate 6 (*)     CO2 13 (*)     Total Bilirubin <0.1 (*)     Albumin 3.0 (*)     Albumin/Globulin Ratio 0.6 (*)     All other components within normal limits   CBC WITH AUTO DIFFERENTIAL - Abnormal; Notable for the following components:    RBC 3.14 (*)     Hemoglobin 9.0 (*)     Hematocrit 28.5 (*)     RDW 15.2 (*)     Seg Neutrophils 80 (*)     Lymphocytes 12 (*)     Immature Granulocytes 1 (*)     Absolute Lymph # 0.99 (*)     All other components within normal limits   BLOOD GAS, VENOUS - Abnormal; Notable for the following components:    pH, Serafin 7.182 (*)     pCO2, Serafin 33.5 (*)     HCO3, Venous 12.3 (*)     Negative Base Excess, Serafin 14.9 (*)     O2 Sat, Serafin 48.3 (*)     All other components within normal limits   URINALYSIS WITH REFLEX TO CULTURE - Abnormal; Notable for the following components:    Color, UA Red (*)     Urine Hgb 3+ (*)     Protein, UA 4+ (*)     Leukocyte Esterase, Urine MODERATE (*)     All other components within normal limits   BASIC METABOLIC PANEL - Abnormal; Notable for the following components:    Glucose 115 (*)     BUN 97 (*)     Creatinine 6.52 (*)     Est, Glom Filt Rate 6 (*)     Calcium 8.2 (*)     Potassium 5.4 (*)     CO2 12 (*)     All other components within normal limits   BLOOD GAS, VENOUS - Abnormal; Notable for the following components:    pH, Serafin 7.183 (*)     pCO2, Serafin 33.2 (*)     HCO3, Venous 12.2 (*)     Negative Base Excess, Serafin 15.0 (*)     O2 Sat, Serafin 55.0 (*)     All other components within normal limits   LIPASE   LACTIC ACID   MICROSCOPIC URINALYSIS       All other labs were within normal range or not returned as of this dictation. EMERGENCY DEPARTMENT COURSE and DIFFERENTIAL DIAGNOSIS/MDM:   Vitals:    Vitals:    11/17/22 1806 11/17/22 [birth year] 11/17/22 2049   BP:  (!) 184/90  (!) 174/93   Pulse: 94 89 88 90   Resp: 24 25  26   Temp:       SpO2: 99% 99% 98% 99%   Weight:               MDM  Number of Diagnoses or Management Options  Acute kidney injury [address] [address] [facility]  Diagnosis management comments: Results reviewed. Creatinine 6.84, . Potassium 5.4 and venous ph 7.18. Normal anion gap.  10-20 WBC moderate LE on urine. Normal Lactate. IVF and Rocephin initiated. CT reviewed. Malposition of left ureteral stent and right sten in place with bilateral hydronephrosis. Patient transferred to [facility] for continued management. Patient and family updated        [facility]       REASSESSMENT          CRITICAL CARE TIME   Total Critical Care time was 35 minutes, excluding separately reportable procedures.   There was a high probability of clinically significant/life threatening deterioration in the patient's condition which required my urgent intervention. CONSULTS:  None    PROCEDURES:  Unless otherwise noted below, none     Procedures        FINAL IMPRESSION      1. Acute kidney injury [facility])          DISPOSITION/PLAN   DISPOSITION Decision To Transfer 11/17/2022 05:12:39 PM      PATIENT REFERRED TO:  No follow-up provider specified. DISCHARGE MEDICATIONS:  Discharge Medication List as of 11/17/2022 10:48 PM        Controlled Substances Monitoring:     No flowsheet data found.     (Please note that portions of this note were completed with a voice recognition program.  Efforts were made to edit the dictations but occasionally words are mis-transcribed.)    Franky Locke MD (electronically signed)  Attending Emergency Physician             Franky Locke MD  11/20/22 [phone]

## 2022-11-18 ENCOUNTER — HOSPITAL ENCOUNTER (INPATIENT)
Age: 67
LOS: 5 days | Discharge: HOME | DRG: 660 | End: 2022-11-23
Attending: INTERNAL MEDICINE | Admitting: HOSPITALIST
Payer: MEDICARE

## 2022-11-18 PROBLEM — R79.89 AZOTEMIA: Status: ACTIVE | Noted: 2022-11-18

## 2022-11-18 PROBLEM — E87.5 HYPERKALEMIA: Status: ACTIVE | Noted: 2022-11-18

## 2022-11-18 LAB
ANION GAP SERPL CALCULATED.3IONS-SCNC: 15 MEQ/L (ref 8–16)
BASOPHILS # BLD: 0.3 %
BASOPHILS ABSOLUTE: 0 THOU/MM3 (ref 0–0.1)
BUN BLDV-MCNC: 100 MG/DL (ref 7–22)
CALCIUM SERPL-MCNC: 7.8 MG/DL (ref 8.5–10.5)
CHLORIDE BLD-SCNC: 114 MEQ/L (ref 98–111)
CO2: 9 MEQ/L (ref 23–33)
CREAT SERPL-MCNC: 7.1 MG/DL (ref 0.4–1.2)
EKG ATRIAL RATE: 73 BPM
EKG ATRIAL RATE: 75 BPM
EKG P AXIS: 39 DEGREES
EKG P AXIS: 78 DEGREES
EKG P-R INTERVAL: 170 MS
EKG P-R INTERVAL: 174 MS
EKG Q-T INTERVAL: 412 MS
EKG Q-T INTERVAL: 420 MS
EKG QRS DURATION: 132 MS
EKG QRS DURATION: 134 MS
EKG QTC CALCULATION (BAZETT): 460 MS
EKG QTC CALCULATION (BAZETT): 462 MS
EKG R AXIS: -40 DEGREES
EKG R AXIS: -43 DEGREES
EKG T AXIS: 14 DEGREES
EKG T AXIS: 17 DEGREES
EKG VENTRICULAR RATE: 73 BPM
EKG VENTRICULAR RATE: 75 BPM
EOSINOPHIL # BLD: 0.9 %
EOSINOPHILS ABSOLUTE: 0.1 THOU/MM3 (ref 0–0.4)
ERYTHROCYTE [DISTWIDTH] IN BLOOD BY AUTOMATED COUNT: 15.5 % (ref 11.5–14.5)
ERYTHROCYTE [DISTWIDTH] IN BLOOD BY AUTOMATED COUNT: 52.2 FL (ref 35–45)
GFR SERPL CREATININE-BSD FRML MDRD: 6 ML/MIN/1.73M2
GLUCOSE BLD-MCNC: 102 MG/DL (ref 70–108)
GLUCOSE BLD-MCNC: 149 MG/DL (ref 70–108)
GLUCOSE BLD-MCNC: 154 MG/DL (ref 70–108)
GLUCOSE BLD-MCNC: 182 MG/DL (ref 70–108)
GLUCOSE BLD-MCNC: 191 MG/DL (ref 70–108)
HCT VFR BLD CALC: 26 % (ref 37–47)
HEMOGLOBIN: 8 GM/DL (ref 12–16)
IMMATURE GRANS (ABS): 0.05 THOU/MM3 (ref 0–0.07)
IMMATURE GRANULOCYTES: 0.7 %
INR BLD: 1.19 (ref 0.85–1.13)
LACTIC ACID: 0.4 MMOL/L (ref 0.5–2)
LYMPHOCYTES # BLD: 15.4 %
LYMPHOCYTES ABSOLUTE: 1.2 THOU/MM3 (ref 1–4.8)
MCH RBC QN AUTO: 28.2 PG (ref 26–33)
MCHC RBC AUTO-ENTMCNC: 30.8 GM/DL (ref 32.2–35.5)
MCV RBC AUTO: 91.5 FL (ref 81–99)
MONOCYTES # BLD: 8.1 %
MONOCYTES ABSOLUTE: 0.6 THOU/MM3 (ref 0.4–1.3)
NUCLEATED RED BLOOD CELLS: 0 /100 WBC
PLATELET # BLD: 273 THOU/MM3 (ref 130–400)
PMV BLD AUTO: 8.2 FL (ref 9.4–12.4)
POTASSIUM SERPL-SCNC: 5.4 MEQ/L (ref 3.5–5.2)
POTASSIUM SERPL-SCNC: 5.8 MEQ/L (ref 3.5–5.2)
RBC # BLD: 2.84 MILL/MM3 (ref 4.2–5.4)
SEG NEUTROPHILS: 74.6 %
SEGMENTED NEUTROPHILS ABSOLUTE COUNT: 5.6 THOU/MM3 (ref 1.8–7.7)
SODIUM BLD-SCNC: 138 MEQ/L (ref 135–145)
WBC # BLD: 7.5 THOU/MM3 (ref 4.8–10.8)

## 2022-11-18 PROCEDURE — 6360000002 HC RX W HCPCS: Performed by: PHYSICIAN ASSISTANT

## 2022-11-18 PROCEDURE — 6370000000 HC RX 637 (ALT 250 FOR IP): Performed by: HOSPITALIST

## 2022-11-18 PROCEDURE — 99223 1ST HOSP IP/OBS HIGH 75: CPT | Performed by: PHYSICIAN ASSISTANT

## 2022-11-18 PROCEDURE — 2500000003 HC RX 250 WO HCPCS: Performed by: INTERNAL MEDICINE

## 2022-11-18 PROCEDURE — 85610 PROTHROMBIN TIME: CPT

## 2022-11-18 PROCEDURE — 93005 ELECTROCARDIOGRAM TRACING: CPT | Performed by: HOSPITALIST

## 2022-11-18 PROCEDURE — 84132 ASSAY OF SERUM POTASSIUM: CPT

## 2022-11-18 PROCEDURE — 83605 ASSAY OF LACTIC ACID: CPT

## 2022-11-18 PROCEDURE — 2580000003 HC RX 258: Performed by: PHYSICIAN ASSISTANT

## 2022-11-18 PROCEDURE — 36415 COLL VENOUS BLD VENIPUNCTURE: CPT

## 2022-11-18 PROCEDURE — 1200000003 HC TELEMETRY R&B

## 2022-11-18 PROCEDURE — 6360000002 HC RX W HCPCS: Performed by: HOSPITALIST

## 2022-11-18 PROCEDURE — 2580000003 HC RX 258: Performed by: INTERNAL MEDICINE

## 2022-11-18 PROCEDURE — 82948 REAGENT STRIP/BLOOD GLUCOSE: CPT

## 2022-11-18 PROCEDURE — 85025 COMPLETE CBC W/AUTO DIFF WBC: CPT

## 2022-11-18 PROCEDURE — 80048 BASIC METABOLIC PNL TOTAL CA: CPT

## 2022-11-18 PROCEDURE — 93010 ELECTROCARDIOGRAM REPORT: CPT | Performed by: INTERNAL MEDICINE

## 2022-11-18 PROCEDURE — 2580000003 HC RX 258: Performed by: HOSPITALIST

## 2022-11-18 PROCEDURE — 99223 1ST HOSP IP/OBS HIGH 75: CPT | Performed by: INTERNAL MEDICINE

## 2022-11-18 RX ORDER — SODIUM CHLORIDE 9 MG/ML
INJECTION, SOLUTION INTRAVENOUS CONTINUOUS
Status: DISCONTINUED | OUTPATIENT
Start: 2022-11-18 | End: 2022-11-18

## 2022-11-18 RX ORDER — ONDANSETRON 4 MG/1
4 TABLET, ORALLY DISINTEGRATING ORAL EVERY 8 HOURS PRN
Status: DISCONTINUED | OUTPATIENT
Start: 2022-11-18 | End: 2022-11-23 | Stop reason: HOSPADM

## 2022-11-18 RX ORDER — SODIUM CHLORIDE 9 MG/ML
INJECTION, SOLUTION INTRAVENOUS PRN
Status: DISCONTINUED | OUTPATIENT
Start: 2022-11-18 | End: 2022-11-23 | Stop reason: HOSPADM

## 2022-11-18 RX ORDER — ACETAMINOPHEN 325 MG/1
650 TABLET ORAL EVERY 6 HOURS PRN
Status: DISCONTINUED | OUTPATIENT
Start: 2022-11-18 | End: 2022-11-23 | Stop reason: HOSPADM

## 2022-11-18 RX ORDER — DEXTROSE MONOHYDRATE 100 MG/ML
INJECTION, SOLUTION INTRAVENOUS CONTINUOUS PRN
Status: DISCONTINUED | OUTPATIENT
Start: 2022-11-18 | End: 2022-11-23 | Stop reason: ALTCHOICE

## 2022-11-18 RX ORDER — ONDANSETRON 2 MG/ML
4 INJECTION INTRAMUSCULAR; INTRAVENOUS EVERY 6 HOURS PRN
Status: DISCONTINUED | OUTPATIENT
Start: 2022-11-18 | End: 2022-11-23 | Stop reason: HOSPADM

## 2022-11-18 RX ORDER — ACETAMINOPHEN 650 MG/1
650 SUPPOSITORY RECTAL EVERY 6 HOURS PRN
Status: DISCONTINUED | OUTPATIENT
Start: 2022-11-18 | End: 2022-11-23 | Stop reason: HOSPADM

## 2022-11-18 RX ORDER — CALCIUM GLUCONATE 10 MG/ML
1000 INJECTION, SOLUTION INTRAVENOUS ONCE
Status: COMPLETED | OUTPATIENT
Start: 2022-11-18 | End: 2022-11-18

## 2022-11-18 RX ORDER — POLYETHYLENE GLYCOL 3350 17 G/17G
17 POWDER, FOR SOLUTION ORAL DAILY PRN
Status: DISCONTINUED | OUTPATIENT
Start: 2022-11-18 | End: 2022-11-23 | Stop reason: HOSPADM

## 2022-11-18 RX ORDER — SODIUM POLYSTYRENE SULFONATE 15 G/60ML
15 SUSPENSION ORAL; RECTAL ONCE
Status: COMPLETED | OUTPATIENT
Start: 2022-11-18 | End: 2022-11-18

## 2022-11-18 RX ORDER — SODIUM CHLORIDE 0.9 % (FLUSH) 0.9 %
5-40 SYRINGE (ML) INJECTION PRN
Status: DISCONTINUED | OUTPATIENT
Start: 2022-11-18 | End: 2022-11-23 | Stop reason: HOSPADM

## 2022-11-18 RX ORDER — SODIUM CHLORIDE 0.9 % (FLUSH) 0.9 %
5-40 SYRINGE (ML) INJECTION EVERY 12 HOURS SCHEDULED
Status: DISCONTINUED | OUTPATIENT
Start: 2022-11-18 | End: 2022-11-23 | Stop reason: HOSPADM

## 2022-11-18 RX ADMIN — HYDROMORPHONE HYDROCHLORIDE 0.5 MG: 1 INJECTION, SOLUTION INTRAMUSCULAR; INTRAVENOUS; SUBCUTANEOUS at 08:10

## 2022-11-18 RX ADMIN — INSULIN HUMAN 10 UNITS: 100 INJECTION, SOLUTION PARENTERAL at 16:02

## 2022-11-18 RX ADMIN — DEXTROSE MONOHYDRATE 250 ML: 100 INJECTION, SOLUTION INTRAVENOUS at 16:01

## 2022-11-18 RX ADMIN — ONDANSETRON 4 MG: 2 INJECTION INTRAMUSCULAR; INTRAVENOUS at 15:00

## 2022-11-18 RX ADMIN — HYDROMORPHONE HYDROCHLORIDE 0.5 MG: 1 INJECTION, SOLUTION INTRAMUSCULAR; INTRAVENOUS; SUBCUTANEOUS at 22:04

## 2022-11-18 RX ADMIN — ONDANSETRON 4 MG: 2 INJECTION INTRAMUSCULAR; INTRAVENOUS at 03:25

## 2022-11-18 RX ADMIN — MEROPENEM 500 MG: 500 INJECTION, POWDER, FOR SOLUTION INTRAVENOUS at 23:32

## 2022-11-18 RX ADMIN — SODIUM POLYSTYRENE SULFONATE 15 G: 15 SUSPENSION ORAL; RECTAL at 15:08

## 2022-11-18 RX ADMIN — CALCIUM GLUCONATE 1000 MG: 10 INJECTION, SOLUTION INTRAVENOUS at 14:52

## 2022-11-18 RX ADMIN — ONDANSETRON 4 MG: 2 INJECTION INTRAMUSCULAR; INTRAVENOUS at 22:04

## 2022-11-18 RX ADMIN — SODIUM BICARBONATE: 84 INJECTION, SOLUTION INTRAVENOUS at 16:02

## 2022-11-18 RX ADMIN — SODIUM CHLORIDE: 9 INJECTION, SOLUTION INTRAVENOUS at 02:22

## 2022-11-18 RX ADMIN — HYDROMORPHONE HYDROCHLORIDE 0.5 MG: 1 INJECTION, SOLUTION INTRAMUSCULAR; INTRAVENOUS; SUBCUTANEOUS at 03:20

## 2022-11-18 RX ADMIN — MEROPENEM 500 MG: 500 INJECTION, POWDER, FOR SOLUTION INTRAVENOUS at 06:26

## 2022-11-18 RX ADMIN — HYDROMORPHONE HYDROCHLORIDE 0.5 MG: 1 INJECTION, SOLUTION INTRAMUSCULAR; INTRAVENOUS; SUBCUTANEOUS at 14:58

## 2022-11-18 ASSESSMENT — ENCOUNTER SYMPTOMS
RHINORRHEA: 0
COUGH: 0
SHORTNESS OF BREATH: 0
VOMITING: 0
NAUSEA: 0
ABDOMINAL PAIN: 1
SORE THROAT: 0
ALLERGIC/IMMUNOLOGIC NEGATIVE: 1
NAUSEA: 1
DIARRHEA: 0
EYES NEGATIVE: 1
RESPIRATORY NEGATIVE: 1

## 2022-11-18 ASSESSMENT — PAIN SCALES - GENERAL
PAINLEVEL_OUTOF10: 5
PAINLEVEL_OUTOF10: 7
PAINLEVEL_OUTOF10: 7
PAINLEVEL_OUTOF10: 6
PAINLEVEL_OUTOF10: 10
PAINLEVEL_OUTOF10: 7
PAINLEVEL_OUTOF10: 5
PAINLEVEL_OUTOF10: 7

## 2022-11-18 ASSESSMENT — PAIN - FUNCTIONAL ASSESSMENT
PAIN_FUNCTIONAL_ASSESSMENT: PREVENTS OR INTERFERES SOME ACTIVE ACTIVITIES AND ADLS
PAIN_FUNCTIONAL_ASSESSMENT: ACTIVITIES ARE NOT PREVENTED
PAIN_FUNCTIONAL_ASSESSMENT: PREVENTS OR INTERFERES SOME ACTIVE ACTIVITIES AND ADLS

## 2022-11-18 ASSESSMENT — PAIN DESCRIPTION - DESCRIPTORS
DESCRIPTORS: PATIENT UNABLE TO DESCRIBE
DESCRIPTORS: ACHING;CRUSHING
DESCRIPTORS: ACHING;STABBING
DESCRIPTORS: ACHING
DESCRIPTORS: DISCOMFORT

## 2022-11-18 ASSESSMENT — PAIN DESCRIPTION - LOCATION
LOCATION: ABDOMEN;FLANK
LOCATION: FLANK;ABDOMEN
LOCATION: PERINEUM
LOCATION: ABDOMEN;FLANK
LOCATION: ABDOMEN

## 2022-11-18 ASSESSMENT — PAIN DESCRIPTION - ORIENTATION
ORIENTATION: RIGHT;LEFT;MID
ORIENTATION: RIGHT;LEFT
ORIENTATION: RIGHT;LEFT
ORIENTATION: LOWER;MID
ORIENTATION: MID;RIGHT;LEFT;LOWER

## 2022-11-18 ASSESSMENT — PAIN DESCRIPTION - PAIN TYPE: TYPE: ACUTE PAIN

## 2022-11-18 NOTE — H&P
Hospitalist - History & Physical      Patient: Azeri Acre    Unit/Bed:6K-23/023-A  YOB: 1955  MRN: [id number]   Acct: [de-identified]   PCP: DONG Haque - RODRIGUEZ      Assessment and Plan:        Acute Kidney Injury:   Bilateral renal stents placed 8/9/22 for nephrolithiasis  Hx of sepsis secondary to UTI, previously treated with incomplete course of IV meropenem in August  Start IV meropenem  Nephrology consult  Urine cx pending  Strict I&O's  Essential hypertension:   Restart carvedilol 12.5 mg twice daily  Paroxysmal atrial fibrillation:   Consider heparin after surgery or restarting Xarelto  Telemetry   History of DVT's  Consider heparin after surgery  Chronic lymphedema, bilateral lower extremities  Outpatient follow up, lymphedema clinic   HFrEF          EF 30-35% from echo 8/22   Strict I&O's  Daily weights   Medical noncompliance  Patient is not taking any of her medications      CC:  dysuria and hematuria    HPI: Patient is transferred from [facility] for further evaluation by urology and nephrology. [age] y/o female with PMH of paroxysmal atrial fibrillation on Xarelto, nephrolithiasis, chronic DVT's, and chronic CHF presents from [facility] for BARBARA and UTI and renal stents with \"malpositioned left ureteral stent\" and left hydroureteronephrosis and severe right hydronephrosis on CT scan. She reports dysuria and hematuria for 2 weeks, no back pain, fevers, nausea, vomiting, or diarrhea. She denies hematuria, dysuria, or flank pain at discharge from hospital admission 8/16/22. She is currently not taking any of her prescribed medications, including carvedilol, furosemide, Xarelto, amlodipine. Pt was hospitalized 8/8/22 to 8/16/22 for nephrolithiasis and sepsis secondary to urinary tract infection.  Her urine culture grew Klebsiella pneumoniae and she was treated with IV meropenem with a plan to continue this treatment as an outpatient however the infusions were canceled due to patient reporting that she couldn't afford these and did not have transportation. When patient was informed that the hospital paid for these services, she continued to [facility] no intention in coming out for this. \" During prior admission, she was found to have EF of 35-40%, functional mitral and tricuspid regurgitation and mild pulmonary hypertension and was newly diagnosed with left ventricular systolic dysfunction. She was also newly diagnosed with atrial fibrillation during last admission. \"    HPI is collected and compiled by Deepika HORVATH. I have independently verified and agree with the information presented here. ROS: Review of Systems   Constitutional:  Positive for activity change. Negative for fever. HENT: Negative. Eyes: Negative. Respiratory: Negative. Cardiovascular: Negative. Gastrointestinal:  Positive for abdominal pain and nausea. Endocrine: Negative. Genitourinary:  Positive for dysuria, flank pain and hematuria. Skin: Negative. Allergic/Immunologic: Negative. Neurological: Negative. Hematological: Negative. Psychiatric/Behavioral: Negative.        PMH:    Past Medical History:   Diagnosis Date    Acute kidney injury superimposed on CKD ([facility] 8/10/2022    Atrial fibrillation with RVR ([facility] [address] 8/9/2022    Carcinoma ([facility] [address]     Squamous Cell    Cellulitis     COVID-19 virus infection 8/12/2022    DVT (deep venous thrombosis) ([facility] [address] 2006    LLE    Kidney stone     Lymphedema     Morbid obesity ([facility] [address]     Psoas abscess, right (HCC)     Pyelonephritis     Wears glasses      SHX:    Social History     Socioeconomic History    Marital status:      Spouse name: Not on file    Number of children: Not on file    Years of education: Not on file    Highest education level: Not on file   Occupational History    Not on file   Tobacco Use    Smoking status: Former     Packs/day: 0.50     Years: 42.00     Pack years: 21.00     Types: Cigarettes     Quit date: 2022     Years since quittin.5    Smokeless tobacco: Never   Vaping Use    Vaping Use: Never used   Substance and Sexual Activity    Alcohol use: No     Alcohol/week: 0.0 standard drinks    Drug use: No    Sexual activity: Not Currently   Other Topics Concern    Not on file   Social History Narrative    Not on file     Social Determinants of Health     Financial Resource Strain: Not on file   Food Insecurity: Not on file   Transportation Needs: Not on file   Physical Activity: Not on file   Stress: Not on file   Social Connections: Not on file   Intimate Partner Violence: Not on file   Housing Stability: Not on file     FHX:   Family History   Adopted: Yes   Problem Relation Age of Onset    Cancer Mother         The patient reports her biologic mother did have bladder cancer     Allergies: No Known Allergies  Medications:     sodium chloride      sodium chloride        sodium chloride flush  5-40 mL IntraVENous 2 times per day     sodium chloride flush, 5-40 mL, PRN  sodium chloride, , PRN  ondansetron, 4 mg, Q8H PRN   Or  ondansetron, 4 mg, Q6H PRN  polyethylene glycol, 17 g, Daily PRN  acetaminophen, 650 mg, Q6H PRN   Or  acetaminophen, 650 mg, Q6H PRN        Labs:   Recent Results (from the past 24 hour(s))   Lipase    Collection Time: 22  3:20 PM   Result Value Ref Range    Lipase 48 13 - 60 U/L   Comprehensive Metabolic Panel    Collection Time: 22  3:20 PM   Result Value Ref Range    Glucose 127 (H) 70 - 99 mg/dL     (HH) 8 - 23 mg/dL    Creatinine 6.84 (HH) 0.50 - 0.90 mg/dL    Est, Glom Filt Rate 6 (L) >60 mL/min/1.73m2    Bun/Cre Ratio 15 9 - 20    Calcium 8.9 8.6 - 10.4 mg/dL    Sodium 135 135 - 144 mmol/L    Potassium 5.2 3.7 - 5.3 mmol/L    Chloride 106 98 - 107 mmol/L    CO2 13 (L) 20 - 31 mmol/L    Anion Gap 16 9 - 17 mmol/L    Alkaline Phosphatase 91 35 - 104 U/L    ALT 11 5 - 33 U/L    AST 12 <32 U/L    Total Bilirubin <0.1 (L) 0.3 - 1.2 mg/dL    Total Protein 7.8 6.4 - 8.3 g/dL    Albumin 3.0 (L) 3.5 - 5.2 g/dL    Albumin/Globulin Ratio 0.6 (L) 1.0 - 2.5   CBC with Auto Differential    Collection Time: 11/17/22  3:20 PM   Result Value Ref Range    WBC 8.6 3.5 - 11.3 k/uL    RBC 3.14 (L) 3.95 - 5.11 m/uL    Hemoglobin 9.0 (L) 11.9 - 15.1 g/dL    Hematocrit 28.5 (L) 36.3 - 47.1 %    MCV 90.8 82.6 - 102.9 fL    MCH 28.7 25.2 - 33.5 pg    MCHC 31.6 28.4 - 34.8 g/dL    RDW 15.2 (H) 11.8 - 14.4 %    Platelets 165 346 - 569 k/uL    MPV 8.5 8.1 - 13.5 fL    NRBC Automated 0.0 0.0 per 100 WBC    Seg Neutrophils 80 (H) 36 - 65 %    Lymphocytes 12 (L) 24 - 43 %    Monocytes 6 3 - 12 %    Eosinophils % 1 1 - 4 %    Basophils 0 0 - 2 %    Immature Granulocytes 1 (H) 0 %    Segs Absolute 6.92 1.50 - 8.10 k/uL    Absolute Lymph # 0.99 (L) 1.10 - 3.70 k/uL    Absolute Mono # 0.55 0.10 - 1.20 k/uL    Absolute Eos # 0.04 0.00 - 0.44 k/uL    Basophils Absolute <0.03 0.00 - 0.20 k/uL    Absolute Immature Granulocyte 0.06 0.00 - 0.30 k/uL   Lactic Acid    Collection Time: 11/17/22  3:20 PM   Result Value Ref Range    Lactic Acid 0.8 0.5 - 2.2 mmol/L   EKG 12 Lead    Collection Time: 11/17/22  5:25 PM   Result Value Ref Range    Ventricular Rate 89 BPM    Atrial Rate 89 BPM    P-R Interval 182 ms    QRS Duration 140 ms    Q-T Interval 378 ms    QTc Calculation (Bazett) 459 ms    P Axis 45 degrees    R Axis -46 degrees    T Axis 23 degrees   Blood gas, venous    Collection Time: 11/17/22  5:35 PM   Result Value Ref Range    pH, Serafin 7.182 (LL) 7.32 - 7.42    pCO2, Serafin 33.5 (L) 39 - 55 mm Hg    pO2, Serafin 31.9 30.0 - 50.0 mm Hg    HCO3, Venous 12.3 (L) 24.0 - 30.0 mmol/L    Negative Base Excess, Serafin 14.9 (H) 0.0 - 2.0 mmol/L    O2 Sat, Serafin 48.3 (L) 60.0 - 85.0 %    Pt Temp 37.0    Urinalysis with Reflex to Culture    Collection Time: 11/17/22  5:52 PM    Specimen: Urine   Result Value Ref Range    Color, UA Red (A) Yellow    Turbidity UA Clear Clear    Glucose, Ur NEGATIVE NEGATIVE    Bilirubin Urine NEGATIVE NEGATIVE    Ketones, Urine NEGATIVE NEGATIVE    Specific Gravity, UA 1.020 1.010 - 1.020    Urine Hgb 3+ (A) NEGATIVE    pH, UA 8.5 5.0 - 9.0    Protein, UA 4+ (A) NEGATIVE    Urobilinogen, Urine Normal Normal    Nitrite, Urine NEGATIVE NEGATIVE    Leukocyte Esterase, Urine MODERATE (A) NEGATIVE   Microscopic Urinalysis    Collection Time: 11/17/22  5:52 PM   Result Value Ref Range    WBC, UA 10 TO 20 0 - 5 /HPF    RBC, UA GREATER THAN 100 0 - 2 /HPF    Epithelial Cells UA 0 TO 2 0 - 25 /HPF   Blood gas, venous    Collection Time: 11/17/22  7:10 PM   Result Value Ref Range    pH, Serafin 7.183 (LL) 7.32 - 7.42    pCO2, Serafin 33.2 (L) 39 - 55 mm Hg    pO2, Serafin 35.3 30.0 - 50.0 mm Hg    HCO3, Venous 12.2 (L) 24.0 - 30.0 mmol/L    Negative Base Excess, Serafin 15.0 (H) 0.0 - 2.0 mmol/L    O2 Sat, Serafin 55.0 (L) 60.0 - 85.0 %    Pt Temp 37    BMP    Collection Time: 11/17/22  7:38 PM   Result Value Ref Range    Glucose 115 (H) 70 - 99 mg/dL    BUN 97 (HH) 8 - 23 mg/dL    Creatinine 6.52 (HH) 0.50 - 0.90 mg/dL    Est, Glom Filt Rate 6 (L) >60 mL/min/1.73m2    Bun/Cre Ratio 15 9 - 20    Calcium 8.2 (L) 8.6 - 10.4 mg/dL    Sodium 136 135 - 144 mmol/L    Potassium 5.4 (H) 3.7 - 5.3 mmol/L    Chloride 107 98 - 107 mmol/L    CO2 12 (L) 20 - 31 mmol/L    Anion Gap 17 9 - 17 mmol/L         Vital Signs: T: 97.7F P: 89 RR: 20 B/P: 152/68: FiO2: RA: O2 Sat[de-identified] I/O: No intake or output data in the 24 hours ending 11/18/22 0111      General:   no acute distress  HEENT:  normocephalic and atraumatic. No scleral icterus. PEARLA, mucous membranes moist  Neck: supple. Trachea midline. No JVD. Full ROM, no meningismus. Lungs: clear to auscultation. No retractions, no accessory muscle use. Cardiac: RRR, 3/6 JOSE M, 2+ pulses  Abdomen: soft. Nontender. Bowel sounds active  Extremities:  No clubbing, cyanosis x 4, 4+ chronic edema    Vasculature: capillary refill < 3 seconds. Skin:  warm and dry.  no visible rashes  Psych:  Alert and oriented x3.  Affect appropriate  Lymph:  No supraclavicular adenopathy. Neurologic:  CN II-XII grossly intact. No focal deficit. Data: (All radiographs, tracings, PFTs, and imaging are personally viewed and interpreted unless otherwise noted).    Outside data reviewed  EKG: rhythm: normal sinus rhythm, rate=89 bpm, jq=174 ms, ubm=110 ms, bh=910 ms, axis=45 degrees and left anterior hemiblock or RBBB        Electronically signed by  Jori Penn PA-C

## 2022-11-18 NOTE — PROGRESS NOTES
Hospitalist Addendum:    Patient admitted with acute renal failure secondary to obstructive uropathy.   NPO, OR for bilateral stent replacements per urology  Bicarb gtt, treat hyperkalemia, repeat labs  Continue with Mer Hannah MD

## 2022-11-18 NOTE — PROGRESS NOTES
This virtual nurse completed admission requirements except; vitals, height. weight and pain assessment which bedside nurse has to complete. This nurse educated patient to use call light to report any change in condition to bedside nursing staff/ safety precautions.

## 2022-11-18 NOTE — H&P
Hospitalist - History & Physical      Patient: Rahat Elaine    Unit/Bed:6K-23/023-A  YOB: 1955  MRN: [id number]   Acct: [de-identified]   PCP: DONG Burgess - CNP      Assessment and Plan:        Acute Kidney Injury:   Bilateral renal stents placed 8/9/22 for nephrolithiasis  Hx of sepsis secondary to UTI, previously treated with incomplete course of IV meropenem in August  Start IV meropenem  Nephrology consult  Urine cx pending  Strict I&O's  Essential hypertension:   Restart carvedilol 12.5 mg twice daily  Paroxysmal atrial fibrillation:   Consider heparin after surgery  Telemetry   History of DVT's  Consider heparin after surgery  Chronic lymphedema, bilateral lower extremities  Outpatient follow up, lymphedema clinic   HFrEF   EF 30-35% from echo 8/22   Strict I&O's  Daily weights   Medical noncompliance  Patient is not taking any of her medications. CC:  Dysuria and hematuria    HPI: 75 y/o female with PMH of paroxysmal atrial fibrillation on Xarelto, nephrolithiasis, chronic DVT's, and chronic CHF presents from [facility] for BARBARA and UTI and renal stents with \"malpositioned left ureteral stent\" and left hydroureteronephrosis and severe right hydronephrosis on CT scan. She reports dysuria and hematuria for 2 weeks, no back pain, fevers, nausea, vomiting, or diarrhea. She denies hematuria, dysuria, or flank pain at discharge from hospital admission 8/16/22. She is currently not taking any of her prescribed medications, including carvedilol, furosemide, Xarelto, amlodipine. Pt was hospitalized 8/8/22 to 8/16/22 for nephrolithiasis and sepsis secondary to urinary tract infection. Her urine culture grew Klebsiella pneumoniae and she was treated with IV meropenem with a plan to continue this treatment as an outpatient however the infusions were canceled due to patient reporting that she couldn't afford these and did not have transportation.  When patient was informed that the hospital paid for these services, she continued to [facility] no intention in coming out for this. \" During prior admission, she was found to have EF of 35-40%, functional mitral and tricuspid regurgitation and mild pulmonary hypertension and was newly diagnosed with left ventricular systolic dysfunction. She was also newly diagnosed with atrial fibrillation during last admission. ROS: Review of Systems   Constitutional:  Negative for chills and fever. HENT:  Negative for rhinorrhea and sore throat. Respiratory:  Negative for cough and shortness of breath. Cardiovascular:  Positive for leg swelling. Negative for chest pain and palpitations. Gastrointestinal:  Positive for abdominal pain. Negative for diarrhea, nausea and vomiting. Genitourinary:  Positive for dysuria and hematuria. Negative for flank pain. Neurological:  Positive for headaches. Negative for dizziness.      PMH:    Past Medical History:   Diagnosis Date    Acute kidney injury superimposed on CKD ([facility] [address] 8/10/2022    Atrial fibrillation with RVR ([facility] [address] 2022    [facility]     Squamous Cell    Cellulitis     COVID-19 virus infection 2022    DVT (deep venous thrombosis) ([facility] 2006    LLE    Kidney stone     Lymphedema     Morbid obesity ([facility] [address]     Psoas abscess, right (HCC)     Pyelonephritis     Wears glasses      SHX:    Social History     Socioeconomic History    Marital status:      Spouse name: Not on file    Number of children: Not on file    Years of education: Not on file    Highest education level: Not on file   Occupational History    Not on file   Tobacco Use    Smoking status: Former     Packs/day: 0.50     Years: 42.00     Pack years: 21.00     Types: Cigarettes     Quit date: 2022     Years since quittin.5    Smokeless tobacco: Never   Vaping Use    Vaping Use: Never used   Substance and Sexual Activity    Alcohol use: No     Alcohol/week: 0.0 standard drinks    Drug use: No    Sexual activity: Not Currently   Other Topics Concern    Not on file   Social History Narrative    Not on file     Social Determinants of Health     Financial Resource Strain: Not on file   Food Insecurity: Not on file   Transportation Needs: Not on file   Physical Activity: Not on file   Stress: Not on file   Social Connections: Not on file   Intimate Partner Violence: Not on file   Housing Stability: Not on file     FHX:   Family History   Adopted: Yes   Problem Relation Age of Onset    Cancer Mother         The patient reports her biologic mother did have bladder cancer     Allergies: No Known Allergies  Medications:     sodium chloride      sodium chloride        sodium chloride flush  5-40 mL IntraVENous 2 times per day    meropenem  1,000 mg IntraVENous Q8H     sodium chloride flush, 5-40 mL, PRN  sodium chloride, , PRN  ondansetron, 4 mg, Q8H PRN   Or  ondansetron, 4 mg, Q6H PRN  polyethylene glycol, 17 g, Daily PRN  acetaminophen, 650 mg, Q6H PRN   Or  acetaminophen, 650 mg, Q6H PRN  HYDROmorphone, 0.5 mg, Q4H PRN      Labs:   Recent Results (from the past 24 hour(s))   Lipase    Collection Time: 11/17/22  3:20 PM   Result Value Ref Range    Lipase 48 13 - 60 U/L   Comprehensive Metabolic Panel    Collection Time: 11/17/22  3:20 PM   Result Value Ref Range    Glucose 127 (H) 70 - 99 mg/dL     (HH) 8 - 23 mg/dL    Creatinine 6.84 (HH) 0.50 - 0.90 mg/dL    Est, Glom Filt Rate 6 (L) >60 mL/min/1.73m2    Bun/Cre Ratio 15 9 - 20    Calcium 8.9 8.6 - 10.4 mg/dL    Sodium 135 135 - 144 mmol/L    Potassium 5.2 3.7 - 5.3 mmol/L    Chloride 106 98 - 107 mmol/L    CO2 13 (L) 20 - 31 mmol/L    Anion Gap 16 9 - 17 mmol/L    Alkaline Phosphatase 91 35 - 104 U/L    ALT 11 5 - 33 U/L    AST 12 <32 U/L    Total Bilirubin <0.1 (L) 0.3 - 1.2 mg/dL    Total Protein 7.8 6.4 - 8.3 g/dL    Albumin 3.0 (L) 3.5 - 5.2 g/dL    Albumin/Globulin Ratio 0.6 (L) 1.0 - 2.5   CBC with Auto Differential    Collection Time: 11/17/22  3:20 PM   Result Value Ref Range    WBC 8.6 3.5 - 11.3 k/uL    RBC 3.14 (L) 3.95 - 5.11 m/uL    Hemoglobin 9.0 (L) 11.9 - 15.1 g/dL    Hematocrit 28.5 (L) 36.3 - 47.1 %    MCV 90.8 82.6 - 102.9 fL    MCH 28.7 25.2 - 33.5 pg    MCHC 31.6 28.4 - 34.8 g/dL    RDW 15.2 (H) 11.8 - 14.4 %    Platelets 791 873 - 141 k/uL    MPV 8.5 8.1 - 13.5 fL    NRBC Automated 0.0 0.0 per 100 WBC    Seg Neutrophils 80 (H) 36 - 65 %    Lymphocytes 12 (L) 24 - 43 %    Monocytes 6 3 - 12 %    Eosinophils % 1 1 - 4 %    Basophils 0 0 - 2 %    Immature Granulocytes 1 (H) 0 %    Segs Absolute 6.92 1.50 - 8.10 k/uL    Absolute Lymph # 0.99 (L) 1.10 - 3.70 k/uL    Absolute Mono # 0.55 0.10 - 1.20 k/uL    Absolute Eos # 0.04 0.00 - 0.44 k/uL    Basophils Absolute <0.03 0.00 - 0.20 k/uL    Absolute Immature Granulocyte 0.06 0.00 - 0.30 k/uL   Lactic Acid    Collection Time: 11/17/22  3:20 PM   Result Value Ref Range    Lactic Acid 0.8 0.5 - 2.2 mmol/L   EKG 12 Lead    Collection Time: 11/17/22  5:25 PM   Result Value Ref Range    Ventricular Rate 89 BPM    Atrial Rate 89 BPM    P-R Interval 182 ms    QRS Duration 140 ms    Q-T Interval 378 ms    QTc Calculation (Bazett) 459 ms    P Axis 45 degrees    R Axis -46 degrees    T Axis 23 degrees   Blood gas, venous    Collection Time: 11/17/22  5:35 PM   Result Value Ref Range    pH, Serafin 7.182 (LL) 7.32 - 7.42    pCO2, Serafin 33.5 (L) 39 - 55 mm Hg    pO2, Serafin 31.9 30.0 - 50.0 mm Hg    HCO3, Venous 12.3 (L) 24.0 - 30.0 mmol/L    Negative Base Excess, Serafin 14.9 (H) 0.0 - 2.0 mmol/L    O2 Sat, Serafin 48.3 (L) 60.0 - 85.0 %    Pt Temp 37.0    Urinalysis with Reflex to Culture    Collection Time: 11/17/22  5:52 PM    Specimen: Urine   Result Value Ref Range    Color, UA Red (A) Yellow    Turbidity UA Clear Clear    Glucose, Ur NEGATIVE NEGATIVE    Bilirubin Urine NEGATIVE NEGATIVE    Ketones, Urine NEGATIVE NEGATIVE    Specific Gravity, UA 1.020 1.010 - 1.020    Urine Hgb 3+ (A) NEGATIVE    pH, UA 8.5 5.0 - 9.0    Protein, UA 4+ (A) NEGATIVE    Urobilinogen, Urine Normal Normal    Nitrite, Urine NEGATIVE NEGATIVE    Leukocyte Esterase, Urine MODERATE (A) NEGATIVE   Microscopic Urinalysis    Collection Time: 11/17/22  5:52 PM   Result Value Ref Range    WBC, UA 10 TO 20 0 - 5 /HPF    RBC, UA GREATER THAN 100 0 - 2 /HPF    Epithelial Cells UA 0 TO 2 0 - 25 /HPF   Blood gas, venous    Collection Time: 11/17/22  7:10 PM   Result Value Ref Range    pH, Serafin 7.183 (LL) 7.32 - 7.42    pCO2, Serafin 33.2 (L) 39 - 55 mm Hg    pO2, Serafin 35.3 30.0 - 50.0 mm Hg    HCO3, Venous 12.2 (L) 24.0 - 30.0 mmol/L    Negative Base Excess, Serafin 15.0 (H) 0.0 - 2.0 mmol/L    O2 Sat, Serafin 55.0 (L) 60.0 - 85.0 %    Pt Temp 37    BMP    Collection Time: 11/17/22  7:38 PM   Result Value Ref Range    Glucose 115 (H) 70 - 99 mg/dL    BUN 97 (HH) 8 - 23 mg/dL    Creatinine 6.52 (HH) 0.50 - 0.90 mg/dL    Est, Glom Filt Rate 6 (L) >60 mL/min/1.73m2    Bun/Cre Ratio 15 9 - 20    Calcium 8.2 (L) 8.6 - 10.4 mg/dL    Sodium 136 135 - 144 mmol/L    Potassium 5.4 (H) 3.7 - 5.3 mmol/L    Chloride 107 98 - 107 mmol/L    CO2 12 (L) 20 - 31 mmol/L    Anion Gap 17 9 - 17 mmol/L         Vital Signs: T: 97.7 P: 89 RR: 20 B/P: 152/58: O2 Sat:99: I/O: No intake or output data in the 24 hours ending 11/18/22 0217      General:   Pt appears comfortable, no acute distress  HEENT:  normocephalic and atraumatic. No scleral icterus. PEARLA, mucous membranes moist  Neck: supple. Trachea midline. No JVD. Full ROM, no meningismus. Lungs: clear to auscultation. No retractions, no accessory muscle use. Cardiac: RRR, 3/6 murmur present, 2+ pulses  Abdomen: soft. Mild tenderness to palpation to LLQ, no rebound, no guarding  Extremities:  No clubbing, cyanosis x 4, massive BLE lymphedema present    Vasculature: capillary refill < 3 seconds. Skin:  warm and dry. no visible rashes  Psych:  Alert and oriented x3. Affect appropriate  Lymph:  No supraclavicular adenopathy.   Neurologic:  CN II-XII grossly intact. No focal deficit. Data: (All radiographs, tracings, PFTs, and imaging are personally viewed and interpreted unless otherwise noted).    EKG: rhythm: normal sinus rhythm, rate=89 bpm, tg=239 ms, efe=485 ms, ra=636 ms, axis=45 degrees    Electronically signed by  Moody Kaur

## 2022-11-18 NOTE — CARE COORDINATION
Case Management Assessment  Initial Evaluation    Date/Time of Evaluation: 11/18/2022 1:32 PM  Assessment Completed by: Buck Nageotte, RN    If patient is discharged prior to next notation, then this note serves as note for discharge by case management. Patient Name: Romulo Marroquin                   YOB: 1955  Diagnosis: BARBARA (acute kidney injury) [facility]) [N17.9]                   Date / Time: 11/18/2022 12:07 AM    Patient Admission Status: Inpatient     Current PCP: DONG Sloan CNP  PCP verified by CM? Yes    Chart Reviewed: Yes      Patient Orientation: Alert and Oriented    Patient Cognition: Alert  History Provided by: Patient    Hospitalization in the last 30 days (Readmission):  No    If yes, Readmission Assessment in CM Navigator will be completed. Advance Directives:     Code Status: Full Code     Primary Decision MakerVerda Chanel - [phone]    Secondary Decision Maker: Jono Jamison - [phone]    Discharge Planning  Patient lives with: Spouse/Significant Other, Children Type of Home: House   Primary Caregiver: Self  Patient Support Systems include: Spouse/Significant Other, Children   Current Financial resources: Medicare  Current community resources: Other (Comment) (none)  Current services prior to admission: Durable Medical Equipment (walker)   Type of Home Care services:  None    ADLS  Prior functional level: Independent in ADLs/IADLs  Current functional level: Independent in ADLs/IADLs      Family can provide assistance at DC: Yes  Would you like Case Management to discuss the discharge plan with any other family members/significant others, and if so, who?  No  Plans to Return to Present Housing: Yes  Other Identified Issues/Barriers to RETURNING to current housing: no  Potential Assistance needed at discharge: Transportation  Patient expects to discharge to: [address] [place] [address] for transportation at discharge: [address] MEDICARE / Plan: Griselda Lopez ESSENTIAL/PLUS / Product Type: *No Product type* /     Does insurance require precert for SNF: Yes    Potential assistance Purchasing Medications: No  [id number]  [place] [id number]  [place]  [place] [address] [address]  Phone: [phone] Fax: [phone]      Factors facilitating achievement of predicted outcomes: Family support and Cooperative    Barriers to discharge: Medical complications    Additional Case Management Notes: Direct admit from [address] [place] [address] Road to Nephrology & Urology. Found with displaced stent. Planning on going to OR to exchange ureteral stent. Receiving: Sodium bicarb gtt, calcium gluconate infusion, Insulin & dextrose IV x 1, IV Merrem, Kayexalate. 11/17/22 19:38 11/18/22 08:44   Potassium 5.4 (H) 5.8 (H)   BUN,BUNPL 97 (HH) 100 (H)   Creatinine 6.52 (HH) 7.1 (HH)      11/17/22 17:52   Color, UA Red ! Protein, UA 4+ ! Leukocyte Esterase, Urine MODERATE ! Urine Hgb 3+ ! Procedure: 11/17 CT Abd/Plv: Malpositioned left ureteral stent with moderate to severe left hydroureteronephrosis and concern for superimposed urinary tract infection. Clinical correlation recommended. Severe right hydronephrosis despite presence of a satisfactorily  positioned ureteral stent. This may reflect urinary tract infection and/or stent obstruction. Colonic diverticulosis without diverticulitis. The Plan for Transition of Care is related to the following treatment goals of BARBARA (acute kidney injury) [facility]) [N17.9]    Patient Goals/Plan/Treatment Preferences: From home with  and daughter. They assist with the cooking and cleaning. She has a walker. Denies any needs at this time. She states she is the only one in her home that drives. She is unsure how she will get transportation home at this time.    Transportation/Food Security/Housekeeping Addressed: No issues identified.      Roger Mehta RN  Case Management Department

## 2022-11-18 NOTE — FLOWSHEET NOTE
11/18/22 5728   Safe Environment   Safety Measures Other (comment)  (VN safety round)   VN called into patients room and introduced myself and role. Patient did not answer. Video activated for safety check. . Patient resting comfortably in bed. . Call light within reach. No obvious signs of distress noted at this time.

## 2022-11-18 NOTE — ED NOTES
Transfer center called and received bed from [facility]. Pt will go to 6K bed 23.       Ernestina Winston RN  11/17/22 1922

## 2022-11-18 NOTE — CONSULTS
[address] RENAL TELEMETRY 6K  [address] [facility] [address]  Dept: [phone]  Loc: [phone]  Visit Date: 11/17/2022    Urology Consult Note    Reason for Consult:  Acute Kidney Injury  Requesting Physician:  Terald Duane, PA-C    History Obtained From:  Patient and Nurse    Chief Complaint: Dysuria and Hematuria    HISTORY OF PRESENT ILLNESS:      The patient is a 74 y.o. female with significant past medical history as listed below, including atrial fibrillation, kidney stones, and obstructive uropathy who presented to the [facility] ED on 11/17/22 for complains of dysuria and hematuria. On arrival, patient noted that she has 2 stents in place from 8/9/22 for nephrolithiasis and sepsis secondary to UTI. Denies any fever or chills. Patient follows with urologist in [facility]. She was noted to have elevated serum creatinine associated with metabolic acidosis and hyperkalemia. Also has history of UTIs. Urology was consulted for management of ureteral stents and BARBARA.     Past Medical History:        Diagnosis Date    Acute kidney injury superimposed on CKD ([address] 8/10/2022    Atrial fibrillation with RVR ([address] 8/9/2022    Carcinoma ([address]     Squamous Cell    Cellulitis     COVID-19 virus infection 8/12/2022    DVT (deep venous thrombosis) ([address] 2006    LLE    Kidney stone     Lymphedema     Morbid obesity ([address]     Psoas abscess, right ([address]     Pyelonephritis     Wears glasses      Past Surgical History:        Procedure Laterality Date    CARPAL TUNNEL RELEASE  1990s    CT ABSCESS DRAIN SUBCUTANEOUS  01/10/2022    CT ABSCESS DRAIN SUBCUTANEOUS 1/10/2022 [facility] CT SCAN    CT ABSCESS DRAIN SUBCUTANEOUS  04/21/2022    CT ABSCESS DRAIN SUBCUTANEOUS 4/21/2022 [facility] CT SCAN    CT ABSCESS DRAIN SUBCUTANEOUS  4/29/2022    CT ABSCESS DRAIN SUBCUTANEOUS 4/29/2022 [facility] CT SCAN    CYSTOSCOPY N/A 01/04/2022    CYSTOSCOPY URETERAL STENT INSERTION performed by Griselda Jetty, MD at [facility] OR    CYSTOSCOPY Right     HLL with stent    CYSTOSCOPY Right 2022    CYSTOSCOPY URETEROSCOPY LASER-WTIH HLL performed by Jase Tuttle MD at [address] [facility] Right 2022    CYSTOSCOPY URETERAL STENT Pattie Powell performed by Jase Tuttle MD at [address] [place] [address] Right 2022    Dr Gloria Melendez with stent insertion    CYSTOSCOPY Right 2022    CYSTOSCOPY URETEROSCOPY LASER-HLL performed by Jase Tuttle MD at [address] Right 2022    CYSTOSCOPY URETERAL STENT Pattie Powell performed by Jase Tuttle MD at [address] [place] [address] Right 2022    CYSTOSCOPY URETERAL STENT INSERTION (Right )    CYSTOSCOPY Right 2022    CYSTOSCOPY URETERAL STENT INSERTION performed by Darci Castelan MD at [address]  Left 2022    CYSTOSCOPY URETERAL STENT INSERTION performed by Jase Tuttle MD at [address] [facility].  PICC [facility] SINGLE  2022         INSERT MIDLINE CATHETER  2022         IR NEPHROSTOMY PERCUTANEOUS RIGHT  2022    IR NEPHROSTOMY PERCUTANEOUS RIGHT 2022 Juarez Fish MD [facility] SPECIAL PROCEDURES    ANNABELLA AND BSO (CERVIX REMOVED)      2019    TUBAL LIGATION  1993    TUBAL LIGATION      WISDOM TOOTH EXTRACTION         Social History     Socioeconomic History    Marital status:      Spouse name: Not on file    Number of children: Not on file    Years of education: Not on file    Highest education level: Not on file   Occupational History    Not on file   Tobacco Use    Smoking status: Former     Packs/day: 0.50     Years: 42.00     Pack years: 21.00     Types: Cigarettes     Quit date: 2022     Years since quittin.5    Smokeless tobacco: Never   Vaping Use    Vaping Use: Never used   Substance and Sexual Activity    Alcohol use: No     Alcohol/week: 0.0 standard drinks    Drug use: No    Sexual activity: Not Currently   Other Topics Concern    Not on file   Social History Narrative Not on file     Social Determinants of Health     Financial Resource Strain: Not on file   Food Insecurity: Not on file   Transportation Needs: Not on file   Physical Activity: Not on file   Stress: Not on file   Social Connections: Not on file   Intimate Partner Violence: Not on file   Housing Stability: Not on file       AL  Family History   Adopted: Yes   Problem Relation Age of Onset    Cancer Mother         The patient reports her biologic mother did have bladder cancer       Allergies:  No Known Allergies    ROS:  Constitutional: Negative for chills, fatigue, fever, or weight loss. Eyes: Denies reported visual changes. ENT: Denies headache, difficulty swallowing, nose bleeds, ringing in ears, or earaches. Cardiovascular: Negative for chest pain, palpitations, tachycardia or edema. Respiratory: Denies cough or SOB. GI:The patient denies abdominal pain, anorexia, nausea or vomiting. : See HPI  Musculoskeletal: Patient denies low back pain or painful or reduced ROM of the back or extremities. Neurological: The patient denies any symptoms of neurological impairment or TIA's; no history of stroke. Lymphatic: Denies swollen glands in neck, axillary or inguinal areas. Psychiatric: Denies anxiety or depression. Skin: Denies rash or lesions. The remainder of the complete ROS is negative    PHYSICAL EXAM:  VITALS:  BP (!) 152/68   Pulse 89   Temp 97.7 °F (36.5 °C) (Axillary)   Resp 18   Ht 5' 1\" (1.549 m) Comment: per previosuly charted  Wt 250 lb (113.4 kg) Comment: per previously charted  LMP  (LMP Unknown)   BMI 47.24 kg/m² . Nursing note and vitals reviewed. Constitutional:    Alert and oriented times 3, no acute distress and cooperative to examination with appropriate mood and affect. HEENT:   Head:         Normocephalic and atraumatic. Mouth/Throat:          Mucous membranes are normal.   Eyes:         EOM are normal. No scleral icterus.   Nose:    The external appearance of the nose is normal  Ears: The ears appear normal to external inspection   Neck:         Supple, symmetrical, trachea midline, no adenopathy, thyroid symmetric, not enlarged and no tenderness. Cardiovascular:        Normal rate, S1 S2 heart sounds. Pulmonary/Chest:       Chest symmetric with normal A/P diameter, no wheezes, rales, or rhonchi noted. Normal respiratory rate and rhthym. No use of accessory muscles. Abdominal:          Soft. No tenderness. Active bowel sounds. Genitourinary:               Bilateral Flank Pain  Musculoskeletal:    Normal range of motion. She exhibits no edema or tenderness of lower extremities. Extremities:    Lymphedema present. No cyanosis, clubbing. Neurological:    Alert and oriented. No cranial nerve deficit. There are no focalizing motor or sensory deficits.     DATA:  CBC:   Lab Results   Component Value Date/Time    WBC 8.6 11/17/2022 03:20 PM    RBC 3.14 11/17/2022 03:20 PM    HGB 9.0 11/17/2022 03:20 PM    HCT 28.5 11/17/2022 03:20 PM    MCV 90.8 11/17/2022 03:20 PM    MCH 28.7 11/17/2022 03:20 PM    MCHC 31.6 11/17/2022 03:20 PM    RDW 15.2 11/17/2022 03:20 PM     11/17/2022 03:20 PM    MPV 8.5 11/17/2022 03:20 PM     BMP:    Lab Results   Component Value Date/Time     11/17/2022 07:38 PM    K 5.4 11/17/2022 07:38 PM     11/17/2022 07:38 PM    CO2 12 11/17/2022 07:38 PM    BUN 97 11/17/2022 07:38 PM    CREATININE 6.52 11/17/2022 07:38 PM    CALCIUM 8.2 11/17/2022 07:38 PM    GFRAA >60 08/16/2022 05:42 AM    LABGLOM 6 11/17/2022 07:38 PM    GLUCOSE 115 11/17/2022 07:38 PM     BUN/Creatinine:    Lab Results   Component Value Date/Time    BUN 97 11/17/2022 07:38 PM    CREATININE 6.52 11/17/2022 07:38 PM     Magnesium:    Lab Results   Component Value Date/Time    MG 1.5 08/09/2022 04:10 AM     Phosphorus:    Lab Results   Component Value Date/Time    PHOS 2.7 04/27/2022 04:56 AM     PT/INR:    Lab Results   Component Value Date/Time    PROTIME 10.4 04/29/2022 08:00 AM    INR 1.0 04/29/2022 08:00 AM     U/A:    Lab Results   Component Value Date/Time    COLORU Red 11/17/2022 05:52 PM    PHUR 8.5 11/17/2022 05:52 PM    WBCUA 10 TO 20 11/17/2022 05:52 PM    RBCUA GREATER THAN 100 11/17/2022 05:52 PM    MUCUS NOT REPORTED 01/02/2022 03:30 PM    TRICHOMONAS NOT REPORTED 01/02/2022 03:30 PM    YEAST NOT REPORTED 01/02/2022 03:30 PM    BACTERIA FEW 04/21/2022 03:33 AM    SPECGRAV 1.020 11/17/2022 05:52 PM    LEUKOCYTESUR MODERATE 11/17/2022 05:52 PM    UROBILINOGEN Normal 11/17/2022 05:52 PM    BILIRUBINUR NEGATIVE 11/17/2022 05:52 PM    GLUCOSEU NEGATIVE 11/17/2022 05:52 PM    AMORPHOUS NOT REPORTED 01/02/2022 03:30 PM       Imaging: The patient has had a CT Abdomen Pelvis Without Contrast which I have reviewed alongside Dr. Jess Olivo with its accompanying report. The study demonstrates   1. Malpositioned left ureteral stent with moderate to severe left   hydroureteronephrosis and concern for superimposed urinary tract infection. Clinical correlation recommended. 2.  Severe right hydronephrosis despite presence of a satisfactorily   positioned ureteral stent. This may reflect urinary tract infection and/or   stent obstruction. IMPRESSION:   BARBARA secondary to obstructive uropathy  Bilateral Severe Hydroureteronephrosis  Hyperkalemia  Metabolic acidemia    Plan:   - Keep patient NPO  - Creatinine 6.5. BARBARA. Patient afebrile. CT ABD PELVIS WO Contrast shows bilateral severe hydroureteronephrosis. - Discussed case with Dr. Jess Olivo. Add for cystoscopy with bilateral stent exchange. - Surgery explained to the patient in detail, all questions and concerns addressed. Patient agrees to plan of care. -Nephrology on board for hyperkalemia and metabolic acidemia. Management per Nephrology.       Thank you for including us in the care of Sharita OrtegaDONG  11/18/22 8:26 AM  Urology

## 2022-11-18 NOTE — PROGRESS NOTES
Transfer  Report from [address] [place] [address]  Referring Physician Dr. Gladys Pat  Accepting Physician Dawood Carlson/ Dr. Danielle Chapa  Patient Condition:     51. Patient of Dr. Gladys Pat at [facility] ER. Patient has came in for burning and frequency with urination, patient has 2 stents in place currently the left shows that it is dislodged, patient has bilat hydronephrosis. WBC normal, CR base 1.3 today 6.84,  K+ 5.2, Venous ph 7.182. Given NS, Rocephin. Awake and alert. Vitals 171/78, 97.3- T , R 20, 99% RA.  Admit to [address], Inpatient, Dx Acute Renal failure

## 2022-11-18 NOTE — PROGRESS NOTES
Pharmacy Renal Adjustment    Kelsie Coats is a 74 y.o. female. Pharmacy renally adjusted the following medications per P[employer] approved policy: Merrem    Height:   Ht Readings from Last 1 Encounters:   08/09/22 5' 1\" (1.549 m)     Weight:  Wt Readings from Last 1 Encounters:   11/17/22 250 lb (113.4 kg)     Recent Labs     11/17/22  1520 11/17/22  1938   * 97*   CREATININE 6.84* 6.52*     Estimated Creatinine Clearance: 10 mL/min (A) (based on SCr of 6.52 mg/dL [facility])). Assessment:  BARBARA    Plan:   Decrease Merrem from 1 gram iv every 8 hours to 500 mg every 12 hours.     Oniel Wilson RPHPharmD  11/18/2022   2:48 AM

## 2022-11-18 NOTE — PROGRESS NOTES
Renal Adjustment Follow-up    Recent Labs     11/17/22  1938 11/18/22  0844   BUN 97* 100*   CREATININE 6.52* 7.1*     Estimated Creatinine Clearance: 9 mL/min (A) (based on SCr of 7.1 mg/dL [facility])). Plan: will change dose to 500 mg q24h; will time  next dose form the time the pre-op dose is given  Pre-op dose not given yet.

## 2022-11-18 NOTE — PROGRESS NOTES
Physician Progress Note      PATIENT:               Niko Chaves  CSN #:                  [id number]  :                       1955  ADMIT DATE:       2022 12:07 AM  DISCH DATE:  RESPONDING  PROVIDER #:        Derik Lepe MD          QUERY TEXT:    Patient admitted with BMI 47.3. If possible, please document in progress notes   and discharge summary if you are evaluating and /or treating any of the   following: The medical record reflects the following:    Risk Factors: excess calories  Clinical Indicators: BMI 47.3 ht 5'1\" wt 250 lb  Treatment: fall precautions, weight based/pharmacy dosed meds when needed    Thank you! Tricia Irene CRCR  RN Clinical   P: [phone]  Options provided:  -- Obesity  -- Morbid obesity  -- Severe obesity  -- Other - I will add my own diagnosis  -- Disagree - Not applicable / Not valid  -- Disagree - Clinically unable to determine / Unknown  -- Refer to Clinical Documentation Reviewer    PROVIDER RESPONSE TEXT:    This patient has morbid obesity.     Query created by: Nicole Mark on 2022 8:59 AM      Electronically signed by:  Derik Lepe MD 2022 9:47 AM

## 2022-11-18 NOTE — CONSULTS
[facility]    [address], [address]  [facility]  11/18/2022 9:45 AM    Pt Name:    Romulo Marroquin  MRN:     [id number]   [id number]  YOB: 1955  Admit Date:    11/18/2022 12:07 AM  Primary Care Physician:  DONG Sloan CNP    CSN Number:   [id number]    Reason for Consult:  BARBARA, hyperkalemia  Requesting provider:  Hospitalist    History:   The patient is a 74 y.o. with past medical history of atrial fibrillation, kidney stone, lymphedema, obesity, previous obstructive uropathy requiring ureteral stents. Patient follows with urology in [facility]. She was transferred from [facility] emergency room where she had presented with complaints of generalized weakness associated with dysuria, and decreased urine output. Patient also reports associated symptoms of nausea and vomiting. No alleviating or aggravating factors. Denies any fever or chills. Patient follows with urologist in [facility]. She was noted to have elevated serum creatinine associated with metabolic acidosis and hyperkalemia. She has history of UTIs.   Nephrology consulted for further evaluation and poss management    Past Medical History:  Past Medical History:   Diagnosis Date    Acute kidney injury superimposed on CKD ([address] 8/10/2022    Atrial fibrillation with RVR ([address] 8/9/2022    Carcinoma ([address]     Squamous Cell    Cellulitis     COVID-19 virus infection 8/12/2022    DVT (deep venous thrombosis) ([address] 2006    LLE    Kidney stone     Lymphedema     Morbid obesity ([address]     Psoas abscess, right ([address]     Pyelonephritis     Wears glasses        Past Surgical History:  Past Surgical History:   Procedure Laterality Date    CARPAL TUNNEL RELEASE  1990s    CT ABSCESS DRAIN SUBCUTANEOUS  01/10/2022    CT ABSCESS DRAIN SUBCUTANEOUS 1/10/2022 [facility] CT SCAN    CT ABSCESS DRAIN SUBCUTANEOUS  04/21/2022    CT ABSCESS DRAIN SUBCUTANEOUS 4/21/2022 [facility] CT SCAN    CT ABSCESS DRAIN SUBCUTANEOUS  4/29/2022    CT ABSCESS DRAIN SUBCUTANEOUS 4/29/2022 [facility] CT SCAN    CYSTOSCOPY N/A 01/04/2022    CYSTOSCOPY URETERAL STENT INSERTION performed by Elaine Posada MD at [facility] with stent    CYSTOSCOPY Right 03/01/2022    CYSTOSCOPY URETEROSCOPY LASER-WTIH HLL performed by Elaine Posada MD at . [address] Right 03/01/2022    CYSTOSCOPY URETERAL STENT Gonzalez Jessika performed by Elaine Posada MD at . [address] Right 04/05/2022    Dr Jayde Castellanos with stent insertion    CYSTOSCOPY Right 04/05/2022    CYSTOSCOPY URETEROSCOPY LASER-HLL performed by Elaine Posada MD at . [address] Right 04/05/2022    CYSTOSCOPY URETERAL STENT Gonzalez Jessika performed by Elaine Posada MD at . [address] Right 04/22/2022    CYSTOSCOPY URETERAL STENT INSERTION (Right )    CYSTOSCOPY Right 4/22/2022    CYSTOSCOPY URETERAL STENT INSERTION performed by Nathalie Yu MD at . [address] Left 8/9/2022    CYSTOSCOPY URETERAL STENT INSERTION performed by Elaine Posada MD at [facility].  PICC [facility] SINGLE  4/26/2022         INSERT MIDLINE CATHETER  01/07/2022         IR NEPHROSTOMY PERCUTANEOUS RIGHT  01/05/2022    IR NEPHROSTOMY PERCUTANEOUS RIGHT 1/5/2022 Gladys Coleman MD [facility] SPECIAL PROCEDURES    ANNABELLA AND BSO (CERVIX REMOVED)      05/2019    TUBAL LIGATION  1993    TUBAL LIGATION      WISDOM TOOTH EXTRACTION         Family History:  Family History   Adopted: Yes   Problem Relation Age of Onset    Cancer Mother         The patient reports her biologic mother did have bladder cancer       Social History:  Social History     Socioeconomic History    Marital status:      Spouse name: Not on file    Number of children: Not on file    Years of education: Not on file    Highest education level: Not on file   Occupational History    Not on file   Tobacco Use    Smoking status: Former     Packs/day: 0.50 Years: 42.00     Pack years: 21.00     Types: Cigarettes     Quit date: 2022     Years since quittin.5    Smokeless tobacco: Never   Vaping Use    Vaping Use: Never used   Substance and Sexual Activity    Alcohol use: No     Alcohol/week: 0.0 standard drinks    Drug use: No    Sexual activity: Not Currently   Other Topics Concern    Not on file   Social History Narrative    Not on file     Social Determinants of Health     Financial Resource Strain: Not on file   Food Insecurity: Not on file   Transportation Needs: Not on file   Physical Activity: Not on file   Stress: Not on file   Social Connections: Not on file   Intimate Partner Violence: Not on file   Housing Stability: Not on file       Home Meds:  Prior to Admission medications    Medication Sig Start Date End Date Taking? Authorizing Provider   carvedilol (COREG) 12.5 MG tablet Take 1 tablet by mouth in the morning and 1 tablet in the evening. Take with meals. Patient not taking: Reported on 2022   DONG Chester CNP   furosemide (LASIX) 40 MG tablet Take 1 tablet by mouth in the morning. Patient not taking: Reported on 2022   DONG Beverly CNP   potassium chloride (KLOR-CON M) 20 MEQ extended release tablet Take 1 tablet by mouth in the morning. Patient not taking: Reported on 2022   DONG Chester CNP   nystatin (MYCOSTATIN) 536415 UNIT/ML suspension Take 5 mLs by mouth in the morning and 5 mLs at noon and 5 mLs in the evening and 5 mLs before bedtime. Patient not taking: Reported on 2022   DONG Beverly CNP   rivaroxaban (XARELTO) 20 MG TABS tablet Take 1 tablet by mouth every 24 hours 22   DONG Beverly CNP   metoprolol succinate (TOPROL XL) 25 MG extended release tablet Take 1 tablet by mouth in the morning.  22  DONG Chester CNP   acetaminophen (TYLENOL) 325 MG tablet Take 650 mg by mouth every 6 hours as needed for Pain  Patient not taking: Reported on 11/17/2022    Historical Provider, MD   sodium chloride flush 0.9 % injection Infuse 5-40 mLs intravenously at bedtime PICC line maintenence    Historical Provider, MD   amLODIPine (NORVASC) 10 MG tablet Take 1 tablet by mouth daily  Patient not taking: Reported on 11/17/2022 5/2/22   Derick Ocampo MD   lisinopril (PRINIVIL;ZESTRIL) 20 MG tablet Take 1 tablet by mouth daily  Patient not taking: No sig reported 5/3/22 8/12/22  Derick Ocampo MD   tamsulosin [facility]) 0.4 MG capsule Take 1 capsule by mouth daily  Patient not taking: Reported on 8/8/2022 4/27/22 8/11/22  Chandler Fall MD   oxybutynin (DITROPAN XL) 15 MG extended release tablet Take 1 tablet by mouth daily  Patient not taking: Reported on 11/18/2022 3/29/22   Amber Ahr, APRN - CNP   Misc Natural Products (OSTEO BI-FLEX ADV DOUBLE ST) TABS Take by mouth every morning    Historical Provider, MD   polyethylene glycol (GLYCOLAX) 17 g packet Take 17 g by mouth daily as needed for Constipation   Patient not taking: Reported on 11/17/2022    Historical Provider, MD   ipratropium-albuterol (DUONEB) 0.5-2.5 (3) MG/3ML SOLN nebulizer solution Inhale 1 vial into the lungs every 4 hours   Patient not taking: Reported on 11/17/2022    Historical Provider, MD   loratadine (CLARITIN) 10 MG capsule Take 10 mg by mouth daily   Patient not taking: Reported on 11/17/2022    Historical Provider, MD   Ascorbic Acid (VITAMIN C) 250 MG tablet Take 250 mg by mouth daily    Historical Provider, MD   vitamin E 400 UNIT capsule Take 400 Units by mouth daily    Historical Provider, MD   vitamin D (CHOLECALCIFEROL) 25 MCG (1000 UT) TABS tablet Take 1,000 Units by mouth daily    Historical Provider, MD   Multiple Vitamins-Minerals (THERAPEUTIC MULTIVITAMIN-MINERALS) tablet Take 1 tablet by mouth daily    Historical Provider, MD       Review of Systems:  Constitutional: For generalized weakness, fatigue, decreased urine output, nausea and vomiting  Head: Negative for headaches  Eyes: Negative for blurry vision or discharge  Ears: Negative for ear pain or hearing changes  Nose: Negative for runny nose or epistaxis  Respiratory: Negative for shortness of breath. Negative for cough or sputum production. Negative for hemoptysis  Cardiovascular: Negative for chest pain. Positive for chronic lower extremity edema/lymphedema  GI: Positive for nausea and vomiting. : Negative for discharge, dysuria, or hematuria  Skin: Negative for rash  Musculoskeletal: Negative for joint pain, moves all ext  Neuro: Negative for numbness or tingling, negative for slurred speech  Psychiatric: Reports stable mood, negative for depression or insomnia    All other review of systems were reviewed and negative    Current Meds:  Infusion:    sodium chloride      sodium bicarbonate infusion       Meds:    sodium chloride flush  5-40 mL IntraVENous 2 times per day    meropenem  500 mg IntraVENous Q12H     Meds prn: sodium chloride flush, sodium chloride, ondansetron **OR** ondansetron, polyethylene glycol, acetaminophen **OR** acetaminophen, HYDROmorphone     Allergies/Intolerances: ALLERGIES: Patient has no known allergies. 24HR INTAKE/OUTPUT:  No intake or output data in the 24 hours ending 11/18/22 0945  No intake/output data recorded. No intake/output data recorded. Admission weight: 250 lb (113.4 kg) (per previously charted)  Wt Readings from Last 3 Encounters:   11/18/22 250 lb (113.4 kg)   11/17/22 250 lb (113.4 kg)   08/16/22 262 lb 1.6 oz (118.9 kg)     Body mass index is 47.24 kg/m².     Physical Examination:  VITALS:   Vitals:    11/18/22 0115 11/18/22 0350 11/18/22 0700 11/18/22 0906   BP: (!) 152/68   131/60   Pulse: 89   80   Resp: 20 18 18   Temp: 97.7 °F (36.5 °C)   98 °F (36.7 °C)   TempSrc: Axillary   Oral   SpO2:    97%   Weight:   250 lb (113.4 kg)    Height:   5' 1\" (1.549 m) Weight:   Wt Readings from Last 3 Encounters:   11/18/22 250 lb (113.4 kg)   11/17/22 250 lb (113.4 kg)   08/16/22 262 lb 1.6 oz (118.9 kg)     Constitutional and General Appearance: alert and cooperative with exam, appears comfortable, no distress, not diaphoretic  Eyes: no icteric sclera in left eye or right eye,  no pallor conjunctiva in left or right eye, no discharge seen from left eye or right eye  Ears and Nose: normal external appearance of left and right ear. Oral: Dry appearing oral mucus membranes  Neck: No jugular venous distention, appears symmetric, good ROM  Lungs: Air entry B/L, no crackles or rales, no use of accessory muscles or labored breathing  Heart: regular rate, S1, S2  Extremities: + Lymphedema changes  GI: soft, non-tender, no guarding, no distention  Skin: Warm to touch  Musculo: moves all extremities  Neuro: no slurred speech, no facial drooping  Psychiatric: Normal mood and affect, Not agitated    Lab Data  CBC:   Recent Labs     11/17/22  1520 11/18/22  0844   WBC 8.6 7.5   HGB 9.0* 8.0*   HCT 28.5* 26.0*    273     BMP:  Recent Labs     11/17/22  1520 11/17/22  1938 11/18/22  0844    136 138   K 5.2 5.4* 5.8*    107 114*   CO2 13* 12* 9*   * 97* 100*   CREATININE 6.84* 6.52* 7.1*   GLUCOSE 127* 115* 102   CALCIUM 8.9 8.2* 7.8*     Hepatic:   Recent Labs     11/17/22  1520   LABALBU 3.0*   AST 12   ALT 11   BILITOT <0.1*   ALKPHOS 91         Additional Labs:  Diagnostics: CT abdomen findings noted  Creatinine 1.0 5 August 2022    Echo shows EF 35 to 40%, moderate pulmonary hypertension, grade 2 diastolic dysfunction    Impression and Plan:  BARBARA secondary to obstructive uropathy. CAT scan shows malpositioned left ureteral stent. Patient has bilateral hydronephrosis. Has severe left hydroureteronephrosis. Has severe right hydronephrosis. Awaiting urology input. Will need intervention including possible replacement of stents.   Start patient on IV sodium bicarbonate drip to control hyperkalemia and metabolic acidemia. If patient's potassium level continues to rise then may even require renal replacement therapy. Monitor electrolytes closely. We will repeat potassium. Discussed with patient in detail. Start IV bicarbonate drip ASAP. Hyperkalemia in setting of BARBARA and obstructive uropathy. Hold lisinopril. Hold potassium supplements. Start IV bicarbonate drip. Will give IV insulin, IV calcium, Kayexalate and/or Lokelma. Metabolic acidemia. Start patient on IV sodium bicarbonate drip. Bilateral severe hydronephrosis  History of obstructive uropathy and ureteral stents  Chronic mild systolic dysfunction with moderate pulmonary hypertension  Lymphedema  History of hypertension. Blood pressure readings noted    Thank you for the consult. Please feel free to call me if you have any questions. Katarina Castanon MD  [facility]    This report has been created using voice recognition software. It may contain minor errors which are inherent in voice recognition technology.

## 2022-11-19 LAB
ABO CHECK: NORMAL
ANION GAP SERPL CALCULATED.3IONS-SCNC: 16 MEQ/L (ref 8–16)
ANTIBODY SCREEN: NORMAL
BASOPHILS # BLD: 0.1 %
BASOPHILS ABSOLUTE: 0 THOU/MM3 (ref 0–0.1)
BUN BLDV-MCNC: 94 MG/DL (ref 7–22)
CALCIUM SERPL-MCNC: 7.2 MG/DL (ref 8.5–10.5)
CHLORIDE BLD-SCNC: 111 MEQ/L (ref 98–111)
CO2: 13 MEQ/L (ref 23–33)
CREAT SERPL-MCNC: 6.9 MG/DL (ref 0.4–1.2)
CULTURE: ABNORMAL
EOSINOPHIL # BLD: 1 %
EOSINOPHILS ABSOLUTE: 0.1 THOU/MM3 (ref 0–0.4)
ERYTHROCYTE [DISTWIDTH] IN BLOOD BY AUTOMATED COUNT: 15.6 % (ref 11.5–14.5)
ERYTHROCYTE [DISTWIDTH] IN BLOOD BY AUTOMATED COUNT: 52.4 FL (ref 35–45)
GFR SERPL CREATININE-BSD FRML MDRD: 6 ML/MIN/1.73M2
GLUCOSE BLD-MCNC: 133 MG/DL (ref 70–108)
GLUCOSE BLD-MCNC: 146 MG/DL (ref 70–108)
GLUCOSE BLD-MCNC: 160 MG/DL (ref 70–108)
GLUCOSE BLD-MCNC: 170 MG/DL (ref 70–108)
GLUCOSE BLD-MCNC: 174 MG/DL (ref 70–108)
HCT VFR BLD CALC: 23.9 % (ref 37–47)
HEMOGLOBIN: 7.2 GM/DL (ref 12–16)
IMMATURE GRANS (ABS): 0.06 THOU/MM3 (ref 0–0.07)
IMMATURE GRANULOCYTES: 0.9 %
LYMPHOCYTES # BLD: 11.9 %
LYMPHOCYTES ABSOLUTE: 0.8 THOU/MM3 (ref 1–4.8)
MCH RBC QN AUTO: 27.8 PG (ref 26–33)
MCHC RBC AUTO-ENTMCNC: 30.1 GM/DL (ref 32.2–35.5)
MCV RBC AUTO: 92.3 FL (ref 81–99)
MONOCYTES # BLD: 7.9 %
MONOCYTES ABSOLUTE: 0.6 THOU/MM3 (ref 0.4–1.3)
NUCLEATED RED BLOOD CELLS: 0 /100 WBC
PLATELET # BLD: 257 THOU/MM3 (ref 130–400)
PMV BLD AUTO: 8.4 FL (ref 9.4–12.4)
POTASSIUM REFLEX MAGNESIUM: 5.3 MEQ/L (ref 3.5–5.2)
RBC # BLD: 2.59 MILL/MM3 (ref 4.2–5.4)
RH FACTOR: NORMAL
SEG NEUTROPHILS: 78.2 %
SEGMENTED NEUTROPHILS ABSOLUTE COUNT: 5.5 THOU/MM3 (ref 1.8–7.7)
SODIUM BLD-SCNC: 140 MEQ/L (ref 135–145)
SPECIMEN DESCRIPTION: ABNORMAL
WBC # BLD: 7 THOU/MM3 (ref 4.8–10.8)

## 2022-11-19 PROCEDURE — 6370000000 HC RX 637 (ALT 250 FOR IP): Performed by: INTERNAL MEDICINE

## 2022-11-19 PROCEDURE — 1200000003 HC TELEMETRY R&B

## 2022-11-19 PROCEDURE — 86900 BLOOD TYPING SEROLOGIC ABO: CPT

## 2022-11-19 PROCEDURE — 86850 RBC ANTIBODY SCREEN: CPT

## 2022-11-19 PROCEDURE — 80048 BASIC METABOLIC PNL TOTAL CA: CPT

## 2022-11-19 PROCEDURE — 82948 REAGENT STRIP/BLOOD GLUCOSE: CPT

## 2022-11-19 PROCEDURE — 6360000002 HC RX W HCPCS: Performed by: PHYSICIAN ASSISTANT

## 2022-11-19 PROCEDURE — 2580000003 HC RX 258: Performed by: INTERNAL MEDICINE

## 2022-11-19 PROCEDURE — 99232 SBSQ HOSP IP/OBS MODERATE 35: CPT | Performed by: HOSPITALIST

## 2022-11-19 PROCEDURE — 99233 SBSQ HOSP IP/OBS HIGH 50: CPT | Performed by: INTERNAL MEDICINE

## 2022-11-19 PROCEDURE — 85025 COMPLETE CBC W/AUTO DIFF WBC: CPT

## 2022-11-19 PROCEDURE — 51798 US URINE CAPACITY MEASURE: CPT

## 2022-11-19 PROCEDURE — 36415 COLL VENOUS BLD VENIPUNCTURE: CPT

## 2022-11-19 PROCEDURE — 6370000000 HC RX 637 (ALT 250 FOR IP): Performed by: HOSPITALIST

## 2022-11-19 PROCEDURE — 86901 BLOOD TYPING SEROLOGIC RH(D): CPT

## 2022-11-19 PROCEDURE — 86923 COMPATIBILITY TEST ELECTRIC: CPT

## 2022-11-19 PROCEDURE — 2580000003 HC RX 258: Performed by: PHYSICIAN ASSISTANT

## 2022-11-19 PROCEDURE — 2500000003 HC RX 250 WO HCPCS: Performed by: INTERNAL MEDICINE

## 2022-11-19 PROCEDURE — 36430 TRANSFUSION BLD/BLD COMPNT: CPT

## 2022-11-19 PROCEDURE — P9016 RBC LEUKOCYTES REDUCED: HCPCS

## 2022-11-19 RX ORDER — SODIUM CHLORIDE 9 MG/ML
INJECTION, SOLUTION INTRAVENOUS PRN
Status: DISCONTINUED | OUTPATIENT
Start: 2022-11-19 | End: 2022-11-23 | Stop reason: HOSPADM

## 2022-11-19 RX ORDER — HYDROCODONE BITARTRATE AND ACETAMINOPHEN 5; 325 MG/1; MG/1
1 TABLET ORAL EVERY 6 HOURS PRN
Status: DISCONTINUED | OUTPATIENT
Start: 2022-11-19 | End: 2022-11-23 | Stop reason: HOSPADM

## 2022-11-19 RX ADMIN — SODIUM BICARBONATE: 84 INJECTION, SOLUTION INTRAVENOUS at 02:16

## 2022-11-19 RX ADMIN — HYDROCODONE BITARTRATE AND ACETAMINOPHEN 1 TABLET: 5; 325 TABLET ORAL at 21:20

## 2022-11-19 RX ADMIN — ONDANSETRON 4 MG: 2 INJECTION INTRAMUSCULAR; INTRAVENOUS at 21:20

## 2022-11-19 RX ADMIN — SODIUM BICARBONATE: 84 INJECTION, SOLUTION INTRAVENOUS at 23:55

## 2022-11-19 RX ADMIN — SODIUM BICARBONATE: 84 INJECTION, SOLUTION INTRAVENOUS at 10:40

## 2022-11-19 RX ADMIN — SODIUM CHLORIDE, PRESERVATIVE FREE 10 ML: 5 INJECTION INTRAVENOUS at 12:02

## 2022-11-19 RX ADMIN — SODIUM CHLORIDE, PRESERVATIVE FREE 10 ML: 5 INJECTION INTRAVENOUS at 21:21

## 2022-11-19 RX ADMIN — ONDANSETRON 4 MG: 2 INJECTION INTRAMUSCULAR; INTRAVENOUS at 06:52

## 2022-11-19 RX ADMIN — HYDROMORPHONE HYDROCHLORIDE 0.5 MG: 1 INJECTION, SOLUTION INTRAMUSCULAR; INTRAVENOUS; SUBCUTANEOUS at 02:16

## 2022-11-19 RX ADMIN — MEROPENEM 500 MG: 500 INJECTION, POWDER, FOR SOLUTION INTRAVENOUS at 12:01

## 2022-11-19 RX ADMIN — SODIUM ZIRCONIUM CYCLOSILICATE 10 G: 10 POWDER, FOR SUSPENSION ORAL at 21:20

## 2022-11-19 RX ADMIN — SODIUM ZIRCONIUM CYCLOSILICATE 10 G: 10 POWDER, FOR SUSPENSION ORAL at 16:51

## 2022-11-19 RX ADMIN — ONDANSETRON 4 MG: 2 INJECTION INTRAMUSCULAR; INTRAVENOUS at 12:01

## 2022-11-19 RX ADMIN — HYDROMORPHONE HYDROCHLORIDE 0.5 MG: 1 INJECTION, SOLUTION INTRAMUSCULAR; INTRAVENOUS; SUBCUTANEOUS at 12:01

## 2022-11-19 RX ADMIN — HYDROMORPHONE HYDROCHLORIDE 0.5 MG: 1 INJECTION, SOLUTION INTRAMUSCULAR; INTRAVENOUS; SUBCUTANEOUS at 06:53

## 2022-11-19 ASSESSMENT — PAIN DESCRIPTION - LOCATION
LOCATION: ABDOMEN;FLANK
LOCATION: ABDOMEN;FLANK
LOCATION: ABDOMEN
LOCATION: ABDOMEN

## 2022-11-19 ASSESSMENT — PAIN - FUNCTIONAL ASSESSMENT
PAIN_FUNCTIONAL_ASSESSMENT: PREVENTS OR INTERFERES SOME ACTIVE ACTIVITIES AND ADLS
PAIN_FUNCTIONAL_ASSESSMENT: PREVENTS OR INTERFERES SOME ACTIVE ACTIVITIES AND ADLS

## 2022-11-19 ASSESSMENT — PAIN SCALES - GENERAL
PAINLEVEL_OUTOF10: 7
PAINLEVEL_OUTOF10: 4
PAINLEVEL_OUTOF10: 8
PAINLEVEL_OUTOF10: 8
PAINLEVEL_OUTOF10: 7
PAINLEVEL_OUTOF10: 4

## 2022-11-19 ASSESSMENT — PAIN DESCRIPTION - DESCRIPTORS
DESCRIPTORS: CRAMPING;ACHING
DESCRIPTORS: ACHING;CRAMPING
DESCRIPTORS: ACHING

## 2022-11-19 ASSESSMENT — PAIN DESCRIPTION - ORIENTATION
ORIENTATION: LOWER
ORIENTATION: RIGHT;LEFT;OUTER;MID

## 2022-11-19 NOTE — PROGRESS NOTES
Renal Adjustment Follow-up    Recent Labs     11/18/22  0844 11/19/22  0538   * 94*   CREATININE 7.1* 6.9*     Estimated Creatinine Clearance: 9 mL/min (A) (based on SCr of 6.9 mg/dL [facility])).     Plan: Decrease Merrem to 500 mg IVPB Q24H     Everett NievesD, BCPS  11/19/2022  3:47 PM

## 2022-11-19 NOTE — FLOWSHEET NOTE
11/19/22 9780   Safe Environment   Safety Measures Other (comment)  (VN safety round)   VN called into patients room and introduced myself and role. Patient answered and permitted video. Video activated. . Patient resting comfortably in bed. . Patient voiced no needs or concerns at this time. Call light within reach.

## 2022-11-19 NOTE — PLAN OF CARE
Problem: Discharge Planning  Goal: Discharge to home or other facility with appropriate resources  Outcome: Progressing  Flowsheets (Taken 11/19/2022 0855)  Discharge to home or other facility with appropriate resources: Identify barriers to discharge with patient and caregiver     Problem: Safety - Adult  Goal: Free from fall injury  Outcome: Progressing  Goal: Isolation precautions  Description: Isolation precautions  Outcome: Progressing     Problem: Chronic Conditions and Co-morbidities  Goal: Patient's chronic conditions and co-morbidity symptoms are monitored and maintained or improved  Outcome: Progressing  Flowsheets (Taken 11/19/2022 0855)  Care Plan - Patient's Chronic Conditions and Co-Morbidity Symptoms are Monitored and Maintained or Improved: Monitor and assess patient's chronic conditions and comorbid symptoms for stability, deterioration, or improvement     Problem: Pain  Goal: Verbalizes/displays adequate comfort level or baseline comfort level  Outcome: Progressing

## 2022-11-19 NOTE — PROGRESS NOTES
Urology Progress Note    Reason for Consult:  Acute Kidney Injury  Requesting Physician:  Christ Govea PA-C     History Obtained From:  Patient and Nurse     Chief Complaint: Dysuria and Hematuria    Subjective: \"    Patient is resting in bed, voiding pink tinged urine, +flatus, +BM, ambulating with assistance, tolerating regular diet, denies any nausea or vomiting. There are complaints of controlled pain at this time.     Burning when voiding, will add pyridium  Plan for bilat ureteral stent exchange Monday, unless condition deteriorates         Vitals:  BP (!) 149/72   Pulse 73   Temp 97.7 °F (36.5 °C) (Oral)   Resp 18   Ht 5' 1\" (1.549 m) Comment: per previosuly charted  Wt 250 lb (113.4 kg) Comment: per previously charted  LMP  (LMP Unknown)   SpO2 99%   BMI 47.24 kg/m²   Temp  Av.9 °F (36.6 °C)  Min: 97.5 °F (36.4 °C)  Max: 98.3 °F (36.8 °C)    Intake/Output Summary (Last 24 hours) at 2022 1311  Last data filed at 2022 2254  Gross per 24 hour   Intake 100 ml   Output 0 ml   Net 100 ml       Social History     Socioeconomic History    Marital status:      Spouse name: Not on file    Number of children: Not on file    Years of education: Not on file    Highest education level: Not on file   Occupational History    Not on file   Tobacco Use    Smoking status: Former     Packs/day: 0.50     Years: 42.00     Pack years: 21.00     Types: Cigarettes     Quit date: 2022     Years since quittin.5    Smokeless tobacco: Never   Vaping Use    Vaping Use: Never used   Substance and Sexual Activity    Alcohol use: No     Alcohol/week: 0.0 standard drinks    Drug use: No    Sexual activity: Not Currently   Other Topics Concern    Not on file   Social History Narrative    Not on file     Social Determinants of Health     Financial Resource Strain: Not on file   Food Insecurity: Not on file   Transportation Needs: Not on file   Physical Activity: Not on file   Stress: Not on file Social Connections: Not on file   Intimate Partner Violence: Not on file   Housing Stability: Not on file     Family History   Adopted: Yes   Problem Relation Age of Onset    Cancer Mother         The patient reports her biologic mother did have bladder cancer     No Known Allergies      Constitutional: Alert and oriented times x3, no acute distress, and cooperative to examination with appropriate mood and affect. HEENT:   Head:         Normocephalic and atraumatic. Mucous membranes are normal.   Eyes:         EOM are normal.  Nose:    The external appearance of the nose is normal  Ears: The ears appear normal to external inspection. Cardiovascular:       Normal rate, regular rhythm. Pulmonary/Chest:  Normal respiratory rate and rhthym. No use of accessory muscles. Lungs clear bilaterally. Abdominal:          Soft. Mid low abd tenderness. Active bowel sounds. Genitalia:    Voiding spontaneously. dysuria  Musculoskeletal:    Normal range of motion. He exhibits no edema or tenderness of lower extremities. Extremities:    No cyanosis, clubbing, or edema present. Neurological:    Alert and oriented.      Labs:  WBC:    Lab Results   Component Value Date/Time    WBC 7.0 11/19/2022 05:38 AM     Hemoglobin/Hematocrit:    Lab Results   Component Value Date/Time    HGB 7.2 11/19/2022 05:38 AM    HCT 23.9 11/19/2022 05:38 AM     BMP:    Lab Results   Component Value Date/Time     11/19/2022 05:38 AM    K 5.3 11/19/2022 05:38 AM     11/19/2022 05:38 AM    CO2 13 11/19/2022 05:38 AM    BUN 94 11/19/2022 05:38 AM    LABALBU 3.0 11/17/2022 03:20 PM    CREATININE 6.9 11/19/2022 05:38 AM    CALCIUM 7.2 11/19/2022 05:38 AM    GFRAA >60 08/16/2022 05:42 AM    LABGLOM 6 11/18/2022 06:06 PM     Impression/Plan:  Ok to eat, NPO mdnight  Bladder scan to ensure emptying - may add pyridium for dysuria  Plan for bilat stent exchange in OR on Monday - postponed from Friday due to availability    BARBARA secondary to obstructive uropathy - CRT 6.9, mildly improved  Bilateral Severe Hydroureteronephrosis - bilateral stents in place  Hyperkalemia - improving  Metabolic acidemia - nephrology on board  Anemia - HGB 7.2, plan to transfuse, managed by medicine  UTI - Ux gram neg rods - on Merrem    Reviewed with Dr Kaley Reece and Dr Codi Roman, APRN - CNP, APRN  11/19/22 1:11 PM  Urology

## 2022-11-19 NOTE — PROGRESS NOTES
[facility]   Nephrology progress note  11/19/2022, 1:32 PM      Pt Name:    Milan Kay  MRN:     [id number]     YOB: 1955  Admit Date:    11/18/2022 12:07 AM    Chief Complaint: Nephrology following for BARBARA/hyperkalemia and metabolic acidosis    Subjective:  Patient was seen and examined this morning  No chest pain or shortness of breath  Feels okay  Awaiting OR    Objective:  24HR INTAKE/OUTPUT:    Intake/Output Summary (Last 24 hours) at 11/19/2022 1332  Last data filed at 11/18/2022 2254  Gross per 24 hour   Intake 100 ml   Output 0 ml   Net 100 ml         I/O last 3 completed shifts: In: 100 [IV Piggyback:100]  Out: 0   No intake/output data recorded.    Admission weight: 250 lb (113.4 kg) (per previously charted)  Wt Readings from Last 3 Encounters:   11/18/22 250 lb (113.4 kg)   11/17/22 250 lb (113.4 kg)   08/16/22 262 lb 1.6 oz (118.9 kg)        Vitals :   Vitals:    11/19/22 0325 11/19/22 0855 11/19/22 0856 11/19/22 1116   BP: (!) 146/74  (!) 146/71 (!) 149/72   Pulse: 74 74 72 73   Resp: 17 16 18   Temp: 97.5 °F (36.4 °C)  98.2 °F (36.8 °C) 97.7 °F (36.5 °C)   TempSrc: Oral  Oral Oral   SpO2: 98%  98% 99%   Weight:       Height:           Physical examination  General Appearance: alert and cooperative with exam, appears comfortable, no distress  Mouth/Throat: Oral mucosa moist  Neck: No JVD  Lungs: Air entry B/L, no rales, no use of accessory muscles  Heart:  S1, S2 heard  GI: soft, non-tender, no guarding  Extremities: + Lymphedema changes    Medications:  Infusion:    sodium chloride      sodium chloride      sodium bicarbonate infusion 150 mL/hr at 11/19/22 1040    dextrose       Meds:    sodium chloride flush  5-40 mL IntraVENous 2 times per day    meropenem  500 mg IntraVENous Q12H    meropenem  1,000 mg IntraVENous 60 Min Pre-Op     Meds prn: sodium chloride, sodium chloride flush, sodium chloride, ondansetron **OR** ondansetron, polyethylene glycol, acetaminophen **OR** acetaminophen, HYDROmorphone, glucose, dextrose bolus **OR** dextrose bolus, glucagon (rDNA), dextrose     Lab Data :  CBC:   Recent Labs     11/17/22  1520 11/18/22  0844 11/19/22  0538   WBC 8.6 7.5 7.0   HGB 9.0* 8.0* 7.2*   HCT 28.5* 26.0* 23.9*    273 257     CMP:  Recent Labs     11/17/22  1938 11/18/22  0844 11/18/22  1708 11/19/22  0538    138  --  140   K 5.4* 5.8* 5.4* 5.3*    114*  --  111   CO2 12* 9*  --  13*   BUN 97* 100*  --  94*   CREATININE 6.52* 7.1*  --  6.9*   GLUCOSE 115* 102  --  146*   CALCIUM 8.2* 7.8*  --  7.2*     Hepatic:   Recent Labs     11/17/22  1520   LABALBU 3.0*   AST 12   ALT 11   BILITOT <0.1*   ALKPHOS 91         Assessment and Plan:  1. BARBARA secondary to obstructive uropathy  CT shows bilateral hydronephrosis. Patient has severe left hydroureteronephrosis and severe right hydronephrosis  Awaiting urological intervention. Likely will need stent replacement  Potassium 5.3. Continue with IV bicarbonate drip    2. Hyperkalemia in setting of BARBARA and obstructive uropathy  3. Metabolic acidosis. Continue with IV sodium bicarbonate drip  4. Bilateral severe hydronephrosis  5. History of obstructive uropathy and ureteral stents  6. Chronic mild systolic dysfunction with moderate pulmonary hypertension  7. Lymphedema      D/W patient     Jessica Oakley MD  [facility]    This report has been created using voice recognition software.  It may contain minor errors which are inherent in voice recognition technology

## 2022-11-19 NOTE — PROGRESS NOTES
Hospitalist Progress Note    Patient:  Madeline Asif      Unit/Bed:6K-23/023-A    YOB: 1955    MRN: [id number]       Acct: [de-identified]     PCP: DONG Villalta CNP    Date of Admission: 11/18/2022    Assessment/Plan:    Acute renal failure: Secondary obstructive uropathy, continue with broad-spectrum antibiotics, bicarb drip, avoid nephrotoxic medications monitor renal function. Nephrology is following patient. Obstructive uropathy: Patient is currently n.p.o., discussed with urology possible bilateral stent replacement today they were unable to do it yesterday awaiting to hear back from OR. UTI:  currently on merrem  Acute on chronic anemia: Patient's hemoglobin dropped to 7.2 today, will transfuse 1 unit PRBC. Patient is agreeable to blood transfusion. Monitor H&H and transfuse as needed. Hyperkalemia:  received treatment yesterday, continue to monitor, currently on bicarb drip  HTN: continue with   Paroxysmal atrial fibrillation: need to confirm home dose of coreg.   Xarelto on hold      Subjective (past 24 hours):   Patient seen and examined at bedside , reports she is thirsty, complains of dysuria but that is improving      Medications:  Reviewed    Infusion Medications    sodium chloride      sodium chloride      sodium bicarbonate infusion 150 mL/hr at 11/19/22 1040    dextrose       Scheduled Medications    sodium chloride flush  5-40 mL IntraVENous 2 times per day    meropenem  500 mg IntraVENous Q12H    meropenem  1,000 mg IntraVENous 60 Min Pre-Op     PRN Meds: sodium chloride, sodium chloride flush, sodium chloride, ondansetron **OR** ondansetron, polyethylene glycol, acetaminophen **OR** acetaminophen, HYDROmorphone, glucose, dextrose bolus **OR** dextrose bolus, glucagon (rDNA), dextrose      Intake/Output Summary (Last 24 hours) at 11/19/2022 1128  Last data filed at 11/18/2022 2250  Gross per 24 hour   Intake 100 ml   Output 0 ml   Net 100 ml       Diet:  Diet NPO Exceptions are: Sips of Water with Meds    Exam:  BP (!) 149/72   Pulse 73   Temp 97.7 °F (36.5 °C) (Oral)   Resp 18   Ht 5' 1\" (1.549 m) Comment: per previosuly charted  Wt 250 lb (113.4 kg) Comment: per previously charted  LMP  (LMP Unknown)   SpO2 99%   BMI 47.24 kg/m²     General appearance: No apparent distress, appears stated age and cooperative. Respiratory:  Normal respiratory effort. Clear to auscultation, bilaterally without Rales/Wheezes/Rhonchi. Cardiovascular: Regular rate and rhythm with normal S1/S2 without murmurs, rubs or gallops. Abdomen: Soft, non-tender, non-distended with normal bowel sounds. Extremities: no pedal edema      Labs:   Recent Labs     11/17/22  1520 11/18/22  0844 11/19/22  0538   WBC 8.6 7.5 7.0   HGB 9.0* 8.0* 7.2*   HCT 28.5* 26.0* 23.9*    273 257     Recent Labs     11/17/22  1938 11/18/22  0844 11/18/22  1708 11/19/22  0538    138  --  140   K 5.4* 5.8* 5.4* 5.3*    114*  --  111   CO2 12* 9*  --  13*   BUN 97* 100*  --  94*   CREATININE 6.52* 7.1*  --  6.9*   CALCIUM 8.2* 7.8*  --  7.2*     Recent Labs     11/17/22  1520   AST 12   ALT 11   BILITOT <0.1*   ALKPHOS 91     Recent Labs     11/18/22  0844   INR 1.19*     No results for input(s): CKTOTAL, TROPONINI in the last 72 hours. No results for input(s): PROCAL in the last 72 hours. Microbiology:      Urinalysis:      Lab Results   Component Value Date/Time    NITRU NEGATIVE 11/17/2022 05:52 PM    WBCUA 10 TO 20 11/17/2022 05:52 PM    BACTERIA FEW 04/21/2022 03:33 AM    RBCUA GREATER THAN 100 11/17/2022 05:52 PM    SPECGRAV 1.020 11/17/2022 05:52 PM    GLUCOSEU NEGATIVE 11/17/2022 05:52 PM       Radiology:  No results found.     DVT prophylaxis: [] Lovenox                                 [] SCDs                                 [] SQ Heparin                                 [] Encourage ambulation           [] Already on Anticoagulation     Code Status: Full Code    Tele:   [] yes [] no    Active Hospital Problems    Diagnosis Date Noted    Hyperkalemia [E87.5] 11/18/2022     Priority: Medium    Azotemia [R79.89] 11/18/2022     Priority: Medium    BARBARA (acute kidney injury) ([facility] [address] [N17.9] 08/12/2022     Priority: Medium       Electronically signed by Radha Melraa MD on 11/19/2022 at 11:28 AM

## 2022-11-20 LAB
ANION GAP SERPL CALCULATED.3IONS-SCNC: 16 MEQ/L (ref 8–16)
BASOPHILS # BLD: 0.3 %
BASOPHILS ABSOLUTE: 0 THOU/MM3 (ref 0–0.1)
BUN BLDV-MCNC: 88 MG/DL (ref 7–22)
CALCIUM SERPL-MCNC: 7.4 MG/DL (ref 8.5–10.5)
CHLORIDE BLD-SCNC: 106 MEQ/L (ref 98–111)
CO2: 22 MEQ/L (ref 23–33)
CREAT SERPL-MCNC: 7 MG/DL (ref 0.4–1.2)
EOSINOPHIL # BLD: 2.2 %
EOSINOPHILS ABSOLUTE: 0.1 THOU/MM3 (ref 0–0.4)
ERYTHROCYTE [DISTWIDTH] IN BLOOD BY AUTOMATED COUNT: 15.2 % (ref 11.5–14.5)
ERYTHROCYTE [DISTWIDTH] IN BLOOD BY AUTOMATED COUNT: 48.4 FL (ref 35–45)
GFR SERPL CREATININE-BSD FRML MDRD: 6 ML/MIN/1.73M2
GLUCOSE BLD-MCNC: 140 MG/DL (ref 70–108)
GLUCOSE BLD-MCNC: 143 MG/DL (ref 70–108)
GLUCOSE BLD-MCNC: 149 MG/DL (ref 70–108)
GLUCOSE BLD-MCNC: 163 MG/DL (ref 70–108)
HCT VFR BLD CALC: 26.2 % (ref 37–47)
HEMOGLOBIN: 8.5 GM/DL (ref 12–16)
IMMATURE GRANS (ABS): 0.03 THOU/MM3 (ref 0–0.07)
IMMATURE GRANULOCYTES: 0.5 %
LYMPHOCYTES # BLD: 14.7 %
LYMPHOCYTES ABSOLUTE: 0.9 THOU/MM3 (ref 1–4.8)
MCH RBC QN AUTO: 28.2 PG (ref 26–33)
MCHC RBC AUTO-ENTMCNC: 32.4 GM/DL (ref 32.2–35.5)
MCV RBC AUTO: 87 FL (ref 81–99)
MONOCYTES # BLD: 7.8 %
MONOCYTES ABSOLUTE: 0.5 THOU/MM3 (ref 0.4–1.3)
NUCLEATED RED BLOOD CELLS: 0 /100 WBC
PLATELET # BLD: 294 THOU/MM3 (ref 130–400)
PMV BLD AUTO: 8.6 FL (ref 9.4–12.4)
POTASSIUM SERPL-SCNC: 4.5 MEQ/L (ref 3.5–5.2)
RBC # BLD: 3.01 MILL/MM3 (ref 4.2–5.4)
SEG NEUTROPHILS: 74.5 %
SEGMENTED NEUTROPHILS ABSOLUTE COUNT: 4.5 THOU/MM3 (ref 1.8–7.7)
SODIUM BLD-SCNC: 144 MEQ/L (ref 135–145)
WBC # BLD: 6 THOU/MM3 (ref 4.8–10.8)

## 2022-11-20 PROCEDURE — 6360000002 HC RX W HCPCS: Performed by: HOSPITALIST

## 2022-11-20 PROCEDURE — 82948 REAGENT STRIP/BLOOD GLUCOSE: CPT

## 2022-11-20 PROCEDURE — 1200000003 HC TELEMETRY R&B

## 2022-11-20 PROCEDURE — 6370000000 HC RX 637 (ALT 250 FOR IP): Performed by: INTERNAL MEDICINE

## 2022-11-20 PROCEDURE — 99232 SBSQ HOSP IP/OBS MODERATE 35: CPT | Performed by: HOSPITALIST

## 2022-11-20 PROCEDURE — 80048 BASIC METABOLIC PNL TOTAL CA: CPT

## 2022-11-20 PROCEDURE — 6360000002 HC RX W HCPCS: Performed by: PHYSICIAN ASSISTANT

## 2022-11-20 PROCEDURE — 2580000003 HC RX 258: Performed by: PHYSICIAN ASSISTANT

## 2022-11-20 PROCEDURE — 2500000003 HC RX 250 WO HCPCS: Performed by: INTERNAL MEDICINE

## 2022-11-20 PROCEDURE — 2580000003 HC RX 258: Performed by: HOSPITALIST

## 2022-11-20 PROCEDURE — 99232 SBSQ HOSP IP/OBS MODERATE 35: CPT | Performed by: INTERNAL MEDICINE

## 2022-11-20 PROCEDURE — 2500000003 HC RX 250 WO HCPCS: Performed by: HOSPITALIST

## 2022-11-20 PROCEDURE — 2580000003 HC RX 258: Performed by: INTERNAL MEDICINE

## 2022-11-20 PROCEDURE — 85025 COMPLETE CBC W/AUTO DIFF WBC: CPT

## 2022-11-20 PROCEDURE — 6370000000 HC RX 637 (ALT 250 FOR IP): Performed by: HOSPITALIST

## 2022-11-20 PROCEDURE — 36415 COLL VENOUS BLD VENIPUNCTURE: CPT

## 2022-11-20 RX ORDER — AMLODIPINE BESYLATE 10 MG/1
10 TABLET ORAL DAILY
Status: DISCONTINUED | OUTPATIENT
Start: 2022-11-20 | End: 2022-11-23 | Stop reason: HOSPADM

## 2022-11-20 RX ORDER — HYDRALAZINE HYDROCHLORIDE 20 MG/ML
10 INJECTION INTRAMUSCULAR; INTRAVENOUS EVERY 6 HOURS PRN
Status: DISCONTINUED | OUTPATIENT
Start: 2022-11-20 | End: 2022-11-23 | Stop reason: HOSPADM

## 2022-11-20 RX ORDER — CARVEDILOL 6.25 MG/1
12.5 TABLET ORAL 2 TIMES DAILY WITH MEALS
Status: DISCONTINUED | OUTPATIENT
Start: 2022-11-20 | End: 2022-11-23 | Stop reason: HOSPADM

## 2022-11-20 RX ADMIN — SODIUM BICARBONATE: 84 INJECTION, SOLUTION INTRAVENOUS at 18:13

## 2022-11-20 RX ADMIN — SODIUM ZIRCONIUM CYCLOSILICATE 10 G: 10 POWDER, FOR SUSPENSION ORAL at 08:48

## 2022-11-20 RX ADMIN — CARVEDILOL 12.5 MG: 6.25 TABLET, FILM COATED ORAL at 17:54

## 2022-11-20 RX ADMIN — HYDROCODONE BITARTRATE AND ACETAMINOPHEN 1 TABLET: 5; 325 TABLET ORAL at 05:30

## 2022-11-20 RX ADMIN — SODIUM ZIRCONIUM CYCLOSILICATE 10 G: 10 POWDER, FOR SUSPENSION ORAL at 21:21

## 2022-11-20 RX ADMIN — SODIUM ZIRCONIUM CYCLOSILICATE 10 G: 10 POWDER, FOR SUSPENSION ORAL at 15:55

## 2022-11-20 RX ADMIN — HYDROCODONE BITARTRATE AND ACETAMINOPHEN 1 TABLET: 5; 325 TABLET ORAL at 16:01

## 2022-11-20 RX ADMIN — HYDRALAZINE HYDROCHLORIDE 10 MG: 20 INJECTION INTRAMUSCULAR; INTRAVENOUS at 17:12

## 2022-11-20 RX ADMIN — ONDANSETRON 4 MG: 2 INJECTION INTRAMUSCULAR; INTRAVENOUS at 08:48

## 2022-11-20 RX ADMIN — SODIUM CHLORIDE, PRESERVATIVE FREE 10 ML: 5 INJECTION INTRAVENOUS at 08:49

## 2022-11-20 RX ADMIN — AMLODIPINE BESYLATE 10 MG: 10 TABLET ORAL at 17:54

## 2022-11-20 RX ADMIN — SODIUM BICARBONATE: 84 INJECTION, SOLUTION INTRAVENOUS at 06:43

## 2022-11-20 RX ADMIN — MEROPENEM 500 MG: 500 INJECTION, POWDER, FOR SOLUTION INTRAVENOUS at 13:09

## 2022-11-20 ASSESSMENT — PAIN SCALES - GENERAL
PAINLEVEL_OUTOF10: 8
PAINLEVEL_OUTOF10: 7

## 2022-11-20 ASSESSMENT — PAIN DESCRIPTION - LOCATION: LOCATION: ABDOMEN

## 2022-11-20 ASSESSMENT — PAIN DESCRIPTION - DESCRIPTORS: DESCRIPTORS: CRAMPING

## 2022-11-20 ASSESSMENT — PAIN DESCRIPTION - ORIENTATION: ORIENTATION: LOWER

## 2022-11-20 NOTE — PLAN OF CARE
Problem: Discharge Planning  Goal: Discharge to home or other facility with appropriate resources  Outcome: Progressing  Flowsheets (Taken 11/20/2022 0041)  Discharge to home or other facility with appropriate resources: Identify barriers to discharge with patient and caregiver     Problem: Safety - Adult  Goal: Free from fall injury  Outcome: Progressing  Flowsheets (Taken 11/20/2022 0041)  Free From Fall Injury: Instruct family/caregiver on patient safety     Problem: Safety - Adult  Goal: Isolation precautions  Description: Isolation precautions  Outcome: Progressing     Problem: Chronic Conditions and Co-morbidities  Goal: Patient's chronic conditions and co-morbidity symptoms are monitored and maintained or improved  Outcome: Progressing  Flowsheets (Taken 11/19/2022 0855 by Abundio Braden RN)  Care Plan - Patient's Chronic Conditions and Co-Morbidity Symptoms are Monitored and Maintained or Improved: Monitor and assess patient's chronic conditions and comorbid symptoms for stability, deterioration, or improvement     Problem: Pain  Goal: Verbalizes/displays adequate comfort level or baseline comfort level  Outcome: Progressing  Flowsheets (Taken 11/20/2022 0041)  Verbalizes/displays adequate comfort level or baseline comfort level:   Encourage patient to monitor pain and request assistance   Assess pain using appropriate pain scale     Problem: Skin/Tissue Integrity  Goal: Absence of new skin breakdown  Description: 1. Monitor for areas of redness and/or skin breakdown  2. Assess vascular access sites hourly  3. Every 4-6 hours minimum:  Change oxygen saturation probe site  4. Every 4-6 hours:  If on nasal continuous positive airway pressure, respiratory therapy assess nares and determine need for appliance change or resting period.   Outcome: Progressing  Note: Monitor skin Qshift

## 2022-11-20 NOTE — PROGRESS NOTES
PHYSICAL THERAPY - DAILY TREATMENT NOTE    Patient Name: Geni Chaudhary        Date: 2019  : 1978    Patient  Verified: YES  Visit #:   16   of   20  Insurance: Payor: James Mast / Plan:  y prime Jose Pt. Cysto/bilateral stent exchange consent signed and placed in pt. Chart. [facility]   8/8/2019  9:30 AM Susana Castaneda [facility]

## 2022-11-20 NOTE — PROGRESS NOTES
21.00     Types: Cigarettes     Quit date: 2022     Years since quittin.5    Smokeless tobacco: Never   Vaping Use    Vaping Use: Never used   Substance and Sexual Activity    Alcohol use: No     Alcohol/week: 0.0 standard drinks    Drug use: No    Sexual activity: Not Currently   Other Topics Concern    Not on file   Social History Narrative    Not on file     Social Determinants of Health     Financial Resource Strain: Not on file   Food Insecurity: Not on file   Transportation Needs: Not on file   Physical Activity: Not on file   Stress: Not on file   Social Connections: Not on file   Intimate Partner Violence: Not on file   Housing Stability: Not on file     Family History   Adopted: Yes   Problem Relation Age of Onset    Cancer Mother         The patient reports her biologic mother did have bladder cancer     No Known Allergies      Constitutional: Alert and oriented times x3, no acute distress, and cooperative to examination with appropriate mood and affect. HEENT:   Head:         Normocephalic and atraumatic. Mucous membranes are normal.   Eyes:         EOM are normal.  Nose:    The external appearance of the nose is normal  Ears: The ears appear normal to external inspection. Cardiovascular:       Normal rate, regular rhythm. Pulmonary/Chest:  Normal respiratory rate and rhthym. No use of accessory muscles. Lungs clear bilaterally. Abdominal:          Soft. Mid low abd tenderness. Active bowel sounds. Genitalia:    Voiding spontaneously. Dysuria-improved  Musculoskeletal:    Normal range of motion. He exhibits no edema or tenderness of lower extremities. Extremities:    No cyanosis, clubbing, or edema present. Neurological:    Alert and oriented.      Labs:  WBC:    Lab Results   Component Value Date/Time    WBC 6.0 2022 09:16 AM     Hemoglobin/Hematocrit:    Lab Results   Component Value Date/Time    HGB 8.5 2022 09:16 AM    HCT 26.2 2022 09:16 AM     BMP: Lab Results   Component Value Date/Time     11/20/2022 05:50 AM    K 4.5 11/20/2022 05:50 AM    K 5.3 11/19/2022 05:38 AM     11/20/2022 05:50 AM    CO2 22 11/20/2022 05:50 AM    BUN 88 11/20/2022 05:50 AM    LABALBU 3.0 11/17/2022 03:20 PM    CREATININE 7.0 11/20/2022 05:50 AM    CALCIUM 7.4 11/20/2022 05:50 AM    GFRAA >60 08/16/2022 05:42 AM    LABGLOM 6 11/20/2022 05:50 AM     Impression/Plan:  [id number] [address], NPO frida  Plan for bilat stent exchange in OR on Monday - postponed from Friday due to availability    BARBARA secondary to obstructive uropathy - CRT 7.0, stable  Bilateral Severe Hydroureteronephrosis - bilateral stents in place  Hyperkalemia - improving  Metabolic acidemia - nephrology on board  Anemia - HGB 7.2, plan to transfuse, managed by medicine  UTI - Ux gram neg rods - on Merrem    Reviewed with Dr Dieter Maguire and Dr Garrett Muñoz, APRN - CNP, APRN  11/20/22 2:58 PM  Urology

## 2022-11-20 NOTE — PROGRESS NOTES
Hospitalist Progress Note    Patient:  Inell Ao      Unit/Bed:6K-23/023-A    YOB: 1955    MRN: [id number]       Acct: [de-identified]     PCP: DONG Fuentes CNP    Date of Admission: 11/18/2022    Assessment/Plan:    Acute renal failure: Secondary obstructive uropathy, continue with broad-spectrum antibiotics, bicarb drip, avoid nephrotoxic medications monitor renal function. Nephrology is following patient. Cr not improving, monitor I/O  Obstructive uropathy: Patient is currently n.p.o.,awaiting possible bilateral stent replacement today they were unable to do it yesterday awaiting to hear back from OR. UTI:  currently on merrem  Acute on chronic anemia:  Monitor H&H and transfuse as needed. Hyperkalemia:  received treatment yesterday, continue to monitor, currently on bicarb drip  HTN: continue with   Paroxysmal atrial fibrillation: need to confirm home dose of coreg. Xarelto on hold      Subjective (past 24 hours):   Patient seen and examined at bedside , patient is frustrated about timing of stents. No acute overnight events.     Medications:  Reviewed    Infusion Medications    sodium chloride      sodium chloride      sodium bicarbonate infusion 150 mL/hr at 11/20/22 0643    dextrose       Scheduled Medications    sodium zirconium cyclosilicate  10 g Oral TID    meropenem  500 mg IntraVENous Q24H    sodium chloride flush  5-40 mL IntraVENous 2 times per day    meropenem  1,000 mg IntraVENous 60 Min Pre-Op     PRN Meds: sodium chloride, HYDROcodone 5 mg - acetaminophen, sodium chloride flush, sodium chloride, ondansetron **OR** ondansetron, polyethylene glycol, acetaminophen **OR** acetaminophen, HYDROmorphone, glucose, dextrose bolus **OR** dextrose bolus, glucagon (rDNA), dextrose      Intake/Output Summary (Last 24 hours) at 11/20/2022 1224  Last data filed at 11/20/2022 1156  Gross per 24 hour   Intake 5603.88 ml   Output 500 ml   Net 5103.88 ml         Diet:  ADULT DIET; Regular; Low Potassium (Less than 3000 mg/day)  Diet NPO Exceptions are: Sips of Water with Meds    Exam:  BP (!) 180/80   Pulse 67   Temp 98.3 °F (36.8 °C) (Oral)   Resp 16   Ht 5' 1\" (1.549 m) Comment: per previosuly charted  Wt 250 lb (113.4 kg) Comment: per previously charted  LMP  (LMP Unknown)   SpO2 97%   BMI 47.24 kg/m²     General appearance: No apparent distress, appears stated age and cooperative. Respiratory:  Normal respiratory effort. Clear to auscultation, bilaterally without Rales/Wheezes/Rhonchi. Cardiovascular: Regular rate and rhythm with normal S1/S2 without murmurs, rubs or gallops. Abdomen: Soft, non-tender, non-distended with normal bowel sounds. Extremities: no pedal edema      Labs:   Recent Labs     11/18/22  0844 11/19/22  0538 11/20/22  0916   WBC 7.5 7.0 6.0   HGB 8.0* 7.2* 8.5*   HCT 26.0* 23.9* 26.2*    257 294       Recent Labs     11/18/22  0844 11/18/22  1708 11/19/22  0538 11/20/22  0550     --  140 144   K 5.8* 5.4* 5.3* 4.5   *  --  111 106   CO2 9*  --  13* 22*   *  --  94* 88*   CREATININE 7.1*  --  6.9* 7.0*   CALCIUM 7.8*  --  7.2* 7.4*       Recent Labs     11/17/22  1520   AST 12   ALT 11   BILITOT <0.1*   ALKPHOS 91       Recent Labs     11/18/22  0844   INR 1.19*       No results for input(s): CKTOTAL, TROPONINI in the last 72 hours. No results for input(s): PROCAL in the last 72 hours. Microbiology:      Urinalysis:      Lab Results   Component Value Date/Time    NITRU NEGATIVE 11/17/2022 05:52 PM    WBCUA 10 TO 20 11/17/2022 05:52 PM    BACTERIA FEW 04/21/2022 03:33 AM    RBCUA GREATER THAN 100 11/17/2022 05:52 PM    SPECGRAV 1.020 11/17/2022 05:52 PM    GLUCOSEU NEGATIVE 11/17/2022 05:52 PM       Radiology:  No results found.     DVT prophylaxis: [] Lovenox                                 [] SCDs                                 [] SQ Heparin                                 [] Encourage ambulation           [] Already on Anticoagulation     Code Status: Full Code    Tele:   [] yes             [] no    Active Hospital Problems    Diagnosis Date Noted    Hyperkalemia [E87.5] 11/18/2022     Priority: Medium    Azotemia [R79.89] 11/18/2022     Priority: Medium    BARBARA (acute kidney injury) ([facility] [address] [N17.9] 08/12/2022     Priority: Medium       Electronically signed by Patrice Campos MD on 11/20/2022 at 12:24 PM [General Appearance - Alert] : alert [General Appearance - In No Acute Distress] : in no acute distress [Neck Appearance] : the appearance of the neck was normal [Neck Cervical Mass (___cm)] : no neck mass was observed [Thyroid Diffuse Enlargement] : the thyroid was not enlarged [Thyroid Nodule] : there were no palpable thyroid nodules [Jugular Venous Distention Increased] : there was no jugular-venous distention [Auscultation Breath Sounds / Voice Sounds] : lungs were clear to auscultation bilaterally [Heart Rate And Rhythm] : heart rate was normal and rhythm regular [Heart Sounds] : normal S1 and S2 [Heart Sounds Gallop] : no gallops [Murmurs] : no murmurs [Heart Sounds Pericardial Friction Rub] : no pericardial rub [Bowel Sounds] : normal bowel sounds [Abdomen Soft] : soft [] : no hepato-splenomegaly [Abdomen Tenderness] : non-tender [Abdomen Mass (___ Cm)] : no abdominal mass palpated [No CVA Tenderness] : no ~M costovertebral angle tenderness [No Spinal Tenderness] : no spinal tenderness [Impaired Insight] : insight and judgment were intact [Oriented To Time, Place, And Person] : oriented to person, place, and time [Affect] : the affect was normal [FreeTextEntry1] : chronic right leg edema

## 2022-11-20 NOTE — FLOWSHEET NOTE
11/20/22 1709   Safe Environment   Safety Measures Other (comment)  (vn round)      11/20/22 1709   Safe Environment   Safety Measures Other (comment)  (vn round)   Call placed , pt responds to audio , permits video . Pt denied any voiced concerns or complaints at this time , call light within reach , no s/s distress noted .  Unable to connect to video , per audio conversation no voiced needs or concerns

## 2022-11-20 NOTE — PLAN OF CARE
Problem: Discharge Planning  Goal: Discharge to home or other facility with appropriate resources  11/20/2022 1251 by Akin Palafox RN  Outcome: Progressing  11/20/2022 0041 by Lc Overton RN  Outcome: Progressing  Flowsheets (Taken 11/20/2022 0041)  Discharge to home or other facility with appropriate resources: Identify barriers to discharge with patient and caregiver     Problem: Safety - Adult  Goal: Free from fall injury  11/20/2022 1251 by Akin Palafox RN  Outcome: Progressing  11/20/2022 0041 by Lc Overton RN  Outcome: Progressing  Flowsheets (Taken 11/20/2022 0041)  Free From Fall Injury: Instruct family/caregiver on patient safety  Goal: Isolation precautions  Description: Isolation precautions  11/20/2022 1251 by Akin Palafox RN  Outcome: Progressing  11/20/2022 0041 by Lc Overton RN  Outcome: Progressing     Problem: Chronic Conditions and Co-morbidities  Goal: Patient's chronic conditions and co-morbidity symptoms are monitored and maintained or improved  11/20/2022 1251 by Akin Palafox RN  Outcome: Progressing  11/20/2022 0041 by Lc Overton RN  Outcome: Progressing  Flowsheets (Taken 11/19/2022 0855 by Akin Palafox RN)  Care Plan - Patient's Chronic Conditions and Co-Morbidity Symptoms are Monitored and Maintained or Improved: Monitor and assess patient's chronic conditions and comorbid symptoms for stability, deterioration, or improvement     Problem: Pain  Goal: Verbalizes/displays adequate comfort level or baseline comfort level  11/20/2022 1251 by Akin Palafox RN  Outcome: Progressing  11/20/2022 0041 by Lc Overton RN  Outcome: Progressing  Flowsheets (Taken 11/20/2022 0041)  Verbalizes/displays adequate comfort level or baseline comfort level:   Encourage patient to monitor pain and request assistance   Assess pain using appropriate pain scale     Problem: Skin/Tissue Integrity  Goal: Absence of new skin breakdown  Description: 1.   Monitor for areas of redness and/or skin breakdown  2. Assess vascular access sites hourly  3. Every 4-6 hours minimum:  Change oxygen saturation probe site  4. Every 4-6 hours:  If on nasal continuous positive airway pressure, respiratory therapy assess nares and determine need for appliance change or resting period.   11/20/2022 1251 by Graham Osler, RN  Outcome: Progressing  11/20/2022 0041 by Everett Rice RN  Outcome: Progressing  Note: Monitor skin Qshift

## 2022-11-20 NOTE — PROGRESS NOTES
[facility]   Nephrology progress note  11/20/2022, 1:26 PM      Pt Name:    Hernán Stubbs  MRN:     [id number]     YOB: 1955  Admit Date:    11/18/2022 12:07 AM    Chief Complaint: Nephrology following for BARBARA/hyperkalemia and metabolic acidosis    Subjective:  Patient was seen and examined this morning  No chest pain or shortness of breath  Still Awaiting OR    Objective:  24HR INTAKE/OUTPUT:    Intake/Output Summary (Last 24 hours) at 11/20/2022 1326  Last data filed at 11/20/2022 1156  Gross per 24 hour   Intake 5603.88 ml   Output 500 ml   Net 5103.88 ml           I/O last 3 completed shifts: In: 5703.9 [I.V.:5098.6; Blood:310; IV Piggyback:295.3]  Out: 100 [Urine:100]  I/O this shift:   In: 0   Out: 400 [Urine:400]   Admission weight: 250 lb (113.4 kg) (per previously charted)  Wt Readings from Last 3 Encounters:   11/18/22 250 lb (113.4 kg)   11/17/22 250 lb (113.4 kg)   08/16/22 262 lb 1.6 oz (118.9 kg)        Vitals :   Vitals:    11/20/22 0530 11/20/22 0600 11/20/22 0859 11/20/22 1156   BP:   (!) 175/78 (!) 180/80   Pulse:   66 67   Resp: 16 16 15 16   Temp:   97.3 °F (36.3 °C) 98.3 °F (36.8 °C)   TempSrc:   Oral Oral   SpO2:   98% 97%   Weight:       Height:           Physical examination  General Appearance: alert and cooperative with exam, appears comfortable, no distress  Mouth/Throat: Oral mucosa moist  Neck: No JVD  Lungs: Air entry B/L, no rales, no use of accessory muscles  Heart:  S1, S2 heard  GI: soft, non-tender, no guarding  Extremities: + Lymphedema changes    Medications:  Infusion:    sodium chloride      sodium chloride      sodium bicarbonate infusion 150 mL/hr at 11/20/22 0643    dextrose       Meds:    sodium zirconium cyclosilicate  10 g Oral TID    meropenem  500 mg IntraVENous Q24H    sodium chloride flush  5-40 mL IntraVENous 2 times per day    meropenem  1,000 mg IntraVENous 60 Min Pre-Op     Meds prn: sodium chloride, HYDROcodone 5 mg - acetaminophen, sodium chloride flush, sodium chloride, ondansetron **OR** ondansetron, polyethylene glycol, acetaminophen **OR** acetaminophen, HYDROmorphone, glucose, dextrose bolus **OR** dextrose bolus, glucagon (rDNA), dextrose     Lab Data :  CBC:   Recent Labs     11/18/22  0844 11/19/22  0538 11/20/22  0916   WBC 7.5 7.0 6.0   HGB 8.0* 7.2* 8.5*   HCT 26.0* 23.9* 26.2*    257 294       CMP:  Recent Labs     11/18/22  0844 11/18/22  1708 11/19/22  0538 11/20/22  0550     --  140 144   K 5.8* 5.4* 5.3* 4.5   *  --  111 106   CO2 9*  --  13* 22*   *  --  94* 88*   CREATININE 7.1*  --  6.9* 7.0*   GLUCOSE 102  --  146* 140*   CALCIUM 7.8*  --  7.2* 7.4*       Hepatic:   Recent Labs     11/17/22  1520   LABALBU 3.0*   AST 12   ALT 11   BILITOT <0.1*   ALKPHOS 91           Assessment and Plan:  1. BARBARA secondary to obstructive uropathy  CT shows bilateral hydronephrosis. Patient has severe left hydroureteronephrosis and severe right hydronephrosis  Still awaiting to go to the OR  Creatinine remains high but stable in last 24 hours    2. Hyperkalemia in setting of BARBARA and obstructive uropathy. Improved with bicarb drip  3. Metabolic acidosis. Continue with IV sodium bicarbonate drip  4. Bilateral severe hydronephrosis  5. History of obstructive uropathy and ureteral stents  6. Chronic mild systolic dysfunction with moderate pulmonary hypertension  7. Lymphedema      D/W patient and RN    Jelly Ross MD  [facility]    This report has been created using voice recognition software.  It may contain minor errors which are inherent in voice recognition technology

## 2022-11-21 ENCOUNTER — ANESTHESIA EVENT (OUTPATIENT)
Dept: OPERATING ROOM | Age: 67
DRG: 660 | End: 2022-11-21
Payer: MEDICARE

## 2022-11-21 ENCOUNTER — ANESTHESIA (OUTPATIENT)
Dept: OPERATING ROOM | Age: 67
DRG: 660 | End: 2022-11-21
Payer: MEDICARE

## 2022-11-21 LAB
ANION GAP SERPL CALCULATED.3IONS-SCNC: 10 MEQ/L (ref 8–16)
BUN BLDV-MCNC: 78 MG/DL (ref 7–22)
CALCIUM SERPL-MCNC: 7.7 MG/DL (ref 8.5–10.5)
CHLORIDE BLD-SCNC: 102 MEQ/L (ref 98–111)
CO2: 28 MEQ/L (ref 23–33)
CREAT SERPL-MCNC: 5.5 MG/DL (ref 0.4–1.2)
GFR SERPL CREATININE-BSD FRML MDRD: 8 ML/MIN/1.73M2
GLUCOSE BLD-MCNC: 126 MG/DL (ref 70–108)
GLUCOSE BLD-MCNC: 132 MG/DL (ref 70–108)
GLUCOSE BLD-MCNC: 132 MG/DL (ref 70–108)
GLUCOSE BLD-MCNC: 151 MG/DL (ref 70–108)
GLUCOSE BLD-MCNC: 169 MG/DL (ref 70–108)
POTASSIUM SERPL-SCNC: 3.7 MEQ/L (ref 3.5–5.2)
SODIUM BLD-SCNC: 140 MEQ/L (ref 135–145)

## 2022-11-21 PROCEDURE — 2580000003 HC RX 258: Performed by: INTERNAL MEDICINE

## 2022-11-21 PROCEDURE — 2500000003 HC RX 250 WO HCPCS: Performed by: NURSE ANESTHETIST, CERTIFIED REGISTERED

## 2022-11-21 PROCEDURE — C2617 STENT, NON-COR, TEM W/O DEL: HCPCS | Performed by: UROLOGY

## 2022-11-21 PROCEDURE — 6360000002 HC RX W HCPCS: Performed by: PHYSICIAN ASSISTANT

## 2022-11-21 PROCEDURE — 6370000000 HC RX 637 (ALT 250 FOR IP): Performed by: HOSPITALIST

## 2022-11-21 PROCEDURE — 6360000002 HC RX W HCPCS: Performed by: NURSE ANESTHETIST, CERTIFIED REGISTERED

## 2022-11-21 PROCEDURE — 2709999900 HC NON-CHARGEABLE SUPPLY: Performed by: UROLOGY

## 2022-11-21 PROCEDURE — 2580000003 HC RX 258: Performed by: PHYSICIAN ASSISTANT

## 2022-11-21 PROCEDURE — 2580000003 HC RX 258: Performed by: HOSPITALIST

## 2022-11-21 PROCEDURE — 99232 SBSQ HOSP IP/OBS MODERATE 35: CPT | Performed by: INTERNAL MEDICINE

## 2022-11-21 PROCEDURE — 0T788DZ DILATION OF BILATERAL URETERS WITH INTRALUMINAL DEVICE, VIA NATURAL OR ARTIFICIAL OPENING ENDOSCOPIC: ICD-10-PCS | Performed by: UROLOGY

## 2022-11-21 PROCEDURE — 6360000002 HC RX W HCPCS: Performed by: HOSPITALIST

## 2022-11-21 PROCEDURE — APPNB30 APP NON BILLABLE TIME 0-30 MINS

## 2022-11-21 PROCEDURE — 82948 REAGENT STRIP/BLOOD GLUCOSE: CPT

## 2022-11-21 PROCEDURE — 99232 SBSQ HOSP IP/OBS MODERATE 35: CPT | Performed by: HOSPITALIST

## 2022-11-21 PROCEDURE — 36415 COLL VENOUS BLD VENIPUNCTURE: CPT

## 2022-11-21 PROCEDURE — 6370000000 HC RX 637 (ALT 250 FOR IP): Performed by: INTERNAL MEDICINE

## 2022-11-21 PROCEDURE — 2580000003 HC RX 258: Performed by: NURSE ANESTHETIST, CERTIFIED REGISTERED

## 2022-11-21 PROCEDURE — 7100000000 HC PACU RECOVERY - FIRST 15 MIN: Performed by: UROLOGY

## 2022-11-21 PROCEDURE — 3600000012 HC SURGERY LEVEL 2 ADDTL 15MIN: Performed by: UROLOGY

## 2022-11-21 PROCEDURE — 0TP98DZ REMOVAL OF INTRALUMINAL DEVICE FROM URETER, VIA NATURAL OR ARTIFICIAL OPENING ENDOSCOPIC: ICD-10-PCS | Performed by: UROLOGY

## 2022-11-21 PROCEDURE — 3700000000 HC ANESTHESIA ATTENDED CARE: Performed by: UROLOGY

## 2022-11-21 PROCEDURE — 80048 BASIC METABOLIC PNL TOTAL CA: CPT

## 2022-11-21 PROCEDURE — 3600000002 HC SURGERY LEVEL 2 BASE: Performed by: UROLOGY

## 2022-11-21 PROCEDURE — 1200000003 HC TELEMETRY R&B

## 2022-11-21 PROCEDURE — C1769 GUIDE WIRE: HCPCS | Performed by: UROLOGY

## 2022-11-21 PROCEDURE — 2500000003 HC RX 250 WO HCPCS: Performed by: HOSPITALIST

## 2022-11-21 PROCEDURE — 7100000001 HC PACU RECOVERY - ADDTL 15 MIN: Performed by: UROLOGY

## 2022-11-21 PROCEDURE — 3700000001 HC ADD 15 MINUTES (ANESTHESIA): Performed by: UROLOGY

## 2022-11-21 DEVICE — URETERAL STENT
Type: IMPLANTABLE DEVICE | Site: URETER | Status: FUNCTIONAL
Brand: PERCUFLEX™ PLUS

## 2022-11-21 RX ORDER — FENTANYL CITRATE 50 UG/ML
INJECTION, SOLUTION INTRAMUSCULAR; INTRAVENOUS PRN
Status: DISCONTINUED | OUTPATIENT
Start: 2022-11-21 | End: 2022-11-21 | Stop reason: SDUPTHER

## 2022-11-21 RX ORDER — LIDOCAINE HYDROCHLORIDE 20 MG/ML
INJECTION, SOLUTION INFILTRATION; PERINEURAL PRN
Status: DISCONTINUED | OUTPATIENT
Start: 2022-11-21 | End: 2022-11-21 | Stop reason: SDUPTHER

## 2022-11-21 RX ORDER — SODIUM CHLORIDE 9 MG/ML
INJECTION, SOLUTION INTRAVENOUS CONTINUOUS
Status: DISCONTINUED | OUTPATIENT
Start: 2022-11-21 | End: 2022-11-22

## 2022-11-21 RX ORDER — SODIUM CHLORIDE 9 MG/ML
INJECTION, SOLUTION INTRAVENOUS CONTINUOUS PRN
Status: DISCONTINUED | OUTPATIENT
Start: 2022-11-21 | End: 2022-11-21 | Stop reason: SDUPTHER

## 2022-11-21 RX ORDER — PROPOFOL 10 MG/ML
INJECTION, EMULSION INTRAVENOUS PRN
Status: DISCONTINUED | OUTPATIENT
Start: 2022-11-21 | End: 2022-11-21 | Stop reason: SDUPTHER

## 2022-11-21 RX ADMIN — SODIUM BICARBONATE: 84 INJECTION, SOLUTION INTRAVENOUS at 05:22

## 2022-11-21 RX ADMIN — CARVEDILOL 12.5 MG: 6.25 TABLET, FILM COATED ORAL at 17:29

## 2022-11-21 RX ADMIN — SODIUM CHLORIDE: 9 INJECTION, SOLUTION INTRAVENOUS at 13:49

## 2022-11-21 RX ADMIN — HYDROCODONE BITARTRATE AND ACETAMINOPHEN 1 TABLET: 5; 325 TABLET ORAL at 09:22

## 2022-11-21 RX ADMIN — SODIUM CHLORIDE: 9 INJECTION, SOLUTION INTRAVENOUS at 19:49

## 2022-11-21 RX ADMIN — HYDRALAZINE HYDROCHLORIDE 10 MG: 20 INJECTION INTRAMUSCULAR; INTRAVENOUS at 05:58

## 2022-11-21 RX ADMIN — MEROPENEM 500 MG: 500 INJECTION, POWDER, FOR SOLUTION INTRAVENOUS at 17:24

## 2022-11-21 RX ADMIN — SODIUM ZIRCONIUM CYCLOSILICATE 10 G: 10 POWDER, FOR SUSPENSION ORAL at 21:06

## 2022-11-21 RX ADMIN — CARVEDILOL 12.5 MG: 6.25 TABLET, FILM COATED ORAL at 09:22

## 2022-11-21 RX ADMIN — HYDROCODONE BITARTRATE AND ACETAMINOPHEN 1 TABLET: 5; 325 TABLET ORAL at 23:07

## 2022-11-21 RX ADMIN — FENTANYL CITRATE 50 MCG: 50 INJECTION, SOLUTION INTRAMUSCULAR; INTRAVENOUS at 14:14

## 2022-11-21 RX ADMIN — HYDROCODONE BITARTRATE AND ACETAMINOPHEN 1 TABLET: 5; 325 TABLET ORAL at 00:22

## 2022-11-21 RX ADMIN — FENTANYL CITRATE 50 MCG: 50 INJECTION, SOLUTION INTRAMUSCULAR; INTRAVENOUS at 14:21

## 2022-11-21 RX ADMIN — AMLODIPINE BESYLATE 10 MG: 10 TABLET ORAL at 17:28

## 2022-11-21 RX ADMIN — PROPOFOL 200 MG: 10 INJECTION, EMULSION INTRAVENOUS at 14:00

## 2022-11-21 RX ADMIN — LIDOCAINE HYDROCHLORIDE 100 MG: 20 INJECTION, SOLUTION INFILTRATION; PERINEURAL at 14:00

## 2022-11-21 RX ADMIN — SODIUM CHLORIDE, PRESERVATIVE FREE 10 ML: 5 INJECTION INTRAVENOUS at 09:23

## 2022-11-21 ASSESSMENT — PAIN DESCRIPTION - DESCRIPTORS
DESCRIPTORS: ACHING
DESCRIPTORS: CRAMPING
DESCRIPTORS: SHARP

## 2022-11-21 ASSESSMENT — PAIN DESCRIPTION - ORIENTATION
ORIENTATION: RIGHT
ORIENTATION: LEFT

## 2022-11-21 ASSESSMENT — PAIN DESCRIPTION - FREQUENCY: FREQUENCY: CONTINUOUS

## 2022-11-21 ASSESSMENT — PAIN SCALES - GENERAL
PAINLEVEL_OUTOF10: 10
PAINLEVEL_OUTOF10: 8
PAINLEVEL_OUTOF10: 6

## 2022-11-21 ASSESSMENT — PAIN DESCRIPTION - LOCATION
LOCATION: GROIN
LOCATION: ABDOMEN
LOCATION: KNEE

## 2022-11-21 ASSESSMENT — PAIN DESCRIPTION - ONSET: ONSET: ON-GOING

## 2022-11-21 NOTE — PLAN OF CARE
Problem: Discharge Planning  Goal: Discharge to home or other facility with appropriate resources  11/21/2022 0124 by Shannan Jack RN  Outcome: Progressing  Flowsheets (Taken 11/21/2022 0124)  Discharge to home or other facility with appropriate resources: Identify barriers to discharge with patient and caregiver     Problem: Safety - Adult  Goal: Free from fall injury  11/21/2022 0124 by Shannan Jack RN  Outcome: Progressing  Flowsheets (Taken 11/21/2022 0124)  Free From Fall Injury: Instruct family/caregiver on patient safety     Problem: Safety - Adult  Goal: Isolation precautions  Description: Isolation precautions  11/21/2022 0124 by Shannan Jack RN  Outcome: Progressing     Problem: Chronic Conditions and Co-morbidities  Goal: Patient's chronic conditions and co-morbidity symptoms are monitored and maintained or improved  11/21/2022 0124 by Shannan Jack RN  Outcome: Progressing  Flowsheets (Taken 11/21/2022 0124)  Care Plan - Patient's Chronic Conditions and Co-Morbidity Symptoms are Monitored and Maintained or Improved: Monitor and assess patient's chronic conditions and comorbid symptoms for stability, deterioration, or improvement     Problem: Pain  Goal: Verbalizes/displays adequate comfort level or baseline comfort level  11/21/2022 0124 by Shannan Jack RN  Outcome: Progressing  Flowsheets (Taken 11/21/2022 0124)  Verbalizes/displays adequate comfort level or baseline comfort level:   Encourage patient to monitor pain and request assistance   Assess pain using appropriate pain scale     Problem: Skin/Tissue Integrity  Goal: Absence of new skin breakdown  Description: 1. Monitor for areas of redness and/or skin breakdown  2. Assess vascular access sites hourly  3. Every 4-6 hours minimum:  Change oxygen saturation probe site  4.   Every 4-6 hours:  If on nasal continuous positive airway pressure, respiratory therapy assess nares and determine need for appliance change or resting period.   11/21/2022 0124 by Amy Bell, RN  Outcome: Progressing  Note: Monitor skin Qshift

## 2022-11-21 NOTE — PROGRESS NOTES
1438 - pt arrives to pacu, respirations easy and unlabored on room air, pt denies pain at this time, VSS    1450 - pt complaining of burning in nathalia area, this RN informed patient of lombardo catheter in place and that is a common feeling while in place, pt verbalized understanding, this RN put lubricant on site to improve burning sensation    1454 - report called to [facility] Russ 69    8636 - pt meets criteria for discharge from pacu at this time, pt transported to  in stable condition

## 2022-11-21 NOTE — ANESTHESIA PRE PROCEDURE
Department of Anesthesiology  Preprocedure Note       Name:  Vladimir Landon   Age:  74 y.o.  :  1955                                          MRN:  [id number]         Date:  2022      Surgeon: Kaylen Momin):  Juanito Miles MD    Procedure: Procedure(s):  CYSTOSCOPY BILATERAL  URETERAL STENT EXCHANGE    Medications prior to admission:   Prior to Admission medications    Medication Sig Start Date End Date Taking? Authorizing Provider   carvedilol (COREG) 12.5 MG tablet Take 1 tablet by mouth in the morning and 1 tablet in the evening. Take with meals. Patient not taking: Reported on 2022   DONG Pineda CNP   furosemide (LASIX) 40 MG tablet Take 1 tablet by mouth in the morning. Patient not taking: Reported on 2022   DONG Woods CNP   potassium chloride (KLOR-CON M) 20 MEQ extended release tablet Take 1 tablet by mouth in the morning. Patient not taking: Reported on 2022   DONG Pineda CNP   nystatin (MYCOSTATIN) 959146 UNIT/ML suspension Take 5 mLs by mouth in the morning and 5 mLs at noon and 5 mLs in the evening and 5 mLs before bedtime. Patient not taking: Reported on 2022   DONG Woods CNP   rivaroxaban (XARELTO) 20 MG TABS tablet Take 1 tablet by mouth every 24 hours 22   DONG Woods CNP   metoprolol succinate (TOPROL XL) 25 MG extended release tablet Take 1 tablet by mouth in the morning.  22  DONG Pineda CNP   acetaminophen (TYLENOL) 325 MG tablet Take 650 mg by mouth every 6 hours as needed for Pain  Patient not taking: Reported on 2022    Historical Provider, MD   sodium chloride flush 0.9 % injection Infuse 5-40 mLs intravenously at bedtime PICC line maintenence    Historical Provider, MD   amLODIPine (NORVASC) 10 MG tablet Take 1 tablet by mouth daily  Patient not taking: Reported on 11/20/22 1754    hydrALAZINE (APRESOLINE) injection 10 mg  10 mg IntraVENous Q6H PRN Nj York MD   10 mg at 11/21/22 0558    0.9 % sodium chloride infusion   IntraVENous PRN Nj York MD        sodium zirconium cyclosilicate (LOKELMA) oral suspension 10 g  10 g Oral TID Li Langston MD   10 g at 11/20/22 2121    meropenem (MERREM) 500 mg in sodium chloride 0.9 % 100 mL IVPB  500 mg IntraVENous Q24H Queenie Muñiz PA-C   Stopped at 11/20/22 1358    HYDROcodone-acetaminophen (NORCO) 5-325 MG per tablet 1 tablet  1 tablet Oral Q6H PRN Nj York MD   1 tablet at 11/21/22 9116    sodium chloride flush 0.9 % injection 5-40 mL  5-40 mL IntraVENous 2 times per day Queenie Muñiz PA-C   10 mL at 11/21/22 3007    sodium chloride flush 0.9 % injection 5-40 mL  5-40 mL IntraVENous PRN Queenie Muñiz PA-C        0.9 % sodium chloride infusion   IntraVENous PRN Queenie Muñiz PA-C        ondansetron (ZOFRAN-ODT) disintegrating tablet 4 mg  4 mg Oral Q8H PRN Queenie Muñiz PA-C        Or    ondansetron (ZOFRAN) injection 4 mg  4 mg IntraVENous Q6H PRN Queenie Muñiz PA-C   4 mg at 11/20/22 0848    polyethylene glycol (GLYCOLAX) packet 17 g  17 g Oral Daily PRN Queenie Muñiz PA-C        acetaminophen (TYLENOL) tablet 650 mg  650 mg Oral Q6H PRN Queenie Muñiz PA-C        Or    acetaminophen (TYLENOL) suppository 650 mg  650 mg Rectal Q6H PRN Queenie Muñiz PA-C        HYDROmorphone (DILAUDID) injection 0.5 mg  0.5 mg IntraVENous Q4H PRN Queenie Muñiz PA-C   0.5 mg at 11/19/22 1201    glucose chewable tablet 16 g  4 tablet Oral PRN Nj York MD        dextrose bolus 10% 125 mL  125 mL IntraVENous PRN Nj York MD        Or    dextrose bolus 10% 250 mL  250 mL IntraVENous PRN Nj York MD        glucagon (rDNA) injection 1 mg  1 mg SubCUTAneous PRN Nj York MD        dextrose 10 % infusion   IntraVENous Continuous PRN Nj York MD        meropenem (MERREM) 1,000 mg in sodium chloride 0.9 % 100 mL IVPB (mini-bag)  1,000 mg IntraVENous 60 Min Pre-Op Guerita Lopez MD           Allergies:  No Known Allergies    Problem List:    Patient Active Problem List   Diagnosis Code    Acute thromboembolism of deep veins of lower extremity ([facility]) I82.409    Chronic anticoagulation Z79.01    Bilateral cellulitis of lower leg L03.116, L03.115    Acute shoulder pain due to trauma M25.519, G89.11    Lymphedema of both lower extremities I89.0    Tobacco abuse Z72.0    History of recurrent deep vein thrombosis (DVT) Z86.718    Gross hematuria R31.0    Frequency of urination R35.0    Nocturia R35.1    Mixed incontinence N39.46    Constipation K59.00    Cellulitis of lower leg L03.119    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 687    Complicated UTI (urinary tract infection) N39.0    Renal calculus N20.0    Cellulitis L03.90    Normocytic anemia D64.9    Iron deficiency anemia D50.9    Renal abscess, right N15.1    Bacteremia due to Klebsiella pneumoniae R78.81, B96.1    Psoas muscle abscess ([facility]) K68.12    Septicemia due to Klebsiella pneumoniae ([facility]) A41.4    Ureteral calculi N20.1    Intra-abdominal abscess ([facility]) K65.1    Obesity, Class III, BMI 40-49.9 (morbid obesity) ([facility]) E66.01    Hypocalcemia E83.51    Hypokalemia E87.6    Vertebral osteomyelitis ([facility]) T12-L1 M46.20    Pleural effusion on right J90    CRP elevated R79.82    Sepsis ([facility]) A41.9    Urologic disorders N39.9    Bacteremia due to Gram-negative bacteria R78.81    Hypoxia R09.02    Atrial fibrillation with rapid ventricular response ([facility]) I48.91    Sepsis due to urinary tract infection ([facility]) A41.9, N39.0    Renal failure syndrome N19    Abnormal ECG R94.31    Acute kidney injury superimposed on CKD ([facility]) N17.9, N18.9    Calculus in urethra N21.1    COVID-19 virus infection U07.1    Biventricular congestive heart failure (HCC) I50.82    Obstructive uropathy N13.9    Klebsiella infection A49.8    Urinary tract infection due to extended-spectrum beta lactamase (ESBL) producing Escherichia coli N39.0, B96.29, Z16.12    BARBARA (acute kidney injury) ([address] N17.9    COVID-19 U07.1    Other hypotension P91.91    Metabolic acidosis K42.00    Sepsis due to Streptococcus species without acute organ dysfunction ([facility]) A40.9    Hyperkalemia E87.5    Azotemia R79.89       Past Medical History:        Diagnosis Date    Acute kidney injury superimposed on CKD ([facility] 8/10/2022    Atrial fibrillation with RVR ([facility] 8/9/2022    Carcinoma ([facility]     Squamous Cell    Cellulitis     COVID-19 virus infection 8/12/2022    DVT (deep venous thrombosis) ([facility] 2006    LLE    Kidney stone     Lymphedema     Morbid obesity ([facility]     Psoas abscess, right ([facility]     Pyelonephritis     Wears glasses        Past Surgical History:        Procedure Laterality Date    CARPAL TUNNEL RELEASE  1990s    CT ABSCESS DRAIN SUBCUTANEOUS  01/10/2022    CT ABSCESS DRAIN SUBCUTANEOUS 1/10/2022 [facility] CT SCAN    CT ABSCESS DRAIN SUBCUTANEOUS  04/21/2022    CT ABSCESS DRAIN SUBCUTANEOUS 4/21/2022 [facility] CT SCAN    CT ABSCESS DRAIN SUBCUTANEOUS  4/29/2022    CT ABSCESS DRAIN SUBCUTANEOUS 4/29/2022 [facility] CT SCAN    CYSTOSCOPY N/A 01/04/2022    CYSTOSCOPY URETERAL STENT INSERTION performed by Keke Rodriguez MD at [address] Right     HLL with stent    CYSTOSCOPY Right 03/01/2022    CYSTOSCOPY URETEROSCOPY LASER-WTIH HLL performed by Keke Rodriguez MD at [address] Right 03/01/2022    CYSTOSCOPY URETERAL STENT INSERTION-EXCHANGE performed by Keke Rodriguez MD at [address] Right 04/05/2022    Dr Maier Gave with stent insertion    CYSTOSCOPY Right 04/05/2022    CYSTOSCOPY URETEROSCOPY LASER-HLL performed by Keke Rodriguez MD at [address] Right 04/05/2022    CYSTOSCOPY URETERAL STENT INSERTION-EXCHANGE performed by Juice Rome MD at [address] Right 2022    CYSTOSCOPY URETERAL STENT INSERTION (Right )    CYSTOSCOPY Right 2022    CYSTOSCOPY URETERAL STENT INSERTION performed by Queenie Dumont MD at [address] Left 2022    CYSTOSCOPY URETERAL STENT INSERTION performed by Juice Rome MD at [address] [facility] [address] SINGLE  2022         INSERT MIDLINE CATHETER  2022         IR NEPHROSTOMY PERCUTANEOUS RIGHT  2022    IR NEPHROSTOMY PERCUTANEOUS RIGHT 2022 Lorena Bridges MD [facility]    ANNABELLA AND BSO (CERVIX REMOVED)      2019    TUBAL LIGATION  1993    TUBAL LIGATION      WISDOM TOOTH EXTRACTION         Social History:    Social History     Tobacco Use    Smoking status: Former     Packs/day: 0.50     Years: 42.00     Pack years: 21.00     Types: Cigarettes     Quit date: 2022     Years since quittin.5    Smokeless tobacco: Never   Substance Use Topics    Alcohol use: No     Alcohol/week: 0.0 standard drinks                                Counseling given: Not Answered      Vital Signs (Current):   Vitals:    22 0558 22 0645 22 0911 22 1044   BP: (!) 182/75 (!) 140/63 (!) 145/61 (!) 148/67   Pulse:   68 64   Resp:   16 16   Temp:   98.1 °F (36.7 °C) 98.2 °F (36.8 °C)   TempSrc:   Oral Oral   SpO2:   94% 93%   Weight:       Height:                                                  BP Readings from Last 3 Encounters:   22 (!) 148/67   22 (!) 174/93   22 (!) 142/86       NPO Status:                                                                                 BMI:   Wt Readings from Last 3 Encounters:   22 250 lb (113.4 kg)   22 250 lb (113.4 kg)   22 262 lb 1.6 oz (118.9 kg)     Body mass index is 47.24 kg/m².     CBC:   Lab Results   Component Value Date/Time    WBC 6.0 2022 09:16 AM    RBC 3.01 2022 09:16 AM HGB 8.5 11/20/2022 09:16 AM    HCT 26.2 11/20/2022 09:16 AM    MCV 87.0 11/20/2022 09:16 AM    RDW 15.2 11/17/2022 03:20 PM     11/20/2022 09:16 AM       CMP:   Lab Results   Component Value Date/Time     11/21/2022 05:36 AM    K 3.7 11/21/2022 05:36 AM    K 5.3 11/19/2022 05:38 AM     11/21/2022 05:36 AM    CO2 28 11/21/2022 05:36 AM    BUN 78 11/21/2022 05:36 AM    CREATININE 5.5 11/21/2022 05:36 AM    GFRAA >60 08/16/2022 05:42 AM    LABGLOM 8 11/21/2022 05:36 AM    GLUCOSE 132 11/21/2022 05:36 AM    PROT 7.8 11/17/2022 03:20 PM    CALCIUM 7.7 11/21/2022 05:36 AM    BILITOT <0.1 11/17/2022 03:20 PM    ALKPHOS 91 11/17/2022 03:20 PM    AST 12 11/17/2022 03:20 PM    ALT 11 11/17/2022 03:20 PM       POC Tests:   Recent Labs     11/21/22  1040   POCGLU 151*       Coags:   Lab Results   Component Value Date/Time    PROTIME 10.4 04/29/2022 08:00 AM    INR 1.19 11/18/2022 08:44 AM    APTT 28.2 04/21/2022 02:14 AM       HCG (If Applicable): No results found for: PREGTESTUR, PREGSERUM, HCG, HCGQUANT     ABGs:   Lab Results   Component Value Date/Time    PHART 7.466 07/19/2018 02:51 PM    PO2ART 72.5 07/19/2018 02:51 PM    DTZ3MXF 35.5 07/19/2018 02:51 PM    OHO0QYN 25.0 07/19/2018 02:51 PM    H4PSFQKP 95.5 07/19/2018 02:51 PM        Type & Screen (If Applicable):  Lab Results   Component Value Date    LABRH POS 11/19/2022       Drug/Infectious Status (If Applicable):  No results found for: HIV, HEPCAB    COVID-19 Screening (If Applicable):   Lab Results   Component Value Date/Time    COVID19 DETECTED 08/12/2022 11:01 AM           Anesthesia Evaluation  Patient summary reviewed no history of anesthetic complications:   Airway: Mallampati: II  TM distance: >3 FB   Neck ROM: full  Mouth opening: > = 3 FB   Dental: normal exam         Pulmonary:normal exam                              ROS comment: Former smoker   Cardiovascular:    (+) dysrhythmias: atrial fibrillation, CHF:, Neuro/Psych:   (+) neuromuscular disease:,             GI/Hepatic/Renal:   (+) morbid obesity          Endo/Other:                     Abdominal:   (+) obese,           Vascular: Other Findings:           Anesthesia Plan      MAC     ASA 3           MIPS: prophylactic pharmacologic antiemetic agents not administered perioperatively for documented reasons. Anesthetic plan and risks discussed with patient.       Plan discussed with CRNA and surgical team.                    Tejas White MD   11/21/2022

## 2022-11-21 NOTE — PROGRESS NOTES
[address] RENAL TELEMETRY 6K  [address] [facility] [address]  Dept: [phone]  Loc: [phone]  Visit Date: 11/17/2022    Urology Progress Note    Reason for Consult:  Acute Kidney Injury  Requesting Physician:  Kevin Francois PA-C     History Obtained From:  Patient and Nurse     Chief Complaint: Dysuria and Hematuria       HISTORY OF PRESENT ILLNESS:    Patient is resting in bed, voiding pink tinged urine, ambulating with assistance, tolerating regular diet, denies vomiting. There are complaints of controlled pain at this time. She does, though, have suprapubic pain at this time. Burning with urination: improved per patient  Sensation of incomplete emptying: reports she does still note this feeling. Recommend bladder scan. Patient does complain of nausea but no vomiting, hematuria. States she has not had a BM. Cystoscopy, bilateral ureteral stent placement per Dr Austin Blair in [address] [place] [address] today  Previous bilateral stent placed in [place] by \"Dr. CRAWFORD\" according to the patient in 08/2022. No fever, chills,  vomiting, chest pain, SOB, or calf pain, bilaterally.      Labs 11/21/2022  Creatinine: 5.5 (previously 7.0)   Potassium: 3.7 (previously 4.5)    Labs 11/20/2022  Hemoglobin: 8.5  Hematocrit: 26.2    Past Medical History:        Diagnosis Date    Acute kidney injury superimposed on CKD ([address] 8/10/2022    Atrial fibrillation with RVR ([address] 8/9/2022    Carcinoma ([address]     Squamous Cell    Cellulitis     COVID-19 virus infection 8/12/2022    DVT (deep venous thrombosis) ([address] 2006    LLE    Kidney stone     Lymphedema     Morbid obesity ([address]     Psoas abscess, right ([address]     Pyelonephritis     Wears glasses      Past Surgical History:        Procedure Laterality Date    CARPAL TUNNEL RELEASE  1990s    CT ABSCESS DRAIN SUBCUTANEOUS  01/10/2022    CT ABSCESS DRAIN SUBCUTANEOUS 1/10/2022 [facility] CT SCAN    CT ABSCESS DRAIN SUBCUTANEOUS  04/21/2022    CT ABSCESS DRAIN SUBCUTANEOUS 2022 [facility] CT SCAN    CT ABSCESS DRAIN SUBCUTANEOUS  2022    CT ABSCESS DRAIN SUBCUTANEOUS 2022 [facility] CT SCAN    CYSTOSCOPY N/A 2022    CYSTOSCOPY URETERAL STENT INSERTION performed by Michelle Haq MD at [address] with stent    CYSTOSCOPY Right 2022    CYSTOSCOPY URETEROSCOPY LASER-WTIH HLL performed by Michelle Haq MD at [address] Right 2022    CYSTOSCOPY URETERAL STENT Stanley Chele performed by Michelle Haq MD at [address] Right 2022    Dr Michelle Mcneal with stent insertion    CYSTOSCOPY Right 2022    CYSTOSCOPY URETEROSCOPY LASER-HLL performed by Michelle Haq MD at [address] Right 2022    CYSTOSCOPY URETERAL STENT Stanley Chele performed by Michelle Haq MD at [address] Right 2022    CYSTOSCOPY URETERAL STENT INSERTION (Right )    CYSTOSCOPY Right 2022    CYSTOSCOPY URETERAL STENT INSERTION performed by Cyndi Camarillo MD at [address] Left 2022    CYSTOSCOPY URETERAL STENT INSERTION performed by Michelle Haq MD at [address] [facility].  PICC [facility] SINGLE  2022         INSERT MIDLINE CATHETER  2022         IR NEPHROSTOMY PERCUTANEOUS RIGHT  2022    IR NEPHROSTOMY PERCUTANEOUS RIGHT 2022 Cheyanne Devlin MD [facility] PROCEDURES    ANNABELLA AND BSO (CERVIX REMOVED)      2019    TUBAL LIGATION  1993    TUBAL LIGATION      WISDOM TOOTH EXTRACTION         Social History     Socioeconomic History    Marital status:      Spouse name: Not on file    Number of children: Not on file    Years of education: Not on file    Highest education level: Not on file   Occupational History    Not on file   Tobacco Use    Smoking status: Former     Packs/day: 0.50     Years: 42.00     Pack years: 21.00     Types: Cigarettes     Quit date: 2022     Years since quittin.5    Smokeless tobacco: Never Vaping Use    Vaping Use: Never used   Substance and Sexual Activity    Alcohol use: No     Alcohol/week: 0.0 standard drinks    Drug use: No    Sexual activity: Not Currently   Other Topics Concern    Not on file   Social History Narrative    Not on file     Social Determinants of Health     Financial Resource Strain: Not on file   Food Insecurity: Not on file   Transportation Needs: Not on file   Physical Activity: Not on file   Stress: Not on file   Social Connections: Not on file   Intimate Partner Violence: Not on file   Housing Stability: Not on file       AL  Family History   Adopted: Yes   Problem Relation Age of Onset    Cancer Mother         The patient reports her biologic mother did have bladder cancer       Allergies:  No Known Allergies    ROS:  Constitutional: Negative for chills, fatigue, fever, or weight loss. Cardiovascular: Negative for chest pain, palpitations, tachycardia or edema. Respiratory: Denies cough or SOB. GI:The patient denies abdominal or flank pain, anorexia, or vomiting. + nausea  : See HPI  Musculoskeletal: Patient denies low back pain or painful or reduced ROM of the back or extremities. Neurological: The patient denies any symptoms of neurological impairment or TIA's; no history of stroke. Lymphatic: Denies swollen glands in neck, axillary or inguinal areas. Psychiatric: Denies anxiety or depression. Skin: Denies rash or lesions. The remainder of the complete ROS is negative    PHYSICAL EXAM:  VITALS:  BP (!) 182/75   Pulse 94   Temp 98 °F (36.7 °C) (Oral)   Resp 16   Ht 5' 1\" (1.549 m) Comment: per previosuly charted  Wt 250 lb (113.4 kg) Comment: per previously charted  LMP  (LMP Unknown)   SpO2 92%   BMI 47.24 kg/m² . Nursing note and vitals reviewed. Constitutional:    Alert and oriented times 3, no acute distress and cooperative to examination with appropriate mood and affect. HEENT:   Head:         Normocephalic and atraumatic.    Mouth/Throat: Mucous membranes are normal.   Eyes:         EOM are normal. No scleral icterus. Nose:    The external appearance of the nose is normal  Ears: The ears appear normal to external inspection   Neck:         Supple, symmetrical, trachea midline, no adenopathy, thyroid symmetric, not enlarged and no tenderness. Pulmonary/Chest:   No use of accessory muscles. Musculoskeletal:    Normal range of motion. .    Neurological:    Alert and oriented.      DATA:  CBC:   Lab Results   Component Value Date/Time    WBC 6.0 11/20/2022 09:16 AM    RBC 3.01 11/20/2022 09:16 AM    HGB 8.5 11/20/2022 09:16 AM    HCT 26.2 11/20/2022 09:16 AM    MCV 87.0 11/20/2022 09:16 AM    MCH 28.2 11/20/2022 09:16 AM    MCHC 32.4 11/20/2022 09:16 AM    RDW 15.2 11/17/2022 03:20 PM     11/20/2022 09:16 AM    MPV 8.6 11/20/2022 09:16 AM     BMP:    Lab Results   Component Value Date/Time     11/20/2022 05:50 AM    K 4.5 11/20/2022 05:50 AM    K 5.3 11/19/2022 05:38 AM     11/20/2022 05:50 AM    CO2 22 11/20/2022 05:50 AM    BUN 88 11/20/2022 05:50 AM    CREATININE 7.0 11/20/2022 05:50 AM    CALCIUM 7.4 11/20/2022 05:50 AM    GFRAA >60 08/16/2022 05:42 AM    LABGLOM 6 11/20/2022 05:50 AM    GLUCOSE 140 11/20/2022 05:50 AM     BUN/Creatinine:    Lab Results   Component Value Date/Time    BUN 88 11/20/2022 05:50 AM    CREATININE 7.0 11/20/2022 05:50 AM     Magnesium:    Lab Results   Component Value Date/Time    MG 1.5 08/09/2022 04:10 AM     Phosphorus:    Lab Results   Component Value Date/Time    PHOS 2.7 04/27/2022 04:56 AM     PT/INR:    Lab Results   Component Value Date/Time    PROTIME 10.4 04/29/2022 08:00 AM    INR 1.19 11/18/2022 08:44 AM     U/A:    Lab Results   Component Value Date/Time    COLORU Red 11/17/2022 05:52 PM    PHUR 8.5 11/17/2022 05:52 PM    WBCUA 10 TO 20 11/17/2022 05:52 PM    RBCUA GREATER THAN 100 11/17/2022 05:52 PM    MUCUS NOT REPORTED 01/02/2022 03:30 PM    TRICHOMONAS NOT REPORTED 01/02/2022 03:30 PM    YEAST NOT REPORTED 01/02/2022 03:30 PM    BACTERIA FEW 04/21/2022 03:33 AM    SPECGRAV 1.020 11/17/2022 05:52 PM    LEUKOCYTESUR MODERATE 11/17/2022 05:52 PM    UROBILINOGEN Normal 11/17/2022 05:52 PM    BILIRUBINUR NEGATIVE 11/17/2022 05:52 PM    GLUCOSEU NEGATIVE 11/17/2022 05:52 PM    AMORPHOUS NOT REPORTED 01/02/2022 03:30 PM       Imaging:   Last CT abdomen and pelvis on 11/17/2022    IMPRESSION/Plan  BARBARA secondary to obstructive uropathy  -Creatinine 5.5, improved from 7.0  Bilateral severe hydroureteronephrosis  -to have bilateral stents placed by Dr. Liz Bello in the [address] today  -patient to be NPO  -has had bilateral stent placement in the past by \"\" in [place]   Hyperkalemia  -currently 3.7, within normal limits   -patient received bicarb drip   Metabolic acidemia  -nephrology following   Anemia  -Hemoglobin improving, 8.5 today   -managed by medicine   UTI  -Ux with gram negative rods  -On merrem       Thank you for including us in the care of Lorelei Grimm PA-C  11/21/22 6:13 AM  Urology

## 2022-11-21 NOTE — PROGRESS NOTES
[facility]   Nephrology progress note  11/21/2022, 10:37 AM      Pt Name:    Cayla Mccrary  MRN:     [id number]     YOB: 1955  Admit Date:    11/18/2022 12:07 AM    Chief Complaint: Nephrology following for BARBARA/hyperkalemia and metabolic acidosis    Subjective:  Patient was seen and examined this morning  No chest pain or shortness of breath  Still Awaiting OR    Objective:  24HR INTAKE/OUTPUT:    Intake/Output Summary (Last 24 hours) at 11/21/2022 1037  Last data filed at 11/21/2022 0522  Gross per 24 hour   Intake 280 ml   Output 1000 ml   Net -720 ml           I/O last 3 completed shifts: In: 5883.9 [P.O.:280; I.V.:5098.6; Blood:310; IV Piggyback:195.3]  Out: 1100 [Urine:1100]  No intake/output data recorded.    Admission weight: 250 lb (113.4 kg) (per previously charted)  Wt Readings from Last 3 Encounters:   11/18/22 250 lb (113.4 kg)   11/17/22 250 lb (113.4 kg)   08/16/22 262 lb 1.6 oz (118.9 kg)        Vitals :   Vitals:    11/21/22 0052 11/21/22 0558 11/21/22 0645 11/21/22 0911   BP:  (!) 182/75 (!) 140/63 (!) 145/61   Pulse:    68   Resp: 16   16   Temp:    98.1 °F (36.7 °C)   TempSrc:    Oral   SpO2:    94%   Weight:       Height:           Physical examination  General Appearance: alert and cooperative with exam, appears comfortable, no distress  Mouth/Throat: Oral mucosa moist  Neck: No JVD  Lungs: Air entry B/L, no rales, no use of accessory muscles  Heart:  S1, S2 heard  GI: soft, non-tender, no guarding  Extremities: + Lymphedema changes    Medications:  Infusion:    sodium chloride      sodium chloride      sodium bicarbonate infusion 100 mL/hr at 11/21/22 0522    dextrose       Meds:    carvedilol  12.5 mg Oral BID WC    amLODIPine  10 mg Oral Daily    sodium zirconium cyclosilicate  10 g Oral TID    meropenem  500 mg IntraVENous Q24H    sodium chloride flush  5-40 mL IntraVENous 2 times per day    meropenem  1,000 mg IntraVENous 60 Min Pre-Op     Meds prn: hydrALAZINE, sodium chloride, HYDROcodone 5 mg - acetaminophen, sodium chloride flush, sodium chloride, ondansetron **OR** ondansetron, polyethylene glycol, acetaminophen **OR** acetaminophen, HYDROmorphone, glucose, dextrose bolus **OR** dextrose bolus, glucagon (rDNA), dextrose     Lab Data :  CBC:   Recent Labs     11/19/22  0538 11/20/22  0916   WBC 7.0 6.0   HGB 7.2* 8.5*   HCT 23.9* 26.2*    294       CMP:  Recent Labs     11/19/22  0538 11/20/22  0550 11/21/22  0536    144 140   K 5.3* 4.5 3.7    106 102   CO2 13* 22* 28   BUN 94* 88* 78*   CREATININE 6.9* 7.0* 5.5*   GLUCOSE 146* 140* 132*   CALCIUM 7.2* 7.4* 7.7*       Hepatic:   No results for input(s): LABALBU, AST, ALT, ALB, BILITOT, ALKPHOS in the last 72 hours. Assessment and Plan:  1. BARBARA secondary to obstructive uropathy  CT shows bilateral hydronephrosis. Patient has severe left hydroureteronephrosis and severe right hydronephrosis  Still awaiting to go to the OR  Creatinine remains high but slightly improved  K is improving with bicarbonate drip    2. Hyperkalemia in setting of BARBARA and obstructive uropathy. Improved with bicarb drip  3. Metabolic acidosis. Stop bicarbonate drip, change to isotonic saline  4. Bilateral severe hydronephrosis  5. History of obstructive uropathy and ureteral stents  6. Chronic mild systolic dysfunction with moderate pulmonary hypertension  7. Lymphedema    D/W patient    Barby Celis MD  [facility]    This report has been created using voice recognition software.  It may contain minor errors which are inherent in voice recognition technology

## 2022-11-21 NOTE — FLOWSHEET NOTE
11/21/22 1028   Safe Environment   Safety Measures Other (comment)  (Virtual nurse round complete)   No response to audio. Camera turned on to check patient safety. Patient in bed, eyes closed. No signs of distress noted.

## 2022-11-21 NOTE — CARE COORDINATION
11/21/22, 8:58 AM EST    DISCHARGE ON GOING EVALUATION    Saul Nava day: 3  Location: -23/023-A Reason for admit: BARBARA (acute kidney injury) [facility]) [N17.9]   Procedure:   11/21 Cystoscopy, bilateral ureteral stent exchange per Dr Nimesh Ferrell pending   11/17 CT Abd/Plv: Malpositioned left ureteral stent with moderate to severe left hydroureteronephrosis and concern for superimposed urinary tract infection. Clinical correlation recommended. Severe right hydronephrosis despite presence of a satisfactorily  positioned ureteral stent. This may reflect urinary tract infection and/or stent obstruction. Colonic diverticulosis without diverticulitis. Barriers to Discharge: Creatinine 5.5, Hgb 8.5, diabetes management, telemetry, Urology, Nephrology following, pain control, IV Merlynn Mote. PCP: DONG Roy CNP  Readmission Risk Score: 22.4%  Patient Goals/Plan/Treatment Preferences: Plan is home with family. Will monitor for needs.

## 2022-11-21 NOTE — PROGRESS NOTES
Hospitalist Progress Note    Patient:  Rahat Elaine      Unit/Bed:[facility] OR (General) POOL R*    YOB: 1955    MRN: [id number]       Acct: [de-identified]     PCP: DONG Burgess CNP    Date of Admission: 11/18/2022    Assessment/Plan:    Acute renal failure: Secondary obstructive uropathy, continue with broad-spectrum antibiotics, bicarb drip, avoid nephrotoxic medications monitor renal function. Nephrology is following patient. Cr down to 5.5 today. Obstructive uropathy: Patient is currently n.p.o.,awaiting possible bilateral stent replacement today  UTI:  currently on merrem  Acute on chronic anemia:  Monitor H&H and transfuse as needed. Hyperkalemia:  received treatment yesterday, continue to monitor, currently on bicarb drip  HTN: continue with   Paroxysmal atrial fibrillation: need to confirm home dose of coreg. Xarelto on hold      Subjective (past 24 hours):   Patient seen and examined at bedside , states she felt dizzy this morning, pain is overall improved. No acute overnight events.     Medications:  Reviewed    Infusion Medications    sodium chloride      sodium chloride      sodium chloride      dextrose       Scheduled Medications    carvedilol  12.5 mg Oral BID WC    amLODIPine  10 mg Oral Daily    sodium zirconium cyclosilicate  10 g Oral TID    meropenem  500 mg IntraVENous Q24H    sodium chloride flush  5-40 mL IntraVENous 2 times per day    meropenem  1,000 mg IntraVENous 60 Min Pre-Op     PRN Meds: hydrALAZINE, sodium chloride, HYDROcodone 5 mg - acetaminophen, sodium chloride flush, sodium chloride, ondansetron **OR** ondansetron, polyethylene glycol, acetaminophen **OR** acetaminophen, HYDROmorphone, glucose, dextrose bolus **OR** dextrose bolus, glucagon (rDNA), dextrose      Intake/Output Summary (Last 24 hours) at 11/21/2022 1416  Last data filed at 11/21/2022 0522  Gross per 24 hour   Intake 280 ml   Output 600 ml   Net -320 ml         Diet:  Diet NPO Exceptions are: Sips of Water with Meds    Exam:  BP (!) 148/67   Pulse 64   Temp 98.2 °F (36.8 °C) (Oral)   Resp 16   Ht 5' 1\" (1.549 m) Comment: per previosuly charted  Wt 250 lb (113.4 kg) Comment: per previously charted  LMP  (LMP Unknown)   SpO2 93%   BMI 47.24 kg/m²     General appearance: No apparent distress, appears stated age and cooperative. Respiratory:  Normal respiratory effort. Clear to auscultation, bilaterally without Rales/Wheezes/Rhonchi. Cardiovascular: Regular rate and rhythm with normal S1/S2 without murmurs, rubs or gallops. Abdomen: Soft, non-tender, non-distended with normal bowel sounds. Extremities: no pedal edema      Labs:   Recent Labs     11/19/22  0538 11/20/22  0916   WBC 7.0 6.0   HGB 7.2* 8.5*   HCT 23.9* 26.2*    294       Recent Labs     11/19/22  0538 11/20/22  0550 11/21/22  0536    144 140   K 5.3* 4.5 3.7    106 102   CO2 13* 22* 28   BUN 94* 88* 78*   CREATININE 6.9* 7.0* 5.5*   CALCIUM 7.2* 7.4* 7.7*       No results for input(s): AST, ALT, BILIDIR, BILITOT, ALKPHOS in the last 72 hours. No results for input(s): INR in the last 72 hours. No results for input(s): Vale Tisha in the last 72 hours. No results for input(s): PROCAL in the last 72 hours. Microbiology:      Urinalysis:      Lab Results   Component Value Date/Time    NITRU NEGATIVE 11/17/2022 05:52 PM    WBCUA 10 TO 20 11/17/2022 05:52 PM    BACTERIA FEW 04/21/2022 03:33 AM    RBCUA GREATER THAN 100 11/17/2022 05:52 PM    SPECGRAV 1.020 11/17/2022 05:52 PM    GLUCOSEU NEGATIVE 11/17/2022 05:52 PM       Radiology:  No results found.     DVT prophylaxis: [] Lovenox                                 [] SCDs                                 [] SQ Heparin                                 [] Encourage ambulation           [] Already on Anticoagulation     Code Status: Full Code    Tele:   [x] yes             [] no    Active Hospital Problems    Diagnosis Date Noted

## 2022-11-21 NOTE — PROGRESS NOTES
Comprehensive Nutrition Assessment    Type and Reason for Visit:  Initial, Positive Nutrition Screen (poor oral intake, unplanned weight loss)    Nutrition Recommendations/Plan:   Diet initiation when appropriate, will monitor for ONS need. Malnutrition Assessment:  Malnutrition Status:  Insufficient data (unable to see patient, in surgery this afternoon) (11/21/22 1525)  Note 8.5% weight loss in the last 10 months per EMR records. Nutrition Assessment:     Pt. nutritionally compromised AEB reports of poor appetite/intake and unplanned weight loss prior to admit. At risk for further nutrition compromise r/t NPO status for surgery, s/p cystoscopy with bilateral ureteral stent exchange today, admit with BARBARA and hyperkalemia d/t obstructive uropathy, UTI, acute on chronic anemia, and underlying medical condition (hx: DVT, cellulitis, squamous cell Ca, kidney stone, morbid obesity, pyelonephritis, lymphedema, afib, BARBARA, covid-19, multiple cystoscopies and ureteral stent). Nutrition Related Findings:    Pt. Report/Treatments/Miscellaneous: Unable to see patient x 2 attempts, in surgery. Note 8.5% weight loss in the last 10 months per EMR records. NPO most of admit so far for anticipated surgery.   GI Status: BM 11/19/22  Pertinent Labs: 11/21/22: Sodium 140, Potassium 3.7, BUN 78, Creatinine 5.5, Glucose 132, Chemstick 132, 151  Pertinent Meds: norvasc, coreg, merrem, lokelma, 0.9NaCl at 100 ml/hr     Wound Type: None       Current Nutrition Intake & Therapies:    Average Meal Intake: NPO     Diet NPO Exceptions are: Sips of Water with Meds    Anthropometric Measures:  Height: 5' 1\" (154.9 cm) (per previosuly charted)  Ideal Body Weight (IBW): 105 lbs (48 kg)    Admission Body Weight: 250 lb (113.4 kg) (11/18/22 nonpitting LE edema)  Current Body Weight: 250 lb (113.4 kg) (11/18/22 nonpitting LE edema),      Current BMI (kg/m2): 47.3  Usual Body Weight:  (Per EMr: 1/25/21:277#10oz, 1/5/22: 273#2oz, 4/21/22: 272#1oz, 5/3/22: 259#8oz, 8/9/22: 256#10oz, 8/14/22: 262#9oz)                       BMI Categories: Obese Class 3 (BMI 40.0 or greater)    Estimated Daily Nutrient Needs:  Energy Requirements Based On: Kcal/kg  Weight Used for Energy Requirements:  (113 kg)  Energy (kcal/day): ~ 3095-2828 kcals (12-15 kcals/kg/day)  Weight Used for Protein Requirements: Ideal (48 kg)  Protein (g/day): 38-48 gram (0.8-1 gram/kg/day) pending renal status, BARBARA         Nutrition Diagnosis:   Inadequate oral intake related to inadequate protein-energy intake as evidenced by poor intake prior to admission, weight loss    Nutrition Interventions:   Food and/or Nutrient Delivery: Continue NPO (When diet initiated will monitor for ONS needs)  Nutrition Education/Counseling: No recommendation at this time  Coordination of Nutrition Care: Continue to monitor while inpatient       Goals:     Goals: Meet at least 75% of estimated needs, Initiate PO diet, by next RD assessment       Nutrition Monitoring and Evaluation:      Food/Nutrient Intake Outcomes: Diet Advancement/Tolerance, Food and Nutrient Intake, IVF Intake  Physical Signs/Symptoms Outcomes: Biochemical Data, GI Status, Fluid Status or Edema, Nutrition Focused Physical Findings, Weight    Discharge Planning:     Too soon to determine     Boo Lujan RD, LD  Contact: [phone]

## 2022-11-22 LAB
ANION GAP SERPL CALCULATED.3IONS-SCNC: 12 MEQ/L (ref 8–16)
BUN BLDV-MCNC: 58 MG/DL (ref 7–22)
CALCIUM SERPL-MCNC: 7.3 MG/DL (ref 8.5–10.5)
CHLORIDE BLD-SCNC: 105 MEQ/L (ref 98–111)
CO2: 28 MEQ/L (ref 23–33)
CREAT SERPL-MCNC: 3.5 MG/DL (ref 0.4–1.2)
GFR SERPL CREATININE-BSD FRML MDRD: 14 ML/MIN/1.73M2
GLUCOSE BLD-MCNC: 105 MG/DL (ref 70–108)
POTASSIUM SERPL-SCNC: 3.6 MEQ/L (ref 3.5–5.2)
SODIUM BLD-SCNC: 145 MEQ/L (ref 135–145)

## 2022-11-22 PROCEDURE — 36415 COLL VENOUS BLD VENIPUNCTURE: CPT

## 2022-11-22 PROCEDURE — 6370000000 HC RX 637 (ALT 250 FOR IP): Performed by: HOSPITALIST

## 2022-11-22 PROCEDURE — 2580000003 HC RX 258: Performed by: INTERNAL MEDICINE

## 2022-11-22 PROCEDURE — 97530 THERAPEUTIC ACTIVITIES: CPT

## 2022-11-22 PROCEDURE — 97110 THERAPEUTIC EXERCISES: CPT

## 2022-11-22 PROCEDURE — 80048 BASIC METABOLIC PNL TOTAL CA: CPT

## 2022-11-22 PROCEDURE — 6360000002 HC RX W HCPCS: Performed by: PHYSICIAN ASSISTANT

## 2022-11-22 PROCEDURE — 97162 PT EVAL MOD COMPLEX 30 MIN: CPT

## 2022-11-22 PROCEDURE — 99232 SBSQ HOSP IP/OBS MODERATE 35: CPT

## 2022-11-22 PROCEDURE — 99232 SBSQ HOSP IP/OBS MODERATE 35: CPT | Performed by: HOSPITALIST

## 2022-11-22 PROCEDURE — 2580000003 HC RX 258: Performed by: PHYSICIAN ASSISTANT

## 2022-11-22 PROCEDURE — 99232 SBSQ HOSP IP/OBS MODERATE 35: CPT | Performed by: INTERNAL MEDICINE

## 2022-11-22 PROCEDURE — 1200000003 HC TELEMETRY R&B

## 2022-11-22 RX ORDER — SODIUM CHLORIDE 450 MG/100ML
INJECTION, SOLUTION INTRAVENOUS CONTINUOUS
Status: DISCONTINUED | OUTPATIENT
Start: 2022-11-22 | End: 2022-11-23

## 2022-11-22 RX ADMIN — HYDROCODONE BITARTRATE AND ACETAMINOPHEN 1 TABLET: 5; 325 TABLET ORAL at 11:49

## 2022-11-22 RX ADMIN — AMLODIPINE BESYLATE 10 MG: 10 TABLET ORAL at 08:28

## 2022-11-22 RX ADMIN — MEROPENEM 500 MG: 500 INJECTION, POWDER, FOR SOLUTION INTRAVENOUS at 12:34

## 2022-11-22 RX ADMIN — SODIUM CHLORIDE: 9 INJECTION, SOLUTION INTRAVENOUS at 05:52

## 2022-11-22 RX ADMIN — SODIUM CHLORIDE: 4.5 INJECTION, SOLUTION INTRAVENOUS at 16:56

## 2022-11-22 RX ADMIN — SODIUM CHLORIDE, PRESERVATIVE FREE 10 ML: 5 INJECTION INTRAVENOUS at 08:28

## 2022-11-22 RX ADMIN — CARVEDILOL 12.5 MG: 6.25 TABLET, FILM COATED ORAL at 16:58

## 2022-11-22 RX ADMIN — CARVEDILOL 12.5 MG: 6.25 TABLET, FILM COATED ORAL at 08:28

## 2022-11-22 ASSESSMENT — PAIN DESCRIPTION - FREQUENCY: FREQUENCY: INTERMITTENT

## 2022-11-22 ASSESSMENT — PAIN DESCRIPTION - ORIENTATION
ORIENTATION: MID;LOWER
ORIENTATION: LEFT

## 2022-11-22 ASSESSMENT — PAIN DESCRIPTION - DESCRIPTORS: DESCRIPTORS: SHARP

## 2022-11-22 ASSESSMENT — PAIN DESCRIPTION - LOCATION
LOCATION: GROIN;ABDOMEN
LOCATION: BACK

## 2022-11-22 ASSESSMENT — PAIN SCALES - GENERAL
PAINLEVEL_OUTOF10: 10
PAINLEVEL_OUTOF10: 6

## 2022-11-22 ASSESSMENT — PAIN DESCRIPTION - ONSET: ONSET: ON-GOING

## 2022-11-22 NOTE — PLAN OF CARE
Problem: Discharge Planning  Goal: Discharge to home or other facility with appropriate resources  Outcome: Progressing  Flowsheets (Taken 11/22/2022 0316)  Discharge to home or other facility with appropriate resources: Identify barriers to discharge with patient and caregiver     Problem: Safety - Adult  Goal: Free from fall injury  Outcome: Progressing  Flowsheets (Taken 11/22/2022 0316)  Free From Fall Injury: Instruct family/caregiver on patient safety  Goal: Isolation precautions  Description: Isolation precautions  Outcome: Progressing     Problem: Chronic Conditions and Co-morbidities  Goal: Patient's chronic conditions and co-morbidity symptoms are monitored and maintained or improved  Outcome: Progressing  Flowsheets (Taken 11/22/2022 0316)  Care Plan - Patient's Chronic Conditions and Co-Morbidity Symptoms are Monitored and Maintained or Improved: Monitor and assess patient's chronic conditions and comorbid symptoms for stability, deterioration, or improvement     Problem: Pain  Goal: Verbalizes/displays adequate comfort level or baseline comfort level  Outcome: Progressing  Flowsheets (Taken 11/22/2022 0316)  Verbalizes/displays adequate comfort level or baseline comfort level: Encourage patient to monitor pain and request assistance     Problem: Skin/Tissue Integrity  Goal: Absence of new skin breakdown  Description: 1. Monitor for areas of redness and/or skin breakdown  2. Assess vascular access sites hourly  3. Every 4-6 hours minimum:  Change oxygen saturation probe site  4. Every 4-6 hours:  If on nasal continuous positive airway pressure, respiratory therapy assess nares and determine need for appliance change or resting period.   Outcome: Progressing  Note: Monitor skin Qshift     Problem: Nutrition Deficit:  Goal: Optimize nutritional status  Outcome: Progressing  Flowsheets (Taken 11/22/2022 0316)  Nutrient intake appropriate for improving, restoring, or maintaining nutritional needs: Assess nutritional status and recommend course of action   Monitor oral intake, labs, and treatment plans

## 2022-11-22 NOTE — PROGRESS NOTES
[address] RENAL TELEMETRY 6K  [address] [facility] [address]  Dept: [phone]  Loc: [phone]  Visit Date: 2022  Urology Progress Note    Reason for Consult:  Acute Kidney Injury  Requesting Physician:  Brandon Gilmore PA-C     History Obtained From:  Patient and Nurse     Chief Complaint: Dysuria and Hematuria        HISTORY OF PRESENT ILLNESS:    Patient is resting in bed, voiding pink tinged urine, ambulating with assistance, tolerating regular diet, denies vomiting. There are complaints of controlled pain at this time. She does, though, have suprapubic pain at this time. Burning with urination: Improved per patient  Patient does complain of nausea but no vomiting, hematuria. States she has not had a BM. Cystoscopy, bilateral ureteral stent placement by Dr. Carmella Kim on . Previous bilateral stent placed in [place] by \"Dr. Deion Edmonds" according to the patient in 2022. No fever, chills,  vomiting, chest pain, SOB, or calf pain, bilaterally.          Vitals:  BP (!) 146/63   Pulse 70   Temp 98.2 °F (36.8 °C) (Oral)   Resp 16   Ht 5' 1\" (1.549 m) Comment: per previosuly charted  Wt 250 lb (113.4 kg)   LMP  (LMP Unknown)   SpO2 92%   BMI 47.24 kg/m²   Temp  Av.1 °F (36.7 °C)  Min: 97.2 °F (36.2 °C)  Max: 98.4 °F (36.9 °C)    Intake/Output Summary (Last 24 hours) at 2022 1112  Last data filed at 2022 9508  Gross per 24 hour   Intake 4380.17 ml   Output 2650 ml   Net 1730.17 ml       Social History     Socioeconomic History    Marital status:      Spouse name: Not on file    Number of children: Not on file    Years of education: Not on file    Highest education level: Not on file   Occupational History    Not on file   Tobacco Use    Smoking status: Former     Packs/day: 0.50     Years: 42.00     Pack years: 21.00     Types: Cigarettes     Quit date: 2022     Years since quittin.6    Smokeless tobacco: Never Vaping Use    Vaping Use: Never used   Substance and Sexual Activity    Alcohol use: No     Alcohol/week: 0.0 standard drinks    Drug use: No    Sexual activity: Not Currently   Other Topics Concern    Not on file   Social History Narrative    Not on file     Social Determinants of Health     Financial Resource Strain: Not on file   Food Insecurity: Not on file   Transportation Needs: Not on file   Physical Activity: Not on file   Stress: Not on file   Social Connections: Not on file   Intimate Partner Violence: Not on file   Housing Stability: Not on file     Family History   Adopted: Yes   Problem Relation Age of Onset    Cancer Mother         The patient reports her biologic mother did have bladder cancer     No Known Allergies    Objective:    Constitutional:   Alert and oriented times x3, no acute distress, and cooperative to examination with appropriate mood and affect. Cardiovascular:       Normal rate, regular rhythm. Pulmonary/Chest:  Normal respiratory rate and rhthym. No use of accessory muscles. Lungs clear bilaterally. Abdominal:          Soft. No tenderness. Active bowel sounds. Genitalia:    Brooks catheter draining magalys colored urine. Musculoskeletal:    Normal range of motion. He exhibits no edema or tenderness of lower extremities. Extremities:    No cyanosis, clubbing, or edema present. Neurological:    Alert and oriented.      Labs:  WBC:    Lab Results   Component Value Date/Time    WBC 6.0 11/20/2022 09:16 AM     Hemoglobin/Hematocrit:    Lab Results   Component Value Date/Time    HGB 8.5 11/20/2022 09:16 AM    HCT 26.2 11/20/2022 09:16 AM     BMP:    Lab Results   Component Value Date/Time     11/22/2022 06:05 AM    K 3.6 11/22/2022 06:05 AM    K 5.3 11/19/2022 05:38 AM     11/22/2022 06:05 AM    CO2 28 11/22/2022 06:05 AM    BUN 58 11/22/2022 06:05 AM    LABALBU 3.0 11/17/2022 03:20 PM    CREATININE 3.5 11/22/2022 06:05 AM    CALCIUM 7.3 11/22/2022 06:05 AM    GFRAA >60 08/16/2022 05:42 AM    LABGLOM 14 11/22/2022 06:05 AM     Imaging:   Last CT abdomen and pelvis on 11/17/2022     IMPRESSION/Plan  BARBARA secondary to obstructive uropathy  -Creatinine 3.5, improved from 5.5  Bilateral severe hydroureteronephrosis  - Bilateral stents exchanged by Dr. Abraham Fan in the [address] [place] [address] on 11/21.  - Brooks catheter in place. Can discontinue Brooks at discharge as long as patient has been afebrile for 24 hours per Dr. Abraham Fan. - Plan to follow-up to schedule for cystoscopy and bilateral retrograde pyelogram. Patient lives in [place], can either follow-up with Dr. Prakash Loving or here in [facility] per patient discretion.  - Has had bilateral stent placement in the past by \"\" in [place] on 8/9/21. Hyperkalemia  - Currently 3.6, within normal limits.   - Patient received bicarb drip   Metabolic acidemia  - Nephrology following   Anemia  - Hemoglobin improving, 8.5 today   - Managed by medicine   UTI  -Ux with gram negative rods  -On Meropenem      Thank you for including us in the care of [address] [facility] ILDEFONSO Jay  11/22/22 11:12 AM  Urology

## 2022-11-22 NOTE — PROGRESS NOTES
Physician Progress Note      PATIENT:               Callum Castellanos  CSN #:                  [id number]  :                       1955  ADMIT DATE:       2022 12:07 AM  DISCH DATE:  RESPONDING  PROVIDER #:        Dev Byrnes MD          QUERY TEXT:    Pt admitted with UTI. Pt noted to have bilateral ureteral stents. If   possible, please document in the progress notes and discharge summary if you   are evaluating and/or treating any of the following: The medical record reflects the following:    Risk Factors: ureteral stents in place  Clinical Indicators: UA moderate leukocytes, negative nitrite, UC growing   E. Coli  Treatment: IV Meropenem, ureteral stent exchange    Thank you! Mariana Friedman, CRCR  RN Clinical   P: [phone]  Options provided:  -- UTI due to ureteral stent POA  -- UTI not due to ureteral stent  -- Other - I will add my own diagnosis  -- Disagree - Not applicable / Not valid  -- Disagree - Clinically unable to determine / Unknown  -- Refer to Clinical Documentation Reviewer    PROVIDER RESPONSE TEXT:    UTI is due to ureteral stent POA.     Query created by: Jeanie Schmidt on 2022 3:03 PM      Electronically signed by:  Dev Byrnes MD 2022 11:59 AM

## 2022-11-22 NOTE — PROGRESS NOTES
Hospitalist Progress Note    Patient:  Rahat Elaine      Unit/Bed:6K-23/023-A    YOB: 1955    MRN: [id number]       Acct: [de-identified]     PCP: DONG Burgess CNP    Date of Admission: 11/18/2022    Assessment/Plan:    Acute renal failure: Secondary to obstructive uropathy, continue with broad-spectrum antibiotics, bicarb drip, avoid nephrotoxic medications monitor renal function. Nephrology is following patient. Cr improving down to 3.5 today  Obstructive uropathy: Patient is s/p bilateral ureteral stent placement  UTI:  currently on merrem  Acute on chronic anemia:  Monitor H&H and transfuse as needed. Hyperkalemia:  resolved  HTN: continue with   Paroxysmal atrial fibrillation: need to confirm home dose of coreg. Xarelto on hold  Dispo: dc planning likely in the next 1-2 days      Subjective (past 24 hours):   Patient seen and examined at bedside , no new complaints or acute overnight events    Medications:  Reviewed    Infusion Medications    sodium chloride 100 mL/hr at 11/22/22 9127    sodium chloride      sodium chloride      dextrose       Scheduled Medications    carvedilol  12.5 mg Oral BID WC    amLODIPine  10 mg Oral Daily    sodium zirconium cyclosilicate  10 g Oral TID    meropenem  500 mg IntraVENous Q24H    sodium chloride flush  5-40 mL IntraVENous 2 times per day    meropenem  1,000 mg IntraVENous 60 Min Pre-Op     PRN Meds: hydrALAZINE, sodium chloride, HYDROcodone 5 mg - acetaminophen, sodium chloride flush, sodium chloride, ondansetron **OR** ondansetron, polyethylene glycol, acetaminophen **OR** acetaminophen, HYDROmorphone, glucose, dextrose bolus **OR** dextrose bolus, glucagon (rDNA), dextrose      Intake/Output Summary (Last 24 hours) at 11/22/2022 1312  Last data filed at 11/22/2022 1206  Gross per 24 hour   Intake 4490.17 ml   Output 3750 ml   Net 740.17 ml         Diet:  ADULT DIET;  Regular; Low Potassium (Less than 3000 mg/day)    Exam:  /63 Pulse 61   Temp 98.5 °F (36.9 °C) (Oral)   Resp 18   Ht 5' 1\" (1.549 m) Comment: per previosuly charted  Wt 250 lb (113.4 kg)   LMP  (LMP Unknown)   SpO2 92%   BMI 47.24 kg/m²     General appearance: No apparent distress, appears stated age and cooperative. Respiratory:  Normal respiratory effort. Clear to auscultation, bilaterally without Rales/Wheezes/Rhonchi. Cardiovascular: Regular rate and rhythm with normal S1/S2 without murmurs, rubs or gallops. Abdomen: Soft, non-tender, non-distended with normal bowel sounds. Extremities: no pedal edema  : lombardo catheter      Labs:   Recent Labs     11/20/22  0916   WBC 6.0   HGB 8.5*   HCT 26.2*          Recent Labs     11/20/22  0550 11/21/22  0536 11/22/22  0605    140 145   K 4.5 3.7 3.6    102 105   CO2 22* 28 28   BUN 88* 78* 58*   CREATININE 7.0* 5.5* 3.5*   CALCIUM 7.4* 7.7* 7.3*       No results for input(s): AST, ALT, BILIDIR, BILITOT, ALKPHOS in the last 72 hours. No results for input(s): INR in the last 72 hours. No results for input(s): [place] in the last 72 hours. No results for input(s): PROCAL in the last 72 hours. Microbiology:      Urinalysis:      Lab Results   Component Value Date/Time    NITRU NEGATIVE 11/17/2022 05:52 PM    WBCUA 10 TO 20 11/17/2022 05:52 PM    BACTERIA FEW 04/21/2022 03:33 AM    RBCUA GREATER THAN 100 11/17/2022 05:52 PM    SPECGRAV 1.020 11/17/2022 05:52 PM    GLUCOSEU NEGATIVE 11/17/2022 05:52 PM       Radiology:  No results found.     DVT prophylaxis: [] Lovenox                                 [] SCDs                                 [] SQ Heparin                                 [] Encourage ambulation           [] Already on Anticoagulation     Code Status: Full Code    Tele:   [x] yes             [] no    Active Hospital Problems    Diagnosis Date Noted    Hyperkalemia [E87.5] 11/18/2022     Priority: Medium    Azotemia [R79.89] 11/18/2022     Priority: Medium    BARBARA (acute

## 2022-11-22 NOTE — FLOWSHEET NOTE
11/22/22 1054   Safe Environment   Safety Measures Other (comment)  (Virtual nurse round complete)   No response to audio. Camera turned on to check patient safety. Patient in bed with eyes closed. Respirations noted to be even and unlabored. Call light is within reach.

## 2022-11-22 NOTE — PROGRESS NOTES
Sarah Mayer is a 74 y.o. female that presents due to BARBARA, s/p stent exchange. she is ind prior to admission now requiring assist for basic mobility. Pt demonstrates a decrease in baseline by way of bed mobility, transfers and ambulation secondary to decreased activity tolerance, strength, fatigue, and balance deficits. Pt will benefit from skilled PT services throughout admission and beyond hospital discharge for improvements in functional mobility and in order to decrease fall risk and return pt to PLOF. Therapy Prognosis: Good    Requires PT Follow-Up: Yes    Discharge Recommendations:  Discharge Recommendations: 24 hour supervision or assist, Home with Home health PT    Patient Education:      . Patient Education  Education Given To: Patient  Education Provided: Plan of Care, Role of Therapy  Education Method: Verbal  Barriers to Learning: None  Education Outcome: Verbalized understanding       Equipment Recommendations: Other: may need w/c? will follow for needs    Plan:  Current Treatment Recommendations: Strengthening, Balance training, Functional mobility training, Gait training, Transfer training, Stair training, Neuromuscular re-education, Patient/Caregiver education & training, Safety education & training, Therapeutic activities, Equipment evaluation, education, & procurement  General Plan:  (5x GM)    Goals:  Patient Goals : to go home  Short Term Goals  Time Frame for Short Term Goals: by discharge  Short Term Goal 1: Pt to transfer supine <--> sit SBA to enable pt to get in/out of bed. Short Term Goal 2: Pt to transfer sit <--> stand SBA for increased functional mobility. Short Term Goal 3: Pt to ambulate >15 feet with RW SBA to enable room ambulation. Long Term Goals  Time Frame for Long Term Goals : NA due to short length of stay    Following session, patient left in safe position with all fall risk precautions in place.

## 2022-11-22 NOTE — PROGRESS NOTES
[facility]   Nephrology progress note  11/22/2022, 4:07 PM      Pt Name:    Arvin Neal  MRN:     [id number]     YOB: 1955  Admit Date:    11/18/2022 12:07 AM    Chief Complaint: Nephrology following for BARBARA/hyperkalemia and metabolic acidosis    Subjective:  Patient was seen and examined this morning  No chest pain or shortness of breath  Eating and drinking better    Objective:  24HR INTAKE/OUTPUT:    Intake/Output Summary (Last 24 hours) at 11/22/2022 1607  Last data filed at 11/22/2022 1529  Gross per 24 hour   Intake 4730.17 ml   Output 4200 ml   Net 530.17 ml        Admission weight: 250 lb (113.4 kg) (per previously charted)  Wt Readings from Last 3 Encounters:   11/22/22 250 lb (113.4 kg)   11/17/22 250 lb (113.4 kg)   08/16/22 262 lb 1.6 oz (118.9 kg)        Vitals :   Vitals:    11/22/22 0309 11/22/22 0828 11/22/22 1206 11/22/22 1536   BP: 138/66 (!) 146/63 133/63 (!) 151/65   Pulse: 61 70 61 60   Resp: 16 16 18 18   Temp: 98.2 °F (36.8 °C) 98.2 °F (36.8 °C) 98.5 °F (36.9 °C) 98.5 °F (36.9 °C)   TempSrc: Oral Oral Oral Oral   SpO2: 93% 92% 92% 95%   Weight: 250 lb (113.4 kg)      Height:           Physical examination  General Appearance: Awake and alert no distress  Mouth/Throat: Oral mucosa moist  Neck: No JVD  Lungs: Reasonably clear  Heart:  S1, S2 heard  GI: soft, non-tender,   Extremities: + Lymphedema     Medications:  Infusion:    sodium chloride 100 mL/hr at 11/22/22 8722    sodium chloride      sodium chloride      dextrose       Meds:    carvedilol  12.5 mg Oral BID WC    amLODIPine  10 mg Oral Daily    sodium zirconium cyclosilicate  10 g Oral TID    meropenem  500 mg IntraVENous Q24H    sodium chloride flush  5-40 mL IntraVENous 2 times per day    meropenem  1,000 mg IntraVENous 60 Min Pre-Op     Meds prn: hydrALAZINE, sodium chloride, HYDROcodone 5 mg - acetaminophen, sodium chloride flush, sodium chloride, ondansetron **OR** ondansetron, polyethylene glycol, acetaminophen **OR** acetaminophen, HYDROmorphone, glucose, dextrose bolus **OR** dextrose bolus, glucagon (rDNA), dextrose     Lab Data :  CBC:   Recent Labs     11/20/22  0916   WBC 6.0   HGB 8.5*   HCT 26.2*          CMP:  Recent Labs     11/20/22  0550 11/21/22  0536 11/22/22  0605    140 145   K 4.5 3.7 3.6    102 105   CO2 22* 28 28   BUN 88* 78* 58*   CREATININE 7.0* 5.5* 3.5*   GLUCOSE 140* 132* 105   CALCIUM 7.4* 7.7* 7.3*       Hepatic:   No results for input(s): LABALBU, AST, ALT, ALB, BILITOT, ALKPHOS in the last 72 hours. Assessment and Plan:  1. BARBARA secondary to obstructive uropathy  CT shows bilateral hydronephrosis. Patient has severe left hydroureteronephrosis and severe right hydronephrosis  Apparently the stents have been there for at least last 3 months which have been exchanged yesterday on 11/21/2022  Creatinine continues to get better we will have to see where it stabilizes    2. Hyperkalemia in setting of BARBARA and obstructive uropathy. Improved with bicarb drip sodium is trending slightly higher switch fluids to half-normal saline  3. Metabolic acidosis. Much improved after bicarbonate drip  4. Bilateral severe hydronephrosis  5. History of obstructive uropathy and ureteral stents  6. Chronic mild systolic dysfunction with moderate pulmonary hypertension  7. Chronic lymphedema        Gavin Everett MD  [facility]    This report has been created using voice recognition software.  It may contain minor errors which are inherent in voice recognition technology

## 2022-11-22 NOTE — CARE COORDINATION
11/22/22, 8:52 AM EST    DISCHARGE ON GOING EVALUATION    Valdemar Stern day: 4  Location: [facility]23/023-A Reason for admit: BARBARA (acute kidney injury) [facility]) [N17.9]   Procedure: 11/21 cysto with bilat stent exchange  Barriers to Discharge: creat decreased to 3.5, Ca+ 7.3. IVF, IV merrem, pain control. PT/OT. PCP: DONG Barrios CNP  Readmission Risk Score: 21.5%  Patient Goals/Plan/Treatment Preferences: Home with family. Has walker. May need transportation assistance at discharge.

## 2022-11-22 NOTE — PROGRESS NOTES
[address] 58  OCCUPATIONAL THERAPY MISSED TREATMENT NOTE  [facility] RENAL TELEMETRY 6K  6K-23/023-A      Date: 2022  Patient Name: Merlene Cohn        CSN: [id number]   : 1955  (74 y.o.)  Gender: female                REASON FOR MISSED TREATMENT: Patient Refused. Pt requesting to rest despite education. OT will check back as time allows.

## 2022-11-22 NOTE — ANESTHESIA POSTPROCEDURE EVALUATION
Department of Anesthesiology  Postprocedure Note    Patient: Kelly Helms  MRN: [id number]  YOB: 1955  Date of evaluation: 11/21/2022      Procedure Summary     Date: 11/21/22 Room / Location: [facility] / Fabrice Reyes    Anesthesia Start: 3028 Anesthesia Stop: 1442    Procedure: CYSTOSCOPY BILATERAL  URETERAL STENT EXCHANGE (Bilateral: Ureter) Diagnosis:       Kidney stone      (Kidney stone [N20.0])    Surgeons: Alex Downey MD Responsible Provider: Narcisa Conner MD    Anesthesia Type: General ASA Status: 3          Anesthesia Type: General    Fly Phase I: Fly Score: 9    Fly Phase II:        Anesthesia Post Evaluation    Patient location during evaluation: PACU  Patient participation: complete - patient participated  Level of consciousness: awake and alert  Airway patency: patent  Nausea & Vomiting: no nausea and no vomiting  Complications: no  Cardiovascular status: hemodynamically stable  Respiratory status: acceptable  Hydration status: euvolemic      [address]. [address]  POST-ANESTHESIA NOTE       Name:  Kelly Helms                                         Age:  74 y.o.   MRN:  [id number]      Last Vitals:  BP (!) 150/79   Pulse 62   Temp 97.2 °F (36.2 °C) (Temporal)   Resp 18   Ht 5' 1\" (1.549 m) Comment: per previosuly charted  Wt 250 lb (113.4 kg) Comment: per previously charted  LMP  (LMP Unknown)   SpO2 92%   BMI 47.24 kg/m²   Patient Vitals for the past 4 hrs:   BP Temp src Pulse Resp SpO2   11/21/22 1640 (!) 150/79 Oral 62 18 92 %       Level of Consciousness:  Awake    Respiratory:  Stable    Oxygen Saturation:  Stable    Cardiovascular:  Stable    Hydration:  Adequate    PONV:  Stable    Post-op Pain:  Adequate analgesia    Post-op Assessment:  No apparent anesthetic complications    Additional Follow-Up / Treatment / Comment:  None    Ale Hutchison MD  November 21, 2022   8:12 PM

## 2022-11-23 ENCOUNTER — TELEPHONE (OUTPATIENT)
Dept: UROLOGY | Age: 67
End: 2022-11-23

## 2022-11-23 VITALS
SYSTOLIC BLOOD PRESSURE: 146 MMHG | OXYGEN SATURATION: 96 % | BODY MASS INDEX: 47.2 KG/M2 | TEMPERATURE: 97.9 F | DIASTOLIC BLOOD PRESSURE: 66 MMHG | HEIGHT: 61 IN | HEART RATE: 68 BPM | RESPIRATION RATE: 16 BRPM | WEIGHT: 250 LBS

## 2022-11-23 LAB
ALBUMIN SERPL-MCNC: 2.3 G/DL (ref 3.5–5.1)
ALP BLD-CCNC: 65 U/L (ref 38–126)
ALT SERPL-CCNC: 6 U/L (ref 11–66)
ANION GAP SERPL CALCULATED.3IONS-SCNC: 8 MEQ/L (ref 8–16)
AST SERPL-CCNC: 9 U/L (ref 5–40)
BILIRUB SERPL-MCNC: < 0.2 MG/DL (ref 0.3–1.2)
BUN BLDV-MCNC: 42 MG/DL (ref 7–22)
CALCIUM SERPL-MCNC: 7.7 MG/DL (ref 8.5–10.5)
CHLORIDE BLD-SCNC: 106 MEQ/L (ref 98–111)
CO2: 29 MEQ/L (ref 23–33)
CREAT SERPL-MCNC: 2.5 MG/DL (ref 0.4–1.2)
GFR SERPL CREATININE-BSD FRML MDRD: 20 ML/MIN/1.73M2
GLUCOSE BLD-MCNC: 106 MG/DL (ref 70–108)
POTASSIUM SERPL-SCNC: 3.2 MEQ/L (ref 3.5–5.2)
SODIUM BLD-SCNC: 143 MEQ/L (ref 135–145)
TOTAL PROTEIN: 6 G/DL (ref 6.1–8)

## 2022-11-23 PROCEDURE — 2580000003 HC RX 258: Performed by: INTERNAL MEDICINE

## 2022-11-23 PROCEDURE — 36415 COLL VENOUS BLD VENIPUNCTURE: CPT

## 2022-11-23 PROCEDURE — 6360000002 HC RX W HCPCS: Performed by: PHYSICIAN ASSISTANT

## 2022-11-23 PROCEDURE — 2580000003 HC RX 258: Performed by: HOSPITALIST

## 2022-11-23 PROCEDURE — 97530 THERAPEUTIC ACTIVITIES: CPT

## 2022-11-23 PROCEDURE — 6370000000 HC RX 637 (ALT 250 FOR IP): Performed by: INTERNAL MEDICINE

## 2022-11-23 PROCEDURE — 99239 HOSP IP/OBS DSCHRG MGMT >30: CPT | Performed by: HOSPITALIST

## 2022-11-23 PROCEDURE — 6370000000 HC RX 637 (ALT 250 FOR IP): Performed by: HOSPITALIST

## 2022-11-23 PROCEDURE — 6360000002 HC RX W HCPCS: Performed by: HOSPITALIST

## 2022-11-23 PROCEDURE — 51798 US URINE CAPACITY MEASURE: CPT

## 2022-11-23 PROCEDURE — 99232 SBSQ HOSP IP/OBS MODERATE 35: CPT | Performed by: INTERNAL MEDICINE

## 2022-11-23 PROCEDURE — 97116 GAIT TRAINING THERAPY: CPT

## 2022-11-23 PROCEDURE — 80053 COMPREHEN METABOLIC PANEL: CPT

## 2022-11-23 RX ORDER — POTASSIUM CHLORIDE 20 MEQ/1
40 TABLET, EXTENDED RELEASE ORAL ONCE
Status: COMPLETED | OUTPATIENT
Start: 2022-11-23 | End: 2022-11-23

## 2022-11-23 RX ORDER — DEXTROSE MONOHYDRATE 100 MG/ML
INJECTION, SOLUTION INTRAVENOUS CONTINUOUS PRN
Status: DISCONTINUED | OUTPATIENT
Start: 2022-11-23 | End: 2022-11-23 | Stop reason: HOSPADM

## 2022-11-23 RX ADMIN — POTASSIUM CHLORIDE 40 MEQ: 1500 TABLET, EXTENDED RELEASE ORAL at 08:48

## 2022-11-23 RX ADMIN — HYDROCODONE BITARTRATE AND ACETAMINOPHEN 1 TABLET: 5; 325 TABLET ORAL at 08:54

## 2022-11-23 RX ADMIN — ERTAPENEM SODIUM 1000 MG: 1 INJECTION, POWDER, LYOPHILIZED, FOR SOLUTION INTRAMUSCULAR; INTRAVENOUS at 14:29

## 2022-11-23 RX ADMIN — AMLODIPINE BESYLATE 10 MG: 10 TABLET ORAL at 08:46

## 2022-11-23 RX ADMIN — SODIUM CHLORIDE: 4.5 INJECTION, SOLUTION INTRAVENOUS at 06:27

## 2022-11-23 RX ADMIN — CARVEDILOL 12.5 MG: 6.25 TABLET, FILM COATED ORAL at 08:46

## 2022-11-23 RX ADMIN — HYDROCODONE BITARTRATE AND ACETAMINOPHEN 1 TABLET: 5; 325 TABLET ORAL at 02:35

## 2022-11-23 ASSESSMENT — PAIN DESCRIPTION - LOCATION
LOCATION: ABDOMEN
LOCATION: OTHER (COMMENT);ABDOMEN
LOCATION: ABDOMEN

## 2022-11-23 ASSESSMENT — PAIN DESCRIPTION - ORIENTATION
ORIENTATION: LOWER
ORIENTATION: MID
ORIENTATION: LOWER

## 2022-11-23 ASSESSMENT — PAIN SCALES - GENERAL
PAINLEVEL_OUTOF10: 8
PAINLEVEL_OUTOF10: 8
PAINLEVEL_OUTOF10: 10
PAINLEVEL_OUTOF10: 10

## 2022-11-23 ASSESSMENT — PAIN DESCRIPTION - DESCRIPTORS: DESCRIPTORS: ACHING;DISCOMFORT;CRAMPING

## 2022-11-23 NOTE — PROGRESS NOTES
[address] [address]  INPATIENT PHYSICAL THERAPY  DAILY NOTE  [facility] RENAL TELEMETRY 6K - 6K-23/023-A    Time In: 1110  Time Out: 1133  Timed Code Treatment Minutes: 23 Minutes  Minutes: 23          Date: 2022  Patient Name: Loki Batres,  Gender:  female        MRN: [id number]  : 1955  (74 y.o.)     Referring Practitioner: Kandi Harrison MD  Diagnosis: BARBARA  Additional Pertinent Hx: The patient is a 74 y.o. with past medical history of atrial fibrillation, kidney stone, lymphedema, obesity, previous obstructive uropathy requiring ureteral stents. She was transferred from [facility] emergency room where she had presented with complaints of generalized weakness associated with dysuria, and decreased urine output. She was noted to have elevated serum creatinine associated with metabolic acidosis and hyperkalemia. Pt is s/p CYSTOSCOPY BILATERAL URETERAL STENT EXCHANGE. Prior Level of Function:  Lives With: Spouse, Daughter  Type of Home: House  Home Layout: One level  Home Access: Stairs to enter with rails  Entrance Stairs - Number of Steps: 5-6  Home Equipment: Pharmacopeia, standard, Pharmacopeia, 4 wheeled        Ambulation Assistance: Independent  Transfer Assistance: Independent  Additional Comments: Pt amb with rollator; has  care at home    Restrictions/Precautions:  Restrictions/Precautions: Fall Risk     SUBJECTIVE: RN approved session, pt is supine in bed and agreeable to PT. PAIN: denies    Vitals: Vitals not assessed per clinical judgement, see nursing flowsheet    OBJECTIVE:  Bed Mobility:  Supine to Sit: Stand By Assistance, X 1, with head of bed raised, with verbal cues , with increased time for completion  Scooting:  Moderate Assistance, X 1    Transfers:  Sit to Stand: Minimal Assistance, X 1, with increased time for completion; from EOB and toilet  Stand to Sit:Minimal Assistance, X 1, with increased time for completion    Ambulation:  Stand By Assistance, X 1  Distance: 10 feet x 2  Surface: Level Tile  Device:Rolling Walker  Gait Deviations: Forward Flexed Posture, Slow Cheryl, Decreased Step Length Bilaterally, and Decreased Gait Speed  **Step-to pattern, decreased L LE stance time    Balance:  Static Sitting Balance:  Stand By Assistance, X 1  Static Standing Balance: Stand By Assistance, X 1  Dynamic Standing Balance: Stand By Assistance, X 1    Functional Outcome Measures: Not completed     ASSESSMENT:  Assessment: Patient progressing toward established goals. Activity Tolerance:  Patient tolerance of  treatment: good. Equipment Recommendations:Equipment Needed: No  Discharge Recommendations: Home with Home Health PT  Plan: Current Treatment Recommendations: Strengthening, Balance training, Functional mobility training, Gait training, Transfer training, Stair training, Neuromuscular re-education, Patient/Caregiver education & training, Safety education & training, Therapeutic activities, Equipment evaluation, education, & procurement  General Plan:  (5x GM)    Patient Education  Patient Education: Transfers, Gait    Goals:  Patient Goals : to go home  Short Term Goals  Time Frame for Short Term Goals: by discharge  Short Term Goal 1: Pt to transfer supine <--> sit SBA to enable pt to get in/out of bed. Short Term Goal 2: Pt to transfer sit <--> stand SBA for increased functional mobility. Short Term Goal 3: Pt to ambulate >15 feet with RW SBA to enable room ambulation. Long Term Goals  Time Frame for Long Term Goals : NA due to short length of stay    Following session, patient left in safe position with all fall risk precautions in place.

## 2022-11-23 NOTE — PROGRESS NOTES
[facility]   Nephrology progress note  11/23/2022, 3:38 PM      Pt Name:    Vladimir Landon  MRN:     [id number]     YOB: 1955  Admit Date:    11/18/2022 12:07 AM    Chief Complaint: Nephrology following for BARBARA/hyperkalemia and metabolic acidosis    Subjective:  Patient was seen and examined this morning  No chest pain or shortness of breath  Eating and drinking better.  Gregorio Ceballos to go home    Objective:  24HR INTAKE/OUTPUT:    Intake/Output Summary (Last 24 hours) at 11/23/2022 1538  Last data filed at 11/23/2022 0240  Gross per 24 hour   Intake 120 ml   Output 700 ml   Net -580 ml        Admission weight: 250 lb (113.4 kg) (per previously charted)  Wt Readings from Last 3 Encounters:   11/23/22 250 lb (113.4 kg)   11/17/22 250 lb (113.4 kg)   08/16/22 262 lb 1.6 oz (118.9 kg)        Vitals :   Vitals:    11/23/22 0235 11/23/22 0402 11/23/22 0830 11/23/22 1143   BP:  (!) 159/75 134/72 (!) 146/66   Pulse:  69 68 68   Resp:  18 16 16   Temp:  98.3 °F (36.8 °C) 97.8 °F (36.6 °C) 97.9 °F (36.6 °C)   TempSrc:  Oral Oral Oral   SpO2:  92% 95% 96%   Weight: 250 lb (113.4 kg)      Height:           Physical examination  General Appearance: Awake and alert no distress  Mouth/Throat: Oral mucosa moist  Neck: No JVD  Lungs: Reasonably clear  Heart:  S1, S2 heard  GI: soft, non-tender,   Extremities: + Lymphedema     Medications:  Infusion:    dextrose      sodium chloride      sodium chloride       Meds:    carvedilol  12.5 mg Oral BID WC    amLODIPine  10 mg Oral Daily    sodium chloride flush  5-40 mL IntraVENous 2 times per day     Meds prn: dextrose bolus **OR** dextrose bolus, dextrose, hydrALAZINE, sodium chloride, HYDROcodone 5 mg - acetaminophen, sodium chloride flush, sodium chloride, ondansetron **OR** ondansetron, polyethylene glycol, acetaminophen **OR** acetaminophen, HYDROmorphone, glucose, glucagon (rDNA)     Lab Data :  CBC:   No results for input(s): WBC, HGB, HCT, PLT in the last 72 hours. CMP:  Recent Labs     11/21/22  0536 11/22/22  0605 11/23/22  0520    145 143   K 3.7 3.6 3.2*    105 106   CO2 28 28 29   BUN 78* 58* 42*   CREATININE 5.5* 3.5* 2.5*   GLUCOSE 132* 105 106   CALCIUM 7.7* 7.3* 7.7*       Hepatic:   Recent Labs     11/23/22  0520   LABALBU 2.3*   AST 9   ALT 6*   BILITOT <0.2*   ALKPHOS 65           Assessment and Plan:  1. BARBARA secondary to obstructive uropathy  CT shows bilateral hydronephrosis. Patient has severe left hydroureteronephrosis and severe right hydronephrosis  Apparently the stents have been there for at least last 3 months which have been exchanged on 11/21/2022  Creatinine continues to get better we will have to see where it stabilizes    2. Hyperkalemia in setting of BARBARA and obstructive uropathy. Now running hypokalemic . We will replace and monitor  3. Metabolic acidosis. Much improved after bicarbonate drip  4. Bilateral severe hydronephrosis  5. History of obstructive uropathy and ureteral stents  6. Chronic mild systolic dysfunction with moderate pulmonary hypertension  7. Chronic lymphedema    Rumaldo Schilder, MD  [facility]    This report has been created using voice recognition software.  It may contain minor errors which are inherent in voice recognition technology

## 2022-11-23 NOTE — PROGRESS NOTES
Brokos catheter placed at this time with STNA at bedside with sterile technique. Will change to leg bag upon discharge.

## 2022-11-23 NOTE — PROCEDURES
A Bladder scan was performed at 1225 . The patient's last void was at 1125 . The residual amount was measured to be 384 ML. Report of results was given to [facility].

## 2022-11-23 NOTE — OP NOTE
[facility]  1955  [id number]    DATE: 11/21/22  SURGEON:  Dr. Mesha Echols MD , MD  ASSISTANT: Dr. Mesha Echols MD MD  PREOPERATIVE DIAGNOSIS: BL ureteral obstruction   POSTOPERATIVE DIAGNOSIS: Same  PROCEDURES PERFORMED:  1. Cystoscopy. 2. Bilateral sided stent exchange. DRAINS: Bilateral 6x24 cm cm double-J ureteral stent  SPECIMEN: none  ANESTHESIA: MAC  ESTIMATED BLOOD LOSS: None. COMPLICATIONS: None. INDICATIONS FOR PROCEDURE:  Cornelio Weiner is a 74 y.o. female presents for BL ureteral stent exchange. Placed by Dr. Yara Martinez (8/2022), then lost to follow up. After the risks, benefits, alternatives, of the procedure were discussed with the patient, informed consent was obtained. The patient elected to proceed. DETAILS OF THE PROCEDURE:  The patient was brought back to the preoperative holding area to the operating suite, and was transferred to the operating table where the patient lay in supine position. General endotracheal anesthesia was induced, and patient was prepped and draped in standard surgical fashion after being placed in dorsolithotomy position. A proper timeout was performed, preoperative antibiotics were given. A rigid cystoscope with a 22 Turkish sheath with 30 degree lens was inserted in the patient's urethral and into the bladder with ease. We then focused our attention on the Left ureteral orifice, we grasped the stent and pulled to the meatus, which we cannulated with our glidewire. Over our glidewire we placed a 6x24 cm double-J ureteral stent and we noted appropriate placement in the upper collecting system using fluoroscopy. We then removed our wire. There was good proximal and distal curl. We then focused our attention on the Right ureteral orifice, we grasped the stent and pulled to the meatus, which we cannulated with our glidewire.  Over our glidewire we placed a 6x24 cm double-J ureteral stent and we noted appropriate placement in the upper collecting system using fluoroscopy. We then drained the patient's bladder and removed the scope and the procedure was subsequently terminated. Plan:   The patient will be discharged home in good condition. She can follow up with her personal urologist, Dr. Juan Alicia for further management of ureteral obstruction and stent management. The patient has a ureteral stent in place, which will need to be removed or replaced within 3 months.

## 2022-11-23 NOTE — PROGRESS NOTES
Patient has Dipesh Vee ordered, held this medication and messaged Dr. Kendra Burris regarding K+ 3.2, orders for 40 Kcl.

## 2022-11-23 NOTE — CARE COORDINATION
11/23/22, 1:44 PM EST    Patient goals/plan/ treatment preferences discussed by  and . Patient goals/plan/ treatment preferences reviewed with patient/ family. Patient/ family verbalize understanding of discharge plan and are in agreement with goal/plan/treatment preferences. Understanding was demonstrated using the teach back method. AVS provided by RN at time of discharge, which includes all necessary medical information pertaining to the patients current course of illness, treatment, post-discharge goals of care, and treatment preferences. Services At/After Discharge: None  Pt to be discharged to home with family. Denies needs or services.         IMM Letter  IMM Letter given to Patient/Family/Significant other/Guardian/POA/by[de-identified] CM  IMM Letter date given[de-identified] 11/23/22  IMM Letter time given[de-identified] 7132 5th grade at [employer]. Jacinta Brown NP

## 2022-11-23 NOTE — PROGRESS NOTES
DC AVS gone over with pt, who voiced no questions/concerns about medications starting, medications stopping, Uretal stent replacement, or follow up appts.  TO clarify Carvedilol and amlodipine pickup with [facility]

## 2022-11-23 NOTE — TELEPHONE ENCOUNTER
Please ensure that the patient has a follow up appointment with Dr. Liliya Mcgrath- a urologist in [facility]. She has bilateral stents placed and will need follow up care. She has been instructed to call, but I do not want her to be lost in follow up.    Please send notes as well

## 2022-11-23 NOTE — PLAN OF CARE
Problem: Discharge Planning  Goal: Discharge to home or other facility with appropriate resources  Outcome: Progressing  Flowsheets (Taken 11/23/2022 0215)  Discharge to home or other facility with appropriate resources:   Identify barriers to discharge with patient and caregiver   Identify discharge learning needs (meds, wound care, etc)   Arrange for interpreters to assist at discharge as needed     Problem: Safety - Adult  Goal: Free from fall injury  Outcome: Progressing  Flowsheets (Taken 11/23/2022 0215)  Free From Fall Injury:   Instruct family/caregiver on patient safety   Based on caregiver fall risk screen, instruct family/caregiver to ask for assistance with transferring infant if caregiver noted to have fall risk factors  Goal: Isolation precautions  Description: Isolation precautions  Outcome: Progressing     Problem: Chronic Conditions and Co-morbidities  Goal: Patient's chronic conditions and co-morbidity symptoms are monitored and maintained or improved  Outcome: Progressing  Flowsheets (Taken 11/23/2022 0215)  Care Plan - Patient's Chronic Conditions and Co-Morbidity Symptoms are Monitored and Maintained or Improved:   Monitor and assess patient's chronic conditions and comorbid symptoms for stability, deterioration, or improvement   Collaborate with multidisciplinary team to address chronic and comorbid conditions and prevent exacerbation or deterioration   Update acute care plan with appropriate goals if chronic or comorbid symptoms are exacerbated and prevent overall improvement and discharge     Problem: Pain  Goal: Verbalizes/displays adequate comfort level or baseline comfort level  Outcome: Progressing  Flowsheets (Taken 11/23/2022 0215)  Verbalizes/displays adequate comfort level or baseline comfort level:   Encourage patient to monitor pain and request assistance   Assess pain using appropriate pain scale   Administer analgesics based on type and severity of pain and evaluate response Implement non-pharmacological measures as appropriate and evaluate response   Notify Licensed Independent Practitioner if interventions unsuccessful or patient reports new pain     Problem: Skin/Tissue Integrity  Goal: Absence of new skin breakdown  Description: 1. Monitor for areas of redness and/or skin breakdown  2. Assess vascular access sites hourly  3. Every 4-6 hours minimum:  Change oxygen saturation probe site  4. Every 4-6 hours:  If on nasal continuous positive airway pressure, respiratory therapy assess nares and determine need for appliance change or resting period. Outcome: Progressing     Problem: Nutrition Deficit:  Goal: Optimize nutritional status  Outcome: Progressing  Flowsheets (Taken 11/23/2022 0215)  Nutrient intake appropriate for improving, restoring, or maintaining nutritional needs:   Assess nutritional status and recommend course of action   Monitor oral intake, labs, and treatment plans   Recommend appropriate diets, oral nutritional supplements, and vitamin/mineral supplements   Order, calculate, and assess calorie counts as needed   Recommend, monitor, and adjust tube feedings and TPN/PPN based on assessed needs   Provide specific nutrition education to patient or family as appropriate  Care plan reviewed with patient. Patient verbalize understanding of the plan of care and contribute to goal setting.

## 2022-11-23 NOTE — PROGRESS NOTES
[address] TE[address]MET[address]  [address] [facility] [address]  Dept: [phone]  Loc: [phone]  Visit Date: 11/17/2022    Urology Progress Note    Reason for Consult:  Acute Kidney Injury  Requesting Physician:  Claudia Da Silva PA-C     History Obtained From:  Patient and Nurse     Chief Complaint: Dysuria and Hematuria    HISTORY OF PRESENT ILLNESS:      Patient is resting in bed, voiding pink tinged urine, ambulating with assistance, tolerating regular diet, denies vomiting. There are complaints of controlled pain at this time. She does, though, have suprapubic pain at this time. Burning with urination: improved per patient  Sensation of incomplete emptying: reports she does still note this feeling. Recommend bladder scan. Patient does complain of nausea but no vomiting, hematuria. States she has not had a BM. Cystoscopy, bilateral ureteral stent placement per Dr Puneet Gupta on 11/21/2022 by Dr. Puneet Gupta. Previous bilateral stent placed in [place] by \"Dr. Shyam Napier" according to the patient in 08/2022. Per internal medicine, planning for discharge in the next 1-2 days. No fever, chills,  vomiting, chest pain, SOB, or calf pain, bilaterally. Vitals 11/23/2022:   afebrile     Labs 11/23/2022:  Creatinine: 2.5    Patient lombardo catheter removed. Voiding pink tinged urine. C/o some urinary frequency. This is not uncommon with stents.      Past Medical History:        Diagnosis Date    Acute kidney injury superimposed on CKD ([address] 8/10/2022    Atrial fibrillation with RVR ([address] 8/9/2022    Carcinoma ([address]     Squamous Cell    Cellulitis     COVID-19 virus infection 8/12/2022    DVT (deep venous thrombosis) ([address] 2006    LLE    Kidney stone     Lymphedema     Morbid obesity ([address]     Psoas abscess, right ([address]     Pyelonephritis     Wears glasses      Past Surgical History:        Procedure Laterality Date    CARPAL TUNNEL RELEASE  1990s    CT ABSCESS DRAIN SUBCUTANEOUS  01/10/2022    CT ABSCESS DRAIN SUBCUTANEOUS 1/10/2022 [facility] CT SCAN    CT ABSCESS DRAIN SUBCUTANEOUS  04/21/2022    CT ABSCESS DRAIN SUBCUTANEOUS 4/21/2022 [facility] CT SCAN    CT ABSCESS DRAIN SUBCUTANEOUS  4/29/2022    CT ABSCESS DRAIN SUBCUTANEOUS 4/29/2022 [facility] CT SCAN    CYSTOSCOPY N/A 01/04/2022    CYSTOSCOPY URETERAL STENT INSERTION performed by John Dickson MD at [address] Right     HLL with stent    CYSTOSCOPY Right 03/01/2022    CYSTOSCOPY URETEROSCOPY LASER-WTIH HLL performed by John Dickson MD at [address] Right 03/01/2022    CYSTOSCOPY URETERAL STENT INSERTION-EXCHANGE performed by John Dickson MD at [address] Right 04/05/2022    Dr Paris Sendbernadette with stent insertion    CYSTOSCOPY Right 04/05/2022    CYSTOSCOPY URETEROSCOPY LASER-HLL performed by oJhn Dickson MD at [address] Right 04/05/2022    CYSTOSCOPY URETERAL STENT Kate Crew performed by John Dickson MD at [address] Right 04/22/2022    CYSTOSCOPY URETERAL STENT INSERTION (Right )    CYSTOSCOPY Right 4/22/2022    CYSTOSCOPY URETERAL STENT INSERTION performed by Tawnya Caballero MD at [address] Left 8/9/2022    CYSTOSCOPY URETERAL STENT INSERTION performed by John Dickson MD at [address] Bilateral 11/21/2022    CYSTOSCOPY BILATERAL  URETERAL STENT EXCHANGE performed by Jessica Peters MD at  [address]  PICC [address] SINGLE  4/26/2022         INSERT MIDLINE CATHETER  01/07/2022         IR NEPHROSTOMY PERCUTANEOUS RIGHT  01/05/2022    IR NEPHROSTOMY PERCUTANEOUS RIGHT 1/5/2022 Wenda Habermann, MD [facility] SPECIAL PROCEDURES    ANNABELLA AND BSO (CERVIX REMOVED)      05/2019    TUBAL LIGATION  1993    TUBAL LIGATION      WISDOM TOOTH EXTRACTION         Social History     Socioeconomic History    Marital status:      Spouse name: Not on file    Number of children: Not on file    Years of education: Not on file Highest education level: Not on file   Occupational History    Not on file   Tobacco Use    Smoking status: Former     Packs/day: 0.50     Years: 42.00     Pack years: 21.00     Types: Cigarettes     Quit date: 2022     Years since quittin.6    Smokeless tobacco: Never   Vaping Use    Vaping Use: Never used   Substance and Sexual Activity    Alcohol use: No     Alcohol/week: 0.0 standard drinks    Drug use: No    Sexual activity: Not Currently   Other Topics Concern    Not on file   Social History Narrative    Not on file     Social Determinants of Health     Financial Resource Strain: Not on file   Food Insecurity: Not on file   Transportation Needs: Not on file   Physical Activity: Not on file   Stress: Not on file   Social Connections: Not on file   Intimate Partner Violence: Not on file   Housing Stability: Not on file       AL  Family History   Adopted: Yes   Problem Relation Age of Onset    Cancer Mother         The patient reports her biologic mother did have bladder cancer       Allergies:  No Known Allergies    ROS:  Constitutional: Negative for chills, fatigue, fever, or weight loss. Cardiovascular: Negative for chest pain, palpitations, tachycardia or edema. Respiratory: Denies cough or SOB. GI:The patient denies abdominal or flank pain, anorexia, nausea or vomiting. : See HPI  Neurological: The patient denies any symptoms of neurological impairment   Psychiatric: Denies anxiety or depression. Skin: Denies rash or lesions. The remainder of the complete ROS is negative    PHYSICAL EXAM:  VITALS:  BP (!) 171/77   Pulse 60   Temp 98.4 °F (36.9 °C) (Oral)   Resp 18   Ht 5' 1\" (1.549 m) Comment: per previosuly charted  Wt 250 lb (113.4 kg)   LMP  (LMP Unknown)   SpO2 94%   BMI 47.24 kg/m² . Nursing note and vitals reviewed. Constitutional:    Alert and oriented times 3, no acute distress and cooperative to examination with appropriate mood and affect.    HEENT:   Head: Normocephalic and atraumatic. Mouth/Throat:          Mucous membranes are normal.   Eyes:         EOM are normal. No scleral icterus. Nose:    The external appearance of the nose is normal  Ears: The ears appear normal to external inspection   Neck:         Supple, symmetrical, trachea midline, no adenopathy, thyroid symmetric, not enlarged and no tenderness. Pulmonary/Chest:   No use of accessory muscles. Psychiatric: Normal Insight     Neurological:    Alert and oriented.   DATA:  CBC:   Lab Results   Component Value Date/Time    WBC 6.0 11/20/2022 09:16 AM    RBC 3.01 11/20/2022 09:16 AM    HGB 8.5 11/20/2022 09:16 AM    HCT 26.2 11/20/2022 09:16 AM    MCV 87.0 11/20/2022 09:16 AM    MCH 28.2 11/20/2022 09:16 AM    MCHC 32.4 11/20/2022 09:16 AM    RDW 15.2 11/17/2022 03:20 PM     11/20/2022 09:16 AM    MPV 8.6 11/20/2022 09:16 AM     BMP:    Lab Results   Component Value Date/Time     11/22/2022 06:05 AM    K 3.6 11/22/2022 06:05 AM    K 5.3 11/19/2022 05:38 AM     11/22/2022 06:05 AM    CO2 28 11/22/2022 06:05 AM    BUN 58 11/22/2022 06:05 AM    CREATININE 3.5 11/22/2022 06:05 AM    CALCIUM 7.3 11/22/2022 06:05 AM    GFRAA >60 08/16/2022 05:42 AM    LABGLOM 14 11/22/2022 06:05 AM    GLUCOSE 105 11/22/2022 06:05 AM     BUN/Creatinine:    Lab Results   Component Value Date/Time    BUN 58 11/22/2022 06:05 AM    CREATININE 3.5 11/22/2022 06:05 AM     Magnesium:    Lab Results   Component Value Date/Time    MG 1.5 08/09/2022 04:10 AM     Phosphorus:    Lab Results   Component Value Date/Time    PHOS 2.7 04/27/2022 04:56 AM     PT/INR:    Lab Results   Component Value Date/Time    PROTIME 10.4 04/29/2022 08:00 AM    INR 1.19 11/18/2022 08:44 AM     U/A:    Lab Results   Component Value Date/Time    COLORU Red 11/17/2022 05:52 PM    PHUR 8.5 11/17/2022 05:52 PM    WBCUA 10 TO 20 11/17/2022 05:52 PM    RBCUA GREATER THAN 100 11/17/2022 05:52 PM    MUCUS NOT REPORTED 01/02/2022 03:30 PM

## 2022-11-23 NOTE — PROGRESS NOTES
Patients bladder scan 384, notified hospitalist, orders to d/c with lombardo. Patient already to be following up with urology o/p.

## 2022-11-24 NOTE — DISCHARGE SUMMARY
Discharge Summary    Patient:  Romulo Marroquin  YOB: 1955    MRN: [id number]   Acct: [de-identified]    Primary Care Physician: DONG Sloan CNP    Admit date:  11/18/2022    Discharge date:  11/23/2022       Discharge Diagnoses:   BARBARA (acute kidney injury) ([facility] [address]  Principal Problem:    [facility] ([facility]) ([facility]  Active Problems:    Hyperkalemia    Azotemia  Resolved Problems:    * No resolved hospital problems. *        Admitted for: (HPI)  73 y/o female with PMH of paroxysmal atrial fibrillation on Xarelto, nephrolithiasis, chronic DVT's, and chronic CHF presents from [facility] ED for BARBARA and UTI and renal stents with \"malpositioned left ureteral stent\" and left hydroureteronephrosis and severe right hydronephrosis on CT scan. She reports dysuria and hematuria for 2 weeks, no back pain, fevers, nausea, vomiting, or diarrhea. She denies hematuria, dysuria, or flank pain at discharge from hospital admission 8/16/22. She is currently not taking any of her prescribed medications, including carvedilol, furosemide, Xarelto, amlodipine. Pt was hospitalized 8/8/22 to 8/16/22 for nephrolithiasis and sepsis secondary to urinary tract infection. Her urine culture grew Klebsiella pneumoniae and she was treated with IV meropenem with a plan to continue this treatment as an outpatient however the infusions were canceled due to patient reporting that she couldn't afford these and did not have transportation. When patient was informed that the hospital paid for these services, she continued to [facility] no intention in coming out for this. \" During prior admission, she was found to have EF of 35-40%, functional mitral and tricuspid regurgitation and mild pulmonary hypertension and was newly diagnosed with left ventricular systolic dysfunction. She was also newly diagnosed with atrial fibrillation during last admission.     Hospital Course:  61-year-old female with history of nephrolithiasis, chronic CHF, paroxysmal A. fib on Xarelto was admitted for acute renal failure secondary to obstructive uropathy with bilateral hydroureteronephrosis. Patient had ureteral stents placed several weeks back. She was placed on IV antibiotics and seen by nephrology and urology. Patient underwent bilateral ureteral stent placement. She was hydrated and her creatinine slowly improved. She had good urine output, Brooks catheter was discontinued however she failed a voiding trial and had a Brooks catheter placed on discharge. She was treated with Carbapenem for ESBL E. coli prior to discharge. She will follow-up with urology and PCP as outpatient. Assessment/Plan:     Acute renal failure: Secondary to obstructive uropathy, continue with broad-spectrum antibiotics, bicarb drip, avoid nephrotoxic medications monitor renal function. Nephrology is following patient. Cr improving down to 3.5 today  Obstructive uropathy: Patient is s/p bilateral ureteral stent placement  E coli UTI:  treated with carbapenem. One dose of Ertapenem before discharge  Acute on chronic anemia:  Monitor H&H and transfuse as needed. Hyperkalemia:  resolved  HTN: continue with   Paroxysmal atrial fibrillation: coreg. okay to resume Xarelto    Consultants:  Patient Care Team:  DONG Sloan CNP as PCP - General (Family Medicine)    Discharge Medications:       Medication List        START taking these medications      amLODIPine 10 MG tablet  Commonly known as: NORVASC  Take 1 tablet by mouth daily     carvedilol 12.5 MG tablet  Commonly known as: COREG  Take 1 tablet by mouth in the morning and 1 tablet in the evening. Take with meals.             CHANGE how you take these medications      rivaroxaban 15 MG Tabs tablet  Commonly known as: Xarelto  Take 1 tablet by mouth every 24 hours  What changed:   medication strength  how much to take            CONTINUE taking these medications      Osteo Bi-Flex Adv Double St Tabs therapeutic multivitamin-minerals tablet     vitamin C 250 MG tablet     vitamin D 25 MCG (1000 UT) Tabs tablet  Commonly known as: CHOLECALCIFEROL     vitamin E 400 UNIT capsule            STOP taking these medications      acetaminophen 325 MG tablet  Commonly known as: TYLENOL     furosemide 40 MG tablet  Commonly known as: LASIX     ipratropium-albuterol 0.5-2.5 (3) MG/3ML Soln nebulizer solution  Commonly known as: DUONEB     lisinopril 20 MG tablet  Commonly known as: PRINIVIL;ZESTRIL     loratadine 10 MG capsule  Commonly known as: CLARITIN     metoprolol succinate 25 MG extended release tablet  Commonly known as: TOPROL XL     nystatin [id number] UNIT/ML suspension  Commonly known as: MYCOSTATIN     oxybutynin 15 MG extended release tablet  Commonly known as: Ditropan XL     polyethylene glycol 17 g packet  Commonly known as: GLYCOLAX     potassium chloride 20 MEQ extended release tablet  Commonly known as: KLOR-CON M     sodium chloride flush 0.9 % injection     tamsulosin 0.4 MG capsule  Commonly known as: FLOMAX               Where to Get Your Medications        These medications were sent to [facility]  [id number] [address] [address]  [facility] [address]      Phone: [phone]   rivaroxaban 15 MG Tabs tablet           Physical Exam:    Vitals:  Vitals:    11/23/22 0235 11/23/22 0402 11/23/22 0830 11/23/22 1143   BP:  (!) 159/75 134/72 (!) 146/66   Pulse:  69 68 68   Resp:  18 16 16   Temp:  98.3 °F (36.8 °C) 97.8 °F (36.6 °C) 97.9 °F (36.6 °C)   TempSrc:  Oral Oral Oral   SpO2:  92% 95% 96%   Weight: 250 lb (113.4 kg)      Height:         Weight: Weight: 250 lb (113.4 kg)     24 hour intake/output:No intake or output data in the 24 hours ending 11/24/22 1233    General appearance - alert awake appears to be in no acute distress  Chest - Bilateral air entry, no wheeze  Heart - S1S2 RRR, no murmurs or gallops  Abdomen - soft, non tender, fat concerning for urinary tract infection. Peritoneum/Retroperitoneum: Severe atherosclerotic calcification of the abdominal aorta and major branch vessels. Mild nonspecific left retroperitoneal adenopathy. Bones/Soft Tissues:   Extensive degenerative changes of the spine. Small fat containing periumbilical hernia. 1.  Malpositioned left ureteral stent with moderate to severe left hydroureteronephrosis and concern for superimposed urinary tract infection. Clinical correlation recommended. 2.  Severe right hydronephrosis despite presence of a satisfactorily positioned ureteral stent. This may reflect urinary tract infection and/or stent obstruction. 3.  Colonic diverticulosis without diverticulitis. 4.  Additional nonemergent findings, as above.        Diet:  No diet orders on file    Activity:  Activity as tolerated (Patient may move about with assist as indicated or with supervision.)    Follow-up:  in the next few days with DONG Hickey CNP    Disposition: home    Condition: Stable      Time Spent: 40 minutes    Electronically signed by Teresa Kasper MD on 11/24/2022 at 12:33 PM    Discharging Hospitalist

## 2022-12-07 ENCOUNTER — OFFICE VISIT (OUTPATIENT)
Dept: UROLOGY | Age: 67
End: 2022-12-07
Payer: MEDICARE

## 2022-12-07 ENCOUNTER — HOSPITAL ENCOUNTER (OUTPATIENT)
Age: 67
Discharge: HOME | End: 2022-12-07
Payer: MEDICARE

## 2022-12-07 ENCOUNTER — TELEPHONE (OUTPATIENT)
Dept: UROLOGY | Age: 67
End: 2022-12-07

## 2022-12-07 VITALS
TEMPERATURE: 97.7 F | BODY MASS INDEX: 47.24 KG/M2 | DIASTOLIC BLOOD PRESSURE: 79 MMHG | HEIGHT: 61 IN | HEART RATE: 63 BPM | SYSTOLIC BLOOD PRESSURE: 136 MMHG

## 2022-12-07 DIAGNOSIS — I15.8 OTHER SECONDARY HYPERTENSION: ICD-10-CM

## 2022-12-07 DIAGNOSIS — N13.30 BILATERAL HYDRONEPHROSIS: ICD-10-CM

## 2022-12-07 DIAGNOSIS — N17.9 AKI (ACUTE KIDNEY INJURY) (HCC): ICD-10-CM

## 2022-12-07 DIAGNOSIS — Z00.00 EVALUATION BY MEDICAL SERVICE REQUIRED: ICD-10-CM

## 2022-12-07 DIAGNOSIS — N20.0 RENAL STONE: Primary | ICD-10-CM

## 2022-12-07 DIAGNOSIS — Z79.01 CHRONIC ANTICOAGULATION: ICD-10-CM

## 2022-12-07 LAB
ABSOLUTE EOS #: 0.14 K/UL (ref 0–0.44)
ABSOLUTE IMMATURE GRANULOCYTE: <0.03 K/UL (ref 0–0.3)
ABSOLUTE LYMPH #: 1.49 K/UL (ref 1.1–3.7)
ABSOLUTE MONO #: 0.37 K/UL (ref 0.1–1.2)
ANION GAP SERPL CALCULATED.3IONS-SCNC: 10 MMOL/L (ref 9–17)
BASOPHILS # BLD: 1 % (ref 0–2)
BASOPHILS ABSOLUTE: 0.03 K/UL (ref 0–0.2)
BUN BLDV-MCNC: 32 MG/DL (ref 8–23)
BUN/CREAT BLD: 18 (ref 9–20)
CALCIUM SERPL-MCNC: 9.2 MG/DL (ref 8.6–10.4)
CHLORIDE BLD-SCNC: 105 MMOL/L (ref 98–107)
CO2: 24 MMOL/L (ref 20–31)
CREAT SERPL-MCNC: 1.81 MG/DL (ref 0.5–0.9)
EOSINOPHILS RELATIVE PERCENT: 3 % (ref 1–4)
GFR SERPL CREATININE-BSD FRML MDRD: 30 ML/MIN/1.73M2
GLUCOSE BLD-MCNC: 92 MG/DL (ref 70–99)
HCT VFR BLD CALC: 34.1 % (ref 36.3–47.1)
HEMOGLOBIN: 10.2 G/DL (ref 11.9–15.1)
IMMATURE GRANULOCYTES: 0 %
LYMPHOCYTES # BLD: 31 % (ref 24–43)
MCH RBC QN AUTO: 28.7 PG (ref 25.2–33.5)
MCHC RBC AUTO-ENTMCNC: 29.9 G/DL (ref 28.4–34.8)
MCV RBC AUTO: 96.1 FL (ref 82.6–102.9)
MONOCYTES # BLD: 8 % (ref 3–12)
NRBC AUTOMATED: 0 PER 100 WBC
PDW BLD-RTO: 15 % (ref 11.8–14.4)
PLATELET # BLD: 324 K/UL (ref 138–453)
PMV BLD AUTO: 9 FL (ref 8.1–13.5)
POTASSIUM SERPL-SCNC: 3.9 MMOL/L (ref 3.7–5.3)
RBC # BLD: 3.55 M/UL (ref 3.95–5.11)
SEG NEUTROPHILS: 57 % (ref 36–65)
SEGMENTED NEUTROPHILS ABSOLUTE COUNT: 2.83 K/UL (ref 1.5–8.1)
SODIUM BLD-SCNC: 139 MMOL/L (ref 135–144)
WBC # BLD: 4.9 K/UL (ref 3.5–11.3)

## 2022-12-07 PROCEDURE — 36415 COLL VENOUS BLD VENIPUNCTURE: CPT

## 2022-12-07 PROCEDURE — 80048 BASIC METABOLIC PNL TOTAL CA: CPT

## 2022-12-07 PROCEDURE — 1123F ACP DISCUSS/DSCN MKR DOCD: CPT | Performed by: PHYSICIAN ASSISTANT

## 2022-12-07 PROCEDURE — 85025 COMPLETE CBC W/AUTO DIFF WBC: CPT

## 2022-12-07 PROCEDURE — 99215 OFFICE O/P EST HI 40 MIN: CPT | Performed by: PHYSICIAN ASSISTANT

## 2022-12-07 ASSESSMENT — ENCOUNTER SYMPTOMS
NAUSEA: 0
APNEA: 0
COLOR CHANGE: 0
EYE REDNESS: 0
ABDOMINAL PAIN: 0
VOMITING: 0
SHORTNESS OF BREATH: 0
BACK PAIN: 0
COUGH: 0
CONSTIPATION: 0
WHEEZING: 0

## 2022-12-07 NOTE — TELEPHONE ENCOUNTER
----- [facility] 12[facility]2022  4:00 PM EST -----  Please let her know that her kidney function has improved from 2 weeks ago.   Follow-up tomorrow morning for catheter removal and we will repeat lab work next Monday

## 2022-12-07 NOTE — PROGRESS NOTES
HPI:    Patient is a 74 y.o. female in no acute distress. She is alert and oriented to person, place, and time. History  Pt presented to the ED with right flank pain. Pt had Ct scan that showed a right sided renal abscess. This also showed right hydronephrosis and an obstructing 1.6 cm right ureteral calculus. Pt was subsequently scheduled for transfer but has had to remain in ED for bed/staff issues.  locally was consulted this AM. There are plans for right sided PCNT at tertiary center. Pt is on empiric zosyn and merepenum. PT has has fever and leukocytosis. Pt has never seen Urology but did know she has a large stone. She has tried to make  appointments but has had transportation issues. Pain is 10 of 10 in the right flank currently. 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 Eddie Cervantes    Today:  Patient is here today for hospital follow-up. Patient is known to our office but was lost to follow-up after an emergent left stent was placed in August 2022. She needed to follow-up for definitive stone treatment and to assess her need for right stent exchange versus removal.  Patient never came back to the office for follow-up. Patient had a recent hospitalization from 11/18/2022 through 11/23/2022 with UTI and acute kidney injury. Per progress notes patient was found to be noncompliant with all of her prescribed medications.   Patient had a CT abdomen pelvis on 11/17/2022 which showed a malpositioned left ureteral stent with associated moderate to severe left hydronephrosis as well as severe right hydronephrosis despite satisfactorily positioned right ureteral stent. Patient also had large left renal calculi. Patient had an ESBL E. Coli she was treated with ertapenem. She was taken to the operating room on 11/21/2022 for bilateral stent exchange. Creatinine did improve. Patient is on Xarelto. Patient does have a history urinary incontinence. Patient states that prior to her discharged home a Brooks catheter was placed. She states that the Brooks catheter was not placed for retention. She states that she does have blood in her catheter bag. She does have bilateral stents. She is having a daily bowel movement which is soft and easy to pass. She has been on Xarelto long-term for her prior history of blood clots. She denies any fever or chills. Patient states that she does not have any upcoming nephrology appointments. Patient does not wish to continue see her prior PCP. Patient would also like to consolidate her cardiology care to the [facility] system as well.         Past Medical History:   Diagnosis Date    Acute kidney injury superimposed on CKD ([address] 8/10/2022    Atrial fibrillation with RVR ([address] 8/9/2022    Carcinoma ([address]     Squamous Cell    Cellulitis     COVID-19 virus infection 8/12/2022    DVT (deep venous thrombosis) ([address] 2006    LLE    Kidney stone     Lymphedema     Morbid obesity ([address]     Psoas abscess, right ([address]     Pyelonephritis     Wears glasses      Past Surgical History:   Procedure Laterality Date    CARPAL TUNNEL RELEASE  1990s    CT ABSCESS DRAIN SUBCUTANEOUS  01/10/2022    CT ABSCESS DRAIN SUBCUTANEOUS 1/10/2022 [facility] CT SCAN    CT ABSCESS DRAIN SUBCUTANEOUS  04/21/2022    CT ABSCESS DRAIN SUBCUTANEOUS 4/21/2022 [facility] CT SCAN    CT ABSCESS DRAIN SUBCUTANEOUS  4/29/2022    CT ABSCESS DRAIN SUBCUTANEOUS 4/29/2022 [facility] CT SCAN    CYSTOSCOPY N/A 01/04/2022 CYSTOSCOPY URETERAL STENT INSERTION performed by Daniel Renteria MD at [address] with stent    CYSTOSCOPY Right 03/01/2022    CYSTOSCOPY URETEROSCOPY LASER-WTIH HLL performed by Daniel Renteria MD at [address] [place] [address] Right 03/01/2022    CYSTOSCOPY URETERAL STENT Olvin Boxer performed by Daniel Renteria MD at [address] [place] [address] Right 04/05/2022    Dr Willian Chisholm with stent insertion    CYSTOSCOPY Right 04/05/2022    CYSTOSCOPY URETEROSCOPY LASER-HLL performed by Daniel Renteria MD at [address] [place] [address] Right 04/05/2022    CYSTOSCOPY URETERAL STENT Olvin Boxer performed by Daniel Renteria MD at [address] [place] [address] Right 04/22/2022    CYSTOSCOPY URETERAL STENT INSERTION (Right )    CYSTOSCOPY Right 4/22/2022    CYSTOSCOPY URETERAL STENT INSERTION performed by Emelina Giordano MD at [address] [place] [address] Left 8/9/2022    CYSTOSCOPY URETERAL STENT INSERTION performed by Daniel Renteria MD at [address] [place] [address] Bilateral 11/21/2022    CYSTOSCOPY BILATERAL  URETERAL STENT EXCHANGE performed by Wong Hernandez MD at [facility]  4/26/2022         INSERT MIDLINE CATHETER  01/07/2022         IR NEPHROSTOMY PERCUTANEOUS RIGHT  01/05/2022    IR NEPHROSTOMY PERCUTANEOUS RIGHT 1/5/2022 Ree Agudelo MD [facility] SPECIAL PROCEDURES    ANNABELLA AND BSO (CERVIX REMOVED)      05/2019    TUBAL LIGATION  1993    TUBAL LIGATION      WISDOM TOOTH EXTRACTION       Outpatient Encounter Medications as of 12/7/2022   Medication Sig Dispense Refill    rivaroxaban (XARELTO) 15 MG TABS tablet Take 1 tablet by mouth every 24 hours 30 tablet 2    carvedilol (COREG) 12.5 MG tablet Take 1 tablet by mouth in the morning and 1 tablet in the evening. Take with meals.  60 tablet 3    Misc Natural Products (OSTEO BI-FLEX ADV DOUBLE ST) TABS Take by mouth every morning      Ascorbic Acid (VITAMIN C) 250 MG tablet Take 250 mg by mouth daily      vitamin E 400 UNIT capsule Take 400 Units by mouth daily      vitamin D (CHOLECALCIFEROL) 25 MCG (1000 UT) TABS tablet Take 1,000 Units by mouth daily      Multiple Vitamins-Minerals (THERAPEUTIC MULTIVITAMIN-MINERALS) tablet Take 1 tablet by mouth daily      [DISCONTINUED] metoprolol succinate (TOPROL XL) 25 MG extended release tablet Take 1 tablet by mouth in the morning. 30 tablet 3    amLODIPine (NORVASC) 10 MG tablet Take 1 tablet by mouth daily (Patient not taking: Reported on 12/7/2022) 30 tablet 3    [DISCONTINUED] lisinopril (PRINIVIL;ZESTRIL) 20 MG tablet Take 1 tablet by mouth daily (Patient not taking: No sig reported) 30 tablet 3    [DISCONTINUED] tamsulosin (FLOMAX) 0.4 MG capsule Take 1 capsule by mouth daily (Patient not taking: Reported on 8/8/2022) 30 capsule 0     No facility-administered encounter medications on file as of 12/7/2022. Current Outpatient Medications on File Prior to Visit   Medication Sig Dispense Refill    rivaroxaban (XARELTO) 15 MG TABS tablet Take 1 tablet by mouth every 24 hours 30 tablet 2    carvedilol (COREG) 12.5 MG tablet Take 1 tablet by mouth in the morning and 1 tablet in the evening. Take with meals. 60 tablet 3    Misc Natural Products (OSTEO BI-FLEX ADV DOUBLE ST) TABS Take by mouth every morning      Ascorbic Acid (VITAMIN C) 250 MG tablet Take 250 mg by mouth daily      vitamin E 400 UNIT capsule Take 400 Units by mouth daily      vitamin D (CHOLECALCIFEROL) 25 MCG (1000 UT) TABS tablet Take 1,000 Units by mouth daily      Multiple Vitamins-Minerals (THERAPEUTIC MULTIVITAMIN-MINERALS) tablet Take 1 tablet by mouth daily      [DISCONTINUED] metoprolol succinate (TOPROL XL) 25 MG extended release tablet Take 1 tablet by mouth in the morning.  30 tablet 3    amLODIPine (NORVASC) 10 MG tablet Take 1 tablet by mouth daily (Patient not taking: Reported on 12/7/2022) 30 tablet 3    [DISCONTINUED] lisinopril (PRINIVIL;ZESTRIL) 20 MG tablet Take 1 tablet by mouth daily (Patient not taking: No sig reported) 30 tablet 3    [DISCONTINUED] tamsulosin (FLOMAX) 0.4 MG capsule Take 1 capsule by mouth daily (Patient not taking: Reported on 2022) 30 capsule 0     No current facility-administered medications on file prior to visit. Patient has no known allergies. Family History   Adopted: Yes   Problem Relation Age of Onset    Cancer Mother         The patient reports her biologic mother did have bladder cancer     Social History     Tobacco Use   Smoking Status Former    Packs/day: 0.50    Years: 42.00    Pack years: 21.00    Types: Cigarettes    Quit date: 2022    Years since quittin.6   Smokeless Tobacco Never       Social History     Substance and Sexual Activity   Alcohol Use No    Alcohol/week: 0.0 standard drinks       Review of Systems   Constitutional:  Negative for appetite change, chills and fever. Eyes:  Negative for redness and visual disturbance. Respiratory:  Negative for apnea, cough, shortness of breath and wheezing. Cardiovascular:  Negative for chest pain and leg swelling. Gastrointestinal:  Negative for abdominal pain, constipation, nausea and vomiting. Genitourinary:  Positive for hematuria. Negative for difficulty urinating, dyspareunia, dysuria, enuresis, flank pain, frequency, pelvic pain, urgency, vaginal bleeding and vaginal discharge. Musculoskeletal:  Negative for back pain, joint swelling and myalgias. Skin:  Negative for color change, rash and wound. Neurological:  Negative for dizziness, tremors and numbness. Hematological:  Negative for adenopathy. Does not bruise/bleed easily. Psychiatric/Behavioral:  Negative for sleep disturbance. /79 (Site: Left Upper Arm, Position: Sitting, Cuff Size: Large Adult)   Pulse 63   Temp 97.7 °F (36.5 °C) (Infrared)   Ht 5' 1\" (1.549 m)   LMP  (LMP Unknown)   BMI 47.24 kg/m²       PHYSICAL EXAM:  Constitutional: Patient resting comfortably, in no acute distress.    Neuro: Alert and oriented to person place and time. Psych: Mood and affect normal.  HEENT: normocephalic, atraumatic  Lungs: Respiratory effort normal, unlabored  Abdomen: obese, soft, non-tender, non-distended   : No CVA tenderness. Bladder non-tender and not distended. Pelvic: deferred     Lab Results   Component Value Date    BUN 42 (H) 11/23/2022     Lab Results   Component Value Date    CREATININE 2.5 (H) 11/23/2022       ASSESSMENT:   Diagnosis Orders   1. Renal stone  CT ABDOMEN PELVIS WO CONTRAST Additional Contrast? None      2. Bilateral hydronephrosis  CT ABDOMEN PELVIS WO CONTRAST Additional Contrast? None    Basic Metabolic Panel    CBC with Auto Differential      3. Chronic anticoagulation  Rick Vazquez MD, Cardiology, [place]      4. Other secondary hypertension  Rick Vazquez MD, Cardiology, [place]      5. BARBARA (acute kidney injury) [facility])  Jeremy eMng MD, Nephrology, [place]    Basic Metabolic Panel      6. Evaluation by medical service required  Drew Daniels MD, Family Medicine, [place]              PLAN:  We will have patient come back tomorrow morning for Brooks catheter removal    Patient did complete a course of culture specific IV ertapenem therefore we do not need to repeat urine culture at this time. CBC and BMP now    We will get a BMP next Monday as well to reflect how she is doing after catheter removal    Discussed case with . We will have patient obtain a CT stone protocol for further evaluation. We will then make plans in regards to definitive stone treatment on the left as well as plans for her right-sided stent. Referral to establish care with new PCP    Referral to nephrology    Patient would like to establish care within the [facility] system for cardiology, we will place a referral for this as well    Follow-up to review CT scan and for surgical planning.     Spent 50 mins, >50% time face-to-face, discussing diagnoses, any applicable test results, treatment plan/options, and prognosis.

## 2022-12-07 NOTE — RESULT ENCOUNTER NOTE
Please let her know that her kidney function has improved from 2 weeks ago.   Follow-up tomorrow morning for catheter removal and we will repeat lab work next Monday

## 2022-12-07 NOTE — PATIENT INSTRUCTIONS
BLADDER IRRITANTS     There are several changes you can make to your diet to help improve your urinary symptoms. The following have been shown to cause irritation to the bladder and should be AVOIDED if possible:  ~ Coffee (including decaffeinated)   ~ ALL Tea (including green teas and decaffeinated)  ~ Soda/Pop/carbonated beverages/Energy drinks (especially dark dyed manuela, root beers, mountain dew, etc)  ~These are MUCH worse if they have caffeine, but can also be irritative to the bladder even without caffeine  ~ Alcoholic beverages  ~ Spicy foods (peppers contain capsaicin, which is very irritating to the bladder)  ~ Acidic foods (for example: tomato based foods, orange juice, etc)    We encourage increased water intake, unless you have been placed on a fluid restriction by another provider. SURVEY:    You may be receiving a survey from [employer] regarding your visit today. Please complete the survey to enable us to provide the highest quality of care to you and your family. If you cannot score us a very good on any question, please call the office to discuss how we could have made your experience a very good one. Thank you.

## 2022-12-07 NOTE — TELEPHONE ENCOUNTER
Called and left a message on the machine for patient to return a call to the office regarding results, or we can alert her of lab results in the morning.

## 2022-12-08 ENCOUNTER — TELEPHONE (OUTPATIENT)
Dept: UROLOGY | Age: 67
End: 2022-12-08

## 2022-12-08 ENCOUNTER — NURSE ONLY (OUTPATIENT)
Dept: UROLOGY | Age: 67
End: 2022-12-08

## 2022-12-08 VITALS — HEART RATE: 99 BPM | TEMPERATURE: 97 F | SYSTOLIC BLOOD PRESSURE: 146 MMHG | DIASTOLIC BLOOD PRESSURE: 81 MMHG

## 2022-12-08 DIAGNOSIS — N13.30 BILATERAL HYDRONEPHROSIS: Primary | ICD-10-CM

## 2022-12-08 NOTE — TELEPHONE ENCOUNTER
Patient had cath pulled today. She is urinating okay. I told her if she had trouble to call our office. I told her if she can't urinate after hours she would need to go to the ER.

## 2022-12-08 NOTE — PROGRESS NOTES
Pt had lombardo catheter placed on 11/23/22 for hydronephrosis. Balloon deflated on catheter and catheter removed. Patient tolerated procedure well. Pt instructed to drink plenty of fluids, try to urinate q 2 hrs, call office in 6 hrs with update of amount voided, and if not voiding will need to return to office to have lombardo replaced. Also instructed to return to office or ER if goes >6 hrs without voiding or has strong urge to void/suprapubic discomfort and cannot urinate. Pt verbalizes understanding of plan. F/u 2 weeks.

## 2022-12-13 NOTE — TELEPHONE ENCOUNTER
Called Dr Glen Muñoz office.  They are [facility] and can see Dr Lesa Beatty notes, etc. Patient does have an appt 12-28-22

## 2023-05-03 ENCOUNTER — ANESTHESIA EVENT (OUTPATIENT)
Dept: OPERATING ROOM | Age: 68
End: 2023-05-03
Payer: MEDICARE

## 2023-05-03 ENCOUNTER — HOSPITAL ENCOUNTER (INPATIENT)
Age: 68
LOS: 6 days | Discharge: HOME | DRG: 660 | End: 2023-05-09
Attending: EMERGENCY MEDICINE | Admitting: STUDENT IN AN ORGANIZED HEALTH CARE EDUCATION/TRAINING PROGRAM
Payer: MEDICARE

## 2023-05-03 ENCOUNTER — APPOINTMENT (OUTPATIENT)
Dept: CT IMAGING | Age: 68
DRG: 660 | End: 2023-05-03
Payer: MEDICARE

## 2023-05-03 DIAGNOSIS — N39.0 ACUTE UTI (URINARY TRACT INFECTION): ICD-10-CM

## 2023-05-03 DIAGNOSIS — N13.30 HYDRONEPHROSIS OF LEFT KIDNEY: Primary | ICD-10-CM

## 2023-05-03 DIAGNOSIS — A49.8 PSEUDOMONAS INFECTION: ICD-10-CM

## 2023-05-03 DIAGNOSIS — N17.9 ACUTE RENAL FAILURE, UNSPECIFIED ACUTE RENAL FAILURE TYPE (HCC): ICD-10-CM

## 2023-05-03 LAB
ABSOLUTE EOS #: 0.06 K/UL (ref 0–0.44)
ABSOLUTE IMMATURE GRANULOCYTE: <0.03 K/UL (ref 0–0.3)
ABSOLUTE LYMPH #: 1.21 K/UL (ref 1.1–3.7)
ABSOLUTE MONO #: 0.36 K/UL (ref 0.1–1.2)
ALBUMIN SERPL-MCNC: 3.2 G/DL (ref 3.5–5.2)
ALBUMIN/GLOBULIN RATIO: 0.7 (ref 1–2.5)
ALP SERPL-CCNC: 66 U/L (ref 35–104)
ALT SERPL-CCNC: 9 U/L (ref 5–33)
ANION GAP SERPL CALCULATED.3IONS-SCNC: 14 MMOL/L (ref 9–17)
AST SERPL-CCNC: 10 U/L
BACTERIA: ABNORMAL
BASOPHILS # BLD: 0 % (ref 0–2)
BASOPHILS ABSOLUTE: <0.03 K/UL (ref 0–0.2)
BILIRUB SERPL-MCNC: <0.1 MG/DL (ref 0.3–1.2)
BILIRUBIN URINE: NEGATIVE
BUN SERPL-MCNC: 87 MG/DL (ref 8–23)
BUN/CREAT BLD: 16 (ref 9–20)
CALCIUM SERPL-MCNC: 9.2 MG/DL (ref 8.6–10.4)
CHLORIDE SERPL-SCNC: 114 MMOL/L (ref 98–107)
CO2 SERPL-SCNC: 10 MMOL/L (ref 20–31)
COLOR: YELLOW
CREAT SERPL-MCNC: 5.48 MG/DL (ref 0.5–0.9)
EOSINOPHILS RELATIVE PERCENT: 1 % (ref 1–4)
EPITHELIAL CELLS UA: ABNORMAL /HPF (ref 0–25)
GFR SERPL CREATININE-BSD FRML MDRD: 8 ML/MIN/1.73M2
GLUCOSE SERPL-MCNC: 130 MG/DL (ref 70–99)
GLUCOSE UR STRIP.AUTO-MCNC: NEGATIVE MG/DL
HCT VFR BLD AUTO: 26.6 % (ref 36.3–47.1)
HGB BLD-MCNC: 8.3 G/DL (ref 11.9–15.1)
IMMATURE GRANULOCYTES: 0 %
INR PPP: 1
KETONES UR STRIP.AUTO-MCNC: NEGATIVE MG/DL
LACTATE PLASV-SCNC: 0.5 MMOL/L (ref 0.5–2.2)
LEUKOCYTE ESTERASE UR QL STRIP.AUTO: ABNORMAL
LIPASE SERPL-CCNC: 73 U/L (ref 13–60)
LYMPHOCYTES # BLD: 22 % (ref 24–43)
MCH RBC QN AUTO: 29.3 PG (ref 25.2–33.5)
MCHC RBC AUTO-ENTMCNC: 31.2 G/DL (ref 28.4–34.8)
MCV RBC AUTO: 94 FL (ref 82.6–102.9)
MONOCYTES # BLD: 7 % (ref 3–12)
NITRITE UR QL STRIP.AUTO: POSITIVE
NRBC AUTOMATED: 0 PER 100 WBC
PDW BLD-RTO: 18.2 % (ref 11.8–14.4)
PLATELET # BLD AUTO: 368 K/UL (ref 138–453)
PMV BLD AUTO: 8.2 FL (ref 8.1–13.5)
POTASSIUM SERPL-SCNC: 4.3 MMOL/L (ref 3.7–5.3)
PROT SERPL-MCNC: 7.8 G/DL (ref 6.4–8.3)
PROT UR STRIP.AUTO-MCNC: 7 MG/DL (ref 5–9)
PROT UR STRIP.AUTO-MCNC: ABNORMAL MG/DL
PROTHROMBIN TIME: 13.4 SEC (ref 11.9–14.8)
RBC # BLD: 2.83 M/UL (ref 3.95–5.11)
RBC CLUMPS #/AREA URNS AUTO: ABNORMAL /HPF (ref 0–2)
SEG NEUTROPHILS: 70 % (ref 36–65)
SEGMENTED NEUTROPHILS ABSOLUTE COUNT: 3.88 K/UL (ref 1.5–8.1)
SODIUM SERPL-SCNC: 138 MMOL/L (ref 135–144)
SPECIFIC GRAVITY UA: 1.02 (ref 1.01–1.02)
TURBIDITY: CLEAR
URINE HGB: ABNORMAL
UROBILINOGEN, URINE: NORMAL
WBC # BLD AUTO: 5.6 K/UL (ref 3.5–11.3)
WBC UA: ABNORMAL /HPF (ref 0–5)

## 2023-05-03 PROCEDURE — 6370000000 HC RX 637 (ALT 250 FOR IP): Performed by: NURSE PRACTITIONER

## 2023-05-03 PROCEDURE — 85610 PROTHROMBIN TIME: CPT

## 2023-05-03 PROCEDURE — 36415 COLL VENOUS BLD VENIPUNCTURE: CPT

## 2023-05-03 PROCEDURE — 99285 EMERGENCY DEPT VISIT HI MDM: CPT

## 2023-05-03 PROCEDURE — 83690 ASSAY OF LIPASE: CPT

## 2023-05-03 PROCEDURE — 94761 N-INVAS EAR/PLS OXIMETRY MLT: CPT

## 2023-05-03 PROCEDURE — 83605 ASSAY OF LACTIC ACID: CPT

## 2023-05-03 PROCEDURE — 87186 SC STD MICRODIL/AGAR DIL: CPT

## 2023-05-03 PROCEDURE — 1200000000 HC SEMI PRIVATE

## 2023-05-03 PROCEDURE — 6360000002 HC RX W HCPCS: Performed by: EMERGENCY MEDICINE

## 2023-05-03 PROCEDURE — 80053 COMPREHEN METABOLIC PANEL: CPT

## 2023-05-03 PROCEDURE — 74176 CT ABD & PELVIS W/O CONTRAST: CPT

## 2023-05-03 PROCEDURE — 87086 URINE CULTURE/COLONY COUNT: CPT

## 2023-05-03 PROCEDURE — 85025 COMPLETE CBC W/AUTO DIFF WBC: CPT

## 2023-05-03 PROCEDURE — 87077 CULTURE AEROBIC IDENTIFY: CPT

## 2023-05-03 PROCEDURE — 93005 ELECTROCARDIOGRAM TRACING: CPT | Performed by: NURSE PRACTITIONER

## 2023-05-03 PROCEDURE — 2580000003 HC RX 258: Performed by: EMERGENCY MEDICINE

## 2023-05-03 PROCEDURE — 2580000003 HC RX 258: Performed by: NURSE PRACTITIONER

## 2023-05-03 PROCEDURE — 6370000000 HC RX 637 (ALT 250 FOR IP): Performed by: STUDENT IN AN ORGANIZED HEALTH CARE EDUCATION/TRAINING PROGRAM

## 2023-05-03 PROCEDURE — 96375 TX/PRO/DX INJ NEW DRUG ADDON: CPT

## 2023-05-03 PROCEDURE — 96365 THER/PROPH/DIAG IV INF INIT: CPT

## 2023-05-03 PROCEDURE — 87040 BLOOD CULTURE FOR BACTERIA: CPT

## 2023-05-03 PROCEDURE — 6360000002 HC RX W HCPCS: Performed by: STUDENT IN AN ORGANIZED HEALTH CARE EDUCATION/TRAINING PROGRAM

## 2023-05-03 PROCEDURE — 81001 URINALYSIS AUTO W/SCOPE: CPT

## 2023-05-03 RX ORDER — FAMOTIDINE 20 MG/1
20 TABLET, FILM COATED ORAL DAILY
Status: DISCONTINUED | OUTPATIENT
Start: 2023-05-03 | End: 2023-05-04

## 2023-05-03 RX ORDER — M-VIT,TX,IRON,MINS/CALC/FOLIC 27MG-0.4MG
1 TABLET ORAL DAILY
Status: DISCONTINUED | OUTPATIENT
Start: 2023-05-04 | End: 2023-05-04

## 2023-05-03 RX ORDER — ONDANSETRON 2 MG/ML
4 INJECTION INTRAMUSCULAR; INTRAVENOUS EVERY 6 HOURS PRN
Status: DISCONTINUED | OUTPATIENT
Start: 2023-05-03 | End: 2023-05-09 | Stop reason: HOSPADM

## 2023-05-03 RX ORDER — ACETAMINOPHEN 650 MG/1
650 SUPPOSITORY RECTAL EVERY 6 HOURS PRN
Status: DISCONTINUED | OUTPATIENT
Start: 2023-05-03 | End: 2023-05-09 | Stop reason: HOSPADM

## 2023-05-03 RX ORDER — VITAMIN B COMPLEX
1000 TABLET ORAL DAILY
Status: DISCONTINUED | OUTPATIENT
Start: 2023-05-03 | End: 2023-05-04

## 2023-05-03 RX ORDER — SODIUM CHLORIDE 0.9 % (FLUSH) 0.9 %
10 SYRINGE (ML) INJECTION PRN
Status: DISCONTINUED | OUTPATIENT
Start: 2023-05-03 | End: 2023-05-04

## 2023-05-03 RX ORDER — ONDANSETRON 4 MG/1
4 TABLET, ORALLY DISINTEGRATING ORAL EVERY 8 HOURS PRN
Status: DISCONTINUED | OUTPATIENT
Start: 2023-05-03 | End: 2023-05-09 | Stop reason: HOSPADM

## 2023-05-03 RX ORDER — FENTANYL CITRATE 50 UG/ML
25 INJECTION, SOLUTION INTRAMUSCULAR; INTRAVENOUS ONCE
Status: COMPLETED | OUTPATIENT
Start: 2023-05-03 | End: 2023-05-03

## 2023-05-03 RX ORDER — ONDANSETRON 2 MG/ML
4 INJECTION INTRAMUSCULAR; INTRAVENOUS ONCE
Status: COMPLETED | OUTPATIENT
Start: 2023-05-03 | End: 2023-05-03

## 2023-05-03 RX ORDER — CETIRIZINE HYDROCHLORIDE 10 MG/1
5 TABLET ORAL DAILY
Status: DISCONTINUED | OUTPATIENT
Start: 2023-05-03 | End: 2023-05-04

## 2023-05-03 RX ORDER — ACETAMINOPHEN 325 MG/1
650 TABLET ORAL EVERY 6 HOURS PRN
Status: DISCONTINUED | OUTPATIENT
Start: 2023-05-03 | End: 2023-05-09 | Stop reason: HOSPADM

## 2023-05-03 RX ORDER — POLYETHYLENE GLYCOL 3350 17 G/17G
17 POWDER, FOR SOLUTION ORAL DAILY PRN
Status: DISCONTINUED | OUTPATIENT
Start: 2023-05-03 | End: 2023-05-04

## 2023-05-03 RX ORDER — 0.9 % SODIUM CHLORIDE 0.9 %
1000 INTRAVENOUS SOLUTION INTRAVENOUS ONCE
Status: COMPLETED | OUTPATIENT
Start: 2023-05-03 | End: 2023-05-03

## 2023-05-03 RX ORDER — ASCORBIC ACID 500 MG
250 TABLET ORAL DAILY
Status: DISCONTINUED | OUTPATIENT
Start: 2023-05-03 | End: 2023-05-04

## 2023-05-03 RX ORDER — SODIUM CHLORIDE 0.9 % (FLUSH) 0.9 %
10 SYRINGE (ML) INJECTION EVERY 12 HOURS SCHEDULED
Status: DISCONTINUED | OUTPATIENT
Start: 2023-05-03 | End: 2023-05-04

## 2023-05-03 RX ORDER — SODIUM CHLORIDE 9 MG/ML
INJECTION, SOLUTION INTRAVENOUS CONTINUOUS
Status: DISCONTINUED | OUTPATIENT
Start: 2023-05-03 | End: 2023-05-04

## 2023-05-03 RX ORDER — RIVAROXABAN 20 MG/1
20 TABLET, FILM COATED ORAL
Status: ON HOLD | COMMUNITY
Start: 2023-04-29 | End: 2023-05-09 | Stop reason: HOSPADM

## 2023-05-03 RX ORDER — CALCIUM CARBONATE 200(500)MG
500 TABLET,CHEWABLE ORAL 3 TIMES DAILY PRN
Status: DISCONTINUED | OUTPATIENT
Start: 2023-05-03 | End: 2023-05-04

## 2023-05-03 RX ORDER — FENTANYL CITRATE 50 UG/ML
25 INJECTION, SOLUTION INTRAMUSCULAR; INTRAVENOUS
Status: DISCONTINUED | OUTPATIENT
Start: 2023-05-03 | End: 2023-05-04

## 2023-05-03 RX ORDER — SODIUM CHLORIDE 9 MG/ML
INJECTION, SOLUTION INTRAVENOUS PRN
Status: DISCONTINUED | OUTPATIENT
Start: 2023-05-03 | End: 2023-05-04

## 2023-05-03 RX ORDER — LORATADINE 10 MG/1
10 TABLET ORAL DAILY
COMMUNITY

## 2023-05-03 RX ADMIN — FENTANYL CITRATE 25 MCG: 50 INJECTION, SOLUTION INTRAMUSCULAR; INTRAVENOUS at 19:03

## 2023-05-03 RX ADMIN — SODIUM CHLORIDE, PRESERVATIVE FREE 10 ML: 5 INJECTION INTRAVENOUS at 16:16

## 2023-05-03 RX ADMIN — Medication 1000 UNITS: at 19:37

## 2023-05-03 RX ADMIN — OXYCODONE HYDROCHLORIDE AND ACETAMINOPHEN 250 MG: 500 TABLET ORAL at 19:37

## 2023-05-03 RX ADMIN — SODIUM CHLORIDE 1000 ML: 9 INJECTION, SOLUTION INTRAVENOUS at 13:12

## 2023-05-03 RX ADMIN — FAMOTIDINE 20 MG: 20 TABLET, FILM COATED ORAL at 16:36

## 2023-05-03 RX ADMIN — MEROPENEM 1000 MG: 1 INJECTION, POWDER, FOR SOLUTION INTRAVENOUS at 14:37

## 2023-05-03 RX ADMIN — ANTACID TABLETS 500 MG: 500 TABLET, CHEWABLE ORAL at 22:45

## 2023-05-03 RX ADMIN — SODIUM CHLORIDE, PRESERVATIVE FREE 10 ML: 5 INJECTION INTRAVENOUS at 19:04

## 2023-05-03 RX ADMIN — SODIUM CHLORIDE: 9 INJECTION, SOLUTION INTRAVENOUS at 16:21

## 2023-05-03 RX ADMIN — FENTANYL CITRATE 25 MCG: 50 INJECTION, SOLUTION INTRAMUSCULAR; INTRAVENOUS at 14:51

## 2023-05-03 RX ADMIN — ONDANSETRON 4 MG: 2 INJECTION INTRAMUSCULAR; INTRAVENOUS at 14:50

## 2023-05-03 ASSESSMENT — ENCOUNTER SYMPTOMS
BACK PAIN: 0
CONSTIPATION: 0
SORE THROAT: 0
BLOOD IN STOOL: 0
SHORTNESS OF BREATH: 0
ABDOMINAL DISTENTION: 0
ABDOMINAL PAIN: 1
VOMITING: 0
DIARRHEA: 0
NAUSEA: 0

## 2023-05-03 ASSESSMENT — PAIN DESCRIPTION - LOCATION
LOCATION: ABDOMEN
LOCATION: ABDOMEN;FLANK
LOCATION: ABDOMEN;FLANK
LOCATION: ABDOMEN

## 2023-05-03 ASSESSMENT — PAIN DESCRIPTION - PAIN TYPE: TYPE: ACUTE PAIN

## 2023-05-03 ASSESSMENT — PAIN SCALES - GENERAL
PAINLEVEL_OUTOF10: 0
PAINLEVEL_OUTOF10: 5
PAINLEVEL_OUTOF10: 10
PAINLEVEL_OUTOF10: 10
PAINLEVEL_OUTOF10: 0
PAINLEVEL_OUTOF10: 3
PAINLEVEL_OUTOF10: 10
PAINLEVEL_OUTOF10: 8

## 2023-05-03 ASSESSMENT — PAIN DESCRIPTION - ORIENTATION
ORIENTATION: LEFT;RIGHT
ORIENTATION: RIGHT;LEFT

## 2023-05-03 ASSESSMENT — PAIN DESCRIPTION - DESCRIPTORS
DESCRIPTORS: CRAMPING
DESCRIPTORS: CRAMPING

## 2023-05-03 ASSESSMENT — PAIN - FUNCTIONAL ASSESSMENT
PAIN_FUNCTIONAL_ASSESSMENT: PREVENTS OR INTERFERES SOME ACTIVE ACTIVITIES AND ADLS
PAIN_FUNCTIONAL_ASSESSMENT: 0-10
PAIN_FUNCTIONAL_ASSESSMENT: ACTIVITIES ARE NOT PREVENTED

## 2023-05-03 ASSESSMENT — PAIN DESCRIPTION - ONSET: ONSET: PROGRESSIVE

## 2023-05-03 ASSESSMENT — PAIN DESCRIPTION - FREQUENCY: FREQUENCY: INTERMITTENT

## 2023-05-04 ENCOUNTER — APPOINTMENT (OUTPATIENT)
Dept: GENERAL RADIOLOGY | Age: 68
DRG: 660 | End: 2023-05-04
Payer: MEDICARE

## 2023-05-04 ENCOUNTER — ANESTHESIA (OUTPATIENT)
Dept: OPERATING ROOM | Age: 68
End: 2023-05-04
Payer: MEDICARE

## 2023-05-04 LAB
ABSOLUTE EOS #: 0.08 K/UL (ref 0–0.44)
ABSOLUTE IMMATURE GRANULOCYTE: <0.03 K/UL (ref 0–0.3)
ABSOLUTE LYMPH #: 0.95 K/UL (ref 1.1–3.7)
ABSOLUTE MONO #: 0.44 K/UL (ref 0.1–1.2)
ANION GAP SERPL CALCULATED.3IONS-SCNC: 12 MMOL/L (ref 9–17)
ANION GAP SERPL CALCULATED.3IONS-SCNC: 13 MMOL/L (ref 9–17)
ANION GAP SERPL CALCULATED.3IONS-SCNC: 13 MMOL/L (ref 9–17)
BASOPHILS # BLD: 1 % (ref 0–2)
BASOPHILS ABSOLUTE: 0.04 K/UL (ref 0–0.2)
BUN SERPL-MCNC: 80 MG/DL (ref 8–23)
BUN SERPL-MCNC: 80 MG/DL (ref 8–23)
BUN SERPL-MCNC: 82 MG/DL (ref 8–23)
BUN/CREAT BLD: 15 (ref 9–20)
BUN/CREAT BLD: 16 (ref 9–20)
BUN/CREAT BLD: 16 (ref 9–20)
CALCIUM SERPL-MCNC: 8.2 MG/DL (ref 8.6–10.4)
CALCIUM SERPL-MCNC: 8.4 MG/DL (ref 8.6–10.4)
CALCIUM SERPL-MCNC: 8.6 MG/DL (ref 8.6–10.4)
CHLORIDE SERPL-SCNC: 114 MMOL/L (ref 98–107)
CHLORIDE SERPL-SCNC: 115 MMOL/L (ref 98–107)
CHLORIDE SERPL-SCNC: 118 MMOL/L (ref 98–107)
CO2 SERPL-SCNC: 11 MMOL/L (ref 20–31)
CO2 SERPL-SCNC: 11 MMOL/L (ref 20–31)
CO2 SERPL-SCNC: 12 MMOL/L (ref 20–31)
CREAT SERPL-MCNC: 5.11 MG/DL (ref 0.5–0.9)
CREAT SERPL-MCNC: 5.12 MG/DL (ref 0.5–0.9)
CREAT SERPL-MCNC: 5.39 MG/DL (ref 0.5–0.9)
EKG ATRIAL RATE: 76 BPM
EKG P AXIS: 59 DEGREES
EKG P-R INTERVAL: 188 MS
EKG Q-T INTERVAL: 400 MS
EKG QRS DURATION: 142 MS
EKG QTC CALCULATION (BAZETT): 450 MS
EKG R AXIS: -52 DEGREES
EKG T AXIS: 11 DEGREES
EKG VENTRICULAR RATE: 76 BPM
EOSINOPHILS RELATIVE PERCENT: 2 % (ref 1–4)
GFR SERPL CREATININE-BSD FRML MDRD: 8 ML/MIN/1.73M2
GFR SERPL CREATININE-BSD FRML MDRD: 9 ML/MIN/1.73M2
GFR SERPL CREATININE-BSD FRML MDRD: 9 ML/MIN/1.73M2
GLUCOSE SERPL-MCNC: 153 MG/DL (ref 70–99)
GLUCOSE SERPL-MCNC: 161 MG/DL (ref 70–99)
GLUCOSE SERPL-MCNC: 91 MG/DL (ref 70–99)
HCT VFR BLD AUTO: 23.7 % (ref 36.3–47.1)
HGB BLD-MCNC: 7.3 G/DL (ref 11.9–15.1)
IMMATURE GRANULOCYTES: 0 %
LYMPHOCYTES # BLD: 21 % (ref 24–43)
MCH RBC QN AUTO: 29 PG (ref 25.2–33.5)
MCHC RBC AUTO-ENTMCNC: 30.8 G/DL (ref 28.4–34.8)
MCV RBC AUTO: 94 FL (ref 82.6–102.9)
MONOCYTES # BLD: 10 % (ref 3–12)
NRBC AUTOMATED: 0 PER 100 WBC
PDW BLD-RTO: 18.3 % (ref 11.8–14.4)
PLATELET # BLD AUTO: 282 K/UL (ref 138–453)
PMV BLD AUTO: 8.1 FL (ref 8.1–13.5)
POTASSIUM SERPL-SCNC: 4.6 MMOL/L (ref 3.7–5.3)
POTASSIUM SERPL-SCNC: 4.7 MMOL/L (ref 3.7–5.3)
POTASSIUM SERPL-SCNC: 5.1 MMOL/L (ref 3.7–5.3)
RBC # BLD: 2.52 M/UL (ref 3.95–5.11)
SEG NEUTROPHILS: 66 % (ref 36–65)
SEGMENTED NEUTROPHILS ABSOLUTE COUNT: 2.92 K/UL (ref 1.5–8.1)
SODIUM SERPL-SCNC: 138 MMOL/L (ref 135–144)
SODIUM SERPL-SCNC: 139 MMOL/L (ref 135–144)
SODIUM SERPL-SCNC: 142 MMOL/L (ref 135–144)
WBC # BLD AUTO: 4.5 K/UL (ref 3.5–11.3)

## 2023-05-04 PROCEDURE — 6360000002 HC RX W HCPCS: Performed by: UROLOGY

## 2023-05-04 PROCEDURE — 2500000003 HC RX 250 WO HCPCS: Performed by: INTERNAL MEDICINE

## 2023-05-04 PROCEDURE — 1200000000 HC SEMI PRIVATE

## 2023-05-04 PROCEDURE — 3700000001 HC ADD 15 MINUTES (ANESTHESIA): Performed by: UROLOGY

## 2023-05-04 PROCEDURE — 94761 N-INVAS EAR/PLS OXIMETRY MLT: CPT

## 2023-05-04 PROCEDURE — 2580000003 HC RX 258: Performed by: UROLOGY

## 2023-05-04 PROCEDURE — 3600000013 HC SURGERY LEVEL 3 ADDTL 15MIN: Performed by: UROLOGY

## 2023-05-04 PROCEDURE — 2580000003 HC RX 258: Performed by: INTERNAL MEDICINE

## 2023-05-04 PROCEDURE — 99222 1ST HOSP IP/OBS MODERATE 55: CPT | Performed by: INTERNAL MEDICINE

## 2023-05-04 PROCEDURE — C1758 CATHETER, URETERAL: HCPCS | Performed by: UROLOGY

## 2023-05-04 PROCEDURE — 7100000001 HC PACU RECOVERY - ADDTL 15 MIN: Performed by: UROLOGY

## 2023-05-04 PROCEDURE — C1769 GUIDE WIRE: HCPCS | Performed by: UROLOGY

## 2023-05-04 PROCEDURE — 3209999900 FLUORO FOR SURGICAL PROCEDURES

## 2023-05-04 PROCEDURE — 85025 COMPLETE CBC W/AUTO DIFF WBC: CPT

## 2023-05-04 PROCEDURE — 7100000000 HC PACU RECOVERY - FIRST 15 MIN: Performed by: UROLOGY

## 2023-05-04 PROCEDURE — 3700000000 HC ANESTHESIA ATTENDED CARE: Performed by: UROLOGY

## 2023-05-04 PROCEDURE — 80048 BASIC METABOLIC PNL TOTAL CA: CPT

## 2023-05-04 PROCEDURE — 36415 COLL VENOUS BLD VENIPUNCTURE: CPT

## 2023-05-04 PROCEDURE — 6360000002 HC RX W HCPCS: Performed by: STUDENT IN AN ORGANIZED HEALTH CARE EDUCATION/TRAINING PROGRAM

## 2023-05-04 PROCEDURE — 2500000003 HC RX 250 WO HCPCS

## 2023-05-04 PROCEDURE — 2580000003 HC RX 258

## 2023-05-04 PROCEDURE — 6360000002 HC RX W HCPCS

## 2023-05-04 PROCEDURE — 2580000003 HC RX 258: Performed by: NURSE PRACTITIONER

## 2023-05-04 PROCEDURE — C2617 STENT, NON-COR, TEM W/O DEL: HCPCS | Performed by: UROLOGY

## 2023-05-04 PROCEDURE — 6360000002 HC RX W HCPCS: Performed by: NURSE PRACTITIONER

## 2023-05-04 PROCEDURE — 6370000000 HC RX 637 (ALT 250 FOR IP): Performed by: UROLOGY

## 2023-05-04 PROCEDURE — 93010 ELECTROCARDIOGRAM REPORT: CPT | Performed by: INTERNAL MEDICINE

## 2023-05-04 PROCEDURE — 2709999900 HC NON-CHARGEABLE SUPPLY: Performed by: UROLOGY

## 2023-05-04 PROCEDURE — 3600000003 HC SURGERY LEVEL 3 BASE: Performed by: UROLOGY

## 2023-05-04 PROCEDURE — 0T788DZ DILATION OF BILATERAL URETERS WITH INTRALUMINAL DEVICE, VIA NATURAL OR ARTIFICIAL OPENING ENDOSCOPIC: ICD-10-PCS | Performed by: UROLOGY

## 2023-05-04 DEVICE — URETERAL STENT WITH SIDE HOLES 6FX24CM
Type: IMPLANTABLE DEVICE | Site: URETER | Status: FUNCTIONAL
Brand: TRIA™ FIRM

## 2023-05-04 RX ORDER — ONDANSETRON 2 MG/ML
4 INJECTION INTRAMUSCULAR; INTRAVENOUS
Status: DISCONTINUED | OUTPATIENT
Start: 2023-05-04 | End: 2023-05-04 | Stop reason: HOSPADM

## 2023-05-04 RX ORDER — CETIRIZINE HYDROCHLORIDE 10 MG/1
5 TABLET ORAL DAILY
Status: DISCONTINUED | OUTPATIENT
Start: 2023-05-05 | End: 2023-05-09 | Stop reason: HOSPADM

## 2023-05-04 RX ORDER — FAMOTIDINE 20 MG/1
20 TABLET, FILM COATED ORAL DAILY
Status: DISCONTINUED | OUTPATIENT
Start: 2023-05-05 | End: 2023-05-04

## 2023-05-04 RX ORDER — CALCIUM CARBONATE 200(500)MG
500 TABLET,CHEWABLE ORAL 3 TIMES DAILY PRN
Status: DISCONTINUED | OUTPATIENT
Start: 2023-05-04 | End: 2023-05-09 | Stop reason: HOSPADM

## 2023-05-04 RX ORDER — PHENYLEPHRINE HYDROCHLORIDE 10 MG/ML
INJECTION INTRAVENOUS PRN
Status: DISCONTINUED | OUTPATIENT
Start: 2023-05-04 | End: 2023-05-04 | Stop reason: SDUPTHER

## 2023-05-04 RX ORDER — FENTANYL CITRATE 50 UG/ML
25 INJECTION, SOLUTION INTRAMUSCULAR; INTRAVENOUS EVERY 5 MIN PRN
Status: DISCONTINUED | OUTPATIENT
Start: 2023-05-04 | End: 2023-05-04 | Stop reason: HOSPADM

## 2023-05-04 RX ORDER — SODIUM CHLORIDE 9 MG/ML
INJECTION, SOLUTION INTRAVENOUS PRN
Status: DISCONTINUED | OUTPATIENT
Start: 2023-05-04 | End: 2023-05-04 | Stop reason: HOSPADM

## 2023-05-04 RX ORDER — M-VIT,TX,IRON,MINS/CALC/FOLIC 27MG-0.4MG
1 TABLET ORAL DAILY
Status: DISCONTINUED | OUTPATIENT
Start: 2023-05-05 | End: 2023-05-09 | Stop reason: HOSPADM

## 2023-05-04 RX ORDER — FAMOTIDINE 10 MG
10 TABLET ORAL DAILY
Status: DISCONTINUED | OUTPATIENT
Start: 2023-05-05 | End: 2023-05-09 | Stop reason: HOSPADM

## 2023-05-04 RX ORDER — SODIUM CHLORIDE 9 MG/ML
INJECTION, SOLUTION INTRAVENOUS PRN
Status: DISCONTINUED | OUTPATIENT
Start: 2023-05-04 | End: 2023-05-09 | Stop reason: HOSPADM

## 2023-05-04 RX ORDER — SODIUM CHLORIDE 9 MG/ML
INJECTION, SOLUTION INTRAVENOUS CONTINUOUS
Status: DISCONTINUED | OUTPATIENT
Start: 2023-05-04 | End: 2023-05-04

## 2023-05-04 RX ORDER — SODIUM CHLORIDE 0.9 % (FLUSH) 0.9 %
5-40 SYRINGE (ML) INJECTION PRN
Status: DISCONTINUED | OUTPATIENT
Start: 2023-05-04 | End: 2023-05-04 | Stop reason: HOSPADM

## 2023-05-04 RX ORDER — POLYETHYLENE GLYCOL 3350 17 G/17G
17 POWDER, FOR SOLUTION ORAL DAILY PRN
Status: DISCONTINUED | OUTPATIENT
Start: 2023-05-04 | End: 2023-05-09 | Stop reason: HOSPADM

## 2023-05-04 RX ORDER — LIDOCAINE HYDROCHLORIDE 20 MG/ML
INJECTION, SOLUTION EPIDURAL; INFILTRATION; INTRACAUDAL; PERINEURAL PRN
Status: DISCONTINUED | OUTPATIENT
Start: 2023-05-04 | End: 2023-05-04 | Stop reason: SDUPTHER

## 2023-05-04 RX ORDER — ONDANSETRON 2 MG/ML
INJECTION INTRAMUSCULAR; INTRAVENOUS PRN
Status: DISCONTINUED | OUTPATIENT
Start: 2023-05-04 | End: 2023-05-04 | Stop reason: SDUPTHER

## 2023-05-04 RX ORDER — PROPOFOL 10 MG/ML
INJECTION, EMULSION INTRAVENOUS PRN
Status: DISCONTINUED | OUTPATIENT
Start: 2023-05-04 | End: 2023-05-04 | Stop reason: SDUPTHER

## 2023-05-04 RX ORDER — FENTANYL CITRATE 50 UG/ML
25 INJECTION, SOLUTION INTRAMUSCULAR; INTRAVENOUS
Status: DISCONTINUED | OUTPATIENT
Start: 2023-05-04 | End: 2023-05-09 | Stop reason: HOSPADM

## 2023-05-04 RX ORDER — DEXAMETHASONE SODIUM PHOSPHATE 4 MG/ML
INJECTION, SOLUTION INTRA-ARTICULAR; INTRALESIONAL; INTRAMUSCULAR; INTRAVENOUS; SOFT TISSUE PRN
Status: DISCONTINUED | OUTPATIENT
Start: 2023-05-04 | End: 2023-05-04 | Stop reason: SDUPTHER

## 2023-05-04 RX ORDER — SODIUM CHLORIDE 0.9 % (FLUSH) 0.9 %
10 SYRINGE (ML) INJECTION PRN
Status: DISCONTINUED | OUTPATIENT
Start: 2023-05-04 | End: 2023-05-09 | Stop reason: HOSPADM

## 2023-05-04 RX ORDER — SODIUM CHLORIDE 0.9 % (FLUSH) 0.9 %
5-40 SYRINGE (ML) INJECTION EVERY 12 HOURS SCHEDULED
Status: DISCONTINUED | OUTPATIENT
Start: 2023-05-04 | End: 2023-05-04 | Stop reason: HOSPADM

## 2023-05-04 RX ORDER — HYDROCODONE BITARTRATE AND ACETAMINOPHEN 5; 325 MG/1; MG/1
1 TABLET ORAL EVERY 6 HOURS PRN
Status: DISCONTINUED | OUTPATIENT
Start: 2023-05-04 | End: 2023-05-04

## 2023-05-04 RX ORDER — METOCLOPRAMIDE HYDROCHLORIDE 5 MG/ML
10 INJECTION INTRAMUSCULAR; INTRAVENOUS
Status: DISCONTINUED | OUTPATIENT
Start: 2023-05-04 | End: 2023-05-04 | Stop reason: HOSPADM

## 2023-05-04 RX ORDER — FENTANYL CITRATE 50 UG/ML
INJECTION, SOLUTION INTRAMUSCULAR; INTRAVENOUS PRN
Status: DISCONTINUED | OUTPATIENT
Start: 2023-05-04 | End: 2023-05-04 | Stop reason: SDUPTHER

## 2023-05-04 RX ORDER — VITAMIN B COMPLEX
1000 TABLET ORAL DAILY
Status: DISCONTINUED | OUTPATIENT
Start: 2023-05-05 | End: 2023-05-09 | Stop reason: HOSPADM

## 2023-05-04 RX ORDER — SODIUM CHLORIDE 0.9 % (FLUSH) 0.9 %
10 SYRINGE (ML) INJECTION EVERY 12 HOURS SCHEDULED
Status: DISCONTINUED | OUTPATIENT
Start: 2023-05-04 | End: 2023-05-09 | Stop reason: HOSPADM

## 2023-05-04 RX ORDER — SODIUM CHLORIDE 9 MG/ML
INJECTION, SOLUTION INTRAVENOUS CONTINUOUS PRN
Status: DISCONTINUED | OUTPATIENT
Start: 2023-05-04 | End: 2023-05-04 | Stop reason: SDUPTHER

## 2023-05-04 RX ORDER — ASCORBIC ACID 500 MG
250 TABLET ORAL DAILY
Status: DISCONTINUED | OUTPATIENT
Start: 2023-05-05 | End: 2023-05-09 | Stop reason: HOSPADM

## 2023-05-04 RX ORDER — HYDROCODONE BITARTRATE AND ACETAMINOPHEN 5; 325 MG/1; MG/1
1 TABLET ORAL EVERY 6 HOURS PRN
Status: DISCONTINUED | OUTPATIENT
Start: 2023-05-04 | End: 2023-05-09 | Stop reason: HOSPADM

## 2023-05-04 RX ORDER — FENTANYL CITRATE 50 UG/ML
50 INJECTION, SOLUTION INTRAMUSCULAR; INTRAVENOUS EVERY 5 MIN PRN
Status: DISCONTINUED | OUTPATIENT
Start: 2023-05-04 | End: 2023-05-04 | Stop reason: HOSPADM

## 2023-05-04 RX ORDER — EPHEDRINE SULFATE/0.9% NACL/PF 25 MG/5 ML
SYRINGE (ML) INTRAVENOUS PRN
Status: DISCONTINUED | OUTPATIENT
Start: 2023-05-04 | End: 2023-05-04 | Stop reason: SDUPTHER

## 2023-05-04 RX ADMIN — LIDOCAINE HYDROCHLORIDE 100 MG: 20 INJECTION, SOLUTION EPIDURAL; INFILTRATION; INTRACAUDAL; PERINEURAL at 07:56

## 2023-05-04 RX ADMIN — FENTANYL CITRATE 25 MCG: 50 INJECTION INTRAMUSCULAR; INTRAVENOUS at 08:29

## 2023-05-04 RX ADMIN — PROPOFOL 150 MG: 10 INJECTION, EMULSION INTRAVENOUS at 07:56

## 2023-05-04 RX ADMIN — PHENYLEPHRINE HYDROCHLORIDE 100 MCG: 10 INJECTION INTRAVENOUS at 08:06

## 2023-05-04 RX ADMIN — HYDROCODONE BITARTRATE AND ACETAMINOPHEN 1 TABLET: 5; 325 TABLET ORAL at 16:14

## 2023-05-04 RX ADMIN — FENTANYL CITRATE 50 MCG: 50 INJECTION INTRAMUSCULAR; INTRAVENOUS at 07:56

## 2023-05-04 RX ADMIN — MEROPENEM 500 MG: 500 INJECTION, POWDER, FOR SOLUTION INTRAVENOUS at 14:08

## 2023-05-04 RX ADMIN — EPHEDRINE SULFATE 10 MG: 5 INJECTION INTRAVENOUS at 08:07

## 2023-05-04 RX ADMIN — SODIUM CHLORIDE: 9 INJECTION, SOLUTION INTRAVENOUS at 04:55

## 2023-05-04 RX ADMIN — SODIUM CHLORIDE: 9 INJECTION, SOLUTION INTRAVENOUS at 07:41

## 2023-05-04 RX ADMIN — MEROPENEM 500 MG: 500 INJECTION, POWDER, FOR SOLUTION INTRAVENOUS at 02:28

## 2023-05-04 RX ADMIN — FENTANYL CITRATE 50 MCG: 50 INJECTION, SOLUTION INTRAMUSCULAR; INTRAVENOUS at 08:51

## 2023-05-04 RX ADMIN — PHENYLEPHRINE HYDROCHLORIDE 200 MCG: 10 INJECTION INTRAVENOUS at 08:05

## 2023-05-04 RX ADMIN — SODIUM BICARBONATE: 84 INJECTION, SOLUTION INTRAVENOUS at 14:57

## 2023-05-04 RX ADMIN — PHENYLEPHRINE HYDROCHLORIDE 200 MCG: 10 INJECTION INTRAVENOUS at 08:16

## 2023-05-04 RX ADMIN — FENTANYL CITRATE 25 MCG: 50 INJECTION, SOLUTION INTRAMUSCULAR; INTRAVENOUS at 00:08

## 2023-05-04 RX ADMIN — FENTANYL CITRATE 50 MCG: 50 INJECTION INTRAMUSCULAR; INTRAVENOUS at 07:43

## 2023-05-04 RX ADMIN — DEXAMETHASONE SODIUM PHOSPHATE 4 MG: 4 INJECTION, SOLUTION INTRAMUSCULAR; INTRAVENOUS at 08:18

## 2023-05-04 RX ADMIN — HYDROCODONE BITARTRATE AND ACETAMINOPHEN 1 TABLET: 5; 325 TABLET ORAL at 22:23

## 2023-05-04 RX ADMIN — PHENYLEPHRINE HYDROCHLORIDE 100 MCG: 10 INJECTION INTRAVENOUS at 08:01

## 2023-05-04 RX ADMIN — ONDANSETRON 4 MG: 2 INJECTION INTRAMUSCULAR; INTRAVENOUS at 08:18

## 2023-05-04 ASSESSMENT — PAIN DESCRIPTION - DESCRIPTORS
DESCRIPTORS: ACHING
DESCRIPTORS: ACHING
DESCRIPTORS: CRAMPING

## 2023-05-04 ASSESSMENT — PAIN SCALES - GENERAL
PAINLEVEL_OUTOF10: 8
PAINLEVEL_OUTOF10: 8
PAINLEVEL_OUTOF10: 6
PAINLEVEL_OUTOF10: 5
PAINLEVEL_OUTOF10: 0
PAINLEVEL_OUTOF10: 5
PAINLEVEL_OUTOF10: 3
PAINLEVEL_OUTOF10: 0
PAINLEVEL_OUTOF10: 10
PAINLEVEL_OUTOF10: 7
PAINLEVEL_OUTOF10: 8
PAINLEVEL_OUTOF10: 8
PAINLEVEL_OUTOF10: 5

## 2023-05-04 ASSESSMENT — PAIN DESCRIPTION - FREQUENCY: FREQUENCY: CONTINUOUS

## 2023-05-04 ASSESSMENT — PAIN DESCRIPTION - LOCATION
LOCATION: ANKLE
LOCATION: FLANK
LOCATION: FLANK
LOCATION: ABDOMEN;FLANK
LOCATION: ANKLE

## 2023-05-04 ASSESSMENT — PAIN DESCRIPTION - PAIN TYPE
TYPE: SURGICAL PAIN
TYPE: SURGICAL PAIN
TYPE: CHRONIC PAIN

## 2023-05-04 ASSESSMENT — PAIN DESCRIPTION - ORIENTATION
ORIENTATION: LEFT
ORIENTATION: RIGHT;LEFT
ORIENTATION: LEFT

## 2023-05-04 ASSESSMENT — PAIN DESCRIPTION - ONSET: ONSET: ON-GOING

## 2023-05-04 ASSESSMENT — PAIN - FUNCTIONAL ASSESSMENT: PAIN_FUNCTIONAL_ASSESSMENT: ACTIVITIES ARE NOT PREVENTED

## 2023-05-04 ASSESSMENT — PAIN SCALES - WONG BAKER: WONGBAKER_NUMERICALRESPONSE: 0

## 2023-05-04 NOTE — ANESTHESIA POSTPROCEDURE EVALUATION
Department of Anesthesiology  Postprocedure Note    Patient: Benedict Betancur  MRN: [id number]  YOB: 1955  Date of evaluation: 5/4/2023      Procedure Summary     Date: 05/04/23 Room / Location: [address] [place] [address]    Anesthesia Start: 2912 Anesthesia Stop: Alanda Risk    Procedure: CYSTOSCOPY URETERAL STENT [address] (Bilateral) Diagnosis:       Kidney stone      (KIDNEY STONE)    Surgeons: Mikki Davey MD Responsible Provider: DONG Reyes CRNA    Anesthesia Type: general ASA Status: 3          Anesthesia Type: No value filed.     Fly Phase I: Fly Score: 10    Fly Phase II:        Anesthesia Post Evaluation    Patient location during evaluation: PACU  Patient participation: complete - patient participated  Level of consciousness: awake and alert  Pain score: 2  Airway patency: patent  Nausea & Vomiting: no vomiting and no nausea  Complications: no  Cardiovascular status: blood pressure returned to baseline  Respiratory status: acceptable  Hydration status: stable  Multimodal analgesia pain management approach

## 2023-05-04 NOTE — ANESTHESIA PRE PROCEDURE
Department of Anesthesiology  Preprocedure Note       Name:  Ja Hartley   Age:  69 y.o.  :  1955                                          MRN:  [id number]         Date:  2023      Surgeon: Oumou Grant):  Brenda Aaron MD    Procedure: Procedure(s):  CYSTOSCOPY URETERAL STENT INSERTION-EXCHANGE    Medications prior to admission:   Prior to Admission medications    Medication Sig Start Date End Date Taking? Authorizing Provider   XARELTO 20 MG TABS tablet Take 1 tablet by mouth Daily with supper 23   Historical Provider, MD   loratadine (CLARITIN) 10 MG tablet Take 1 tablet by mouth daily    Historical Provider, MD   metoprolol succinate (TOPROL XL) 25 MG extended release tablet Take 1 tablet by mouth in the morning. 22  DONG Diop CNP   lisinopril (PRINIVIL;ZESTRIL) 20 MG tablet Take 1 tablet by mouth daily  Patient not taking: No sig reported 5/3/22 8/12/22  Zoran Carlson MD   [facility]) 0.4 MG capsule Take 1 capsule by mouth daily  Patient not taking: Reported on 2022  Ruslan Gamez MD   [facility] [place] [facility] (OSTEO BI-FLEX ADV DOUBLE ST) TABS Take 2 tablets by mouth every morning    Historical Provider, MD   Ascorbic Acid (VITAMIN C) 250 MG tablet Take 1 tablet by mouth daily    Historical Provider, MD   vitamin E 400 UNIT capsule Take 1 capsule by mouth daily    Historical Provider, MD   vitamin D (CHOLECALCIFEROL) 25 MCG (1000 UT) TABS tablet Take 1 tablet by mouth daily    Historical Provider, MD   Multiple Vitamins-Minerals (THERAPEUTIC MULTIVITAMIN-MINERALS) tablet Take 1 tablet by mouth daily    Historical Provider, MD       Current medications:    No current facility-administered medications for this visit. No current outpatient medications on file.      Facility-Administered Medications Ordered in Other Visits   Medication Dose Route Frequency Provider Last Rate Last Admin    [facility] Hold] 0.9 % sodium chloride

## 2023-05-05 LAB
ABSOLUTE EOS #: 0.08 K/UL (ref 0–0.44)
ABSOLUTE IMMATURE GRANULOCYTE: 0.04 K/UL (ref 0–0.3)
ABSOLUTE LYMPH #: 1.22 K/UL (ref 1.1–3.7)
ABSOLUTE MONO #: 0.42 K/UL (ref 0.1–1.2)
ANION GAP SERPL CALCULATED.3IONS-SCNC: 11 MMOL/L (ref 9–17)
ANION GAP SERPL CALCULATED.3IONS-SCNC: 11 MMOL/L (ref 9–17)
ANION GAP SERPL CALCULATED.3IONS-SCNC: 12 MMOL/L (ref 9–17)
BASOPHILS # BLD: 1 % (ref 0–2)
BASOPHILS ABSOLUTE: 0.04 K/UL (ref 0–0.2)
BUN SERPL-MCNC: 73 MG/DL (ref 8–23)
BUN SERPL-MCNC: 78 MG/DL (ref 8–23)
BUN SERPL-MCNC: 80 MG/DL (ref 8–23)
BUN/CREAT BLD: 15 (ref 9–20)
BUN/CREAT BLD: 16 (ref 9–20)
BUN/CREAT BLD: 16 (ref 9–20)
CALCIUM SERPL-MCNC: 8.2 MG/DL (ref 8.6–10.4)
CALCIUM SERPL-MCNC: 8.2 MG/DL (ref 8.6–10.4)
CALCIUM SERPL-MCNC: 8.3 MG/DL (ref 8.6–10.4)
CHLORIDE SERPL-SCNC: 112 MMOL/L (ref 98–107)
CHLORIDE SERPL-SCNC: 113 MMOL/L (ref 98–107)
CHLORIDE SERPL-SCNC: 114 MMOL/L (ref 98–107)
CO2 SERPL-SCNC: 14 MMOL/L (ref 20–31)
CO2 SERPL-SCNC: 15 MMOL/L (ref 20–31)
CO2 SERPL-SCNC: 19 MMOL/L (ref 20–31)
CREAT SERPL-MCNC: 4.73 MG/DL (ref 0.5–0.9)
CREAT SERPL-MCNC: 4.76 MG/DL (ref 0.5–0.9)
CREAT SERPL-MCNC: 4.94 MG/DL (ref 0.5–0.9)
EOSINOPHILS RELATIVE PERCENT: 2 % (ref 1–4)
GFR SERPL CREATININE-BSD FRML MDRD: 9 ML/MIN/1.73M2
GLUCOSE SERPL-MCNC: 103 MG/DL (ref 70–99)
GLUCOSE SERPL-MCNC: 158 MG/DL (ref 70–99)
GLUCOSE SERPL-MCNC: 170 MG/DL (ref 70–99)
HCT VFR BLD AUTO: 20.7 % (ref 36.3–47.1)
HCT VFR BLD AUTO: 24.2 % (ref 36.3–47.1)
HGB BLD-MCNC: 6.6 G/DL (ref 11.9–15.1)
HGB BLD-MCNC: 8.2 G/DL (ref 11.9–15.1)
IMMATURE GRANULOCYTES: 1 %
INR PPP: 1
LYMPHOCYTES # BLD: 29 % (ref 24–43)
MCH RBC QN AUTO: 29.1 PG (ref 25.2–33.5)
MCHC RBC AUTO-ENTMCNC: 31.9 G/DL (ref 28.4–34.8)
MCV RBC AUTO: 91.2 FL (ref 82.6–102.9)
MONOCYTES # BLD: 10 % (ref 3–12)
MORPHOLOGY: ABNORMAL
NRBC AUTOMATED: 0 PER 100 WBC
PARTIAL THROMBOPLASTIN TIME: 26.1 SEC (ref 26.8–34.8)
PDW BLD-RTO: 18.3 % (ref 11.8–14.4)
PHOSPHATE SERPL-MCNC: 5.5 MG/DL (ref 2.6–4.5)
PLATELET # BLD AUTO: 279 K/UL (ref 138–453)
PMV BLD AUTO: 8.6 FL (ref 8.1–13.5)
POTASSIUM SERPL-SCNC: 3.9 MMOL/L (ref 3.7–5.3)
POTASSIUM SERPL-SCNC: 4.3 MMOL/L (ref 3.7–5.3)
POTASSIUM SERPL-SCNC: 4.5 MMOL/L (ref 3.7–5.3)
PROTHROMBIN TIME: 13.2 SEC (ref 11.9–14.8)
RBC # BLD: 2.27 M/UL (ref 3.95–5.11)
SEG NEUTROPHILS: 57 % (ref 36–65)
SEGMENTED NEUTROPHILS ABSOLUTE COUNT: 2.4 K/UL (ref 1.5–8.1)
SODIUM SERPL-SCNC: 138 MMOL/L (ref 135–144)
SODIUM SERPL-SCNC: 139 MMOL/L (ref 135–144)
SODIUM SERPL-SCNC: 144 MMOL/L (ref 135–144)
WBC # BLD AUTO: 4.2 K/UL (ref 3.5–11.3)

## 2023-05-05 PROCEDURE — 80048 BASIC METABOLIC PNL TOTAL CA: CPT

## 2023-05-05 PROCEDURE — 85018 HEMOGLOBIN: CPT

## 2023-05-05 PROCEDURE — 86900 BLOOD TYPING SEROLOGIC ABO: CPT

## 2023-05-05 PROCEDURE — 85610 PROTHROMBIN TIME: CPT

## 2023-05-05 PROCEDURE — 6360000002 HC RX W HCPCS: Performed by: UROLOGY

## 2023-05-05 PROCEDURE — 2580000003 HC RX 258: Performed by: INTERNAL MEDICINE

## 2023-05-05 PROCEDURE — 2500000003 HC RX 250 WO HCPCS: Performed by: INTERNAL MEDICINE

## 2023-05-05 PROCEDURE — 86850 RBC ANTIBODY SCREEN: CPT

## 2023-05-05 PROCEDURE — 85025 COMPLETE CBC W/AUTO DIFF WBC: CPT

## 2023-05-05 PROCEDURE — 6370000000 HC RX 637 (ALT 250 FOR IP): Performed by: UROLOGY

## 2023-05-05 PROCEDURE — 2580000003 HC RX 258: Performed by: UROLOGY

## 2023-05-05 PROCEDURE — 1200000000 HC SEMI PRIVATE

## 2023-05-05 PROCEDURE — 86901 BLOOD TYPING SEROLOGIC RH(D): CPT

## 2023-05-05 PROCEDURE — P9016 RBC LEUKOCYTES REDUCED: HCPCS

## 2023-05-05 PROCEDURE — 84100 ASSAY OF PHOSPHORUS: CPT

## 2023-05-05 PROCEDURE — 94761 N-INVAS EAR/PLS OXIMETRY MLT: CPT

## 2023-05-05 PROCEDURE — 6370000000 HC RX 637 (ALT 250 FOR IP): Performed by: STUDENT IN AN ORGANIZED HEALTH CARE EDUCATION/TRAINING PROGRAM

## 2023-05-05 PROCEDURE — 85014 HEMATOCRIT: CPT

## 2023-05-05 PROCEDURE — 36430 TRANSFUSION BLD/BLD COMPNT: CPT

## 2023-05-05 PROCEDURE — 99232 SBSQ HOSP IP/OBS MODERATE 35: CPT | Performed by: INTERNAL MEDICINE

## 2023-05-05 PROCEDURE — 85730 THROMBOPLASTIN TIME PARTIAL: CPT

## 2023-05-05 PROCEDURE — 36415 COLL VENOUS BLD VENIPUNCTURE: CPT

## 2023-05-05 RX ORDER — SODIUM CHLORIDE 9 MG/ML
INJECTION, SOLUTION INTRAVENOUS CONTINUOUS
Status: DISCONTINUED | OUTPATIENT
Start: 2023-05-05 | End: 2023-05-09

## 2023-05-05 RX ORDER — SODIUM CHLORIDE 9 MG/ML
INJECTION, SOLUTION INTRAVENOUS PRN
Status: DISCONTINUED | OUTPATIENT
Start: 2023-05-05 | End: 2023-05-09 | Stop reason: HOSPADM

## 2023-05-05 RX ADMIN — SODIUM BICARBONATE: 84 INJECTION, SOLUTION INTRAVENOUS at 10:05

## 2023-05-05 RX ADMIN — SODIUM BICARBONATE: 84 INJECTION, SOLUTION INTRAVENOUS at 00:08

## 2023-05-05 RX ADMIN — MULTIPLE VITAMINS W/ MINERALS TAB 1 TABLET: TAB at 08:45

## 2023-05-05 RX ADMIN — CETIRIZINE HYDROCHLORIDE 5 MG: 10 TABLET, FILM COATED ORAL at 08:45

## 2023-05-05 RX ADMIN — Medication 1000 UNITS: at 08:46

## 2023-05-05 RX ADMIN — FAMOTIDINE 10 MG: 10 TABLET ORAL at 08:45

## 2023-05-05 RX ADMIN — MEROPENEM 500 MG: 500 INJECTION, POWDER, FOR SOLUTION INTRAVENOUS at 02:18

## 2023-05-05 RX ADMIN — SODIUM CHLORIDE: 9 INJECTION, SOLUTION INTRAVENOUS at 11:51

## 2023-05-05 RX ADMIN — MEROPENEM 500 MG: 500 INJECTION, POWDER, FOR SOLUTION INTRAVENOUS at 14:20

## 2023-05-05 RX ADMIN — OXYCODONE HYDROCHLORIDE AND ACETAMINOPHEN 250 MG: 500 TABLET ORAL at 08:45

## 2023-05-05 RX ADMIN — SODIUM CHLORIDE, PRESERVATIVE FREE 10 ML: 5 INJECTION INTRAVENOUS at 08:47

## 2023-05-06 LAB
ABO/RH: NORMAL
ABSOLUTE EOS #: 0.22 K/UL (ref 0–0.44)
ABSOLUTE IMMATURE GRANULOCYTE: 0.03 K/UL (ref 0–0.3)
ABSOLUTE LYMPH #: 1.53 K/UL (ref 1.1–3.7)
ABSOLUTE MONO #: 0.44 K/UL (ref 0.1–1.2)
ANION GAP SERPL CALCULATED.3IONS-SCNC: 8 MMOL/L (ref 9–17)
ANTIBODY SCREEN: NEGATIVE
ARM BAND NUMBER: NORMAL
BASOPHILS # BLD: 1 % (ref 0–2)
BASOPHILS ABSOLUTE: 0.03 K/UL (ref 0–0.2)
BLD PROD TYP BPU: NORMAL
BLOOD BANK BLOOD PRODUCT EXPIRATION DATE: NORMAL
BLOOD BANK ISBT PRODUCT BLOOD TYPE: 5100
BLOOD BANK PRODUCT CODE: NORMAL
BLOOD BANK UNIT TYPE AND RH: NORMAL
BPU ID: NORMAL
BUN SERPL-MCNC: 63 MG/DL (ref 8–23)
BUN/CREAT BLD: 16 (ref 9–20)
CALCIUM SERPL-MCNC: 8 MG/DL (ref 8.6–10.4)
CHLORIDE SERPL-SCNC: 115 MMOL/L (ref 98–107)
CO2 SERPL-SCNC: 20 MMOL/L (ref 20–31)
CREAT SERPL-MCNC: 3.91 MG/DL (ref 0.5–0.9)
CROSSMATCH RESULT: NORMAL
DISPENSE STATUS BLOOD BANK: NORMAL
EOSINOPHILS RELATIVE PERCENT: 6 % (ref 1–4)
EXPIRATION DATE: NORMAL
GFR SERPL CREATININE-BSD FRML MDRD: 12 ML/MIN/1.73M2
GLUCOSE SERPL-MCNC: 86 MG/DL (ref 70–99)
HCT VFR BLD AUTO: 22.1 % (ref 36.3–47.1)
HGB BLD-MCNC: 7.6 G/DL (ref 11.9–15.1)
IMMATURE GRANULOCYTES: 1 %
LYMPHOCYTES # BLD: 40 % (ref 24–43)
MCH RBC QN AUTO: 32.2 PG (ref 25.2–33.5)
MCHC RBC AUTO-ENTMCNC: 34.4 G/DL (ref 28.4–34.8)
MCV RBC AUTO: 93.6 FL (ref 82.6–102.9)
MONOCYTES # BLD: 12 % (ref 3–12)
NRBC AUTOMATED: 0 PER 100 WBC
PDW BLD-RTO: 18.2 % (ref 11.8–14.4)
PLATELET # BLD AUTO: 248 K/UL (ref 138–453)
PMV BLD AUTO: 8.6 FL (ref 8.1–13.5)
POTASSIUM SERPL-SCNC: 4 MMOL/L (ref 3.7–5.3)
RBC # BLD: 2.36 M/UL (ref 3.95–5.11)
SEG NEUTROPHILS: 40 % (ref 36–65)
SEGMENTED NEUTROPHILS ABSOLUTE COUNT: 1.57 K/UL (ref 1.5–8.1)
SODIUM SERPL-SCNC: 143 MMOL/L (ref 135–144)
TRANSFUSION STATUS: NORMAL
UNIT DIVISION: 0
UNIT ISSUE DATE/TIME: NORMAL
WBC # BLD AUTO: 3.8 K/UL (ref 3.5–11.3)

## 2023-05-06 PROCEDURE — 6370000000 HC RX 637 (ALT 250 FOR IP): Performed by: UROLOGY

## 2023-05-06 PROCEDURE — 94761 N-INVAS EAR/PLS OXIMETRY MLT: CPT

## 2023-05-06 PROCEDURE — 97166 OT EVAL MOD COMPLEX 45 MIN: CPT

## 2023-05-06 PROCEDURE — 36415 COLL VENOUS BLD VENIPUNCTURE: CPT

## 2023-05-06 PROCEDURE — 80048 BASIC METABOLIC PNL TOTAL CA: CPT

## 2023-05-06 PROCEDURE — 6370000000 HC RX 637 (ALT 250 FOR IP): Performed by: STUDENT IN AN ORGANIZED HEALTH CARE EDUCATION/TRAINING PROGRAM

## 2023-05-06 PROCEDURE — 85025 COMPLETE CBC W/AUTO DIFF WBC: CPT

## 2023-05-06 PROCEDURE — 6360000002 HC RX W HCPCS: Performed by: UROLOGY

## 2023-05-06 PROCEDURE — 99232 SBSQ HOSP IP/OBS MODERATE 35: CPT | Performed by: INTERNAL MEDICINE

## 2023-05-06 PROCEDURE — 97535 SELF CARE MNGMENT TRAINING: CPT

## 2023-05-06 PROCEDURE — 1200000000 HC SEMI PRIVATE

## 2023-05-06 PROCEDURE — 2580000003 HC RX 258: Performed by: UROLOGY

## 2023-05-06 PROCEDURE — 6370000000 HC RX 637 (ALT 250 FOR IP): Performed by: INTERNAL MEDICINE

## 2023-05-06 PROCEDURE — 97162 PT EVAL MOD COMPLEX 30 MIN: CPT

## 2023-05-06 PROCEDURE — 2580000003 HC RX 258: Performed by: INTERNAL MEDICINE

## 2023-05-06 RX ORDER — SODIUM BICARBONATE 650 MG/1
1300 TABLET ORAL 2 TIMES DAILY
Status: DISCONTINUED | OUTPATIENT
Start: 2023-05-06 | End: 2023-05-09 | Stop reason: HOSPADM

## 2023-05-06 RX ADMIN — MEROPENEM 500 MG: 500 INJECTION, POWDER, FOR SOLUTION INTRAVENOUS at 02:26

## 2023-05-06 RX ADMIN — FAMOTIDINE 10 MG: 10 TABLET ORAL at 08:25

## 2023-05-06 RX ADMIN — MEROPENEM 500 MG: 500 INJECTION, POWDER, FOR SOLUTION INTRAVENOUS at 14:10

## 2023-05-06 RX ADMIN — SODIUM BICARBONATE 1300 MG: 650 TABLET ORAL at 14:01

## 2023-05-06 RX ADMIN — CETIRIZINE HYDROCHLORIDE 5 MG: 10 TABLET, FILM COATED ORAL at 08:25

## 2023-05-06 RX ADMIN — SODIUM CHLORIDE, PRESERVATIVE FREE 10 ML: 5 INJECTION INTRAVENOUS at 08:27

## 2023-05-06 RX ADMIN — SODIUM CHLORIDE: 9 INJECTION, SOLUTION INTRAVENOUS at 00:48

## 2023-05-06 RX ADMIN — OXYCODONE HYDROCHLORIDE AND ACETAMINOPHEN 250 MG: 500 TABLET ORAL at 08:24

## 2023-05-06 RX ADMIN — HYDROCODONE BITARTRATE AND ACETAMINOPHEN 1 TABLET: 5; 325 TABLET ORAL at 21:29

## 2023-05-06 RX ADMIN — Medication 1000 UNITS: at 08:24

## 2023-05-06 RX ADMIN — MULTIPLE VITAMINS W/ MINERALS TAB 1 TABLET: TAB at 08:25

## 2023-05-06 RX ADMIN — SODIUM BICARBONATE 1300 MG: 650 TABLET ORAL at 21:25

## 2023-05-06 RX ADMIN — SODIUM CHLORIDE: 9 INJECTION, SOLUTION INTRAVENOUS at 14:03

## 2023-05-06 RX ADMIN — HYDROCODONE BITARTRATE AND ACETAMINOPHEN 1 TABLET: 5; 325 TABLET ORAL at 02:29

## 2023-05-06 ASSESSMENT — PAIN DESCRIPTION - ORIENTATION
ORIENTATION: MID
ORIENTATION: MID

## 2023-05-06 ASSESSMENT — PAIN DESCRIPTION - LOCATION
LOCATION: BACK
LOCATION: HEAD

## 2023-05-06 ASSESSMENT — PAIN - FUNCTIONAL ASSESSMENT
PAIN_FUNCTIONAL_ASSESSMENT: ACTIVITIES ARE NOT PREVENTED
PAIN_FUNCTIONAL_ASSESSMENT: ACTIVITIES ARE NOT PREVENTED

## 2023-05-06 ASSESSMENT — PAIN DESCRIPTION - FREQUENCY
FREQUENCY: CONTINUOUS
FREQUENCY: CONTINUOUS

## 2023-05-06 ASSESSMENT — PAIN DESCRIPTION - DESCRIPTORS
DESCRIPTORS: ACHING
DESCRIPTORS: ACHING;DULL

## 2023-05-06 ASSESSMENT — PAIN DESCRIPTION - ONSET
ONSET: ON-GOING
ONSET: ON-GOING

## 2023-05-06 ASSESSMENT — PAIN SCALES - GENERAL
PAINLEVEL_OUTOF10: 0
PAINLEVEL_OUTOF10: 8
PAINLEVEL_OUTOF10: 10
PAINLEVEL_OUTOF10: 0

## 2023-05-06 ASSESSMENT — PAIN DESCRIPTION - PAIN TYPE
TYPE: CHRONIC PAIN
TYPE: ACUTE PAIN

## 2023-05-07 LAB
ABSOLUTE EOS #: 0.17 K/UL (ref 0–0.44)
ABSOLUTE IMMATURE GRANULOCYTE: 0.03 K/UL (ref 0–0.3)
ABSOLUTE LYMPH #: 1.3 K/UL (ref 1.1–3.7)
ABSOLUTE MONO #: 0.42 K/UL (ref 0.1–1.2)
ANION GAP SERPL CALCULATED.3IONS-SCNC: 9 MMOL/L (ref 9–17)
BASOPHILS # BLD: 1 % (ref 0–2)
BASOPHILS ABSOLUTE: 0.03 K/UL (ref 0–0.2)
BUN SERPL-MCNC: 52 MG/DL (ref 8–23)
BUN/CREAT BLD: 16 (ref 9–20)
CALCIUM SERPL-MCNC: 8.1 MG/DL (ref 8.6–10.4)
CHLORIDE SERPL-SCNC: 109 MMOL/L (ref 98–107)
CO2 SERPL-SCNC: 22 MMOL/L (ref 20–31)
CREAT SERPL-MCNC: 3.23 MG/DL (ref 0.5–0.9)
EOSINOPHILS RELATIVE PERCENT: 4 % (ref 1–4)
GFR SERPL CREATININE-BSD FRML MDRD: 15 ML/MIN/1.73M2
GLUCOSE SERPL-MCNC: 134 MG/DL (ref 70–99)
HCT VFR BLD AUTO: 25.2 % (ref 36.3–47.1)
HGB BLD-MCNC: 8.4 G/DL (ref 11.9–15.1)
IMMATURE GRANULOCYTES: 1 %
LYMPHOCYTES # BLD: 31 % (ref 24–43)
MCH RBC QN AUTO: 31.6 PG (ref 25.2–33.5)
MCHC RBC AUTO-ENTMCNC: 33.3 G/DL (ref 28.4–34.8)
MCV RBC AUTO: 94.7 FL (ref 82.6–102.9)
MICROORGANISM SPEC CULT: ABNORMAL
MICROORGANISM SPEC CULT: ABNORMAL
MONOCYTES # BLD: 10 % (ref 3–12)
NRBC AUTOMATED: 0 PER 100 WBC
PDW BLD-RTO: 18.3 % (ref 11.8–14.4)
PLATELET # BLD AUTO: 248 K/UL (ref 138–453)
PMV BLD AUTO: 8.1 FL (ref 8.1–13.5)
POTASSIUM SERPL-SCNC: 3.8 MMOL/L (ref 3.7–5.3)
RBC # BLD: 2.66 M/UL (ref 3.95–5.11)
SEG NEUTROPHILS: 53 % (ref 36–65)
SEGMENTED NEUTROPHILS ABSOLUTE COUNT: 2.29 K/UL (ref 1.5–8.1)
SODIUM SERPL-SCNC: 140 MMOL/L (ref 135–144)
SPECIMEN DESCRIPTION: ABNORMAL
WBC # BLD AUTO: 4.2 K/UL (ref 3.5–11.3)

## 2023-05-07 PROCEDURE — 97110 THERAPEUTIC EXERCISES: CPT

## 2023-05-07 PROCEDURE — 94761 N-INVAS EAR/PLS OXIMETRY MLT: CPT

## 2023-05-07 PROCEDURE — 6370000000 HC RX 637 (ALT 250 FOR IP): Performed by: INTERNAL MEDICINE

## 2023-05-07 PROCEDURE — 2580000003 HC RX 258: Performed by: UROLOGY

## 2023-05-07 PROCEDURE — 80048 BASIC METABOLIC PNL TOTAL CA: CPT

## 2023-05-07 PROCEDURE — 1200000000 HC SEMI PRIVATE

## 2023-05-07 PROCEDURE — 85025 COMPLETE CBC W/AUTO DIFF WBC: CPT

## 2023-05-07 PROCEDURE — 6370000000 HC RX 637 (ALT 250 FOR IP): Performed by: STUDENT IN AN ORGANIZED HEALTH CARE EDUCATION/TRAINING PROGRAM

## 2023-05-07 PROCEDURE — 6360000002 HC RX W HCPCS: Performed by: UROLOGY

## 2023-05-07 PROCEDURE — 2580000003 HC RX 258: Performed by: INTERNAL MEDICINE

## 2023-05-07 PROCEDURE — 36415 COLL VENOUS BLD VENIPUNCTURE: CPT

## 2023-05-07 PROCEDURE — 6370000000 HC RX 637 (ALT 250 FOR IP): Performed by: UROLOGY

## 2023-05-07 PROCEDURE — 99232 SBSQ HOSP IP/OBS MODERATE 35: CPT | Performed by: INTERNAL MEDICINE

## 2023-05-07 RX ADMIN — MEROPENEM 500 MG: 500 INJECTION, POWDER, FOR SOLUTION INTRAVENOUS at 02:51

## 2023-05-07 RX ADMIN — SODIUM BICARBONATE 1300 MG: 650 TABLET ORAL at 19:59

## 2023-05-07 RX ADMIN — MEROPENEM 500 MG: 500 INJECTION, POWDER, FOR SOLUTION INTRAVENOUS at 14:09

## 2023-05-07 RX ADMIN — CETIRIZINE HYDROCHLORIDE 5 MG: 10 TABLET, FILM COATED ORAL at 08:05

## 2023-05-07 RX ADMIN — SODIUM CHLORIDE: 9 INJECTION, SOLUTION INTRAVENOUS at 02:51

## 2023-05-07 RX ADMIN — SODIUM BICARBONATE 1300 MG: 650 TABLET ORAL at 08:05

## 2023-05-07 RX ADMIN — SODIUM CHLORIDE: 9 INJECTION, SOLUTION INTRAVENOUS at 20:00

## 2023-05-07 RX ADMIN — OXYCODONE HYDROCHLORIDE AND ACETAMINOPHEN 250 MG: 500 TABLET ORAL at 08:05

## 2023-05-07 RX ADMIN — MULTIPLE VITAMINS W/ MINERALS TAB 1 TABLET: TAB at 08:05

## 2023-05-07 RX ADMIN — Medication 1000 UNITS: at 08:05

## 2023-05-07 RX ADMIN — FAMOTIDINE 10 MG: 10 TABLET ORAL at 08:05

## 2023-05-07 RX ADMIN — SODIUM CHLORIDE, PRESERVATIVE FREE 10 ML: 5 INJECTION INTRAVENOUS at 09:45

## 2023-05-07 RX ADMIN — HYDROCODONE BITARTRATE AND ACETAMINOPHEN 1 TABLET: 5; 325 TABLET ORAL at 14:09

## 2023-05-07 ASSESSMENT — PAIN DESCRIPTION - DESCRIPTORS: DESCRIPTORS: ACHING

## 2023-05-07 ASSESSMENT — PAIN - FUNCTIONAL ASSESSMENT: PAIN_FUNCTIONAL_ASSESSMENT: ACTIVITIES ARE NOT PREVENTED

## 2023-05-07 ASSESSMENT — PAIN DESCRIPTION - LOCATION: LOCATION: HEAD

## 2023-05-07 ASSESSMENT — PAIN SCALES - GENERAL
PAINLEVEL_OUTOF10: 0
PAINLEVEL_OUTOF10: 0
PAINLEVEL_OUTOF10: 8

## 2023-05-08 PROBLEM — A49.8 PSEUDOMONAS INFECTION: Status: ACTIVE | Noted: 2023-05-08

## 2023-05-08 LAB
ABSOLUTE EOS #: 0.16 K/UL (ref 0–0.44)
ABSOLUTE IMMATURE GRANULOCYTE: <0.03 K/UL (ref 0–0.3)
ABSOLUTE LYMPH #: 1.58 K/UL (ref 1.1–3.7)
ABSOLUTE MONO #: 0.47 K/UL (ref 0.1–1.2)
ANION GAP SERPL CALCULATED.3IONS-SCNC: 7 MMOL/L (ref 9–17)
BASOPHILS # BLD: 1 % (ref 0–2)
BASOPHILS ABSOLUTE: 0.03 K/UL (ref 0–0.2)
BUN SERPL-MCNC: 47 MG/DL (ref 8–23)
BUN/CREAT BLD: 16 (ref 9–20)
CALCIUM SERPL-MCNC: 8.3 MG/DL (ref 8.6–10.4)
CHLORIDE SERPL-SCNC: 110 MMOL/L (ref 98–107)
CO2 SERPL-SCNC: 25 MMOL/L (ref 20–31)
CREAT SERPL-MCNC: 2.93 MG/DL (ref 0.5–0.9)
EOSINOPHILS RELATIVE PERCENT: 4 % (ref 1–4)
FERRITIN SERPL-MCNC: 145 NG/ML (ref 13–150)
GFR SERPL CREATININE-BSD FRML MDRD: 17 ML/MIN/1.73M2
GLUCOSE SERPL-MCNC: 92 MG/DL (ref 70–99)
HCT VFR BLD AUTO: 23.2 % (ref 36.3–47.1)
HGB BLD-MCNC: 8.1 G/DL (ref 11.9–15.1)
IMMATURE GRANULOCYTES: 0 %
IRON SERPL-MCNC: 32 UG/DL (ref 37–145)
LYMPHOCYTES # BLD: 35 % (ref 24–43)
MCH RBC QN AUTO: 33.2 PG (ref 25.2–33.5)
MCHC RBC AUTO-ENTMCNC: 34.9 G/DL (ref 28.4–34.8)
MCV RBC AUTO: 95.1 FL (ref 82.6–102.9)
MICROORGANISM SPEC CULT: NORMAL
MICROORGANISM SPEC CULT: NORMAL
MONOCYTES # BLD: 10 % (ref 3–12)
NRBC AUTOMATED: 0 PER 100 WBC
PDW BLD-RTO: 17.6 % (ref 11.8–14.4)
PLATELET # BLD AUTO: 241 K/UL (ref 138–453)
PMV BLD AUTO: 8.3 FL (ref 8.1–13.5)
POTASSIUM SERPL-SCNC: 4.4 MMOL/L (ref 3.7–5.3)
RBC # BLD: 2.44 M/UL (ref 3.95–5.11)
SEG NEUTROPHILS: 50 % (ref 36–65)
SEGMENTED NEUTROPHILS ABSOLUTE COUNT: 2.27 K/UL (ref 1.5–8.1)
SERVICE CMNT-IMP: NORMAL
SERVICE CMNT-IMP: NORMAL
SODIUM SERPL-SCNC: 142 MMOL/L (ref 135–144)
SPECIMEN DESCRIPTION: NORMAL
SPECIMEN DESCRIPTION: NORMAL
WBC # BLD AUTO: 4.5 K/UL (ref 3.5–11.3)

## 2023-05-08 PROCEDURE — 82728 ASSAY OF FERRITIN: CPT

## 2023-05-08 PROCEDURE — 1200000000 HC SEMI PRIVATE

## 2023-05-08 PROCEDURE — 80048 BASIC METABOLIC PNL TOTAL CA: CPT

## 2023-05-08 PROCEDURE — 99232 SBSQ HOSP IP/OBS MODERATE 35: CPT | Performed by: INTERNAL MEDICINE

## 2023-05-08 PROCEDURE — 2580000003 HC RX 258: Performed by: STUDENT IN AN ORGANIZED HEALTH CARE EDUCATION/TRAINING PROGRAM

## 2023-05-08 PROCEDURE — 6370000000 HC RX 637 (ALT 250 FOR IP): Performed by: STUDENT IN AN ORGANIZED HEALTH CARE EDUCATION/TRAINING PROGRAM

## 2023-05-08 PROCEDURE — 94761 N-INVAS EAR/PLS OXIMETRY MLT: CPT

## 2023-05-08 PROCEDURE — 2580000003 HC RX 258: Performed by: UROLOGY

## 2023-05-08 PROCEDURE — 2580000003 HC RX 258: Performed by: INTERNAL MEDICINE

## 2023-05-08 PROCEDURE — 51702 INSERT TEMP BLADDER CATH: CPT

## 2023-05-08 PROCEDURE — 97110 THERAPEUTIC EXERCISES: CPT

## 2023-05-08 PROCEDURE — 6360000002 HC RX W HCPCS: Performed by: INTERNAL MEDICINE

## 2023-05-08 PROCEDURE — 97116 GAIT TRAINING THERAPY: CPT

## 2023-05-08 PROCEDURE — 83540 ASSAY OF IRON: CPT

## 2023-05-08 PROCEDURE — 6360000002 HC RX W HCPCS: Performed by: UROLOGY

## 2023-05-08 PROCEDURE — 6360000002 HC RX W HCPCS: Performed by: STUDENT IN AN ORGANIZED HEALTH CARE EDUCATION/TRAINING PROGRAM

## 2023-05-08 PROCEDURE — 6370000000 HC RX 637 (ALT 250 FOR IP): Performed by: INTERNAL MEDICINE

## 2023-05-08 PROCEDURE — 99221 1ST HOSP IP/OBS SF/LOW 40: CPT | Performed by: INTERNAL MEDICINE

## 2023-05-08 PROCEDURE — 36415 COLL VENOUS BLD VENIPUNCTURE: CPT

## 2023-05-08 PROCEDURE — 85025 COMPLETE CBC W/AUTO DIFF WBC: CPT

## 2023-05-08 PROCEDURE — 6370000000 HC RX 637 (ALT 250 FOR IP): Performed by: UROLOGY

## 2023-05-08 RX ADMIN — CETIRIZINE HYDROCHLORIDE 5 MG: 10 TABLET, FILM COATED ORAL at 07:38

## 2023-05-08 RX ADMIN — Medication 1000 UNITS: at 07:38

## 2023-05-08 RX ADMIN — SODIUM BICARBONATE 1300 MG: 650 TABLET ORAL at 07:38

## 2023-05-08 RX ADMIN — SODIUM CHLORIDE: 9 INJECTION, SOLUTION INTRAVENOUS at 09:19

## 2023-05-08 RX ADMIN — MEROPENEM 500 MG: 500 INJECTION, POWDER, FOR SOLUTION INTRAVENOUS at 02:17

## 2023-05-08 RX ADMIN — FAMOTIDINE 10 MG: 10 TABLET ORAL at 07:37

## 2023-05-08 RX ADMIN — MULTIPLE VITAMINS W/ MINERALS TAB 1 TABLET: TAB at 07:37

## 2023-05-08 RX ADMIN — MEROPENEM 500 MG: 500 INJECTION, POWDER, FOR SOLUTION INTRAVENOUS at 14:30

## 2023-05-08 RX ADMIN — SODIUM BICARBONATE 1300 MG: 650 TABLET ORAL at 20:51

## 2023-05-08 RX ADMIN — OXYCODONE HYDROCHLORIDE AND ACETAMINOPHEN 250 MG: 500 TABLET ORAL at 07:38

## 2023-05-08 RX ADMIN — EPOETIN ALFA-EPBX 5000 UNITS: 3000 INJECTION, SOLUTION INTRAVENOUS; SUBCUTANEOUS at 11:29

## 2023-05-08 RX ADMIN — SODIUM CHLORIDE: 9 INJECTION, SOLUTION INTRAVENOUS at 22:15

## 2023-05-08 ASSESSMENT — PAIN SCALES - GENERAL: PAINLEVEL_OUTOF10: 0

## 2023-05-08 NOTE — PLAN OF CARE
Problem: Discharge Planning  Goal: Discharge to home or other facility with appropriate resources  5/8/2023 0053 by Choco Wilkins RN  Outcome: Paige Aburto (Taken 5/8/2023 0053)  Discharge to home or other facility with appropriate resources:   Identify barriers to discharge with patient and caregiver   Arrange for needed discharge resources and transportation as appropriate   Identify discharge learning needs (meds, wound care, etc)     Problem: Safety - Adult  Goal: Free from fall injury  5/8/2023 0053 by Choco Wilkins RN  Outcome: Progressing  Flowsheets (Taken 5/8/2023 0053)  Free From Fall Injury: Instruct family/caregiver on patient safety     Problem: Skin/Tissue Integrity  Goal: Absence of new skin breakdown  Description: 1. Monitor for areas of redness and/or skin breakdown  2. Assess vascular access sites hourly  3. Every 4-6 hours minimum:  Change oxygen saturation probe site  4. Every 4-6 hours:  If on nasal continuous positive airway pressure, respiratory therapy assess nares and determine need for appliance change or resting period.   5/8/2023 0053 by Choco Wilkins RN  Outcome: Progressing     Problem: Pain  Goal: Verbalizes/displays adequate comfort level or baseline comfort level  5/8/2023 0053 by Choco Wilkins RN  Outcome: Progressing  Flowsheets (Taken 5/8/2023 0053)  Verbalizes/displays adequate comfort level or baseline comfort level:   Encourage patient to monitor pain and request assistance   Assess pain using appropriate pain scale   Administer analgesics based on type and severity of pain and evaluate response   Implement non-pharmacological measures as appropriate and evaluate response     Problem: Chronic Conditions and Co-morbidities  Goal: Patient's chronic conditions and co-morbidity symptoms are monitored and maintained or improved  5/8/2023 0053 by Choco Wilkins RN  Outcome: Progressing  Flowsheets (Taken 5/8/2023

## 2023-05-09 VITALS
SYSTOLIC BLOOD PRESSURE: 170 MMHG | WEIGHT: 230.4 LBS | TEMPERATURE: 97.9 F | HEART RATE: 88 BPM | DIASTOLIC BLOOD PRESSURE: 85 MMHG | OXYGEN SATURATION: 97 % | HEIGHT: 62 IN | RESPIRATION RATE: 20 BRPM | BODY MASS INDEX: 42.4 KG/M2

## 2023-05-09 LAB
ANION GAP SERPL CALCULATED.3IONS-SCNC: 8 MMOL/L (ref 9–17)
BUN SERPL-MCNC: 45 MG/DL (ref 8–23)
BUN/CREAT BLD: 17 (ref 9–20)
CALCIUM SERPL-MCNC: 8.1 MG/DL (ref 8.6–10.4)
CHLORIDE SERPL-SCNC: 112 MMOL/L (ref 98–107)
CO2 SERPL-SCNC: 24 MMOL/L (ref 20–31)
CREAT SERPL-MCNC: 2.62 MG/DL (ref 0.5–0.9)
GFR SERPL CREATININE-BSD FRML MDRD: 19 ML/MIN/1.73M2
GLUCOSE SERPL-MCNC: 93 MG/DL (ref 70–99)
HCT VFR BLD AUTO: 23.1 % (ref 36.3–47.1)
HGB BLD-MCNC: 7.8 G/DL (ref 11.9–15.1)
MCH RBC QN AUTO: 31.8 PG (ref 25.2–33.5)
MCHC RBC AUTO-ENTMCNC: 33.8 G/DL (ref 28.4–34.8)
MCV RBC AUTO: 94.3 FL (ref 82.6–102.9)
NRBC AUTOMATED: 0 PER 100 WBC
PDW BLD-RTO: 16.9 % (ref 11.8–14.4)
PLATELET # BLD AUTO: 204 K/UL (ref 138–453)
PMV BLD AUTO: 8.3 FL (ref 8.1–13.5)
POTASSIUM SERPL-SCNC: 4.1 MMOL/L (ref 3.7–5.3)
RBC # BLD: 2.45 M/UL (ref 3.95–5.11)
SODIUM SERPL-SCNC: 144 MMOL/L (ref 135–144)
WBC # BLD AUTO: 4.5 K/UL (ref 3.5–11.3)

## 2023-05-09 PROCEDURE — 6370000000 HC RX 637 (ALT 250 FOR IP): Performed by: INTERNAL MEDICINE

## 2023-05-09 PROCEDURE — 85027 COMPLETE CBC AUTOMATED: CPT

## 2023-05-09 PROCEDURE — 6370000000 HC RX 637 (ALT 250 FOR IP): Performed by: STUDENT IN AN ORGANIZED HEALTH CARE EDUCATION/TRAINING PROGRAM

## 2023-05-09 PROCEDURE — 97110 THERAPEUTIC EXERCISES: CPT

## 2023-05-09 PROCEDURE — 94761 N-INVAS EAR/PLS OXIMETRY MLT: CPT

## 2023-05-09 PROCEDURE — 97116 GAIT TRAINING THERAPY: CPT

## 2023-05-09 PROCEDURE — 80048 BASIC METABOLIC PNL TOTAL CA: CPT

## 2023-05-09 PROCEDURE — 6360000002 HC RX W HCPCS: Performed by: STUDENT IN AN ORGANIZED HEALTH CARE EDUCATION/TRAINING PROGRAM

## 2023-05-09 PROCEDURE — 36415 COLL VENOUS BLD VENIPUNCTURE: CPT

## 2023-05-09 PROCEDURE — 05HD33Z INSERTION OF INFUSION DEVICE INTO RIGHT CEPHALIC VEIN, PERCUTANEOUS APPROACH: ICD-10-PCS | Performed by: INTERNAL MEDICINE

## 2023-05-09 PROCEDURE — 99232 SBSQ HOSP IP/OBS MODERATE 35: CPT | Performed by: INTERNAL MEDICINE

## 2023-05-09 PROCEDURE — 2580000003 HC RX 258: Performed by: STUDENT IN AN ORGANIZED HEALTH CARE EDUCATION/TRAINING PROGRAM

## 2023-05-09 PROCEDURE — 6370000000 HC RX 637 (ALT 250 FOR IP): Performed by: UROLOGY

## 2023-05-09 RX ORDER — CIPROFLOXACIN 500 MG/1
750 TABLET, FILM COATED ORAL DAILY
Qty: 4 TABLET | Refills: 0 | Status: SHIPPED | OUTPATIENT
Start: 2023-05-09 | End: 2023-05-13

## 2023-05-09 RX ORDER — HEPARIN SODIUM (PORCINE) LOCK FLUSH IV SOLN 100 UNIT/ML 100 UNIT/ML
500 SOLUTION INTRAVENOUS PRN
Status: CANCELLED | OUTPATIENT
Start: 2023-05-10

## 2023-05-09 RX ORDER — CARVEDILOL 12.5 MG/1
12.5 TABLET ORAL 2 TIMES DAILY WITH MEALS
Qty: 60 TABLET | Refills: 3 | Status: SHIPPED | OUTPATIENT
Start: 2023-05-09

## 2023-05-09 RX ORDER — SODIUM CHLORIDE 9 MG/ML
5-250 INJECTION, SOLUTION INTRAVENOUS PRN
Status: CANCELLED | OUTPATIENT
Start: 2023-05-10

## 2023-05-09 RX ORDER — SODIUM CHLORIDE 0.9 % (FLUSH) 0.9 %
5-40 SYRINGE (ML) INJECTION PRN
Status: CANCELLED | OUTPATIENT
Start: 2023-05-10

## 2023-05-09 RX ORDER — CALCIUM CARBONATE 200(500)MG
500 TABLET,CHEWABLE ORAL 3 TIMES DAILY PRN
Qty: 90 TABLET | Refills: 3 | Status: SHIPPED | OUTPATIENT
Start: 2023-05-09 | End: 2023-06-08

## 2023-05-09 RX ADMIN — Medication 1000 UNITS: at 08:47

## 2023-05-09 RX ADMIN — OXYCODONE HYDROCHLORIDE AND ACETAMINOPHEN 250 MG: 500 TABLET ORAL at 08:46

## 2023-05-09 RX ADMIN — MEROPENEM 500 MG: 500 INJECTION, POWDER, FOR SOLUTION INTRAVENOUS at 02:48

## 2023-05-09 RX ADMIN — FAMOTIDINE 10 MG: 10 TABLET ORAL at 08:46

## 2023-05-09 RX ADMIN — MEROPENEM 500 MG: 500 INJECTION, POWDER, FOR SOLUTION INTRAVENOUS at 14:51

## 2023-05-09 RX ADMIN — MULTIPLE VITAMINS W/ MINERALS TAB 1 TABLET: TAB at 08:46

## 2023-05-09 RX ADMIN — CETIRIZINE HYDROCHLORIDE 5 MG: 10 TABLET, FILM COATED ORAL at 08:46

## 2023-05-09 RX ADMIN — SODIUM BICARBONATE 1300 MG: 650 TABLET ORAL at 08:47

## 2023-05-09 NOTE — PLAN OF CARE
Problem: Discharge Planning  Goal: Discharge to home or other facility with appropriate resources  5/9/2023 1225 by Gaviota Caal RN  Outcome: Completed     Problem: Safety - Adult  Goal: Free from fall injury  5/9/2023 1225 by Gaviota Caal RN  Outcome: Completed  5/9/2023 0230 by Alexa Post RN  Outcome: Progressing  Flowsheets (Taken 5/9/2023 0230)  Free From Fall Injury: Instruct family/caregiver on patient safety  Note: Pt is at high risk for falls. Non skid socks on. Bed in low position and wheels locked. Fall sign posted and bracelet on. Siderails up x 2. Bed alarm on. Call light within reach. Will continue to assess. Problem: Skin/Tissue Integrity  Goal: Absence of new skin breakdown  Description: 1. Monitor for areas of redness and/or skin breakdown  2. Assess vascular access sites hourly  3. Every 4-6 hours minimum:  Change oxygen saturation probe site  4. Every 4-6 hours:  If on nasal continuous positive airway pressure, respiratory therapy assess nares and determine need for appliance change or resting period. 5/9/2023 1225 by Gaviota Caal RN  Outcome: Completed     Problem: Pain  Goal: Verbalizes/displays adequate comfort level or baseline comfort level  5/9/2023 1225 by Gaviota Caal RN  Outcome: Completed  5/9/2023 0230 by Alexa Post RN  Outcome: Progressing  Flowsheets (Taken 5/9/2023 0230)  Verbalizes/displays adequate comfort level or baseline comfort level:   Encourage patient to monitor pain and request assistance   Assess pain using appropriate pain scale   Implement non-pharmacological measures as appropriate and evaluate response   Administer analgesics based on type and severity of pain and evaluate response  Note: Pt denies pain at this time. Pain meds given as needed & as ordered. Will continue to assess.       Problem: Chronic Conditions and Co-morbidities  Goal: Patient's chronic conditions and co-morbidity symptoms are monitored and maintained or

## 2023-05-09 NOTE — OP NOTE
[address]                                OPERATIVE REPORT    PATIENT NAME: Nan Zaman                   :        1955  MED REC NO:   [id number]                              ROOM:       3045  ACCOUNT NO:   [de-identified]                           ADMIT DATE: 2023  PROVIDER:     Cecelia Garcia    DATE OF PROCEDURE:  2023    SURGEON:  Dr. Cecelia Garcia. ASSISTANT:  None. PREOPERATIVE DIAGNOSES:  1.  Bilateral ureteral obstruction. 2.  Left hydronephrosis. POSTOPERATIVE DIAGNOSES:  1.  Bilateral ureteral obstruction. 2.  Left hydronephrosis. PROCEDURE PERFORMED:  1. Cystoscopy with left ureteral stent exchange. 2.  Cystoscopy with right ureteral stent exchange. ANESTHESIA:  General.    COMPLICATIONS:  None. ESTIMATED BLOOD LOSS:  Minimal.    SPECIMENS:  None. PROSTHESIS:  Bilateral 6 x 24-Japanese double-J ureteral stent. DISPOSITION:  Stable. FINDINGS:  Left hydronephrosis. INDICATIONS:  This patient is a 69-year-old female with known small  kidneys, stents in place. He did come into the emergency department  with worsening pain as well as worsening renal function. Creatinine  reached 5.4, here now for exchange of her stents as she does have left  hydronephrosis and perinephric stranding. DESCRIPTION OF PROCEDURE:  The patient was taken back to the operating  room after informed consent including all risks, benefits, and  alternatives were obtained. The patient was [facility]  onto the operating room table, where she was induced under general  anesthesia. She did receive IV Meropenem on the floor prior to the  surgery. At this point in time, she was prepped and draped in the  normal sterile fashion, and placed in the dorsal lithotomy. She had a  22-Japanese sheath with a 30-degree lens passed through the urethra into  the bladder.   Once in

## 2023-05-09 NOTE — DISCHARGE SUMMARY
Discharge Summary    Savanna Cardenas  :  1955  MRN:  [id number]    Admit date:  5/3/2023      Discharge date: 2023     Admitting Physician:  Efraín Mittal MD    Discharge Diagnoses:    Principal Problem:    Hydronephrosis of left kidney  Active Problems:    Acute kidney injury superimposed on CKD ([facility] [address]    Pseudomonas infection  Resolved Problems:    * No resolved hospital problems. *      Hospital Course:   Savanna Cardenas is a 69 y.o. female admitted with hydronephrosis of left kidney BARBARA and UTI. She  presented to the emergency room with complaints of left lower quadrant abdominal pain. Patient stated she had a stent in place. She stated she has not felt well off and on for approximately 1 month. She states she is not making urine. She denied nausea or vomiting or diarrhea but did admit to constipation with last BM 2 days ago and was a very small amount. She states normally for her she goes every other day. She denied documented fever but stated she has had chills. Patient does have history of CKD and required transfer to [address] [place] [address] in 2022. During patient's initial evaluation BUN was elevated at 87 and a creatinine of 5.48. Her GFR was 8. Patient was taken to surgery today for stent exchange by urology. In patient's admission nephrology and urology were consulted. Labs were trended. Patient was placed on IV antibiotics and IV fluids. Urine culture grew out Pseudomonas and ESBL with E. coli. Infectious disease was consulted patient was placed on IV meropenem and plan will be IV Invanz for 4 more days and oral Cipro for 4 more days on discharge. Midline placed. Renal function improved and back to baseline. Brooks catheter removed today. PT OT consulted she tolerates this well. Patient did have anemia and was transfused 1 unit of PRBCs for hemoglobin of 6.6. That appears to be stable at 7.6.   I will continue to hold her Xarelto on discharge and will defer to her

## 2023-05-09 NOTE — PLAN OF CARE
Problem: Safety - Adult  Goal: Free from fall injury  Outcome: Progressing  Flowsheets (Taken 5/9/2023 0230)  Free From Fall Injury: Instruct family/caregiver on patient safety  Note: Pt is at high risk for falls. Non skid socks on. Bed in low position and wheels locked. Fall sign posted and bracelet on. Siderails up x 2. Bed alarm on. Call light within reach. Will continue to assess. Problem: Pain  Goal: Verbalizes/displays adequate comfort level or baseline comfort level  Outcome: Progressing  Flowsheets (Taken 5/9/2023 0230)  Verbalizes/displays adequate comfort level or baseline comfort level:   Encourage patient to monitor pain and request assistance   Assess pain using appropriate pain scale   Implement non-pharmacological measures as appropriate and evaluate response   Administer analgesics based on type and severity of pain and evaluate response  Note: Pt denies pain at this time. Pain meds given as needed & as ordered. Will continue to assess.

## 2023-05-09 NOTE — CARE COORDINATION
Confirmed administration time for IV Invanz for 10:30 AM daily for 4 more days with [address] Pt notified of time of med administration and location of Outpt dept. Verbalized understanding.   ANKITA Mendosa RN

## 2023-05-09 NOTE — DISCHARGE INSTR - DIET

## 2023-05-10 ENCOUNTER — CARE COORDINATION (OUTPATIENT)
Dept: CASE MANAGEMENT | Age: 68
End: 2023-05-10

## 2023-05-10 ENCOUNTER — HOSPITAL ENCOUNTER (OUTPATIENT)
Dept: INFUSION THERAPY | Age: 68
Discharge: HOME | End: 2023-05-10

## 2023-05-10 ENCOUNTER — TELEPHONE (OUTPATIENT)
Dept: INFUSION THERAPY | Age: 68
End: 2023-05-10

## 2023-05-10 ENCOUNTER — TELEPHONE (OUTPATIENT)
Dept: INFECTIOUS DISEASES | Age: 68
End: 2023-05-10

## 2023-05-10 ENCOUNTER — TELEPHONE (OUTPATIENT)
Dept: NEPHROLOGY | Age: 68
End: 2023-05-10

## 2023-05-10 ENCOUNTER — TELEPHONE (OUTPATIENT)
Dept: UROLOGY | Age: 68
End: 2023-05-10

## 2023-05-10 DIAGNOSIS — B96.29 URINARY TRACT INFECTION DUE TO EXTENDED-SPECTRUM BETA LACTAMASE (ESBL) PRODUCING ESCHERICHIA COLI: Primary | ICD-10-CM

## 2023-05-10 DIAGNOSIS — Z16.12 URINARY TRACT INFECTION DUE TO EXTENDED-SPECTRUM BETA LACTAMASE (ESBL) PRODUCING ESCHERICHIA COLI: Primary | ICD-10-CM

## 2023-05-10 DIAGNOSIS — N39.0 URINARY TRACT INFECTION DUE TO EXTENDED-SPECTRUM BETA LACTAMASE (ESBL) PRODUCING ESCHERICHIA COLI: Primary | ICD-10-CM

## 2023-05-10 NOTE — PROGRESS NOTES
5/10/23 @ 1408-    Received phone call from outpatient that stated the patient did not show up for her outpatient IV medication. The nurse called  the patient and the patient stated she is not coming in and just wants the IV taken out. Patient has a midline in. Called the patient to see why she didn't come in and to work on getting her IV medications. Patient started yelling and using foul language. \"Just leave me alone and I want this fn line out of my arm. When I tried to talk she hung up the phone. Called patient CNP to make her aware of the situation.     KATHRYN Tang
Access RN called for Midline placement. RN to call with ETA.  Await
Comprehensive Nutrition Assessment    Type and Reason for Visit:  Reassess    Nutrition Recommendations/Plan:   Continue to avoid salt/sodium in diet  Yogurt or probiotic for GI if loose stools. Malnutrition Assessment:  Malnutrition Status: At risk for malnutrition (Comment) (05/04/23 1304)    Context:  Acute Illness     Findings of the 6 clinical characteristics of malnutrition:  Energy Intake:  No significant decrease in energy intake  Weight Loss:  No significant weight loss     Body Fat Loss:  No significant body fat loss     Muscle Mass Loss:  No significant muscle mass loss    Fluid Accumulation:  Moderate to Severe Extremities   Strength:  Not Performed    Nutrition Assessment:    Stabilizing weight and improving PO intakes. Planning d/c for later after midline access for IV antibiotics. Denies loose stools currently, but advised use of yogurt or probiotic as preventative measures. Improved BLE edema accounting for some mild weight declines acutely. Renal indices remain elevated from prior values. Nutrition Related Findings:    generalized and +1 BLE edema (was 2-3+). Wound Type: None       Current Nutrition Intake & Therapies:    Average Meal Intake: %  Average Supplements Intake: None Ordered  ADULT DIET; Regular    Anthropometric Measures:  Height: 5' 1.5\" (156.2 cm)  Ideal Body Weight (IBW): 108 lbs (49 kg)    Admission Body Weight: 232 lb (105.2 kg)  Current Body Weight: 230 lb 6.4 oz (104.5 kg), 214.5 % IBW.  Weight Source: Bed Scale  Current BMI (kg/m2): 42.8  Usual Body Weight: 250 lb (113.4 kg) (6 months ago)  % Weight Change (Calculated): -7.8  Weight Adjustment For: No Adjustment                 BMI Categories: Obese Class 3 (BMI 40.0 or greater)    Estimated Daily Nutrient Needs:  Energy Requirements Based On: Kcal/kg  Weight Used for Energy Requirements: Current  Energy (kcal/day): 4066-5949 (11-15)  Weight Used for Protein Requirements: Ideal  Protein (g/day): 39-49
Consent from nephrology requested, via [employer], for placement of midline. Await response.
Consult to Dr. Willie Thorne sent via [employer], d/t ESBL, await response
Dr. Angelina Dior completed video visit at this time.  See new orders
Dr. Elise Ocampo called, update given to writer. MD spoke to pt on phone.
Husam Baxter, from Access RN returned call and states she will arrive around 1200. Will continue to monitor.
[facility]   [phone]   Nephrology progress note  5/8/2023, 10:02 AM      Pt Name:    Kris Stahl  MRN:     [id number]     YOB: 1955  Admit Date:    5/3/2023 11:40 AM  Primary Care Physician:  DONG Marshall CNP   Room number  5823/8694-04    Patient's Location: [facility]   Physician's (myself) Location: [address], [address] [place] [address]  Patient's nurse:     Chief Complaint: Nephrology following for BARBARA    Subjective:  Patient seen and examined  No chest pain or shortness of breath  Creat is improving      Objective:  24HR INTAKE/OUTPUT:    Intake/Output Summary (Last 24 hours) at 5/8/2023 1002  Last data filed at 5/8/2023 0452  Gross per 24 hour   Intake 2723.42 ml   Output 1400 ml   Net 1323.42 ml       I/O last 3 completed shifts: In: 5459.6 [P.O.:1670; I.V.:3789.6]  Out: 3825 [Urine:3825]  No intake/output data recorded. Admission weight: 232 lb (105.2 kg)  Wt Readings from Last 3 Encounters:   05/08/23 231 lb 11.3 oz (105.1 kg)   11/23/22 250 lb (113.4 kg)   11/17/22 250 lb (113.4 kg)     Body mass index is 43.07 kg/m². Physical examination  VITALS:     Vitals:    05/07/23 1930 05/07/23 2330 05/08/23 0315 05/08/23 0715   BP: 134/62 (!) 155/60  (!) 148/63   Pulse: 77 70  68   Resp: 18 20  18   Temp: 97.9 °F (36.6 °C) 97.3 °F (36.3 °C)  97.1 °F (36.2 °C)   TempSrc: Temporal Temporal  Temporal   SpO2: 96% 97%  96%   Weight:   231 lb 11.3 oz (105.1 kg)    Height:         Constitutional and General Appearance: alert and cooperative, appears comfortable, no apparent distress, not diaphoretic, Appears well-developed and well-nourished.    Lungs: no use of accessory muscles or labored breathing noted, Respiratory effort observed to be normal  Extremities: ++ lymphedema  Neuro: no slurred speech, no facial drooping  Psychiatric: Normal mood and affect, Not agitated  Mental status: Alert and awake, Oriented to person/place/time, Able to follow commands      Due to
[facility]  Inpatient/Observation/Outpatient Rehabilitation    Date: 2023  Patient Name: Salma Mcpherson       [x] Inpatient Acute/Observation       []  Outpatient  : 1955       Plan of Care/Recert ends    [] Pt no showed for scheduled appointment    [x] Pt refused/declined therapy at this time due to:       Pt. declined, stated she is leaving once her  gets here, would like to rest at this time. [] Pt cancelled due to:  [] No Reason Given   [] Sick/ill   [] Other:    Therapist/Assistant will attempt to see this patient, at our earliest opportunity.        Ed Chaparro, PTA Date: 2023
Occupational Therapy  Facility/Department: [facility] AT THE [facility] MED SURG  Daily Treatment Note  NAME: Max Mayorga  : 1955  MRN: [id number]    Date of Service: 2023    Discharge Recommendations:  Continue to assess pending progress         Patient Diagnosis(es): The primary encounter diagnosis was Severe Hydronephrosis of left kidney. Diagnoses of Acute renal failure, unspecified acute renal failure type ([address] Acute UTI (urinary tract infection), and Pseudomonas infection were also pertinent to this visit. Assessment    Activity Tolerance: Patient limited by fatigue;Patient limited by endurance  Discharge Recommendations: Continue to assess pending progress      Plan   Occupational Therapy Plan  Times Per Week: 7x/wk  Times Per Day: Once a day  Current Treatment Recommendations: Strengthening; Functional mobility training; Safety education & training;Self-Care / ADL     Restrictions  Restrictions/Precautions  Restrictions/Precautions: Contact Precautions;General Precautions; Fall Risk    Subjective   Orientation  Overall Orientation Status: Within Functional Limits  Pain: denies           Objective    Vitals          OT Exercises  Exercise Treatment: Pt tolerated ther ex with 1# dumbbell x 7 planes x 15 reps x 1 set to increase UE strength and endurance in order to ease completion of ADL tasks. Pt required RBs as needed secondary to fatigue. Safety Devices  Type of Devices: All fall risk precautions in place;Call light within reach; Chair alarm in place; Left in chair;Nurse notified; Heels elevated for pressure relief     Patient Education  Education Given To: Patient  Education Provided: Role of Therapy;Plan of Care  Education Method: Demonstration;Verbal  Education Outcome: Verbalized understanding;Demonstrated understanding    Goals  Short Term Goals  Time Frame for Short Term Goals: 20 visits  Short Term Goal 1: Pt. will demo tolerate 15 mins of BUE ther ex/act to increase overall strength/activity
Occupational Therapy  Facility/Department: [facility] AT THE [facility] MED SURG  Daily Treatment Note  NAME: Salma Mcpherson  : 1955  MRN: [id number]    Date of Service: 2023    Discharge Recommendations:  Continue to assess pending progress         Patient Diagnosis(es): The primary encounter diagnosis was Severe Hydronephrosis of left kidney. Diagnoses of Acute renal failure, unspecified acute renal failure type ([address] and Acute UTI (urinary tract infection) were also pertinent to this visit. Assessment    Activity Tolerance: Patient tolerated treatment well;Patient limited by fatigue  Discharge Recommendations: Continue to assess pending progress      Plan   Occupational Therapy Plan  Times Per Week: 7x/wk  Times Per Day: Once a day  Current Treatment Recommendations: Strengthening; Functional mobility training; Safety education & training;Self-Care / ADL     Restrictions  Restrictions/Precautions  Restrictions/Precautions: Contact Precautions;General Precautions; Fall Risk    Subjective   Subjective  Subjective: Pt sitting up in bedside chair upon arrival Pt agreed to participate in therapy session. Pain: Pt had no complaints of pain this date. Orientation  Overall Orientation Status: Within Functional Limits  Pain: no c/o pain at this time, reports LE's feeling better today. Objective    Vitals          OT Exercises  Exercise Treatment: Pt tolerated ther ex with 1# dumbbell x 5 planes x 15 reps x 1 set to increase UE strength and endurance in order to ease completion of ADL tasks. Pt required RBs as needed secondary to fatigue. Safety Devices  Type of Devices: All fall risk precautions in place;Call light within reach; Chair alarm in place; Left in chair;Nurse notified;Gait belt     Patient Education  Education Given To: Patient  Education Provided: Role of Therapy;Plan of Care  Education Method: Demonstration;Verbal  Barriers to Learning: None  Education Outcome: Verbalized understanding;Demonstrated
Physical Therapy      Facility[facility]Department[facility] SURG  Daily Treatment Note  NAME: April Coleman  : 1955  MRN: [id number]    Date of Service: 2023    Discharge Recommendations:  Continue to assess pending progress, Subacute/Skilled Nursing Facility, Home with Home health PT        Patient Diagnosis(es): The primary encounter diagnosis was Severe Hydronephrosis of left kidney. Diagnoses of Acute renal failure, unspecified acute renal failure type ([address] and Acute UTI (urinary tract infection) were also pertinent to this visit. Assessment   Assessment: Transfers CGA x1. Pt refused gait this afternoon stating she does alot at home and is tired this afternoon, pt agreeable to participation in ther ex and STS's. Seated BLE ther ex in all planes AROM x20 ea. 2x 5 STS from recliner with B UE support to 2WW, CGA. Static standing ~ 1 minute with [place] UE support on 2WW and FF posture for pericare, no LOB. Activity Tolerance: Patient tolerated treatment well;Patient limited by fatigue     Plan    Physcial Therapy Plan  General Plan: 2 times a day 7 days a week (1x/day on weekends and holidays)  Current Treatment Recommendations: Strengthening;Balance training;Functional mobility training;Transfer training;Neuromuscular re-education;Stair training;Gait training; Endurance training; Therapeutic activities; Positioning;Home exercise program;Safety education & training     Restrictions  Restrictions/Precautions  Restrictions/Precautions: Contact Precautions, General Precautions, Fall Risk     Subjective    Subjective  Subjective: Pt in recliner upon arrival, states she is tired, but agreed to working with therapy. Pain: no c/o pain at this time.   Orientation  Overall Orientation Status: Within Functional Limits     Objective   Vitals     Bed Mobility Training  Bed Mobility Training: No  Overall Level of Assistance: Assist X1;Minimum assistance  Interventions: Verbal cues  Supine to Sit: Minimum
Physical Therapy    Facility/Department: [facility] AT THE [facility] MED SURG  Daily Treatment Note  NAME: Natanael Christianson  : 1955  MRN: [id number]    Date of Service: 2023    Discharge Recommendations:  Continue to assess pending progress, Subacute/Skilled Nursing Facility, Home with Home health PT        Patient Diagnosis(es): The primary encounter diagnosis was Severe Hydronephrosis of left kidney. Diagnoses of Acute renal failure, unspecified acute renal failure type ([address] and Acute UTI (urinary tract infection) were also pertinent to this visit. Assessment   Assessment: Bed mobility supine to sit EOB Min A x1. Transfers Min A from EOB and CGA from chair x1. Seated BLE ther ex x15 ea all planes. Static standing ~ 1 minutes with BUE support on 2WW, CGA/SBA x1. No LOB, FF posture. Gait 4' forward/backward x2 with 2WW and CGA x1, cues for upright posture no LOB. Activity Tolerance: Patient tolerated treatment well     Plan    Physcial Therapy Plan  General Plan: 2 times a day 7 days a week (1x/day on weekens and holidays)  Current Treatment Recommendations: Strengthening;Balance training;Functional mobility training;Transfer training;Neuromuscular re-education;Stair training;Gait training; Endurance training; Therapeutic activities; Positioning;Home exercise program;Safety education & training     Restrictions  Restrictions/Precautions  Restrictions/Precautions: Contact Precautions, General Precautions, Fall Risk     Subjective    Subjective  Subjective: Pt in bed upon arrival and agreed to therapy. Pain: no c/o pain at this time, reports LE's feeling better today.   Orientation  Overall Orientation Status: Within Functional Limits     Objective   Bed Mobility Training  Bed Mobility Training: Yes  Overall Level of Assistance: Assist X1;Minimum assistance  Interventions: Verbal cues  Supine to Sit: Minimum assistance;Assist X1  Scooting: Minimum assistance;Assist X1  Transfer Training  Transfer Training: Yes  Overall Level
Physical Therapy  Facility/Department: [facility] AT THE [facility] MED SURG  Daily Treatment Note  NAME: Salma Mcpherson  : 1955  MRN: [id number]    Date of Service: 2023    Discharge Recommendations:  Continue to assess pending progress, Subacute/Skilled Nursing Facility, Home with Home health PT     Patient Diagnosis(es): The primary encounter diagnosis was Severe Hydronephrosis of left kidney. Diagnoses of Acute renal failure, unspecified acute renal failure type ([address] Acute UTI (urinary tract infection), and Pseudomonas infection were also pertinent to this visit. Assessment   Assessment: Gait with [employer] 35ftx1, SBA/CGA for safety. Bed mobility: CGA/Min A. Transfers: CGA. Supine ankle pumps and heels slides x15. Seated exercises B LE x20  Activity Tolerance: Patient tolerated treatment well;Patient limited by fatigue     Plan    Physcial Therapy Plan  General Plan: 2 times a day 7 days a week  Current Treatment Recommendations: Strengthening;Balance training;Functional mobility training;Transfer training;Neuromuscular re-education;Stair training;Gait training; Endurance training; Therapeutic activities; Positioning;Home exercise program;Safety education & training     Restrictions  Restrictions/Precautions  Restrictions/Precautions: Contact Precautions, General Precautions, Fall Risk     Subjective    Subjective  Subjective: Pt. in chair upon arrival, agreeable to therapy at this time.   Pain: denies  Orientation  Overall Orientation Status: Within Functional Limits     Objective   Bed Mobility Training  Bed Mobility Training: Yes  Overall Level of Assistance: Minimum assistance;Assist X1;Contact-guard assistance  Interventions: Verbal cues  Rolling: Contact-guard assistance;Assist X1  Supine to Sit: Minimum assistance;Assist X1;Contact-guard assistance  Scooting: Contact-guard assistance;Minimum assistance;Assist X1  Transfer Training  Transfer Training: Yes  Overall Level of Assistance: Contact-guard assistance;Assist
Progress Note    SUBJECTIVE:    Patient seen for f/u of Hydronephrosis of left kidney. She resting in bed no complaints. Ready to go home she stated    ROS:   Constitutional: negative  for fevers, and negative for chills. Respiratory: negative for shortness of breath, negative for cough, and negative for wheezing  Cardiovascular: negative for chest pain, and negative for palpitations  Gastrointestinal: negative for abdominal pain, negative for nausea,negative for vomiting, negative for diarrhea, and negative for constipation     All other systems were reviewed with the patient and are negative unless otherwise stated in HPI      OBJECTIVE:      Vitals:   Vitals:    05/09/23 0645   BP: (!) 144/70   Pulse: 86   Resp: 18   Temp: 97.7 °F (36.5 °C)   SpO2: 97%     Weight - Scale: 230 lb 6.4 oz (104.5 kg)   Height: 5' 1.5\" (156.2 cm)     Weight  Wt Readings from Last 3 Encounters:   05/09/23 230 lb 6.4 oz (104.5 kg)   11/23/22 250 lb (113.4 kg)   11/17/22 250 lb (113.4 kg)     Body mass index is 42.83 kg/m². 24HR INTAKE/OUTPUT:      Intake/Output Summary (Last 24 hours) at 5/9/2023 0920  Last data filed at 5/9/2023 0533  Gross per 24 hour   Intake 3139 ml   Output 3775 ml   Net -636 ml     -----------------------------------------------------------------  Exam:    GEN:    Awake, alert and oriented x3. EYES:  EOMI, pupils equal   NECK: Supple. No lymphadenopathy. No carotid bruit  CVS:    regular rate and rhythm, 2/6 systolic murmur  PULM:  CTA, no wheezes, rales or rhonchi, no acute respiratory distress  ABD:    Bowels sounds normal.  Abdomen is soft. No distention. no tenderness to palpation. EXT:   lymph edema bilaterally . No calf tenderness. NEURO: Moves all extremities. Motor and sensory are grossly intact  SKIN:  No rashes.   No skin lesions.    -----------------------------------------------------------------    Diagnostic Data:      Complete Blood Count:   Recent Labs     05/07/23  0943
Pt d/c'd off unit at this time, via wheelchair, with spouse, to home. Belongings in hand. No issues noted.
Pt is A&Ox4, calm and cooperative. Assmt and VS completed as charted, see flow sheet. IV fluids continue as ordered, see MAR. Pt resting in bed, call light within reach, denies pain, denies further needs. Will continue to monitor.
Pt is A&Ox4, calm and cooperative. Assmt and VS completed as charted, see flow sheet. Pt denies pain. Brooks patent, draining yellow urine. IV fluids continue as ordered, see MAR. Pt resting in bed, call light within reach, denies further needs. Will continue to monitor.
Pt sitting up in chair watching TV. Assessment and vital signs as charted. Pt denies pain at this time. Pt is A & O x 4. Pt is on room air. IV fluids infusing as ordered. Brooks catheter in place, draining and patent. Pt denies any other needs at this time. Call light in reach. Chair alarm on. Will continue to monitor.
Received call from Dr. Chelsea Beaulieu. He states it is ok for midline to be placed, from his standpoint. Yahaira Harris, from Access RN, informed. Will continue to monitor.
Vitals and assessment completed at this time as charted. Patient resting in bed with no distress noted. Patient denies any needs at this time. Call light within reach.
Writer reviewed discharge instructions with patient. Patient aware of need to  prescriptions. Patient aware of all follow up appointments. Writer provided education and educational handout about hydronephrosis and preventing Kidney stones. Patient denies questions. Copy of discharge instructions given to patient.
Ashley Ware APRN - CNP   lisinopril (PRINIVIL;ZESTRIL) 20 MG tablet Take 1 tablet by mouth daily  Patient not taking: No sig reported 5/3/22 8/12/22  Rani Parker MD   tamsulosin (FLOMAX) 0.4 MG capsule Take 1 capsule by mouth daily  Patient not taking: Reported on 8/8/2022 4/27/22 8/11/22  Lul Sun MD   Mis Natural Products (OSTEO BI-FLEX ADV DOUBLE ST) TABS Take 2 tablets by mouth every morning    Historical Provider, MD   Ascorbic Acid (VITAMIN C) 250 MG tablet Take 1 tablet by mouth daily    Historical Provider, MD   vitamin E 400 UNIT capsule Take 1 capsule by mouth daily    Historical Provider, MD   vitamin D (CHOLECALCIFEROL) 25 MCG (1000 UT) TABS tablet Take 1 tablet by mouth daily    Historical Provider, MD   Multiple Vitamins-Minerals (THERAPEUTIC MULTIVITAMIN-MINERALS) tablet Take 1 tablet by mouth daily    Historical Provider, MD       ALLERGIES:     Patient has no known allergies. OBJECTIVE:       Vitals:    05/07/23 1630 05/07/23 1930 05/07/23 2330 05/08/23 0315   BP: 133/65 134/62 (!) 155/60    Pulse: 71 77 70    Resp: 16 18 20    Temp: 97.6 °F (36.4 °C) 97.9 °F (36.6 °C) 97.3 °F (36.3 °C)    TempSrc: Temporal Temporal Temporal    SpO2: 95% 96% 97%    Weight:    231 lb 11.3 oz (105.1 kg)   Height:             Intake/Output Summary (Last 24 hours) at 5/8/2023 0657  Last data filed at 5/8/2023 1166  Gross per 24 hour   Intake 3083.42 ml   Output 2325 ml   Net 758.42 ml       PHYSICAL EXAM:  General Appearance  Alert , awake , not in acute distress  HEENT - Head is normocephalic, atraumatic. Lungs - Bilateral equal air entry , no wheezes, rales or rhonchi, aeration good  Cardiovascular - Heart sounds are abnormal.  Regular rhythm, normal rate with systolic murmur, no gallop or rub.   Abdomen - Soft, nontender, nondistended, no masses or organomegaly  Neurologic - There are no new focal motor or sensory deficits  Skin - No bruising or bleeding on exposed skin area, chronic
last 72 hours. No results for input(s): RPR in the last 72 hours. No results for input(s): HIV in the last 72 hours. No results for input(s): BC in the last 72 hours. Lab Results   Component Value Date/Time    MUCUS NOT REPORTED 01/02/2022 03:30 PM    RBC 2.45 05/09/2023 06:29 AM    TRICHOMONAS NOT REPORTED 01/02/2022 03:30 PM    WBC 4.5 05/09/2023 06:29 AM    YEAST NOT REPORTED 01/02/2022 03:30 PM    TURBIDITY Clear 05/03/2023 01:30 PM     Lab Results   Component Value Date/Time    CREATININE 2.62 05/09/2023 06:29 AM    GLUCOSE 93 05/09/2023 06:29 AM       Medical Decision Making-Imaging:   CT OF THE ABDOMEN AND PELVIS WITHOUT CONTRAST 5/3/2023 12:58 pm     TECHNIQUE:  CT of the abdomen and pelvis was performed without the administration of  intravenous contrast. Multiplanar reformatted images are provided for review. Automated exposure control, iterative reconstruction, and/or weight based  adjustment of the mA/kV was utilized to reduce the radiation dose to as low  as reasonably achievable. COMPARISON:  11/17/2022     HISTORY:  ORDERING SYSTEM PROVIDED HISTORY: LLQ pain  TECHNOLOGIST PROVIDED HISTORY:  LLQ pain        FINDINGS:  Lower Chest:  Visualized portion of the lower chest demonstrates no acute  abnormality. Organs:  Lack of contrast limits evaluation of the solid organs and bowel. The visualized liver, spleen, pancreas and adrenal glands demonstrate no  acute abnormality. There is cholelithiasis without evidence of acute  cholecystitis. There is severe left-sided hydronephrosis with extensive left perinephric  inflammatory changes. Multiple nonobstructing stones are noted in the left  kidney. There are numerous 2-4 mm stones seen throughout the left kidney  with a large collection of stones measuring approximately 13 mm in the lower  pole. There is a left ureteral stent present. No evidence of stones in the  left ureter. There is a right ureteral stent.   Resolution of previously

## 2023-05-10 NOTE — TELEPHONE ENCOUNTER
Iman from the hospital called to let us know that patient is refusing last 4 days of antibiotics. She wants the midline out. (See message from social work from today). YAZMIN Latham, and Dr. Eren Douglas were all informed.

## 2023-05-10 NOTE — TELEPHONE ENCOUNTER
Spoke to [employer] they are aware of pt refusing IV antibiotic. Stephy please see the note from the .

## 2023-05-10 NOTE — CARE COORDINATION
5/10/23  1446  Post discharge entry. Notified offices of Dr Peyton Contreras and Dr Roland Ahumada of Pt not receiving her IV Invanz this morning in Outpatient department. Also informed that Pt reported to the Outpt dept that she would not be coming to the remaining appts either.    ANKITA Mendosa RN

## 2023-05-10 NOTE — CARE COORDINATION
[facility] Care Transitions Initial Follow Up Call    Call within 2 business days of discharge: Yes    Patient Current Location:  Home: [address] [place] [address] Transition Nurse contacted the patient by telephone to perform post hospital discharge assessment. Verified name and  with patient as identifiers. Provided introduction to self, and explanation of the Care Transition Nurse role. Patient: Walter Mattson Patient : 1955   MRN: [id number]  Reason for Admission: Severe Hydronephrosis of left kidney   Discharge Date: 23 RARS: Readmission Risk Score: 21.4      Last Discharge  Street       Date Complaint Diagnosis Description Type Department Provider    5/3/23 Abdominal Pain Severe Hydronephrosis of left kidney . .. [facility]; Davin George... Was this an external facility discharge? No Discharge Facility: [address] to be reviewed by the provider   Additional needs identified to be addressed with provider: Yes  medications-ID called and update that pt will not be receiving IV antibiotic that was ordered. Method of communication with provider: phone. Was able to contact Olive Pineda for initial transitional outreach. She stated that she was doing \"fine\". She denied any further Lt flank/abdominal pain, and no urinary issues except for frequent urination. She stated that she did not make it to the infusion center for her antibiotic. She said that she has no one to help her so she could not go. She said that she does drive, but needs help getting in and out of her vehicle. She said that no family members  were available to help. She said that she called the infusion center and asked if they could send a nurse out to take out her IV.   Writer asked if  home care could be ordered to come out and she said that she asked about that while she was in the hospital and she said that she was not

## 2023-05-10 NOTE — TELEPHONE ENCOUNTER
Casey Figueroa called today. She said that Silvana Devlin refused treatment for the IV antibiotic today and future treatment. An Tran just wanted to make you aware that everything was set up for her when she left and she is not sure what happened when she went home.

## 2023-05-10 NOTE — TELEPHONE ENCOUNTER
Peewee baird states\" I''m not coming ,I need to help to get in the car. I can drive, but I need help, I barley made it home last night,You need to have someone come out here and take this line out\". She also States \" I only have 4 days to go, I will be ok\". I informed Elayne Menchaca and she is checking into it.

## 2023-05-11 ENCOUNTER — CARE COORDINATION (OUTPATIENT)
Dept: CASE MANAGEMENT | Age: 68
End: 2023-05-11

## 2023-05-11 NOTE — CARE COORDINATION
Care Transitions Outreach Attempt    Call within 2 business days of discharge: Yes   Attempted to reach patient for transitions of care follow up. Unable to reach patient. Patient: Mathew Almanzar Patient : 1955 MRN: <[id number]>    Last Discharge  Street       Date Complaint Diagnosis Description Type Department Provider    5/3/23 Abdominal Pain Severe Hydronephrosis of left kidney . .. ED to Hosp-Ad[facility] (Discharged) (ADMITTED[facility]; Samantha Greene... # 1 attempt-Attempted initial 24 hour hospital follow up call. Left a Hip[employer] compliant message with name and call back information. Requested return call to [phone]. Was this an external facility discharge?  No Discharge Facility: [facility]    Noted following upcoming appointments from discharge chart review:   [facility] follow up appointment(s):   Future Appointments   Date Time Provider Tamela Ni   5/15/2023 10:45 AM SCHEDULE, [facility] SCHEDULE [facility]   2023  2:40 PM Suzanna Perez MD TIFF KID&HYP [facility]     Non-BS follow up appointment(s):

## 2023-05-11 NOTE — TELEPHONE ENCOUNTER
I spoke with Dr. Elisha Rivas and patient. We are in the process of getting another urine culture to see if issue is resolved.

## 2023-05-12 ENCOUNTER — CARE COORDINATION (OUTPATIENT)
Dept: CARE COORDINATION | Age: 68
End: 2023-05-12

## 2023-05-15 ENCOUNTER — CARE COORDINATION (OUTPATIENT)
Dept: CASE MANAGEMENT | Age: 68
End: 2023-05-15

## 2023-05-15 NOTE — CARE COORDINATION
Care Transitions Outreach Attempt #3    Call within 2 business days of discharge: Yes   Attempt #3 to reach patient for transitions of care follow up. Unable to reach patient. CTN sign off for transitions. Patient: Valorie Silverman Patient : 1955 MRN: [id number]    Last Discharge [address]       Date Complaint Diagnosis Description Type Department Provider    5/3/23 Abdominal Pain Severe Hydronephrosis of left kidney . .. ED to Hosp-Admission (Discharged) (ADMITTED) [facility] Abraham Olvera MD; M[facility]...               Noted following upcoming appointments from discharge chart review:   [facility] follow up appointment(s):   Future Appointments   Date Time Provider Tamela Ni   2023  2:40 PM Nahum Dwyer MD [facility]     Gian Rojo RN BSN   Care Transitions Nurse  [phone]

## 2023-05-23 ENCOUNTER — TELEPHONE (OUTPATIENT)
Dept: INFECTIOUS DISEASES | Age: 68
End: 2023-05-23

## 2023-05-23 NOTE — TELEPHONE ENCOUNTER
5/23/2023 11:55 AM  Please contact [facility] care nurses and ask them to pull midline    5/23/2023 11:58 AM  Thank you  Unread

## 2023-05-23 NOTE — TELEPHONE ENCOUNTER
Faxing order for midline removal to number provided. Patient notified of where to go and understands.

## 2023-05-23 NOTE — TELEPHONE ENCOUNTER
Patient states she was in the [facility] 5/3-5/9 and was given a line for IV abx. The patient states she never  followed through with the abx because times did not work out for her for treatment. Patient is asking for an order for the line to be removed and sent to [facility]. Please advise.      Perfect Serve Sent to Dr. Gume Fisher

## 2023-05-23 NOTE — TELEPHONE ENCOUNTER
Spoken with Khushboo Hutton at [facility] in [address] and they state patient can just go in to outpatient and get the line removed. They just need an order faxed to [phone]. Julisa's callback number is [phone].

## 2023-05-24 ENCOUNTER — HOSPITAL ENCOUNTER (OUTPATIENT)
Dept: INFUSION THERAPY | Age: 68
Discharge: HOME | End: 2023-05-24
Payer: MEDICARE

## 2023-05-24 VITALS
SYSTOLIC BLOOD PRESSURE: 146 MMHG | TEMPERATURE: 98.6 F | HEART RATE: 81 BPM | RESPIRATION RATE: 20 BRPM | DIASTOLIC BLOOD PRESSURE: 76 MMHG

## 2023-05-24 PROCEDURE — 99213 OFFICE O/P EST LOW 20 MIN: CPT

## 2023-05-24 NOTE — PROGRESS NOTES
MIDLINE REMOVED AND PRESSURE APPLIED TO SITE FOR 5 MINUTES. NO BLEEDING NOTED. COBAND PLACED AND PATIENT INSTRUCTED TO LEAVE COBAND ON FOR 24 HOURS THEN AFTER REMOVE AND PLACE A BANDAID.   PATIENT V.U.

## 2023-05-24 NOTE — DISCHARGE INSTRUCTIONS
Outpatient Discharge Instructions For PICC Removal    [address] Juan Kumar [address]896  [phone]      You are advised to carry out the following instructions:    Diet:  As prescribed by your Physician. Activity:  As prescribed by your Physician. Care of your PICC site:    Keep guaze dressing on for 24 hours. After 24 hours,remove dressing and apply a band-aid. Keep PICC site clean and dry until healed. May take 7-10 days.        Notify your Physician if the previous PICC site has any signs of bleeding,redness,swelling,pain,drainage ,or you develop a fever greater than 100.5                                  ANY PROBLEMS OR CONCERNS NOTIFY YOUR PHYSICIAN OR GO TO THE NEAREST EMERGENCY ROOM

## 2023-08-10 NOTE — PLAN OF CARE
Problem: Discharge Planning  Goal: Discharge to home or other facility with appropriate resources  Outcome: Progressing     Problem: Skin/Tissue Integrity  Goal: Absence of new skin breakdown  Description: 1. Monitor for areas of redness and/or skin breakdown  2. Assess vascular access sites hourly  3. Every 4-6 hours minimum:  Change oxygen saturation probe site  4. Every 4-6 hours:  If on nasal continuous positive airway pressure, respiratory therapy assess nares and determine need for appliance change or resting period.   Outcome: Progressing     Problem: Safety - Adult  Goal: Free from fall injury  Outcome: Progressing     Problem: ABCDS Injury Assessment  Goal: Absence of physical injury  Outcome: Progressing     Problem: Pain  Goal: Verbalizes/displays adequate comfort level or baseline comfort level  Outcome: Progressing [Normal Rate and Rhythm] : normal rate and rhythm [2+] : left 2+ [Ankle Swelling (On Exam)] : present [Varicose Veins Of Lower Extremities] : bilaterally [] : present [Ankle Swelling Bilaterally] : severe [Abdomen Tenderness] : ~T ~M No abdominal tenderness [Skin Ulcer] : no ulcer [Alert] : alert [Oriented to Person] : oriented to person [Oriented to Place] : oriented to place [Calm] : calm [Oriented to Time] : oriented to time [de-identified] : NAD [de-identified] : supple, no masses [de-identified] : unlabored breathing [de-identified] : obese [FreeTextEntry1] : large varicose veins over posterior thighs and buttocks +lymphorrhea of lower extremities [de-identified] : FROM of all 4 extremities

## 2024-12-09 ENCOUNTER — HOSPITAL ENCOUNTER (EMERGENCY)
Age: 69
Discharge: ANOTHER HOSPITAL | End: 2024-12-10
Attending: EMERGENCY MEDICINE
Payer: MEDICARE

## 2024-12-09 ENCOUNTER — APPOINTMENT (OUTPATIENT)
Dept: CT IMAGING | Age: 69
End: 2024-12-09
Payer: MEDICARE

## 2024-12-09 VITALS
TEMPERATURE: 97.6 F | RESPIRATION RATE: 20 BRPM | DIASTOLIC BLOOD PRESSURE: 73 MMHG | SYSTOLIC BLOOD PRESSURE: 177 MMHG | BODY MASS INDEX: 51.12 KG/M2 | HEART RATE: 91 BPM | OXYGEN SATURATION: 97 % | WEIGHT: 275 LBS

## 2024-12-09 DIAGNOSIS — N13.30 BILATERAL HYDRONEPHROSIS: ICD-10-CM

## 2024-12-09 DIAGNOSIS — N30.00 ACUTE CYSTITIS WITHOUT HEMATURIA: ICD-10-CM

## 2024-12-09 DIAGNOSIS — B37.31 YEAST VAGINITIS: ICD-10-CM

## 2024-12-09 DIAGNOSIS — N17.9 AKI (ACUTE KIDNEY INJURY) (HCC): Primary | ICD-10-CM

## 2024-12-09 DIAGNOSIS — E86.0 DEHYDRATION: ICD-10-CM

## 2024-12-09 DIAGNOSIS — S81.809A MULTIPLE OPENS WOUND OF LOWER EXTREMITY, UNSPECIFIED LATERALITY, INITIAL ENCOUNTER: ICD-10-CM

## 2024-12-09 LAB
ANION GAP SERPL CALCULATED.3IONS-SCNC: 14 MMOL/L (ref 9–16)
BACTERIA URNS QL MICRO: ABNORMAL
BASOPHILS # BLD: 0.03 K/UL (ref 0–0.2)
BASOPHILS NFR BLD: 1 % (ref 0–2)
BILIRUB UR QL STRIP: NEGATIVE
BUN SERPL-MCNC: 63 MG/DL (ref 8–23)
BUN/CREAT SERPL: 13 (ref 9–20)
CALCIUM SERPL-MCNC: 8.6 MG/DL (ref 8.6–10.4)
CHLORIDE SERPL-SCNC: 112 MMOL/L (ref 98–107)
CLARITY UR: ABNORMAL
CO2 SERPL-SCNC: 16 MMOL/L (ref 20–31)
COLOR UR: YELLOW
CREAT SERPL-MCNC: 4.9 MG/DL (ref 0.5–0.9)
EOSINOPHIL # BLD: 0.15 K/UL (ref 0–0.44)
EOSINOPHILS RELATIVE PERCENT: 3 % (ref 1–4)
EPI CELLS #/AREA URNS HPF: ABNORMAL /HPF (ref 0–25)
ERYTHROCYTE [DISTWIDTH] IN BLOOD BY AUTOMATED COUNT: 14.7 % (ref 11.8–14.4)
GFR, ESTIMATED: 9 ML/MIN/1.73M2
GLUCOSE SERPL-MCNC: 98 MG/DL (ref 74–99)
GLUCOSE UR STRIP-MCNC: NEGATIVE MG/DL
HCT VFR BLD AUTO: 28.5 % (ref 36.3–47.1)
HGB BLD-MCNC: 8.9 G/DL (ref 11.9–15.1)
HGB UR QL STRIP.AUTO: ABNORMAL
IMM GRANULOCYTES # BLD AUTO: <0.03 K/UL (ref 0–0.3)
IMM GRANULOCYTES NFR BLD: 0 %
KETONES UR STRIP-MCNC: ABNORMAL MG/DL
LEUKOCYTE ESTERASE UR QL STRIP: ABNORMAL
LYMPHOCYTES NFR BLD: 0.93 K/UL (ref 1.1–3.7)
LYMPHOCYTES RELATIVE PERCENT: 16 % (ref 24–43)
MCH RBC QN AUTO: 31.9 PG (ref 25.2–33.5)
MCHC RBC AUTO-ENTMCNC: 31.2 G/DL (ref 28.4–34.8)
MCV RBC AUTO: 102.2 FL (ref 82.6–102.9)
MONOCYTES NFR BLD: 0.44 K/UL (ref 0.1–1.2)
MONOCYTES NFR BLD: 8 % (ref 3–12)
NEUTROPHILS NFR BLD: 72 % (ref 36–65)
NEUTS SEG NFR BLD: 4.3 K/UL (ref 1.5–8.1)
NITRITE UR QL STRIP: NEGATIVE
NRBC BLD-RTO: 0 PER 100 WBC
PH UR STRIP: 6 [PH] (ref 5–9)
PLATELET # BLD AUTO: 242 K/UL (ref 138–453)
PMV BLD AUTO: 8.8 FL (ref 8.1–13.5)
POTASSIUM SERPL-SCNC: 4.6 MMOL/L (ref 3.7–5.3)
PROT UR STRIP-MCNC: ABNORMAL MG/DL
RBC # BLD AUTO: 2.79 M/UL (ref 3.95–5.11)
RBC #/AREA URNS HPF: ABNORMAL /HPF (ref 0–2)
SODIUM SERPL-SCNC: 142 MMOL/L (ref 136–145)
SP GR UR STRIP: 1.01 (ref 1.01–1.02)
UROBILINOGEN UR STRIP-ACNC: NORMAL EU/DL (ref 0–1)
WBC #/AREA URNS HPF: ABNORMAL /HPF (ref 0–5)
WBC OTHER # BLD: 5.9 K/UL (ref 3.5–11.3)

## 2024-12-09 PROCEDURE — 80048 BASIC METABOLIC PNL TOTAL CA: CPT

## 2024-12-09 PROCEDURE — 6360000002 HC RX W HCPCS

## 2024-12-09 PROCEDURE — 6360000002 HC RX W HCPCS: Performed by: PHYSICIAN ASSISTANT

## 2024-12-09 PROCEDURE — 6370000000 HC RX 637 (ALT 250 FOR IP): Performed by: PHYSICIAN ASSISTANT

## 2024-12-09 PROCEDURE — 81001 URINALYSIS AUTO W/SCOPE: CPT

## 2024-12-09 PROCEDURE — 96367 TX/PROPH/DG ADDL SEQ IV INF: CPT

## 2024-12-09 PROCEDURE — 87186 SC STD MICRODIL/AGAR DIL: CPT

## 2024-12-09 PROCEDURE — 96375 TX/PRO/DX INJ NEW DRUG ADDON: CPT

## 2024-12-09 PROCEDURE — 96361 HYDRATE IV INFUSION ADD-ON: CPT

## 2024-12-09 PROCEDURE — 2580000003 HC RX 258: Performed by: PHYSICIAN ASSISTANT

## 2024-12-09 PROCEDURE — 87086 URINE CULTURE/COLONY COUNT: CPT

## 2024-12-09 PROCEDURE — 87077 CULTURE AEROBIC IDENTIFY: CPT

## 2024-12-09 PROCEDURE — 96365 THER/PROPH/DIAG IV INF INIT: CPT

## 2024-12-09 PROCEDURE — 85025 COMPLETE CBC W/AUTO DIFF WBC: CPT

## 2024-12-09 PROCEDURE — 99285 EMERGENCY DEPT VISIT HI MDM: CPT

## 2024-12-09 PROCEDURE — 2500000003 HC RX 250 WO HCPCS: Performed by: PHYSICIAN ASSISTANT

## 2024-12-09 PROCEDURE — 74176 CT ABD & PELVIS W/O CONTRAST: CPT

## 2024-12-09 RX ORDER — MORPHINE SULFATE 4 MG/ML
4 INJECTION, SOLUTION INTRAMUSCULAR; INTRAVENOUS ONCE
Status: COMPLETED | OUTPATIENT
Start: 2024-12-09 | End: 2024-12-09

## 2024-12-09 RX ORDER — SODIUM CHLORIDE 9 MG/ML
INJECTION, SOLUTION INTRAVENOUS CONTINUOUS
Status: DISCONTINUED | OUTPATIENT
Start: 2024-12-09 | End: 2024-12-10 | Stop reason: HOSPADM

## 2024-12-09 RX ORDER — FLUCONAZOLE 100 MG/1
200 TABLET ORAL ONCE
Status: COMPLETED | OUTPATIENT
Start: 2024-12-09 | End: 2024-12-09

## 2024-12-09 RX ORDER — 0.9 % SODIUM CHLORIDE 0.9 %
1000 INTRAVENOUS SOLUTION INTRAVENOUS ONCE
Status: COMPLETED | OUTPATIENT
Start: 2024-12-09 | End: 2024-12-09

## 2024-12-09 RX ORDER — FENTANYL CITRATE 50 UG/ML
25 INJECTION, SOLUTION INTRAMUSCULAR; INTRAVENOUS ONCE
Status: COMPLETED | OUTPATIENT
Start: 2024-12-10 | End: 2024-12-09

## 2024-12-09 RX ORDER — METRONIDAZOLE 500 MG/100ML
500 INJECTION, SOLUTION INTRAVENOUS EVERY 8 HOURS
Status: DISCONTINUED | OUTPATIENT
Start: 2024-12-09 | End: 2024-12-10 | Stop reason: HOSPADM

## 2024-12-09 RX ORDER — ONDANSETRON 2 MG/ML
4 INJECTION INTRAMUSCULAR; INTRAVENOUS ONCE
Status: COMPLETED | OUTPATIENT
Start: 2024-12-10 | End: 2024-12-10

## 2024-12-09 RX ADMIN — FLUCONAZOLE 200 MG: 100 TABLET ORAL at 18:36

## 2024-12-09 RX ADMIN — SODIUM CHLORIDE: 9 INJECTION, SOLUTION INTRAVENOUS at 20:26

## 2024-12-09 RX ADMIN — METRONIDAZOLE 500 MG: 500 INJECTION, SOLUTION INTRAVENOUS at 19:17

## 2024-12-09 RX ADMIN — MEROPENEM 1000 MG: 1 INJECTION INTRAVENOUS at 18:37

## 2024-12-09 RX ADMIN — SODIUM CHLORIDE 1000 ML: 9 INJECTION, SOLUTION INTRAVENOUS at 18:37

## 2024-12-09 RX ADMIN — MORPHINE SULFATE 4 MG: 4 INJECTION, SOLUTION INTRAMUSCULAR; INTRAVENOUS at 19:17

## 2024-12-09 RX ADMIN — WATER 1000 MG: 1 INJECTION INTRAMUSCULAR; INTRAVENOUS; SUBCUTANEOUS at 18:36

## 2024-12-09 RX ADMIN — MICONAZOLE NITRATE: 20 POWDER TOPICAL at 19:28

## 2024-12-09 RX ADMIN — FENTANYL CITRATE 25 MCG: 50 INJECTION INTRAMUSCULAR; INTRAVENOUS at 23:53

## 2024-12-09 ASSESSMENT — ENCOUNTER SYMPTOMS
VOMITING: 0
BACK PAIN: 0
NAUSEA: 0
SHORTNESS OF BREATH: 0
ABDOMINAL PAIN: 0
COUGH: 0

## 2024-12-09 NOTE — ED PROVIDER NOTES
[facility]  EMERGENCY DEPARTMENT ENCOUNTER      Pt Name: Yolie Kay  MRN: [id number]  Birthdate 1955  Date of evaluation: 12/9/2024  Provider: Evelyn Lezama PA-C    CHIEF COMPLAINT       Chief Complaint   Patient presents with    Flank Pain     Patient to the emergency department by EMS from home with complaint of left flank pain and painful urination for the past several months     Wound Check     Patient has open wounds noted on lower extremities          HISTORY OF PRESENT ILLNESS      Yolie Kay is a 68 y.o. female who presents to the emergency department due to flank pain.  Patient presents emergency department complaining of left-sided flank pain.  Patient states that it feels similar to when she has had blockages in the past.  She has sharp pain and burning.  She states that she has stopped drinking fluids.  She thought this may help the pain.  Patient has chronic open wounds on her lower extremities.  These are unchanged.  She states that she has swelling and redness to her labia and vaginal area.  This has further exacerbated her symptoms.  Pain is mild to moderate.  No fever or chills.  No nausea or vomiting.        REVIEW OF SYSTEMS       Review of Systems   Constitutional:  Negative for fever.   Respiratory:  Negative for cough and shortness of breath.    Cardiovascular:  Negative for chest pain.   Gastrointestinal:  Negative for abdominal pain, nausea and vomiting.   Genitourinary:  Positive for dysuria, flank pain and genital sores.   Musculoskeletal:  Negative for back pain.         PAST MEDICAL HISTORY     Past Medical History:   Diagnosis Date    Acute kidney injury superimposed on CKD (HCC) 8/10/2022    Atrial fibrillation with RVR (HCC) 8/9/2022    Carcinoma (HCC)     Squamous Cell    Cellulitis     COVID-19 virus infection 8/12/2022    DVT (deep venous thrombosis) (HCC) 2006    LLE    Kidney stone     Lymphedema     Morbid obesity     Psoas abscess, right (HCC)

## 2024-12-10 ENCOUNTER — ANESTHESIA (OUTPATIENT)
Dept: OPERATING ROOM | Age: 69
End: 2024-12-10
Payer: MEDICARE

## 2024-12-10 ENCOUNTER — ANESTHESIA EVENT (OUTPATIENT)
Dept: OPERATING ROOM | Age: 69
End: 2024-12-10
Payer: MEDICARE

## 2024-12-10 PROBLEM — Z01.810 PREOPERATIVE CARDIOVASCULAR EXAMINATION: Status: ACTIVE | Noted: 2024-12-10

## 2024-12-10 PROBLEM — Z86.19 HISTORY OF ESBL E. COLI INFECTION: Status: ACTIVE | Noted: 2024-12-10

## 2024-12-10 PROCEDURE — 2580000003 HC RX 258: Performed by: NURSE PRACTITIONER

## 2024-12-10 PROCEDURE — 2500000003 HC RX 250 WO HCPCS: Performed by: INTERNAL MEDICINE

## 2024-12-10 PROCEDURE — 2500000003 HC RX 250 WO HCPCS

## 2024-12-10 PROCEDURE — 6360000002 HC RX W HCPCS

## 2024-12-10 PROCEDURE — 2580000003 HC RX 258: Performed by: INTERNAL MEDICINE

## 2024-12-10 RX ORDER — MIDAZOLAM HYDROCHLORIDE 1 MG/ML
INJECTION, SOLUTION INTRAMUSCULAR; INTRAVENOUS
Status: DISCONTINUED | OUTPATIENT
Start: 2024-12-10 | End: 2024-12-10 | Stop reason: SDUPTHER

## 2024-12-10 RX ORDER — CEFAZOLIN SODIUM 1 G/3ML
INJECTION, POWDER, FOR SOLUTION INTRAMUSCULAR; INTRAVENOUS
Status: DISCONTINUED | OUTPATIENT
Start: 2024-12-10 | End: 2024-12-10 | Stop reason: SDUPTHER

## 2024-12-10 RX ADMIN — MIDAZOLAM 0.5 MG: 1 INJECTION INTRAMUSCULAR; INTRAVENOUS at 16:37

## 2024-12-10 RX ADMIN — ONDANSETRON 4 MG: 2 INJECTION, SOLUTION INTRAMUSCULAR; INTRAVENOUS at 00:00

## 2024-12-10 RX ADMIN — Medication 10 MG: at 16:27

## 2024-12-10 RX ADMIN — Medication 10 MG: at 16:45

## 2024-12-10 RX ADMIN — SODIUM BICARBONATE 75 ML/HR: 84 INJECTION, SOLUTION INTRAVENOUS at 16:25

## 2024-12-10 RX ADMIN — SODIUM CHLORIDE: 9 INJECTION, SOLUTION INTRAVENOUS at 16:14

## 2024-12-10 RX ADMIN — MIDAZOLAM 1 MG: 1 INJECTION INTRAMUSCULAR; INTRAVENOUS at 16:24

## 2024-12-10 RX ADMIN — Medication 20 MG: at 16:37

## 2024-12-10 RX ADMIN — CEFAZOLIN 2 G: 1 INJECTION, POWDER, FOR SOLUTION INTRAMUSCULAR; INTRAVENOUS at 16:36

## 2024-12-10 ASSESSMENT — ENCOUNTER SYMPTOMS: SHORTNESS OF BREATH: 1

## 2024-12-10 NOTE — ANESTHESIA POSTPROCEDURE EVALUATION
Department of Anesthesiology  Postprocedure Note    Patient: Yolie Kay  MRN: [id number]  YOB: 1955  Date of evaluation: 12/10/2024    Procedure Summary       Date: 12/10/24 Room / Location: [address]    Anesthesia Start: 1615 Anesthesia Stop: 1656    Procedure: CYSTOSCOPY BILATERAL STENT PLACEMENT (Bilateral) Diagnosis:       Bilateral ureteral calculi      (Bilateral ureteral calculi [N20.1])    Surgeons: Eduard Taylor MD Responsible Provider: Anamaria Torres MD    Anesthesia Type: MAC ASA Status: 3            Anesthesia Type: No value filed.    Fly Phase I: Fly Score: 10    Fly Phase II:      Anesthesia Post Evaluation    Patient location during evaluation: PACU  Patient participation: complete - patient participated  Level of consciousness: awake  Airway patency: patent  Nausea & Vomiting: no nausea and no vomiting  Cardiovascular status: blood pressure returned to baseline  Respiratory status: acceptable  Hydration status: euvolemic  Pain management: adequate    No notable events documented.

## 2024-12-10 NOTE — ANESTHESIA PRE PROCEDURE
0142 12/10/24 0145 12/10/24 0412 12/10/24 0500   BP: (!) 156/68 (!) 148/61 (!) 168/86 (!) 150/69   Pulse: 94 93 90 79   Resp: 19 20 20 20   Temp: 97.8 °F (36.6 °C)  97.8 °F (36.6 °C)    TempSrc: Oral  Oral    SpO2: 94% 94% 96% 98%   Weight:  124.7 kg (275 lb)     Height:  1.549 m (5' 1\")                                                BP Readings from Last 3 Encounters:   12/10/24 (!) 150/69   12/09/24 (!) 177/73   05/24/23 (!) 146/76       NPO Status:                                                                                 BMI:   Wt Readings from Last 3 Encounters:   12/10/24 124.7 kg (275 lb)   12/09/24 124.7 kg (275 lb)   05/09/23 104.5 kg (230 lb 6.4 oz)     Body mass index is 51.96 kg/m².    CBC:   Lab Results   Component Value Date/Time    WBC 5.9 12/09/2024 05:05 PM    RBC 2.79 12/09/2024 05:05 PM    HGB 8.9 12/09/2024 05:05 PM    HCT 28.5 12/09/2024 05:05 PM    .2 12/09/2024 05:05 PM    RDW 14.7 12/09/2024 05:05 PM     12/09/2024 05:05 PM       CMP:   Lab Results   Component Value Date/Time     12/10/2024 05:05 AM    K 4.9 12/10/2024 05:05 AM    K 5.3 11/19/2022 05:38 AM     12/10/2024 05:05 AM    CO2 13 12/10/2024 05:05 AM    BUN 58 12/10/2024 05:05 AM    CREATININE 4.8 12/10/2024 05:05 AM    GFRAA >60 08/16/2022 05:42 AM    LABGLOM 9 12/10/2024 05:05 AM    LABGLOM 19 05/09/2023 06:29 AM    GLUCOSE 120 12/10/2024 05:05 AM    CALCIUM 8.2 12/10/2024 05:05 AM    BILITOT <0.2 12/10/2024 05:05 AM    ALKPHOS 49 12/10/2024 05:05 AM    AST 9 12/10/2024 05:05 AM    ALT <5 12/10/2024 05:05 AM       POC Tests: No results for input(s): \"POCGLU\", \"POCNA\", \"POCK\", \"POCCL\", \"POCBUN\", \"POCHEMO\", \"POCHCT\" in the last 72 hours.    Coags:   Lab Results   Component Value Date/Time    PROTIME 13.9 12/10/2024 05:05 AM    INR 1.1 12/10/2024 05:05 AM    APTT 26.1 05/05/2023 07:50 AM       HCG (If Applicable): No results found for: \"PREGTESTUR\", \"PREGSERUM\", \"HCG\", \"HCGQUANT\"     ABGs:   Lab Results

## 2024-12-12 LAB
MICROORGANISM SPEC CULT: ABNORMAL
SPECIMEN DESCRIPTION: ABNORMAL
